# Patient Record
Sex: MALE | Race: ASIAN | NOT HISPANIC OR LATINO | URBAN - METROPOLITAN AREA
[De-identification: names, ages, dates, MRNs, and addresses within clinical notes are randomized per-mention and may not be internally consistent; named-entity substitution may affect disease eponyms.]

---

## 2017-10-04 ENCOUNTER — EMERGENCY (EMERGENCY)
Facility: HOSPITAL | Age: 47
LOS: 1 days | Discharge: HOME | End: 2017-10-04
Attending: EMERGENCY MEDICINE | Admitting: EMERGENCY MEDICINE
Payer: MEDICAID

## 2017-10-04 VITALS
OXYGEN SATURATION: 100 % | TEMPERATURE: 98 F | SYSTOLIC BLOOD PRESSURE: 133 MMHG | DIASTOLIC BLOOD PRESSURE: 98 MMHG | HEART RATE: 89 BPM | RESPIRATION RATE: 16 BRPM

## 2017-10-04 VITALS
RESPIRATION RATE: 16 BRPM | SYSTOLIC BLOOD PRESSURE: 141 MMHG | HEART RATE: 93 BPM | DIASTOLIC BLOOD PRESSURE: 100 MMHG | TEMPERATURE: 98 F | OXYGEN SATURATION: 100 %

## 2017-10-04 PROCEDURE — 99284 EMERGENCY DEPT VISIT MOD MDM: CPT

## 2017-10-04 RX ORDER — LOPERAMIDE HCL 2 MG
2 TABLET ORAL ONCE
Qty: 0 | Refills: 0 | Status: COMPLETED | OUTPATIENT
Start: 2017-10-04 | End: 2017-10-04

## 2017-10-04 RX ORDER — SODIUM CHLORIDE 9 MG/ML
1000 INJECTION INTRAMUSCULAR; INTRAVENOUS; SUBCUTANEOUS ONCE
Qty: 0 | Refills: 0 | Status: COMPLETED | OUTPATIENT
Start: 2017-10-04 | End: 2017-10-04

## 2017-10-04 NOTE — ED ADULT NURSE NOTE - CHIEF COMPLAINT QUOTE
Pt comes in for c/o abd pain that began since his return from [facility] 9/18/17. Pt reports that he was initially only having LLQ pain, now is having diarrhea, multiple episodes today. Pt reports feeling weak, appears in no acute distress, vs as noted.

## 2017-10-04 NOTE — ED ADULT TRIAGE NOTE - CHIEF COMPLAINT QUOTE
Pt comes in for c/o abd pain that began since his return from [facility] 9/18/17. Pt reports that he was initially only having LLQ pain, now is having diarrhea, multiple episodes today. Pt reports feeling weak, appears in no acute distress, vs as noted.

## 2017-10-05 LAB
ALBUMIN SERPL ELPH-MCNC: 4.3 G/DL — SIGNIFICANT CHANGE UP (ref 3.3–5)
ALP SERPL-CCNC: 60 U/L — SIGNIFICANT CHANGE UP (ref 40–120)
ALT FLD-CCNC: 26 U/L — SIGNIFICANT CHANGE UP (ref 4–41)
AST SERPL-CCNC: 24 U/L — SIGNIFICANT CHANGE UP (ref 4–40)
BASOPHILS # BLD AUTO: 0.05 K/UL — SIGNIFICANT CHANGE UP (ref 0–0.2)
BASOPHILS NFR BLD AUTO: 0.7 % — SIGNIFICANT CHANGE UP (ref 0–2)
BILIRUB SERPL-MCNC: 0.8 MG/DL — SIGNIFICANT CHANGE UP (ref 0.2–1.2)
BUN SERPL-MCNC: 12 MG/DL — SIGNIFICANT CHANGE UP (ref 7–23)
CALCIUM SERPL-MCNC: 8.8 MG/DL — SIGNIFICANT CHANGE UP (ref 8.4–10.5)
CHLORIDE SERPL-SCNC: 98 MMOL/L — SIGNIFICANT CHANGE UP (ref 98–107)
CO2 SERPL-SCNC: 26 MMOL/L — SIGNIFICANT CHANGE UP (ref 22–31)
CREAT SERPL-MCNC: 0.89 MG/DL — SIGNIFICANT CHANGE UP (ref 0.5–1.3)
EOSINOPHIL # BLD AUTO: 0.25 K/UL — SIGNIFICANT CHANGE UP (ref 0–0.5)
EOSINOPHIL NFR BLD AUTO: 3.7 % — SIGNIFICANT CHANGE UP (ref 0–6)
GLUCOSE SERPL-MCNC: 178 MG/DL — HIGH (ref 70–99)
HCT VFR BLD CALC: 47.7 % — SIGNIFICANT CHANGE UP (ref 39–50)
HGB BLD-MCNC: 16.3 G/DL — SIGNIFICANT CHANGE UP (ref 13–17)
IMM GRANULOCYTES # BLD AUTO: 0.02 # — SIGNIFICANT CHANGE UP
IMM GRANULOCYTES NFR BLD AUTO: 0.3 % — SIGNIFICANT CHANGE UP (ref 0–1.5)
LYMPHOCYTES # BLD AUTO: 2.31 K/UL — SIGNIFICANT CHANGE UP (ref 1–3.3)
LYMPHOCYTES # BLD AUTO: 34.3 % — SIGNIFICANT CHANGE UP (ref 13–44)
MCHC RBC-ENTMCNC: 29.1 PG — SIGNIFICANT CHANGE UP (ref 27–34)
MCHC RBC-ENTMCNC: 34.2 % — SIGNIFICANT CHANGE UP (ref 32–36)
MCV RBC AUTO: 85.2 FL — SIGNIFICANT CHANGE UP (ref 80–100)
MONOCYTES # BLD AUTO: 0.79 K/UL — SIGNIFICANT CHANGE UP (ref 0–0.9)
MONOCYTES NFR BLD AUTO: 11.7 % — SIGNIFICANT CHANGE UP (ref 2–14)
NEUTROPHILS # BLD AUTO: 3.31 K/UL — SIGNIFICANT CHANGE UP (ref 1.8–7.4)
NEUTROPHILS NFR BLD AUTO: 49.3 % — SIGNIFICANT CHANGE UP (ref 43–77)
NRBC # FLD: 0 — SIGNIFICANT CHANGE UP
PLATELET # BLD AUTO: 193 K/UL — SIGNIFICANT CHANGE UP (ref 150–400)
PMV BLD: 10.9 FL — SIGNIFICANT CHANGE UP (ref 7–13)
POTASSIUM SERPL-MCNC: 4.1 MMOL/L — SIGNIFICANT CHANGE UP (ref 3.5–5.3)
POTASSIUM SERPL-SCNC: 4.1 MMOL/L — SIGNIFICANT CHANGE UP (ref 3.5–5.3)
PROT SERPL-MCNC: 7.5 G/DL — SIGNIFICANT CHANGE UP (ref 6–8.3)
RBC # BLD: 5.6 M/UL — SIGNIFICANT CHANGE UP (ref 4.2–5.8)
RBC # FLD: 11.5 % — SIGNIFICANT CHANGE UP (ref 10.3–14.5)
SODIUM SERPL-SCNC: 136 MMOL/L — SIGNIFICANT CHANGE UP (ref 135–145)
WBC # BLD: 6.73 K/UL — SIGNIFICANT CHANGE UP (ref 3.8–10.5)
WBC # FLD AUTO: 6.73 K/UL — SIGNIFICANT CHANGE UP (ref 3.8–10.5)

## 2017-10-05 RX ADMIN — SODIUM CHLORIDE 2000 MILLILITER(S): 9 INJECTION INTRAMUSCULAR; INTRAVENOUS; SUBCUTANEOUS at 00:06

## 2017-10-05 RX ADMIN — Medication 2 MILLIGRAM(S): at 00:47

## 2017-10-05 NOTE — ED PROVIDER NOTE - OBJECTIVE STATEMENT
This is a 48yo male with DM presenting with 2 weeks of abdominal pain. Patient states that he was recently abroad in [facility] for 17 days and [place] to [place] he began experiencing abdominal pain. Patient describes the pain as sharp/burning and intermittent lasting between 20-30 minutes after eating. The pain is located in LLQ with no radiation. Additionally, patient states that he had subjective fevers and chills at home on Saturday/Sunday and developed a non-productive cough on Sunday. Last night, patient states that he developed diarrhea and had about 6-7 bowel movements. Denies nausea/vomiting, sob, cp, melena/hematochezia, dysuria, constipation.

## 2017-10-05 NOTE — ED PROVIDER NOTE - ATTENDING CONTRIBUTION TO CARE
DR. CARDENAS, ATTENDING MD-  I performed a face to face bedside interview with patient regarding history of present illness, review of symptoms and past medical history. I completed an independent physical exam.  I have discussed patient's plan of care with the resident.   Documentation as above in the note.    46 y/o male recently returned to [place] after visiting [facility] with about 2 wks of llq discomfort, yesterday developed diarrhea without mucus / blood.  Denies f/c, ha, neck stiffness, cp, sob, cough, n/v, dysuria, rash.  Afebrile, vs wnl, dry mm, fatigued, ctabil, s1s2 rrr no m/r/g, abd soft non ttp no r/g, no cva tenderness b/l, no leg swelling b/l, no rash.    Benign abd, likely viral syndrome vs foodborne illness.  Eval for lyte abn.  Obtain cbc, bmp, give ivf bolus, antidiarrheal, reassess.

## 2017-10-05 NOTE — ED PROVIDER NOTE - CHPI ED SYMPTOMS NEG
no dysuria/no chills/no burning urination/no blood in stool/no hematuria/no nausea/no abdominal distension/no vomiting

## 2020-03-18 NOTE — ED ADULT NURSE NOTE - PMH
You have been scheduled for Tuesday, July 14, 2020 09:00 AM with Dr Richard Thomas. No pertinent past medical history

## 2021-03-31 NOTE — ED PROVIDER NOTE - TOBACCO USE
Bexarotene Counseling:  I discussed with the patient the risks of bexarotene including but not limited to hair loss, dry lips/skin/eyes, liver abnormalities, hyperlipidemia, pancreatitis, depression/suicidal ideation, photosensitivity, drug rash/allergic reactions, hypothyroidism, anemia, leukopenia, infection, cataracts, and teratogenicity.  Patient understands that they will need regular blood tests to check lipid profile, liver function tests, white blood cell count, thyroid function tests and pregnancy test if applicable. Current every day smoker

## 2022-05-14 ENCOUNTER — EMERGENCY (EMERGENCY)
Facility: HOSPITAL | Age: 52
LOS: 1 days | Discharge: HOME | End: 2022-05-14
Attending: EMERGENCY MEDICINE | Admitting: EMERGENCY MEDICINE
Payer: MEDICAID

## 2022-05-14 VITALS
RESPIRATION RATE: 18 BRPM | SYSTOLIC BLOOD PRESSURE: 142 MMHG | HEART RATE: 87 BPM | OXYGEN SATURATION: 100 % | DIASTOLIC BLOOD PRESSURE: 84 MMHG

## 2022-05-14 VITALS
OXYGEN SATURATION: 100 % | RESPIRATION RATE: 18 BRPM | SYSTOLIC BLOOD PRESSURE: 159 MMHG | TEMPERATURE: 98 F | DIASTOLIC BLOOD PRESSURE: 90 MMHG | HEART RATE: 84 BPM

## 2022-05-14 LAB
ALBUMIN SERPL ELPH-MCNC: 4.2 G/DL — SIGNIFICANT CHANGE UP (ref 3.3–5)
ALP SERPL-CCNC: 82 U/L — SIGNIFICANT CHANGE UP (ref 40–120)
ALT FLD-CCNC: 23 U/L — SIGNIFICANT CHANGE UP (ref 4–41)
ANION GAP SERPL CALC-SCNC: 14 MMOL/L — SIGNIFICANT CHANGE UP (ref 7–14)
AST SERPL-CCNC: 15 U/L — SIGNIFICANT CHANGE UP (ref 4–40)
BASOPHILS # BLD AUTO: 0.06 K/UL — SIGNIFICANT CHANGE UP (ref 0–0.2)
BASOPHILS NFR BLD AUTO: 0.7 % — SIGNIFICANT CHANGE UP (ref 0–2)
BILIRUB SERPL-MCNC: 0.5 MG/DL — SIGNIFICANT CHANGE UP (ref 0.2–1.2)
BLOOD GAS VENOUS COMPREHENSIVE RESULT: SIGNIFICANT CHANGE UP
BUN SERPL-MCNC: 14 MG/DL — SIGNIFICANT CHANGE UP (ref 7–23)
CALCIUM SERPL-MCNC: 9.3 MG/DL — SIGNIFICANT CHANGE UP (ref 8.4–10.5)
CHLORIDE SERPL-SCNC: 96 MMOL/L — LOW (ref 98–107)
CO2 SERPL-SCNC: 22 MMOL/L — SIGNIFICANT CHANGE UP (ref 22–31)
CREAT SERPL-MCNC: 0.8 MG/DL — SIGNIFICANT CHANGE UP (ref 0.5–1.3)
EGFR: 108 ML/MIN/1.73M2 — SIGNIFICANT CHANGE UP
EOSINOPHIL # BLD AUTO: 0.26 K/UL — SIGNIFICANT CHANGE UP (ref 0–0.5)
EOSINOPHIL NFR BLD AUTO: 2.9 % — SIGNIFICANT CHANGE UP (ref 0–6)
GLUCOSE SERPL-MCNC: 359 MG/DL — HIGH (ref 70–99)
HCT VFR BLD CALC: 46.9 % — SIGNIFICANT CHANGE UP (ref 39–50)
HGB BLD-MCNC: 16.3 G/DL — SIGNIFICANT CHANGE UP (ref 13–17)
IANC: 5.89 K/UL — SIGNIFICANT CHANGE UP (ref 1.8–7.4)
IMM GRANULOCYTES NFR BLD AUTO: 0.2 % — SIGNIFICANT CHANGE UP (ref 0–1.5)
LYMPHOCYTES # BLD AUTO: 2.05 K/UL — SIGNIFICANT CHANGE UP (ref 1–3.3)
LYMPHOCYTES # BLD AUTO: 23.1 % — SIGNIFICANT CHANGE UP (ref 13–44)
MCHC RBC-ENTMCNC: 29.3 PG — SIGNIFICANT CHANGE UP (ref 27–34)
MCHC RBC-ENTMCNC: 34.8 GM/DL — SIGNIFICANT CHANGE UP (ref 32–36)
MCV RBC AUTO: 84.2 FL — SIGNIFICANT CHANGE UP (ref 80–100)
MONOCYTES # BLD AUTO: 0.6 K/UL — SIGNIFICANT CHANGE UP (ref 0–0.9)
MONOCYTES NFR BLD AUTO: 6.8 % — SIGNIFICANT CHANGE UP (ref 2–14)
NEUTROPHILS # BLD AUTO: 5.89 K/UL — SIGNIFICANT CHANGE UP (ref 1.8–7.4)
NEUTROPHILS NFR BLD AUTO: 66.3 % — SIGNIFICANT CHANGE UP (ref 43–77)
NRBC # BLD: 0 /100 WBCS — SIGNIFICANT CHANGE UP
NRBC # FLD: 0 K/UL — SIGNIFICANT CHANGE UP
PLATELET # BLD AUTO: 234 K/UL — SIGNIFICANT CHANGE UP (ref 150–400)
POTASSIUM SERPL-MCNC: 4.1 MMOL/L — SIGNIFICANT CHANGE UP (ref 3.5–5.3)
POTASSIUM SERPL-SCNC: 4.1 MMOL/L — SIGNIFICANT CHANGE UP (ref 3.5–5.3)
PROT SERPL-MCNC: 7.6 G/DL — SIGNIFICANT CHANGE UP (ref 6–8.3)
RBC # BLD: 5.57 M/UL — SIGNIFICANT CHANGE UP (ref 4.2–5.8)
RBC # FLD: 11.9 % — SIGNIFICANT CHANGE UP (ref 10.3–14.5)
SODIUM SERPL-SCNC: 132 MMOL/L — LOW (ref 135–145)
WBC # BLD: 8.88 K/UL — SIGNIFICANT CHANGE UP (ref 3.8–10.5)
WBC # FLD AUTO: 8.88 K/UL — SIGNIFICANT CHANGE UP (ref 3.8–10.5)

## 2022-05-14 PROCEDURE — 99284 EMERGENCY DEPT VISIT MOD MDM: CPT

## 2022-05-14 RX ORDER — FAMOTIDINE 10 MG/ML
20 INJECTION INTRAVENOUS ONCE
Refills: 0 | Status: COMPLETED | OUTPATIENT
Start: 2022-05-14 | End: 2022-05-14

## 2022-05-14 RX ORDER — SODIUM CHLORIDE 9 MG/ML
1000 INJECTION, SOLUTION INTRAVENOUS ONCE
Refills: 0 | Status: COMPLETED | OUTPATIENT
Start: 2022-05-14 | End: 2022-05-14

## 2022-05-14 RX ORDER — ACETAMINOPHEN 500 MG
975 TABLET ORAL ONCE
Refills: 0 | Status: COMPLETED | OUTPATIENT
Start: 2022-05-14 | End: 2022-05-14

## 2022-05-14 RX ADMIN — FAMOTIDINE 20 MILLIGRAM(S): 10 INJECTION INTRAVENOUS at 02:57

## 2022-05-14 RX ADMIN — SODIUM CHLORIDE 1000 MILLILITER(S): 9 INJECTION, SOLUTION INTRAVENOUS at 02:57

## 2022-05-14 RX ADMIN — Medication 975 MILLIGRAM(S): at 02:57

## 2022-05-14 NOTE — ED PROVIDER NOTE - PHYSICAL EXAMINATION
General appearance: NAD, conversant, afebrile    Eyes: anicteric sclerae, ERICK, EOMI   HENT: Atraumatic; oropharynx clear, MMM and no ulcerations, no pharyngeal erythema or exudate   Neck: Trachea midline; Full range of motion, supple   Pulm: CTA bl, normal respiratory effort and no intercostal retractions, normal work of breathing   CV: RRR, No murmurs, rubs, or gallops   Abdomen: Soft, non-tender, non-distended; no guarding or rebound   Back: No cvat   Extremities: No peripheral edema    Skin: Dry, normal temperature, turgor and texture; no rash

## 2022-05-14 NOTE — ED PROVIDER NOTE - CLINICAL SUMMARY MEDICAL DECISION MAKING FREE TEXT BOX
50yo male presenting with generalized abdominal pain, hyperglycemia.  Nontender on exam.  Low suspicion acute surgical process.  Will check labs and blood gas.   Clinically low suspicion dka.  Medicate with gi cocktail and reassess. 52yo male presenting with generalized abdominal pain, hyperglycemia.  Nontender on exam.  Low suspicion acute surgical process.  Will check labs and blood gas.   Clinically low suspicion dka.  Medicate with gi cocktail and reassess.    elizabetholesya: 50 yo man with recent travel from [facility] c/o generailze abd pain.  denies diarrhea/ fever/ vomiting.  has dm/ htn, on janumet.  abd soft no r/g.  pt lucid and awake.  labs being checked, does not appear to need ct based on exam

## 2022-05-14 NOTE — ED PROVIDER NOTE - PROGRESS NOTE DETAILS
[place]enn[place] PGY3: patient notes improvement in pain.  Tolerating po.  Reviewed results with patient and plan for dc.  Has pmd to follow up with, answered questions.

## 2022-05-14 NOTE — ED PROVIDER NOTE - PATIENT PORTAL LINK FT
You can access the FollowMyHealth Patient Portal offered by [facility] by registering at the following website: [email or web address]. By joining [employer]’s FollowMyHealth portal, you will also be able to view your health information using other applications (apps) compatible with our system.

## 2022-05-14 NOTE — ED PROVIDER NOTE - OBJECTIVE STATEMENT
50yo male with pmh dm, htn, hld, presenting with generalized abdominal pain.  Started 4 days ago and persistent, progressively worsening.  Has not taken anything for it.  No vomiting, nausea, diarrhea.  Did have constipation earlier today.  Recently returned from [facility] last sunday.  No chest pain, back pain, sob, urinary symptoms.  Noted to be hyperglycemic, takes janumet but did not take dose today.  No pshx.

## 2022-05-14 NOTE — ED PROVIDER NOTE - NSFOLLOWUPINSTRUCTIONS_ED_ALL_ED_FT
Rest, drink plenty of fluids  Advance activity as tolerated  Continue all previously prescribed medications as directed  Follow up with your PMD - bring copies of your results  Return to the ER for severe abdominal pain, excessive vomiting, or other new or concerning symptoms   For pain you may take famotidine 20mg every 12 hours as needed  You may also take maalox 20ml every 6 hours as needed  You may also take acetaminophen 975mg every 6 hours as needed  For constipation you may take miralax 17g daily as needed

## 2022-05-14 NOTE — ED ADULT NURSE NOTE - OBJECTIVE STATEMENT
A&OX4 ambulatory self care 51 year old female presenting to the ed today from home for abdominal pain, hx diverticulitis, pt states he has been having abdominal pain x1 week with no nausea no vomiting, no chest pain no diarrhea. 20g iv placed in right ac. labs drawn and sent.

## 2022-05-14 NOTE — ED PROVIDER NOTE - ATTENDING CONTRIBUTION TO CARE
eitan: 52 yo man with recent travel from [facility] c/o generailze abd pain.  denies diarrhea/ fever/ vomiting.  has dm/ htn, on janumet.  abd soft no r/g.  pt lucid and awake.  labs being checked, does not appear to need ct based on exam    I performed a history and physical exam of the patient and discussed their management with the resident and /or advanced care provider. I reviewed the resident and /or ACP's note and agree with the documented findings and plan of care. My medical decison making and observations are found above.

## 2022-05-14 NOTE — ED ADULT TRIAGE NOTE - CHIEF COMPLAINT QUOTE
C/o generalized abd pain 1x week. Denies nausea, vomiting, diarrhea, constipation, fever or chills. PMH DM2,  at triage. Pt states did not take medication today

## 2022-12-10 ENCOUNTER — INPATIENT (INPATIENT)
Facility: HOSPITAL | Age: 52
LOS: 15 days | Discharge: HOME HEALTH | DRG: 234 | End: 2022-12-26
Attending: THORACIC SURGERY (CARDIOTHORACIC VASCULAR SURGERY) | Admitting: INTERNAL MEDICINE
Payer: MEDICAID

## 2022-12-10 ENCOUNTER — EMERGENCY (EMERGENCY)
Facility: HOSPITAL | Age: 52
LOS: 0 days | Discharge: ANOTHER HOSPITAL | End: 2022-12-10
Attending: STUDENT IN AN ORGANIZED HEALTH CARE EDUCATION/TRAINING PROGRAM

## 2022-12-10 VITALS
RESPIRATION RATE: 18 BRPM | WEIGHT: 149.91 LBS | OXYGEN SATURATION: 95 % | TEMPERATURE: 98 F | SYSTOLIC BLOOD PRESSURE: 125 MMHG | HEART RATE: 93 BPM | DIASTOLIC BLOOD PRESSURE: 79 MMHG

## 2022-12-10 VITALS
SYSTOLIC BLOOD PRESSURE: 125 MMHG | DIASTOLIC BLOOD PRESSURE: 80 MMHG | TEMPERATURE: 98 F | RESPIRATION RATE: 25 BRPM | OXYGEN SATURATION: 98 % | HEART RATE: 97 BPM

## 2022-12-10 VITALS
HEIGHT: 69 IN | HEART RATE: 92 BPM | DIASTOLIC BLOOD PRESSURE: 90 MMHG | TEMPERATURE: 99 F | RESPIRATION RATE: 16 BRPM | SYSTOLIC BLOOD PRESSURE: 150 MMHG | WEIGHT: 149.91 LBS | OXYGEN SATURATION: 96 %

## 2022-12-10 DIAGNOSIS — R07.89 OTHER CHEST PAIN: ICD-10-CM

## 2022-12-10 DIAGNOSIS — Z20.822 CONTACT WITH AND (SUSPECTED) EXPOSURE TO COVID-19: ICD-10-CM

## 2022-12-10 DIAGNOSIS — I25.10 ATHEROSCLEROTIC HEART DISEASE OF NATIVE CORONARY ARTERY WITHOUT ANGINA PECTORIS: ICD-10-CM

## 2022-12-10 DIAGNOSIS — R61 GENERALIZED HYPERHIDROSIS: ICD-10-CM

## 2022-12-10 DIAGNOSIS — E11.9 TYPE 2 DIABETES MELLITUS WITHOUT COMPLICATIONS: ICD-10-CM

## 2022-12-10 DIAGNOSIS — I21.4 NON-ST ELEVATION (NSTEMI) MYOCARDIAL INFARCTION: ICD-10-CM

## 2022-12-10 DIAGNOSIS — R94.31 ABNORMAL ELECTROCARDIOGRAM [ECG] [EKG]: ICD-10-CM

## 2022-12-10 DIAGNOSIS — I10 ESSENTIAL (PRIMARY) HYPERTENSION: ICD-10-CM

## 2022-12-10 DIAGNOSIS — I77.0 ARTERIOVENOUS FISTULA, ACQUIRED: ICD-10-CM

## 2022-12-10 LAB
A1C WITH ESTIMATED AVERAGE GLUCOSE RESULT: 8.8 % — HIGH (ref 4–5.6)
ALBUMIN SERPL ELPH-MCNC: 3.8 G/DL — SIGNIFICANT CHANGE UP (ref 3.3–5)
ALBUMIN SERPL ELPH-MCNC: 4.1 G/DL — SIGNIFICANT CHANGE UP (ref 3.3–5)
ALBUMIN SERPL ELPH-MCNC: 4.5 G/DL — SIGNIFICANT CHANGE UP (ref 3.3–5)
ALP SERPL-CCNC: 62 U/L — SIGNIFICANT CHANGE UP (ref 40–120)
ALP SERPL-CCNC: 64 U/L — SIGNIFICANT CHANGE UP (ref 40–120)
ALP SERPL-CCNC: 68 U/L — SIGNIFICANT CHANGE UP (ref 40–120)
ALT FLD-CCNC: 25 U/L — SIGNIFICANT CHANGE UP (ref 12–78)
ALT FLD-CCNC: 26 U/L — SIGNIFICANT CHANGE UP (ref 10–45)
ALT FLD-CCNC: 30 U/L — SIGNIFICANT CHANGE UP (ref 10–45)
ANION GAP SERPL CALC-SCNC: 13 MMOL/L — SIGNIFICANT CHANGE UP (ref 5–17)
ANION GAP SERPL CALC-SCNC: 14 MMOL/L — SIGNIFICANT CHANGE UP (ref 5–17)
ANION GAP SERPL CALC-SCNC: 9 MMOL/L — SIGNIFICANT CHANGE UP (ref 5–17)
APTT BLD: 128 SEC — CRITICAL HIGH (ref 27.5–35.5)
APTT BLD: 35.4 SEC — SIGNIFICANT CHANGE UP (ref 27.5–35.5)
APTT BLD: 48.1 SEC — HIGH (ref 27.5–35.5)
AST SERPL-CCNC: 138 U/L — HIGH (ref 10–40)
AST SERPL-CCNC: 18 U/L — SIGNIFICANT CHANGE UP (ref 15–37)
AST SERPL-CCNC: 71 U/L — HIGH (ref 10–40)
BASOPHILS # BLD AUTO: 0.05 K/UL — SIGNIFICANT CHANGE UP (ref 0–0.2)
BASOPHILS # BLD AUTO: 0.09 K/UL — SIGNIFICANT CHANGE UP (ref 0–0.2)
BASOPHILS NFR BLD AUTO: 0.5 % — SIGNIFICANT CHANGE UP (ref 0–2)
BASOPHILS NFR BLD AUTO: 0.6 % — SIGNIFICANT CHANGE UP (ref 0–2)
BILIRUB SERPL-MCNC: 0.4 MG/DL — SIGNIFICANT CHANGE UP (ref 0.2–1.2)
BILIRUB SERPL-MCNC: 0.4 MG/DL — SIGNIFICANT CHANGE UP (ref 0.2–1.2)
BILIRUB SERPL-MCNC: 0.5 MG/DL — SIGNIFICANT CHANGE UP (ref 0.2–1.2)
BLD GP AB SCN SERPL QL: NEGATIVE — SIGNIFICANT CHANGE UP
BUN SERPL-MCNC: 16 MG/DL — SIGNIFICANT CHANGE UP (ref 7–23)
BUN SERPL-MCNC: 19 MG/DL — SIGNIFICANT CHANGE UP (ref 7–23)
BUN SERPL-MCNC: 19 MG/DL — SIGNIFICANT CHANGE UP (ref 7–23)
CALCIUM SERPL-MCNC: 8.8 MG/DL — SIGNIFICANT CHANGE UP (ref 8.4–10.5)
CALCIUM SERPL-MCNC: 9.1 MG/DL — SIGNIFICANT CHANGE UP (ref 8.5–10.1)
CALCIUM SERPL-MCNC: 9.3 MG/DL — SIGNIFICANT CHANGE UP (ref 8.4–10.5)
CHLORIDE SERPL-SCNC: 101 MMOL/L — SIGNIFICANT CHANGE UP (ref 96–108)
CHLORIDE SERPL-SCNC: 101 MMOL/L — SIGNIFICANT CHANGE UP (ref 96–108)
CHLORIDE SERPL-SCNC: 102 MMOL/L — SIGNIFICANT CHANGE UP (ref 96–108)
CHOLEST SERPL-MCNC: 119 MG/DL — SIGNIFICANT CHANGE UP
CK MB BLD-MCNC: 7.5 % — HIGH (ref 0–3.5)
CK MB BLD-MCNC: 8.2 % — HIGH (ref 0–3.5)
CK MB BLD-MCNC: 8.8 % — HIGH (ref 0–3.5)
CK MB CFR SERPL CALC: 101.4 NG/ML — HIGH (ref 0–6.7)
CK MB CFR SERPL CALC: 47.4 NG/ML — HIGH (ref 0–6.7)
CK MB CFR SERPL CALC: 79.9 NG/ML — HIGH (ref 0–6.7)
CK SERPL-CCNC: 1059 U/L — HIGH (ref 30–200)
CK SERPL-CCNC: 1149 U/L — HIGH (ref 30–200)
CK SERPL-CCNC: 576 U/L — HIGH (ref 30–200)
CO2 SERPL-SCNC: 21 MMOL/L — LOW (ref 22–31)
CO2 SERPL-SCNC: 23 MMOL/L — SIGNIFICANT CHANGE UP (ref 22–31)
CO2 SERPL-SCNC: 25 MMOL/L — SIGNIFICANT CHANGE UP (ref 22–31)
CREAT SERPL-MCNC: 0.71 MG/DL — SIGNIFICANT CHANGE UP (ref 0.5–1.3)
CREAT SERPL-MCNC: 0.84 MG/DL — SIGNIFICANT CHANGE UP (ref 0.5–1.3)
CREAT SERPL-MCNC: 1.14 MG/DL — SIGNIFICANT CHANGE UP (ref 0.5–1.3)
EGFR: 106 ML/MIN/1.73M2 — SIGNIFICANT CHANGE UP
EGFR: 111 ML/MIN/1.73M2 — SIGNIFICANT CHANGE UP
EGFR: 78 ML/MIN/1.73M2 — SIGNIFICANT CHANGE UP
EOSINOPHIL # BLD AUTO: 0.09 K/UL — SIGNIFICANT CHANGE UP (ref 0–0.5)
EOSINOPHIL # BLD AUTO: 0.23 K/UL — SIGNIFICANT CHANGE UP (ref 0–0.5)
EOSINOPHIL NFR BLD AUTO: 0.8 % — SIGNIFICANT CHANGE UP (ref 0–6)
EOSINOPHIL NFR BLD AUTO: 1.6 % — SIGNIFICANT CHANGE UP (ref 0–6)
ESTIMATED AVERAGE GLUCOSE: 206 MG/DL — HIGH (ref 68–114)
FLUAV AG NPH QL: SIGNIFICANT CHANGE UP
FLUBV AG NPH QL: SIGNIFICANT CHANGE UP
GLUCOSE BLDC GLUCOMTR-MCNC: 153 MG/DL — HIGH (ref 70–99)
GLUCOSE BLDC GLUCOMTR-MCNC: 193 MG/DL — HIGH (ref 70–99)
GLUCOSE SERPL-MCNC: 121 MG/DL — HIGH (ref 70–99)
GLUCOSE SERPL-MCNC: 188 MG/DL — HIGH (ref 70–99)
GLUCOSE SERPL-MCNC: 231 MG/DL — HIGH (ref 70–99)
HCT VFR BLD CALC: 47.4 % — SIGNIFICANT CHANGE UP (ref 39–50)
HCT VFR BLD CALC: 48.4 % — SIGNIFICANT CHANGE UP (ref 39–50)
HCT VFR BLD CALC: 49.1 % — SIGNIFICANT CHANGE UP (ref 39–50)
HDLC SERPL-MCNC: 37 MG/DL — LOW
HGB BLD-MCNC: 16.1 G/DL — SIGNIFICANT CHANGE UP (ref 13–17)
HGB BLD-MCNC: 16.2 G/DL — SIGNIFICANT CHANGE UP (ref 13–17)
HGB BLD-MCNC: 16.6 G/DL — SIGNIFICANT CHANGE UP (ref 13–17)
IMM GRANULOCYTES NFR BLD AUTO: 0.5 % — SIGNIFICANT CHANGE UP (ref 0–0.9)
IMM GRANULOCYTES NFR BLD AUTO: 0.6 % — SIGNIFICANT CHANGE UP (ref 0–0.9)
INR BLD: 1.08 RATIO — SIGNIFICANT CHANGE UP (ref 0.88–1.16)
INR BLD: 1.15 RATIO — SIGNIFICANT CHANGE UP (ref 0.88–1.16)
INR BLD: 1.18 RATIO — HIGH (ref 0.88–1.16)
LIPID PNL WITH DIRECT LDL SERPL: 67 MG/DL — SIGNIFICANT CHANGE UP
LYMPHOCYTES # BLD AUTO: 1.77 K/UL — SIGNIFICANT CHANGE UP (ref 1–3.3)
LYMPHOCYTES # BLD AUTO: 15.1 % — SIGNIFICANT CHANGE UP (ref 13–44)
LYMPHOCYTES # BLD AUTO: 15.9 % — SIGNIFICANT CHANGE UP (ref 13–44)
LYMPHOCYTES # BLD AUTO: 2.12 K/UL — SIGNIFICANT CHANGE UP (ref 1–3.3)
MAGNESIUM SERPL-MCNC: 2.2 MG/DL — SIGNIFICANT CHANGE UP (ref 1.6–2.6)
MCHC RBC-ENTMCNC: 28.5 PG — SIGNIFICANT CHANGE UP (ref 27–34)
MCHC RBC-ENTMCNC: 28.9 PG — SIGNIFICANT CHANGE UP (ref 27–34)
MCHC RBC-ENTMCNC: 29.2 PG — SIGNIFICANT CHANGE UP (ref 27–34)
MCHC RBC-ENTMCNC: 33.3 GM/DL — SIGNIFICANT CHANGE UP (ref 32–36)
MCHC RBC-ENTMCNC: 33.8 G/DL — SIGNIFICANT CHANGE UP (ref 32–36)
MCHC RBC-ENTMCNC: 34.2 GM/DL — SIGNIFICANT CHANGE UP (ref 32–36)
MCV RBC AUTO: 84.4 FL — SIGNIFICANT CHANGE UP (ref 80–100)
MCV RBC AUTO: 85.6 FL — SIGNIFICANT CHANGE UP (ref 80–100)
MCV RBC AUTO: 86.9 FL — SIGNIFICANT CHANGE UP (ref 80–100)
MONOCYTES # BLD AUTO: 0.29 K/UL — SIGNIFICANT CHANGE UP (ref 0–0.9)
MONOCYTES # BLD AUTO: 0.7 K/UL — SIGNIFICANT CHANGE UP (ref 0–0.9)
MONOCYTES NFR BLD AUTO: 2.6 % — SIGNIFICANT CHANGE UP (ref 2–14)
MONOCYTES NFR BLD AUTO: 5 % — SIGNIFICANT CHANGE UP (ref 2–14)
NEUTROPHILS # BLD AUTO: 10.85 K/UL — HIGH (ref 1.8–7.4)
NEUTROPHILS # BLD AUTO: 8.85 K/UL — HIGH (ref 1.8–7.4)
NEUTROPHILS NFR BLD AUTO: 77.1 % — HIGH (ref 43–77)
NEUTROPHILS NFR BLD AUTO: 79.7 % — HIGH (ref 43–77)
NON HDL CHOLESTEROL: 82 MG/DL — SIGNIFICANT CHANGE UP
NRBC # BLD: 0 /100 WBCS — SIGNIFICANT CHANGE UP (ref 0–0)
NT-PROBNP SERPL-SCNC: 191 PG/ML — HIGH (ref 0–125)
PA ADP PRP-ACNC: 41 PRU — LOW (ref 194–417)
PHOSPHATE SERPL-MCNC: 3.6 MG/DL — SIGNIFICANT CHANGE UP (ref 2.5–4.5)
PLATELET # BLD AUTO: 209 K/UL — SIGNIFICANT CHANGE UP (ref 150–400)
PLATELET # BLD AUTO: 209 K/UL — SIGNIFICANT CHANGE UP (ref 150–400)
PLATELET # BLD AUTO: 214 K/UL — SIGNIFICANT CHANGE UP (ref 150–400)
POTASSIUM SERPL-MCNC: 3.7 MMOL/L — SIGNIFICANT CHANGE UP (ref 3.5–5.3)
POTASSIUM SERPL-MCNC: 3.7 MMOL/L — SIGNIFICANT CHANGE UP (ref 3.5–5.3)
POTASSIUM SERPL-MCNC: 4 MMOL/L — SIGNIFICANT CHANGE UP (ref 3.5–5.3)
POTASSIUM SERPL-SCNC: 3.7 MMOL/L — SIGNIFICANT CHANGE UP (ref 3.5–5.3)
POTASSIUM SERPL-SCNC: 3.7 MMOL/L — SIGNIFICANT CHANGE UP (ref 3.5–5.3)
POTASSIUM SERPL-SCNC: 4 MMOL/L — SIGNIFICANT CHANGE UP (ref 3.5–5.3)
PROT SERPL-MCNC: 6.9 G/DL — SIGNIFICANT CHANGE UP (ref 6–8.3)
PROT SERPL-MCNC: 7.2 GM/DL — SIGNIFICANT CHANGE UP (ref 6–8.3)
PROT SERPL-MCNC: 7.3 G/DL — SIGNIFICANT CHANGE UP (ref 6–8.3)
PROTHROM AB SERPL-ACNC: 13 SEC — SIGNIFICANT CHANGE UP (ref 10.5–13.4)
PROTHROM AB SERPL-ACNC: 13.2 SEC — SIGNIFICANT CHANGE UP (ref 10.5–13.4)
PROTHROM AB SERPL-ACNC: 13.6 SEC — HIGH (ref 10.5–13.4)
RBC # BLD: 5.54 M/UL — SIGNIFICANT CHANGE UP (ref 4.2–5.8)
RBC # BLD: 5.57 M/UL — SIGNIFICANT CHANGE UP (ref 4.2–5.8)
RBC # BLD: 5.82 M/UL — HIGH (ref 4.2–5.8)
RBC # FLD: 12.3 % — SIGNIFICANT CHANGE UP (ref 10.3–14.5)
RBC # FLD: 12.3 % — SIGNIFICANT CHANGE UP (ref 10.3–14.5)
RBC # FLD: 12.5 % — SIGNIFICANT CHANGE UP (ref 10.3–14.5)
RH IG SCN BLD-IMP: POSITIVE — SIGNIFICANT CHANGE UP
SARS-COV-2 RNA SPEC QL NAA+PROBE: SIGNIFICANT CHANGE UP
SARS-COV-2 RNA SPEC QL NAA+PROBE: SIGNIFICANT CHANGE UP
SODIUM SERPL-SCNC: 136 MMOL/L — SIGNIFICANT CHANGE UP (ref 135–145)
SODIUM SERPL-SCNC: 136 MMOL/L — SIGNIFICANT CHANGE UP (ref 135–145)
SODIUM SERPL-SCNC: 137 MMOL/L — SIGNIFICANT CHANGE UP (ref 135–145)
TRIGL SERPL-MCNC: 74 MG/DL — SIGNIFICANT CHANGE UP
TROPONIN I, HIGH SENSITIVITY RESULT: 135.1 NG/L — HIGH
TROPONIN T, HIGH SENSITIVITY RESULT: 1659 NG/L — HIGH (ref 0–51)
TROPONIN T, HIGH SENSITIVITY RESULT: 2197 NG/L — HIGH (ref 0–51)
TROPONIN T, HIGH SENSITIVITY RESULT: 516 NG/L — HIGH (ref 0–51)
TSH SERPL-MCNC: 0.38 UIU/ML — SIGNIFICANT CHANGE UP (ref 0.27–4.2)
WBC # BLD: 11.11 K/UL — HIGH (ref 3.8–10.5)
WBC # BLD: 14.07 K/UL — HIGH (ref 3.8–10.5)
WBC # BLD: 8.05 K/UL — SIGNIFICANT CHANGE UP (ref 3.8–10.5)
WBC # FLD AUTO: 11.11 K/UL — HIGH (ref 3.8–10.5)
WBC # FLD AUTO: 14.07 K/UL — HIGH (ref 3.8–10.5)
WBC # FLD AUTO: 8.05 K/UL — SIGNIFICANT CHANGE UP (ref 3.8–10.5)

## 2022-12-10 PROCEDURE — 99292 CRITICAL CARE ADDL 30 MIN: CPT

## 2022-12-10 PROCEDURE — 99152 MOD SED SAME PHYS/QHP 5/>YRS: CPT

## 2022-12-10 PROCEDURE — 99053 MED SERV 10PM-8AM 24 HR FAC: CPT

## 2022-12-10 PROCEDURE — 99291 CRITICAL CARE FIRST HOUR: CPT | Mod: 25

## 2022-12-10 PROCEDURE — 99221 1ST HOSP IP/OBS SF/LOW 40: CPT

## 2022-12-10 PROCEDURE — 71045 X-RAY EXAM CHEST 1 VIEW: CPT | Mod: 26

## 2022-12-10 PROCEDURE — 93306 TTE W/DOPPLER COMPLETE: CPT | Mod: 26

## 2022-12-10 PROCEDURE — 93010 ELECTROCARDIOGRAM REPORT: CPT

## 2022-12-10 PROCEDURE — 99291 CRITICAL CARE FIRST HOUR: CPT

## 2022-12-10 PROCEDURE — 99285 EMERGENCY DEPT VISIT HI MDM: CPT

## 2022-12-10 PROCEDURE — 71045 X-RAY EXAM CHEST 1 VIEW: CPT | Mod: 26,77

## 2022-12-10 PROCEDURE — 93458 L HRT ARTERY/VENTRICLE ANGIO: CPT | Mod: 26

## 2022-12-10 RX ORDER — FAMOTIDINE 10 MG/ML
20 INJECTION INTRAVENOUS ONCE
Refills: 0 | Status: COMPLETED | OUTPATIENT
Start: 2022-12-10 | End: 2022-12-10

## 2022-12-10 RX ORDER — NITROGLYCERIN 6.5 MG
0.4 CAPSULE, EXTENDED RELEASE ORAL
Refills: 0 | Status: DISCONTINUED | OUTPATIENT
Start: 2022-12-10 | End: 2022-12-10

## 2022-12-10 RX ORDER — HEPARIN SODIUM 5000 [USP'U]/ML
4000 INJECTION INTRAVENOUS; SUBCUTANEOUS ONCE
Refills: 0 | Status: COMPLETED | OUTPATIENT
Start: 2022-12-10 | End: 2022-12-10

## 2022-12-10 RX ORDER — TAMSULOSIN HYDROCHLORIDE 0.4 MG/1
0.4 CAPSULE ORAL AT BEDTIME
Refills: 0 | Status: DISCONTINUED | OUTPATIENT
Start: 2022-12-10 | End: 2022-12-19

## 2022-12-10 RX ORDER — ATORVASTATIN CALCIUM 80 MG/1
80 TABLET, FILM COATED ORAL AT BEDTIME
Refills: 0 | Status: DISCONTINUED | OUTPATIENT
Start: 2022-12-10 | End: 2022-12-19

## 2022-12-10 RX ORDER — DEXTROSE 50 % IN WATER 50 %
25 SYRINGE (ML) INTRAVENOUS ONCE
Refills: 0 | Status: DISCONTINUED | OUTPATIENT
Start: 2022-12-10 | End: 2022-12-12

## 2022-12-10 RX ORDER — HEPARIN SODIUM 5000 [USP'U]/ML
INJECTION INTRAVENOUS; SUBCUTANEOUS
Qty: 25000 | Refills: 0 | Status: DISCONTINUED | OUTPATIENT
Start: 2022-12-10 | End: 2022-12-10

## 2022-12-10 RX ORDER — CHLORHEXIDINE GLUCONATE 213 G/1000ML
1 SOLUTION TOPICAL
Refills: 0 | Status: DISCONTINUED | OUTPATIENT
Start: 2022-12-10 | End: 2022-12-19

## 2022-12-10 RX ORDER — INSULIN LISPRO 100/ML
VIAL (ML) SUBCUTANEOUS AT BEDTIME
Refills: 0 | Status: DISCONTINUED | OUTPATIENT
Start: 2022-12-10 | End: 2022-12-19

## 2022-12-10 RX ORDER — HEPARIN SODIUM 5000 [USP'U]/ML
4000 INJECTION INTRAVENOUS; SUBCUTANEOUS EVERY 6 HOURS
Refills: 0 | Status: DISCONTINUED | OUTPATIENT
Start: 2022-12-10 | End: 2022-12-10

## 2022-12-10 RX ORDER — NICOTINE POLACRILEX 2 MG
2 GUM BUCCAL
Refills: 0 | Status: DISCONTINUED | OUTPATIENT
Start: 2022-12-10 | End: 2022-12-19

## 2022-12-10 RX ORDER — METOPROLOL TARTRATE 50 MG
25 TABLET ORAL
Refills: 0 | Status: DISCONTINUED | OUTPATIENT
Start: 2022-12-10 | End: 2022-12-10

## 2022-12-10 RX ORDER — CARVEDILOL PHOSPHATE 80 MG/1
3.12 CAPSULE, EXTENDED RELEASE ORAL EVERY 12 HOURS
Refills: 0 | Status: DISCONTINUED | OUTPATIENT
Start: 2022-12-10 | End: 2022-12-11

## 2022-12-10 RX ORDER — HEPARIN SODIUM 5000 [USP'U]/ML
4100 INJECTION INTRAVENOUS; SUBCUTANEOUS EVERY 6 HOURS
Refills: 0 | Status: DISCONTINUED | OUTPATIENT
Start: 2022-12-10 | End: 2022-12-10

## 2022-12-10 RX ORDER — INSULIN LISPRO 100/ML
VIAL (ML) SUBCUTANEOUS
Refills: 0 | Status: DISCONTINUED | OUTPATIENT
Start: 2022-12-10 | End: 2022-12-12

## 2022-12-10 RX ORDER — DEXTROSE 50 % IN WATER 50 %
15 SYRINGE (ML) INTRAVENOUS ONCE
Refills: 0 | Status: DISCONTINUED | OUTPATIENT
Start: 2022-12-10 | End: 2022-12-12

## 2022-12-10 RX ORDER — ASPIRIN/CALCIUM CARB/MAGNESIUM 324 MG
81 TABLET ORAL DAILY
Refills: 0 | Status: DISCONTINUED | OUTPATIENT
Start: 2022-12-10 | End: 2022-12-19

## 2022-12-10 RX ORDER — NICOTINE POLACRILEX 2 MG
1 GUM BUCCAL DAILY
Refills: 0 | Status: DISCONTINUED | OUTPATIENT
Start: 2022-12-10 | End: 2022-12-19

## 2022-12-10 RX ORDER — PANTOPRAZOLE SODIUM 20 MG/1
40 TABLET, DELAYED RELEASE ORAL
Refills: 0 | Status: DISCONTINUED | OUTPATIENT
Start: 2022-12-10 | End: 2022-12-19

## 2022-12-10 RX ORDER — DEXTROSE 50 % IN WATER 50 %
12.5 SYRINGE (ML) INTRAVENOUS ONCE
Refills: 0 | Status: DISCONTINUED | OUTPATIENT
Start: 2022-12-10 | End: 2022-12-12

## 2022-12-10 RX ORDER — HEPARIN SODIUM 5000 [USP'U]/ML
800 INJECTION INTRAVENOUS; SUBCUTANEOUS
Qty: 25000 | Refills: 0 | Status: DISCONTINUED | OUTPATIENT
Start: 2022-12-10 | End: 2022-12-14

## 2022-12-10 RX ORDER — TICAGRELOR 90 MG/1
180 TABLET ORAL ONCE
Refills: 0 | Status: COMPLETED | OUTPATIENT
Start: 2022-12-10 | End: 2022-12-10

## 2022-12-10 RX ADMIN — HEPARIN SODIUM 8 UNIT(S)/HR: 5000 INJECTION INTRAVENOUS; SUBCUTANEOUS at 17:08

## 2022-12-10 RX ADMIN — HEPARIN SODIUM 800 UNIT(S)/HR: 5000 INJECTION INTRAVENOUS; SUBCUTANEOUS at 06:58

## 2022-12-10 RX ADMIN — HEPARIN SODIUM 800 UNIT(S)/HR: 5000 INJECTION INTRAVENOUS; SUBCUTANEOUS at 14:29

## 2022-12-10 RX ADMIN — PANTOPRAZOLE SODIUM 40 MILLIGRAM(S): 20 TABLET, DELAYED RELEASE ORAL at 17:07

## 2022-12-10 RX ADMIN — Medication 1 PATCH: at 20:38

## 2022-12-10 RX ADMIN — TICAGRELOR 180 MILLIGRAM(S): 90 TABLET ORAL at 06:53

## 2022-12-10 RX ADMIN — HEPARIN SODIUM 4000 UNIT(S): 5000 INJECTION INTRAVENOUS; SUBCUTANEOUS at 06:53

## 2022-12-10 RX ADMIN — Medication 1: at 17:10

## 2022-12-10 RX ADMIN — Medication 25 MILLIGRAM(S): at 14:29

## 2022-12-10 RX ADMIN — ATORVASTATIN CALCIUM 80 MILLIGRAM(S): 80 TABLET, FILM COATED ORAL at 21:16

## 2022-12-10 RX ADMIN — FAMOTIDINE 20 MILLIGRAM(S): 10 INJECTION INTRAVENOUS at 06:14

## 2022-12-10 RX ADMIN — HEPARIN SODIUM 800 UNIT(S)/HR: 5000 INJECTION INTRAVENOUS; SUBCUTANEOUS at 08:08

## 2022-12-10 RX ADMIN — CARVEDILOL PHOSPHATE 3.12 MILLIGRAM(S): 80 CAPSULE, EXTENDED RELEASE ORAL at 17:08

## 2022-12-10 RX ADMIN — HEPARIN SODIUM 9 UNIT(S)/HR: 5000 INJECTION INTRAVENOUS; SUBCUTANEOUS at 21:16

## 2022-12-10 RX ADMIN — Medication 1 PATCH: at 15:02

## 2022-12-10 RX ADMIN — TAMSULOSIN HYDROCHLORIDE 0.4 MILLIGRAM(S): 0.4 CAPSULE ORAL at 21:16

## 2022-12-10 NOTE — ED ADULT NURSE NOTE - OBJECTIVE STATEMENT
51 y/o M A&Ox3 PMH DM, GERD, HTN denies PSH presents to the ED from OSH via EMS c/o chest pain. Pt reports waking up @ approx 0300 w/ midsternal chest pain 53 y/o M A&Ox3 PMH DM, GERD, HTN denies PSH presents to the ED from OSH via EMS c/o chest pain. Pt reports waking up @ approx 0300 w/ midsternal chest pain. Pt reports feeling left arm numbness (which has since resolved), SOB, diaphoresis & 2 episodes of vomiting w/ chest pain. According to EMS pt received 1 nitro tab, 324mg Aspirin, Brilinta and Heparin (bolus & infusion start) @ OSH prior to ED arrival. Upon ED arrival pt is well appearing. Pt ambulated independently w/ steady gait. Breathing is even and unlabored. Skin is warm, dry & in tact. EKG completed, pt placed on CM- NSR. Denies current CP, SOB, HA, N/V/D, numbness, tingling, fevers, chills, cough. Comfort & safety provided.

## 2022-12-10 NOTE — PROGRESS NOTE ADULT - SUBJECTIVE AND OBJECTIVE BOX
SHAIKH BARBOSA  MRN-[id number]  Patient is a 52y old  Male who presents with a chief complaint of Chest pain (10 Dec 2022 20:43)    HPI:  52M w/ HTN, T2DM, GERD who is transferred from OSH for ACS evaluation/management. Per patient yesterday at 3 am he was woken up from his sleep for crushing chest pain that radiated to his left side w/ associated numbness and tinging. He endorses sweats, and small bouts of emesis x2. He states he has never experienced pain like this in the past, however it has resolved. He has been recently seeing a gastroenterology for worsening reflux over to past 4-6 months for which he was taking omeprazole. he states he misses his medications up to 2-3x weekly. He denies previous MIs, cardiac surgeries or hospitalizations He endorses smoking 5 cig/day for 36 years. 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 70% occlusion of pLAD 80% occlusion of pDaig, 100% occlusion of Lcx, 80% occlusion of pOM, 70% occlusion of pRPL. Patient admitted to CCU for management  (10 Dec 2022 12:55)      REVIEW OF SYSTEMS:    CONSTITUTIONAL: No weakness, fevers or chills  EYES/ENT: No visual changes;  No vertigo or throat pain   NECK: No pain or stiffness  RESPIRATORY: No cough, wheezing, hemoptysis; No shortness of breath  CARDIOVASCULAR: No chest pain or palpitations  GASTROINTESTINAL: No abdominal or epigastric pain. No nausea, vomiting, or hematemesis; No diarrhea or constipation. No melena or hematochezia.  GENITOURINARY: No dysuria, frequency or hematuria  NEUROLOGICAL: No numbness or weakness  SKIN: No itching, rashes      Physical Exam:  Vital Signs Last 24 Hrs  T(C): 36.8 (10 Dec 2022 19:00), Max: 36.8 (10 Dec 2022 19:00)  T(F): 98.2 (10 Dec 2022 19:00), Max: 98.2 (10 Dec 2022 19:00)  HR: 106 (10 Dec 2022 22:00) (78 - 106)  BP: 140/90 (10 Dec 2022 22:00) (121/81 - 159/96)  BP(mean): 109 (10 Dec 2022 22:00) (96 - 120)  RR: 26 (10 Dec 2022 22:00) (16 - 29)  SpO2: 97% (10 Dec 2022 22:00) (95% - 100%)    Parameters below as of 10 Dec 2022 22:00  Patient On (Oxygen Delivery Method): room air    ============================I/O===========================   I&O's Detail    10 Dec 2022 07:01  -  10 Dec 2022 22:18  --------------------------------------------------------  IN:    Heparin: 75 mL    Oral Fluid: 770 mL  Total IN: 845 mL    OUT:    Voided (mL): 750 mL  Total OUT: 750 mL    Total NET: 95 mL        ============================ LABS =========================                        16.2   8.05  )-----------( 209      ( 10 Dec 2022 12:55 )             47.4     12-10    136  |  101  |  16  ----------------------------<  121<H>  3.7   |  21<L>  |  0.71    Ca    8.8      10 Dec 2022 12:55  Phos  3.6     12-10  Mg     2.2     12-10    TPro  6.9  /  Alb  4.1  /  TBili  0.5  /  DBili  x   /  AST  138<H>  /  ALT  30  /  AlkPhos  64  12-10    LIVER FUNCTIONS - ( 10 Dec 2022 12:55 )  Alb: 4.1 g/dL / Pro: 6.9 g/dL / ALK PHOS: 64 U/L / ALT: 30 U/L / AST: 138 U/L / GGT: x           PT/INR - ( 10 Dec 2022 20:41 )   PT: 13.2 sec;   INR: 1.15 ratio         PTT - ( 10 Dec 2022 20:41 )  PTT:48.1 sec      ======================Micro/Rad/Cardio=================  Culture: Reviewed   CXR: Reviewed  Echo:Reviewed  ======================================================  PAST MEDICAL & SURGICAL HISTORY:  Hypertension      Diabetes mellitus      GERD (gastroesophageal reflux disease)      No significant past surgical history        ====================ASSESSMENT ==============  52M w/ HTN, T2DM, GERD who is transferred from [facility] for ACS evaluation/management 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, now pending CABG     Plan:  ====================== NEUROLOGY=====================  A+O x3, no focal deficits  - continue to monitor mental status as per protocol   ==================== RESPIRATORY======================  Comfortable room air  - Monitor SpO2, goal >94%, NC prn       ====================CARDIOVASCULAR==================  Triple Vessel disease  - 70% occlusion of pLAD 80% occlusion of pDaig, 100% occlusion of Lcx, 80% occlusion of pOM, 70% occlusion of pRPL   - F/up CT Surgery for CABG  - F/up Transthoracic echo   - Patient Euvolemic appearing on exam. No need for diuresis at this time, continue to monitor fluid status      - Start high intensity Lipitor. F/U lipid panel      - trend cardiac enzymes q8hrs until peak      - c/w Aspirin  - Monitor Right femoral access site as per protocol   - Hold brillinta pending surgery  - Trend P2Y12 level to >150     HTN  - eventual plan to start ACE after CABG  - lopressor 12.5    carvedilol 3.125 milliGRAM(s) Oral every 12 hours    ===================HEMATOLOGIC/ONC ===================  No active issues    aspirin  chewable 81 milliGRAM(s) Oral daily  heparin  Infusion 800 Unit(s)/Hr (9 mL/Hr) IV Continuous <Continuous>    ===================== RENAL =========================  SCr wnl on presentation  - continue to monitor and trend SCr, BUN, lytes, and I&Os  - replete lytes prn with goal K 4-4.5 and Mg >2      Continue monitoring urine output  tamsulosin 0.4 milliGRAM(s) Oral at bedtime    ==================== GASTROINTESTINAL===================  - DASH carb consistent diet as tolerated   - Monitor Bowel movements  - Trend LFTs     pantoprazole    Tablet 40 milliGRAM(s) Oral before breakfast    =======================    ENDOCRINE  =====================  - f/u A1c  - start mod insulin sliding scale, adjust prn based upon FS  -F/u TSH      atorvastatin 80 milliGRAM(s) Oral at bedtime  dextrose 50% Injectable 25 Gram(s) IV Push once  dextrose 50% Injectable 12.5 Gram(s) IV Push once  dextrose 50% Injectable 25 Gram(s) IV Push once  dextrose Oral Gel 15 Gram(s) Oral once  insulin lispro (ADMELOG) corrective regimen sliding scale   SubCutaneous three times a day before meals  insulin lispro (ADMELOG) corrective regimen sliding scale   SubCutaneous at bedtime    ========================INFECTIOUS DISEASE================    Afebrile, No leukocytosis  - monitor and trend wbc and temp curve          SHAIKH BARBOSA  MRN-[id number]  Patient is a 52y old  Male who presents with a chief complaint of Chest pain (10 Dec 2022 20:43)    HPI:  52M w/ HTN, T2DM, GERD who is transferred from OSH for ACS evaluation/management. Per patient yesterday at 3 am he was woken up from his sleep for crushing chest pain that radiated to his left side w/ associated numbness and tinging. He endorses sweats, and small bouts of emesis x2. He states he has never experienced pain like this in the past, however it has resolved. He has been recently seeing a gastroenterology for worsening reflux over to past 4-6 months for which he was taking omeprazole. he states he misses his medications up to 2-3x weekly. He denies previous MIs, cardiac surgeries or hospitalizations He endorses smoking 5 cig/day for 36 years. 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 70% occlusion of pLAD 80% occlusion of pDaig, 100% occlusion of Lcx, 80% occlusion of pOM, 70% occlusion of pRPL. Patient admitted to CCU for management  (10 Dec 2022 12:55)      REVIEW OF SYSTEMS:    CONSTITUTIONAL: No weakness, fevers or chills  EYES/ENT: No visual changes;  No vertigo or throat pain   NECK: No pain or stiffness  RESPIRATORY: No cough, wheezing, hemoptysis; No shortness of breath  CARDIOVASCULAR: No chest pain or palpitations  GASTROINTESTINAL: No abdominal or epigastric pain. No nausea, vomiting, or hematemesis; No diarrhea or constipation. No melena or hematochezia.  GENITOURINARY: No dysuria, frequency or hematuria  NEUROLOGICAL: No numbness or weakness  SKIN: No itching, rashes      Physical Exam:  Vital Signs Last 24 Hrs  T(C): 36.8 (10 Dec 2022 19:00), Max: 36.8 (10 Dec 2022 19:00)  T(F): 98.2 (10 Dec 2022 19:00), Max: 98.2 (10 Dec 2022 19:00)  HR: 106 (10 Dec 2022 22:00) (78 - 106)  BP: 140/90 (10 Dec 2022 22:00) (121/81 - 159/96)  BP(mean): 109 (10 Dec 2022 22:00) (96 - 120)  RR: 26 (10 Dec 2022 22:00) (16 - 29)  SpO2: 97% (10 Dec 2022 22:00) (95% - 100%)    Parameters below as of 10 Dec 2022 22:00  Patient On (Oxygen Delivery Method): room air    ============================I/O===========================   I&O's Detail    10 Dec 2022 07:01  -  10 Dec 2022 22:18  --------------------------------------------------------  IN:    Heparin: 75 mL    Oral Fluid: 770 mL  Total IN: 845 mL    OUT:    Voided (mL): 750 mL  Total OUT: 750 mL    Total NET: 95 mL        ============================ LABS =========================                        16.2   8.05  )-----------( 209      ( 10 Dec 2022 12:55 )             47.4     12-10    136  |  101  |  16  ----------------------------<  121<H>  3.7   |  21<L>  |  0.71    Ca    8.8      10 Dec 2022 12:55  Phos  3.6     12-10  Mg     2.2     12-10    TPro  6.9  /  Alb  4.1  /  TBili  0.5  /  DBili  x   /  AST  138<H>  /  ALT  30  /  AlkPhos  64  12-10    LIVER FUNCTIONS - ( 10 Dec 2022 12:55 )  Alb: 4.1 g/dL / Pro: 6.9 g/dL / ALK PHOS: 64 U/L / ALT: 30 U/L / AST: 138 U/L / GGT: x           PT/INR - ( 10 Dec 2022 20:41 )   PT: 13.2 sec;   INR: 1.15 ratio         PTT - ( 10 Dec 2022 20:41 )  PTT:48.1 sec      ======================Micro/Rad/Cardio=================  Culture: Reviewed   CXR: Reviewed  Echo:Reviewed  ======================================================  PAST MEDICAL & SURGICAL HISTORY:  Hypertension      Diabetes mellitus      GERD (gastroesophageal reflux disease)      No significant past surgical history        ====================ASSESSMENT ==============  52M w/ HTN, T2DM, GERD who is transferred from [facility] for ACS evaluation/management 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, now pending CABG     Plan:  ====================== NEUROLOGY=====================  A+O x3, no focal deficits  - continue to monitor mental status as per protocol     ==================== RESPIRATORY======================  Comfortable room air  - Monitor SpO2, goal >94%    ====================CARDIOVASCULAR==================  Triple Vessel disease  - 70% occlusion of pLAD 80% occlusion of pDaig, 100% occlusion of Lcx, 80% occlusion of pOM, 70% occlusion of pRPL   - F/up CT Surgery for CABG - Dr Hedrick evaluating   - TTE: EF 45-50%, min MR, min TR, +WMAs,  mild DD, normal RVF  - trend cardiac enzymes q8hrs until peak   - c/w Aspirin  - Hold brillinta pending surgery  - Trend P2Y12 level to >150     HTN  - eventual plan to start ACE after CABG  - Coreg 3.125    carvedilol 3.125 milliGRAM(s) Oral every 12 hours    ===================HEMATOLOGIC/ONC ===================  No active issues    aspirin  chewable 81 milliGRAM(s) Oral daily  heparin  Infusion 800 Unit(s)/Hr (9 mL/Hr) IV Continuous <Continuous>    ===================== RENAL =========================  SCr wnl on presentation  - continue to monitor and trend SCr, BUN, lytes, and I&Os  - replete lytes prn with goal K 4-4.5 and Mg >2    tamsulosin 0.4 milliGRAM(s) Oral at bedtime    ==================== GASTROINTESTINAL===================  - DASH carb consistent diet as tolerated   - Monitor Bowel movements  - Trend LFTs     pantoprazole    Tablet 40 milliGRAM(s) Oral before breakfast    =======================    ENDOCRINE  =====================  - A1C 8.8%  - c/w mod insulin sliding scale  -F/u TSH      atorvastatin 80 milliGRAM(s) Oral at bedtime  dextrose 50% Injectable 25 Gram(s) IV Push once  dextrose 50% Injectable 12.5 Gram(s) IV Push once  dextrose 50% Injectable 25 Gram(s) IV Push once  dextrose Oral Gel 15 Gram(s) Oral once  insulin lispro (ADMELOG) corrective regimen sliding scale   SubCutaneous three times a day before meals  insulin lispro (ADMELOG) corrective regimen sliding scale   SubCutaneous at bedtime    ========================INFECTIOUS DISEASE================    Afebrile, No leukocytosis  - monitor and trend wbc and temp curve         Patient requires continuous monitoring with bedside rhythm monitoring, pulse ox monitoring, and intermittent blood gas analysis. Care plan discussed with ICU care team. Patient remained critical and at risk for life threatening decompensation.  Patient seen, examined and plan discussed with CCU team during rounds.     I have personally provided 35 minutes of critical care time excluding time spent on separate procedures, in addition to initial critical care time provided by the CICU Attending, Dr. Parish

## 2022-12-10 NOTE — ED ADULT NURSE NOTE - NSFALLRSKASSESSDT_ED_ALL_ED
Katelin iWseman  : 1964  Payor: [address] [place] [address] Road / Plan: Manan Jose / Product Type: Workers Comp /  [id number]  at [facility] [place] [employer]  [address] [place] [address], [address].  [phone]   [phone]       OUTPATIENT PHYSICAL THERAPY:Daily Note 2018      ICD-10: Treatment Diagnosis: Sprain of posterior cruciate ligament of right knee, sequela (S83.521S); Pain in right knee (M25.561); Stiffness of right knee, not elsewhere classified (M25.661); Difficulty in walking, not elsewhere classified (R26.2)  Precautions/Allergies:   Review of patient's allergies indicates no known allergies. Fall Risk Score: 1 (? 5 = High Risk)  MD Orders:Evaluate and treat, HEP, ROM, strengthening MEDICAL/REFERRING DIAGNOSIS:  S/p R knee scope, PCL reconstruction   DATE OF ONSET: Surgery 17  REFERRING PHYSICIAN: Olga Akhtar MD  RETURN PHYSICIAN APPOINTMENT: 18     INITIAL ASSESSMENT:  Ms. Rom Walter presents s/p right knee scope with PCL reconstruction. She continues to slowly progress with R knee ROM. She is able to ambulate short distances with no assistive device with increased weight on R LE. She does continue with whole R LE weakness limiting mobility. She does report pain in R knee with increased weight on R LE with walking and exercises and continued swelling at the end of the day in knee in R knee and ankle. She will benefit from continuing skilled physical therapy to continue to progress functional mobility. PROBLEM LIST (Impacting functional limitations):  1. Post-op pain and swelling right knee  2. Decreased ROM right knee   3. Decreased functional strength right knee/LE   4. Decreased gait skills INTERVENTIONS PLANNED:  1. aquatic therapy  2. Thermal and electric modalities, manual therapies for pain. 3. Manual therapies and therapeutic exercises for ROM and strength.    4. Therapeutic exercises for gait and balance   TREATMENT PLAN:  Effective Dates: 4-6-18 to 6-29-18. Frequency/Duration: 3 times a week for 12 more weeks  GOALS: (Goals have been discussed and agreed upon with patient.)   Short-Term Functional Goals: Time Frame: 6 weeks  1. Pt will be able to perform a good quad set and SLR with no extensor lag with for improved strength for gait. Progressing towards  2. Pt will increase R knee ROM to 0-90 for improved mobility. GOAL MET  3. Pt will improved R ankle DF AROM to 0 for improved gait. GOAL MET  4. Pt will report pain 5/10 with modified daily activities. GOAL MET  Discharge Goals: Time Frame: 12 weeks   1. Pt will increase R LE strength to 5/5 with manual muscle testing for improved strength for ADLs and work. Progressing towards  2. Pt will negotiate 1 flight of stairs with step over step with good control. Progressing towards  3. Pt will increase R knee ROM to 0-120 for mobility. GOAL MET  4. Pt will report pain 3/10 with daily activities. Progressing towards  5. Pt will score 45/80 on LEFS. Not tested  Rehabilitation Potential For Stated Goals: Good  Regarding [address] therapy, I certify that the treatment plan above will be carried out by a therapist or under their direction. Thank you for this referral,    Allie Mcgrath, PT                       HISTORY:   History of Present Injury/Illness (Reason for Referral): She works at [employer] and she was entering the cooler and there was oil on the floor. She slipped and the feet slipped out from under her. She tried to get up and slipped again. Injury was August 14-17. She did go to physical therapy but it was making her worse. They decided to have surgery. Surgery 12-21-17. Stayed 2 nights in the hospital. She did have home health physical therapy 3-4x. Pain increases with walking. Past Medical History/Comorbidities: diabetes type 2 controlled with diet, HTN, L LE varicose vein surgery  Social History/Living Environment: Lives with .  2 steps to get inside. Prior Level of Function/Work/Activity: Works at [employer]. 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 Job does require lifting. Would like to get back to walking and walking dogs. Current Medications:       Current Outpatient Prescriptions:     magnesium hydroxide (BEAUCHAMP MILK OF MAGNESIA) 400 mg/5 mL suspension, Take 15 mL by mouth as needed for Constipation. as needed daily, Disp: , Rfl:     temazepam (RESTORIL) 15 mg capsule, Take 15 mg by mouth nightly as needed for Sleep., Disp: , Rfl:     promethazine (PHENERGAN) 25 mg tablet, Take 25 mg by mouth every eight (8) hours as needed for Nausea., Disp: , Rfl:     HYDROmorphone (DILAUDID) 2 mg tablet, Take 2-4 mg by mouth as needed for Pain. as needed every 4-6 hours, Disp: , Rfl:     atorvastatin (LIPITOR) 20 mg tablet, Take 20 mg by mouth nightly., Disp: , Rfl:     lisinopril-hydroCHLOROthiazide (PRINZIDE, ZESTORETIC) 10-12.5 mg per tablet, Take  by mouth daily. Indications: hypertension, Disp: , Rfl:     gemfibrozil (LOPID) 600 mg tablet, Take 600 mg by mouth two (2) times a day., Disp: , Rfl:     Omega-3 Fatty Acids 60- mg cpDR, Take  by mouth daily. , Disp: , Rfl:     acetaminophen (TYLENOL) 325 mg tablet, Take 325 mg by mouth every four (4) hours as needed for Pain., Disp: , Rfl:    Date Last Reviewed:  4-23-18   Number of Personal Factors/Comorbidities that affect the Plan of Care: 1-2: MODERATE COMPLEXITY   EXAMINATION:     Observation/Orthostatic Postural Assessment:  Pt arrived to therapy with no assistive device with brace on the R knee. Palpation: no tenderness around incisions     ROM:                Strength:                   Mobility: n/t  Balance:Unable to balance on R single leg       Body Structures Involved:  1. Joints  2. Muscles  3. Ligaments Body Functions Affected:  1. Neuromusculoskeletal Activities and Participation Affected:  1. Mobility  2.  Community, Social and [place] Index   Number of elements (examined above) that affect the Plan of Care: 4+: HIGH COMPLEXITY   CLINICAL PRESENTATION:   Presentation: Evolving clinical presentation with changing clinical characteristics: MODERATE COMPLEXITY   CLINICAL DECISION MAKING:   Outcome Measure: Tool Used: Lower Extremity Functional Scale (LEFS)  Score:  Initial: 5/80 Most Recent: 20/80 (Date: 4-16-18 )   Interpretation of Score: 20 questions each scored on a 5 point scale with 0 representing \"extreme difficulty or unable to perform\" and 4 representing \"no difficulty\". The lower the score, the greater the functional disability. 80/80 represents no disability. Minimal detectable change is 9 points. Score 80 79-63 62-48 47-32 31-16 15-1 0   Modifier CH CI CJ CK CL CM CN     Medical Necessity:   · Patient is expected to demonstrate progress in strength, range of motion and balance to improve gait. Reason for Services/Other Comments:  · Patient continues to require skilled intervention due to post-op R knee, decreased ROM, strength, gait. Use of outcome tool(s) and clinical judgement create a POC that gives a: Questionable prediction of patient's progress: MODERATE COMPLEXITY            TREATMENT:   (In addition to Assessment/Re-Assessment sessions the following treatments were rendered)  Pre-treatment Symptoms/Complaints: Pt reports she was very sore after her last therapy session. Pain: Initial:  4/10 Post Session:  44/10. Therapeutic Exercise (40 Minutes): For improved muscle activation, gait, and knee ROM. Heel slides performed in supine (3 x 10) with manual overpressure at end of ROM for improved R knee ROM. Short acr quads 4# 3x10, long arc quads 4# 3x10, standing hamstring curls 4# 2x10. Supine bridges 2x10. Standing at wall and bring knee and hip into flexion 2x10 B LE. Verbal cues for maintaining trunk position/not leaning. Shuttle press single leg 25# 2x10, shuttle single leg heel raise25# 2x10.  Step ups 4\" 2x10 with visual and verbal cues for R knee extension during step up. Standing on R LE single leg with UE support 10\"x2. Walking with visual cues from mirror for foot and ankle alignment with no assistive device. Gait Training ( 0 minutes):         HEP: No changes made to existing HEP. Treatment/Session Assessment:    · Response to Treatment:  She seems to be slowly progressing with LE strength. She reports pain when she puts increased weight on the R LE with exercises. · Compliance with Program/Exercises:  Appears complaint   · Recommendations/Intent for next treatment session: \"Next visit will focus on gait, knee ROM\".  LE strengthening as able  Total Treatment Duration: 40 Minutes   PT Patient Time In/Time Out  Time In: 1300  Time Out: [facility] 10-Dec-2022 08:39

## 2022-12-10 NOTE — ED PROVIDER NOTE - CLINICAL SUMMARY MEDICAL DECISION MAKING FREE TEXT BOX
52M PMH HTN, DM BIBEMS d/t central non-radiating waxing / waning CP onset 1hr PTA a/w diaphoresis, N/V. AF, VSS. Well appearing, in NAD. ECG w/ + slight ST change changes. Plan: Consult Cardio, trop, CXR, CBC, CMP, BNP, coags. Give Pepcid. Re-eval.

## 2022-12-10 NOTE — ED PROVIDER NOTE - PROGRESS NOTE DETAILS
cards fellow alerted cards at bedside cards planning for likely cath today. patient admitted to Dr. Meyer.

## 2022-12-10 NOTE — ED PROVIDER NOTE - ATTENDING CONTRIBUTION TO CARE
52M w/ hx of HTN, DM on metformin, GERD w/ NSTEMI transfer from [place] this AM woke up w/ substernal cp, w L arm parethesias, pt w/ no fevers, no chills, + nausea and 1 episode of vomiting, pt reports he thought his sx were 2/2 reflux. denies any prior hx of cardiac cath/stress test. no long travel nor car rides, he states upon arrival to [facility] 5/ 10 cp received asa, 2 nitro, brillinta and started on heparin gtt, pt brought to [facility] w/ no cp, pt states he feels good at this time. Const: no acute distress, Well-developed, Eyes: no conjunctival injection and no scleral icterus ENMT: Moist mucus membranes, CVS: +S1/S2, radial/DP pulse 2+ bilaterally RESP: Unlabored respiratory effort, Clear to auscultation bilaterally GI: Nontender/Nondistended soft abdomen, no CVA tenderness MSK: Extremities w/o deformity or ttp, Psych: Awake, Alert, & Orientedx3;  Appropriate mood and affect, cooperative  Plan for labs imaging and reassessment findings c/f NSTEMI, cards consult and admisison

## 2022-12-10 NOTE — H&P ADULT - HISTORY OF PRESENT ILLNESS
52M w/ HTN, T2DM, GERD who is transferred from OSH for ACS evaluation/management. Per patient yesterday at 3 am he was woken up from his sleep for crushing chest pain that radiated to his left side   52M w/ HTN, T2DM, GERD who is transferred from OSH for ACS evaluation/management. Per patient yesterday at 3 am he was woken up from his sleep for crushing chest pain that radiated to his left side. He endorses sweats, and emesis x2. He states    52M w/ HTN, T2DM, GERD who is transferred from [facility] for ACS evaluation/management. Per patient yesterday at 3 am he was woken up from his sleep for crushing chest pain that radiated to his left side w/ associated numbness and tinging. He endorses sweats, and small bouts of emesis x2. He states he has never experienced pain like this in the past, however it has resolved. He has been recently seeing a gastroenterology for worsening reflux over to past 4-6 months for which he was taking omeprazole. he states he misses his medications up to 2-3x weekly. He denies previous MIs, cardiac surgeries or hospitalizations He endorses smoking 5 cig/day for 36 years.  52M w/ HTN, T2DM, GERD who is transferred from OSH for ACS evaluation/management. Per patient yesterday at 3 am he was woken up from his sleep for crushing chest pain that radiated to his left side w/ associated numbness and tinging. He endorses sweats, and small bouts of emesis x2. He states he has never experienced pain like this in the past, however it has resolved. He has been recently seeing a gastroenterology for worsening reflux over to past 4-6 months for which he was taking omeprazole. he states he misses his medications up to 2-3x weekly. He denies previous MIs, cardiac surgeries or hospitalizations He endorses smoking 5 cig/day for 36 years. 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 70% occlusion of pLAD 80% occlusion of pDaig, 100% occlusion of Lcx, 80% occlusion of pOM, 70% occlusion of pRPL. Patient admitted to CCU for management

## 2022-12-10 NOTE — ED ADULT TRIAGE NOTE - NSWEIGHTCALCTOOLDRUG_GEN_A_CORE
Patient: Ainsley Lima Date: 2022   : 1940    81 year old female      OUTPATIENT WOUND CARE PROGRESS NOTE    Supervising Wound Care / Hyperbaric Medicine Physician: Not Applicable  Consulting Provider:  Debbie Garcia NP  Date of Consultation/Last Comprehensive Exam:  2021  Referring  Provider: Charline Perrin NP    SUBJECTIVE:    Chief Complaint:  Right ankle wound      Wound/Ulcer Present:    Diabetic lower extremity ulcer:  Oquendo grade 1 (superficial diabetic ulcer).    Diabetic foot exam performed?  Yes.     Current Vascular Assessment:  Physical exam and Ankle-brachial indices (TIFF).     Current Antibiotic Regimen:  None.     Current Offloading Modality:  Shoe insert.    Additional Wound Category:  None     Maximum Baseline Ambulatory Status:  Ambulates unassisted    History of Present Illness:  This is a 81 year old diabetic female who is well known to the wound care clinic. She was last seen in 2021 for a nonhealing right diabetic foot ulceration. Despite offloading and local wound care her wound failed to heal. In 2021 she underwent excisional debridement right plantar foot ulcer, medial sesamoidectomy, hallux Gaines procedure, lesser claw toe corrections with extensor tenotomies, Achilles lengthening, and peroneus longus to brevis tendon transfer with Dr. Fagan. She additionally underwent knee replacement in early November.      At the end of November she presented to her pcp with complaints of a nonhealing wound to her right lateral ankle. She believes it may have started after the area was rubbing on her shoes. Cultures were obtained and she was placed on Augmentin and mepilex dressings. Upon follow up on 12/10/2021 it was noted that the wound was larger. Xray, ESR and CRP have all been obtained. She was placed on doxycycline and topical bacitracin ointment.     After her surgery with Dr. Fagan she went to [facility] for diabetic shoes and inserts. Unfortunately she believes  these shoes rubbed on the area. She is since wearing a clogs without inserts in them.     5/25/2022  Returns today for follow up. Tolerating dressing changes. No fevers or chills.   Purulent drainage at 4/06 appt. Culture without growth. Treating topically with gentamicin ointment.  Pt states she has only had a tiny bit of drainage with dressing changes, but it has not completely stopped draining.    No fevers or chills    She did see Dr. Mcgowan who is concerned about underlying abscess, possibly sterile stitch abscess. He recommended exploration, irrigation and probable wound vac closure however surgery had to be postponed d/t awaiting cardiac clearance. Pt now hoping to postpone surgery until late August.     Currently on topical gentamicin BID.    Completed Cipro 250 mg bid x 7 for UTI, 3/18 - 3/25/2022  Topical Mupirocin initially  Doxy end of Dec 2021 and augmentin early Dec 2021    Current Treatment Regimen:  Dressing:  Gentamicin   Frequency:  Twice a day   Changed by:  Patient    Review of Systems:  Pertinent items are noted in HPI (history of present illness).    Past Medical History:   Diagnosis Date   • Acute right MCA stroke (CMS/HCC) 3/11/2016   • Atrial fibrillation (CMS/HCC) 5/19/2011   • Cataract    • Cerebral infarction due to embolism of right middle cerebral artery (CMS/HCC) 3/22/2016   • DDD (degenerative disc disease), lumbar 12/12/2011   • DDD lumbar spine    • Degenerative joint disease    • Diabetic foot ulcer (CMS/HCC)    • Diabetic ulcer of toe (CMS/HCC)    • DM type 2 with diabetic peripheral neuropathy (CMS/HCC) 3/11/2018   • Endometriosis march 2016    coming for hysterectomy   • Essential hypertension, benign 5/19/2011   • Fracture     age 14 right ankle    • Hyperlipidemia 12/12/2011   • Hyperparathyroidism (CMS/HCC)    • Hypothyroid    • Hypothyroidism 12/12/2011   • Malignant neoplasm (CMS/HCC) 2016    squamous cell to face and legs removed   • Mixed hyperlipidemia 11/18/2015   •  BERG (nonalcoholic steatohepatitis)    • Osteoporosis, unspecified 02/02/2009   • Peripheral neuropathy    • Pneumonia     per patient   • PONV (postoperative nausea and vomiting)    • Poor circulation of extremity    • Pseudophakia of both eyes 10/09/2017   • S/P parathyroidectomy (CMS/[facility]) 12/12/2011   • Sciatica 5/19/2011   • Sciatica    • 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     Past Surgical History:   Procedure Laterality Date   • Cardioversion  2015   • Cataract extraction extracapsular w/ intraocular lens implantation Left 09/11/2017    Med Mckeon MD   • Cataract extraction w/  intraocular lens implant Right    • Colonoscopy diagnostic  09/08/2020    Dr. Nayak WNL d/c colon due to age   • Dexa bone density axial skeleton  05/12/2008   • Echocardiogram  03/11/2016   • Gynecologic cryosurgery     • Hysterectomy     • Joint replacement     • Pap smear  02/04/2010   • Parathyroidectomy     • Ptca      holter monitor per patient.   • Stress test  03/03/2016   • Total hip replacement Bilateral    • Total knee replacement Left 11/03/2021    Left Total Knee Arthroplasty~ Dr. Hari Hinojosa/ [facility] [place]     Social History     Socioeconomic History   • Marital status:      Spouse name: Not on file   • Number of children: Not on file   • Years of education: Not on file   • Highest education level: Not on file   Occupational History   • Not on file   Tobacco Use   • Smoking status: Never Smoker   • Smokeless tobacco: Never Used   Vaping Use   • Vaping Use: never used   Substance and Sexual Activity   • Alcohol use: Yes     Alcohol/week: 0.0 - 1.0 standard drinks   •  Drug use: No   • Sexual activity: Never     Partners: Male   Other Topics Concern   • Not on file   Social History Narrative   • Not on file     Social Determinants of Health     Financial Resource Strain: Not on file   Food Insecurity: Not on file   Transportation Needs: Not on file   Physical Activity: Not on file   Stress: Not on file   Social Connections: Not on file   Intimate Partner Violence: Not At Risk   • Social Determinants: Intimate Partner Violence Past Fear: No   • Social Determinants: Intimate Partner Violence Current Fear: No     Family History   Problem Relation Age of Onset   • Cancer Mother         cervical   • Musculoskeletal Mother    • Psychiatry Mother    • Thyroid Mother    • Respiratory Mother          age 92 with emphazema--smoker   • Heart Father          age 84 with CHF--old hx of ASHD   • Hypertension Father    • Cancer Brother    • Other Brother         lungs collapsed twice   • Depression Brother    • COPD Sister    • Cancer Daughter    • Patient is unaware of any medical problems Daughter    • Patient is unaware of any medical problems Daughter    • Patient is unaware of any medical problems Son    • Stroke Paternal Grandmother    • Gastrointestinal Maternal Uncle    • Cancer Maternal Aunt         breast   • Cancer Maternal Uncle         colon       Current Outpatient Medications   Medication Sig   • Insulin Pen Needle (BD ULTRA-FINE PEN NEEDLES) 29G X 12.7MM Misc Use as directed with Lantus   • insulin glargine (Lantus SoloStar) 100 UNIT/ML pen-injector Inject 45 Units into the skin nightly. Prime 2 units before each dose.   • mupirocin (BACTROBAN) 2 % ointment Apply to area three times daily.   • fluorouracil (Efudex) 5 % cream Apply a thin layer twice daily in and around biopsy site left eyebrow for 14 days. Avoid application directly near eye.   • warfarin (COUMADIN) 2 MG tablet Take 6 mg by mouth daily.   • metoPROLOL tartrate (LOPRESSOR) 50 MG tablet Take 1  tablet by mouth 2 times daily.   • rosuvastatin (CRESTOR) 5 MG tablet Take 1 tablet by mouth 3 days a week. Three nights a week Mon Wed Fri   • levothyroxine 137 MCG tablet Take 1 tablet by mouth at bedtime. Except on Tuesdays   • Vitamin K, Phytonadione, 100 MCG Tab Take 1 tablet by mouth daily. Per [facility] clinic for INR stabalization   • losartan (COZAAR) 50 MG tablet Take 0.5 tablets by mouth 2 times daily.   • Flaxseed, Linseed, (FLAX SEEDS PO) Takes two tablets by mouth twice daily   • DISPENSE Nyasia C plus one tab once daily   • DISPENSE Immune C complex, OTC one tablet a day   • gabapentin (NEURONTIN) 100 MG capsule TAKE 1 CAPSULE BY MOUTH THREE TIMES DAILY (Patient taking differently: 10-28-21: takes at bedtime only)   • amoxicillin (AMOXIL) 500 MG capsule Take 4 capsules by mouth before dental procedure.   • DISPENSE Healthy feet & nerves. Take 2 tablets by mouth every morning and 1 tablet every night. OTC.   • B Complex Vitamins (B COMPLEX PO) Take 1 tablet by mouth daily. Unsure of strength.   • aspirin 81 MG chewable tablet Chew 1 tablet by mouth daily.   • Cholecalciferol (VITAMIN D) 2000 UNITS tablet Take 2,000 Units by mouth 2 times daily.      No current facility-administered medications for this encounter.        ALLERGIES:  Cephalexin, Levofloxacin, Aminoglycosides, Amoxicillin-pot clavulanate, Tape [adhesive   (environmental)], Nickel, Polysporin [bacitracin-polymyxin b], Sulfa antibiotics, and Neosporin [neomycin-bacitracin-polymyxin]    OBJECTIVE:  Vital Signs:    Visit Vitals  BP (!) 141/78 (BP Location: RUE - Right upper extremity, Patient Position: Sitting)   Pulse 81   Temp 97.5 °F (36.4 °C) (Temporal)   Resp 20         Physical Exam:  General appearance: Appears stated age and oriented to person, place, time and situation   Right leg edema is well controlled. Right lateral ankle ulceration covered with fibrin which is mechanically lifted. Below is a tiny open area, 2-3 mm in diameter. I  am unable to clearly identify the previous central sinus tract but I am able to probe about 0.3 mm depth.  I do not appreciate any suture material in the base of the wound.   Scant serous drainage on dressing seen. No fluctuance.  Mild tenderness with palpation.    5/9/2022  Wound Bed Quality:  Fibrin and Edgar-epithelialization      Edda-wound Quality:    inflammation - minimal    Additional Descriptors:  none    Wound Measurements Per Flowsheet:       Wound Ankle Right Lateral (Active)   Wound Care Team Consult Date 12/20/21 12/20/21 1400   Wound Length (cm) 0.3 cm 05/24/22 1100   Wound Width (cm) 0.3 cm 05/24/22 1100   Wound Depth (cm) 0 cm 05/24/22 1100   Wound Surface Area (cm^2) 0.09 cm^2 05/24/22 1100   Wound Volume (cm^3) 0 cm^3 05/24/22 1100   Wound Volume Change (Initial) -0.06 cm3 05/24/22 1100   Wound Volume % Change (Initial) -100 % 05/24/22 1100   Number of days: 156       Wound Face Incision (Active)   Number of days: 50       Wound Toe, 3rd Right Distal Traumatic (Active)   Wound Care Team Consult Date 05/24/22 05/24/22 1100   Wound Length (cm) 0.2 cm 05/24/22 1100   Wound Width (cm) 0.2 cm 05/24/22 1100   Wound Depth (cm) 0 cm 05/24/22 1100   Wound Surface Area (cm^2) 0.04 cm^2 05/24/22 1100   Wound Volume (cm^3) 0 cm^3 05/24/22 1100   Number of days: 1     PROCEDURE:  None performed    Procedure was Performed by:  Physician     Laboratory assessments reviewed:  No results found for: PAB   Albumin (g/dL)   Date Value   04/26/2022 4.1   03/01/2022 4.0   01/24/2022 3.9      No results available in last 24 hours    Lab Results   Component Value Date    WBC 6.2 04/26/2022    GLUCOSE 189 (H) 04/26/2022    HGBA1C 7.7 (H) 01/24/2022    CRP 0.9 12/15/2021    RESR 50 (H) 12/15/2021    CREATININE 0.70 04/26/2022    GFRA >90 10/23/2019    GFRNA 87 10/23/2019        Culture results:  4/6/22 R Ankle wound - No growth    Diagnostic Assessments Reviewed:  MRI  and Vascular Studies:  Ankle-brachial indices (TIFF)    2022  IMPRESSION:  Right le. Normal resting TIFF at 1.11 with corresponding normal distal waveforms.  2. Normal great toe pressure at 80 mmHg.     Left le. Normal resting TIFF at 1.19 with corresponding normal distal waveforms.  2. Normal great toe pressure at 83 mmHg.    Xray 2021  FINDINGS and IMPRESSION:     1. Posterior and plantar soft tissue swelling is present at the level of  the calcaneus without evidence for osteomyelitis      2 .Alignment is anatomic without evidence for acute fracture     3.  Minimal osteoarthritic changes.    2022 MRI R ankle  IMPRESSION:  1. No evidence for osteomyelitis  2. There is a pinhole, cylindrical T2 hyperintense signal at the lateral  ankle just below the malleolar tip extending into the peroneal tendons with  enhancement but without associated discrete fluid collection to indicate an  abscess. Given the enhancement, this could reflect scar tissue or be  related to the patient's suspected infection.  3. Achilles tendinopathy, PTT tendinopathy, extensor digitorum tendinopathy  with mild tenosynovitis    Nutritional Assessment:  Prealbumin and/or Albumin reviewed    Wound treatment goals are palliative:  No    DIAGNOSES:  Diabetic lower extremity ulcer, Oquendo grade 1 (superficial diabetic ulcer) Right ankle   Wound deterioration/infection    Medical Decision Makin year old diabetic female with long standing history of diabetic foot ulcerations. She is well known to wound care and she ultimately required surgical reconstruction of her right foot to heal. She was last seen in clinic in 2021. At the end of November she was noted to have an open wound on the right lateral ankle. She was placed initially on Augmentin and then doxycycline. She completed all antibiotics.   Continues to have waxing/waning drainage from sinus tract of the lateral ankle at the distal end of the incisional scar.  MRI is abnormal - no osteomyelitis, no abscess.   Could she have a retained suture?  Debrided wound today but no residual foreign body identified.  Recommend follow-up with [facility] medicine department.  I called them and they will have Dr. Mcgowan review the MRI and determine who the patient should see.  They indicated they would call the patient to schedule that appointment.      5/24/2022: Wound stable but chronic, open wound.  Discussed risk of infection given open wound and probable underlying abscess, although possibly sterile stitch abscess. Pt wants to postpone sg till end of Aug. Discussed no OM now but as long as she has an open wound with possible deep soft tissue abscess, acute infection/OM is a risk. I do not recommend extended postponement. However, decision up to patient. There is a chance she wound continue to be stable but if serious infection occurs, it would be limb threatening. Encouraged pt to discuss with Dr. Mcgowan      Diabetic control:    Tight glycemic control encouraged      Infection:    Purulent drainage, wound deterioration at 4/6 appt. Treating now with gentamicin topically. Culture had no significant growth and wound improving.  Discussed need for urgent evaluation if worsening (redness, fever, chills, odor, etc)  She has completed Augmentin, Doxycycline. No po abx since December except recent Cipro for UTI  MRI abnormal -4/29/2022 but no OM. No current acute infection. See discussion above.       Debridement:    Completed 5/9/2022  Mechanical debridement to lift fibrin plug today   Nutrition:    High protein diet encouraged      Offloading:    She is wearing her diabetic inserts in a quality shoe that does not rub      Vascular:    ABIs reviewed- appears to have adequate perfusion  Recommended continued use of compression stockings.  She does not know what the compression strength is, but will let us know.  If not at least 20-30 mmHg, then I would recommend multilayer compression wrap.    Topical care:    Gentamicin ointment   used to wound twice daily, cover with gauze.       Hyperbaric:    not a current candidate        Follow up wound clinic 2 weeks    Plan of Care:  Advanced Wound Care Recommendations:  NA  Percent Wound Closure from consult:  NA  Care plan to augment wound closure:  Not applicable.       Patient stable. All questions were answered.    Significant note data copied forward from Dr. Deana Ayala and reviewed by me as needed in the formulation of this note and plan.    Feliciano Bustamante, DO  Hyperbaric Medicine and Wound Care  [phone]

## 2022-12-10 NOTE — CONSULT NOTE ADULT - ASSESSMENT
52M w/ HTN, T2DM, GERD who is transferred from OSH for ACS evaluation/management. Per patient yesterday at 3 am he was woken up from his sleep for crushing chest pain that radiated to his left side w/ associated numbness and tinging. He endorses sweats, and small bouts of emesis x2.    12/10 VSS; Patient with multivessel disease. Preop CABG eval.

## 2022-12-10 NOTE — ED PROVIDER NOTE - PROGRESS NOTE DETAILS
Trop + 135, d/w Interventional Cardio, accept as transfer to [facility]. Pt updated to results, transfer. He understands / agrees w/ this plan. Trop + 135, d/w Interventional Cardio, accept as transfer to [facility]. Given loading dose Brilinta, Heparin bolus / drip. Pt updated to results, transfer. He understands / agrees w/ this plan.

## 2022-12-10 NOTE — ED PROVIDER NOTE - CLINICAL SUMMARY MEDICAL DECISION MAKING FREE TEXT BOX
52 m w/ hx of HTN, HLD, here w/ cp, given nitro, heparin asa brillinta, elevated trop currently on heparin gtt, was accepted by interventional 52 m w/ hx of HTN, HLD, here w/ cp, given nitro, heparin asa brillinta, elevated trop currently on heparin gtt, was accepted by interventional cardiology. well appearing, clear lungs, HD stable, normal heart sounds. spoke with cards, will see patient shortly.

## 2022-12-10 NOTE — ED PROVIDER NOTE - OBJECTIVE STATEMENT
52-year-old male with history of hypertension, diabetes on metformin, GERD presenting in transfer from [facility] for NSTEMI.  Patient initially presented with problem chest pain and nausea at 5 AM this morning 1 episode of vomiting.  EKG without ST elevations.  Given 325 ASA and 2 nitro sublingual tablets by EMS.  Patient found to have elevated troponin, started on heparin drip, given Brilinta, and accepted for transfer by interventional cardiology.  Patient reports improvement of symptoms at this time. 52-year-old male with history of hypertension, diabetes on metformin, GERD presenting in transfer from [facility] for NSTEMI.  Patient initially presented with problem chest pain and nausea at 5 AM this morning 1 episode of vomiting.  EKG without ST elevations.  Given 325 ASA and 2 nitro sublingual tablets by EMS.  Patient found to have elevated troponin, started on heparin drip, given Brilinta, and accepted for transfer by interventional cardiology.  Patient reports improvement of symptoms at this time.    [facility] MRN: [id number]

## 2022-12-10 NOTE — H&P ADULT - ASSESSMENT
52M w/ HTN, T2DM, GERD who is transferred from OSH for ACS evaluation/management 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, now pending CABG     PLAN:    NEURO:   A+O x3, no focal deficits  - continue to monitor mental status as per protocol     PULM:  Comfortable room air  - Monitor SpO2, goal >94%, NC prn     CARDIAC:  - Triple Vessel disease , 70% occlusion of pLAD 80% occlusion of pDaig, 100% occlusion of Lcx, 80% occlusion of pOM, 70% occlusion of pRPL   - F/up CT Surgery for CABG  - F/up Transthoracic echo   - Patient Euvolemic appearing on exam. No need for diuresis at this time, continue to monitor fluid status      - Start high intensity Lipitor. F/U lipid panel      - trend cardiac enzymes q8hrs until peak      - c/w Aspirin  - Monitor Right femoral access site as per protocol   - Hold brillinta pending surgery  - Trend P2Y12 level to >150     HTN  - eventual plan to start ACE after CABG  - lopressor 12.5    GI:   - DASH carb consistent diet as tolerated   - Monitor Bowel movements  - Trend LFTs     RENAL:  SCr wnl on presentation  - continue to monitor and trend SCr, BUN, lytes, and I&Os  - replete lytes prn with goal K 4-4.5 and Mg >2    HEME:  CBC and plts wnl  - continue to monitor and trend cbc   - c/w Dual antiplatelet therapy   - DVT PPX: SQH     ENDO:  - f/u A1c  - start mod insulin sliding scale, adjust prn based upon FS  -F/u TSH    ID:  Afebrile, No leukocytosis  - monitor and trend wbc and temp curve     GI  - DASH/CC diet

## 2022-12-10 NOTE — CONSULT NOTE ADULT - SUBJECTIVE AND OBJECTIVE BOX
Patient seen and evaluated at bedside  Consult question:    HPI:    PMH:   Hypertension    Diabetes mellitus    GERD (gastroesophageal reflux disease)      PSH:   No significant past surgical history      Medications:   heparin   Injectable 4100 Unit(s) IV Push every 6 hours PRN  heparin  Infusion.  Unit(s)/Hr IV Continuous <Continuous>    Allergies:  No Known Allergies    FAMILY HISTORY:    Social History:  Smoking:  Alcohol:  Drugs:    Review of Systems:  Constitutional: [ ] Fever [ ] Chills [ ] Fatigue [ ] Weight change   HEENT: [ ] Blurred vision [ ] Eye Pain [ ] Headache [ ] Runny nose [ ] Sore Throat   Respiratory: [ ] Cough [ ] Wheezing [ ] Shortness of breath  Cardiovascular: [ ] Chest Pain [ ] Palpitations [ ] SANCHEZ [ ] PND [ ] Orthopnea  Gastrointestinal: [ ] Abdominal Pain [ ] Diarrhea [ ] Constipation [ ] Hemorrhoids [ ] Nausea [ ] Vomiting  Genitourinary: [ ] Nocturia [ ] Dysuria [ ] Incontinence  Extremities: [ ] Swelling [ ] Joint Pain  Neurologic: [ ] Focal deficit [ ] Paresthesias [ ] Syncope  Lymphatic: [ ] Swelling [ ] Lymphadenopathy   Skin: [ ] Rash [ ] Ecchymoses [ ] Wounds [ ] Lesions  Psychiatry: [ ] Depression [ ] Suicidal/Homicidal Ideation [ ] Anxiety [ ] Sleep Disturbances  [ ] 10 point review of systems is otherwise negative except as mentioned above            [ ]Unable to obtain    Physical Exam:  T(C): 36.4 (12-10-22 @ 08:00), Max: 36.7 (12-10-22 @ 07:38)  HR: 93 (12-10-22 @ 08:00) (93 - 93)  BP: 128/83 (12-10-22 @ 08:00) (125/79 - 128/83)  RR: 22 (12-10-22 @ 08:00) (18 - 22)  SpO2: 97% (12-10-22 @ 08:00) (95% - 97%)  Wt(kg): --    Daily     Daily     Appearance: NAD  Eyes: PERRL, EOMI  HENT: Normal oral mus[place]a, NC/AT  Cardiovascular: normal S1 and S2, RRR, no m/r/g, no edema, normal JVP  Procedural Access Site:  No hematoma, non-tender to palpation, 2+ pulses distally, no bruit, no ecchymosis  Respiratory: Clear to auscultation bilaterally  Gastrointestinal: Soft, non-tender, non-distended, BS+  Musculoskeletal: No clubbing, no joint deformity   Neurologic: Non-focal  Lymphatic: No lymphadenopathy  Psychiatry: AAOx3, mood & affect appropriate  Skin: No rashes, no ecchymoses, no cyanosis    Cardiovascular Diagnostic Testing:  ECG:    Echo:    Stress Testing:    Cath:    Interpretation of Telemetry:    Imaging:    Labs:                       Patient seen and evaluated at bedside  Consult question: NSTEMI transfer    HPI:  52M w/ Hx of HTN, DM, GERD, who is transferred from OSH for chest pain evaluation.    Patient has been having pressure like chest pain for past 6 months. The pain was pressure like and exertional. He thought it was acid reflux and has been following up with PCP and GI. He was treated for "bug in stomach" with ABx for three month (probably H. pylori?) had C-scope which was negative and was planning for EGD. However, his exercise tolerance has been downtrending with worsening exertional chest pressure. Aroudn 3AM this AM, he woke up from sleep with severe chest pressure, diaphoresis, and NBNB emesis. His BP was in 170s, and his wife called EMS, went to OSH. He was loaded with ASA and Brilinta, started on hep gtt. SLNitro was given en route as well. Patient was transferred to [facility] for further evaluation.    Upon evaluation, patient reported resolution of his chest pain. He is current smomker (3-4 cigarette per day for > 20 years), no other toxic habits. Father passed away at age of 57 with stroke. No other cardiac history in family. He reports NKDA. Denies recent palpitation, syncope, SOB, leg swelling, fever/chills.          PMH:   Hypertension    Diabetes mellitus    GERD (gastroesophageal reflux disease)      PSH:   No significant past surgical history      Medications:   heparin   Injectable 4100 Unit(s) IV Push every 6 hours PRN  heparin  Infusion.  Unit(s)/Hr IV Continuous <Continuous>    Allergies:  No Known Allergies    FAMILY HISTORY:    Social History:  Smoking: current smoker  Alcohol: NA  Drugs: NA    Review of Systems:  Constitutional: [ ] Fever [ ] Chills [ ] Fatigue [ ] Weight change   HEENT: [ ] Blurred vision [ ] Eye Pain [ ] Headache [ ] Runny nose [ ] Sore Throat   Respiratory: [ ] Cough [ ] Wheezing [ ] Shortness of breath  Cardiovascular: [x ] Chest Pain [ ] Palpitations [ ] SANCHEZ [ ] PND [ ] Orthopnea  Gastrointestinal: [ ] Abdominal Pain [ ] Diarrhea [ ] Constipation [ ] Hemorrhoids [ ] Nausea [ ] Vomiting  Genitourinary: [ ] Nocturia [ ] Dysuria [ ] Incontinence  Extremities: [ ] Swelling [ ] Joint Pain  Neurologic: [ ] Focal deficit [ ] Paresthesias [ ] Syncope  Lymphatic: [ ] Swelling [ ] Lymphadenopathy   Skin: [ ] Rash [ ] Ecchymoses [ ] Wounds [ ] Lesions  Psychiatry: [ ] Depression [ ] Suicidal/Homicidal Ideation [ ] Anxiety [ ] Sleep Disturbances  [ ] 10 point review of systems is otherwise negative except as mentioned above            [ ]Unable to obtain    Physical Exam:  T(C): 36.4 (12-10-22 @ 08:00), Max: 36.7 (12-10-22 @ 07:38)  HR: 93 (12-10-22 @ 08:00) (93 - 93)  BP: 128/83 (12-10-22 @ 08:00) (125/79 - 128/83)  RR: 22 (12-10-22 @ 08:00) (18 - 22)  SpO2: 97% (12-10-22 @ 08:00) (95% - 97%)  Wt(kg): --    Daily     Daily     Constitutional: NAD.   HEENT: AT/NC, EOMI, Supple neck;  Respiratory: no wheezing or crackles. no increase in WOB  Cardiovascular: RRR, S1, S2, no M/R/G. 2+ distal pulses. no JVD, no LE edema.  Gastrointestinal: soft; NT/ND, +BS  Extremities: no cyanosis; non-tender to palpation, DP and Radial pulses intact.  Neurological: A&Ox 3;  Psychiatric: normal mood/affect.      Labs:

## 2022-12-10 NOTE — H&P ADULT - ATTENDING COMMENTS
PT is 53 yo smoker,  HTN, HLD, DM transferred with NSTEMI with 3VD on cath today. No further CP since admission. CTS contacted for CABG evaluation. Continue heparin. ECHO pending. If recurrent CP then IABP.

## 2022-12-10 NOTE — ED PROVIDER NOTE - NSICDXPASTMEDICALHX_GEN_ALL_CORE_FT
PAST MEDICAL HISTORY:  Diabetes mellitus     GERD (gastroesophageal reflux disease)     Hypertension

## 2022-12-10 NOTE — H&P ADULT - NSHPREVIEWOFSYSTEMS_GEN_ALL_CORE
CONSTITUTIONAL:  No weight loss, fever, chills, weakness or fatigue.  HEENT:  Eyes:  No visual loss, blurred vision, double vision or yellow sclerae. Ears, Nose, Throat:  No hearing loss, sneezing, congestion, runny nose or sore throat.  CARDIOVASCULAR:  No current chest pain, chest pressure or chest discomfort. No palpitations.  RESPIRATORY:  No shortness of breath, cough or sputum.  GASTROINTESTINAL:  No anorexia, nausea, vomiting or diarrhea. No abdominal pain or blood.  GENITOURINARY:  Denies hematuria, dysuria.   NEUROLOGICAL:  No headache, dizziness, syncope, paralysis, ataxia, numbness or tingling in the extremities.   MUSCULOSKELETAL:  No muscle, back pain, joint pain or stiffness.  HEMATOLOGIC:  No anemia, bleeding or bruising.  LYMPHATICS:  No enlarged nodes.   PSYCHIATRIC:  No history of depression or anxiety.  ENDOCRINOLOGIC:  No reports of sweating, cold or heat intolerance. No polyuria or polydipsia.  ALLERGIES:  No history of asthma, hives, eczema or rhinitis.

## 2022-12-10 NOTE — ED ADULT TRIAGE NOTE - ACCOMPANIED BY
EMT/paramedic Closure 4 Information: This tab is for additional flaps and grafts above and beyond our usual structured repairs.  Please note if you enter information here it will not currently bill and you will need to add the billing information manually.

## 2022-12-10 NOTE — CONSULT NOTE ADULT - SUBJECTIVE AND OBJECTIVE BOX
History of Present Illness:  HPI:  52M w/ HTN, T2DM, GERD who is transferred from OSH for ACS evaluation/management. Per patient yesterday at 3 am he was woken up from his sleep for crushing chest pain that radiated to his left side w/ associated numbness and tinging. He endorses sweats, and small bouts of emesis x2. He states he has never experienced pain like this in the past, however it has resolved. He has been recently seeing a gastroenterology for worsening reflux over to past 4-6 months for which he was taking omeprazole. he states he misses his medications up to 2-3x weekly. He denies previous MIs, cardiac surgeries or hospitalizations He endorses smoking 5 cig/day for 36 years. 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 70% occlusion of pLAD 80% occlusion of pDaig, 100% occlusion of Lcx, 80% occlusion of pOM, 70% occlusion of pRPL. Patient admitted to CCU for management  (10 Dec 2022 12:55)       Past Medical History  Hypertension    Diabetes mellitus    GERD (gastroesophageal reflux disease)        Past Surgical History  No significant past surgical history        MEDICATIONS  (STANDING):  aspirin  chewable 81 milliGRAM(s) Oral daily  atorvastatin 80 milliGRAM(s) Oral at bedtime  carvedilol 3.125 milliGRAM(s) Oral every 12 hours  chlorhexidine 4% Liquid 1 Application(s) Topical <User Schedule>  dextrose 50% Injectable 25 Gram(s) IV Push once  dextrose 50% Injectable 12.5 Gram(s) IV Push once  dextrose 50% Injectable 25 Gram(s) IV Push once  dextrose Oral Gel 15 Gram(s) Oral once  heparin  Infusion 800 Unit(s)/Hr (8 mL/Hr) IV Continuous <Continuous>  insulin lispro (ADMELOG) corrective regimen sliding scale   SubCutaneous three times a day before meals  insulin lispro (ADMELOG) corrective regimen sliding scale   SubCutaneous at bedtime  nicotine -  14 mG/24Hr(s) Patch 1 Patch Transdermal daily  pantoprazole    Tablet 40 milliGRAM(s) Oral before breakfast  tamsulosin 0.4 milliGRAM(s) Oral at bedtime    MEDICATIONS  (PRN):  nicotine  Polacrilex Gum 2 milliGRAM(s) Oral every 2 hours PRN nicotine cravings      Vital Signs Last 24 Hrs  T(C): 36.7 (12-10-22 @ 16:00), Max: 36.7 (12-10-22 @ 07:38)  T(F): 98 (12-10-22 @ 16:00), Max: 98 (12-10-22 @ 07:38)  HR: 101 (12-10-22 @ 19:00) (78 - 101)  BP: 136/81 (12-10-22 @ 19:00) (122/86 - 159/96)  RR: 24 (12-10-22 @ 19:00) (16 - 29)  SpO2: 98% (12-10-22 @ 19:00) (95% - 100%)           Daily Height in cm: 175.26 (10 Dec 2022 12:20)    Daily   Admit Wt: Drug Dosing Weight  Height (cm): 175.3 (10 Dec 2022 12:20)  Weight (kg): 66.9 (10 Dec 2022 12:20)  BMI (kg/m2): 21.8 (10 Dec 2022 12:20)  BSA (m2): 1.82 (10 Dec 2022 12:20)    Allergies: No Known Allergies      SOCIAL HISTORY:  Smoker: [X ] Yes  [] No    5 cig/day current smoker   ETOH use: [ ] Yes  [X] No             Pt denies  Ilicit Drug use:  [ ] Yes  [X] No     Pt denies    FAMILY HISTORY:      Review of Systems  GENERAL:  no weakness, fatigue, fevers or chills  NEURO: no dizziness, numbness, tingling or weakness  SKIN: no itching, burning, rashes, or lesions   HEENT: no visual changes;  no headache, no vertigo, no recent colds  RESPIRATORY: no shortness of breath, no cough, sputum, wheezing  CARDIOVASCULAR:  +chest pain,  or palpitations  GI: no abd pain. no N/V/D.  PERIPHERAL VASCULAR: no swelling, no tenderness, no erythema    PHYSICAL EXAM  General: Well nourished, well developed, NAD.                                              Neuro: Normal exam oriented to person/place & time with no focal motor or sensory  deficits.                    Eyes: Normal exam of conjunctiva & lids, pupils equally reactive.   ENT: Normal exam of nasal/oral mucosa with absence of cyanosis.   Neck: Normal exam of jugular veins, trachea & thyroid.   Chest: Normal lung exam with good air movement absence of wheezes, rales, or rhonchi.                                                                         CV:  Auscultation: normal S1S2, RRR   Carotids: No Bruits[X]  Abdominal Aorta: normal [X] nonpalpable[X]                                                                         GI: Normal exam of abdomen with no noted masses or tenderness. +BSx4Q                                                                                            Extremities: Normal no evidence of cyanosis or deformity, Edema: none  Lower Extremity Pulses: Right[+2DP] Left[+2DP] Varicosities[none]  SKIN : Normal exam to inspection & palpation.                                                           LABS:                        16.2   8.05  )-----------( 209      ( 10 Dec 2022 12:55 )             47.4     12-10    136  |  101  |  16  ----------------------------<  121<H>  3.7   |  21<L>  |  0.71    Ca    8.8      10 Dec 2022 12:55  Phos  3.6     12-10  Mg     2.2     12-10    TPro  6.9  /  Alb  4.1  /  TBili  0.5  /  DBili  x   /  AST  138<H>  /  ALT  30  /  AlkPhos  64  12-10    PT/INR - ( 10 Dec 2022 08:50 )   PT: 13.6 sec;   INR: 1.18 ratio         PTT - ( 10 Dec 2022 08:50 )  PTT:128.0 sec      Cardiac Cath:  < from: Cardiac Catheterization (12.10.22 @ 11:50) >    Cath Lab Report    Diagnostic Cardiologist:       Neisha Cramer MD   Fellow:                        Vickie Colon MD - Fellow   Referring Physician:           Amanda Parish MD     Procedures Performed   Procedures:               1.    Arterial Access - Right Femoral     2.    Ultrasound Guided Access   3.    Left Heart Cath   4.    Diagnostic Coronary Angiography   5.    AngioSeal     Indications:                Myocardial infarction without ST elevation  (NSTEMI)    Diagnostic Conclusions:     Multivessel CAD   Recommendations:     CABG eval     Procedure Narrative:   The risks and alternatives of the procedures and conscious sedation  were explained to the patient and informed consent was  obtained. The patient was brought to the cath lab and placed on the  exam table.  Access   Right femoral artery:   The puncture site was infiltrated with 1% Lidocaine. Vascular access  was obtained using modified seldinger technique.    Diagnostic Findings:     Coronary Angiography   The coronary circulation is right dominant.      LM   Left main artery: The segment is visually normal in size and  structure.    LAD   Mid left anterior descending: There is a 90 % stenosis. First  diagonal: There is a 90 % stenosis.    CX   Distal circumflex: There is a 100 % stenosis. First obtuse marginal:  There is a 90 % stenosis.    RCA   Proximal right coronary artery: There is a 30 % stenosis. Right  posterior descending artery: Angiography shows severe    Patient: SHAIKH FAMILIA              MRN: [id number]  Study Date: 12/10/2022   11:50 AM     Page 1 of 3          atherosclerosis. Right Posterolateral Segment: There is a 90 %  stenosis.    History and Risk Factors:   Hypertension:                              No   Dyslipidemia:                              Yes   Tobacco Use:                    Current - Every Day   Cardiac Arrest Out of Hospital:            No   Diabetes:                                  Yes     X-Ray:   Diagnostic Flouro time:      3.6 min.                   Exam record  DAP:          3906.77 cGycm2  Interventional Flouro time:                             Exam fluoro  DAP:          496.86 cGycm2  Total Flouro Time:           3.6 min.                   Exam total  DAP:           4403.63  cGycm2    Exam Start Time:   11:50 AM   Exam End Time:     12:07 PM   Exam Duration:     17 min     < end of copied text >    TTE / BECKY:  e< from: Transthoracic Echocardiogram (12.10.22 @ 14:53) >  PROCEDURE: Transthoracic echocardiogram with 2-D, M-Mode  and complete spectral and color flow Doppler.  INDICATION: Cardiomyopathy, unspecified (I42.9)  ------------------------------------------------------------------------  Dimensions:    Normal Values:  LA:     3.7    2.0 - 4.0 cm  Ao:     3.2    2.0 - 3.8 cm  SEPTUM: 0.8    0.6 - 1.2 cm  PWT:    0.8    0.6 - 1.1 cm  LVIDd:  4.3    3.0 - 5.6 cm  LVIDs:  2.5    1.8 - 4.0 cm  Derived variables:  LVMI: 58 g/m2  RWT: 0.37  Fractional short: 42 %  EF (Visual Estimate): 45-50 %  ------------------------------------------------------------------------  Observations:  Mitral Valve: Normal mitral valve. Minimal mitral  regurgitation.  Aortic Valve/Aorta: Aortic valve leaflet morphology not  well visualized.  Normal aortic root size. (Ao: 3.2 cm at the sinuses of  Valsalva).  Left Atrium: Normal left atrium.  LA volume index = 20  cc/m2.  Left Ventricle: Endocardium not well visualized; grossly  mild segmental left ventricular systolic dysfunction.  Hypokinesis of the distal septum and anteroapex. Normal  left ventricular internal dimensions and wall thicknesses.  Mild diastolic dysfunction (Stage I).  Right Heart: Normal right atrium. Normal right ventricular  size and function. Normal tricuspid valve. Minimal  tricuspid regurgitation. Normal pulmonic valve.  Pericardium/Pleura: Normal pericardium with no pericardial  effusion.  Hemodynamic: Estimated right atrial pressure is 8 mm Hg.  ------------------------------------------------------------------------  Conclusions:  1. Normal mitral valve. Minimal mitral regurgitation.  2. Normal left ventricular internal dimensions and wall  thicknesses.  3. Endocardium not well visualized; grossly mild segmental  left ventricular systolic dysfunction.  Hypokinesis of the  distal septum and anteroapex.  4. Mild diastolic dysfunction (Stage I).  5. Normal right ventricular size and function.  ------------------------------------------------------------------------  Confirmed on  12/10/2022 - 17:01:36 by Nikko Gu M.D.,  [facility], [place]    < end of copied text >

## 2022-12-10 NOTE — ED ADULT NURSE REASSESSMENT NOTE - NS ED NURSE REASSESS COMMENT FT1
Heparin stopped at per Cardiology MD Mcallister @ 1132. Pt to be transported to Cath lab at this time.

## 2022-12-10 NOTE — PATIENT PROFILE ADULT - FALL HARM RISK - HARM RISK INTERVENTIONS

## 2022-12-10 NOTE — ED ADULT NURSE NOTE - OBJECTIVE STATEMENT
52 year old male came in c/o midsternal chest pain, radiating to the left arm x4:00am, PMH HTN, HLD, Gerd, complaint with medication at home, baby aspirin taken at home, x2 nitro spray given by ems, pain not resolving, IV placed upon arrival, Labs collected, placed on the monitors

## 2022-12-10 NOTE — ED ADULT NURSE REASSESSMENT NOTE - NS ED NURSE REASSESS COMMENT FT1
3 attempts made to contact Cath lab to give report on patient. Charge RN aware. Pt being transported with MD Mcallister & BOB at this time.

## 2022-12-10 NOTE — ED ADULT NURSE NOTE - NS ED NURSE TRANSPORT WITH
MD/ EDT/Cardiac Monitor/Defib/ACLS/Rescue Kit/O2/BVM/IV pump/ACLS Rescue Kit Cardiac Monitor/Defib/ACLS/Rescue Kit/O2/BVM/IV pump/ACLS Rescue Kit

## 2022-12-10 NOTE — ED PROVIDER NOTE - OBJECTIVE STATEMENT
52M PMH HTN, DM BIBEMS d/t CP. Pt reports went to bed last night feeling well. Pt awoke 1hr PTA w/ intermittent pressure-like central CP, non-radiating. Pt states pain lasts x minutes, waxing / waning. Pt endorses slight improvement w/ belching. + diaphoresis, N/V. Denies F/C, h/a, dizziness, SOB, cough, abd pain, back pain, diarrhea, constipation, UTI sx, LE pain / swelling. Denies hx CAD.     PMH as above, PSH none, NKDA, meds as listed.

## 2022-12-10 NOTE — H&P ADULT - NSHPPHYSICALEXAM_GEN_ALL_CORE
Vital Signs Last 24 Hrs  T(C): 36.7 (10 Dec 2022 12:20), Max: 36.7 (10 Dec 2022 07:38)  T(F): 98 (10 Dec 2022 12:20), Max: 98 (10 Dec 2022 07:38)  HR: 87 (10 Dec 2022 12:30) (87 - 93)  BP: 128/84 (10 Dec 2022 12:30) (125/79 - 131/87)  BP(mean): 101 (10 Dec 2022 12:30) (101 - 104)  RR: 23 (10 Dec 2022 12:30) (18 - 23)  SpO2: 98% (10 Dec 2022 12:30) (95% - 99%)    Parameters below as of 10 Dec 2022 12:30  Patient On (Oxygen Delivery Method): room air    GENERAL APPEARANCE: Well developed, NAD  HEENT:  PERRL, EOMI. hearing grossly intact.  NECK: Neck supple, non-tender no lymphadenopathy, masses or thyromegaly.  CARDIAC: Normal S1 and S2. no mrg. RRR  LUNGS: Clear to auscultation B/L, no rales, rhonchi, or wheezing  ABDOMEN: Soft , NTND, bowel sounds normal. No guarding or rebound.   MUSCULOSKELETAL: ROM intact.  No joint erythema or tenderness.   EXTREMITIES: No edema. Peripheral pulses intact.   NEUROLOGICAL: Non focal. Strength and sensation symmetric and intact throughout.   SKIN: Warm and dry , Well perfused  PSYCHIATRIC: AOx3 , Normal mood and affect

## 2022-12-10 NOTE — H&P ADULT - NSHPLABSRESULTS_GEN_ALL_CORE
LABS:                        16.1   11.11 )-----------( 214      ( 10 Dec 2022 08:50 )             48.4     10 Dec 2022 08:50    137    |  101    |  19     ----------------------------<  188    4.0     |  23     |  0.84     Ca    9.3        10 Dec 2022 08:50    TPro  7.3    /  Alb  4.5    /  TBili  0.4    /  DBili  x      /  AST  71     /  ALT  26     /  AlkPhos  68     10 Dec 2022 08:50    PT/INR - ( 10 Dec 2022 08:50 )   PT: 13.6 sec;   INR: 1.18 ratio         PTT - ( 10 Dec 2022 08:50 )  PTT:128.0 sec  CAPILLARY BLOOD GLUCOSE        BLOOD CULTURE    RADIOLOGY & ADDITIONAL TESTS:    Imaging Personally Reviewed:  [ ] YES

## 2022-12-10 NOTE — CONSULT NOTE ADULT - ASSESSMENT
Mr. Montez is a 52M w/ HTN, T2DM, GERD who is transferred from OSH for ACS evaluation/management.    # NSTEMI  - Currently patient reports resolution of chest pain. "back to normal"  - history, risk factors, as well as initial symptom this AM concerned for CAD.  - Troponin reportedly elevation at OSH  - EKG showed lateral wall ST depression with slight ST elevation in aVR/V1, which could be multivessel or proximal left system. repeat ECG here shows improvement in ST segments.  - c/w ASA (s/p loading per patient)  - c/w Brilinta (reportedly loaded at OSH)  - c/w hep gtt  - Start high-dose statin   - Check LFT, lipid profile, and TSH.  - Continue to monitor on telemetry  - trend Rodger q6  - trend CMP, Mg, phos, CBC  - maintain K > 4, Mg > 2  - maintain active T&S, Hgb > 8; will monitor for s/s of bleeding   - rapid COVID PCR sent   - Will plan for C today given risk factors and presenting symptom. Consent form signed and placed in chart.   - Discussed with Dr. Cramer         Mr. Montez is a 52M w/ HTN, T2DM, GERD who is transferred from OSH for ACS evaluation/management.    # NSTEMI  - Currently patient reports resolution of chest pain. "back to normal"  - history, risk factors, as well as initial symptom this AM concerned for CAD.  - Troponin reportedly elevation at OSH  - EKG showed lateral wall ST depression with slight ST elevation in aVR/V1, which could be multivessel or proximal left system. repeat ECG here shows improvement in ST segments.  - c/w ASA (s/p loading per patient)  - c/w Brilinta (reportedly loaded at OSH)  - c/w hep gtt  - Start high-dose statin   - Check LFT, lipid profile, and TSH.  - Continue to monitor on telemetry  - trend Rodger q6  - trend CMP, Mg, phos, CBC  - maintain K > 4, Mg > 2  - maintain active T&S, Hgb > 8; will monitor for s/s of bleeding   - rapid COVID PCR sent   - Will plan for C today given risk factors and presenting symptom. Consent form signed and placed in chart.   - acquire TTE  - Discussed with Dr. Cramer         Mr. Montez is a 52M w/ HTN, T2DM, GERD who is transferred from OSH for ACS evaluation/management.    # NSTEMI  - Currently patient reports resolution of chest pain. "back to normal"  - history, risk factors, as well as initial symptom this AM concerned for CAD.  - Troponin reportedly elevation at OSH  - EKG showed lateral wall ST depression with slight ST elevation in aVR/V1, which could be multivessel or proximal left system. repeat ECG here shows improvement in ST segments.  - c/w ASA (s/p loading per patient)  - c/w Brilinta (reportedly loaded at OSH)  - c/w hep gtt  - Start high-dose statin   - Check LFT, lipid profile, and TSH.  - Continue to monitor on telemetry  - trend Rodger q6  - trend CMP, Mg, phos, CBC  - maintain K > 4, Mg > 2  - maintain active T&S, Hgb > 8; will monitor for s/s of bleeding   - rapid COVID PCR sent   - Will plan for C today given risk factors and presenting symptom. Consent form signed and placed in chart. Depending on the result from C, patient may go to CICU instead of telemetry floor.  - acquire TTE  - Discussed with Dr. Cramer

## 2022-12-11 LAB
ALBUMIN SERPL ELPH-MCNC: 3.9 G/DL — SIGNIFICANT CHANGE UP (ref 3.3–5)
ALP SERPL-CCNC: 64 U/L — SIGNIFICANT CHANGE UP (ref 40–120)
ALT FLD-CCNC: 30 U/L — SIGNIFICANT CHANGE UP (ref 10–45)
ANION GAP SERPL CALC-SCNC: 17 MMOL/L — SIGNIFICANT CHANGE UP (ref 5–17)
APTT BLD: 52.8 SEC — HIGH (ref 27.5–35.5)
APTT BLD: 82.5 SEC — HIGH (ref 27.5–35.5)
APTT BLD: 84 SEC — HIGH (ref 27.5–35.5)
AST SERPL-CCNC: 102 U/L — HIGH (ref 10–40)
BILIRUB SERPL-MCNC: 0.8 MG/DL — SIGNIFICANT CHANGE UP (ref 0.2–1.2)
BLD GP AB SCN SERPL QL: NEGATIVE — SIGNIFICANT CHANGE UP
BUN SERPL-MCNC: 19 MG/DL — SIGNIFICANT CHANGE UP (ref 7–23)
CALCIUM SERPL-MCNC: 8.8 MG/DL — SIGNIFICANT CHANGE UP (ref 8.4–10.5)
CHLORIDE SERPL-SCNC: 100 MMOL/L — SIGNIFICANT CHANGE UP (ref 96–108)
CK MB BLD-MCNC: 6.3 % — HIGH (ref 0–3.5)
CK MB CFR SERPL CALC: 38 NG/ML — HIGH (ref 0–6.7)
CK SERPL-CCNC: 607 U/L — HIGH (ref 30–200)
CO2 SERPL-SCNC: 17 MMOL/L — LOW (ref 22–31)
CREAT SERPL-MCNC: 0.9 MG/DL — SIGNIFICANT CHANGE UP (ref 0.5–1.3)
EGFR: 103 ML/MIN/1.73M2 — SIGNIFICANT CHANGE UP
GLUCOSE BLDC GLUCOMTR-MCNC: 134 MG/DL — HIGH (ref 70–99)
GLUCOSE BLDC GLUCOMTR-MCNC: 200 MG/DL — HIGH (ref 70–99)
GLUCOSE BLDC GLUCOMTR-MCNC: 224 MG/DL — HIGH (ref 70–99)
GLUCOSE BLDC GLUCOMTR-MCNC: 245 MG/DL — HIGH (ref 70–99)
GLUCOSE SERPL-MCNC: 178 MG/DL — HIGH (ref 70–99)
HCT VFR BLD CALC: 48.8 % — SIGNIFICANT CHANGE UP (ref 39–50)
HGB BLD-MCNC: 16.2 G/DL — SIGNIFICANT CHANGE UP (ref 13–17)
INR BLD: 1.14 RATIO — SIGNIFICANT CHANGE UP (ref 0.88–1.16)
INR BLD: 1.14 RATIO — SIGNIFICANT CHANGE UP (ref 0.88–1.16)
MAGNESIUM SERPL-MCNC: 2 MG/DL — SIGNIFICANT CHANGE UP (ref 1.6–2.6)
MCHC RBC-ENTMCNC: 28.8 PG — SIGNIFICANT CHANGE UP (ref 27–34)
MCHC RBC-ENTMCNC: 33.2 GM/DL — SIGNIFICANT CHANGE UP (ref 32–36)
MCV RBC AUTO: 86.7 FL — SIGNIFICANT CHANGE UP (ref 80–100)
NRBC # BLD: 0 /100 WBCS — SIGNIFICANT CHANGE UP (ref 0–0)
PA ADP PRP-ACNC: 111 PRU — LOW (ref 194–417)
PHOSPHATE SERPL-MCNC: 3.2 MG/DL — SIGNIFICANT CHANGE UP (ref 2.5–4.5)
PLATELET # BLD AUTO: 204 K/UL — SIGNIFICANT CHANGE UP (ref 150–400)
POTASSIUM SERPL-MCNC: 4 MMOL/L — SIGNIFICANT CHANGE UP (ref 3.5–5.3)
POTASSIUM SERPL-SCNC: 4 MMOL/L — SIGNIFICANT CHANGE UP (ref 3.5–5.3)
PROT SERPL-MCNC: 7 G/DL — SIGNIFICANT CHANGE UP (ref 6–8.3)
PROTHROM AB SERPL-ACNC: 13.2 SEC — SIGNIFICANT CHANGE UP (ref 10.5–13.4)
PROTHROM AB SERPL-ACNC: 13.3 SEC — SIGNIFICANT CHANGE UP (ref 10.5–13.4)
RAPID RVP RESULT: SIGNIFICANT CHANGE UP
RBC # BLD: 5.63 M/UL — SIGNIFICANT CHANGE UP (ref 4.2–5.8)
RBC # FLD: 12.3 % — SIGNIFICANT CHANGE UP (ref 10.3–14.5)
RH IG SCN BLD-IMP: POSITIVE — SIGNIFICANT CHANGE UP
SARS-COV-2 RNA SPEC QL NAA+PROBE: SIGNIFICANT CHANGE UP
SODIUM SERPL-SCNC: 134 MMOL/L — LOW (ref 135–145)
TROPONIN T, HIGH SENSITIVITY RESULT: 1273 NG/L — HIGH (ref 0–51)
WBC # BLD: 10.38 K/UL — SIGNIFICANT CHANGE UP (ref 3.8–10.5)
WBC # FLD AUTO: 10.38 K/UL — SIGNIFICANT CHANGE UP (ref 3.8–10.5)

## 2022-12-11 PROCEDURE — 99292 CRITICAL CARE ADDL 30 MIN: CPT

## 2022-12-11 PROCEDURE — 99221 1ST HOSP IP/OBS SF/LOW 40: CPT

## 2022-12-11 PROCEDURE — 93010 ELECTROCARDIOGRAM REPORT: CPT

## 2022-12-11 PROCEDURE — 99233 SBSQ HOSP IP/OBS HIGH 50: CPT

## 2022-12-11 RX ORDER — METOPROLOL TARTRATE 50 MG
25 TABLET ORAL ONCE
Refills: 0 | Status: COMPLETED | OUTPATIENT
Start: 2022-12-11 | End: 2022-12-11

## 2022-12-11 RX ORDER — SODIUM CHLORIDE 9 MG/ML
500 INJECTION, SOLUTION INTRAVENOUS ONCE
Refills: 0 | Status: COMPLETED | OUTPATIENT
Start: 2022-12-11 | End: 2022-12-11

## 2022-12-11 RX ORDER — ACETAMINOPHEN 500 MG
650 TABLET ORAL EVERY 6 HOURS
Refills: 0 | Status: DISCONTINUED | OUTPATIENT
Start: 2022-12-11 | End: 2022-12-19

## 2022-12-11 RX ORDER — METOPROLOL TARTRATE 50 MG
25 TABLET ORAL
Refills: 0 | Status: DISCONTINUED | OUTPATIENT
Start: 2022-12-11 | End: 2022-12-12

## 2022-12-11 RX ORDER — METOPROLOL TARTRATE 50 MG
12.5 TABLET ORAL EVERY 12 HOURS
Refills: 0 | Status: DISCONTINUED | OUTPATIENT
Start: 2022-12-11 | End: 2022-12-11

## 2022-12-11 RX ADMIN — CARVEDILOL PHOSPHATE 3.12 MILLIGRAM(S): 80 CAPSULE, EXTENDED RELEASE ORAL at 06:34

## 2022-12-11 RX ADMIN — Medication 12.5 MILLIGRAM(S): at 17:15

## 2022-12-11 RX ADMIN — Medication 1 PATCH: at 19:27

## 2022-12-11 RX ADMIN — Medication 1 PATCH: at 17:14

## 2022-12-11 RX ADMIN — Medication 1: at 12:41

## 2022-12-11 RX ADMIN — ATORVASTATIN CALCIUM 80 MILLIGRAM(S): 80 TABLET, FILM COATED ORAL at 21:12

## 2022-12-11 RX ADMIN — Medication 650 MILLIGRAM(S): at 01:59

## 2022-12-11 RX ADMIN — Medication 12.5 MILLIGRAM(S): at 11:39

## 2022-12-11 RX ADMIN — Medication 81 MILLIGRAM(S): at 12:19

## 2022-12-11 RX ADMIN — Medication 2: at 17:08

## 2022-12-11 RX ADMIN — Medication 650 MILLIGRAM(S): at 03:57

## 2022-12-11 RX ADMIN — TAMSULOSIN HYDROCHLORIDE 0.4 MILLIGRAM(S): 0.4 CAPSULE ORAL at 21:12

## 2022-12-11 RX ADMIN — SODIUM CHLORIDE 1000 MILLILITER(S): 9 INJECTION, SOLUTION INTRAVENOUS at 09:38

## 2022-12-11 RX ADMIN — PANTOPRAZOLE SODIUM 40 MILLIGRAM(S): 20 TABLET, DELAYED RELEASE ORAL at 06:33

## 2022-12-11 RX ADMIN — Medication 25 MILLIGRAM(S): at 21:12

## 2022-12-11 RX ADMIN — SODIUM CHLORIDE 500 MILLILITER(S): 9 INJECTION, SOLUTION INTRAVENOUS at 02:25

## 2022-12-11 RX ADMIN — CHLORHEXIDINE GLUCONATE 1 APPLICATION(S): 213 SOLUTION TOPICAL at 06:33

## 2022-12-11 NOTE — PROGRESS NOTE ADULT - NS ATTEND AMEND GEN_ALL_CORE FT
51yo gentleman smoker, DM, GERD, NSTEMI with 3 VD on cath. Enzymes decreasing. OOB. No further CP. one episode low grade temp overnight but now afebrile and HR . FS well controlled. P2Y12 is low and repeat today. ECHO Ef=45-50%. Add metoprolol instead of coreg and fluids. Complete heparin for 48 hours. On flomax for BPH. Glucose well controlled. CABG pending. Transfer to floor.

## 2022-12-11 NOTE — PROGRESS NOTE ADULT - SUBJECTIVE AND OBJECTIVE BOX
PATIENT:  SHAIKH FAMILIA  [id number]    CHIEF COMPLAINT:  Patient is a 52y old  Male who presents with a chief complaint of Chest pain (10 Dec 2022 20:43)      INTERVAL HISTORYOVERNIGHT EVENTS:      REVIEW OF SYSTEMS:    Constitutional:     [ ] negative [ ] fevers [ ] chills [ ] weight loss [ ] weight gain  HEENT:                  [ ] negative [ ] dry eyes [ ] eye irritation [ ] postnasal drip [ ] nasal congestion  CV:                         [ ] negative  [ ] chest pain [ ] orthopnea [ ] palpitations [ ] murmur  Resp:                     [ ] negative [ ] cough [ ] shortness of breath [ ] dyspnea [ ] wheezing [ ] sputum [ ] hemoptysis  GI:                          [ ] negative [ ] nausea [ ] vomiting [ ] diarrhea [ ] constipation [ ] abd pain [ ] dysphagia   :                        [ ] negative [ ] dysuria [ ] nocturia [ ] hematuria [ ] increased urinary frequency  Musculoskeletal: [ ] negative [ ] back pain [ ] myalgias [ ] arthralgias [ ] fracture  Skin:                       [ ] negative [ ] rash [ ] itch  Neurological:        [ ] negative [ ] headache [ ] dizziness [ ] syncope [ ] weakness [ ] numbness  Psychiatric:           [ ] negative [ ] anxiety [ ] depression  Endocrine:            [ ] negative [ ] diabetes [ ] thyroid problem  Heme/Lymph:      [ ] negative [ ] anemia [ ] bleeding problem  Allergic/Immune: [ ] negative [ ] itchy eyes [ ] nasal discharge [ ] hives [ ] angioedema    [ ] All other systems negative  [ ] Unable to assess ROS because ________.    MEDICATIONS:  MEDICATIONS  (STANDING):  aspirin  chewable 81 milliGRAM(s) Oral daily  atorvastatin 80 milliGRAM(s) Oral at bedtime  carvedilol 3.125 milliGRAM(s) Oral every 12 hours  chlorhexidine 4% Liquid 1 Application(s) Topical <User Schedule>  dextrose 50% Injectable 25 Gram(s) IV Push once  dextrose 50% Injectable 12.5 Gram(s) IV Push once  dextrose 50% Injectable 25 Gram(s) IV Push once  dextrose Oral Gel 15 Gram(s) Oral once  heparin  Infusion 800 Unit(s)/Hr (11 mL/Hr) IV Continuous <Continuous>  insulin lispro (ADMELOG) corrective regimen sliding scale   SubCutaneous three times a day before meals  insulin lispro (ADMELOG) corrective regimen sliding scale   SubCutaneous at bedtime  nicotine -  14 mG/24Hr(s) Patch 1 Patch Transdermal daily  pantoprazole    Tablet 40 milliGRAM(s) Oral before breakfast  tamsulosin 0.4 milliGRAM(s) Oral at bedtime    MEDICATIONS  (PRN):  acetaminophen     Tablet .. 650 milliGRAM(s) Oral every 6 hours PRN Temp greater or equal to 38C (100.4F)  nicotine  Polacrilex Gum 2 milliGRAM(s) Oral every 2 hours PRN nicotine cravings      ALLERGIES:  Allergies    No Known Allergies    Intolerances        OBJECTIVE:  ICU Vital Signs Last 24 Hrs  T(C): 36.9 (11 Dec 2022 03:00), Max: 37.4 (10 Dec 2022 23:00)  T(F): 98.4 (11 Dec 2022 03:00), Max: 99.4 (10 Dec 2022 23:00)  HR: 98 (11 Dec 2022 07:01) (78 - 122)  BP: 102/65 (11 Dec 2022 07:01) (85/64 - 159/96)  BP(mean): 79 (11 Dec 2022 07:01) (71 - 120)  ABP: --  ABP(mean): --  RR: 22 (11 Dec 2022 07:01) (16 - 29)  SpO2: 96% (11 Dec 2022 07:01) (96% - 100%)    O2 Parameters below as of 10 Dec 2022 23:00  Patient On (Oxygen Delivery Method): room air            Adult Advanced Hemodynamics Last 24 Hrs  CVP(mm Hg): --  CVP(cm H2O): --  CO: --  CI: --  PA: --  PA(mean): --  PCWP: --  SVR: --  SVRI: --  PVR: --  PVRI: --  CAPILLARY BLOOD GLUCOSE      POCT Blood Glucose.: 193 mg/dL (10 Dec 2022 21:17)  POCT Blood Glucose.: 153 mg/dL (10 Dec 2022 16:58)    CAPILLARY BLOOD GLUCOSE      POCT Blood Glucose.: 193 mg/dL (10 Dec 2022 21:17)    I&O's Summary    10 Dec 2022 07:01  -  11 Dec 2022 07:00  --------------------------------------------------------  IN: 914 mL / OUT: 750 mL / NET: 164 mL      Daily Height in cm: 175.26 (10 Dec 2022 12:20)    Daily     PHYSICAL EXAMINATION:  General: WN/WD NAD  HEENT: PERRLA, EOMI, moist mucous membranes  Neurology: A&Ox3, nonfocal, BLANTON x 4  Respiratory: CTA B/L, normal respiratory effort, no wheezes, crackles, rales  CV: RRR, S1S2, no murmurs, rubs or gallops  Abdominal: Soft, NT, ND +BS, Last BM  Extremities: No edema, + peripheral pulses  Incisions:   Tubes:    LABS:                          16.2   10.38 )-----------( 204      ( 11 Dec 2022 03:57 )             48.8     12-11    134<L>  |  100  |  19  ----------------------------<  178<H>  4.0   |  17<L>  |  0.90    Ca    8.8      11 Dec 2022 03:57  Phos  3.2     12-11  Mg     2.0     12-11    TPro  7.0  /  Alb  3.9  /  TBili  0.8  /  DBili  x   /  AST  102<H>  /  ALT  30  /  AlkPhos  64  12-11    LIVER FUNCTIONS - ( 11 Dec 2022 03:57 )  Alb: 3.9 g/dL / Pro: 7.0 g/dL / ALK PHOS: 64 U/L / ALT: 30 U/L / AST: 102 U/L / GGT: x           PT/INR - ( 11 Dec 2022 03:58 )   PT: 13.3 sec;   INR: 1.14 ratio         PTT - ( 11 Dec 2022 03:58 )  PTT:52.8 sec  CKMB Units: 38.0 ng/mL (12-11 @ 03:57)  Creatine Kinase, Serum: 607 U/L (12-11 @ 03:57)  CKMB Units: 79.9 ng/mL (12-10 @ 20:41)  Creatine Kinase, Serum: 1059 U/L (12-10 @ 20:41)  CKMB Units: 101.4 ng/mL (12-10 @ 12:55)  Creatine Kinase, Serum: 1149 U/L (12-10 @ 12:55)  CKMB Units: 47.4 ng/mL (12-10 @ 08:50)  Creatine Kinase, Serum: 576 U/L (12-10 @ 08:50)    CARDIAC MARKERS ( 11 Dec 2022 03:57 )  x     / x     / 607 U/L / x     / 38.0 ng/mL  CARDIAC MARKERS ( 10 Dec 2022 20:41 )  x     / x     / 1059 U/L / x     / 79.9 ng/mL  CARDIAC MARKERS ( 10 Dec 2022 12:55 )  x     / x     / 1149 U/L / x     / 101.4 ng/mL  CARDIAC MARKERS ( 10 Dec 2022 08:50 )  x     / x     / 576 U/L / x     / 47.4 ng/mL          ===================ASSESSMENT ==============  52M w/ HTN, T2DM, GERD who is transferred from [facility] for ACS evaluation/management 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, now pending CABG     Plan:  ====================== NEUROLOGY=====================  A+O x3, no focal deficits  - continue to monitor mental status as per protocol     ==================== RESPIRATORY======================  Comfortable room air  - Monitor SpO2, goal >94%    ====================CARDIOVASCULAR==================  Triple Vessel disease  - 70% occlusion of pLAD 80% occlusion of pDaig, 100% occlusion of Lcx, 80% occlusion of pOM, 70% occlusion of pRPL   - F/up CT Surgery for CABG - Dr Hedrick evaluating   - TTE: EF 45-50%, min MR, min TR, +WMAs,  mild DD, normal RVF  - trend cardiac enzymes q8hrs until peak   - c/w Aspirin  - Hold brillinta pending surgery  - Trend P2Y12 level to >150     HTN  - eventual plan to start ACE after CABG  - Coreg 3.125- held 12/11 AM     carvedilol 3.125 milliGRAM(s) Oral every 12 hours    ===================HEMATOLOGIC/ONC ===================  No active issues    aspirin  chewable 81 milliGRAM(s) Oral daily  heparin  Infusion 800 Unit(s)/Hr (9 mL/Hr) IV Continuous <Continuous>    ===================== RENAL =========================  SCr wnl on presentation  - continue to monitor and trend SCr, BUN, lytes, and I&Os  - replete lytes prn with goal K 4-4.5 and Mg >2    tamsulosin 0.4 milliGRAM(s) Oral at bedtime    ==================== GASTROINTESTINAL===================  - DASH carb consistent diet as tolerated   - Monitor Bowel movements  - Trend LFTs     pantoprazole    Tablet 40 milliGRAM(s) Oral before breakfast    =======================    ENDOCRINE  =====================  - A1C 8.8%  - c/w mod insulin sliding scale  -F/u TSH      atorvastatin 80 milliGRAM(s) Oral at bedtime  dextrose 50% Injectable 25 Gram(s) IV Push once  dextrose 50% Injectable 12.5 Gram(s) IV Push once  dextrose 50% Injectable 25 Gram(s) IV Push once  dextrose Oral Gel 15 Gram(s) Oral once  insulin lispro (ADMELOG) corrective regimen sliding scale   SubCutaneous three times a day before meals  insulin lispro (ADMELOG) corrective regimen sliding scale   SubCutaneous at bedtime    ========================INFECTIOUS DISEASE================    Afebrile, No leukocytosis  - monitor and trend wbc and temp curve

## 2022-12-11 NOTE — PROGRESS NOTE ADULT - ASSESSMENT
====================ASSESSMENT ==============  52M w/ HTN, T2DM, GERD who is transferred from [facility] for ACS evaluation/management 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, now pending CABG       Plan:  ====================== NEUROLOGY=====================  A+O x3, no focal deficits  - continue to monitor mental status as per protocol     ==================== RESPIRATORY======================  Comfortable room air  - Monitor SpO2, goal >94%    ====================CARDIOVASCULAR==================  Triple Vessel disease  - 70% occlusion of pLAD 80% occlusion of pDaig, 100% occlusion of Lcx, 80% occlusion of pOM, 70% occlusion of pRPL   - F/up CT Surgery for CABG - Dr Hedrick evaluating   - TTE: EF 45-50%, min MR, min TR, +WMAs,  mild DD, normal RVF  - c/w Aspirin and Statin   - Hold brillinta pending surgery  - Trend P2Y12 level to >150     HTN  - eventual plan to start ACE after CABG  - Coreg 3.125    ===================HEMATOLOGIC/ONC ===================  No active issues  - AC with Heparin gtt    heparin  Infusion 800 Unit(s)/Hr (11 mL/Hr) IV Continuous <Continuous>    ===================== RENAL =========================  SCr wnl on presentation  - continue to monitor and trend SCr, BUN, lytes, and I&Os  - replete lytes prn with goal K 4-4.5 and Mg >2  - c/w Flomax     ==================== GASTROINTESTINAL===================  - DASH carb consistent diet as tolerated   - Monitor Bowel movements  - PPI  - Trend LFTs   =======================    ENDOCRINE  =====================   A1C 8.8%  - c/w mod insulin sliding scale  -F/u TSH    ========================INFECTIOUS DISEASE================  Afebrile, No leukocytosis  - monitor and trend wbc and temp curve     Patient requires continuous monitoring with bedside rhythm monitoring, pulse ox monitoring, and intermittent blood gas analysis. Care plan discussed with ICU care team. Patient remained critical and at risk for life threatening decompensation.  Patient seen, examined and plan discussed with CCU team during rounds.     I have personally provided _35___ minutes of critical care time excluding time spent on separate procedures, in addition to initial critical care time provided by the CICU Attending, Dr. Graves   By signing my name below, I, Tony Medrano, attest that this documentation has been prepared under the direction and in the presence of Vianey Loja NP   Electronically signed: Fiorella Garcia, 12-11-22 @ 20:32    I, Vianey Loja NP, personally performed the services described in this documentation. all medical record entries made by the scribe were at my direction and in my presence. I have reviewed the chart and agree that the record reflects my personal performance and is accurate and complete  Electronically signed: Vianey Loja NP

## 2022-12-12 DIAGNOSIS — E11.9 TYPE 2 DIABETES MELLITUS WITHOUT COMPLICATIONS: ICD-10-CM

## 2022-12-12 DIAGNOSIS — I25.10 ATHEROSCLEROTIC HEART DISEASE OF NATIVE CORONARY ARTERY WITHOUT ANGINA PECTORIS: ICD-10-CM

## 2022-12-12 LAB
ALBUMIN SERPL ELPH-MCNC: 4.1 G/DL — SIGNIFICANT CHANGE UP (ref 3.3–5)
ALP SERPL-CCNC: 61 U/L — SIGNIFICANT CHANGE UP (ref 40–120)
ALT FLD-CCNC: 21 U/L — SIGNIFICANT CHANGE UP (ref 10–45)
ANION GAP SERPL CALC-SCNC: 13 MMOL/L — SIGNIFICANT CHANGE UP (ref 5–17)
APTT BLD: 114.9 SEC — HIGH (ref 27.5–35.5)
APTT BLD: 60.5 SEC — HIGH (ref 27.5–35.5)
APTT BLD: 63.4 SEC — HIGH (ref 27.5–35.5)
AST SERPL-CCNC: 38 U/L — SIGNIFICANT CHANGE UP (ref 10–40)
BILIRUB SERPL-MCNC: 1 MG/DL — SIGNIFICANT CHANGE UP (ref 0.2–1.2)
BUN SERPL-MCNC: 19 MG/DL — SIGNIFICANT CHANGE UP (ref 7–23)
CALCIUM SERPL-MCNC: 9 MG/DL — SIGNIFICANT CHANGE UP (ref 8.4–10.5)
CHLORIDE SERPL-SCNC: 99 MMOL/L — SIGNIFICANT CHANGE UP (ref 96–108)
CO2 SERPL-SCNC: 24 MMOL/L — SIGNIFICANT CHANGE UP (ref 22–31)
CREAT SERPL-MCNC: 1.05 MG/DL — SIGNIFICANT CHANGE UP (ref 0.5–1.3)
EGFR: 86 ML/MIN/1.73M2 — SIGNIFICANT CHANGE UP
GLUCOSE BLDC GLUCOMTR-MCNC: 160 MG/DL — HIGH (ref 70–99)
GLUCOSE BLDC GLUCOMTR-MCNC: 179 MG/DL — HIGH (ref 70–99)
GLUCOSE BLDC GLUCOMTR-MCNC: 250 MG/DL — HIGH (ref 70–99)
GLUCOSE BLDC GLUCOMTR-MCNC: 261 MG/DL — HIGH (ref 70–99)
GLUCOSE SERPL-MCNC: 171 MG/DL — HIGH (ref 70–99)
HCT VFR BLD CALC: 45.9 % — SIGNIFICANT CHANGE UP (ref 39–50)
HGB BLD-MCNC: 15.8 G/DL — SIGNIFICANT CHANGE UP (ref 13–17)
INR BLD: 1.16 RATIO — SIGNIFICANT CHANGE UP (ref 0.88–1.16)
MAGNESIUM SERPL-MCNC: 2 MG/DL — SIGNIFICANT CHANGE UP (ref 1.6–2.6)
MCHC RBC-ENTMCNC: 29.1 PG — SIGNIFICANT CHANGE UP (ref 27–34)
MCHC RBC-ENTMCNC: 34.4 GM/DL — SIGNIFICANT CHANGE UP (ref 32–36)
MCV RBC AUTO: 84.5 FL — SIGNIFICANT CHANGE UP (ref 80–100)
NRBC # BLD: 0 /100 WBCS — SIGNIFICANT CHANGE UP (ref 0–0)
PA ADP PRP-ACNC: 152 PRU — LOW (ref 194–417)
PHOSPHATE SERPL-MCNC: 3.6 MG/DL — SIGNIFICANT CHANGE UP (ref 2.5–4.5)
PLATELET # BLD AUTO: 198 K/UL — SIGNIFICANT CHANGE UP (ref 150–400)
POTASSIUM SERPL-MCNC: 3.8 MMOL/L — SIGNIFICANT CHANGE UP (ref 3.5–5.3)
POTASSIUM SERPL-SCNC: 3.8 MMOL/L — SIGNIFICANT CHANGE UP (ref 3.5–5.3)
PROT SERPL-MCNC: 7.1 G/DL — SIGNIFICANT CHANGE UP (ref 6–8.3)
PROTHROM AB SERPL-ACNC: 13.5 SEC — HIGH (ref 10.5–13.4)
RBC # BLD: 5.43 M/UL — SIGNIFICANT CHANGE UP (ref 4.2–5.8)
RBC # FLD: 12.2 % — SIGNIFICANT CHANGE UP (ref 10.3–14.5)
SODIUM SERPL-SCNC: 136 MMOL/L — SIGNIFICANT CHANGE UP (ref 135–145)
WBC # BLD: 7.73 K/UL — SIGNIFICANT CHANGE UP (ref 3.8–10.5)
WBC # FLD AUTO: 7.73 K/UL — SIGNIFICANT CHANGE UP (ref 3.8–10.5)

## 2022-12-12 PROCEDURE — 99291 CRITICAL CARE FIRST HOUR: CPT

## 2022-12-12 PROCEDURE — 93010 ELECTROCARDIOGRAM REPORT: CPT

## 2022-12-12 PROCEDURE — 99232 SBSQ HOSP IP/OBS MODERATE 35: CPT

## 2022-12-12 RX ORDER — METOPROLOL TARTRATE 50 MG
25 TABLET ORAL ONCE
Refills: 0 | Status: COMPLETED | OUTPATIENT
Start: 2022-12-12 | End: 2022-12-12

## 2022-12-12 RX ORDER — METOPROLOL TARTRATE 50 MG
50 TABLET ORAL
Refills: 0 | Status: DISCONTINUED | OUTPATIENT
Start: 2022-12-12 | End: 2022-12-14

## 2022-12-12 RX ORDER — INSULIN LISPRO 100/ML
VIAL (ML) SUBCUTANEOUS
Refills: 0 | Status: DISCONTINUED | OUTPATIENT
Start: 2022-12-12 | End: 2022-12-19

## 2022-12-12 RX ADMIN — Medication 6: at 17:08

## 2022-12-12 RX ADMIN — TAMSULOSIN HYDROCHLORIDE 0.4 MILLIGRAM(S): 0.4 CAPSULE ORAL at 22:24

## 2022-12-12 RX ADMIN — ATORVASTATIN CALCIUM 80 MILLIGRAM(S): 80 TABLET, FILM COATED ORAL at 22:24

## 2022-12-12 RX ADMIN — Medication 81 MILLIGRAM(S): at 12:45

## 2022-12-12 RX ADMIN — Medication 50 MILLIGRAM(S): at 17:08

## 2022-12-12 RX ADMIN — Medication 25 MILLIGRAM(S): at 05:48

## 2022-12-12 RX ADMIN — HEPARIN SODIUM 9 UNIT(S)/HR: 5000 INJECTION INTRAVENOUS; SUBCUTANEOUS at 08:05

## 2022-12-12 RX ADMIN — Medication 1 PATCH: at 13:08

## 2022-12-12 RX ADMIN — Medication 1 PATCH: at 22:14

## 2022-12-12 RX ADMIN — Medication 25 MILLIGRAM(S): at 08:14

## 2022-12-12 RX ADMIN — HEPARIN SODIUM 9 UNIT(S)/HR: 5000 INJECTION INTRAVENOUS; SUBCUTANEOUS at 14:20

## 2022-12-12 RX ADMIN — Medication 2: at 12:41

## 2022-12-12 RX ADMIN — CHLORHEXIDINE GLUCONATE 1 APPLICATION(S): 213 SOLUTION TOPICAL at 05:58

## 2022-12-12 RX ADMIN — Medication 1 PATCH: at 07:09

## 2022-12-12 RX ADMIN — Medication 1 PATCH: at 12:45

## 2022-12-12 RX ADMIN — PANTOPRAZOLE SODIUM 40 MILLIGRAM(S): 20 TABLET, DELAYED RELEASE ORAL at 08:14

## 2022-12-12 RX ADMIN — Medication 2: at 07:59

## 2022-12-12 NOTE — PROGRESS NOTE ADULT - ASSESSMENT
52M w/ HTN, T2DM, GERD who is transferred from [facility] for ACS evaluation/management. Per patient yesterday at 3 am he was woken up from his sleep for crushing chest pain that radiated to his left side w/ associated numbness and tinging. He endorses sweats, and small bouts of emesis x2. He states he has never experienced pain like this in the past, however it has resolved. He has been recently seeing a gastroenterology for worsening reflux over to past 4-6 months for which he was taking omeprazole. he states he misses his medications up to 2-3x weekly.   He denies previous MIs, cardiac surgeries or hospitalizations He endorses smoking 5 cig/day for 36 years. 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 70% occlusion of pLAD 80% occlusion of pDaig, 100% occlusion of Lcx, 80% occlusion of pOM, 70% occlusion of pRPL. Patient admitted to CCU for management    12/12 Tx 2 [facility] for further management  Continue Hep gtt CAD Cont ASA statin betablocker CAD CABG next week w/ Dr Hedrick   Called Endocrine consult hyperglycemia

## 2022-12-12 NOTE — PROGRESS NOTE ADULT - ASSESSMENT
====================ASSESSMENT ==============  52M w/ HTN, T2DM, GERD who is transferred from [facility] for ACS evaluation/management 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, now pending CABG       Plan:  ====================== NEUROLOGY=====================  A+O x3, no focal deficits  - continue to monitor mental status as per protocol     ==================== RESPIRATORY======================  Comfortable room air  - Monitor SpO2, goal >94%    ====================CARDIOVASCULAR==================  Triple Vessel disease  - 70% occlusion of pLAD 80% occlusion of pDaig, 100% occlusion of Lcx, 80% occlusion of pOM, 70% occlusion of pRPL   - F/up CT Surgery for CABG - Dr Hedrick evaluating   - TTE: EF 45-50%, min MR, min TR, +WMAs,  mild DD, normal RVF  - c/w Aspirin and Statin   - Hold brillinta pending surgery  - Trend P2Y12 level to >150     HTN  - eventual plan to start ACE after CABG  - Coreg 3.125    ===================HEMATOLOGIC/ONC ===================  No active issues  - AC with Heparin gtt    heparin  Infusion 800 Unit(s)/Hr (11 mL/Hr) IV Continuous <Continuous>    ===================== RENAL =========================  SCr wnl on presentation  - continue to monitor and trend SCr, BUN, lytes, and I&Os  - replete lytes prn with goal K 4-4.5 and Mg >2  - c/w Flomax     ==================== GASTROINTESTINAL===================  - DASH carb consistent diet as tolerated   - Monitor Bowel movements  - PPI  - Trend LFTs   =======================    ENDOCRINE  =====================   A1C 8.8%  - c/w mod insulin sliding scale  -F/u TSH    ========================INFECTIOUS DISEASE================  Afebrile, No leukocytosis  - monitor and trend wbc and temp curve     Patient requires continuous monitoring with bedside rhythm monitoring, pulse ox monitoring, and intermittent blood gas analysis. Care plan discussed with ICU care team. Patient remained critical and at risk for life threatening decompensation.  Patient seen, examined and plan discussed with CCU team during rounds.     I have personally provided _35___ minutes of critical care time excluding time spent on separate procedures, in addition to initial critical care time provided by the CICU Attending, Dr. Graves   By signing my name below, I, Tony Medrano, attest that this documentation has been prepared under the direction and in the presence of Vianey Loja NP   Electronically signed: Fiorella Garcia, 12-11-22 @ 20:32    I, Vianey Loja NP, personally performed the services described in this documentation. all medical record entries made by the scribe were at my direction and in my presence. I have reviewed the chart and agree that the record reflects my personal performance and is accurate and complete  Electronically signed: Vianey Loja NP  ====================ASSESSMENT ==============  52M w/ HTN, T2DM, GERD who is transferred from [facility] for ACS evaluation/management 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, now pending CABG       Plan:  ====================== NEUROLOGY=====================  A+O x3, no focal deficits  - continue to monitor mental status as per protocol     ==================== RESPIRATORY======================  Comfortable room air  - Monitor SpO2, goal >94%    ====================CARDIOVASCULAR==================  Triple Vessel disease  - 70% occlusion of pLAD 80% occlusion of pDaig, 100% occlusion of Lcx, 80% occlusion of pOM, 70% occlusion of pRPL   - F/up CT Surgery for CABG - Dr Hedrick evaluating   - TTE: EF 45-50%, min MR, min TR, +WMAs,  mild DD, normal RVF  - c/w Aspirin and Statin   - Hold brillinta pending surgery  - Trend P2Y12 level to >150     HTN  - eventual plan to start ACE after CABG  - Coreg 3.125    ===================HEMATOLOGIC/ONC ===================  No active issues  - AC with Heparin gtt    heparin  Infusion 800 Unit(s)/Hr (11 mL/Hr) IV Continuous <Continuous>    ===================== RENAL =========================  SCr wnl on presentation  - continue to monitor and trend SCr, BUN, lytes, and I&Os  - replete lytes prn with goal K 4-4.5 and Mg >2  - c/w Flomax     ==================== GASTROINTESTINAL===================  - DASH carb consistent diet as tolerated   - Monitor Bowel movements  - PPI  - Trend LFTs   =======================    ENDOCRINE  =====================   A1C 8.8%  - c/w mod insulin sliding scale  -F/u TSH    ========================INFECTIOUS DISEASE================  Afebrile, No leukocytosis  - monitor and trend wbc and temp curve    ====================ASSESSMENT ==============  52M w/ HTN, T2DM, GERD who is transferred from [facility] for ACS evaluation/management 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, now pending CABG       Plan:  ====================== NEUROLOGY=====================  A+O x3, no focal deficits  - continue to monitor mental status as per protocol     ==================== RESPIRATORY======================  Comfortable room air  - Monitor SpO2, goal >94%    ====================CARDIOVASCULAR==================  Triple Vessel disease  - 70% occlusion of pLAD 80% occlusion of pDaig, 100% occlusion of Lcx, 80% occlusion of pOM, 70% occlusion of pRPL   - F/up CT Surgery for CABG - Dr Hedrick evaluating   - TTE: EF 45-50%, min MR, min TR, +WMAs,  mild DD, normal RVF  - c/w Aspirin and Statin   - Hold brillinta pending surgery  - Trend P2Y12 level to >150. 152 today 12/12/22    HTN  - eventual plan to start ACE after CABG  - Coreg 3.125    ===================HEMATOLOGIC/ONC ===================  No active issues  - AC with Heparin gtt    heparin  Infusion 900 Unit(s)/Hr (9 mL/Hr) IV Continuous <Continuous>    ===================== RENAL =========================  SCr wnl on presentation  - continue to monitor and trend SCr, BUN, lytes, and I&Os  - replete lytes prn with goal K 4-4.5 and Mg >2  - c/w Flomax     ==================== GASTROINTESTINAL===================  - DASH carb consistent diet as tolerated   - Monitor Bowel movements  - PPI  - Trend LFTs   =======================    ENDOCRINE  =====================   A1C 8.8%  - c/w mod insulin sliding scale  - TSH = 0.38  - consider SGLT2 after surgery for gdmt    ========================INFECTIOUS DISEASE================  Afebrile, No leukocytosis  - monitor and trend wbc and temp curve         Gurjit Geller, PGY1.

## 2022-12-12 NOTE — CHART NOTE - NSCHARTNOTEFT_GEN_A_CORE
CCU Transfer Note    Transfer from: CCU    Transfer to: ( x ) Telemetry --    Accepting Physician:    Team covering on floor: ACP/Resident team   Signout given to: Hospitalist and     CCU COURSE:      PAST MEDICAL & SURGICAL HISTORY:  No pertinent past medical history      Hypertension      Diabetes mellitus      GERD (gastroesophageal reflux disease)      No significant past surgical history      No significant past surgical history          Vital Signs Last 24 Hrs  T(C): 36.6 (12 Dec 2022 08:00), Max: 36.9 (11 Dec 2022 19:00)  T(F): 97.9 (12 Dec 2022 08:00), Max: 98.4 (11 Dec 2022 19:00)  HR: 87 (12 Dec 2022 12:00) (85 - 105)  BP: 126/83 (12 Dec 2022 12:00) (103/61 - 156/94)  BP(mean): 100 (12 Dec 2022 12:00) (77 - 119)  RR: 20 (12 Dec 2022 12:00) (16 - 33)  SpO2: 99% (12 Dec 2022 12:00) (93% - 99%)    Parameters below as of 12 Dec 2022 12:00  Patient On (Oxygen Delivery Method): room air      MEDICATIONS  (STANDING):  aspirin  chewable 81 milliGRAM(s) Oral daily  atorvastatin 80 milliGRAM(s) Oral at bedtime  chlorhexidine 4% Liquid 1 Application(s) Topical <User Schedule>  heparin  Infusion 800 Unit(s)/Hr (9 mL/Hr) IV Continuous <Continuous>  insulin lispro (ADMELOG) corrective regimen sliding scale   SubCutaneous at bedtime  insulin lispro (ADMELOG) corrective regimen sliding scale   SubCutaneous three times a day before meals  metoprolol tartrate 50 milliGRAM(s) Oral two times a day  nicotine -  14 mG/24Hr(s) Patch 1 Patch Transdermal daily  pantoprazole    Tablet 40 milliGRAM(s) Oral before breakfast  tamsulosin 0.4 milliGRAM(s) Oral at bedtime    MEDICATIONS  (PRN):  acetaminophen     Tablet .. 650 milliGRAM(s) Oral every 6 hours PRN Temp greater or equal to 38C (100.4F)  nicotine  Polacrilex Gum 2 milliGRAM(s) Oral every 2 hours PRN nicotine cravings        CARDIAC MARKERS ( 11 Dec 2022 03:57 )  x     / x     / 607 U/L / x     / 38.0 ng/mL  CARDIAC MARKERS ( 10 Dec 2022 20:41 )  x     / x     / 1059 U/L / x     / 79.9 ng/mL                            15.8   7.73  )-----------( 198      ( 12 Dec 2022 05:56 )             45.9     12-12    136  |  99  |  19  ----------------------------<  171<H>  3.8   |  24  |  1.05    Ca    9.0      12 Dec 2022 05:56  Phos  3.6     12-12  Mg     2.0     12-12    TPro  7.1  /  Alb  4.1  /  TBili  1.0  /  DBili  x   /  AST  38  /  ALT  21  /  AlkPhos  61  12-12    PT/INR - ( 12 Dec 2022 05:56 )   PT: 13.5 sec;   INR: 1.16 ratio         PTT - ( 12 Dec 2022 05:56 )  PTT:114.9 sec  PHYSICAL EXAM:      Constitutional:    Eyes:    ENMT:    Neck:    Breasts:    Back:    Respiratory:    Cardiovascular:    Gastrointestinal:    Genitourinary:    Rectal:    Extremities:    Vascular:    Neurological:    Skin:    Lymph Nodes:    Musculoskeletal:    Psychiatric:        Lactate:        ASSESSMENT & PLAN:             FOR FOLLOW UP:        Gurjit Geller, PGY1 CCU Transfer Note    Transfer from: CCU    Transfer to: ( x ) Telemetry --    Accepting Physician:    Team covering on floor: ACP/Resident team   Signout given to: Hospitalist and     CCU COURSE:    52M w/ HTN, T2DM, GERD who is transferred from OSH for ACS evaluation/management. Pt presented after waking up from his sleep d/t crushing chest pain w/ left-sided radiation w/ associated numbness and tinging. Pt taken to cath lab found to have multivessel, disease. 70% occlusion of pLAD 80% occlusion of pDaig, 100% occlusion of Lcx, 80% occlusion of pOM, 70% occlusion of pRPL. Patient admitted to CCU for management. On heparin drip. Trop were elevated but peaked on 12/11. Pt evaluated by CT surgery for CABG. P2Y12 levels initially low s/p bilinta load. Levels trended to >150. Level 152 on 12/12. Patient asymptomatic on unit since arrival w/o chest pain, dyspnea, dizziness, sweats. Pending CABG       PAST MEDICAL & SURGICAL HISTORY:  No pertinent past medical history      Hypertension      Diabetes mellitus      GERD (gastroesophageal reflux disease)      No significant past surgical history      No significant past surgical history          Vital Signs Last 24 Hrs  T(C): 36.6 (12 Dec 2022 08:00), Max: 36.9 (11 Dec 2022 19:00)  T(F): 97.9 (12 Dec 2022 08:00), Max: 98.4 (11 Dec 2022 19:00)  HR: 87 (12 Dec 2022 12:00) (85 - 105)  BP: 126/83 (12 Dec 2022 12:00) (103/61 - 156/94)  BP(mean): 100 (12 Dec 2022 12:00) (77 - 119)  RR: 20 (12 Dec 2022 12:00) (16 - 33)  SpO2: 99% (12 Dec 2022 12:00) (93% - 99%)    Parameters below as of 12 Dec 2022 12:00  Patient On (Oxygen Delivery Method): room air      MEDICATIONS  (STANDING):  aspirin  chewable 81 milliGRAM(s) Oral daily  atorvastatin 80 milliGRAM(s) Oral at bedtime  chlorhexidine 4% Liquid 1 Application(s) Topical <User Schedule>  heparin  Infusion 800 Unit(s)/Hr (9 mL/Hr) IV Continuous <Continuous>  insulin lispro (ADMELOG) corrective regimen sliding scale   SubCutaneous at bedtime  insulin lispro (ADMELOG) corrective regimen sliding scale   SubCutaneous three times a day before meals  metoprolol tartrate 50 milliGRAM(s) Oral two times a day  nicotine -  14 mG/24Hr(s) Patch 1 Patch Transdermal daily  pantoprazole    Tablet 40 milliGRAM(s) Oral before breakfast  tamsulosin 0.4 milliGRAM(s) Oral at bedtime    MEDICATIONS  (PRN):  acetaminophen     Tablet .. 650 milliGRAM(s) Oral every 6 hours PRN Temp greater or equal to 38C (100.4F)  nicotine  Polacrilex Gum 2 milliGRAM(s) Oral every 2 hours PRN nicotine cravings        CARDIAC MARKERS ( 11 Dec 2022 03:57 )  x     / x     / 607 U/L / x     / 38.0 ng/mL  CARDIAC MARKERS ( 10 Dec 2022 20:41 )  x     / x     / 1059 U/L / x     / 79.9 ng/mL                            15.8   7.73  )-----------( 198      ( 12 Dec 2022 05:56 )             45.9     12-12    136  |  99  |  19  ----------------------------<  171<H>  3.8   |  24  |  1.05    Ca    9.0      12 Dec 2022 05:56  Phos  3.6     12-12  Mg     2.0     12-12    TPro  7.1  /  Alb  4.1  /  TBili  1.0  /  DBili  x   /  AST  38  /  ALT  21  /  AlkPhos  61  12-12    PT/INR - ( 12 Dec 2022 05:56 )   PT: 13.5 sec;   INR: 1.16 ratio         PTT - ( 12 Dec 2022 05:56 )  PTT:114.9 sec  PHYSICAL EXAM:      Constitutional:    Eyes:    ENMT:    Neck:    Breasts:    Back:    Respiratory:    Cardiovascular:    Gastrointestinal:    Genitourinary:    Rectal:    Extremities:    Vascular:    Neurological:    Skin:    Lymph Nodes:    Musculoskeletal:    Psychiatric:        Lactate:        ASSESSMENT & PLAN:     52M w/ HTN, T2DM, GERD who is transferred from OSH for ACS evaluation/management 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, now pending CABG       Plan:  ====================== NEUROLOGY=====================  A+O x3, no focal deficits  - continue to monitor mental status as per protocol     ==================== RESPIRATORY======================  Comfortable room air  - Monitor SpO2, goal >94%    ====================CARDIOVASCULAR==================  Triple Vessel disease  - 70% occlusion of pLAD 80% occlusion of pDaig, 100% occlusion of Lcx, 80% occlusion of pOM, 70% occlusion of pRPL   - F/up CT Surgery for CABG - Dr Hedrick evaluating   - TTE: EF 45-50%, min MR, min TR, +WMAs,  mild DD, normal RVF  - c/w Aspirin and Statin   - Hold brillinta pending surgery  - Trend P2Y12 level to >150     HTN  - eventual plan to start ACE after CABG  - Coreg 3.125    ===================HEMATOLOGIC/ONC ===================  No active issues  - AC with Heparin gtt    heparin  Infusion 800 Unit(s)/Hr (11 mL/Hr) IV Continuous <Continuous>    ===================== RENAL =========================  SCr wnl on presentation  - continue to monitor and trend SCr, BUN, lytes, and I&Os  - replete lytes prn with goal K 4-4.5 and Mg >2  - c/w Flomax     ==================== GASTROINTESTINAL===================  - DASH carb consistent diet as tolerated   - Monitor Bowel movements  - PPI  - Trend LFTs   =======================    ENDOCRINE  =====================   A1C 8.8%  - c/w mod insulin sliding scale  -F/u TSH    ========================INFECTIOUS DISEASE================  Afebrile, No leukocytosis  - monitor and trend wbc and temp curve           FOR FOLLOW UP:        Gurjit Geller, PGY1 CCU Transfer Note    Transfer from: CCU    Transfer to: ( x ) Telemetry --    Accepting Physician:    Team covering on floor: ACP/Resident team   Signout given to: Hospitalist and     CCU COURSE:    52M w/ HTN, T2DM, GERD who is transferred from OSH for ACS evaluation/management. Pt presented after waking up from his sleep d/t crushing chest pain w/ left-sided radiation w/ associated numbness and tinging. Pt taken to cath lab found to have multivessel, disease. 70% occlusion of pLAD 80% occlusion of pDaig, 100% occlusion of Lcx, 80% occlusion of pOM, 70% occlusion of pRPL. Patient admitted to CCU for management. On heparin drip. Trop were elevated but peaked on 12/11. Pt evaluated by CT surgery for CABG. P2Y12 levels initially low s/p bilinta load. Levels trended to >150. Level 152 on 12/12. Patient asymptomatic on unit since arrival w/o chest pain, dyspnea, dizziness, sweats. Pending CABG       PAST MEDICAL & SURGICAL HISTORY:  No pertinent past medical history      Hypertension      Diabetes mellitus      GERD (gastroesophageal reflux disease)      No significant past surgical history      No significant past surgical history          Vital Signs Last 24 Hrs  T(C): 36.6 (12 Dec 2022 08:00), Max: 36.9 (11 Dec 2022 19:00)  T(F): 97.9 (12 Dec 2022 08:00), Max: 98.4 (11 Dec 2022 19:00)  HR: 87 (12 Dec 2022 12:00) (85 - 105)  BP: 126/83 (12 Dec 2022 12:00) (103/61 - 156/94)  BP(mean): 100 (12 Dec 2022 12:00) (77 - 119)  RR: 20 (12 Dec 2022 12:00) (16 - 33)  SpO2: 99% (12 Dec 2022 12:00) (93% - 99%)    Parameters below as of 12 Dec 2022 12:00  Patient On (Oxygen Delivery Method): room air      MEDICATIONS  (STANDING):  aspirin  chewable 81 milliGRAM(s) Oral daily  atorvastatin 80 milliGRAM(s) Oral at bedtime  chlorhexidine 4% Liquid 1 Application(s) Topical <User Schedule>  heparin  Infusion 800 Unit(s)/Hr (9 mL/Hr) IV Continuous <Continuous>  insulin lispro (ADMELOG) corrective regimen sliding scale   SubCutaneous at bedtime  insulin lispro (ADMELOG) corrective regimen sliding scale   SubCutaneous three times a day before meals  metoprolol tartrate 50 milliGRAM(s) Oral two times a day  nicotine -  14 mG/24Hr(s) Patch 1 Patch Transdermal daily  pantoprazole    Tablet 40 milliGRAM(s) Oral before breakfast  tamsulosin 0.4 milliGRAM(s) Oral at bedtime    MEDICATIONS  (PRN):  acetaminophen     Tablet .. 650 milliGRAM(s) Oral every 6 hours PRN Temp greater or equal to 38C (100.4F)  nicotine  Polacrilex Gum 2 milliGRAM(s) Oral every 2 hours PRN nicotine cravings        CARDIAC MARKERS ( 11 Dec 2022 03:57 )  x     / x     / 607 U/L / x     / 38.0 ng/mL  CARDIAC MARKERS ( 10 Dec 2022 20:41 )  x     / x     / 1059 U/L / x     / 79.9 ng/mL                            15.8   7.73  )-----------( 198      ( 12 Dec 2022 05:56 )             45.9     12-12    136  |  99  |  19  ----------------------------<  171<H>  3.8   |  24  |  1.05    Ca    9.0      12 Dec 2022 05:56  Phos  3.6     12-12  Mg     2.0     12-12    TPro  7.1  /  Alb  4.1  /  TBili  1.0  /  DBili  x   /  AST  38  /  ALT  21  /  AlkPhos  61  12-12    PT/INR - ( 12 Dec 2022 05:56 )   PT: 13.5 sec;   INR: 1.16 ratio         PTT - ( 12 Dec 2022 05:56 )  PTT:114.9 sec  PHYSICAL EXAM:  GENERAL: No acute distress, well-developed  HEAD:  Atraumatic, Normocephalic  EYES: EOMI, PERRLA, conjunctiva and sclera clear  NECK: Supple, no lymphadenopathy, no JVD  CHEST/LUNG: CTAB; No wheezes, rales, or rhonchi  HEART: Regular rate and rhythm. Normal S1/S2. 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, now pending CABG       Plan:  ====================== NEUROLOGY=====================  A+O x3, no focal deficits  - continue to monitor mental status as per protocol     ==================== RESPIRATORY======================  Comfortable room air  - Monitor SpO2, goal >94%    ====================CARDIOVASCULAR==================  Triple Vessel disease  - 70% occlusion of pLAD 80% occlusion of pDaig, 100% occlusion of Lcx, 80% occlusion of pOM, 70% occlusion of pRPL   - F/up CT Surgery for CABG - Dr Hedrick evaluating   - TTE: EF 45-50%, min MR, min TR, +WMAs,  mild DD, normal RVF  - c/w Aspirin and Statin   - Hold brillinta pending surgery  - Trend P2Y12 level to >150. 152 today 12/12/22    HTN  - eventual plan to start ACE after CABG  - Coreg 3.125    ===================HEMATOLOGIC/ONC ===================  No active issues  - AC with Heparin gtt    heparin  Infusion 900 Unit(s)/Hr (9 mL/Hr) IV Continuous <Continuous>    ===================== RENAL =========================  SCr wnl on presentation  - continue to monitor and trend SCr, BUN, lytes, and I&Os  - replete lytes prn with goal K 4-4.5 and Mg >2  - c/w Flomax     ==================== GASTROINTESTINAL===================  - DASH carb consistent diet as tolerated   - Monitor Bowel movements  - PPI  - Trend LFTs   =======================    ENDOCRINE  =====================   A1C 8.8%  - c/w mod insulin sliding scale  - TSH = 0.38  - consider SGLT2 after surgery for gdmt    ========================INFECTIOUS DISEASE================  Afebrile, No leukocytosis  - monitor and trend wbc and temp curve           FOR FOLLOW UP:    [ ] CT surgery recs for CABG      Gurjit Geller, PGY1

## 2022-12-12 NOTE — PROGRESS NOTE ADULT - PROBLEM SELECTOR PLAN 1
ASA statin betablocker  Hep gtt anatomy  Preop workup in progress  CABG next week  Nicotine patch current tobacco use

## 2022-12-12 NOTE — PROGRESS NOTE ADULT - SUBJECTIVE AND OBJECTIVE BOX
SHAIKH BARBOSA  MRN-[id number]  Patient is a 52y old  Male who presents with a chief complaint of Chest pain (10 Dec 2022 20:43)    HPI:  52M w/ HTN, T2DM, GERD who is transferred from OSH for ACS evaluation/management. Per patient yesterday at 3 am he was woken up from his sleep for crushing chest pain that radiated to his left side w/ associated numbness and tinging. He endorses sweats, and small bouts of emesis x2. He states he has never experienced pain like this in the past, however it has resolved. He has been recently seeing a gastroenterology for worsening reflux over to past 4-6 months for which he was taking omeprazole. he states he misses his medications up to 2-3x weekly. He denies previous MIs, cardiac surgeries or hospitalizations He endorses smoking 5 cig/day for 36 years. 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 70% occlusion of pLAD 80% occlusion of pDaig, 100% occlusion of Lcx, 80% occlusion of pOM, 70% occlusion of pRPL. Patient admitted to CCU for management  (10 Dec 2022 12:55)      24 HOUR EVENTS:    REVIEW OF SYSTEMS:    CONSTITUTIONAL: No weakness, fevers or chills  EYES/ENT: No visual changes;  No vertigo or throat pain   NECK: No pain or stiffness  RESPIRATORY: No cough, wheezing, hemoptysis; No shortness of breath  CARDIOVASCULAR: No chest pain or palpitations  GASTROINTESTINAL: No abdominal or epigastric pain. No nausea, vomiting, or hematemesis; No diarrhea or constipation. No melena or hematochezia.  GENITOURINARY: No dysuria, frequency or hematuria  NEUROLOGICAL: No numbness or weakness  SKIN: No itching, rashes      ICU Vital Signs Last 24 Hrs  T(C): 36.6 (12 Dec 2022 08:00), Max: 36.9 (11 Dec 2022 19:00)  T(F): 97.9 (12 Dec 2022 08:00), Max: 98.4 (11 Dec 2022 19:00)  HR: 87 (12 Dec 2022 12:00) (85 - 105)  BP: 126/83 (12 Dec 2022 12:00) (103/61 - 156/94)  BP(mean): 100 (12 Dec 2022 12:00) (77 - 119)  ABP: --  ABP(mean): --  RR: 20 (12 Dec 2022 12:00) (16 - 33)  SpO2: 99% (12 Dec 2022 12:00) (93% - 99%)    O2 Parameters below as of 12 Dec 2022 12:00  Patient On (Oxygen Delivery Method): room air            CVP(mm Hg): --  CO: --  CI: --  PA: --  PA(mean): --  PA(direct): --  PCWP: --  LA: --  RA: --  SVR: --  SVRI: --  PVR: --  PVRI: --  I&O's Summary    11 Dec 2022 07:01  -  12 Dec 2022 07:00  --------------------------------------------------------  IN: 1634 mL / OUT: 1450 mL / NET: 184 mL    12 Dec 2022 07:01  -  12 Dec 2022 13:03  --------------------------------------------------------  IN: 294 mL / OUT: 150 mL / NET: 144 mL        CAPILLARY BLOOD GLUCOSE    CAPILLARY BLOOD GLUCOSE      POCT Blood Glucose.: 160 mg/dL (12 Dec 2022 12:38)      PHYSICAL EXAM:  GENERAL: No acute distress, well-developed  HEAD:  Atraumatic, Normocephalic  EYES: EOMI, PERRLA, conjunctiva and sclera clear  NECK: Supple, no lymphadenopathy, no JVD  CHEST/LUNG: CTAB; No wheezes, rales, or rhonchi  HEART: Regular rate and rhythm. Normal S1/S2. No murmurs, rubs, or gallops  ABDOMEN: Soft, non-tender, non-distended; normal bowel sounds, no organomegaly  EXTREMITIES:  2+ peripheral pulses b/l, No clubbing, cyanosis, or edema  NEUROLOGY: A&O x 3, no focal deficits  SKIN: No rashes or lesions    ============================I/O===========================   I&O's Detail    11 Dec 2022 07:01  -  12 Dec 2022 07:00  --------------------------------------------------------  IN:    Heparin: 264 mL    IV PiggyBack: 500 mL    Oral Fluid: 870 mL  Total IN: 1634 mL    OUT:    Voided (mL): 1450 mL  Total OUT: 1450 mL    Total NET: 184 mL      12 Dec 2022 07:01  -  12 Dec 2022 13:03  --------------------------------------------------------  IN:    Heparin: 54 mL    Oral Fluid: 240 mL  Total IN: 294 mL    OUT:    Voided (mL): 150 mL  Total OUT: 150 mL    Total NET: 144 mL        ============================ LABS =========================                        15.8   7.73  )-----------( 198      ( 12 Dec 2022 05:56 )             45.9     12-12    136  |  99  |  19  ----------------------------<  171<H>  3.8   |  24  |  1.05    Ca    9.0      12 Dec 2022 05:56  Phos  3.6     12-12  Mg     2.0     12-12    TPro  7.1  /  Alb  4.1  /  TBili  1.0  /  DBili  x   /  AST  38  /  ALT  21  /  AlkPhos  61  12-12    Troponin T, High Sensitivity Result: 1273 ng/L (12-11-22 @ 03:57)  Troponin T, High Sensitivity Result: 2197 ng/L (12-10-22 @ 20:41)  Troponin T, High Sensitivity Result: 1659 ng/L (12-10-22 @ 12:55)  Troponin T, High Sensitivity Result: 516 ng/L (12-10-22 @ 08:50)    CKMB Units: 38.0 ng/mL (12-11-22 @ 03:57)  CKMB Units: 79.9 ng/mL (12-10-22 @ 20:41)  CKMB Units: 101.4 ng/mL (12-10-22 @ 12:55)  CKMB Units: 47.4 ng/mL (12-10-22 @ 08:50)    Creatine Kinase, Serum: 607 U/L (12-11-22 @ 03:57)  Creatine Kinase, Serum: 1059 U/L (12-10-22 @ 20:41)  Creatine Kinase, Serum: 1149 U/L (12-10-22 @ 12:55)  Creatine Kinase, Serum: 576 U/L (12-10-22 @ 08:50)    CPK Mass Assay %: 6.3 % (12-11-22 @ 03:57)  CPK Mass Assay %: 7.5 % (12-10-22 @ 20:41)  CPK Mass Assay %: 8.8 % (12-10-22 @ 12:55)  CPK Mass Assay %: 8.2 % (12-10-22 @ 08:50)      P2Y12 Plt Reactivity: 152 PRU (12-12-22 @ 05:56)  P2Y12 Plt Reactivity: 111 PRU (12-11-22 @ 11:22)  P2Y12 Plt Reactivity: 41 PRU (12-10-22 @ 16:06)    LIVER FUNCTIONS - ( 12 Dec 2022 05:56 )  Alb: 4.1 g/dL / Pro: 7.1 g/dL / ALK PHOS: 61 U/L / ALT: 21 U/L / AST: 38 U/L / GGT: x           PT/INR - ( 12 Dec 2022 05:56 )   PT: 13.5 sec;   INR: 1.16 ratio         PTT - ( 12 Dec 2022 05:56 )  PTT:114.9 sec        ======================Micro/Rad/Cardio=================  Telemetry: Reviewed   EKG: Reviewed  CXR: Reviewed  Culture: Reviewed   Echo: Transthoracic Echocardiogram:   Patient name: SHAIKH BARBOSA  YOB: 1970   Age: 52 (M)   MR#: [id number]  Study Date: 12/10/2022  [facility]  Study quality: Technically fair  Referring Physician: David Meyer MD  Blood Pressure: 147/89 mmHg  Height: 175 cm  Weight: 68 kg  BSA: 1.8 m2  ------------------------------------------------------------------------  PROCEDURE: Transthoracic echocardiogram with 2-D, M-Mode  and complete spectral and color flow Doppler.  INDICATION: Cardiomyopathy, unspecified (I42.9)  ------------------------------------------------------------------------  Dimensions:    Normal Values:  LA:     3.7    2.0 - 4.0 cm  Ao:     3.2    2.0 - 3.8 cm  SEPTUM: 0.8    0.6 - 1.2 cm  PWT:    0.8    0.6 - 1.1 cm  LVIDd:  4.3    3.0 - 5.6 cm  LVIDs:  2.5    1.8 - 4.0 cm  Derived variables:  LVMI: 58 g/m2  RWT: 0.37  Fractional short: 42 %  EF (Visual Estimate): 45-50 %  ------------------------------------------------------------------------  Observations:  Mitral Valve: Normal mitral valve. Minimal mitral  regurgitation.  Aortic Valve/Aorta: Aortic valve leaflet morphology not  well visualized.  Normal aortic root size. (Ao: 3.2 cm at the sinuses of  Valsalva).  Left Atrium: Normal left atrium.  LA volume index = 20  cc/m2.  Left Ventricle: Endocardium not well visualized; grossly  mild segmental left ventricular systolic dysfunction.  Hypokinesis of the distal septum and anteroapex. Normal  left ventricular internal dimensions and wall thicknesses.  Mild diastolic dysfunction (Stage I).  Right Heart: Normal right atrium. Normal right ventricular  size and function. Normal tricuspid valve. Minimal  tricuspid regurgitation. Normal pulmonic valve.  Pericardium/Pleura: Normal pericardium with no pericardial  effusion.  Hemodynamic: Estimated right atrial pressure is 8 mm Hg.  ------------------------------------------------------------------------  Conclusions:  1. Normal mitral valve. Minimal mitral regurgitation.  2. Normal left ventricular internal dimensions and wall  thicknesses.  3. Endocardium not well visualized; grossly mild segmental  left ventricular systolic dysfunction.  Hypokinesis of the  distal septum and anteroapex.  4. Mild diastolic dysfunction (Stage I).  5. Normal right ventricular size and function.  ------------------------------------------------------------------------  Confirmed on  12/10/2022 - 17:01:36 by Nikko Gu M.D.,  [facility], REGINALD  ------------------------------------------------------------------------ (12-10-22 @ 14:53)    Cath: see sunrise for details  ======================================================  PAST MEDICAL & SURGICAL HISTORY:  No pertinent past medical history      Hypertension      Diabetes mellitus      GERD (gastroesophageal reflux disease)      No significant past surgical history      No significant past surgical history             Jay Méndez MD  Internal Medicine, PGY-2  Please Contact via TEAMS SHAIKH BARBOSA  MRN-[id number]  Patient is a 52y old  Male who presents with a chief complaint of Chest pain (10 Dec 2022 20:43)    HPI:  52M w/ HTN, T2DM, GERD who is transferred from OSH for ACS evaluation/management. Per patient yesterday at 3 am he was woken up from his sleep for crushing chest pain that radiated to his left side w/ associated numbness and tinging. He endorses sweats, and small bouts of emesis x2. He states he has never experienced pain like this in the past, however it has resolved. He has been recently seeing a gastroenterology for worsening reflux over to past 4-6 months for which he was taking omeprazole. he states he misses his medications up to 2-3x weekly. He denies previous MIs, cardiac surgeries or hospitalizations He endorses smoking 5 cig/day for 36 years. 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 70% occlusion of pLAD 80% occlusion of pDaig, 100% occlusion of Lcx, 80% occlusion of pOM, 70% occlusion of pRPL. Patient admitted to CCU for management      24 HOUR EVENTS: No acute o/n events. Pt reports feeling well w/o chest pain, dyspnea. Pending CT surg recs for CABG.     REVIEW OF SYSTEMS:    CONSTITUTIONAL: No weakness, fevers or chills  EYES/ENT: No visual changes;  No vertigo or throat pain   NECK: No pain or stiffness  RESPIRATORY: No cough, wheezing, hemoptysis; No shortness of breath  CARDIOVASCULAR: No chest pain or palpitations  GASTROINTESTINAL: No abdominal or epigastric pain. No nausea, vomiting, or hematemesis; No diarrhea or constipation. No melena or hematochezia.  GENITOURINARY: No dysuria, frequency or hematuria  NEUROLOGICAL: No numbness or weakness  SKIN: No itching, rashes      ICU Vital Signs Last 24 Hrs  T(C): 36.6 (12 Dec 2022 08:00), Max: 36.9 (11 Dec 2022 19:00)  T(F): 97.9 (12 Dec 2022 08:00), Max: 98.4 (11 Dec 2022 19:00)  HR: 87 (12 Dec 2022 12:00) (85 - 105)  BP: 126/83 (12 Dec 2022 12:00) (103/61 - 156/94)  BP(mean): 100 (12 Dec 2022 12:00) (77 - 119)  ABP: --  ABP(mean): --  RR: 20 (12 Dec 2022 12:00) (16 - 33)  SpO2: 99% (12 Dec 2022 12:00) (93% - 99%)    O2 Parameters below as of 12 Dec 2022 12:00  Patient On (Oxygen Delivery Method): room air            CVP(mm Hg): --  CO: --  CI: --  PA: --  PA(mean): --  PA(direct): --  PCWP: --  LA: --  RA: --  SVR: --  SVRI: --  PVR: --  PVRI: --  I&O's Summary    11 Dec 2022 07:01  -  12 Dec 2022 07:00  --------------------------------------------------------  IN: 1634 mL / OUT: 1450 mL / NET: 184 mL    12 Dec 2022 07:01  -  12 Dec 2022 13:03  --------------------------------------------------------  IN: 294 mL / OUT: 150 mL / NET: 144 mL        CAPILLARY BLOOD GLUCOSE    CAPILLARY BLOOD GLUCOSE      POCT Blood Glucose.: 160 mg/dL (12 Dec 2022 12:38)      PHYSICAL EXAM:  GENERAL: No acute distress, well-developed  HEAD:  Atraumatic, Normocephalic  EYES: EOMI, PERRLA, conjunctiva and sclera clear  NECK: Supple, no lymphadenopathy, no JVD  CHEST/LUNG: CTAB; No wheezes, rales, or rhonchi  HEART: Regular rate and rhythm. Normal S1/S2. No murmurs, rubs, or gallops  ABDOMEN: Soft, non-tender, non-distended; normal bowel sounds, no organomegaly  EXTREMITIES:  2+ peripheral pulses b/l, No clubbing, cyanosis, or edema  NEUROLOGY: A&O x 3, no focal deficits  SKIN: No rashes or lesions    ============================I/O===========================   I&O's Detail    11 Dec 2022 07:01  -  12 Dec 2022 07:00  --------------------------------------------------------  IN:    Heparin: 264 mL    IV PiggyBack: 500 mL    Oral Fluid: 870 mL  Total IN: 1634 mL    OUT:    Voided (mL): 1450 mL  Total OUT: 1450 mL    Total NET: 184 mL      12 Dec 2022 07:01  -  12 Dec 2022 13:03  --------------------------------------------------------  IN:    Heparin: 54 mL    Oral Fluid: 240 mL  Total IN: 294 mL    OUT:    Voided (mL): 150 mL  Total OUT: 150 mL    Total NET: 144 mL        ============================ LABS =========================                        15.8   7.73  )-----------( 198      ( 12 Dec 2022 05:56 )             45.9     12-12    136  |  99  |  19  ----------------------------<  171<H>  3.8   |  24  |  1.05    Ca    9.0      12 Dec 2022 05:56  Phos  3.6     12-12  Mg     2.0     12-12    TPro  7.1  /  Alb  4.1  /  TBili  1.0  /  DBili  x   /  AST  38  /  ALT  21  /  AlkPhos  61  12-12    Troponin T, High Sensitivity Result: 1273 ng/L (12-11-22 @ 03:57)  Troponin T, High Sensitivity Result: 2197 ng/L (12-10-22 @ 20:41)  Troponin T, High Sensitivity Result: 1659 ng/L (12-10-22 @ 12:55)  Troponin T, High Sensitivity Result: 516 ng/L (12-10-22 @ 08:50)    CKMB Units: 38.0 ng/mL (12-11-22 @ 03:57)  CKMB Units: 79.9 ng/mL (12-10-22 @ 20:41)  CKMB Units: 101.4 ng/mL (12-10-22 @ 12:55)  CKMB Units: 47.4 ng/mL (12-10-22 @ 08:50)    Creatine Kinase, Serum: 607 U/L (12-11-22 @ 03:57)  Creatine Kinase, Serum: 1059 U/L (12-10-22 @ 20:41)  Creatine Kinase, Serum: 1149 U/L (12-10-22 @ 12:55)  Creatine Kinase, Serum: 576 U/L (12-10-22 @ 08:50)    CPK Mass Assay %: 6.3 % (12-11-22 @ 03:57)  CPK Mass Assay %: 7.5 % (12-10-22 @ 20:41)  CPK Mass Assay %: 8.8 % (12-10-22 @ 12:55)  CPK Mass Assay %: 8.2 % (12-10-22 @ 08:50)      P2Y12 Plt Reactivity: 152 PRU (12-12-22 @ 05:56)  P2Y12 Plt Reactivity: 111 PRU (12-11-22 @ 11:22)  P2Y12 Plt Reactivity: 41 PRU (12-10-22 @ 16:06)    LIVER FUNCTIONS - ( 12 Dec 2022 05:56 )  Alb: 4.1 g/dL / Pro: 7.1 g/dL / ALK PHOS: 61 U/L / ALT: 21 U/L / AST: 38 U/L / GGT: x           PT/INR - ( 12 Dec 2022 05:56 )   PT: 13.5 sec;   INR: 1.16 ratio         PTT - ( 12 Dec 2022 05:56 )  PTT:114.9 sec        ======================Micro/Rad/Cardio=================  Telemetry: Reviewed   EKG: Reviewed  CXR: Reviewed  Culture: Reviewed   Echo: Transthoracic Echocardiogram:   Patient name: SHAIKH BARBOSA  YOB: 1970   Age: 52 (M)   MR#: [id number]  Study Date: 12/10/2022  [facility]  Study quality: Technically fair  Referring Physician: David Meyer MD  Blood Pressure: 147/89 mmHg  Height: 175 cm  Weight: 68 kg  BSA: 1.8 m2  ------------------------------------------------------------------------  PROCEDURE: Transthoracic echocardiogram with 2-D, M-Mode  and complete spectral and color flow Doppler.  INDICATION: Cardiomyopathy, unspecified (I42.9)  ------------------------------------------------------------------------  Dimensions:    Normal Values:  LA:     3.7    2.0 - 4.0 cm  Ao:     3.2    2.0 - 3.8 cm  SEPTUM: 0.8    0.6 - 1.2 cm  PWT:    0.8    0.6 - 1.1 cm  LVIDd:  4.3    3.0 - 5.6 cm  LVIDs:  2.5    1.8 - 4.0 cm  Derived variables:  LVMI: 58 g/m2  RWT: 0.37  Fractional short: 42 %  EF (Visual Estimate): 45-50 %  ------------------------------------------------------------------------  Observations:  Mitral Valve: Normal mitral valve. Minimal mitral  regurgitation.  Aortic Valve/Aorta: Aortic valve leaflet morphology not  well visualized.  Normal aortic root size. (Ao: 3.2 cm at the sinuses of  Valsalva).  Left Atrium: Normal left atrium.  LA volume index = 20  cc/m2.  Left Ventricle: Endocardium not well visualized; grossly  mild segmental left ventricular systolic dysfunction.  Hypokinesis of the distal septum and anteroapex. Normal  left ventricular internal dimensions and wall thicknesses.  Mild diastolic dysfunction (Stage I).  Right Heart: Normal right atrium. Normal right ventricular  size and function. Normal tricuspid valve. Minimal  tricuspid regurgitation. Normal pulmonic valve.  Pericardium/Pleura: Normal pericardium with no pericardial  effusion.  Hemodynamic: Estimated right atrial pressure is 8 mm Hg.  ------------------------------------------------------------------------  Conclusions:  1. Normal mitral valve. Minimal mitral regurgitation.  2. Normal left ventricular internal dimensions and wall  thicknesses.  3. Endocardium not well visualized; grossly mild segmental  left ventricular systolic dysfunction.  Hypokinesis of the  distal septum and anteroapex.  4. Mild diastolic dysfunction (Stage I).  5. Normal right ventricular size and function.  ------------------------------------------------------------------------  Confirmed on  12/10/2022 - 17:01:36 by Nikko Gu M.D.,  [facility], REGINALD  ------------------------------------------------------------------------ (12-10-22 @ 14:53)    Cath: see sunrise for details  ======================================================  PAST MEDICAL & SURGICAL HISTORY:  No pertinent past medical history      Hypertension      Diabetes mellitus      GERD (gastroesophageal reflux disease)      No significant past surgical history      No significant past surgical history             Jay Méndez MD  Internal Medicine, PGY-2  Please Contact via TEAMS

## 2022-12-12 NOTE — PROGRESS NOTE ADULT - ATTENDING COMMENTS
PT is 51 yo smoker,  HTN, HLD, DM transferred with NSTEMI     s/p cath - with 3VD on cath .   No further CP since admission.   CTS contacted for CABG evaluation.   Continue heparin. ECHO pending. If recurrent CP then IABP.  awaiting p2y12    Patient is critically ill, requiring critical care services. Despite the current condition, the patient is at a high risk of clinical and hemodynamic demise

## 2022-12-12 NOTE — PROGRESS NOTE ADULT - SUBJECTIVE AND OBJECTIVE BOX
Subjective:  "Hello"  oswaldo, son at bedside    Tele:   SR  80s                             T(C): 37 (12-12-22 @ 13:45), Max: 37 (12-12-22 @ 13:45)  HR: 97 (12-12-22 @ 13:45) (85 - 105)  BP: 127/86 (12-12-22 @ 13:45) (103/61 - 156/94)  RR: 18 (12-12-22 @ 13:45) (16 - 33)  SpO2: 98% (12-12-22 @ 13:45) (93% - 99%)        12-12    136  |  99  |  19  ----------------------------<  171<H>  3.8   |  24  |  1.05    Ca    9.0      12 Dec 2022 05:56  Phos  3.6     12-12  Mg     2.0     12-12    TPro  7.1  /  Alb  4.1  /  TBili  1.0  /  DBili  x   /  AST  38  /  ALT  21  /  AlkPhos  61  12-12                               15.8   7.73  )-----------( 198      ( 12 Dec 2022 05:56 )             45.9        PT/INR - ( 12 Dec 2022 05:56 )   PT: 13.5 sec;   INR: 1.16 ratio         PTT - ( 12 Dec 2022 14:17 )  PTT:63.4 sec    CAPILLARY BLOOD GLUCOSE      POCT Blood Glucose.: 160 mg/dL (12 Dec 2022 12:38)  POCT Blood Glucose.: 179 mg/dL (12 Dec 2022 07:36)  POCT Blood Glucose.: 245 mg/dL (11 Dec 2022 21:11)  POCT Blood Glucose.: 224 mg/dL (11 Dec 2022 17:03)           CXR:      Assessment    Neuro: alert, oriented     Pulm: clear bilaterally    CV: S1 S2  RRR    Abd: softly distended   nontender BM this am    Extremities: no edema      MEDICATIONS  (STANDING):  aspirin  chewable 81 milliGRAM(s) Oral daily  atorvastatin 80 milliGRAM(s) Oral at bedtime  chlorhexidine 4% Liquid 1 Application(s) Topical <User Schedule>  heparin  Infusion 800 Unit(s)/Hr (9 mL/Hr) IV Continuous <Continuous>  insulin lispro (ADMELOG) corrective regimen sliding scale   SubCutaneous at bedtime  insulin lispro (ADMELOG) corrective regimen sliding scale   SubCutaneous three times a day before meals  metoprolol tartrate 50 milliGRAM(s) Oral two times a day  nicotine -  14 mG/24Hr(s) Patch 1 Patch Transdermal daily  pantoprazole    Tablet 40 milliGRAM(s) Oral before breakfast  tamsulosin 0.4 milliGRAM(s) Oral at bedtime       PAST MEDICAL & SURGICAL HISTORY:  No pertinent past medical history      Hypertension      Diabetes mellitus      GERD (gastroesophageal reflux disease)      No significant past surgical history      No significant past surgical history

## 2022-12-13 DIAGNOSIS — I10 ESSENTIAL (PRIMARY) HYPERTENSION: ICD-10-CM

## 2022-12-13 LAB
ALBUMIN SERPL ELPH-MCNC: 3.8 G/DL — SIGNIFICANT CHANGE UP (ref 3.3–5)
ALP SERPL-CCNC: 56 U/L — SIGNIFICANT CHANGE UP (ref 40–120)
ALT FLD-CCNC: 17 U/L — SIGNIFICANT CHANGE UP (ref 10–45)
ANION GAP SERPL CALC-SCNC: 15 MMOL/L — SIGNIFICANT CHANGE UP (ref 5–17)
APTT BLD: 52.7 SEC — HIGH (ref 27.5–35.5)
AST SERPL-CCNC: 23 U/L — SIGNIFICANT CHANGE UP (ref 10–40)
BASOPHILS # BLD AUTO: 0.05 K/UL — SIGNIFICANT CHANGE UP (ref 0–0.2)
BASOPHILS NFR BLD AUTO: 0.7 % — SIGNIFICANT CHANGE UP (ref 0–2)
BILIRUB SERPL-MCNC: 0.8 MG/DL — SIGNIFICANT CHANGE UP (ref 0.2–1.2)
BUN SERPL-MCNC: 14 MG/DL — SIGNIFICANT CHANGE UP (ref 7–23)
CALCIUM SERPL-MCNC: 9 MG/DL — SIGNIFICANT CHANGE UP (ref 8.4–10.5)
CHLORIDE SERPL-SCNC: 100 MMOL/L — SIGNIFICANT CHANGE UP (ref 96–108)
CO2 SERPL-SCNC: 23 MMOL/L — SIGNIFICANT CHANGE UP (ref 22–31)
CREAT SERPL-MCNC: 0.8 MG/DL — SIGNIFICANT CHANGE UP (ref 0.5–1.3)
EGFR: 107 ML/MIN/1.73M2 — SIGNIFICANT CHANGE UP
EOSINOPHIL # BLD AUTO: 0.31 K/UL — SIGNIFICANT CHANGE UP (ref 0–0.5)
EOSINOPHIL NFR BLD AUTO: 4.2 % — SIGNIFICANT CHANGE UP (ref 0–6)
GLUCOSE BLDC GLUCOMTR-MCNC: 195 MG/DL — HIGH (ref 70–99)
GLUCOSE BLDC GLUCOMTR-MCNC: 221 MG/DL — HIGH (ref 70–99)
GLUCOSE BLDC GLUCOMTR-MCNC: 224 MG/DL — HIGH (ref 70–99)
GLUCOSE BLDC GLUCOMTR-MCNC: 273 MG/DL — HIGH (ref 70–99)
GLUCOSE SERPL-MCNC: 187 MG/DL — HIGH (ref 70–99)
HCT VFR BLD CALC: 44.2 % — SIGNIFICANT CHANGE UP (ref 39–50)
HGB BLD-MCNC: 15 G/DL — SIGNIFICANT CHANGE UP (ref 13–17)
IMM GRANULOCYTES NFR BLD AUTO: 0.4 % — SIGNIFICANT CHANGE UP (ref 0–0.9)
LYMPHOCYTES # BLD AUTO: 2.51 K/UL — SIGNIFICANT CHANGE UP (ref 1–3.3)
LYMPHOCYTES # BLD AUTO: 33.6 % — SIGNIFICANT CHANGE UP (ref 13–44)
MCHC RBC-ENTMCNC: 28.8 PG — SIGNIFICANT CHANGE UP (ref 27–34)
MCHC RBC-ENTMCNC: 33.9 GM/DL — SIGNIFICANT CHANGE UP (ref 32–36)
MCV RBC AUTO: 84.8 FL — SIGNIFICANT CHANGE UP (ref 80–100)
MONOCYTES # BLD AUTO: 0.6 K/UL — SIGNIFICANT CHANGE UP (ref 0–0.9)
MONOCYTES NFR BLD AUTO: 8 % — SIGNIFICANT CHANGE UP (ref 2–14)
NEUTROPHILS # BLD AUTO: 3.96 K/UL — SIGNIFICANT CHANGE UP (ref 1.8–7.4)
NEUTROPHILS NFR BLD AUTO: 53.1 % — SIGNIFICANT CHANGE UP (ref 43–77)
NRBC # BLD: 0 /100 WBCS — SIGNIFICANT CHANGE UP (ref 0–0)
PLATELET # BLD AUTO: 185 K/UL — SIGNIFICANT CHANGE UP (ref 150–400)
POTASSIUM SERPL-MCNC: 3.5 MMOL/L — SIGNIFICANT CHANGE UP (ref 3.5–5.3)
POTASSIUM SERPL-SCNC: 3.5 MMOL/L — SIGNIFICANT CHANGE UP (ref 3.5–5.3)
PROT SERPL-MCNC: 6.9 G/DL — SIGNIFICANT CHANGE UP (ref 6–8.3)
RBC # BLD: 5.21 M/UL — SIGNIFICANT CHANGE UP (ref 4.2–5.8)
RBC # FLD: 12 % — SIGNIFICANT CHANGE UP (ref 10.3–14.5)
SODIUM SERPL-SCNC: 138 MMOL/L — SIGNIFICANT CHANGE UP (ref 135–145)
WBC # BLD: 7.46 K/UL — SIGNIFICANT CHANGE UP (ref 3.8–10.5)
WBC # FLD AUTO: 7.46 K/UL — SIGNIFICANT CHANGE UP (ref 3.8–10.5)

## 2022-12-13 PROCEDURE — 99232 SBSQ HOSP IP/OBS MODERATE 35: CPT

## 2022-12-13 PROCEDURE — 93880 EXTRACRANIAL BILAT STUDY: CPT | Mod: 26

## 2022-12-13 RX ORDER — DEXTROSE 50 % IN WATER 50 %
25 SYRINGE (ML) INTRAVENOUS ONCE
Refills: 0 | Status: DISCONTINUED | OUTPATIENT
Start: 2022-12-13 | End: 2022-12-19

## 2022-12-13 RX ORDER — DEXTROSE 50 % IN WATER 50 %
12.5 SYRINGE (ML) INTRAVENOUS ONCE
Refills: 0 | Status: DISCONTINUED | OUTPATIENT
Start: 2022-12-13 | End: 2022-12-19

## 2022-12-13 RX ORDER — INSULIN GLARGINE 100 [IU]/ML
8 INJECTION, SOLUTION SUBCUTANEOUS AT BEDTIME
Refills: 0 | Status: DISCONTINUED | OUTPATIENT
Start: 2022-12-13 | End: 2022-12-13

## 2022-12-13 RX ORDER — INSULIN GLARGINE 100 [IU]/ML
13 INJECTION, SOLUTION SUBCUTANEOUS AT BEDTIME
Refills: 0 | Status: DISCONTINUED | OUTPATIENT
Start: 2022-12-13 | End: 2022-12-14

## 2022-12-13 RX ORDER — GLUCAGON INJECTION, SOLUTION 0.5 MG/.1ML
1 INJECTION, SOLUTION SUBCUTANEOUS ONCE
Refills: 0 | Status: DISCONTINUED | OUTPATIENT
Start: 2022-12-13 | End: 2022-12-19

## 2022-12-13 RX ORDER — DEXTROSE 50 % IN WATER 50 %
15 SYRINGE (ML) INTRAVENOUS ONCE
Refills: 0 | Status: DISCONTINUED | OUTPATIENT
Start: 2022-12-13 | End: 2022-12-19

## 2022-12-13 RX ORDER — INSULIN LISPRO 100/ML
7 VIAL (ML) SUBCUTANEOUS
Refills: 0 | Status: DISCONTINUED | OUTPATIENT
Start: 2022-12-13 | End: 2022-12-14

## 2022-12-13 RX ORDER — INSULIN LISPRO 100/ML
5 VIAL (ML) SUBCUTANEOUS
Refills: 0 | Status: DISCONTINUED | OUTPATIENT
Start: 2022-12-13 | End: 2022-12-13

## 2022-12-13 RX ADMIN — Medication 50 MILLIGRAM(S): at 17:03

## 2022-12-13 RX ADMIN — Medication 81 MILLIGRAM(S): at 11:30

## 2022-12-13 RX ADMIN — Medication 5 UNIT(S): at 11:30

## 2022-12-13 RX ADMIN — INSULIN GLARGINE 13 UNIT(S): 100 INJECTION, SOLUTION SUBCUTANEOUS at 22:31

## 2022-12-13 RX ADMIN — Medication 4: at 17:03

## 2022-12-13 RX ADMIN — ATORVASTATIN CALCIUM 80 MILLIGRAM(S): 80 TABLET, FILM COATED ORAL at 22:31

## 2022-12-13 RX ADMIN — PANTOPRAZOLE SODIUM 40 MILLIGRAM(S): 20 TABLET, DELAYED RELEASE ORAL at 05:06

## 2022-12-13 RX ADMIN — Medication 6: at 11:31

## 2022-12-13 RX ADMIN — Medication 7 UNIT(S): at 17:03

## 2022-12-13 RX ADMIN — Medication 1 PATCH: at 07:00

## 2022-12-13 RX ADMIN — CHLORHEXIDINE GLUCONATE 1 APPLICATION(S): 213 SOLUTION TOPICAL at 10:31

## 2022-12-13 RX ADMIN — Medication 2: at 07:43

## 2022-12-13 RX ADMIN — Medication 50 MILLIGRAM(S): at 05:06

## 2022-12-13 RX ADMIN — Medication 1 PATCH: at 11:30

## 2022-12-13 RX ADMIN — HEPARIN SODIUM 9 UNIT(S)/HR: 5000 INJECTION INTRAVENOUS; SUBCUTANEOUS at 07:00

## 2022-12-13 RX ADMIN — TAMSULOSIN HYDROCHLORIDE 0.4 MILLIGRAM(S): 0.4 CAPSULE ORAL at 22:31

## 2022-12-13 NOTE — CONSULT NOTE ADULT - PROBLEM SELECTOR RECOMMENDATION 9
Continue with ASA 81 Ator 80 mg daily   Continue with BB   Brillinta LD - Stopped   P2Y12 41 - plan to RPT   TTE - EF 45-50%   Preop CABG eval Dr. Hedrick   Continue with hep gtt   Monitor cardiac enzymes
Will start Lantus 13 units at bed time.  Will start Admelog 7 units before each meal in addition to Admelog correction scale coverage.  Patient counseled for compliance with consistent low carb diet.  .

## 2022-12-13 NOTE — PROGRESS NOTE ADULT - SUBJECTIVE AND OBJECTIVE BOX
VITAL SIGNS    Telemetry: SR   Overnight Events: no acute events    Vital Signs Last 24 Hrs  T(C): 36.5 (22 @ 03:59), Max: 37.5 (22 @ 19:17)  T(F): 97.7 (22 @ 03:59), Max: 99.5 (22 @ 19:17)  HR: 101 (22 @ 03:59) (85 - 101)  BP: 117/78 (22 @ 03:59) (117/78 - 149/94)  RR: 18 (22 @ 03:59) (18 - 24)  SpO2: 95% (22 @ 03:59) (95% - 99%)             @ 07:01  -   @ 07:00  --------------------------------------------------------  IN: 1100 mL / OUT: 1150 mL / NET: -50 mL     @ 07:01  -   @ 08:36  --------------------------------------------------------  IN: 300 mL / OUT: 300 mL / NET: 0 mL       Daily     Daily Weight in k.2 (13 Dec 2022 07:29)  Admit Wt: Drug Dosing Weight  Height (cm): 175.3 (10 Dec 2022 12:20)  Weight (kg): 66.9 (10 Dec 2022 12:20)  BMI (kg/m2): 21.8 (10 Dec 2022 12:20)  BSA (m2): 1.82 (10 Dec 2022 12:20)    Bilirubin Total, Serum: 0.8 mg/dL ( @ 05:23)    CAPILLARY BLOOD GLUCOSE      POCT Blood Glucose.: 195 mg/dL (13 Dec 2022 07:25)  POCT Blood Glucose.: 250 mg/dL (12 Dec 2022 21:35)  POCT Blood Glucose.: 261 mg/dL (12 Dec 2022 16:24)  POCT Blood Glucose.: 160 mg/dL (12 Dec 2022 12:38)          acetaminophen     Tablet .. 650 milliGRAM(s) Oral every 6 hours PRN  aspirin  chewable 81 milliGRAM(s) Oral daily  atorvastatin 80 milliGRAM(s) Oral at bedtime  chlorhexidine 4% Liquid 1 Application(s) Topical <User Schedule>  heparin  Infusion 800 Unit(s)/Hr IV Continuous <Continuous>  insulin lispro (ADMELOG) corrective regimen sliding scale   SubCutaneous at bedtime  insulin lispro (ADMELOG) corrective regimen sliding scale   SubCutaneous three times a day before meals  metoprolol tartrate 50 milliGRAM(s) Oral two times a day  nicotine  Polacrilex Gum 2 milliGRAM(s) Oral every 2 hours PRN  nicotine -  14 mG/24Hr(s) Patch 1 Patch Transdermal daily  pantoprazole    Tablet 40 milliGRAM(s) Oral before breakfast  tamsulosin 0.4 milliGRAM(s) Oral at bedtime                            15.0   7.46  )-----------( 185      ( 13 Dec 2022 05:23 )             44.2     12-    138  |  100  |  14  ----------------------------<  187<H>  3.5   |  23  |  0.80    Ca    9.0      13 Dec 2022 05:23  Phos  3.6     12-12  Mg     2.0     12-12    TPro  6.9  /  Alb  3.8  /  TBili  0.8  /  DBili  x   /  AST  23  /  ALT  17  /  AlkPhos  56  12-13    PT/INR - ( 12 Dec 2022 05:56 )   PT: 13.5 sec;   INR: 1.16 ratio         PTT - ( 13 Dec 2022 05:23 )  PTT:52.7 sec    PHYSICAL EXAM    Subjective: "I feel good"  General: well appearing male, in no acute distress, sitting in chair, eating breakfast  Neurology: alert and oriented x 3, nonfocal, no gross deficits  CV : S1/S2, no murmurs/rubs/gallops  Lungs: clear. RR easy, unlabored   Abdomen: soft, nontender, nondistended, positive bowel sounds, +bowel movement, Neg N/V/D   :  pt voiding without difficulty   Extremities: BLANTON; no edema, neg calf tenderness. PPP bilaterally, groins stable          Coumadin    [ ] YES          [x]      NO         Eliquis    [ ] YES          [x]      NO       Heparin gtt   [ x ] YES              [ ]   NO  Dose: 9cc/hr    Disposition:  Home [   ]     Rehab [   ]       OR Date: Next Week  Plan of care discussed with Cardiothoracic Team at AM rounds.

## 2022-12-13 NOTE — PROGRESS NOTE ADULT - ASSESSMENT
52M w/ HTN, T2DM, GERD who is transferred from OSH for ACS evaluation/management. Per patient yesterday at 3 am he was woken up from his sleep for crushing chest pain that radiated to his left side w/ associated numbness and tinging. He endorses sweats, and small bouts of emesis x2. He states he has never experienced pain like this in the past, however it has resolved. He has been recently seeing a gastroenterology for worsening reflux over to past 4-6 months for which he was taking omeprazole. he states he misses his medications up to 2-3x weekly.   He denies previous MIs, cardiac surgeries or hospitalizations He endorses smoking 5 cig/day for 36 years. 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 70% occlusion of pLAD 80% occlusion of pDaig, 100% occlusion of Lcx, 80% occlusion of pOM, 70% occlusion of pRPL. Patient admitted to CCU for management    12/12 Tx 2 [place] for further management  Continue Hep gtt CAD Cont ASA statin betablocker CAD CABG next week w/ Dr Hedrick   Called Endocrine consult hyperglycemia  12/13 c/w Hep gtt, preop w/u in progress, f/u carotids, trend P2Y12

## 2022-12-13 NOTE — CONSULT NOTE ADULT - ASSESSMENT
Assessment  DMT2: 52y Male with DM T2 with hyperglycemia admitted with chest pain, A1C is 8.2%  patient was on oral hypoglycemic agents at home, now on insulin coverage, blood sugars running high, no hypoglycemic episode,  eating meals.  Patient is high risk with high level decision making due to uncontrolled diabetes, has increased risk for complications. Tight sugar control is not recommended for this patient.  CAD: Planing CABG, on medications, monitored, asymptomatic.  HTN: On antihypertensive medications, monitored, asymptomatic.  HLD: On statin, compliant.            Judith Sanders MD  Cell:  [phone]  Office: [phone]

## 2022-12-13 NOTE — PROGRESS NOTE ADULT - NSPROGADDITIONALINFOA_GEN_ALL_CORE
Luz Maria Toure, AGACNP-BC  Cardiovascular & Thoracic Surgery  [address] [place] [address]  Office: [phone]    Available on [employer] Teams  Spectra: [id number]  New Consults: [id number]

## 2022-12-13 NOTE — CONSULT NOTE ADULT - SUBJECTIVE AND OBJECTIVE BOX
HPI:  52M w/ HTN, T2DM, GERD who is transferred from OSH for ACS evaluation/management. Per patient yesterday at 3 am he was woken up from his sleep for crushing chest pain that radiated to his left side w/ associated numbness and tinging. He endorses sweats, and small bouts of emesis x2. He states he has never experienced pain like this in the past, however it has resolved. He has been recently seeing a gastroenterology for worsening reflux over to past 4-6 months for which he was taking omeprazole. he states he misses his medications up to 2-3x weekly. He denies previous MIs, cardiac surgeries or hospitalizations He endorses smoking 5 cig/day for 36 years. 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 70% occlusion of pLAD 80% occlusion of pDaig, 100% occlusion of Lcx, 80% occlusion of pOM, 70% occlusion of pRPL. Patient admitted to CCU for management  (10 Dec 2022 12:55)  Patient has history of diabetes, on oral meds at home, no recent hypoglycemic episodes, no polyuria polydipsia. Patient follows up with PCP for diabetes management.    PAST MEDICAL & SURGICAL HISTORY:  No pertinent past medical history      Hypertension      Diabetes mellitus      GERD (gastroesophageal reflux disease)      No significant past surgical history      No significant past surgical history          FAMILY HISTORY:      Social History:    Outpatient Medications:    MEDICATIONS  (STANDING):  aspirin  chewable 81 milliGRAM(s) Oral daily  atorvastatin 80 milliGRAM(s) Oral at bedtime  chlorhexidine 4% Liquid 1 Application(s) Topical <User Schedule>  dextrose 50% Injectable 25 Gram(s) IV Push once  dextrose 50% Injectable 12.5 Gram(s) IV Push once  dextrose 50% Injectable 25 Gram(s) IV Push once  dextrose Oral Gel 15 Gram(s) Oral once  glucagon  Injectable 1 milliGRAM(s) IntraMuscular once  heparin  Infusion 800 Unit(s)/Hr (9 mL/Hr) IV Continuous <Continuous>  insulin glargine Injectable (LANTUS) 8 Unit(s) SubCutaneous at bedtime  insulin lispro (ADMELOG) corrective regimen sliding scale   SubCutaneous three times a day before meals  insulin lispro (ADMELOG) corrective regimen sliding scale   SubCutaneous at bedtime  insulin lispro Injectable (ADMELOG) 5 Unit(s) SubCutaneous three times a day before meals  metoprolol tartrate 50 milliGRAM(s) Oral two times a day  nicotine -  14 mG/24Hr(s) Patch 1 Patch Transdermal daily  pantoprazole    Tablet 40 milliGRAM(s) Oral before breakfast  tamsulosin 0.4 milliGRAM(s) Oral at bedtime    MEDICATIONS  (PRN):  acetaminophen     Tablet .. 650 milliGRAM(s) Oral every 6 hours PRN Temp greater or equal to 38C (100.4F)  nicotine  Polacrilex Gum 2 milliGRAM(s) Oral every 2 hours PRN nicotine cravings      Allergies    No Known Allergies    Intolerances      Review of Systems:  Constitutional: No fever, no chills  Eyes: No blurry vision  Neuro: No tremors  HEENT: No pain, no neck swelling  Cardiovascular: No chest pain, no palpitations  Respiratory: No SOB, no cough  GI: No nausea, vomiting, abdominal pain  : No dysuria  Skin: no rash  MSK: Has leg swelling.  Psych: no depression  Endocrine: no polyuria, polydipsia    UNABLE TO OBTAIN    ALL OTHER SYSTEMS REVIEWED AND NEGATIVE        PHYSICAL EXAM:  VITALS: T(C): 36.5 (12-13-22 @ 03:59)  T(F): 97.7 (12-13-22 @ 03:59), Max: 99.5 (12-12-22 @ 19:17)  HR: 101 (12-13-22 @ 03:59) (96 - 101)  BP: 117/78 (12-13-22 @ 03:59) (117/78 - 149/94)  RR:  (18 - 20)  SpO2:  (95% - 98%)  Wt(kg): --  GENERAL: NAD, well-groomed, well-developed  EYES: No proptosis, no lid lag  HEENT:  Atraumatic, Normocephalic  THYROID: Normal size, no palpable nodules  RESPIRATORY: Clear to auscultation bilaterally; No rales, rhonchi, wheezing  CARDIOVASCULAR: Si S2, No murmurs;  GI: Soft, non distended, normal bowel sounds  SKIN: Dry, intact, No rashes or lesions  MUSCULOSKELETAL: Has BL lower extremity edema.  NEURO:  no tremor, sensation decreased in feet BL,    POCT Blood Glucose.: 273 mg/dL (12-13-22 @ 11:18)  POCT Blood Glucose.: 195 mg/dL (12-13-22 @ 07:25)  POCT Blood Glucose.: 250 mg/dL (12-12-22 @ 21:35)  POCT Blood Glucose.: 261 mg/dL (12-12-22 @ 16:24)  POCT Blood Glucose.: 160 mg/dL (12-12-22 @ 12:38)  POCT Blood Glucose.: 179 mg/dL (12-12-22 @ 07:36)  POCT Blood Glucose.: 245 mg/dL (12-11-22 @ 21:11)  POCT Blood Glucose.: 224 mg/dL (12-11-22 @ 17:03)  POCT Blood Glucose.: 200 mg/dL (12-11-22 @ 12:13)  POCT Blood Glucose.: 134 mg/dL (12-11-22 @ 08:18)  POCT Blood Glucose.: 193 mg/dL (12-10-22 @ 21:17)  POCT Blood Glucose.: 153 mg/dL (12-10-22 @ 16:58)                            15.0   7.46  )-----------( 185      ( 13 Dec 2022 05:23 )             44.2       12-13    138  |  100  |  14  ----------------------------<  187<H>  3.5   |  23  |  0.80    eGFR: 107    Ca    9.0      12-13  Mg     2.0     12-12  Phos  3.6     12-12    TPro  6.9  /  Alb  3.8  /  TBili  0.8  /  DBili  x   /  AST  23  /  ALT  17  /  AlkPhos  56  12-13      Thyroid Function Tests:  12-10 @ 12:55 TSH 0.38 FreeT4 -- T3 -- Anti TPO -- Anti Thyroglobulin Ab -- TSI --          12-10 Chol 119 Direct LDL -- LDL calculated 67 HDL 37<L> Trig 74    Radiology:

## 2022-12-13 NOTE — PROGRESS NOTE ADULT - PROBLEM SELECTOR PLAN 1
ASA 81, Atorvastatin 80, Lopressor 50 BID  Hep gtt anatomy  Trend P2Y12, 12/12- 152  Preop workup in progress- f/u Carotids, MRSA, Fibrinogen  CABG next week  Nicotine patch current tobacco use

## 2022-12-14 LAB
ANION GAP SERPL CALC-SCNC: 12 MMOL/L — SIGNIFICANT CHANGE UP (ref 5–17)
APTT BLD: 40.2 SEC — HIGH (ref 27.5–35.5)
APTT BLD: 64.2 SEC — HIGH (ref 27.5–35.5)
BUN SERPL-MCNC: 14 MG/DL — SIGNIFICANT CHANGE UP (ref 7–23)
CALCIUM SERPL-MCNC: 9.1 MG/DL — SIGNIFICANT CHANGE UP (ref 8.4–10.5)
CHLORIDE SERPL-SCNC: 100 MMOL/L — SIGNIFICANT CHANGE UP (ref 96–108)
CO2 SERPL-SCNC: 24 MMOL/L — SIGNIFICANT CHANGE UP (ref 22–31)
CREAT SERPL-MCNC: 0.81 MG/DL — SIGNIFICANT CHANGE UP (ref 0.5–1.3)
EGFR: 107 ML/MIN/1.73M2 — SIGNIFICANT CHANGE UP
GLUCOSE BLDC GLUCOMTR-MCNC: 180 MG/DL — HIGH (ref 70–99)
GLUCOSE BLDC GLUCOMTR-MCNC: 193 MG/DL — HIGH (ref 70–99)
GLUCOSE BLDC GLUCOMTR-MCNC: 200 MG/DL — HIGH (ref 70–99)
GLUCOSE BLDC GLUCOMTR-MCNC: 317 MG/DL — HIGH (ref 70–99)
GLUCOSE SERPL-MCNC: 181 MG/DL — HIGH (ref 70–99)
HCT VFR BLD CALC: 42.5 % — SIGNIFICANT CHANGE UP (ref 39–50)
HGB BLD-MCNC: 14.4 G/DL — SIGNIFICANT CHANGE UP (ref 13–17)
MCHC RBC-ENTMCNC: 28.7 PG — SIGNIFICANT CHANGE UP (ref 27–34)
MCHC RBC-ENTMCNC: 33.9 GM/DL — SIGNIFICANT CHANGE UP (ref 32–36)
MCV RBC AUTO: 84.8 FL — SIGNIFICANT CHANGE UP (ref 80–100)
MRSA PCR RESULT.: SIGNIFICANT CHANGE UP
NRBC # BLD: 0 /100 WBCS — SIGNIFICANT CHANGE UP (ref 0–0)
PLATELET # BLD AUTO: 191 K/UL — SIGNIFICANT CHANGE UP (ref 150–400)
POTASSIUM SERPL-MCNC: 3.5 MMOL/L — SIGNIFICANT CHANGE UP (ref 3.5–5.3)
POTASSIUM SERPL-SCNC: 3.5 MMOL/L — SIGNIFICANT CHANGE UP (ref 3.5–5.3)
RBC # BLD: 5.01 M/UL — SIGNIFICANT CHANGE UP (ref 4.2–5.8)
RBC # FLD: 12 % — SIGNIFICANT CHANGE UP (ref 10.3–14.5)
S AUREUS DNA NOSE QL NAA+PROBE: SIGNIFICANT CHANGE UP
SODIUM SERPL-SCNC: 136 MMOL/L — SIGNIFICANT CHANGE UP (ref 135–145)
WBC # BLD: 6.43 K/UL — SIGNIFICANT CHANGE UP (ref 3.8–10.5)
WBC # FLD AUTO: 6.43 K/UL — SIGNIFICANT CHANGE UP (ref 3.8–10.5)

## 2022-12-14 PROCEDURE — 99232 SBSQ HOSP IP/OBS MODERATE 35: CPT

## 2022-12-14 RX ORDER — INSULIN LISPRO 100/ML
8 VIAL (ML) SUBCUTANEOUS
Refills: 0 | Status: DISCONTINUED | OUTPATIENT
Start: 2022-12-14 | End: 2022-12-14

## 2022-12-14 RX ORDER — METOPROLOL TARTRATE 50 MG
50 TABLET ORAL EVERY 8 HOURS
Refills: 0 | Status: DISCONTINUED | OUTPATIENT
Start: 2022-12-14 | End: 2022-12-19

## 2022-12-14 RX ORDER — HEPARIN SODIUM 5000 [USP'U]/ML
1100 INJECTION INTRAVENOUS; SUBCUTANEOUS
Qty: 25000 | Refills: 0 | Status: DISCONTINUED | OUTPATIENT
Start: 2022-12-14 | End: 2022-12-19

## 2022-12-14 RX ORDER — INSULIN GLARGINE 100 [IU]/ML
18 INJECTION, SOLUTION SUBCUTANEOUS AT BEDTIME
Refills: 0 | Status: DISCONTINUED | OUTPATIENT
Start: 2022-12-14 | End: 2022-12-15

## 2022-12-14 RX ORDER — INSULIN GLARGINE 100 [IU]/ML
16 INJECTION, SOLUTION SUBCUTANEOUS AT BEDTIME
Refills: 0 | Status: DISCONTINUED | OUTPATIENT
Start: 2022-12-14 | End: 2022-12-14

## 2022-12-14 RX ORDER — INSULIN LISPRO 100/ML
9 VIAL (ML) SUBCUTANEOUS
Refills: 0 | Status: DISCONTINUED | OUTPATIENT
Start: 2022-12-14 | End: 2022-12-15

## 2022-12-14 RX ADMIN — TAMSULOSIN HYDROCHLORIDE 0.4 MILLIGRAM(S): 0.4 CAPSULE ORAL at 22:00

## 2022-12-14 RX ADMIN — PANTOPRAZOLE SODIUM 40 MILLIGRAM(S): 20 TABLET, DELAYED RELEASE ORAL at 05:53

## 2022-12-14 RX ADMIN — Medication 8 UNIT(S): at 11:17

## 2022-12-14 RX ADMIN — Medication 2: at 09:54

## 2022-12-14 RX ADMIN — Medication 9 UNIT(S): at 16:57

## 2022-12-14 RX ADMIN — Medication 1 PATCH: at 19:21

## 2022-12-14 RX ADMIN — Medication 50 MILLIGRAM(S): at 22:00

## 2022-12-14 RX ADMIN — INSULIN GLARGINE 18 UNIT(S): 100 INJECTION, SOLUTION SUBCUTANEOUS at 22:00

## 2022-12-14 RX ADMIN — Medication 2: at 16:56

## 2022-12-14 RX ADMIN — Medication 81 MILLIGRAM(S): at 15:03

## 2022-12-14 RX ADMIN — CHLORHEXIDINE GLUCONATE 1 APPLICATION(S): 213 SOLUTION TOPICAL at 09:55

## 2022-12-14 RX ADMIN — Medication 8: at 11:16

## 2022-12-14 RX ADMIN — Medication 1 PATCH: at 15:02

## 2022-12-14 RX ADMIN — Medication 50 MILLIGRAM(S): at 17:00

## 2022-12-14 RX ADMIN — Medication 50 MILLIGRAM(S): at 05:53

## 2022-12-14 RX ADMIN — ATORVASTATIN CALCIUM 80 MILLIGRAM(S): 80 TABLET, FILM COATED ORAL at 22:00

## 2022-12-14 RX ADMIN — Medication 1 PATCH: at 06:52

## 2022-12-14 NOTE — PROGRESS NOTE ADULT - SUBJECTIVE AND OBJECTIVE BOX
Patient discussed on morning rounds with       Operation / Date:     SUBJECTIVE ASSESSMENT:  52y Male         Vital Signs Last 24 Hrs  T(C): 37 (14 Dec 2022 04:24), Max: 37 (14 Dec 2022 04:24)  T(F): 98.6 (14 Dec 2022 04:24), Max: 98.6 (14 Dec 2022 04:24)  HR: 95 (14 Dec 2022 04:24) (88 - 104)  BP: 112/75 (14 Dec 2022 04:24) (112/75 - 138/93)  BP(mean): --  RR: 18 (14 Dec 2022 04:24) (18 - 18)  SpO2: 96% (14 Dec 2022 04:24) (96% - 98%)    Parameters below as of 14 Dec 2022 04:24  Patient On (Oxygen Delivery Method): room air      I&O's Detail    12 Dec 2022 07:01  -  13 Dec 2022 07:00  --------------------------------------------------------  IN:    Heparin: 180 mL    Oral Fluid: 920 mL  Total IN: 1100 mL    OUT:    Voided (mL): 1150 mL  Total OUT: 1150 mL    Total NET: -50 mL      13 Dec 2022 07:01  -  14 Dec 2022 06:39  --------------------------------------------------------  IN:    Heparin: 216 mL    Oral Fluid: 1140 mL  Total IN: 1356 mL    OUT:    Voided (mL): 940 mL  Total OUT: 940 mL    Total NET: 416 mL          CHEST TUBE:  Yes/No. AIR LEAKS: Yes/No. Suction / H2O SEAL.   CELIO DRAIN:  Yes/No.  EPICARDIAL WIRES: Yes/No.  TIE DOWNS: Yes/No.  DAWKINS: Yes/No.    PHYSICAL EXAM:    General:     Neurological:    Cardiovascular:    Respiratory:    Gastrointestinal:    Extremities:    Vascular:    Incision Sites:    LABS:                        14.4   6.43  )-----------( 191      ( 14 Dec 2022 06:19 )             42.5       COUMADIN:  Yes/No. REASON: .    PTT - ( 13 Dec 2022 05:23 )  PTT:52.7 sec    12-13    138  |  100  |  14  ----------------------------<  187<H>  3.5   |  23  |  0.80    Ca    9.0      13 Dec 2022 05:23    TPro  6.9  /  Alb  3.8  /  TBili  0.8  /  DBili  x   /  AST  23  /  ALT  17  /  AlkPhos  56  12-13          MEDICATIONS  (STANDING):  aspirin  chewable 81 milliGRAM(s) Oral daily  atorvastatin 80 milliGRAM(s) Oral at bedtime  chlorhexidine 4% Liquid 1 Application(s) Topical <User Schedule>  dextrose 50% Injectable 25 Gram(s) IV Push once  dextrose 50% Injectable 12.5 Gram(s) IV Push once  dextrose 50% Injectable 25 Gram(s) IV Push once  dextrose Oral Gel 15 Gram(s) Oral once  glucagon  Injectable 1 milliGRAM(s) IntraMuscular once  heparin  Infusion 800 Unit(s)/Hr (9 mL/Hr) IV Continuous <Continuous>  insulin glargine Injectable (LANTUS) 13 Unit(s) SubCutaneous at bedtime  insulin lispro (ADMELOG) corrective regimen sliding scale   SubCutaneous three times a day before meals  insulin lispro (ADMELOG) corrective regimen sliding scale   SubCutaneous at bedtime  insulin lispro Injectable (ADMELOG) 7 Unit(s) SubCutaneous three times a day before meals  metoprolol tartrate 50 milliGRAM(s) Oral two times a day  nicotine -  14 mG/24Hr(s) Patch 1 Patch Transdermal daily  pantoprazole    Tablet 40 milliGRAM(s) Oral before breakfast  tamsulosin 0.4 milliGRAM(s) Oral at bedtime    MEDICATIONS  (PRN):  acetaminophen     Tablet .. 650 milliGRAM(s) Oral every 6 hours PRN Temp greater or equal to 38C (100.4F)  nicotine  Polacrilex Gum 2 milliGRAM(s) Oral every 2 hours PRN nicotine cravings        RADIOLOGY & ADDITIONAL TESTS:     Patient discussed on morning rounds with Dr. Moreno    Operation / Date: Pre op for CABG next week    SUBJECTIVE ASSESSMENT:  Patient feels well; Good appetite, up and ambulating.  Has been CP-free.  Denies CP/SOB/N/V/D/dizziness/cough/fever/chills.      Vital Signs Last 24 Hrs  T(C): 37 (14 Dec 2022 04:24), Max: 37 (14 Dec 2022 04:24)  T(F): 98.6 (14 Dec 2022 04:24), Max: 98.6 (14 Dec 2022 04:24)  HR: 95 (14 Dec 2022 04:24) (88 - 104)  BP: 112/75 (14 Dec 2022 04:24) (112/75 - 138/93)  BP(mean): --  RR: 18 (14 Dec 2022 04:24) (18 - 18)  SpO2: 96% (14 Dec 2022 04:24) (96% - 98%)    Parameters below as of 14 Dec 2022 04:24  Patient On (Oxygen Delivery Method): room air      I&O's Detail    12 Dec 2022 07:01  -  13 Dec 2022 07:00  --------------------------------------------------------  IN:    Heparin: 180 mL    Oral Fluid: 920 mL  Total IN: 1100 mL    OUT:    Voided (mL): 1150 mL  Total OUT: 1150 mL    Total NET: -50 mL      13 Dec 2022 07:01  -  14 Dec 2022 06:39  --------------------------------------------------------  IN:    Heparin: 216 mL    Oral Fluid: 1140 mL  Total IN: 1356 mL    OUT:    Voided (mL): 940 mL  Total OUT: 940 mL    Total NET: 416 mL      PHYSICAL EXAM:    GEN: NAD, looks comfortable  Psych: Mood appropriate  Neuro: A&Ox3.  No focal deficits.  Moving all extremities.   HEENT: No obvious abnormalities  CV: S1S2, regular, no murmurs appreciated.  No carotid bruits.  No JVD  Lungs: Clear B/L.  No wheezing, rales or rhonchi  ABD: Soft, non-tender, non-distended.  +Bowel sounds  EXT: Warm and well perfused.  No peripheral edema noted  Musculoskeletal: Moving all extremities with normal ROM, no joint swelling  PV: Pedal pulses palpable    LABS:                        14.4   6.43  )-----------( 191      ( 14 Dec 2022 06:19 )             42.5       PTT - ( 13 Dec 2022 05:23 )  PTT:52.7 sec    12-13    138  |  100  |  14  ----------------------------<  187<H>  3.5   |  23  |  0.80    Ca    9.0      13 Dec 2022 05:23    TPro  6.9  /  Alb  3.8  /  TBili  0.8  /  DBili  x   /  AST  23  /  ALT  17  /  AlkPhos  56  12-13          MEDICATIONS  (STANDING):  aspirin  chewable 81 milliGRAM(s) Oral daily  atorvastatin 80 milliGRAM(s) Oral at bedtime  chlorhexidine 4% Liquid 1 Application(s) Topical <User Schedule>  dextrose 50% Injectable 25 Gram(s) IV Push once  dextrose 50% Injectable 12.5 Gram(s) IV Push once  dextrose 50% Injectable 25 Gram(s) IV Push once  dextrose Oral Gel 15 Gram(s) Oral once  glucagon  Injectable 1 milliGRAM(s) IntraMuscular once  heparin  Infusion 800 Unit(s)/Hr (9 mL/Hr) IV Continuous <Continuous>  insulin glargine Injectable (LANTUS) 13 Unit(s) SubCutaneous at bedtime  insulin lispro (ADMELOG) corrective regimen sliding scale   SubCutaneous three times a day before meals  insulin lispro (ADMELOG) corrective regimen sliding scale   SubCutaneous at bedtime  insulin lispro Injectable (ADMELOG) 7 Unit(s) SubCutaneous three times a day before meals  metoprolol tartrate 50 milliGRAM(s) Oral two times a day  nicotine -  14 mG/24Hr(s) Patch 1 Patch Transdermal daily  pantoprazole    Tablet 40 milliGRAM(s) Oral before breakfast  tamsulosin 0.4 milliGRAM(s) Oral at bedtime    MEDICATIONS  (PRN):  acetaminophen     Tablet .. 650 milliGRAM(s) Oral every 6 hours PRN Temp greater or equal to 38C (100.4F)  nicotine  Polacrilex Gum 2 milliGRAM(s) Oral every 2 hours PRN nicotine cravings        RADIOLOGY & ADDITIONAL TESTS:

## 2022-12-14 NOTE — PROGRESS NOTE ADULT - ASSESSMENT
Assessment  DMT2: 52y Male with DM T2 with hyperglycemia admitted with chest pain, A1C is 8.2%  patient was on oral hypoglycemic agents at home, now basal bolus insulin coverage, blood sugars still running high, no hypoglycemic episode,  eating meals.  Patient is high risk with high level decision making due to uncontrolled diabetes, has increased risk for complications. Tight sugar control is not recommended for this patient.  CAD: Planing CABG, on medications, monitored, asymptomatic.  HTN: On antihypertensive medications, monitored, asymptomatic.  HLD: On statin, compliant.            Judith Sanders MD  Cell:  [phone]  Office: [phone]

## 2022-12-14 NOTE — PROGRESS NOTE ADULT - PROBLEM SELECTOR PLAN 1
ASA 81, Atorvastatin 80, Lopressor 50 BID  Hep gtt anatomy  Trend P2Y12, 12/12- 152  Preop workup in progress- f/u Carotids, MRSA, Fibrinogen  CABG next week  Nicotine patch current tobacco use ASA 81, Atorvastatin 80, Lopressor 50 BID  Hep gtt - ptt 40.  Gtt inc'd, repeat ptt later.   Trend P2Y12, 12/12- 152  Preop workup in progress-  MRSA, Fibrinogen  Carotids neg.   CABG next week- date TBD.   Nicotine patch current tobacco use

## 2022-12-14 NOTE — PROGRESS NOTE ADULT - PROBLEM SELECTOR PLAN 1
Will increase Lantus to 18 units at bed time.  Will increase Admelog to 9  units before each meal in addition to Admelog correction scale coverage.  Patient counseled for compliance with consistent low carb diet.  .

## 2022-12-14 NOTE — PROGRESS NOTE ADULT - ASSESSMENT
52M w/ HTN, T2DM, GERD who is transferred from OSH for ACS evaluation/management. Per patient yesterday at 3 am he was woken up from his sleep for crushing chest pain that radiated to his left side w/ associated numbness and tinging. He endorses sweats, and small bouts of emesis x2. He states he has never experienced pain like this in the past, however it has resolved. He has been recently seeing a gastroenterology for worsening reflux over to past 4-6 months for which he was taking omeprazole. he states he misses his medications up to 2-3x weekly.   He denies previous MIs, cardiac surgeries or hospitalizations He endorses smoking 5 cig/day for 36 years. 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 70% occlusion of pLAD 80% occlusion of pDaig, 100% occlusion of Lcx, 80% occlusion of pOM, 70% occlusion of pRPL. Patient admitted to CCU for management    12/12 Tx 2 [place] for further management  Continue Hep gtt CAD Cont ASA statin betablocker CAD CABG next week w/ Dr Hedrick   Called Endocrine consult hyperglycemia  12/13 c/w Hep gtt, preop w/u in progress, f/u carotids, trend P2Y12   This is a 51 y/o male, w/ PMHx HTN, T2DM, GERD who is transferred from OSH for ACS evaluation/management. Per patient he got woken up from his sleep with crushing chest pain that radiated to his left side w/ associated numbness and tinging and 2 bouts of emesis.  He thought it was "acid reflux" and was recently seeing a gastroenterologist, prescribed omeprazole.  Somewhat compliant with taking his meds.  Denies previous MIs, cardiac surgeries or hospitalizations He endorses smoking 5 cig/day for 36 years. 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 70% occlusion of pLAD 80% occlusion of pDaig, 100% occlusion of Lcx, 80% occlusion of pOM, 70% occlusion of pRPL. Patient admitted to CCU for management.    12/12 Tx 2 [place] for further management  Continue Hep gtt CAD Cont ASA statin betablocker CAD CABG next week w/ Dr Hedrick   Called Endocrine consult hyperglycemia  12/13 c/w Hep gtt, preop w/u in progress, f/u carotids, trend P2Y12  12/14 Carotids neg.  P2Y12 152.  Repeat tomorrow or Friday.  Pre op for next week.  Hep gtt inc'd from 9 units to 11 units for ptt 40.

## 2022-12-14 NOTE — PROGRESS NOTE ADULT - SUBJECTIVE AND OBJECTIVE BOX
Chief complaint    Patient is a 52y old  Male who presents with a chief complaint of Preop CABG (12 Dec 2022 15:00)   Review of systems  Patient in bed, appears comfortable.    Labs and Fingersticks  CAPILLARY BLOOD GLUCOSE      POCT Blood Glucose.: 317 mg/dL (14 Dec 2022 11:06)  POCT Blood Glucose.: 180 mg/dL (14 Dec 2022 07:14)  POCT Blood Glucose.: 224 mg/dL (13 Dec 2022 21:39)  POCT Blood Glucose.: 221 mg/dL (13 Dec 2022 16:29)      Anion Gap, Serum: 12 (12-14 @ 06:19)  Anion Gap, Serum: 15 (12-13 @ 05:23)      Calcium, Total Serum: 9.1 (12-14 @ 06:19)  Calcium, Total Serum: 9.0 (12-13 @ 05:23)  Albumin, Serum: 3.8 (12-13 @ 05:23)    Alanine Aminotransferase (ALT/SGPT): 17 (12-13 @ 05:23)  Alkaline Phosphatase, Serum: 56 (12-13 @ 05:23)  Aspartate Aminotransferase (AST/SGOT): 23 (12-13 @ 05:23)        12-14    136  |  100  |  14  ----------------------------<  181<H>  3.5   |  24  |  0.81    Ca    9.1      14 Dec 2022 06:19    TPro  6.9  /  Alb  3.8  /  TBili  0.8  /  DBili  x   /  AST  23  /  ALT  17  /  AlkPhos  56  12-13                        14.4   6.43  )-----------( 191      ( 14 Dec 2022 06:19 )             42.5     Medications  MEDICATIONS  (STANDING):  aspirin  chewable 81 milliGRAM(s) Oral daily  atorvastatin 80 milliGRAM(s) Oral at bedtime  chlorhexidine 4% Liquid 1 Application(s) Topical <User Schedule>  dextrose 50% Injectable 25 Gram(s) IV Push once  dextrose 50% Injectable 12.5 Gram(s) IV Push once  dextrose 50% Injectable 25 Gram(s) IV Push once  dextrose Oral Gel 15 Gram(s) Oral once  glucagon  Injectable 1 milliGRAM(s) IntraMuscular once  heparin  Infusion 1100 Unit(s)/Hr (11 mL/Hr) IV Continuous <Continuous>  insulin glargine Injectable (LANTUS) 18 Unit(s) SubCutaneous at bedtime  insulin lispro (ADMELOG) corrective regimen sliding scale   SubCutaneous three times a day before meals  insulin lispro (ADMELOG) corrective regimen sliding scale   SubCutaneous at bedtime  insulin lispro Injectable (ADMELOG) 9 Unit(s) SubCutaneous three times a day before meals  metoprolol tartrate 50 milliGRAM(s) Oral two times a day  nicotine -  14 mG/24Hr(s) Patch 1 Patch Transdermal daily  pantoprazole    Tablet 40 milliGRAM(s) Oral before breakfast  tamsulosin 0.4 milliGRAM(s) Oral at bedtime      Physical Exam  General: Patient appears comfortable.  Vital Signs Last 12 Hrs  T(F): 97.7 (12-14-22 @ 11:17), Max: 98.6 (12-14-22 @ 04:24)  HR: 87 (12-14-22 @ 11:17) (84 - 95)  BP: 128/80 (12-14-22 @ 11:17) (110/75 - 128/80)  BP(mean): 87 (12-14-22 @ 09:00) (87 - 87)  RR: 18 (12-14-22 @ 11:17) (18 - 18)  SpO2: 95% (12-14-22 @ 11:17) (95% - 99%)  Neck: No palpable thyroid nodules.  CVS: S1S2, No murmurs  Respiratory: No wheezing, no crepitations  GI: Abdomen soft, non tender.  Musculoskeletal:  edema lower extremities.     Diagnostics

## 2022-12-15 LAB
ALBUMIN SERPL ELPH-MCNC: 3.9 G/DL — SIGNIFICANT CHANGE UP (ref 3.3–5)
ALP SERPL-CCNC: 53 U/L — SIGNIFICANT CHANGE UP (ref 40–120)
ALT FLD-CCNC: 49 U/L — HIGH (ref 10–45)
ANION GAP SERPL CALC-SCNC: 13 MMOL/L — SIGNIFICANT CHANGE UP (ref 5–17)
APTT BLD: 60.3 SEC — HIGH (ref 27.5–35.5)
APTT BLD: 60.9 SEC — HIGH (ref 27.5–35.5)
AST SERPL-CCNC: 36 U/L — SIGNIFICANT CHANGE UP (ref 10–40)
BASOPHILS # BLD AUTO: 0.07 K/UL — SIGNIFICANT CHANGE UP (ref 0–0.2)
BASOPHILS NFR BLD AUTO: 1 % — SIGNIFICANT CHANGE UP (ref 0–2)
BILIRUB SERPL-MCNC: 0.7 MG/DL — SIGNIFICANT CHANGE UP (ref 0.2–1.2)
BUN SERPL-MCNC: 10 MG/DL — SIGNIFICANT CHANGE UP (ref 7–23)
CALCIUM SERPL-MCNC: 9.2 MG/DL — SIGNIFICANT CHANGE UP (ref 8.4–10.5)
CHLORIDE SERPL-SCNC: 100 MMOL/L — SIGNIFICANT CHANGE UP (ref 96–108)
CO2 SERPL-SCNC: 23 MMOL/L — SIGNIFICANT CHANGE UP (ref 22–31)
CREAT SERPL-MCNC: 0.75 MG/DL — SIGNIFICANT CHANGE UP (ref 0.5–1.3)
EGFR: 109 ML/MIN/1.73M2 — SIGNIFICANT CHANGE UP
EOSINOPHIL # BLD AUTO: 0.41 K/UL — SIGNIFICANT CHANGE UP (ref 0–0.5)
EOSINOPHIL NFR BLD AUTO: 5.8 % — SIGNIFICANT CHANGE UP (ref 0–6)
GLUCOSE BLDC GLUCOMTR-MCNC: 111 MG/DL — HIGH (ref 70–99)
GLUCOSE BLDC GLUCOMTR-MCNC: 140 MG/DL — HIGH (ref 70–99)
GLUCOSE BLDC GLUCOMTR-MCNC: 158 MG/DL — HIGH (ref 70–99)
GLUCOSE BLDC GLUCOMTR-MCNC: 190 MG/DL — HIGH (ref 70–99)
GLUCOSE SERPL-MCNC: 133 MG/DL — HIGH (ref 70–99)
HCT VFR BLD CALC: 42.4 % — SIGNIFICANT CHANGE UP (ref 39–50)
HGB BLD-MCNC: 14.3 G/DL — SIGNIFICANT CHANGE UP (ref 13–17)
IMM GRANULOCYTES NFR BLD AUTO: 0.4 % — SIGNIFICANT CHANGE UP (ref 0–0.9)
INR BLD: 1.22 RATIO — HIGH (ref 0.88–1.16)
LYMPHOCYTES # BLD AUTO: 2.81 K/UL — SIGNIFICANT CHANGE UP (ref 1–3.3)
LYMPHOCYTES # BLD AUTO: 39.6 % — SIGNIFICANT CHANGE UP (ref 13–44)
MAGNESIUM SERPL-MCNC: 1.8 MG/DL — SIGNIFICANT CHANGE UP (ref 1.6–2.6)
MCHC RBC-ENTMCNC: 28.9 PG — SIGNIFICANT CHANGE UP (ref 27–34)
MCHC RBC-ENTMCNC: 33.7 GM/DL — SIGNIFICANT CHANGE UP (ref 32–36)
MCV RBC AUTO: 85.7 FL — SIGNIFICANT CHANGE UP (ref 80–100)
MONOCYTES # BLD AUTO: 0.55 K/UL — SIGNIFICANT CHANGE UP (ref 0–0.9)
MONOCYTES NFR BLD AUTO: 7.7 % — SIGNIFICANT CHANGE UP (ref 2–14)
NEUTROPHILS # BLD AUTO: 3.23 K/UL — SIGNIFICANT CHANGE UP (ref 1.8–7.4)
NEUTROPHILS NFR BLD AUTO: 45.5 % — SIGNIFICANT CHANGE UP (ref 43–77)
NRBC # BLD: 0 /100 WBCS — SIGNIFICANT CHANGE UP (ref 0–0)
PA ADP PRP-ACNC: 158 PRU — LOW (ref 194–417)
PLATELET # BLD AUTO: 201 K/UL — SIGNIFICANT CHANGE UP (ref 150–400)
POTASSIUM SERPL-MCNC: 3.5 MMOL/L — SIGNIFICANT CHANGE UP (ref 3.5–5.3)
POTASSIUM SERPL-SCNC: 3.5 MMOL/L — SIGNIFICANT CHANGE UP (ref 3.5–5.3)
PROT SERPL-MCNC: 6.8 G/DL — SIGNIFICANT CHANGE UP (ref 6–8.3)
PROTHROM AB SERPL-ACNC: 14.2 SEC — HIGH (ref 10.5–13.4)
RBC # BLD: 4.95 M/UL — SIGNIFICANT CHANGE UP (ref 4.2–5.8)
RBC # FLD: 12.2 % — SIGNIFICANT CHANGE UP (ref 10.3–14.5)
SODIUM SERPL-SCNC: 136 MMOL/L — SIGNIFICANT CHANGE UP (ref 135–145)
WBC # BLD: 7.1 K/UL — SIGNIFICANT CHANGE UP (ref 3.8–10.5)
WBC # FLD AUTO: 7.1 K/UL — SIGNIFICANT CHANGE UP (ref 3.8–10.5)

## 2022-12-15 PROCEDURE — 99231 SBSQ HOSP IP/OBS SF/LOW 25: CPT

## 2022-12-15 RX ORDER — INSULIN LISPRO 100/ML
10 VIAL (ML) SUBCUTANEOUS
Refills: 0 | Status: DISCONTINUED | OUTPATIENT
Start: 2022-12-15 | End: 2022-12-19

## 2022-12-15 RX ORDER — INSULIN GLARGINE 100 [IU]/ML
20 INJECTION, SOLUTION SUBCUTANEOUS AT BEDTIME
Refills: 0 | Status: DISCONTINUED | OUTPATIENT
Start: 2022-12-15 | End: 2022-12-19

## 2022-12-15 RX ADMIN — Medication 10 UNIT(S): at 11:42

## 2022-12-15 RX ADMIN — TAMSULOSIN HYDROCHLORIDE 0.4 MILLIGRAM(S): 0.4 CAPSULE ORAL at 22:02

## 2022-12-15 RX ADMIN — Medication 50 MILLIGRAM(S): at 06:07

## 2022-12-15 RX ADMIN — Medication 81 MILLIGRAM(S): at 11:43

## 2022-12-15 RX ADMIN — ATORVASTATIN CALCIUM 80 MILLIGRAM(S): 80 TABLET, FILM COATED ORAL at 22:02

## 2022-12-15 RX ADMIN — Medication 10 UNIT(S): at 08:27

## 2022-12-15 RX ADMIN — Medication 2: at 07:50

## 2022-12-15 RX ADMIN — Medication 50 MILLIGRAM(S): at 22:02

## 2022-12-15 RX ADMIN — CHLORHEXIDINE GLUCONATE 1 APPLICATION(S): 213 SOLUTION TOPICAL at 11:47

## 2022-12-15 RX ADMIN — Medication 1 PATCH: at 07:00

## 2022-12-15 RX ADMIN — PANTOPRAZOLE SODIUM 40 MILLIGRAM(S): 20 TABLET, DELAYED RELEASE ORAL at 06:07

## 2022-12-15 RX ADMIN — HEPARIN SODIUM 11 UNIT(S)/HR: 5000 INJECTION INTRAVENOUS; SUBCUTANEOUS at 21:55

## 2022-12-15 RX ADMIN — Medication 1 PATCH: at 21:22

## 2022-12-15 RX ADMIN — Medication 1 PATCH: at 11:42

## 2022-12-15 RX ADMIN — INSULIN GLARGINE 20 UNIT(S): 100 INJECTION, SOLUTION SUBCUTANEOUS at 22:03

## 2022-12-15 RX ADMIN — Medication 50 MILLIGRAM(S): at 13:40

## 2022-12-15 RX ADMIN — Medication 10 UNIT(S): at 17:20

## 2022-12-15 NOTE — PROGRESS NOTE ADULT - SUBJECTIVE AND OBJECTIVE BOX
Patient has only 2 batteries (being charged) at bedside and 2 clips.   Notified Dr. Riojas(Neurocritical Care) that we are waiting on back up batteries/controller for LVAD prior to sending patient to CT. Charge Nurse Giovany spoke with LVAD coordinator, coordinator will bring back up batteries and controller. CT Deptmade leanne.     Dr. ROCHELLE Campbell spoke with wife, wife is coming from [place].        Chief complaint    Patient is a 52y old  Male who presents with a chief complaint of sob (15 Dec 2022 07:52)   Review of systems  Patient in bed, appears comfortable.    Labs and Fingersticks  CAPILLARY BLOOD GLUCOSE      POCT Blood Glucose.: 158 mg/dL (15 Dec 2022 07:29)  POCT Blood Glucose.: 200 mg/dL (14 Dec 2022 21:59)  POCT Blood Glucose.: 193 mg/dL (14 Dec 2022 16:39)  POCT Blood Glucose.: 317 mg/dL (14 Dec 2022 11:06)      Anion Gap, Serum: 13 (12-15 @ 06:55)  Anion Gap, Serum: 12 (12-14 @ 06:19)      Calcium, Total Serum: 9.2 (12-15 @ 06:55)  Calcium, Total Serum: 9.1 (12-14 @ 06:19)  Albumin, Serum: 3.9 (12-15 @ 06:55)    Alanine Aminotransferase (ALT/SGPT): 49 *H* (12-15 @ 06:55)  Alkaline Phosphatase, Serum: 53 (12-15 @ 06:55)  Aspartate Aminotransferase (AST/SGOT): 36 (12-15 @ 06:55)        12-15    136  |  100  |  10  ----------------------------<  133<H>  3.5   |  23  |  0.75    Ca    9.2      15 Dec 2022 06:55  Mg     1.8     12-15    TPro  6.8  /  Alb  3.9  /  TBili  0.7  /  DBili  x   /  AST  36  /  ALT  49<H>  /  AlkPhos  53  12-15                        14.3   7.10  )-----------( 201      ( 15 Dec 2022 06:55 )             42.4     Medications  MEDICATIONS  (STANDING):  aspirin  chewable 81 milliGRAM(s) Oral daily  atorvastatin 80 milliGRAM(s) Oral at bedtime  chlorhexidine 4% Liquid 1 Application(s) Topical <User Schedule>  dextrose 50% Injectable 25 Gram(s) IV Push once  dextrose 50% Injectable 12.5 Gram(s) IV Push once  dextrose 50% Injectable 25 Gram(s) IV Push once  dextrose Oral Gel 15 Gram(s) Oral once  glucagon  Injectable 1 milliGRAM(s) IntraMuscular once  heparin  Infusion 1100 Unit(s)/Hr (11 mL/Hr) IV Continuous <Continuous>  insulin glargine Injectable (LANTUS) 20 Unit(s) SubCutaneous at bedtime  insulin lispro (ADMELOG) corrective regimen sliding scale   SubCutaneous three times a day before meals  insulin lispro (ADMELOG) corrective regimen sliding scale   SubCutaneous at bedtime  insulin lispro Injectable (ADMELOG) 10 Unit(s) SubCutaneous three times a day before meals  metoprolol tartrate 50 milliGRAM(s) Oral every 8 hours  nicotine -  14 mG/24Hr(s) Patch 1 Patch Transdermal daily  pantoprazole    Tablet 40 milliGRAM(s) Oral before breakfast  tamsulosin 0.4 milliGRAM(s) Oral at bedtime      Physical Exam  General: Patient appears comfortable.  Vital Signs Last 12 Hrs  T(F): 97.9 (12-15-22 @ 05:00), Max: 97.9 (12-15-22 @ 05:00)  HR: 83 (12-15-22 @ 05:00) (83 - 83)  BP: 124/81 (12-15-22 @ 05:00) (124/81 - 124/81)  BP(mean): --  RR: 18 (12-15-22 @ 05:00) (18 - 18)  SpO2: 98% (12-15-22 @ 05:00) (98% - 98%)  Neck: No palpable thyroid nodules.  CVS: S1S2, No murmurs  Respiratory: No wheezing, no crepitations  GI: Abdomen soft, non tender.  Musculoskeletal:  edema lower extremities.     Diagnostics

## 2022-12-15 NOTE — PROGRESS NOTE ADULT - SUBJECTIVE AND OBJECTIVE BOX
VITAL SIGNS-Telemetry:  SR   Vital Signs Last 24 Hrs  T(C): 36.6 (12-15-22 @ 05:00), Max: 36.8 (12-14-22 @ 19:38)  T(F): 97.9 (12-15-22 @ 05:00), Max: 98.2 (12-14-22 @ 19:38)  HR: 83 (12-15-22 @ 05:00) (83 - 87)  BP: 124/81 (12-15-22 @ 05:00) (110/75 - 144/94)  RR: 18 (12-15-22 @ 05:00) (18 - 18)  SpO2: 98% (12-15-22 @ 05:00) (95% - 99%)         12-14 @ 07:01  -  12-15 @ 07:00  --------------------------------------------------------  IN: 972 mL / OUT: 1200 mL / NET: -228 mL    Daily     Daily     CAPILLARY BLOOD GLUCOSE  POCT Blood Glucose.: 158 mg/dL (15 Dec 2022 07:29)  POCT Blood Glucose.: 200 mg/dL (14 Dec 2022 21:59)  POCT Blood Glucose.: 193 mg/dL (14 Dec 2022 16:39)  POCT Blood Glucose.: 317 mg/dL (14 Dec 2022 11:06)            PHYSICAL EXAM:  Neurology: alert and oriented x 3, nonfocal, no gross deficits  CV : S1S2  Lungs: cta  Abdomen: soft, nontender, nondistended, positive bowel sounds, last bowel movement       +bm  Extremities:   no c/c/e      acetaminophen     Tablet .. 650 milliGRAM(s) Oral every 6 hours PRN  aspirin  chewable 81 milliGRAM(s) Oral daily  atorvastatin 80 milliGRAM(s) Oral at bedtime  chlorhexidine 4% Liquid 1 Application(s) Topical <User Schedule>  dextrose 50% Injectable 25 Gram(s) IV Push once  dextrose 50% Injectable 12.5 Gram(s) IV Push once  dextrose 50% Injectable 25 Gram(s) IV Push once  dextrose Oral Gel 15 Gram(s) Oral once  glucagon  Injectable 1 milliGRAM(s) IntraMuscular once  heparin  Infusion 1100 Unit(s)/Hr IV Continuous <Continuous>  insulin glargine Injectable (LANTUS) 20 Unit(s) SubCutaneous at bedtime  insulin lispro (ADMELOG) corrective regimen sliding scale   SubCutaneous three times a day before meals  insulin lispro (ADMELOG) corrective regimen sliding scale   SubCutaneous at bedtime  insulin lispro Injectable (ADMELOG) 10 Unit(s) SubCutaneous three times a day before meals  metoprolol tartrate 50 milliGRAM(s) Oral every 8 hours  nicotine  Polacrilex Gum 2 milliGRAM(s) Oral every 2 hours PRN  nicotine -  14 mG/24Hr(s) Patch 1 Patch Transdermal daily  pantoprazole    Tablet 40 milliGRAM(s) Oral before breakfast  tamsulosin 0.4 milliGRAM(s) Oral at bedtime    Physical Therapy Rec:   Home  [ x ]   Home w/ PT  [  ]  Rehab  [  ]  Discussed with Cardiothoracic Team at AM rounds.

## 2022-12-15 NOTE — PROGRESS NOTE ADULT - PROBLEM SELECTOR PLAN 1
ASA 81, Atorvastatin 80, Lopressor 50 BID  Hep gtt - ptt 40.  Gtt inc'd, repeat ptt later.   Trend P2Y12, 12/12- 152  Preop workup in progress-  MRSA, Fibrinogen  Carotids neg.   CABG Monday 12/19  Nicotine patch current tobacco use

## 2022-12-15 NOTE — PROGRESS NOTE ADULT - ASSESSMENT
Assessment  DMT2: 52y Male with DM T2 with hyperglycemia admitted with chest pain, A1C is 8.2%  patient was on oral hypoglycemic agents at home, now basal bolus insulin coverage, blood sugars still running high, insulin dose increased today, no hypoglycemic episode,  eating meals.  Patient is high risk with high level decision making due to uncontrolled diabetes, has increased risk for complications. Tight sugar control is not recommended for this patient.  CAD: Planing CABG, on medications, monitored, asymptomatic.  HTN: On antihypertensive medications, monitored, asymptomatic.  HLD: On statin, compliant.            Judith Sanders MD  Cell:  [phone]  Office: [phone]

## 2022-12-15 NOTE — PROGRESS NOTE ADULT - ASSESSMENT
This is a 53 y/o male, w/ PMHx HTN, T2DM, GERD who is transferred from OSH for ACS evaluation/management. Per patient he got woken up from his sleep with crushing chest pain that radiated to his left side w/ associated numbness and tinging and 2 bouts of emesis.  He thought it was "acid reflux" and was recently seeing a gastroenterologist, prescribed omeprazole.  Somewhat compliant with taking his meds.  Denies previous MIs, cardiac surgeries or hospitalizations He endorses smoking 5 cig/day for 36 years. 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 70% occlusion of pLAD 80% occlusion of pDaig, 100% occlusion of Lcx, 80% occlusion of pOM, 70% occlusion of pRPL. Patient admitted to CCU for management.    12/12 Tx 2 [place] for further management  Continue Hep gtt CAD Cont ASA statin betablocker CAD CABG next week w/ Dr Hedrick   Called Endocrine consult hyperglycemia  12/13 c/w Hep gtt, preop w/u in progress, f/u carotids, trend P2Y12  12/14 Carotids neg.  P2Y12 152.  Repeat tomorrow or Friday.  Pre op for next week.  Hep gtt inc'd from 9 units to 11 units for ptt 40.     12/15 VSS SR OR Mon 12/19

## 2022-12-16 LAB
ANION GAP SERPL CALC-SCNC: 13 MMOL/L — SIGNIFICANT CHANGE UP (ref 5–17)
APTT BLD: 71.9 SEC — HIGH (ref 27.5–35.5)
BUN SERPL-MCNC: 10 MG/DL — SIGNIFICANT CHANGE UP (ref 7–23)
CALCIUM SERPL-MCNC: 8.7 MG/DL — SIGNIFICANT CHANGE UP (ref 8.4–10.5)
CHLORIDE SERPL-SCNC: 102 MMOL/L — SIGNIFICANT CHANGE UP (ref 96–108)
CO2 SERPL-SCNC: 24 MMOL/L — SIGNIFICANT CHANGE UP (ref 22–31)
CREAT SERPL-MCNC: 0.76 MG/DL — SIGNIFICANT CHANGE UP (ref 0.5–1.3)
EGFR: 109 ML/MIN/1.73M2 — SIGNIFICANT CHANGE UP
GLUCOSE BLDC GLUCOMTR-MCNC: 100 MG/DL — HIGH (ref 70–99)
GLUCOSE BLDC GLUCOMTR-MCNC: 103 MG/DL — HIGH (ref 70–99)
GLUCOSE BLDC GLUCOMTR-MCNC: 178 MG/DL — HIGH (ref 70–99)
GLUCOSE BLDC GLUCOMTR-MCNC: 201 MG/DL — HIGH (ref 70–99)
GLUCOSE SERPL-MCNC: 110 MG/DL — HIGH (ref 70–99)
HCT VFR BLD CALC: 42 % — SIGNIFICANT CHANGE UP (ref 39–50)
HGB BLD-MCNC: 14.3 G/DL — SIGNIFICANT CHANGE UP (ref 13–17)
MAGNESIUM SERPL-MCNC: 1.7 MG/DL — SIGNIFICANT CHANGE UP (ref 1.6–2.6)
MCHC RBC-ENTMCNC: 28.9 PG — SIGNIFICANT CHANGE UP (ref 27–34)
MCHC RBC-ENTMCNC: 34 GM/DL — SIGNIFICANT CHANGE UP (ref 32–36)
MCV RBC AUTO: 84.8 FL — SIGNIFICANT CHANGE UP (ref 80–100)
NRBC # BLD: 0 /100 WBCS — SIGNIFICANT CHANGE UP (ref 0–0)
PHOSPHATE SERPL-MCNC: 4.2 MG/DL — SIGNIFICANT CHANGE UP (ref 2.5–4.5)
PLATELET # BLD AUTO: 212 K/UL — SIGNIFICANT CHANGE UP (ref 150–400)
POTASSIUM SERPL-MCNC: 3.6 MMOL/L — SIGNIFICANT CHANGE UP (ref 3.5–5.3)
POTASSIUM SERPL-SCNC: 3.6 MMOL/L — SIGNIFICANT CHANGE UP (ref 3.5–5.3)
RBC # BLD: 4.95 M/UL — SIGNIFICANT CHANGE UP (ref 4.2–5.8)
RBC # FLD: 12.3 % — SIGNIFICANT CHANGE UP (ref 10.3–14.5)
SODIUM SERPL-SCNC: 139 MMOL/L — SIGNIFICANT CHANGE UP (ref 135–145)
WBC # BLD: 6.75 K/UL — SIGNIFICANT CHANGE UP (ref 3.8–10.5)
WBC # FLD AUTO: 6.75 K/UL — SIGNIFICANT CHANGE UP (ref 3.8–10.5)

## 2022-12-16 PROCEDURE — 99232 SBSQ HOSP IP/OBS MODERATE 35: CPT

## 2022-12-16 RX ORDER — POTASSIUM CHLORIDE 20 MEQ
20 PACKET (EA) ORAL ONCE
Refills: 0 | Status: COMPLETED | OUTPATIENT
Start: 2022-12-16 | End: 2022-12-16

## 2022-12-16 RX ADMIN — Medication 1 PATCH: at 11:15

## 2022-12-16 RX ADMIN — Medication 1 PATCH: at 19:00

## 2022-12-16 RX ADMIN — Medication 1 PATCH: at 06:10

## 2022-12-16 RX ADMIN — Medication 50 MILLIGRAM(S): at 13:49

## 2022-12-16 RX ADMIN — Medication 50 MILLIGRAM(S): at 05:39

## 2022-12-16 RX ADMIN — Medication 20 MILLIEQUIVALENT(S): at 08:52

## 2022-12-16 RX ADMIN — Medication 10 UNIT(S): at 11:00

## 2022-12-16 RX ADMIN — ATORVASTATIN CALCIUM 80 MILLIGRAM(S): 80 TABLET, FILM COATED ORAL at 22:11

## 2022-12-16 RX ADMIN — Medication 10 UNIT(S): at 08:04

## 2022-12-16 RX ADMIN — Medication 10 UNIT(S): at 16:28

## 2022-12-16 RX ADMIN — INSULIN GLARGINE 20 UNIT(S): 100 INJECTION, SOLUTION SUBCUTANEOUS at 22:11

## 2022-12-16 RX ADMIN — TAMSULOSIN HYDROCHLORIDE 0.4 MILLIGRAM(S): 0.4 CAPSULE ORAL at 22:12

## 2022-12-16 RX ADMIN — Medication 1 PATCH: at 12:00

## 2022-12-16 RX ADMIN — Medication 81 MILLIGRAM(S): at 12:00

## 2022-12-16 RX ADMIN — PANTOPRAZOLE SODIUM 40 MILLIGRAM(S): 20 TABLET, DELAYED RELEASE ORAL at 05:35

## 2022-12-16 RX ADMIN — Medication 50 MILLIGRAM(S): at 22:12

## 2022-12-16 RX ADMIN — Medication 4: at 16:28

## 2022-12-16 RX ADMIN — CHLORHEXIDINE GLUCONATE 1 APPLICATION(S): 213 SOLUTION TOPICAL at 06:38

## 2022-12-16 NOTE — PROGRESS NOTE ADULT - ASSESSMENT
Assessment  DMT2: 52y Male with DM T2 with hyperglycemia admitted with chest pain, A1C is 8.2%  patient was on oral hypoglycemic agents at home, now basal bolus insulin coverage, blood sugars are trending down, no hypoglycemic episode,  eating meals.  Patient is high risk with high level decision making due to uncontrolled diabetes, has increased risk for complications. Tight sugar control is not recommended for this patient.  CAD: Planing CABG, on medications, monitored, asymptomatic.  HTN: On antihypertensive medications, monitored, asymptomatic.  HLD: On statin, compliant.            Judith Sanders MD  Cell:  [phone]  Office: [phone]

## 2022-12-16 NOTE — PROGRESS NOTE ADULT - SUBJECTIVE AND OBJECTIVE BOX
VITAL SIGNS    Telemetry:  SR 70-90  Vital Signs Last 24 Hrs  T(C): 36.2 (22 @ 04:36), Max: 36.8 (12-15-22 @ 19:57)  T(F): 97.2 (22 @ 04:36), Max: 98.2 (12-15-22 @ 19:57)  HR: 77 (22 @ 04:36) (77 - 96)  BP: 151/72 (22 @ 04:36) (110/72 - 151/72)  RR: 18 (22 @ 04:36) (18 - 18)  SpO2: 100% (22 @ 04:36) (96% - 100%)            12-15 @ 07:01  -   @ 07:00  --------------------------------------------------------  IN: 492 mL / OUT: 600 mL / NET: -108 mL       Daily     Daily Weight in k (16 Dec 2022 08:49)  Admit Wt: Drug Dosing Weight  Height (cm): 175.3 (10 Dec 2022 12:20)  Weight (kg): 66.9 (10 Dec 2022 12:20)  BMI (kg/m2): 21.8 (10 Dec 2022 12:20)  BSA (m2): 1.82 (10 Dec 2022 12:20)      CAPILLARY BLOOD GLUCOSE      POCT Blood Glucose.: 103 mg/dL (16 Dec 2022 07:31)  POCT Blood Glucose.: 190 mg/dL (15 Dec 2022 22:00)  POCT Blood Glucose.: 111 mg/dL (15 Dec 2022 16:27)  POCT Blood Glucose.: 140 mg/dL (15 Dec 2022 11:37)          MEDICATIONS  acetaminophen     Tablet .. 650 milliGRAM(s) Oral every 6 hours PRN  aspirin  chewable 81 milliGRAM(s) Oral daily  atorvastatin 80 milliGRAM(s) Oral at bedtime  chlorhexidine 4% Liquid 1 Application(s) Topical <User Schedule>  dextrose 50% Injectable 25 Gram(s) IV Push once  dextrose 50% Injectable 25 Gram(s) IV Push once  dextrose 50% Injectable 12.5 Gram(s) IV Push once  dextrose Oral Gel 15 Gram(s) Oral once  glucagon  Injectable 1 milliGRAM(s) IntraMuscular once  heparin  Infusion 1100 Unit(s)/Hr IV Continuous <Continuous>  insulin glargine Injectable (LANTUS) 20 Unit(s) SubCutaneous at bedtime  insulin lispro (ADMELOG) corrective regimen sliding scale   SubCutaneous three times a day before meals  insulin lispro (ADMELOG) corrective regimen sliding scale   SubCutaneous at bedtime  insulin lispro Injectable (ADMELOG) 10 Unit(s) SubCutaneous three times a day before meals  metoprolol tartrate 50 milliGRAM(s) Oral every 8 hours  nicotine  Polacrilex Gum 2 milliGRAM(s) Oral every 2 hours PRN  nicotine -  14 mG/24Hr(s) Patch 1 Patch Transdermal daily  pantoprazole    Tablet 40 milliGRAM(s) Oral before breakfast  tamsulosin 0.4 milliGRAM(s) Oral at bedtime      >>> <<<  PHYSICAL EXAM  Subjective: NAD  Neurology: alert and oriented x 3, nonfocal, no gross deficits  CV :s1s2  Lungs: CTA b/l  Abdomen: soft, NT,ND, (+ )BM  :  voiding  Extremities: -c/c/e       LABS  12-    139  |  102  |  10  ----------------------------<  110<H>  3.6   |  24  |  0.76    Ca    8.7      16 Dec 2022 06:07  Phos  4.2     12-16  Mg     1.7     12-16    TPro  6.8  /  Alb  3.9  /  TBili  0.7  /  DBili  x   /  AST  36  /  ALT  49<H>  /  AlkPhos  53  12-15                                 14.3   6.75  )-----------( 212      ( 16 Dec 2022 06:07 )             42.0          PT/INR - ( 15 Dec 2022 06:55 )   PT: 14.2 sec;   INR: 1.22 ratio         PTT - ( 16 Dec 2022 06:07 )  PTT:71.9 sec       PAST MEDICAL & SURGICAL HISTORY:  No pertinent past medical history      Hypertension      Diabetes mellitus      GERD (gastroesophageal reflux disease)      No significant past surgical history      No significant past surgical history

## 2022-12-16 NOTE — PROGRESS NOTE ADULT - SUBJECTIVE AND OBJECTIVE BOX
Chief complaint    Patient is a 52y old  Male who presents with a chief complaint of CAD NSTEMI (16 Dec 2022 10:37)   Review of systems  Patient in bed, appears comfortable.    Labs and Fingersticks  CAPILLARY BLOOD GLUCOSE      POCT Blood Glucose.: 100 mg/dL (16 Dec 2022 11:21)  POCT Blood Glucose.: 103 mg/dL (16 Dec 2022 07:31)  POCT Blood Glucose.: 190 mg/dL (15 Dec 2022 22:00)  POCT Blood Glucose.: 111 mg/dL (15 Dec 2022 16:27)      Anion Gap, Serum: 13 (12-16 @ 06:07)  Anion Gap, Serum: 13 (12-15 @ 06:55)      Calcium, Total Serum: 8.7 (12-16 @ 06:07)  Calcium, Total Serum: 9.2 (12-15 @ 06:55)  Albumin, Serum: 3.9 (12-15 @ 06:55)    Alanine Aminotransferase (ALT/SGPT): 49 *H* (12-15 @ 06:55)  Alkaline Phosphatase, Serum: 53 (12-15 @ 06:55)  Aspartate Aminotransferase (AST/SGOT): 36 (12-15 @ 06:55)        12-16    139  |  102  |  10  ----------------------------<  110<H>  3.6   |  24  |  0.76    Ca    8.7      16 Dec 2022 06:07  Phos  4.2     12-16  Mg     1.7     12-16    TPro  6.8  /  Alb  3.9  /  TBili  0.7  /  DBili  x   /  AST  36  /  ALT  49<H>  /  AlkPhos  53  12-15                        14.3   6.75  )-----------( 212      ( 16 Dec 2022 06:07 )             42.0     Medications  MEDICATIONS  (STANDING):  aspirin  chewable 81 milliGRAM(s) Oral daily  atorvastatin 80 milliGRAM(s) Oral at bedtime  chlorhexidine 4% Liquid 1 Application(s) Topical <User Schedule>  dextrose 50% Injectable 25 Gram(s) IV Push once  dextrose 50% Injectable 12.5 Gram(s) IV Push once  dextrose 50% Injectable 25 Gram(s) IV Push once  dextrose Oral Gel 15 Gram(s) Oral once  glucagon  Injectable 1 milliGRAM(s) IntraMuscular once  heparin  Infusion 1100 Unit(s)/Hr (11 mL/Hr) IV Continuous <Continuous>  insulin glargine Injectable (LANTUS) 20 Unit(s) SubCutaneous at bedtime  insulin lispro (ADMELOG) corrective regimen sliding scale   SubCutaneous at bedtime  insulin lispro (ADMELOG) corrective regimen sliding scale   SubCutaneous three times a day before meals  insulin lispro Injectable (ADMELOG) 10 Unit(s) SubCutaneous three times a day before meals  metoprolol tartrate 50 milliGRAM(s) Oral every 8 hours  nicotine -  14 mG/24Hr(s) Patch 1 Patch Transdermal daily  pantoprazole    Tablet 40 milliGRAM(s) Oral before breakfast  tamsulosin 0.4 milliGRAM(s) Oral at bedtime      Physical Exam  General: Patient appears comfortable.  Vital Signs Last 12 Hrs  T(F): 99 (12-16-22 @ 12:08), Max: 99 (12-16-22 @ 12:08)  HR: 85 (12-16-22 @ 12:08) (77 - 85)  BP: 104/72 (12-16-22 @ 12:08) (104/72 - 151/72)  BP(mean): 98 (12-16-22 @ 04:36) (98 - 98)  RR: 18 (12-16-22 @ 12:08) (18 - 18)  SpO2: 97% (12-16-22 @ 12:08) (97% - 100%)  Neck: No palpable thyroid nodules.  CVS: S1S2, No murmurs  Respiratory: No wheezing, no crepitations  GI: Abdomen soft, non tender.  Musculoskeletal:  edema lower extremities.     Diagnostics

## 2022-12-16 NOTE — PROGRESS NOTE ADULT - ASSESSMENT
This is a 53 y/o male, w/ PMHx HTN, T2DM, GERD who is transferred from OSH for ACS evaluation/management. Per patient he got woken up from his sleep with crushing chest pain that radiated to his left side w/ associated numbness and tinging and 2 bouts of emesis.  He thought it was "acid reflux" and was recently seeing a gastroenterologist, prescribed omeprazole.  Somewhat compliant with taking his meds.  Denies previous MIs, cardiac surgeries or hospitalizations He endorses smoking 5 cig/day for 36 years. 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 70% occlusion of pLAD 80% occlusion of pDaig, 100% occlusion of Lcx, 80% occlusion of pOM, 70% occlusion of pRPL. Patient admitted to CCU for management.    12/12 Tx 2 [facility] for further management  Continue Hep gtt CAD Cont ASA statin betablocker CAD CABG next week w/ Dr Hedrick   Called Endocrine consult hyperglycemia  12/13 c/w Hep gtt, preop w/u in progress, f/u carotids, trend P2Y12  12/14 Carotids neg.  P2Y12 152.  Repeat tomorrow or Friday.  Pre op for next week.  Hep gtt inc'd from 9 units to 11 units for ptt 40.     12/15 VSS SR OR Mon 12/19 12/16 Continue with heparin gtt, Betablocker and statin.  Glucose well controlled. This is a 53 y/o male, w/ PMHx HTN, T2DM, GERD who is transferred from OSH for ACS evaluation/management. Per patient he got woken up from his sleep with crushing chest pain that radiated to his left side w/ associated numbness and tinging and 2 bouts of emesis.  He thought it was "acid reflux" and was recently seeing a gastroenterologist, prescribed omeprazole.  Somewhat compliant with taking his meds.  Denies previous MIs, cardiac surgeries or hospitalizations He endorses smoking 5 cig/day for 36 years. 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 70% occlusion of pLAD 80% occlusion of pDaig, 100% occlusion of Lcx, 80% occlusion of pOM, 70% occlusion of pRPL. Patient admitted to CCU for management.    12/12 Tx 2 [facility] for further management  Continue Hep gtt CAD Cont ASA statin betablocker CAD CABG next week w/ Dr Hedrick   Called Endocrine consult hyperglycemia  12/13 c/w Hep gtt, preop w/u in progress, f/u carotids, trend P2Y12  12/14 Carotids neg.  P2Y12 152.  Repeat tomorrow or Friday.  Pre op for next week.  Hep gtt inc'd from 9 units to 11 units for ptt 40.     12/15 VSS SR OR Mon 12/19 12/16 Continue with heparin gtt, Betablocker and statin.  Glucose well controlled. Replete potassium

## 2022-12-17 LAB
ANION GAP SERPL CALC-SCNC: 12 MMOL/L — SIGNIFICANT CHANGE UP (ref 5–17)
APTT BLD: 70.5 SEC — HIGH (ref 27.5–35.5)
BUN SERPL-MCNC: 11 MG/DL — SIGNIFICANT CHANGE UP (ref 7–23)
CALCIUM SERPL-MCNC: 9.1 MG/DL — SIGNIFICANT CHANGE UP (ref 8.4–10.5)
CHLORIDE SERPL-SCNC: 103 MMOL/L — SIGNIFICANT CHANGE UP (ref 96–108)
CO2 SERPL-SCNC: 23 MMOL/L — SIGNIFICANT CHANGE UP (ref 22–31)
CREAT SERPL-MCNC: 0.79 MG/DL — SIGNIFICANT CHANGE UP (ref 0.5–1.3)
EGFR: 108 ML/MIN/1.73M2 — SIGNIFICANT CHANGE UP
GLUCOSE BLDC GLUCOMTR-MCNC: 114 MG/DL — HIGH (ref 70–99)
GLUCOSE BLDC GLUCOMTR-MCNC: 161 MG/DL — HIGH (ref 70–99)
GLUCOSE BLDC GLUCOMTR-MCNC: 169 MG/DL — HIGH (ref 70–99)
GLUCOSE BLDC GLUCOMTR-MCNC: 195 MG/DL — HIGH (ref 70–99)
GLUCOSE SERPL-MCNC: 107 MG/DL — HIGH (ref 70–99)
HCT VFR BLD CALC: 41.6 % — SIGNIFICANT CHANGE UP (ref 39–50)
HGB BLD-MCNC: 13.9 G/DL — SIGNIFICANT CHANGE UP (ref 13–17)
MCHC RBC-ENTMCNC: 29.2 PG — SIGNIFICANT CHANGE UP (ref 27–34)
MCHC RBC-ENTMCNC: 33.4 GM/DL — SIGNIFICANT CHANGE UP (ref 32–36)
MCV RBC AUTO: 87.4 FL — SIGNIFICANT CHANGE UP (ref 80–100)
NRBC # BLD: 0 /100 WBCS — SIGNIFICANT CHANGE UP (ref 0–0)
PLATELET # BLD AUTO: 228 K/UL — SIGNIFICANT CHANGE UP (ref 150–400)
POTASSIUM SERPL-MCNC: 3.6 MMOL/L — SIGNIFICANT CHANGE UP (ref 3.5–5.3)
POTASSIUM SERPL-SCNC: 3.6 MMOL/L — SIGNIFICANT CHANGE UP (ref 3.5–5.3)
RBC # BLD: 4.76 M/UL — SIGNIFICANT CHANGE UP (ref 4.2–5.8)
RBC # FLD: 12.2 % — SIGNIFICANT CHANGE UP (ref 10.3–14.5)
SODIUM SERPL-SCNC: 138 MMOL/L — SIGNIFICANT CHANGE UP (ref 135–145)
WBC # BLD: 7.73 K/UL — SIGNIFICANT CHANGE UP (ref 3.8–10.5)
WBC # FLD AUTO: 7.73 K/UL — SIGNIFICANT CHANGE UP (ref 3.8–10.5)

## 2022-12-17 PROCEDURE — 99232 SBSQ HOSP IP/OBS MODERATE 35: CPT

## 2022-12-17 RX ADMIN — Medication 50 MILLIGRAM(S): at 05:13

## 2022-12-17 RX ADMIN — INSULIN GLARGINE 20 UNIT(S): 100 INJECTION, SOLUTION SUBCUTANEOUS at 21:55

## 2022-12-17 RX ADMIN — Medication 50 MILLIGRAM(S): at 21:56

## 2022-12-17 RX ADMIN — HEPARIN SODIUM 11 UNIT(S)/HR: 5000 INJECTION INTRAVENOUS; SUBCUTANEOUS at 07:00

## 2022-12-17 RX ADMIN — Medication 10 UNIT(S): at 16:27

## 2022-12-17 RX ADMIN — Medication 81 MILLIGRAM(S): at 11:36

## 2022-12-17 RX ADMIN — Medication 10 UNIT(S): at 08:05

## 2022-12-17 RX ADMIN — Medication 50 MILLIGRAM(S): at 13:35

## 2022-12-17 RX ADMIN — Medication 1 PATCH: at 07:00

## 2022-12-17 RX ADMIN — PANTOPRAZOLE SODIUM 40 MILLIGRAM(S): 20 TABLET, DELAYED RELEASE ORAL at 05:13

## 2022-12-17 RX ADMIN — Medication 2: at 11:35

## 2022-12-17 RX ADMIN — ATORVASTATIN CALCIUM 80 MILLIGRAM(S): 80 TABLET, FILM COATED ORAL at 21:56

## 2022-12-17 RX ADMIN — Medication 10 UNIT(S): at 11:35

## 2022-12-17 RX ADMIN — Medication 1 PATCH: at 07:30

## 2022-12-17 RX ADMIN — TAMSULOSIN HYDROCHLORIDE 0.4 MILLIGRAM(S): 0.4 CAPSULE ORAL at 21:56

## 2022-12-17 RX ADMIN — Medication 2: at 16:28

## 2022-12-17 NOTE — PROGRESS NOTE ADULT - SUBJECTIVE AND OBJECTIVE BOX
Chief complaint    Patient is a 52y old  Male who presents with a chief complaint of CAD NSTEMI (16 Dec 2022 10:37)   Review of systems  Patient in bed, appears comfortable.    Labs and Fingersticks  CAPILLARY BLOOD GLUCOSE      POCT Blood Glucose.: 114 mg/dL (17 Dec 2022 07:33)  POCT Blood Glucose.: 178 mg/dL (16 Dec 2022 21:50)  POCT Blood Glucose.: 201 mg/dL (16 Dec 2022 16:26)  POCT Blood Glucose.: 100 mg/dL (16 Dec 2022 11:21)      Anion Gap, Serum: 12 (12-17 @ 06:43)  Anion Gap, Serum: 13 (12-16 @ 06:07)      Calcium, Total Serum: 9.1 (12-17 @ 06:43)  Calcium, Total Serum: 8.7 (12-16 @ 06:07)          12-17    138  |  103  |  11  ----------------------------<  107<H>  3.6   |  23  |  0.79    Ca    9.1      17 Dec 2022 06:43  Phos  4.2     12-16  Mg     1.7     12-16                          13.9   7.73  )-----------( 228      ( 17 Dec 2022 06:43 )             41.6     Medications  MEDICATIONS  (STANDING):  aspirin  chewable 81 milliGRAM(s) Oral daily  atorvastatin 80 milliGRAM(s) Oral at bedtime  chlorhexidine 4% Liquid 1 Application(s) Topical <User Schedule>  dextrose 50% Injectable 25 Gram(s) IV Push once  dextrose 50% Injectable 12.5 Gram(s) IV Push once  dextrose 50% Injectable 25 Gram(s) IV Push once  dextrose Oral Gel 15 Gram(s) Oral once  glucagon  Injectable 1 milliGRAM(s) IntraMuscular once  heparin  Infusion 1100 Unit(s)/Hr (11 mL/Hr) IV Continuous <Continuous>  insulin glargine Injectable (LANTUS) 20 Unit(s) SubCutaneous at bedtime  insulin lispro (ADMELOG) corrective regimen sliding scale   SubCutaneous at bedtime  insulin lispro (ADMELOG) corrective regimen sliding scale   SubCutaneous three times a day before meals  insulin lispro Injectable (ADMELOG) 10 Unit(s) SubCutaneous three times a day before meals  metoprolol tartrate 50 milliGRAM(s) Oral every 8 hours  nicotine -  14 mG/24Hr(s) Patch 1 Patch Transdermal daily  pantoprazole    Tablet 40 milliGRAM(s) Oral before breakfast  tamsulosin 0.4 milliGRAM(s) Oral at bedtime      Physical Exam  General: Patient appears comfortable.  Vital Signs Last 12 Hrs  T(F): 98.4 (12-17-22 @ 04:23), Max: 98.4 (12-17-22 @ 04:23)  HR: 87 (12-17-22 @ 04:23) (87 - 91)  BP: 110/74 (12-17-22 @ 04:23) (110/74 - 117/80)  BP(mean): --  RR: 18 (12-17-22 @ 04:23) (18 - 18)  SpO2: 98% (12-17-22 @ 04:23) (98% - 98%)  Neck: No palpable thyroid nodules.  CVS: S1S2, No murmurs  Respiratory: No wheezing, no crepitations  GI: Abdomen soft, non tender.  Musculoskeletal:  edema lower extremities.     Diagnostics

## 2022-12-17 NOTE — PROGRESS NOTE ADULT - ASSESSMENT
This is a 51 y/o male, w/ PMHx HTN, T2DM, GERD who is transferred from OSH for ACS evaluation/management. Per patient he got woken up from his sleep with crushing chest pain that radiated to his left side w/ associated numbness and tinging and 2 bouts of emesis.  He thought it was "acid reflux" and was recently seeing a gastroenterologist, prescribed omeprazole.  Somewhat compliant with taking his meds.  Denies previous MIs, cardiac surgeries or hospitalizations He endorses smoking 5 cig/day for 36 years. 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 70% occlusion of pLAD 80% occlusion of pDaig, 100% occlusion of Lcx, 80% occlusion of pOM, 70% occlusion of pRPL. Patient admitted to CCU for management.    12/12 Tx 2 [place] for further management  Continue Hep gtt CAD Cont ASA statin betablocker CAD CABG next week w/ Dr Hedrick   Called Endocrine consult hyperglycemia  12/13 c/w Hep gtt, preop w/u in progress, f/u carotids, trend P2Y12  12/14 Carotids neg.  P2Y12 152.  Repeat tomorrow or Friday.  Pre op for next week.  Hep gtt inc'd from 9 units to 11 units for ptt 40.     12/15 VSS SR OR Mon 12/19 12/16 Continue with heparin gtt, Betablocker and statin.  Glucose well controlled. Replete potassium   12/17 VSS Carotid doppler negative for stenosis . Nutrition consulted for diabetic education.

## 2022-12-17 NOTE — PROGRESS NOTE ADULT - SUBJECTIVE AND OBJECTIVE BOX
VITAL SIGNS    Telemetry:  SR 80-90  Vital Signs Last 24 Hrs  T(C): 36.9 (22 @ 04:23), Max: 36.9 (22 @ 19:54)  T(F): 98.4 (22 @ 04:23), Max: 98.4 (22 @ 19:54)  HR: 87 (22 @ 04:23) (82 - 91)  BP: 110/74 (22 @ 04:23) (110/74 - 117/80)  RR: 18 (22 @ 04:23) (18 - 18)  SpO2: 98% (22 @ 04:23) (98% - 99%)             @ 07:01  -   @ 07:00  --------------------------------------------------------  IN: 577 mL / OUT: 400 mL / NET: 177 mL     @ 07:01  -   @ 13:04  --------------------------------------------------------  IN: 0 mL / OUT: 450 mL / NET: -450 mL       Daily     Daily Weight in k.3 (17 Dec 2022 08:38)  Admit Wt: Drug Dosing Weight  Height (cm): 175.3 (10 Dec 2022 12:20)  Weight (kg): 66.9 (10 Dec 2022 12:20)  BMI (kg/m2): 21.8 (10 Dec 2022 12:20)  BSA (m2): 1.82 (10 Dec 2022 12:20)      CAPILLARY BLOOD GLUCOSE      POCT Blood Glucose.: 195 mg/dL (17 Dec 2022 11:20)  POCT Blood Glucose.: 114 mg/dL (17 Dec 2022 07:33)  POCT Blood Glucose.: 178 mg/dL (16 Dec 2022 21:50)  POCT Blood Glucose.: 201 mg/dL (16 Dec 2022 16:26)          MEDICATIONS  acetaminophen     Tablet .. 650 milliGRAM(s) Oral every 6 hours PRN  aspirin  chewable 81 milliGRAM(s) Oral daily  atorvastatin 80 milliGRAM(s) Oral at bedtime  chlorhexidine 4% Liquid 1 Application(s) Topical <User Schedule>  dextrose 50% Injectable 25 Gram(s) IV Push once  dextrose 50% Injectable 12.5 Gram(s) IV Push once  dextrose 50% Injectable 25 Gram(s) IV Push once  dextrose Oral Gel 15 Gram(s) Oral once  glucagon  Injectable 1 milliGRAM(s) IntraMuscular once  heparin  Infusion 1100 Unit(s)/Hr IV Continuous <Continuous>  insulin glargine Injectable (LANTUS) 20 Unit(s) SubCutaneous at bedtime  insulin lispro (ADMELOG) corrective regimen sliding scale   SubCutaneous three times a day before meals  insulin lispro (ADMELOG) corrective regimen sliding scale   SubCutaneous at bedtime  insulin lispro Injectable (ADMELOG) 10 Unit(s) SubCutaneous three times a day before meals  metoprolol tartrate 50 milliGRAM(s) Oral every 8 hours  nicotine  Polacrilex Gum 2 milliGRAM(s) Oral every 2 hours PRN  nicotine -  14 mG/24Hr(s) Patch 1 Patch Transdermal daily  pantoprazole    Tablet 40 milliGRAM(s) Oral before breakfast  tamsulosin 0.4 milliGRAM(s) Oral at bedtime      >>> <<<  PHYSICAL EXAM  Subjective: NAD  Neurology: alert and oriented x 3, nonfocal, no gross deficits  CV : s1s2  Lungs:cta  Abdomen: soft, NT,ND, ( +)BM  :  voiding  Extremities:   -c/c/e    LABS      138  |  103  |  11  ----------------------------<  107<H>  3.6   |  23  |  0.79    Ca    9.1      17 Dec 2022 06:43  Phos  4.2     12-16  Mg     1.7     12-16                                   13.9   7.73  )-----------( 228      ( 17 Dec 2022 06:43 )             41.6          PTT - ( 17 Dec 2022 06:43 )  PTT:70.5 sec       PAST MEDICAL & SURGICAL HISTORY:  No pertinent past medical history      Hypertension      Diabetes mellitus      GERD (gastroesophageal reflux disease)      No significant past surgical history      No significant past surgical history

## 2022-12-17 NOTE — PROGRESS NOTE ADULT - ASSESSMENT
Assessment  DMT2: 52y Male with DM T2 with hyperglycemia admitted with chest pain, A1C is 8.2%  patient was on oral hypoglycemic agents at home, on basal bolus insulin coverage, blood sugarsimproving, no hypoglycemic episode,  eating meals.  Patient is high risk with high level decision making due to uncontrolled diabetes, has increased risk for complications. Tight sugar control is not recommended for this patient.  CAD: Planing CABG, on medications, monitored, asymptomatic.  HTN: On antihypertensive medications, monitored, asymptomatic.  HLD: On statin, compliant.            Judith Sanders MD  Cell:  [phone]  Office: [phone]

## 2022-12-18 ENCOUNTER — TRANSCRIPTION ENCOUNTER (OUTPATIENT)
Age: 52
End: 2022-12-18

## 2022-12-18 LAB
ANION GAP SERPL CALC-SCNC: 13 MMOL/L — SIGNIFICANT CHANGE UP (ref 5–17)
APTT BLD: 72.8 SEC — HIGH (ref 27.5–35.5)
BLD GP AB SCN SERPL QL: NEGATIVE — SIGNIFICANT CHANGE UP
BUN SERPL-MCNC: 13 MG/DL — SIGNIFICANT CHANGE UP (ref 7–23)
CALCIUM SERPL-MCNC: 9.4 MG/DL — SIGNIFICANT CHANGE UP (ref 8.4–10.5)
CHLORIDE SERPL-SCNC: 100 MMOL/L — SIGNIFICANT CHANGE UP (ref 96–108)
CO2 SERPL-SCNC: 25 MMOL/L — SIGNIFICANT CHANGE UP (ref 22–31)
CREAT SERPL-MCNC: 0.91 MG/DL — SIGNIFICANT CHANGE UP (ref 0.5–1.3)
EGFR: 102 ML/MIN/1.73M2 — SIGNIFICANT CHANGE UP
GLUCOSE BLDC GLUCOMTR-MCNC: 122 MG/DL — HIGH (ref 70–99)
GLUCOSE BLDC GLUCOMTR-MCNC: 189 MG/DL — HIGH (ref 70–99)
GLUCOSE BLDC GLUCOMTR-MCNC: 191 MG/DL — HIGH (ref 70–99)
GLUCOSE BLDC GLUCOMTR-MCNC: 196 MG/DL — HIGH (ref 70–99)
GLUCOSE SERPL-MCNC: 121 MG/DL — HIGH (ref 70–99)
HCT VFR BLD CALC: 43.3 % — SIGNIFICANT CHANGE UP (ref 39–50)
HGB BLD-MCNC: 14.4 G/DL — SIGNIFICANT CHANGE UP (ref 13–17)
MCHC RBC-ENTMCNC: 29.1 PG — SIGNIFICANT CHANGE UP (ref 27–34)
MCHC RBC-ENTMCNC: 33.3 GM/DL — SIGNIFICANT CHANGE UP (ref 32–36)
MCV RBC AUTO: 87.5 FL — SIGNIFICANT CHANGE UP (ref 80–100)
NRBC # BLD: 0 /100 WBCS — SIGNIFICANT CHANGE UP (ref 0–0)
PLATELET # BLD AUTO: 233 K/UL — SIGNIFICANT CHANGE UP (ref 150–400)
POTASSIUM SERPL-MCNC: 4 MMOL/L — SIGNIFICANT CHANGE UP (ref 3.5–5.3)
POTASSIUM SERPL-SCNC: 4 MMOL/L — SIGNIFICANT CHANGE UP (ref 3.5–5.3)
RBC # BLD: 4.95 M/UL — SIGNIFICANT CHANGE UP (ref 4.2–5.8)
RBC # FLD: 12.5 % — SIGNIFICANT CHANGE UP (ref 10.3–14.5)
RH IG SCN BLD-IMP: POSITIVE — SIGNIFICANT CHANGE UP
SARS-COV-2 RNA SPEC QL NAA+PROBE: SIGNIFICANT CHANGE UP
SODIUM SERPL-SCNC: 138 MMOL/L — SIGNIFICANT CHANGE UP (ref 135–145)
WBC # BLD: 7.4 K/UL — SIGNIFICANT CHANGE UP (ref 3.8–10.5)
WBC # FLD AUTO: 7.4 K/UL — SIGNIFICANT CHANGE UP (ref 3.8–10.5)

## 2022-12-18 PROCEDURE — 99232 SBSQ HOSP IP/OBS MODERATE 35: CPT

## 2022-12-18 RX ORDER — ASCORBIC ACID 60 MG
2000 TABLET,CHEWABLE ORAL ONCE
Refills: 0 | Status: COMPLETED | OUTPATIENT
Start: 2022-12-18 | End: 2022-12-18

## 2022-12-18 RX ORDER — CHLORHEXIDINE GLUCONATE 213 G/1000ML
1 SOLUTION TOPICAL ONCE
Refills: 0 | Status: COMPLETED | OUTPATIENT
Start: 2022-12-18 | End: 2022-12-18

## 2022-12-18 RX ORDER — CEFUROXIME AXETIL 250 MG
1500 TABLET ORAL ONCE
Refills: 0 | Status: DISCONTINUED | OUTPATIENT
Start: 2022-12-18 | End: 2022-12-19

## 2022-12-18 RX ORDER — MUPIROCIN 20 MG/G
1 OINTMENT TOPICAL
Refills: 0 | Status: DISCONTINUED | OUTPATIENT
Start: 2022-12-18 | End: 2022-12-19

## 2022-12-18 RX ORDER — CHLORHEXIDINE GLUCONATE 213 G/1000ML
30 SOLUTION TOPICAL ONCE
Refills: 0 | Status: COMPLETED | OUTPATIENT
Start: 2022-12-18 | End: 2022-12-19

## 2022-12-18 RX ORDER — GABAPENTIN 400 MG/1
300 CAPSULE ORAL ONCE
Refills: 0 | Status: COMPLETED | OUTPATIENT
Start: 2022-12-18 | End: 2022-12-19

## 2022-12-18 RX ORDER — ACETAMINOPHEN 500 MG
1000 TABLET ORAL ONCE
Refills: 0 | Status: COMPLETED | OUTPATIENT
Start: 2022-12-18 | End: 2022-12-19

## 2022-12-18 RX ADMIN — Medication 50 MILLIGRAM(S): at 05:00

## 2022-12-18 RX ADMIN — INSULIN GLARGINE 20 UNIT(S): 100 INJECTION, SOLUTION SUBCUTANEOUS at 21:58

## 2022-12-18 RX ADMIN — MUPIROCIN 1 APPLICATION(S): 20 OINTMENT TOPICAL at 16:59

## 2022-12-18 RX ADMIN — ATORVASTATIN CALCIUM 80 MILLIGRAM(S): 80 TABLET, FILM COATED ORAL at 21:59

## 2022-12-18 RX ADMIN — PANTOPRAZOLE SODIUM 40 MILLIGRAM(S): 20 TABLET, DELAYED RELEASE ORAL at 05:00

## 2022-12-18 RX ADMIN — CHLORHEXIDINE GLUCONATE 1 APPLICATION(S): 213 SOLUTION TOPICAL at 20:01

## 2022-12-18 RX ADMIN — Medication 50 MILLIGRAM(S): at 13:08

## 2022-12-18 RX ADMIN — Medication 81 MILLIGRAM(S): at 11:32

## 2022-12-18 RX ADMIN — Medication 50 MILLIGRAM(S): at 21:59

## 2022-12-18 RX ADMIN — Medication 2000 MILLIGRAM(S): at 21:58

## 2022-12-18 RX ADMIN — Medication 10 UNIT(S): at 16:59

## 2022-12-18 RX ADMIN — Medication 2: at 11:33

## 2022-12-18 RX ADMIN — HEPARIN SODIUM 11 UNIT(S)/HR: 5000 INJECTION INTRAVENOUS; SUBCUTANEOUS at 07:00

## 2022-12-18 RX ADMIN — Medication 2: at 16:59

## 2022-12-18 RX ADMIN — Medication 10 UNIT(S): at 08:05

## 2022-12-18 RX ADMIN — Medication 10 UNIT(S): at 11:32

## 2022-12-18 RX ADMIN — TAMSULOSIN HYDROCHLORIDE 0.4 MILLIGRAM(S): 0.4 CAPSULE ORAL at 21:59

## 2022-12-18 NOTE — PROGRESS NOTE ADULT - SUBJECTIVE AND OBJECTIVE BOX
Cardiac Surgery Pre-op Note:    CC: Patient is a 52y old  Male who presents with a chief complaint of CAD (17 Dec 2022 13:04)      Referring Physician:                                                                                                           Surgeon: Dr Hedrick    Procedure: (Date) (Procedure)    Allergies    No Known Allergies    Intolerances        HPI:  52M w/ HTN, T2DM, GERD who is transferred from OS for ACS evaluation/management. Per patient yesterday at 3 am he was woken up from his sleep for crushing chest pain that radiated to his left side w/ associated numbness and tinging. He endorses sweats, and small bouts of emesis x2. He states he has never experienced pain like this in the past, however it has resolved. He has been recently seeing a gastroenterology for worsening reflux over to past 4-6 months for which he was taking omeprazole. he states he misses his medications up to 2-3x weekly. He denies previous MIs, cardiac surgeries or hospitalizations He endorses smoking 5 cig/day for 36 years. 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 70% occlusion of pLAD 80% occlusion of pDaig, 100% occlusion of Lcx, 80% occlusion of pOM, 70% occlusion of pRPL. Patient admitted to CCU for management  (10 Dec 2022 12:55)      PAST MEDICAL & SURGICAL HISTORY:  No pertinent past medical history      Hypertension      Diabetes mellitus      GERD (gastroesophageal reflux disease)      No significant past surgical history      No significant past surgical history          MEDICATIONS  (STANDING):  aspirin  chewable 81 milliGRAM(s) Oral daily  atorvastatin 80 milliGRAM(s) Oral at bedtime  chlorhexidine 4% Liquid 1 Application(s) Topical <User Schedule>  dextrose 50% Injectable 25 Gram(s) IV Push once  dextrose 50% Injectable 12.5 Gram(s) IV Push once  dextrose 50% Injectable 25 Gram(s) IV Push once  dextrose Oral Gel 15 Gram(s) Oral once  glucagon  Injectable 1 milliGRAM(s) IntraMuscular once  heparin  Infusion 1100 Unit(s)/Hr (11 mL/Hr) IV Continuous <Continuous>  insulin glargine Injectable (LANTUS) 20 Unit(s) SubCutaneous at bedtime  insulin lispro (ADMELOG) corrective regimen sliding scale   SubCutaneous three times a day before meals  insulin lispro (ADMELOG) corrective regimen sliding scale   SubCutaneous at bedtime  insulin lispro Injectable (ADMELOG) 10 Unit(s) SubCutaneous three times a day before meals  metoprolol tartrate 50 milliGRAM(s) Oral every 8 hours  nicotine -  14 mG/24Hr(s) Patch 1 Patch Transdermal daily  pantoprazole    Tablet 40 milliGRAM(s) Oral before breakfast  tamsulosin 0.4 milliGRAM(s) Oral at bedtime    MEDICATIONS  (PRN):  acetaminophen     Tablet .. 650 milliGRAM(s) Oral every 6 hours PRN Temp greater or equal to 38C (100.4F)  nicotine  Polacrilex Gum 2 milliGRAM(s) Oral every 2 hours PRN nicotine cravings        Labs:                        14.4   7.40  )-----------( 233      ( 18 Dec 2022 06:01 )             43.3     12-18    138  |  100  |  13  ----------------------------<  121<H>  4.0   |  25  |  0.91    Ca    9.4      18 Dec 2022 06:01      PTT - ( 18 Dec 2022 06:01 )  PTT:72.8 sec    Blood Type: ABO Interpretation: A (12-18 @ 05:48)    HGB A1C: 8.8  Prealbumin:   Pro-BNP:   Thyroid Panel: Thyroid Stimulating Hormone, Serum: 0.38 uIU/mL   MRSA: MRSA PCR Result.: NotDetec (12-13 @ 12:22)   / MSSA:       CXR: < from: Xray Chest 1 View- PORTABLE-Urgent (12.10.22 @ 08:57) >  FINDINGS:  LUNGS: The lungs are clear.  PLEURA: No pleural effusion or pneumothorax.  HEART AND MEDIASTINUM: Heart size is within normal limits.  SKELETON: There is no acute osseus abnormality,.    IMPRESSION:  Clear lungs.    < end of copied text >      EKG: < from: 12 Lead ECG (12.12.22 @ 05:33) >  Systolic  mmHg    Diastolic BP 81 mmHg    Ventricular Rate 104 BPM    Atrial Rate 104 BPM    P-R Interval 172 ms    QRS Duration 84 ms    Q-T Interval 340 ms    QTC Calculation(Bazett) 447 ms    P Axis 63 degrees    R Axis -34 degrees    T Axis 64 degrees    Diagnosis Line SINUS TACHYCARDIA  LEFT AXIS DEVIATION  INFERIOR INFARCT , AGE UNDETERMINED  ANTERIOR INFARCT , AGE UNDETERMINED  ABNORMAL ECG    < end of copied text >      Carotid Duplex:  < from: VA Duplex Carotid, Bilat (12.13.22 @ 14:21) >  RIGHT:  PROX CCA = 75  DIST CCA = 56  PROX ICA = 67  DIST ICA = 49  ECA = 135    LEFT:  PROX CCA = 79  DIST CCA = 73  PROX ICA = 62  DIST ICA = 50  ECA = 148    Antegrade flow is noted within both vertebral arteries.    IMPRESSION: No significant hemodynamic stenosis of either carotid artery.    Measurement of carotid stenosis is based on velocity parameters that   correlate the residual internal carotid diameter with that of the more   distal vessel in accordance with a method such as the [place] American   Symptomatic Carotid Endarterectomy Trial (NASCET).    < end of copied text >      PFT's:    Echocardiogram: < from: Transthoracic Echocardiogram (12.10.22 @ 14:53) >  ------------------------------------------------------------------------  Dimensions:    Normal Values:  LA:     3.7    2.0 - 4.0 cm  Ao:     3.2    2.0 - 3.8 cm  SEPTUM: 0.8    0.6 - 1.2 cm  PWT:    0.8    0.6 - 1.1 cm  LVIDd:  4.3    3.0 - 5.6 cm  LVIDs:  2.5    1.8 - 4.0 cm  Derived variables:  LVMI: 58 g/m2  RWT: 0.37  Fractional short: 42 %  EF (Visual Estimate): 45-50 %  ------------------------------------------------------------------------  Observations:  Mitral Valve: Normal mitral valve. Minimal mitral  regurgitation.  Aortic Valve/Aorta: Aortic valve leaflet morphology not  well visualized.  Normal aortic root size. (Ao: 3.2 cm at the sinuses of  Valsalva).  Left Atrium: Normal left atrium.  LA volume index = 20  cc/m2.  Left Ventricle: Endocardium not well visualized; grossly  mild segmental left ventricular systolic dysfunction.  Hypokinesis of the distal septum and anteroapex. Normal  left ventricular internal dimensions and wall thicknesses.  Mild diastolic dysfunction (Stage I).  Right Heart: Normal right atrium. Normal right ventricular  size and function. Normal tricuspid valve. Minimal  tricuspid regurgitation. Normal pulmonic valve.  Pericardium/Pleura: Normal pericardium with no pericardial  effusion.  Hemodynamic: Estimated right atrial pressure is 8 mm Hg.  ------------------------------------------------------------------------  Conclusions:  1. Normal mitral valve. Minimal mitral regurgitation.  2. Normal left ventricular internal dimensions and wall  thicknesses.  3. Endocardium not well visualized; grossly mild segmental  left ventricular systolic dysfunction.  Hypokinesis of the  distal septum and anteroapex.  4. Mild diastolic dysfunction (Stage I).  5. Normal right ventricular size and function.    < end of copied text >      Cardiac catheterization: Triple Vessel disease  - 70% occlusion of pLAD 80% occlusion of pDaig, 100% occlusion of Lcx, 80% occlusion of pOM, 70% occlusion of pRPL     Vein Mapping:    Gen: WN/WD NAD  Neuro: AAOx3, nonfocal  Pulm: CTA B/L  CV: RRR, S1S2  Abd: Soft, NT, ND +BS  Ext: No edema, + peripheral pulses      Pt has AICD/PPM [ ] Yes  [ ] No             Brand Name:  Pre-op Beta Blocker ordered within 24 hrs of surgery (CABG ONLY)?  [ ] Yes  [ ] No  If not, Why?  Type & Cross  [ ] Yes  [ ] No  NPO after Midnight [ ] Yes  [ ] No  Pre-op ABX ordered, to be taped on chart:  [ ] Yes  [ ] No     Hibiclens/Peridex ordered [ ] Yes  [ ] No  Intraop on Hold: PRBCs, CXR, BECKY [ ]   Consent obtained  [ ] Yes  [ ] No

## 2022-12-18 NOTE — PROGRESS NOTE ADULT - SUBJECTIVE AND OBJECTIVE BOX
Chief complaint    Patient is a 52y old  Male who presents with a chief complaint of CAD (17 Dec 2022 13:04)   Review of systems  Patient in bed, appears comfortable.    Labs and Fingersticks  CAPILLARY BLOOD GLUCOSE      POCT Blood Glucose.: 122 mg/dL (18 Dec 2022 07:21)  POCT Blood Glucose.: 169 mg/dL (17 Dec 2022 21:34)  POCT Blood Glucose.: 161 mg/dL (17 Dec 2022 16:22)  POCT Blood Glucose.: 195 mg/dL (17 Dec 2022 11:20)      Anion Gap, Serum: 13 (12-18 @ 06:01)  Anion Gap, Serum: 12 (12-17 @ 06:43)      Calcium, Total Serum: 9.4 (12-18 @ 06:01)  Calcium, Total Serum: 9.1 (12-17 @ 06:43)          12-18    138  |  100  |  13  ----------------------------<  121<H>  4.0   |  25  |  0.91    Ca    9.4      18 Dec 2022 06:01                          14.4   7.40  )-----------( 233      ( 18 Dec 2022 06:01 )             43.3     Medications  MEDICATIONS  (STANDING):  aspirin  chewable 81 milliGRAM(s) Oral daily  atorvastatin 80 milliGRAM(s) Oral at bedtime  chlorhexidine 4% Liquid 1 Application(s) Topical <User Schedule>  dextrose 50% Injectable 25 Gram(s) IV Push once  dextrose 50% Injectable 12.5 Gram(s) IV Push once  dextrose 50% Injectable 25 Gram(s) IV Push once  dextrose Oral Gel 15 Gram(s) Oral once  glucagon  Injectable 1 milliGRAM(s) IntraMuscular once  heparin  Infusion 1100 Unit(s)/Hr (11 mL/Hr) IV Continuous <Continuous>  insulin glargine Injectable (LANTUS) 20 Unit(s) SubCutaneous at bedtime  insulin lispro (ADMELOG) corrective regimen sliding scale   SubCutaneous three times a day before meals  insulin lispro (ADMELOG) corrective regimen sliding scale   SubCutaneous at bedtime  insulin lispro Injectable (ADMELOG) 10 Unit(s) SubCutaneous three times a day before meals  metoprolol tartrate 50 milliGRAM(s) Oral every 8 hours  nicotine -  14 mG/24Hr(s) Patch 1 Patch Transdermal daily  pantoprazole    Tablet 40 milliGRAM(s) Oral before breakfast  tamsulosin 0.4 milliGRAM(s) Oral at bedtime      Physical Exam  General: Patient appears comfortable.  Vital Signs Last 12 Hrs  T(F): 97.9 (12-18-22 @ 04:42), Max: 97.9 (12-18-22 @ 04:42)  HR: 84 (12-18-22 @ 04:42) (79 - 84)  BP: 113/77 (12-18-22 @ 04:42) (113/77 - 116/79)  BP(mean): --  RR: 18 (12-18-22 @ 04:42) (18 - 18)  SpO2: 98% (12-18-22 @ 04:42) (98% - 98%)  Neck: No palpable thyroid nodules.  CVS: S1S2, No murmurs  Respiratory: No wheezing, no crepitations  GI: Abdomen soft, non tender.  Musculoskeletal:  edema lower extremities.     Diagnostics

## 2022-12-18 NOTE — PROGRESS NOTE ADULT - ASSESSMENT
Assessment  DMT2: 52y Male with DM T2 with hyperglycemia admitted with chest pain, A1C is 8.2%  patient was on oral hypoglycemic agents at home, on basal bolus insulin coverage, blood sugarsimproving, no hypoglycemic episode,  eating meals.  Patient is high risk with high level decision making due to uncontrolled diabetes, has increased risk for complications. Tight sugar control is not recommended for this patient.  CAD: Planing CABG, on medications, monitored, asymptomatic.  HTN: On antihypertensive medications, monitored, asymptomatic.  HLD: On statin, compliant.            Judith Sanders MD  Cell:  [phone]  Office: [phone]

## 2022-12-18 NOTE — PROGRESS NOTE ADULT - ASSESSMENT
52M w/ HTN, T2DM, GERD who is transferred from OSH for ACS evaluation/management. Cardiac cath demonstrates Triple Vessel disease- 70% occlusion of pLAD 80% occlusion of pDaig, 100% occlusion of Lcx, 80% occlusion of pOM, 70% occlusion of pRPL

## 2022-12-19 ENCOUNTER — APPOINTMENT (OUTPATIENT)
Dept: CARDIOTHORACIC SURGERY | Facility: HOSPITAL | Age: 52
End: 2022-12-19

## 2022-12-19 ENCOUNTER — TRANSCRIPTION ENCOUNTER (OUTPATIENT)
Age: 52
End: 2022-12-19

## 2022-12-19 LAB
ALBUMIN SERPL ELPH-MCNC: 3.2 G/DL — LOW (ref 3.3–5)
ALP SERPL-CCNC: 42 U/L — SIGNIFICANT CHANGE UP (ref 40–120)
ALT FLD-CCNC: 33 U/L — SIGNIFICANT CHANGE UP (ref 10–45)
ANION GAP SERPL CALC-SCNC: 12 MMOL/L — SIGNIFICANT CHANGE UP (ref 5–17)
ANION GAP SERPL CALC-SCNC: 9 MMOL/L — SIGNIFICANT CHANGE UP (ref 5–17)
APTT BLD: 32.8 SEC — SIGNIFICANT CHANGE UP (ref 27.5–35.5)
AST SERPL-CCNC: 40 U/L — SIGNIFICANT CHANGE UP (ref 10–40)
BASE EXCESS BLDV CALC-SCNC: -1 MMOL/L — SIGNIFICANT CHANGE UP (ref -2–3)
BASE EXCESS BLDV CALC-SCNC: -2.1 MMOL/L — LOW (ref -2–3)
BASE EXCESS BLDV CALC-SCNC: -4.3 MMOL/L — LOW (ref -2–3)
BASE EXCESS BLDV CALC-SCNC: 0.2 MMOL/L — SIGNIFICANT CHANGE UP (ref -2–3)
BASE EXCESS BLDV CALC-SCNC: 1.7 MMOL/L — SIGNIFICANT CHANGE UP (ref -2–3)
BASE EXCESS BLDV CALC-SCNC: 2.2 MMOL/L — SIGNIFICANT CHANGE UP (ref -2–3)
BASE EXCESS BLDV CALC-SCNC: 2.5 MMOL/L — SIGNIFICANT CHANGE UP (ref -2–3)
BASE EXCESS BLDV CALC-SCNC: 3.6 MMOL/L — HIGH (ref -2–3)
BASOPHILS # BLD AUTO: 0 K/UL — SIGNIFICANT CHANGE UP (ref 0–0.2)
BASOPHILS NFR BLD AUTO: 0 % — SIGNIFICANT CHANGE UP (ref 0–2)
BILIRUB SERPL-MCNC: 1 MG/DL — SIGNIFICANT CHANGE UP (ref 0.2–1.2)
BLOOD GAS VENOUS - CREATININE: SIGNIFICANT CHANGE UP MG/DL (ref 0.5–1.3)
BUN SERPL-MCNC: 14 MG/DL — SIGNIFICANT CHANGE UP (ref 7–23)
BUN SERPL-MCNC: 14 MG/DL — SIGNIFICANT CHANGE UP (ref 7–23)
CA-I SERPL-SCNC: 1.05 MMOL/L — LOW (ref 1.15–1.33)
CA-I SERPL-SCNC: 1.06 MMOL/L — LOW (ref 1.15–1.33)
CA-I SERPL-SCNC: 1.07 MMOL/L — LOW (ref 1.15–1.33)
CA-I SERPL-SCNC: 1.08 MMOL/L — LOW (ref 1.15–1.33)
CA-I SERPL-SCNC: 1.26 MMOL/L — SIGNIFICANT CHANGE UP (ref 1.15–1.33)
CA-I SERPL-SCNC: 1.36 MMOL/L — HIGH (ref 1.15–1.33)
CA-I SERPL-SCNC: 1.54 MMOL/L — HIGH (ref 1.15–1.33)
CA-I SERPL-SCNC: 1.77 MMOL/L — CRITICAL HIGH (ref 1.15–1.33)
CALCIUM SERPL-MCNC: 8.3 MG/DL — LOW (ref 8.4–10.5)
CALCIUM SERPL-MCNC: 8.5 MG/DL — SIGNIFICANT CHANGE UP (ref 8.4–10.5)
CHLORIDE BLDV-SCNC: 100 MMOL/L — SIGNIFICANT CHANGE UP (ref 96–108)
CHLORIDE BLDV-SCNC: 101 MMOL/L — SIGNIFICANT CHANGE UP (ref 96–108)
CHLORIDE BLDV-SCNC: 102 MMOL/L — SIGNIFICANT CHANGE UP (ref 96–108)
CHLORIDE BLDV-SCNC: 103 MMOL/L — SIGNIFICANT CHANGE UP (ref 96–108)
CHLORIDE BLDV-SCNC: 104 MMOL/L — SIGNIFICANT CHANGE UP (ref 96–108)
CHLORIDE BLDV-SCNC: 105 MMOL/L — SIGNIFICANT CHANGE UP (ref 96–108)
CHLORIDE BLDV-SCNC: 106 MMOL/L — SIGNIFICANT CHANGE UP (ref 96–108)
CHLORIDE BLDV-SCNC: 99 MMOL/L — SIGNIFICANT CHANGE UP (ref 96–108)
CHLORIDE SERPL-SCNC: 107 MMOL/L — SIGNIFICANT CHANGE UP (ref 96–108)
CHLORIDE SERPL-SCNC: 108 MMOL/L — SIGNIFICANT CHANGE UP (ref 96–108)
CK MB BLD-MCNC: 8.9 % — HIGH (ref 0–3.5)
CK MB CFR SERPL CALC: 22.8 NG/ML — HIGH (ref 0–6.7)
CK SERPL-CCNC: 255 U/L — HIGH (ref 30–200)
CO2 BLDV-SCNC: 23 MMOL/L — SIGNIFICANT CHANGE UP (ref 22–26)
CO2 BLDV-SCNC: 26 MMOL/L — SIGNIFICANT CHANGE UP (ref 22–26)
CO2 BLDV-SCNC: 27 MMOL/L — HIGH (ref 22–26)
CO2 BLDV-SCNC: 27 MMOL/L — HIGH (ref 22–26)
CO2 BLDV-SCNC: 28 MMOL/L — HIGH (ref 22–26)
CO2 BLDV-SCNC: 28 MMOL/L — HIGH (ref 22–26)
CO2 BLDV-SCNC: 30 MMOL/L — HIGH (ref 22–26)
CO2 BLDV-SCNC: 30 MMOL/L — HIGH (ref 22–26)
CO2 SERPL-SCNC: 22 MMOL/L — SIGNIFICANT CHANGE UP (ref 22–31)
CO2 SERPL-SCNC: 24 MMOL/L — SIGNIFICANT CHANGE UP (ref 22–31)
CREAT SERPL-MCNC: 0.76 MG/DL — SIGNIFICANT CHANGE UP (ref 0.5–1.3)
CREAT SERPL-MCNC: 0.76 MG/DL — SIGNIFICANT CHANGE UP (ref 0.5–1.3)
EGFR: 109 ML/MIN/1.73M2 — SIGNIFICANT CHANGE UP
EGFR: 109 ML/MIN/1.73M2 — SIGNIFICANT CHANGE UP
EOSINOPHIL # BLD AUTO: 0.12 K/UL — SIGNIFICANT CHANGE UP (ref 0–0.5)
EOSINOPHIL NFR BLD AUTO: 1 % — SIGNIFICANT CHANGE UP (ref 0–6)
FIBRINOGEN PPP-MCNC: 275 MG/DL — SIGNIFICANT CHANGE UP (ref 200–445)
GAS PNL BLDA: SIGNIFICANT CHANGE UP
GAS PNL BLDV: 132 MMOL/L — LOW (ref 136–145)
GAS PNL BLDV: 133 MMOL/L — LOW (ref 136–145)
GAS PNL BLDV: 135 MMOL/L — LOW (ref 136–145)
GAS PNL BLDV: 137 MMOL/L — SIGNIFICANT CHANGE UP (ref 136–145)
GAS PNL BLDV: 138 MMOL/L — SIGNIFICANT CHANGE UP (ref 136–145)
GAS PNL BLDV: SIGNIFICANT CHANGE UP
GLUCOSE BLDC GLUCOMTR-MCNC: 131 MG/DL — HIGH (ref 70–99)
GLUCOSE BLDC GLUCOMTR-MCNC: 143 MG/DL — HIGH (ref 70–99)
GLUCOSE BLDC GLUCOMTR-MCNC: 180 MG/DL — HIGH (ref 70–99)
GLUCOSE BLDC GLUCOMTR-MCNC: 199 MG/DL — HIGH (ref 70–99)
GLUCOSE BLDC GLUCOMTR-MCNC: 227 MG/DL — HIGH (ref 70–99)
GLUCOSE BLDV-MCNC: 101 MG/DL — HIGH (ref 70–99)
GLUCOSE BLDV-MCNC: 117 MG/DL — HIGH (ref 70–99)
GLUCOSE BLDV-MCNC: 140 MG/DL — HIGH (ref 70–99)
GLUCOSE BLDV-MCNC: 157 MG/DL — HIGH (ref 70–99)
GLUCOSE BLDV-MCNC: 162 MG/DL — HIGH (ref 70–99)
GLUCOSE BLDV-MCNC: 94 MG/DL — SIGNIFICANT CHANGE UP (ref 70–99)
GLUCOSE BLDV-MCNC: 95 MG/DL — SIGNIFICANT CHANGE UP (ref 70–99)
GLUCOSE BLDV-MCNC: 97 MG/DL — SIGNIFICANT CHANGE UP (ref 70–99)
GLUCOSE SERPL-MCNC: 119 MG/DL — HIGH (ref 70–99)
GLUCOSE SERPL-MCNC: 137 MG/DL — HIGH (ref 70–99)
HCO3 BLDV-SCNC: 22 MMOL/L — SIGNIFICANT CHANGE UP (ref 22–29)
HCO3 BLDV-SCNC: 25 MMOL/L — SIGNIFICANT CHANGE UP (ref 22–29)
HCO3 BLDV-SCNC: 25 MMOL/L — SIGNIFICANT CHANGE UP (ref 22–29)
HCO3 BLDV-SCNC: 26 MMOL/L — SIGNIFICANT CHANGE UP (ref 22–29)
HCO3 BLDV-SCNC: 28 MMOL/L — SIGNIFICANT CHANGE UP (ref 22–29)
HCO3 BLDV-SCNC: 29 MMOL/L — SIGNIFICANT CHANGE UP (ref 22–29)
HCT VFR BLD CALC: 34.6 % — LOW (ref 39–50)
HCT VFR BLDA CALC: 27 % — LOW (ref 39–51)
HCT VFR BLDA CALC: 28 % — LOW (ref 39–51)
HCT VFR BLDA CALC: 30 % — LOW (ref 39–51)
HCT VFR BLDA CALC: 30 % — LOW (ref 39–51)
HCT VFR BLDA CALC: 42 % — SIGNIFICANT CHANGE UP (ref 39–51)
HGB BLD CALC-MCNC: 10 G/DL — LOW (ref 12.6–17.4)
HGB BLD CALC-MCNC: 14.1 G/DL — SIGNIFICANT CHANGE UP (ref 12.6–17.4)
HGB BLD CALC-MCNC: 9 G/DL — LOW (ref 12.6–17.4)
HGB BLD CALC-MCNC: 9.2 G/DL — LOW (ref 12.6–17.4)
HGB BLD CALC-MCNC: 9.9 G/DL — LOW (ref 12.6–17.4)
HGB BLD-MCNC: 11.8 G/DL — LOW (ref 13–17)
INR BLD: 1.57 RATIO — HIGH (ref 0.88–1.16)
LACTATE BLDV-MCNC: 1 MMOL/L — SIGNIFICANT CHANGE UP (ref 0.5–2)
LACTATE BLDV-MCNC: 1 MMOL/L — SIGNIFICANT CHANGE UP (ref 0.5–2)
LACTATE BLDV-MCNC: 1.1 MMOL/L — SIGNIFICANT CHANGE UP (ref 0.5–2)
LACTATE BLDV-MCNC: 1.3 MMOL/L — SIGNIFICANT CHANGE UP (ref 0.5–2)
LACTATE BLDV-MCNC: 1.4 MMOL/L — SIGNIFICANT CHANGE UP (ref 0.5–2)
LACTATE BLDV-MCNC: 1.4 MMOL/L — SIGNIFICANT CHANGE UP (ref 0.5–2)
LACTATE BLDV-MCNC: 1.5 MMOL/L — SIGNIFICANT CHANGE UP (ref 0.5–2)
LACTATE BLDV-MCNC: 1.5 MMOL/L — SIGNIFICANT CHANGE UP (ref 0.5–2)
LYMPHOCYTES # BLD AUTO: 1.84 K/UL — SIGNIFICANT CHANGE UP (ref 1–3.3)
LYMPHOCYTES # BLD AUTO: 16 % — SIGNIFICANT CHANGE UP (ref 13–44)
MAGNESIUM SERPL-MCNC: 2.4 MG/DL — SIGNIFICANT CHANGE UP (ref 1.6–2.6)
MANUAL SMEAR VERIFICATION: SIGNIFICANT CHANGE UP
MCHC RBC-ENTMCNC: 29.6 PG — SIGNIFICANT CHANGE UP (ref 27–34)
MCHC RBC-ENTMCNC: 34.1 GM/DL — SIGNIFICANT CHANGE UP (ref 32–36)
MCV RBC AUTO: 86.7 FL — SIGNIFICANT CHANGE UP (ref 80–100)
MONOCYTES # BLD AUTO: 1.04 K/UL — HIGH (ref 0–0.9)
MONOCYTES NFR BLD AUTO: 9 % — SIGNIFICANT CHANGE UP (ref 2–14)
MYELOCYTES NFR BLD: 2 % — HIGH (ref 0–0)
NEUTROPHILS # BLD AUTO: 8.29 K/UL — HIGH (ref 1.8–7.4)
NEUTROPHILS NFR BLD AUTO: 65 % — SIGNIFICANT CHANGE UP (ref 43–77)
NEUTS BAND # BLD: 7 % — SIGNIFICANT CHANGE UP (ref 0–8)
NRBC # BLD: 0 /100 — SIGNIFICANT CHANGE UP (ref 0–0)
PCO2 BLDV: 36 MMHG — LOW (ref 42–55)
PCO2 BLDV: 39 MMHG — LOW (ref 42–55)
PCO2 BLDV: 42 MMHG — SIGNIFICANT CHANGE UP (ref 42–55)
PCO2 BLDV: 43 MMHG — SIGNIFICANT CHANGE UP (ref 42–55)
PCO2 BLDV: 44 MMHG — SIGNIFICANT CHANGE UP (ref 42–55)
PCO2 BLDV: 53 MMHG — SIGNIFICANT CHANGE UP (ref 42–55)
PCO2 BLDV: 53 MMHG — SIGNIFICANT CHANGE UP (ref 42–55)
PCO2 BLDV: 54 MMHG — SIGNIFICANT CHANGE UP (ref 42–55)
PH BLDV: 7.28 — LOW (ref 7.32–7.43)
PH BLDV: 7.29 — LOW (ref 7.32–7.43)
PH BLDV: 7.32 — SIGNIFICANT CHANGE UP (ref 7.32–7.43)
PH BLDV: 7.34 — SIGNIFICANT CHANGE UP (ref 7.32–7.43)
PH BLDV: 7.38 — SIGNIFICANT CHANGE UP (ref 7.32–7.43)
PH BLDV: 7.42 — SIGNIFICANT CHANGE UP (ref 7.32–7.43)
PH BLDV: 7.44 — HIGH (ref 7.32–7.43)
PH BLDV: 7.47 — HIGH (ref 7.32–7.43)
PHOSPHATE SERPL-MCNC: 3.9 MG/DL — SIGNIFICANT CHANGE UP (ref 2.5–4.5)
PLAT MORPH BLD: NORMAL — SIGNIFICANT CHANGE UP
PLATELET # BLD AUTO: 143 K/UL — LOW (ref 150–400)
PO2 BLDV: 117 MMHG — HIGH (ref 25–45)
PO2 BLDV: 159 MMHG — HIGH (ref 25–45)
PO2 BLDV: 192 MMHG — HIGH (ref 25–45)
PO2 BLDV: 54 MMHG — HIGH (ref 25–45)
PO2 BLDV: 63 MMHG — HIGH (ref 25–45)
PO2 BLDV: 68 MMHG — HIGH (ref 25–45)
PO2 BLDV: 72 MMHG — HIGH (ref 25–45)
PO2 BLDV: 81 MMHG — HIGH (ref 25–45)
POTASSIUM BLDV-SCNC: 3.9 MMOL/L — SIGNIFICANT CHANGE UP (ref 3.5–5.1)
POTASSIUM BLDV-SCNC: 4.1 MMOL/L — SIGNIFICANT CHANGE UP (ref 3.5–5.1)
POTASSIUM BLDV-SCNC: 4.1 MMOL/L — SIGNIFICANT CHANGE UP (ref 3.5–5.1)
POTASSIUM BLDV-SCNC: 4.4 MMOL/L — SIGNIFICANT CHANGE UP (ref 3.5–5.1)
POTASSIUM BLDV-SCNC: 4.5 MMOL/L — SIGNIFICANT CHANGE UP (ref 3.5–5.1)
POTASSIUM BLDV-SCNC: 5 MMOL/L — SIGNIFICANT CHANGE UP (ref 3.5–5.1)
POTASSIUM BLDV-SCNC: 5.3 MMOL/L — HIGH (ref 3.5–5.1)
POTASSIUM BLDV-SCNC: 5.4 MMOL/L — HIGH (ref 3.5–5.1)
POTASSIUM SERPL-MCNC: 3.9 MMOL/L — SIGNIFICANT CHANGE UP (ref 3.5–5.3)
POTASSIUM SERPL-MCNC: 5.2 MMOL/L — SIGNIFICANT CHANGE UP (ref 3.5–5.3)
POTASSIUM SERPL-SCNC: 3.9 MMOL/L — SIGNIFICANT CHANGE UP (ref 3.5–5.3)
POTASSIUM SERPL-SCNC: 5.2 MMOL/L — SIGNIFICANT CHANGE UP (ref 3.5–5.3)
PROT SERPL-MCNC: 5.3 G/DL — LOW (ref 6–8.3)
PROTHROM AB SERPL-ACNC: 18.3 SEC — HIGH (ref 10.5–13.4)
RBC # BLD: 3.99 M/UL — LOW (ref 4.2–5.8)
RBC # FLD: 12.2 % — SIGNIFICANT CHANGE UP (ref 10.3–14.5)
RBC BLD AUTO: SIGNIFICANT CHANGE UP
SAO2 % BLDV: 83.2 % — SIGNIFICANT CHANGE UP (ref 67–88)
SAO2 % BLDV: 88.1 % — HIGH (ref 67–88)
SAO2 % BLDV: 93.5 % — HIGH (ref 67–88)
SAO2 % BLDV: 95.3 % — HIGH (ref 67–88)
SAO2 % BLDV: 97.6 % — HIGH (ref 67–88)
SAO2 % BLDV: 98.5 % — HIGH (ref 67–88)
SAO2 % BLDV: 99 % — HIGH (ref 67–88)
SAO2 % BLDV: 99.4 % — HIGH (ref 67–88)
SODIUM SERPL-SCNC: 139 MMOL/L — SIGNIFICANT CHANGE UP (ref 135–145)
SODIUM SERPL-SCNC: 143 MMOL/L — SIGNIFICANT CHANGE UP (ref 135–145)
TROPONIN T, HIGH SENSITIVITY RESULT: 320 NG/L — HIGH (ref 0–51)
WBC # BLD: 11.52 K/UL — HIGH (ref 3.8–10.5)
WBC # FLD AUTO: 11.52 K/UL — HIGH (ref 3.8–10.5)

## 2022-12-19 PROCEDURE — 33519 CABG ARTERY-VEIN THREE: CPT

## 2022-12-19 PROCEDURE — 71045 X-RAY EXAM CHEST 1 VIEW: CPT | Mod: 26

## 2022-12-19 PROCEDURE — 33533 CABG ARTERIAL SINGLE: CPT

## 2022-12-19 PROCEDURE — 93010 ELECTROCARDIOGRAM REPORT: CPT | Mod: 76

## 2022-12-19 PROCEDURE — 99291 CRITICAL CARE FIRST HOUR: CPT

## 2022-12-19 PROCEDURE — 33508 ENDOSCOPIC VEIN HARVEST: CPT | Mod: 59

## 2022-12-19 DEVICE — LIGATING CLIPS WECK HORIZON MEDIUM (BLUE) 24: Type: IMPLANTABLE DEVICE | Status: FUNCTIONAL

## 2022-12-19 DEVICE — CANNULA VESSEL 3MM BLUNT TIP CLEAR 1-WAY VALVE: Type: IMPLANTABLE DEVICE | Status: FUNCTIONAL

## 2022-12-19 DEVICE — PACING WIRE WHITE M-24 BAREWIRE 37MM X 89MM: Type: IMPLANTABLE DEVICE | Status: FUNCTIONAL

## 2022-12-19 DEVICE — SHUNT FLO-THRU INTRALUMINAL 1MM X 18MM: Type: IMPLANTABLE DEVICE | Status: FUNCTIONAL

## 2022-12-19 DEVICE — CANNULA VENOUS 2 STAGE LIGHTHOUSE TIP 28-38FR X 1/2" NON-VENTED: Type: IMPLANTABLE DEVICE | Status: FUNCTIONAL

## 2022-12-19 DEVICE — CANNULA RCSP 15FR X 12".5" MANUAL-INFLATE CUFF WITH GUIDEWIRE STYLET: Type: IMPLANTABLE DEVICE | Status: FUNCTIONAL

## 2022-12-19 DEVICE — LIGATING CLIPS WECK HORIZON SMALL-WIDE (RED) 24: Type: IMPLANTABLE DEVICE | Status: FUNCTIONAL

## 2022-12-19 DEVICE — SURGICEL FIBRILLAR 2 X 4": Type: IMPLANTABLE DEVICE | Status: FUNCTIONAL

## 2022-12-19 DEVICE — CANNULA AORTIC ROOT 14G X 14CM FLANGED: Type: IMPLANTABLE DEVICE | Status: FUNCTIONAL

## 2022-12-19 DEVICE — KIT CVC 1LUM 16GX20CM BLU FLX TIP: Type: IMPLANTABLE DEVICE | Status: FUNCTIONAL

## 2022-12-19 DEVICE — SHUNT FLO-THRU INTRALUMINAL1.5MM X 18MM: Type: IMPLANTABLE DEVICE | Status: FUNCTIONAL

## 2022-12-19 DEVICE — KIT A-LINE 1LUM 20G X 12CM SAFE KIT: Type: IMPLANTABLE DEVICE | Status: FUNCTIONAL

## 2022-12-19 DEVICE — MEDIASTINAL CATH DRAIN 9MM: Type: IMPLANTABLE DEVICE | Status: FUNCTIONAL

## 2022-12-19 DEVICE — KIT CVC 2LUM MAC 9FR CHG: Type: IMPLANTABLE DEVICE | Status: FUNCTIONAL

## 2022-12-19 DEVICE — CANNULA AORTIC PERFUSION EZ GLIDE STRAIGHT 21FR X 35CM NON-VENTED: Type: IMPLANTABLE DEVICE | Status: FUNCTIONAL

## 2022-12-19 RX ORDER — CHLORHEXIDINE GLUCONATE 213 G/1000ML
15 SOLUTION TOPICAL EVERY 12 HOURS
Refills: 0 | Status: DISCONTINUED | OUTPATIENT
Start: 2022-12-19 | End: 2022-12-19

## 2022-12-19 RX ORDER — GABAPENTIN 400 MG/1
100 CAPSULE ORAL EVERY 8 HOURS
Refills: 0 | Status: COMPLETED | OUTPATIENT
Start: 2022-12-19 | End: 2022-12-24

## 2022-12-19 RX ORDER — PANTOPRAZOLE SODIUM 20 MG/1
40 TABLET, DELAYED RELEASE ORAL DAILY
Refills: 0 | Status: DISCONTINUED | OUTPATIENT
Start: 2022-12-19 | End: 2022-12-20

## 2022-12-19 RX ORDER — AMIODARONE HYDROCHLORIDE 400 MG/1
400 TABLET ORAL
Refills: 0 | Status: COMPLETED | OUTPATIENT
Start: 2022-12-19 | End: 2022-12-22

## 2022-12-19 RX ORDER — INSULIN HUMAN 100 [IU]/ML
3 INJECTION, SOLUTION SUBCUTANEOUS
Qty: 100 | Refills: 0 | Status: DISCONTINUED | OUTPATIENT
Start: 2022-12-19 | End: 2022-12-20

## 2022-12-19 RX ORDER — OXYCODONE HYDROCHLORIDE 5 MG/1
5 TABLET ORAL EVERY 4 HOURS
Refills: 0 | Status: DISCONTINUED | OUTPATIENT
Start: 2022-12-19 | End: 2022-12-26

## 2022-12-19 RX ORDER — SODIUM CHLORIDE 9 MG/ML
1000 INJECTION INTRAMUSCULAR; INTRAVENOUS; SUBCUTANEOUS
Refills: 0 | Status: DISCONTINUED | OUTPATIENT
Start: 2022-12-19 | End: 2022-12-26

## 2022-12-19 RX ORDER — DEXTROSE 50 % IN WATER 50 %
50 SYRINGE (ML) INTRAVENOUS
Refills: 0 | Status: DISCONTINUED | OUTPATIENT
Start: 2022-12-19 | End: 2022-12-26

## 2022-12-19 RX ORDER — SENNA PLUS 8.6 MG/1
2 TABLET ORAL AT BEDTIME
Refills: 0 | Status: DISCONTINUED | OUTPATIENT
Start: 2022-12-20 | End: 2022-12-26

## 2022-12-19 RX ORDER — ACETAMINOPHEN 500 MG
650 TABLET ORAL EVERY 6 HOURS
Refills: 0 | Status: COMPLETED | OUTPATIENT
Start: 2022-12-19 | End: 2022-12-22

## 2022-12-19 RX ORDER — HYDROMORPHONE HYDROCHLORIDE 2 MG/ML
0.5 INJECTION INTRAMUSCULAR; INTRAVENOUS; SUBCUTANEOUS EVERY 6 HOURS
Refills: 0 | Status: DISCONTINUED | OUTPATIENT
Start: 2022-12-19 | End: 2022-12-21

## 2022-12-19 RX ORDER — INSULIN HUMAN 100 [IU]/ML
10 INJECTION, SOLUTION SUBCUTANEOUS ONCE
Refills: 0 | Status: COMPLETED | OUTPATIENT
Start: 2022-12-19 | End: 2022-12-19

## 2022-12-19 RX ORDER — POTASSIUM CHLORIDE 20 MEQ
10 PACKET (EA) ORAL
Refills: 0 | Status: DISCONTINUED | OUTPATIENT
Start: 2022-12-19 | End: 2022-12-20

## 2022-12-19 RX ORDER — DEXTROSE 50 % IN WATER 50 %
25 SYRINGE (ML) INTRAVENOUS
Refills: 0 | Status: DISCONTINUED | OUTPATIENT
Start: 2022-12-19 | End: 2022-12-20

## 2022-12-19 RX ORDER — ASPIRIN/CALCIUM CARB/MAGNESIUM 324 MG
300 TABLET ORAL ONCE
Refills: 0 | Status: DISCONTINUED | OUTPATIENT
Start: 2022-12-19 | End: 2022-12-20

## 2022-12-19 RX ORDER — ASPIRIN/CALCIUM CARB/MAGNESIUM 324 MG
325 TABLET ORAL DAILY
Refills: 0 | Status: DISCONTINUED | OUTPATIENT
Start: 2022-12-19 | End: 2022-12-26

## 2022-12-19 RX ORDER — POLYETHYLENE GLYCOL 3350 17 G/17G
17 POWDER, FOR SOLUTION ORAL DAILY
Refills: 0 | Status: DISCONTINUED | OUTPATIENT
Start: 2022-12-20 | End: 2022-12-26

## 2022-12-19 RX ORDER — ASCORBIC ACID 60 MG
500 TABLET,CHEWABLE ORAL
Refills: 0 | Status: COMPLETED | OUTPATIENT
Start: 2022-12-19 | End: 2022-12-24

## 2022-12-19 RX ORDER — DEXMEDETOMIDINE HYDROCHLORIDE IN 0.9% SODIUM CHLORIDE 4 UG/ML
1.5 INJECTION INTRAVENOUS
Qty: 200 | Refills: 0 | Status: DISCONTINUED | OUTPATIENT
Start: 2022-12-19 | End: 2022-12-20

## 2022-12-19 RX ORDER — MEPERIDINE HYDROCHLORIDE 50 MG/ML
25 INJECTION INTRAMUSCULAR; INTRAVENOUS; SUBCUTANEOUS ONCE
Refills: 0 | Status: DISCONTINUED | OUTPATIENT
Start: 2022-12-19 | End: 2022-12-20

## 2022-12-19 RX ORDER — ALBUTEROL 90 UG/1
2.5 AEROSOL, METERED ORAL ONCE
Refills: 0 | Status: COMPLETED | OUTPATIENT
Start: 2022-12-19 | End: 2022-12-19

## 2022-12-19 RX ORDER — DEXTROSE 50 % IN WATER 50 %
10 SYRINGE (ML) INTRAVENOUS ONCE
Refills: 0 | Status: COMPLETED | OUTPATIENT
Start: 2022-12-19 | End: 2022-12-19

## 2022-12-19 RX ORDER — CHLORHEXIDINE GLUCONATE 213 G/1000ML
1 SOLUTION TOPICAL DAILY
Refills: 0 | Status: DISCONTINUED | OUTPATIENT
Start: 2022-12-19 | End: 2022-12-23

## 2022-12-19 RX ORDER — OXYCODONE HYDROCHLORIDE 5 MG/1
10 TABLET ORAL EVERY 4 HOURS
Refills: 0 | Status: DISCONTINUED | OUTPATIENT
Start: 2022-12-19 | End: 2022-12-26

## 2022-12-19 RX ORDER — SODIUM BICARBONATE 1 MEQ/ML
50 SYRINGE (ML) INTRAVENOUS ONCE
Refills: 0 | Status: COMPLETED | OUTPATIENT
Start: 2022-12-19 | End: 2022-12-19

## 2022-12-19 RX ORDER — SODIUM CHLORIDE 9 MG/ML
1500 INJECTION, SOLUTION INTRAVENOUS ONCE
Refills: 0 | Status: COMPLETED | OUTPATIENT
Start: 2022-12-19 | End: 2022-12-19

## 2022-12-19 RX ORDER — POTASSIUM CHLORIDE 20 MEQ
10 PACKET (EA) ORAL
Refills: 0 | Status: COMPLETED | OUTPATIENT
Start: 2022-12-19 | End: 2022-12-19

## 2022-12-19 RX ORDER — CEFUROXIME AXETIL 250 MG
1500 TABLET ORAL EVERY 8 HOURS
Refills: 0 | Status: COMPLETED | OUTPATIENT
Start: 2022-12-19 | End: 2022-12-21

## 2022-12-19 RX ORDER — ACETAMINOPHEN 500 MG
650 TABLET ORAL EVERY 6 HOURS
Refills: 0 | Status: DISCONTINUED | OUTPATIENT
Start: 2022-12-22 | End: 2022-12-26

## 2022-12-19 RX ADMIN — SODIUM CHLORIDE 10 MILLILITER(S): 9 INJECTION INTRAMUSCULAR; INTRAVENOUS; SUBCUTANEOUS at 14:52

## 2022-12-19 RX ADMIN — MUPIROCIN 1 APPLICATION(S): 20 OINTMENT TOPICAL at 05:22

## 2022-12-19 RX ADMIN — Medication 100 MILLIGRAM(S): at 19:58

## 2022-12-19 RX ADMIN — HYDROMORPHONE HYDROCHLORIDE 0.5 MILLIGRAM(S): 2 INJECTION INTRAMUSCULAR; INTRAVENOUS; SUBCUTANEOUS at 19:15

## 2022-12-19 RX ADMIN — ALBUTEROL 2.5 MILLIGRAM(S): 90 AEROSOL, METERED ORAL at 21:00

## 2022-12-19 RX ADMIN — ALBUTEROL 2.5 MILLIGRAM(S): 90 AEROSOL, METERED ORAL at 21:01

## 2022-12-19 RX ADMIN — HYDROMORPHONE HYDROCHLORIDE 0.5 MILLIGRAM(S): 2 INJECTION INTRAMUSCULAR; INTRAVENOUS; SUBCUTANEOUS at 19:00

## 2022-12-19 RX ADMIN — Medication 50 MILLIGRAM(S): at 05:22

## 2022-12-19 RX ADMIN — CHLORHEXIDINE GLUCONATE 30 MILLILITER(S): 213 SOLUTION TOPICAL at 05:23

## 2022-12-19 RX ADMIN — INSULIN HUMAN 3 UNIT(S)/HR: 100 INJECTION, SOLUTION SUBCUTANEOUS at 14:53

## 2022-12-19 RX ADMIN — Medication 50 MILLIEQUIVALENT(S): at 20:45

## 2022-12-19 RX ADMIN — CHLORHEXIDINE GLUCONATE 15 MILLILITER(S): 213 SOLUTION TOPICAL at 17:35

## 2022-12-19 RX ADMIN — Medication 10 MILLILITER(S): at 20:45

## 2022-12-19 RX ADMIN — SODIUM CHLORIDE 3000 MILLILITER(S): 9 INJECTION, SOLUTION INTRAVENOUS at 17:28

## 2022-12-19 RX ADMIN — ALBUTEROL 2.5 MILLIGRAM(S): 90 AEROSOL, METERED ORAL at 20:52

## 2022-12-19 RX ADMIN — DEXMEDETOMIDINE HYDROCHLORIDE IN 0.9% SODIUM CHLORIDE 25.1 MICROGRAM(S)/KG/HR: 4 INJECTION INTRAVENOUS at 14:57

## 2022-12-19 RX ADMIN — INSULIN HUMAN 10 UNIT(S): 100 INJECTION, SOLUTION SUBCUTANEOUS at 20:45

## 2022-12-19 RX ADMIN — GABAPENTIN 300 MILLIGRAM(S): 400 CAPSULE ORAL at 05:22

## 2022-12-19 RX ADMIN — PANTOPRAZOLE SODIUM 40 MILLIGRAM(S): 20 TABLET, DELAYED RELEASE ORAL at 05:22

## 2022-12-19 RX ADMIN — Medication 1000 MILLIGRAM(S): at 05:22

## 2022-12-19 NOTE — DIETITIAN INITIAL EVALUATION ADULT - PERTINENT LABORATORY DATA
12-18    138  |  100  |  13  ----------------------------<  121<H>  4.0   |  25  |  0.91    Ca    9.4      18 Dec 2022 06:01    POCT Blood Glucose.: 189 mg/dL (12-18-22 @ 21:21)  A1C with Estimated Average Glucose Result: 8.8 % (12-10-22 @ 12:55)

## 2022-12-19 NOTE — DIETITIAN INITIAL EVALUATION ADULT - REASON FOR ADMISSION
Pt is a 52M w/ HTN, T2DM, GERD who is transferred from OSH for ACS evaluation/management 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, now s/p CABG.

## 2022-12-19 NOTE — PROGRESS NOTE ADULT - PROBLEM SELECTOR PLAN 1
Will continue current insulin regimen for now. Will continue monitoring  blood sugars, will Follow up.  Will switch to basal bolus insulin once starts eating meals.

## 2022-12-19 NOTE — DIETITIAN INITIAL EVALUATION ADULT - ADD RECOMMEND
When/if feasible - diet advancement per team, add consistent carbohydrate restriction. RD remains available to provide nutrition education when appropriate. Continue to monitor nutritional intake, labs, weights, BM, skin, clinical course.

## 2022-12-19 NOTE — DIETITIAN INITIAL EVALUATION ADULT - OTHER INFO
-- Pt intubated s/p CABG, sedated on Precedex.  -- Insulin gtt for BG management. HbA1c 8.8% - endocrinology following prior to surgery. On oral medications, no insulin, PTA.    Weight Hx: Dosing weight 147.4lbs - most recent weight 149.2lbs. Weights fluctuating in house, will continue to monitor/obtain further weight history on follow up.

## 2022-12-19 NOTE — PROGRESS NOTE ADULT - ASSESSMENT
Assessment  DMT2: 52y Male with DM T2 with hyperglycemia admitted with chest pain, A1C is 8.2%  patient was on oral hypoglycemic agents at home, was on basal bolus insulin coverage, s/p CABG on IV insulin, blood sugars stable.  Patient is high risk with high level decision making due to uncontrolled diabetes, has increased risk for complications. Tight sugar control is not recommended for this patient.  CAD: S/P CABG, on medications, monitored, asymptomatic.  HTN: On antihypertensive medications, monitored, asymptomatic.  HLD: On statin, compliant.            Judith Sanders MD  Cell:  [phone]  Office: [phone]

## 2022-12-19 NOTE — PROGRESS NOTE ADULT - SUBJECTIVE AND OBJECTIVE BOX
Chief complaint    Patient is a 52y old  Male who presents with a chief complaint of Pt is a 52M w/ HTN, T2DM, GERD who is transferred from OSH for ACS evaluation/management 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, now s/p CABG.     (19 Dec 2022 15:09)   Review of systems  Patient in bed, appears comfortable.    Labs and Fingersticks  CAPILLARY BLOOD GLUCOSE      POCT Blood Glucose.: 189 mg/dL (18 Dec 2022 21:21)      Anion Gap, Serum: 12 (12-19 @ 15:09)  Anion Gap, Serum: 13 (12-18 @ 06:01)      Calcium, Total Serum: 8.3 *L* (12-19 @ 15:09)  Calcium, Total Serum: 9.4 (12-18 @ 06:01)  Albumin, Serum: 3.2 *L* (12-19 @ 15:09)    Alanine Aminotransferase (ALT/SGPT): 33 (12-19 @ 15:09)  Alkaline Phosphatase, Serum: 42 (12-19 @ 15:09)  Aspartate Aminotransferase (AST/SGOT): 40 (12-19 @ 15:09)        12-19    143  |  107  |  14  ----------------------------<  119<H>  3.9   |  24  |  0.76    Ca    8.3<L>      19 Dec 2022 15:09  Phos  3.9     12-19  Mg     2.4     12-19    TPro  5.3<L>  /  Alb  3.2<L>  /  TBili  1.0  /  DBili  x   /  AST  40  /  ALT  33  /  AlkPhos  42  12-19                        11.8   11.52 )-----------( 143      ( 19 Dec 2022 15:09 )             34.6     Medications  MEDICATIONS  (STANDING):  acetaminophen     Tablet .. 650 milliGRAM(s) Oral every 6 hours  aMIOdarone    Tablet 400 milliGRAM(s) Oral two times a day  ascorbic acid 500 milliGRAM(s) Oral two times a day  aspirin enteric coated 325 milliGRAM(s) Oral daily  aspirin Suppository 300 milliGRAM(s) Rectal once  chlorhexidine 0.12% Liquid 15 milliLiter(s) Oral Mucosa every 12 hours  chlorhexidine 2% Cloths 1 Application(s) Topical daily  dexMEDEtomidine Infusion 1.5 MICROgram(s)/kG/Hr (25.1 mL/Hr) IV Continuous <Continuous>  dextrose 50% Injectable 50 milliLiter(s) IV Push every 15 minutes  dextrose 50% Injectable 25 milliLiter(s) IV Push every 15 minutes  gabapentin 100 milliGRAM(s) Oral every 8 hours  insulin regular Infusion 3 Unit(s)/Hr (3 mL/Hr) IV Continuous <Continuous>  meperidine     Injectable 25 milliGRAM(s) IV Push once  pantoprazole  Injectable 40 milliGRAM(s) IV Push daily  potassium chloride  10 mEq/50 mL IVPB 10 milliEquivalent(s) IV Intermittent every 1 hour  potassium chloride  10 mEq/50 mL IVPB 10 milliEquivalent(s) IV Intermittent every 1 hour  potassium chloride  10 mEq/50 mL IVPB 10 milliEquivalent(s) IV Intermittent every 1 hour  potassium chloride  10 mEq/50 mL IVPB 10 milliEquivalent(s) IV Intermittent every 1 hour  sodium chloride 0.9%. 1000 milliLiter(s) (10 mL/Hr) IV Continuous <Continuous>      Physical Exam  General: Patient appears comfortable.  Vital Signs Last 12 Hrs  T(F): 96.1 (12-19-22 @ 14:53), Max: 96.1 (12-19-22 @ 14:53)  HR: 97 (12-19-22 @ 16:30) (75 - 97)  BP: 109/72 (12-19-22 @ 07:22) (109/72 - 109/72)  BP(mean): 98 (12-19-22 @ 07:22) (98 - 98)  RR: 10 (12-19-22 @ 16:30) (10 - 18)  SpO2: 99% (12-19-22 @ 16:30) (96% - 100%)  Neck: No palpable thyroid nodules.  CVS: S1S2, No murmurs  Respiratory: No wheezing, no crepitations  GI: Abdomen soft, non tender.  Musculoskeletal:  edema lower extremities.     Diagnostics

## 2022-12-19 NOTE — DIETITIAN INITIAL EVALUATION ADULT - PERTINENT MEDS FT
MEDICATIONS  (STANDING):  acetaminophen     Tablet .. 650 milliGRAM(s) Oral every 6 hours  aMIOdarone    Tablet 400 milliGRAM(s) Oral two times a day  ascorbic acid 500 milliGRAM(s) Oral two times a day  aspirin enteric coated 325 milliGRAM(s) Oral daily  aspirin Suppository 300 milliGRAM(s) Rectal once  chlorhexidine 0.12% Liquid 15 milliLiter(s) Oral Mucosa every 12 hours  chlorhexidine 2% Cloths 1 Application(s) Topical daily  dexMEDEtomidine Infusion 1.5 MICROgram(s)/kG/Hr (25.1 mL/Hr) IV Continuous <Continuous>  dextrose 50% Injectable 50 milliLiter(s) IV Push every 15 minutes  dextrose 50% Injectable 25 milliLiter(s) IV Push every 15 minutes  gabapentin 100 milliGRAM(s) Oral every 8 hours  insulin regular Infusion 3 Unit(s)/Hr (3 mL/Hr) IV Continuous <Continuous>  meperidine     Injectable 25 milliGRAM(s) IV Push once  pantoprazole  Injectable 40 milliGRAM(s) IV Push daily  potassium chloride  10 mEq/50 mL IVPB 10 milliEquivalent(s) IV Intermittent every 1 hour  potassium chloride  10 mEq/50 mL IVPB 10 milliEquivalent(s) IV Intermittent every 1 hour  potassium chloride  10 mEq/50 mL IVPB 10 milliEquivalent(s) IV Intermittent every 1 hour  sodium chloride 0.9%. 1000 milliLiter(s) (10 mL/Hr) IV Continuous <Continuous>    MEDICATIONS  (PRN):  HYDROmorphone  Injectable 0.5 milliGRAM(s) IV Push every 6 hours PRN Breakthrough Pain  oxyCODONE    IR 5 milliGRAM(s) Oral every 4 hours PRN Moderate Pain (4 - 6)  oxyCODONE    IR 10 milliGRAM(s) Oral every 4 hours PRN Severe Pain (7 - 10)

## 2022-12-19 NOTE — PRE-ANESTHESIA EVALUATION ADULT - NSANTHPMHFT_GEN_ALL_CORE
52M PMH HTN, T2DM, GERD, current smoker p/w multivessel disease, EF: 45-50%.  CXR, carotid dopplers wnl, EKG: NSR.

## 2022-12-19 NOTE — PROGRESS NOTE ADULT - SUBJECTIVE AND OBJECTIVE BOX
HPI:  52M w/ HTN, T2DM, GERD who is transferred from OSH for ACS evaluation/management. Per patient yesterday at 3 am he was woken up from his sleep for crushing chest pain that radiated to his left side w/ associated numbness and tinging. He endorses sweats, and small bouts of emesis x2. He states he has never experienced pain like this in the past, however it has resolved. He has been recently seeing a gastroenterology for worsening reflux over to past 4-6 months for which he was taking omeprazole. he states he misses his medications up to 2-3x weekly. He denies previous MIs, cardiac surgeries or hospitalizations He endorses smoking 5 cig/day for 36 years. 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 70% occlusion of pLAD 80% occlusion of pDaig, 100% occlusion of Lcx, 80% occlusion of pOM, 70% occlusion of pRPL. Patient admitted to CCU for management  (10 Dec 2022 12:55)      Patient seen and examined at the bedside.    Remained critically ill on continuous ICU monitoring.    OBJECTIVE:  Vital Signs Last 24 Hrs  T(C): 37.2 (19 Dec 2022 17:00), Max: 37.2 (19 Dec 2022 17:00)  T(F): 99 (19 Dec 2022 17:00), Max: 99 (19 Dec 2022 17:00)  HR: 96 (19 Dec 2022 17:15) (73 - 97)  BP: 109/72 (19 Dec 2022 07:22) (109/72 - 143/86)  BP(mean): 98 (19 Dec 2022 07:22) (98 - 98)  RR: 12 (19 Dec 2022 17:15) (10 - 19)  SpO2: 100% (19 Dec 2022 17:15) (96% - 100%)    Parameters below as of 19 Dec 2022 16:00      O2 Concentration (%): 40    Physical Exam:   General: intubated, multiple lines gtt  Neurology: sedated  Eyes: bilateral pupils equal and reactive   ENT/Neck: +ETT midline, Neck supple, trachea midline, No JVD   Respiratory: Clear bilaterally   CV: S1S2, no murmurs        [x] Sternal dressing, [x] Mediastinal CT x2, [x] Pleural CT,        [x] Sinus rhythm  Abdominal: Soft, NT, ND +BS  Extremities: 1-2+ pedal edema noted, + peripheral pulses   Skin: No Rashes, Hematoma, Ecchymosis                           Assessment:  52M w/ HTN, T2DM, GERD who is transferred from OSH for ACS evaluation/management. Per patient yesterday at 3 am he was woken up from his sleep for crushing chest pain that radiated to his left side w/ associated numbness and tinging. He endorses sweats, and small bouts of emesis x2. He states he has never experienced pain like this in the past, however it has resolved. He has been recently seeing a gastroenterology for worsening reflux over to past 4-6 months for which he was taking omeprazole. he states he misses his medications up to 2-3x weekly. He denies previous MIs, cardiac surgeries or hospitalizations He endorses smoking 5 cig/day for 36 years.    CAD s/p CABG 12/19/2022  Hemodynamic instability  Hypovolemia  Post op respiratory insufficiency  Acute blood loss anemia  Thrombocytopenia       Plan:   ***Neuro***  Addressed analgesic regimen to optimize function and sedated with IV Precedex infusion for ventilatory synchrony.       ***Cardiovascular***  Patient was admitted with CAD s/p CABG 12/19/2022. Invasive hemodynamic monitoring with a central venous catheter & an A-line were required for the continuous central venous and MAP/BP monitoring to ensure adequate cardiovascular support Continue with Amiodarone for atrial fibrillation prophylaxis. ASA continued for graft occlusion-thromboembolism prophylaxis. Echo from today showed mildly reduced left ventricular systolic function.    ***Pulmonary***  Respiratory status required full ventilatory support, close monitoring of respiratory rate and breathing pattern, the following of ABG’s with A-line monitoring, continuous pulse oximetry monitoring      Mode: AC/ CMV (Assist Control/ Continuous Mandatory Ventilation)  RR (machine): 10  TV (machine): 600  FiO2: 50  PEEP: 5  ITime: 1  MAP: 8  PIP: 21               ***Heme***  Thrombocytopenia and Anemia due to acute blood loss, no current plan for transfusion. Continue to monitor hemoglobin and hematocrit levels     ***GI***  NPO after recent procedure, advance diet as tolerated. Protonix for stress ulcer prophylaxis.       ***Renal***  Optimize renal perfusion with adequate volume resuscitation and continued monitoring of urine output, fluid balance, electrolytes, and BUN/Creatinine.          ***ID***  Afebrile, white blood cells within normal limits  Continue trending white blood count and monitoring fever curve.       ***Endocrine***  Metabolic stability, history of diabetes mellitus required an IV regular Insulin drip while following serial glucose levels to help achieve and maintain euglycemia.            Patient requires continuous monitoring with bedside rhythm monitoring, pulse oximetry monitoring, and continuous central venous and arterial pressure monitoring; and intermittent blood gas analysis. Care plan discussed with the ICU care team.   Patient remained critical, at risk for life threatening decompensation.    I have spent 30 minutes providing critical care management to this patient.    By signing my name below, I, Tony Medrano, attest that this documentation has been prepared under the direction and in the presence of Landy James MD   Electronically signed: Fiorella Garcia, 12-19-22 @ 17:43    I, Landy James, personally performed the services described in this documentation. all medical record entries made by the scribe were at my direction and in my presence. I have reviewed the chart and agree that the record reflects my personal performance and is accurate and complete  Electronically signed: Landy James MD  HPI:  52M w/ HTN, T2DM, GERD who is transferred from OSH for ACS evaluation/management. Per patient yesterday at 3 am he was woken up from his sleep for crushing chest pain that radiated to his left side w/ associated numbness and tinging. He endorses sweats, and small bouts of emesis x2. He states he has never experienced pain like this in the past, however it has resolved. He has been recently seeing a gastroenterology for worsening reflux over to past 4-6 months for which he was taking omeprazole. he states he misses his medications up to 2-3x weekly. He denies previous MIs, cardiac surgeries or hospitalizations He endorses smoking 5 cig/day for 36 years. 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 70% occlusion of pLAD 80% occlusion of pDaig, 100% occlusion of Lcx, 80% occlusion of pOM, 70% occlusion of pRPL. Patient admitted to CCU for management  (10 Dec 2022 12:55)      Patient seen and examined at the bedside.    Remained critically ill on continuous ICU monitoring.    OBJECTIVE:  Vital Signs Last 24 Hrs  T(C): 37.2 (19 Dec 2022 17:00), Max: 37.2 (19 Dec 2022 17:00)  T(F): 99 (19 Dec 2022 17:00), Max: 99 (19 Dec 2022 17:00)  HR: 96 (19 Dec 2022 17:15) (73 - 97)  BP: 109/72 (19 Dec 2022 07:22) (109/72 - 143/86)  BP(mean): 98 (19 Dec 2022 07:22) (98 - 98)  RR: 12 (19 Dec 2022 17:15) (10 - 19)  SpO2: 100% (19 Dec 2022 17:15) (96% - 100%)    Parameters below as of 19 Dec 2022 16:00      O2 Concentration (%): 40    Physical Exam:   General: intubated, breathing in sync with the ventilator, normal body habitus  Neurology: clearing anesthesia and able to follow commands and move all extremities  Eyes: bilateral pupils equal and reactive   ENT/Neck: +ETT midline, Neck supple, trachea midline, No JVD   Respiratory: Clear bilaterally   CV: S1S2, no murmurs        [x] Sternal dressing, [x] Mediastinal CT x2, [x] Pleural CT,        [x] Sinus rhythm  Abdominal: Soft, NT, ND +BS  Extremities: No pedal edema noted, + peripheral pulses   Skin: No Rashes, Hematoma, Ecchymosis                           Assessment:  52M w/ HTN, T2DM, GERD who is transferred from OSH for ACS evaluation/management. Per patient yesterday at 3 am he was woken up from his sleep for crushing chest pain that radiated to his left side w/ associated numbness and tinging. He endorses sweats, and small bouts of emesis x2. He states he has never experienced pain like this in the past, however it has resolved. He has been recently seeing a gastroenterology for worsening reflux over to past 4-6 months for which he was taking omeprazole. he states he misses his medications up to 2-3x weekly. He denies previous MIs, cardiac surgeries or hospitalizations He endorses smoking 5 cig/day for 36 years.    CAD s/p CABG 12/19/2022  Hemodynamic instability  Hypovolemia  Post op respiratory insufficiency  Acute blood loss anemia  Thrombocytopenia       Plan:   ***Neuro***  Addressed analgesic regimen to optimize function and sedated initially with IV Precedex infusion for ventilatory synchrony, now weaned to off.       ***Cardiovascular***  Patient was admitted with CAD s/p CABG 12/19/2022. Volume resuscitated for hypovolemia due to high urine output. Invasive hemodynamic monitoring with a central venous catheter & an A-line were required for the continuous central venous and MAP/BP monitoring to ensure adequate cardiovascular support. Continue with Amiodarone for atrial fibrillation prophylaxis. ASA continued for graft occlusion-thromboembolism prophylaxis. Echo from today showed mildly reduced left ventricular systolic function.    ***Pulmonary***  Respiratory status required full ventilatory support with subsequent weaning to pressure support, close monitoring of respiratory rate and breathing pattern, the following of ABG’s with A-line monitoring, and continuous pulse oximetry monitoring with plan to proceed to extubation if patient successfully completes weaning trial.    Mode: CPAP with PS  FiO2: 40  PEEP: 5  PS: 5  MAP: 7  PIP: 12       ***Heme***  No acute issues. Continue to monitor hemoglobin and hematocrit levels     ***GI***  NPO after recent procedure, advance diet as tolerated. Protonix for stress ulcer prophylaxis.       ***Renal***  Optimize renal perfusion with adequate volume resuscitation and continued monitoring of urine output, fluid balance, electrolytes, and BUN/Creatinine.          ***ID***  Afebrile, white blood cells within normal limits  Continue trending white blood count and monitoring fever curve.       ***Endocrine***  Metabolic stability, history of diabetes mellitus required an IV regular Insulin drip while following serial glucose levels to help achieve and maintain euglycemia.            Patient requires continuous monitoring with bedside rhythm monitoring, pulse oximetry monitoring, and continuous central venous and arterial pressure monitoring; and intermittent blood gas analysis. Care plan discussed with the ICU care team.   Patient remained critical, at risk for life threatening decompensation.    I have spent 30 minutes providing critical care management to this patient.    By signing my name below, I, Tony Medrano, attest that this documentation has been prepared under the direction and in the presence of Landy James MD   Electronically signed: Fiorella Garcia, 12-19-22 @ 17:43    I, Landy James, personally performed the services described in this documentation. all medical record entries made by the scribe were at my direction and in my presence. I have reviewed the chart and agree that the record reflects my personal performance and is accurate and complete  Electronically signed: Landy James MD

## 2022-12-20 PROBLEM — Z00.00 ENCOUNTER FOR PREVENTIVE HEALTH EXAMINATION: Status: ACTIVE | Noted: 2022-12-20

## 2022-12-20 LAB
ALBUMIN SERPL ELPH-MCNC: 3.6 G/DL — SIGNIFICANT CHANGE UP (ref 3.3–5)
ALP SERPL-CCNC: 40 U/L — SIGNIFICANT CHANGE UP (ref 40–120)
ALT FLD-CCNC: 33 U/L — SIGNIFICANT CHANGE UP (ref 10–45)
ANION GAP SERPL CALC-SCNC: 9 MMOL/L — SIGNIFICANT CHANGE UP (ref 5–17)
APTT BLD: 35 SEC — SIGNIFICANT CHANGE UP (ref 27.5–35.5)
AST SERPL-CCNC: 39 U/L — SIGNIFICANT CHANGE UP (ref 10–40)
BASOPHILS # BLD AUTO: 0.02 K/UL — SIGNIFICANT CHANGE UP (ref 0–0.2)
BASOPHILS NFR BLD AUTO: 0.2 % — SIGNIFICANT CHANGE UP (ref 0–2)
BILIRUB SERPL-MCNC: 0.7 MG/DL — SIGNIFICANT CHANGE UP (ref 0.2–1.2)
BUN SERPL-MCNC: 15 MG/DL — SIGNIFICANT CHANGE UP (ref 7–23)
CALCIUM SERPL-MCNC: 8.2 MG/DL — LOW (ref 8.4–10.5)
CHLORIDE SERPL-SCNC: 108 MMOL/L — SIGNIFICANT CHANGE UP (ref 96–108)
CO2 SERPL-SCNC: 24 MMOL/L — SIGNIFICANT CHANGE UP (ref 22–31)
CREAT SERPL-MCNC: 0.85 MG/DL — SIGNIFICANT CHANGE UP (ref 0.5–1.3)
EGFR: 105 ML/MIN/1.73M2 — SIGNIFICANT CHANGE UP
EOSINOPHIL # BLD AUTO: 0 K/UL — SIGNIFICANT CHANGE UP (ref 0–0.5)
EOSINOPHIL NFR BLD AUTO: 0 % — SIGNIFICANT CHANGE UP (ref 0–6)
GAS PNL BLDA: SIGNIFICANT CHANGE UP
GLUCOSE BLDC GLUCOMTR-MCNC: 124 MG/DL — HIGH (ref 70–99)
GLUCOSE BLDC GLUCOMTR-MCNC: 155 MG/DL — HIGH (ref 70–99)
GLUCOSE BLDC GLUCOMTR-MCNC: 225 MG/DL — HIGH (ref 70–99)
GLUCOSE BLDC GLUCOMTR-MCNC: 242 MG/DL — HIGH (ref 70–99)
GLUCOSE BLDC GLUCOMTR-MCNC: 260 MG/DL — HIGH (ref 70–99)
GLUCOSE BLDC GLUCOMTR-MCNC: 264 MG/DL — HIGH (ref 70–99)
GLUCOSE BLDC GLUCOMTR-MCNC: 270 MG/DL — HIGH (ref 70–99)
GLUCOSE BLDC GLUCOMTR-MCNC: 270 MG/DL — HIGH (ref 70–99)
GLUCOSE BLDC GLUCOMTR-MCNC: 296 MG/DL — HIGH (ref 70–99)
GLUCOSE BLDC GLUCOMTR-MCNC: 300 MG/DL — HIGH (ref 70–99)
GLUCOSE BLDC GLUCOMTR-MCNC: 303 MG/DL — HIGH (ref 70–99)
GLUCOSE BLDC GLUCOMTR-MCNC: 76 MG/DL — SIGNIFICANT CHANGE UP (ref 70–99)
GLUCOSE BLDC GLUCOMTR-MCNC: 79 MG/DL — SIGNIFICANT CHANGE UP (ref 70–99)
GLUCOSE BLDC GLUCOMTR-MCNC: 80 MG/DL — SIGNIFICANT CHANGE UP (ref 70–99)
GLUCOSE BLDC GLUCOMTR-MCNC: 88 MG/DL — SIGNIFICANT CHANGE UP (ref 70–99)
GLUCOSE BLDC GLUCOMTR-MCNC: 91 MG/DL — SIGNIFICANT CHANGE UP (ref 70–99)
GLUCOSE BLDC GLUCOMTR-MCNC: 97 MG/DL — SIGNIFICANT CHANGE UP (ref 70–99)
GLUCOSE SERPL-MCNC: 77 MG/DL — SIGNIFICANT CHANGE UP (ref 70–99)
HCT VFR BLD CALC: 31.6 % — LOW (ref 39–50)
HGB BLD-MCNC: 10.6 G/DL — LOW (ref 13–17)
IMM GRANULOCYTES NFR BLD AUTO: 0.6 % — SIGNIFICANT CHANGE UP (ref 0–0.9)
INR BLD: 1.54 RATIO — HIGH (ref 0.88–1.16)
LYMPHOCYTES # BLD AUTO: 1.17 K/UL — SIGNIFICANT CHANGE UP (ref 1–3.3)
LYMPHOCYTES # BLD AUTO: 13.6 % — SIGNIFICANT CHANGE UP (ref 13–44)
MAGNESIUM SERPL-MCNC: 1.9 MG/DL — SIGNIFICANT CHANGE UP (ref 1.6–2.6)
MCHC RBC-ENTMCNC: 29.3 PG — SIGNIFICANT CHANGE UP (ref 27–34)
MCHC RBC-ENTMCNC: 33.5 GM/DL — SIGNIFICANT CHANGE UP (ref 32–36)
MCV RBC AUTO: 87.3 FL — SIGNIFICANT CHANGE UP (ref 80–100)
MONOCYTES # BLD AUTO: 1.11 K/UL — HIGH (ref 0–0.9)
MONOCYTES NFR BLD AUTO: 12.9 % — SIGNIFICANT CHANGE UP (ref 2–14)
NEUTROPHILS # BLD AUTO: 6.26 K/UL — SIGNIFICANT CHANGE UP (ref 1.8–7.4)
NEUTROPHILS NFR BLD AUTO: 72.7 % — SIGNIFICANT CHANGE UP (ref 43–77)
NRBC # BLD: 0 /100 WBCS — SIGNIFICANT CHANGE UP (ref 0–0)
PHOSPHATE SERPL-MCNC: 4 MG/DL — SIGNIFICANT CHANGE UP (ref 2.5–4.5)
PLATELET # BLD AUTO: 147 K/UL — LOW (ref 150–400)
POTASSIUM SERPL-MCNC: 4 MMOL/L — SIGNIFICANT CHANGE UP (ref 3.5–5.3)
POTASSIUM SERPL-SCNC: 4 MMOL/L — SIGNIFICANT CHANGE UP (ref 3.5–5.3)
PROT SERPL-MCNC: 5.6 G/DL — LOW (ref 6–8.3)
PROTHROM AB SERPL-ACNC: 17.9 SEC — HIGH (ref 10.5–13.4)
RBC # BLD: 3.62 M/UL — LOW (ref 4.2–5.8)
RBC # FLD: 12.5 % — SIGNIFICANT CHANGE UP (ref 10.3–14.5)
SODIUM SERPL-SCNC: 141 MMOL/L — SIGNIFICANT CHANGE UP (ref 135–145)
WBC # BLD: 8.61 K/UL — SIGNIFICANT CHANGE UP (ref 3.8–10.5)
WBC # FLD AUTO: 8.61 K/UL — SIGNIFICANT CHANGE UP (ref 3.8–10.5)

## 2022-12-20 PROCEDURE — 71045 X-RAY EXAM CHEST 1 VIEW: CPT | Mod: 26

## 2022-12-20 PROCEDURE — 93010 ELECTROCARDIOGRAM REPORT: CPT

## 2022-12-20 PROCEDURE — 99291 CRITICAL CARE FIRST HOUR: CPT

## 2022-12-20 RX ORDER — DEXTROSE 50 % IN WATER 50 %
25 SYRINGE (ML) INTRAVENOUS
Refills: 0 | Status: DISCONTINUED | OUTPATIENT
Start: 2022-12-20 | End: 2022-12-26

## 2022-12-20 RX ORDER — MAGNESIUM SULFATE 500 MG/ML
1 VIAL (ML) INJECTION ONCE
Refills: 0 | Status: COMPLETED | OUTPATIENT
Start: 2022-12-20 | End: 2022-12-20

## 2022-12-20 RX ORDER — HYDROMORPHONE HYDROCHLORIDE 2 MG/ML
0.5 INJECTION INTRAMUSCULAR; INTRAVENOUS; SUBCUTANEOUS ONCE
Refills: 0 | Status: DISCONTINUED | OUTPATIENT
Start: 2022-12-20 | End: 2022-12-20

## 2022-12-20 RX ORDER — PANTOPRAZOLE SODIUM 20 MG/1
40 TABLET, DELAYED RELEASE ORAL
Refills: 0 | Status: DISCONTINUED | OUTPATIENT
Start: 2022-12-20 | End: 2022-12-26

## 2022-12-20 RX ORDER — INSULIN GLARGINE 100 [IU]/ML
40 INJECTION, SOLUTION SUBCUTANEOUS AT BEDTIME
Refills: 0 | Status: DISCONTINUED | OUTPATIENT
Start: 2022-12-20 | End: 2022-12-22

## 2022-12-20 RX ORDER — METOPROLOL TARTRATE 50 MG
25 TABLET ORAL EVERY 12 HOURS
Refills: 0 | Status: DISCONTINUED | OUTPATIENT
Start: 2022-12-20 | End: 2022-12-20

## 2022-12-20 RX ORDER — INSULIN LISPRO 100/ML
12 VIAL (ML) SUBCUTANEOUS
Refills: 0 | Status: DISCONTINUED | OUTPATIENT
Start: 2022-12-20 | End: 2022-12-21

## 2022-12-20 RX ORDER — GLUCAGON INJECTION, SOLUTION 0.5 MG/.1ML
1 INJECTION, SOLUTION SUBCUTANEOUS ONCE
Refills: 0 | Status: DISCONTINUED | OUTPATIENT
Start: 2022-12-20 | End: 2022-12-26

## 2022-12-20 RX ORDER — INSULIN LISPRO 100/ML
VIAL (ML) SUBCUTANEOUS
Refills: 0 | Status: DISCONTINUED | OUTPATIENT
Start: 2022-12-20 | End: 2022-12-26

## 2022-12-20 RX ORDER — ATORVASTATIN CALCIUM 80 MG/1
40 TABLET, FILM COATED ORAL AT BEDTIME
Refills: 0 | Status: DISCONTINUED | OUTPATIENT
Start: 2022-12-20 | End: 2022-12-26

## 2022-12-20 RX ORDER — ALBUMIN HUMAN 25 %
250 VIAL (ML) INTRAVENOUS ONCE
Refills: 0 | Status: COMPLETED | OUTPATIENT
Start: 2022-12-20 | End: 2022-12-20

## 2022-12-20 RX ORDER — INSULIN LISPRO 100/ML
10 VIAL (ML) SUBCUTANEOUS
Refills: 0 | Status: DISCONTINUED | OUTPATIENT
Start: 2022-12-20 | End: 2022-12-20

## 2022-12-20 RX ORDER — INSULIN HUMAN 100 [IU]/ML
3 INJECTION, SOLUTION SUBCUTANEOUS
Qty: 100 | Refills: 0 | Status: DISCONTINUED | OUTPATIENT
Start: 2022-12-20 | End: 2022-12-21

## 2022-12-20 RX ORDER — METOPROLOL TARTRATE 50 MG
25 TABLET ORAL ONCE
Refills: 0 | Status: COMPLETED | OUTPATIENT
Start: 2022-12-20 | End: 2022-12-20

## 2022-12-20 RX ORDER — METOPROLOL TARTRATE 50 MG
25 TABLET ORAL EVERY 8 HOURS
Refills: 0 | Status: DISCONTINUED | OUTPATIENT
Start: 2022-12-21 | End: 2022-12-21

## 2022-12-20 RX ORDER — DEXTROSE 50 % IN WATER 50 %
15 SYRINGE (ML) INTRAVENOUS ONCE
Refills: 0 | Status: DISCONTINUED | OUTPATIENT
Start: 2022-12-20 | End: 2022-12-26

## 2022-12-20 RX ORDER — METOPROLOL TARTRATE 50 MG
25 TABLET ORAL EVERY 8 HOURS
Refills: 0 | Status: DISCONTINUED | OUTPATIENT
Start: 2022-12-20 | End: 2022-12-20

## 2022-12-20 RX ORDER — DEXTROSE 50 % IN WATER 50 %
50 SYRINGE (ML) INTRAVENOUS
Refills: 0 | Status: DISCONTINUED | OUTPATIENT
Start: 2022-12-20 | End: 2022-12-26

## 2022-12-20 RX ORDER — ENOXAPARIN SODIUM 100 MG/ML
40 INJECTION SUBCUTANEOUS EVERY 24 HOURS
Refills: 0 | Status: DISCONTINUED | OUTPATIENT
Start: 2022-12-20 | End: 2022-12-26

## 2022-12-20 RX ORDER — METOPROLOL TARTRATE 50 MG
25 TABLET ORAL
Refills: 0 | Status: DISCONTINUED | OUTPATIENT
Start: 2022-12-20 | End: 2022-12-20

## 2022-12-20 RX ORDER — INSULIN GLARGINE 100 [IU]/ML
36 INJECTION, SOLUTION SUBCUTANEOUS AT BEDTIME
Refills: 0 | Status: DISCONTINUED | OUTPATIENT
Start: 2022-12-20 | End: 2022-12-20

## 2022-12-20 RX ORDER — TAMSULOSIN HYDROCHLORIDE 0.4 MG/1
0.4 CAPSULE ORAL AT BEDTIME
Refills: 0 | Status: DISCONTINUED | OUTPATIENT
Start: 2022-12-20 | End: 2022-12-21

## 2022-12-20 RX ADMIN — PANTOPRAZOLE SODIUM 40 MILLIGRAM(S): 20 TABLET, DELAYED RELEASE ORAL at 09:00

## 2022-12-20 RX ADMIN — Medication 500 MILLIGRAM(S): at 06:36

## 2022-12-20 RX ADMIN — Medication 650 MILLIGRAM(S): at 06:36

## 2022-12-20 RX ADMIN — Medication 100 GRAM(S): at 09:04

## 2022-12-20 RX ADMIN — HYDROMORPHONE HYDROCHLORIDE 0.5 MILLIGRAM(S): 2 INJECTION INTRAMUSCULAR; INTRAVENOUS; SUBCUTANEOUS at 05:45

## 2022-12-20 RX ADMIN — HYDROMORPHONE HYDROCHLORIDE 0.5 MILLIGRAM(S): 2 INJECTION INTRAMUSCULAR; INTRAVENOUS; SUBCUTANEOUS at 11:31

## 2022-12-20 RX ADMIN — GABAPENTIN 100 MILLIGRAM(S): 400 CAPSULE ORAL at 22:28

## 2022-12-20 RX ADMIN — Medication 100 MILLIGRAM(S): at 20:31

## 2022-12-20 RX ADMIN — HYDROMORPHONE HYDROCHLORIDE 0.5 MILLIGRAM(S): 2 INJECTION INTRAMUSCULAR; INTRAVENOUS; SUBCUTANEOUS at 09:47

## 2022-12-20 RX ADMIN — HYDROMORPHONE HYDROCHLORIDE 0.5 MILLIGRAM(S): 2 INJECTION INTRAMUSCULAR; INTRAVENOUS; SUBCUTANEOUS at 16:14

## 2022-12-20 RX ADMIN — GABAPENTIN 100 MILLIGRAM(S): 400 CAPSULE ORAL at 06:40

## 2022-12-20 RX ADMIN — ENOXAPARIN SODIUM 40 MILLIGRAM(S): 100 INJECTION SUBCUTANEOUS at 09:14

## 2022-12-20 RX ADMIN — Medication 125 MILLILITER(S): at 13:05

## 2022-12-20 RX ADMIN — HYDROMORPHONE HYDROCHLORIDE 0.5 MILLIGRAM(S): 2 INJECTION INTRAMUSCULAR; INTRAVENOUS; SUBCUTANEOUS at 05:30

## 2022-12-20 RX ADMIN — Medication 650 MILLIGRAM(S): at 17:14

## 2022-12-20 RX ADMIN — Medication 650 MILLIGRAM(S): at 12:37

## 2022-12-20 RX ADMIN — INSULIN HUMAN 3 UNIT(S)/HR: 100 INJECTION, SOLUTION SUBCUTANEOUS at 23:05

## 2022-12-20 RX ADMIN — Medication 650 MILLIGRAM(S): at 17:34

## 2022-12-20 RX ADMIN — OXYCODONE HYDROCHLORIDE 5 MILLIGRAM(S): 5 TABLET ORAL at 10:46

## 2022-12-20 RX ADMIN — HYDROMORPHONE HYDROCHLORIDE 0.5 MILLIGRAM(S): 2 INJECTION INTRAMUSCULAR; INTRAVENOUS; SUBCUTANEOUS at 17:36

## 2022-12-20 RX ADMIN — HYDROMORPHONE HYDROCHLORIDE 0.5 MILLIGRAM(S): 2 INJECTION INTRAMUSCULAR; INTRAVENOUS; SUBCUTANEOUS at 21:52

## 2022-12-20 RX ADMIN — GABAPENTIN 100 MILLIGRAM(S): 400 CAPSULE ORAL at 13:18

## 2022-12-20 RX ADMIN — AMIODARONE HYDROCHLORIDE 400 MILLIGRAM(S): 400 TABLET ORAL at 17:15

## 2022-12-20 RX ADMIN — HYDROMORPHONE HYDROCHLORIDE 0.5 MILLIGRAM(S): 2 INJECTION INTRAMUSCULAR; INTRAVENOUS; SUBCUTANEOUS at 09:43

## 2022-12-20 RX ADMIN — OXYCODONE HYDROCHLORIDE 10 MILLIGRAM(S): 5 TABLET ORAL at 20:26

## 2022-12-20 RX ADMIN — HYDROMORPHONE HYDROCHLORIDE 0.5 MILLIGRAM(S): 2 INJECTION INTRAMUSCULAR; INTRAVENOUS; SUBCUTANEOUS at 17:37

## 2022-12-20 RX ADMIN — Medication 325 MILLIGRAM(S): at 12:37

## 2022-12-20 RX ADMIN — OXYCODONE HYDROCHLORIDE 10 MILLIGRAM(S): 5 TABLET ORAL at 13:18

## 2022-12-20 RX ADMIN — ATORVASTATIN CALCIUM 40 MILLIGRAM(S): 80 TABLET, FILM COATED ORAL at 22:29

## 2022-12-20 RX ADMIN — Medication 500 MILLIGRAM(S): at 17:15

## 2022-12-20 RX ADMIN — AMIODARONE HYDROCHLORIDE 400 MILLIGRAM(S): 400 TABLET ORAL at 06:36

## 2022-12-20 RX ADMIN — Medication 125 MILLILITER(S): at 18:23

## 2022-12-20 RX ADMIN — Medication 25 MILLIGRAM(S): at 15:27

## 2022-12-20 RX ADMIN — Medication 650 MILLIGRAM(S): at 12:38

## 2022-12-20 RX ADMIN — INSULIN HUMAN 3 UNIT(S)/HR: 100 INJECTION, SOLUTION SUBCUTANEOUS at 13:05

## 2022-12-20 RX ADMIN — POLYETHYLENE GLYCOL 3350 17 GRAM(S): 17 POWDER, FOR SOLUTION ORAL at 12:36

## 2022-12-20 RX ADMIN — Medication 100 MILLIGRAM(S): at 12:36

## 2022-12-20 RX ADMIN — Medication 100 MILLIGRAM(S): at 04:10

## 2022-12-20 RX ADMIN — Medication 25 MILLIGRAM(S): at 09:20

## 2022-12-20 RX ADMIN — OXYCODONE HYDROCHLORIDE 5 MILLIGRAM(S): 5 TABLET ORAL at 10:48

## 2022-12-20 RX ADMIN — SENNA PLUS 2 TABLET(S): 8.6 TABLET ORAL at 22:28

## 2022-12-20 RX ADMIN — OXYCODONE HYDROCHLORIDE 10 MILLIGRAM(S): 5 TABLET ORAL at 13:25

## 2022-12-20 RX ADMIN — Medication 650 MILLIGRAM(S): at 00:16

## 2022-12-20 RX ADMIN — TAMSULOSIN HYDROCHLORIDE 0.4 MILLIGRAM(S): 0.4 CAPSULE ORAL at 22:29

## 2022-12-20 RX ADMIN — HYDROMORPHONE HYDROCHLORIDE 0.5 MILLIGRAM(S): 2 INJECTION INTRAMUSCULAR; INTRAVENOUS; SUBCUTANEOUS at 11:23

## 2022-12-20 NOTE — PHYSICAL THERAPY INITIAL EVALUATION ADULT - WEIGHT-BEARING RESTRICTIONS: GAIT, REHAB EVAL
Group Topic:  Group Psychotherapy    Date: 6/8/2022  Start Time: 10:15 AM  End Time: 11:15 AM  Facilitators: Marcellus Cervantes LCSW    Focus: The focus of todays group was on peoples challenges managing their anger and ways to improve it as evidenced by working on grounding and relaxation skills.   Number in attendance: 7    Pt. was an active participant in group and shared about their challenges with managing their anger in healhty ways.  They were attentive to a group discussion about ways to best cope with anger. Therapist offered empathic validation and encouragement that pt. focus on healthy self care. Pt. encouraged her to work on healthy self soothing strategies as well as healthy ways to mobilize their anger in positive directions.  Open and receptive to feedback. Offered support to peers. Pleasant and cooperative. Services provided via remote technology.  60 minutes spent with the pt using this technology.       Method: Group  Attendance: Present  Participation: Active  Patient Response: Appropriate feedback and Attentive  Mood: Anxious and Depressed  Affect: Type: Anxious and Depressed   Range: Blunted/flat   Congruency: Congruent   Stability: Stable  Behavior/Socialization: Cooperative  Thought Process: Focused  Task Performance: Follows directions  Additional Information:  Psychosocial Stressors: Health  Symptom Notations: anxious, depressed, engaged.   Patient Evaluation: Encouragement - needs prompts       full weight-bearing

## 2022-12-20 NOTE — OCCUPATIONAL THERAPY INITIAL EVALUATION ADULT - LIVES WITH, PROFILE
as per chart and confirmed with pt, Pt lives in private house w wife with 1 flight of stairs to enter, no stairs inside, independently performs ADLs and ambulates without AD. Reports 0 falls within the last year./children/spouse

## 2022-12-20 NOTE — PHYSICAL THERAPY INITIAL EVALUATION ADULT - PLANNED THERAPY INTERVENTIONS, PT EVAL
GOAL: Pt will negotiate 1 flight of stairs using handrail independently to ensure safe home entry, within 2 weeks./balance training/bed mobility training/gait training/strengthening/transfer training

## 2022-12-20 NOTE — PROGRESS NOTE ADULT - SUBJECTIVE AND OBJECTIVE BOX
Patient seen and examined at the bedside.    Remained critically ill on continuous ICU monitoring.    Brief Summary:  52y M CAD s/p C4L 12/19/22.     24 Hour events:      OBJECTIVE:  Vital Signs Last 24 Hrs  T(C): 37.7 (20 Dec 2022 04:00), Max: 38.1 (19 Dec 2022 23:00)  T(F): 99.9 (20 Dec 2022 04:00), Max: 100.6 (19 Dec 2022 23:00)  HR: 101 (20 Dec 2022 07:00) (84 - 108)  BP: --  BP(mean): --  RR: 11 (20 Dec 2022 07:00) (4 - 30)  SpO2: 98% (20 Dec 2022 07:00) (98% - 100%)    Parameters below as of 20 Dec 2022 04:00  Patient On (Oxygen Delivery Method): mask, aerosol  O2 Concentration (%): 40    Mode: CPAP with PS  FiO2: 40  PEEP: 5  PS: 5  MAP: 7  PIP: 12            Physical Exam:   General: NAD   Neurology: sedated   Eyes: bilateral pupils equal and reactive   ENT/Neck: +ETT midline, Neck supple, trachea midline, No JVD   Respiratory: Clear bilaterally   CV: S1S2, no murmurs        [x] Sternal dressing, [x] Mediastinal CT x2, [x] Pleural CT        [x] Sinus rhythm  Abdominal: Soft, NT, ND +BS  Extremities: No pedal edema noted, + peripheral pulses   Skin: No Rashes, Hematoma, Ecchymosis                       -------------------------------------------------------------------------------------------------------------------------------                        10.6   8.61  )-----------( 147      ( 20 Dec 2022 02:11 )             31.6     12-20    141  |  108  |  15  ----------------------------<  77  4.0   |  24  |  0.85    Ca    8.2<L>      20 Dec 2022 02:11  Phos  4.0     12-20  Mg     1.9     12-20    TPro  5.6<L>  /  Alb  3.6  /  TBili  0.7  /  DBili  x   /  AST  39  /  ALT  33  /  AlkPhos  40  12-20    LIVER FUNCTIONS - ( 20 Dec 2022 02:11 )  Alb: 3.6 g/dL / Pro: 5.6 g/dL / ALK PHOS: 40 U/L / ALT: 33 U/L / AST: 39 U/L / GGT: x           PT/INR - ( 20 Dec 2022 02:11 )   PT: 17.9 sec;   INR: 1.54 ratio         PTT - ( 20 Dec 2022 02:11 )  PTT:35.0 sec  ABG - ( 20 Dec 2022 05:57 )  pH, Arterial: 7.38  pH, Blood: x     /  pCO2: 41    /  pO2: 107   / HCO3: 24    / Base Excess: -0.8  /  SaO2: 98.7    ------------------------------------------------------------------------------------------------------------------------------  Assessment:  52M w/ HTN, T2DM, GERD who is transferred from OSH for ACS evaluation/management. Per patient yesterday at 3 am he was woken up from his sleep for crushing chest pain that radiated to his left side w/ associated numbness and tinging. He endorses sweats, and small bouts of emesis x2. He states he has never experienced pain like this in the past, however it has resolved. He has been recently seeing a gastroenterology for worsening reflux over to past 4-6 months for which he was taking omeprazole. he states he misses his medications up to 2-3x weekly. He denies previous MIs, cardiac surgeries or hospitalizations He endorses smoking 5 cig/day for 36 years.    CAD s/p CABG 12/19/2022  Hemodynamic instability  Hypovolemia  Post op respiratory insufficiency  Acute blood loss anemia  Thrombocytopenia    Plan:   ***Neuro***  Patient sedated with precedex   Postoperative acute pain control with oxycodone, tylenol, and dilaudid.     ***Cardiovascular***  Invasive hemodynamic monitoring, assess perfusion indices   At high risk for hemodynamic instability and cardiac arrhythmias.  IVF for perioperative hypovolemia.  Amiodarone for afib prophylaxis   lopressor for rate control   ASA daily   Monitor chest tube output.    ***Pulmonary***  Postoperative acute respiratory insufficiency  Deep breathing and coughing exercises.  Wean oxygen as able.    ***GI***  NPO for now.  Protonix for stress ulcer prophylaxis   Bowel regimen with Miralax and Senna     ***Renal***  Radford catheter for strict I/O measurements.   Replete electrolytes as indicated.    ***ID***  Perioperative coverage with Cefuroxime.    ***Endocrine***  Hx of DM2, insulin as needed to maintain euglycemia.      Patient requires continuous monitoring with bedside rhythm monitoring, pulse oximetry monitoring, and continuous central venous and arterial pressure monitoring; and intermittent blood gas analysis. Care plan discussed with the ICU care team.   Patient remained critical, at risk for life threatening decompensation.    I have spent 30 minutes providing critical care management to this patient.    By signing my name below, I, Linda Lieberman, attest that this documentation has been prepared under the direction and in the presence of Yahaira Clifton MD  Electronically signed: Fiorella Moraes, 12-20-22 @ 07:23    I, Yahaira Clifton MD, personally performed the services described in this documentation. all medical record entries made by the scribe were at my direction and in my presence. I have reviewed the chart and agree that the record reflects my personal performance and is accurate and complete  Electronically signed: Yahaira Clifton MD Patient seen and examined at the bedside.    Remained critically ill on continuous ICU monitoring.    Brief Summary:  52y M CAD s/p CABG x 4 on 12/19/22.     24 Hour events:  No acute events overnight.  Given additional volume and started on beta blocker this morning.    OBJECTIVE:  Vital Signs Last 24 Hrs  T(C): 37.7 (20 Dec 2022 04:00), Max: 38.1 (19 Dec 2022 23:00)  T(F): 99.9 (20 Dec 2022 04:00), Max: 100.6 (19 Dec 2022 23:00)  HR: 101 (20 Dec 2022 07:00) (84 - 108)  BP: --  BP(mean): --  RR: 11 (20 Dec 2022 07:00) (4 - 30)  SpO2: 98% (20 Dec 2022 07:00) (98% - 100%)    Parameters below as of 20 Dec 2022 04:00  Patient On (Oxygen Delivery Method): mask, aerosol  O2 Concentration (%): 40    Mode: CPAP with PS  FiO2: 40  PEEP: 5  PS: 5  MAP: 7  PIP: 12            Physical Exam:   General: NAD   Neurology: Awake and interactive, no new focal deficits.  Eyes: Bilateral pupils equal and reactive   ENT/Neck: +ETT midline, Neck supple, trachea midline, No JVD   Respiratory: Clear bilaterally   CV: S1S2, no murmurs        [x] Sternal dressing, [x] Mediastinal CT x2, [x] Pleural CT        [x] Sinus rhythm  Abdominal: Soft, NT, ND +BS  Extremities: No pedal edema noted, + peripheral pulses                        -------------------------------------------------------------------------------------------------------------------------------                        10.6   8.61  )-----------( 147      ( 20 Dec 2022 02:11 )             31.6     12-20    141  |  108  |  15  ----------------------------<  77  4.0   |  24  |  0.85    Ca    8.2<L>      20 Dec 2022 02:11  Phos  4.0     12-20  Mg     1.9     12-20    TPro  5.6<L>  /  Alb  3.6  /  TBili  0.7  /  DBili  x   /  AST  39  /  ALT  33  /  AlkPhos  40  12-20    LIVER FUNCTIONS - ( 20 Dec 2022 02:11 )  Alb: 3.6 g/dL / Pro: 5.6 g/dL / ALK PHOS: 40 U/L / ALT: 33 U/L / AST: 39 U/L / GGT: x           PT/INR - ( 20 Dec 2022 02:11 )   PT: 17.9 sec;   INR: 1.54 ratio         PTT - ( 20 Dec 2022 02:11 )  PTT:35.0 sec  ABG - ( 20 Dec 2022 05:57 )  pH, Arterial: 7.38  pH, Blood: x     /  pCO2: 41    /  pO2: 107   / HCO3: 24    / Base Excess: -0.8  /  SaO2: 98.7    ------------------------------------------------------------------------------------------------------------------------------  Assessment:  52M w/ CAD s/p CABG 12/19/2022    At highr risk for hemodynamic instability  At high risk for cardiac arrhythmias  Hypovolemia  Post op respiratory insufficiency  Acute blood loss anemia      Plan:   ***Neuro***  Postoperative acute pain control with oxycodone, tylenol, and dilaudid.     ***Cardiovascular***  Invasive hemodynamic monitoring, assess perfusion indices   At high risk for hemodynamic instability and cardiac arrhythmias.  IVF for perioperative hypovolemia.  Amiodarone for afib prophylaxis   Beta blocker for rate control   ASA/statin daily   Monitor chest tube output.    ***Pulmonary***  Postoperative acute respiratory insufficiency  Deep breathing and coughing exercises.  Wean oxygen as able.    ***GI***  Tolerating diet  Protonix for stress ulcer prophylaxis   Bowel regimen with Miralax and Senna     ***Renal***  Trend creatinine   Replete electrolytes as indicated.    ***ID***  Perioperative antibiotics.    ***Endocrine***  Hx of DM2, insulin as needed to maintain euglycemia.      Patient requires continuous monitoring with bedside rhythm monitoring, pulse oximetry monitoring, and continuous central venous and arterial pressure monitoring; and intermittent blood gas analysis. Care plan discussed with the ICU care team.   Patient remained critical, at risk for life threatening decompensation.    I have spent 35 minutes providing critical care management to this patient.    By signing my name below, I, Linda Lieberman, attest that this documentation has been prepared under the direction and in the presence of Yahaira Clifton MD  Electronically signed: Fiorella Moraes, 12-20-22 @ 07:23    I, Yahaira Clifton MD, personally performed the services described in this documentation. all medical record entries made by the scribe were at my direction and in my presence. I have reviewed the chart and agree that the record reflects my personal performance and is accurate and complete  Electronically signed: Yahaira Clifton MD

## 2022-12-20 NOTE — PHYSICAL THERAPY INITIAL EVALUATION ADULT - PERTINENT HX OF CURRENT PROBLEM, REHAB EVAL
Pt is a 52y M w/ HTN, T2DM, GERD who is transferred from OSH for ACS evaluation/management. Pt adm after waking up from sleep from crushing chest pain that radiated to his left side w/ associated numbness and tinging, endorsed sweats, small bouts of emesis x2. He states he has never experienced pain like this in the past, however it has resolved. He has been recently seeing a gastroenterology for worsening reflux over to past 4-6 months for which he was taking omeprazole. he states he misses his medications up to 2-3x weekly. He denies previous MIs, cardiac surgeries or hospitalizations He endorses smoking 5 cig/day for 36 years. 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 70% occlusion of pLAD 80% occlusion of pDaig, 100% occlusion of Lcx, 80% occlusion of pOM, 70% occlusion of pRPL. Patient admitted to CCU for management. Pt now s/p CABG 12/19.

## 2022-12-20 NOTE — PROGRESS NOTE ADULT - SUBJECTIVE AND OBJECTIVE BOX
Chief complaint    Patient is a 52y old  Male who presents with a chief complaint of C4L (20 Dec 2022 07:22)   Review of systems  Patient in bed, appears comfortable.    Labs and Fingersticks  CAPILLARY BLOOD GLUCOSE      POCT Blood Glucose.: 300 mg/dL (20 Dec 2022 15:58)  POCT Blood Glucose.: 270 mg/dL (20 Dec 2022 15:01)  POCT Blood Glucose.: 264 mg/dL (20 Dec 2022 13:47)  POCT Blood Glucose.: 303 mg/dL (20 Dec 2022 12:47)  POCT Blood Glucose.: 296 mg/dL (20 Dec 2022 11:46)  POCT Blood Glucose.: 76 mg/dL (20 Dec 2022 06:50)  POCT Blood Glucose.: 88 mg/dL (20 Dec 2022 05:22)  POCT Blood Glucose.: 91 mg/dL (20 Dec 2022 04:00)  POCT Blood Glucose.: 80 mg/dL (20 Dec 2022 03:05)  POCT Blood Glucose.: 79 mg/dL (20 Dec 2022 01:35)  POCT Blood Glucose.: 180 mg/dL (19 Dec 2022 22:50)  POCT Blood Glucose.: 199 mg/dL (19 Dec 2022 21:58)  POCT Blood Glucose.: 227 mg/dL (19 Dec 2022 21:16)  POCT Blood Glucose.: 131 mg/dL (19 Dec 2022 20:03)  POCT Blood Glucose.: 143 mg/dL (19 Dec 2022 19:09)      Anion Gap, Serum: 9 (12-20 @ 02:11)  Anion Gap, Serum: 9 (12-19 @ 20:38)  Anion Gap, Serum: 12 (12-19 @ 15:09)      Calcium, Total Serum: 8.2 *L* (12-20 @ 02:11)  Calcium, Total Serum: 8.5 (12-19 @ 20:38)  Calcium, Total Serum: 8.3 *L* (12-19 @ 15:09)  Albumin, Serum: 3.6 (12-20 @ 02:11)  Albumin, Serum: 3.2 *L* (12-19 @ 15:09)    Alanine Aminotransferase (ALT/SGPT): 33 (12-20 @ 02:11)  Alanine Aminotransferase (ALT/SGPT): 33 (12-19 @ 15:09)  Alkaline Phosphatase, Serum: 40 (12-20 @ 02:11)  Alkaline Phosphatase, Serum: 42 (12-19 @ 15:09)  Aspartate Aminotransferase (AST/SGOT): 39 (12-20 @ 02:11)  Aspartate Aminotransferase (AST/SGOT): 40 (12-19 @ 15:09)        12-20    141  |  108  |  15  ----------------------------<  77  4.0   |  24  |  0.85    Ca    8.2<L>      20 Dec 2022 02:11  Phos  4.0     12-20  Mg     1.9     12-20    TPro  5.6<L>  /  Alb  3.6  /  TBili  0.7  /  DBili  x   /  AST  39  /  ALT  33  /  AlkPhos  40  12-20                        10.6   8.61  )-----------( 147      ( 20 Dec 2022 02:11 )             31.6     Medications  MEDICATIONS  (STANDING):  acetaminophen     Tablet .. 650 milliGRAM(s) Oral every 6 hours  aMIOdarone    Tablet 400 milliGRAM(s) Oral two times a day  ascorbic acid 500 milliGRAM(s) Oral two times a day  aspirin enteric coated 325 milliGRAM(s) Oral daily  atorvastatin 40 milliGRAM(s) Oral at bedtime  cefuroxime  IVPB 1500 milliGRAM(s) IV Intermittent every 8 hours  chlorhexidine 2% Cloths 1 Application(s) Topical daily  dextrose 50% Injectable 50 milliLiter(s) IV Push every 15 minutes  dextrose 50% Injectable 25 milliLiter(s) IV Push every 15 minutes  dextrose 50% Injectable 50 milliLiter(s) IV Push every 15 minutes  dextrose Oral Gel 15 Gram(s) Oral once  enoxaparin Injectable 40 milliGRAM(s) SubCutaneous every 24 hours  gabapentin 100 milliGRAM(s) Oral every 8 hours  glucagon  Injectable 1 milliGRAM(s) IntraMuscular once  insulin glargine Injectable (LANTUS) 36 Unit(s) SubCutaneous at bedtime  insulin lispro (ADMELOG) corrective regimen sliding scale   SubCutaneous three times a day before meals  insulin lispro Injectable (ADMELOG) 10 Unit(s) SubCutaneous three times a day before meals  insulin regular Infusion 3 Unit(s)/Hr (3 mL/Hr) IV Continuous <Continuous>  metoprolol tartrate 25 milliGRAM(s) Oral every 8 hours  pantoprazole    Tablet 40 milliGRAM(s) Oral before breakfast  polyethylene glycol 3350 17 Gram(s) Oral daily  potassium chloride  10 mEq/50 mL IVPB 10 milliEquivalent(s) IV Intermittent every 1 hour  potassium chloride  10 mEq/50 mL IVPB 10 milliEquivalent(s) IV Intermittent every 1 hour  potassium chloride  10 mEq/50 mL IVPB 10 milliEquivalent(s) IV Intermittent every 1 hour  senna 2 Tablet(s) Oral at bedtime  sodium chloride 0.9%. 1000 milliLiter(s) (10 mL/Hr) IV Continuous <Continuous>  tamsulosin 0.4 milliGRAM(s) Oral at bedtime      Physical Exam  General: Patient appears comfortable.  Vital Signs Last 12 Hrs  T(F): 98.2 (12-20-22 @ 16:00), Max: 99.3 (12-20-22 @ 08:00)  HR: 114 (12-20-22 @ 16:00) (94 - 114)  BP: --  BP(mean): --  RR: 31 (12-20-22 @ 16:00) (11 - 37)  SpO2: 97% (12-20-22 @ 16:00) (97% - 100%)  Neck: No palpable thyroid nodules.  CVS: S1S2, No murmurs  Respiratory: No wheezing, no crepitations  GI: Abdomen soft, non tender.  Musculoskeletal:  edema lower extremities.     Diagnostics

## 2022-12-20 NOTE — OCCUPATIONAL THERAPY INITIAL EVALUATION ADULT - PERTINENT HX OF CURRENT PROBLEM, REHAB EVAL
52M w/ HTN, T2DM, GERD who is transferred from [facility] for ACS evaluation/management. Per patient yesterday at 3 am he was woken up from his sleep for crushing chest pain that radiated to his left side w/ associated numbness and tinging. He endorses sweats, and small bouts of emesis x2. He states he has never experienced pain like this in the past, however it has resolved. He has been recently seeing a gastroenterology for worsening reflux over to past 4-6 months for which he was taking omeprazole. he states he misses his medications up to 2-3x weekly. He denies previous MIs, cardiac surgeries or hospitalizations. He endorses smoking 5 cig/day for 36 years. Found to have CAD s/p CABG 12/19/2022.

## 2022-12-20 NOTE — PROGRESS NOTE ADULT - PROBLEM SELECTOR PLAN 1
Will start Lantus 38 units at bed time.  Will start Admelog 12 units before each meal in addition to Admelog correction scale coverage.  Patient counseled for compliance with consistent low carb diet.  . Will start Lantus 40 units at bed time.  Will start Admelog 12 units before each meal in addition to Admelog correction scale coverage.  Patient counseled for compliance with consistent low carb diet.  .

## 2022-12-20 NOTE — PHYSICAL THERAPY INITIAL EVALUATION ADULT - ADDITIONAL COMMENTS
Pt lives in private house w wife with 1 flight of stairs to enter, no stairs inside, independently performs ADLs and ambulates without AD. Reports 0 falls within the last year.

## 2022-12-20 NOTE — PROGRESS NOTE ADULT - ASSESSMENT
Assessment  DMT2: 52y Male with DM T2 with hyperglycemia admitted with chest pain, A1C is 8.2%  patient was on oral hypoglycemic agents at home, on basal bolus insulin coverage, s/p CABG on IV insulin, off IV insulin now, started eating meals, blood sugars are running high  Patient is high risk with high level decision making due to uncontrolled diabetes, has increased risk for complications. Tight sugar control is not recommended for this patient.  CAD: S/P CABG, on medications, monitored, asymptomatic.  HTN: On antihypertensive medications, monitored, asymptomatic.  HLD: On statin, compliant.            Judith Sanders MD  Cell:  [phone]  Office: [phone]

## 2022-12-20 NOTE — OCCUPATIONAL THERAPY INITIAL EVALUATION ADULT - LEVEL OF INDEPENDENCE: DRESS UPPER BODY, OT EVAL
attempted to review UB dressing techniques with pt, however pt requested to do another time, stating he wants to go back to sleep attempted to review UB dressing techniques with pt, however pt requested to do another time, stating pt wants to go back to sleep

## 2022-12-20 NOTE — PHYSICAL THERAPY INITIAL EVALUATION ADULT - GAIT TRAINING, PT EVAL
GOAL: Pt will independently ambulate x400 ft using least restrictive device without loss of balance, within 2 weeks.

## 2022-12-21 LAB
ALBUMIN SERPL ELPH-MCNC: 3.9 G/DL — SIGNIFICANT CHANGE UP (ref 3.3–5)
ALP SERPL-CCNC: 42 U/L — SIGNIFICANT CHANGE UP (ref 40–120)
ALT FLD-CCNC: 90 U/L — HIGH (ref 10–45)
ANION GAP SERPL CALC-SCNC: 12 MMOL/L — SIGNIFICANT CHANGE UP (ref 5–17)
APPEARANCE UR: CLEAR — SIGNIFICANT CHANGE UP
AST SERPL-CCNC: 91 U/L — HIGH (ref 10–40)
BACTERIA # UR AUTO: NEGATIVE — SIGNIFICANT CHANGE UP
BASE EXCESS BLDV CALC-SCNC: -4.1 MMOL/L — LOW (ref -2–3)
BASOPHILS # BLD AUTO: 0.04 K/UL — SIGNIFICANT CHANGE UP (ref 0–0.2)
BASOPHILS NFR BLD AUTO: 0.4 % — SIGNIFICANT CHANGE UP (ref 0–2)
BILIRUB SERPL-MCNC: 1 MG/DL — SIGNIFICANT CHANGE UP (ref 0.2–1.2)
BILIRUB UR-MCNC: NEGATIVE — SIGNIFICANT CHANGE UP
BLD GP AB SCN SERPL QL: NEGATIVE — SIGNIFICANT CHANGE UP
BUN SERPL-MCNC: 20 MG/DL — SIGNIFICANT CHANGE UP (ref 7–23)
CALCIUM SERPL-MCNC: 8.4 MG/DL — SIGNIFICANT CHANGE UP (ref 8.4–10.5)
CHLORIDE SERPL-SCNC: 101 MMOL/L — SIGNIFICANT CHANGE UP (ref 96–108)
CO2 BLDV-SCNC: 24 MMOL/L — SIGNIFICANT CHANGE UP (ref 22–26)
CO2 SERPL-SCNC: 22 MMOL/L — SIGNIFICANT CHANGE UP (ref 22–31)
COLOR SPEC: YELLOW — SIGNIFICANT CHANGE UP
COMMENT - URINE: SIGNIFICANT CHANGE UP
CREAT SERPL-MCNC: 0.95 MG/DL — SIGNIFICANT CHANGE UP (ref 0.5–1.3)
DIFF PNL FLD: NEGATIVE — SIGNIFICANT CHANGE UP
EGFR: 97 ML/MIN/1.73M2 — SIGNIFICANT CHANGE UP
EOSINOPHIL # BLD AUTO: 0.02 K/UL — SIGNIFICANT CHANGE UP (ref 0–0.5)
EOSINOPHIL NFR BLD AUTO: 0.2 % — SIGNIFICANT CHANGE UP (ref 0–6)
EPI CELLS # UR: 0 /HPF — SIGNIFICANT CHANGE UP
GAS PNL BLDA: SIGNIFICANT CHANGE UP
GAS PNL BLDV: SIGNIFICANT CHANGE UP
GLUCOSE BLDC GLUCOMTR-MCNC: 101 MG/DL — HIGH (ref 70–99)
GLUCOSE BLDC GLUCOMTR-MCNC: 101 MG/DL — HIGH (ref 70–99)
GLUCOSE BLDC GLUCOMTR-MCNC: 115 MG/DL — HIGH (ref 70–99)
GLUCOSE BLDC GLUCOMTR-MCNC: 116 MG/DL — HIGH (ref 70–99)
GLUCOSE BLDC GLUCOMTR-MCNC: 123 MG/DL — HIGH (ref 70–99)
GLUCOSE BLDC GLUCOMTR-MCNC: 129 MG/DL — HIGH (ref 70–99)
GLUCOSE BLDC GLUCOMTR-MCNC: 129 MG/DL — HIGH (ref 70–99)
GLUCOSE BLDC GLUCOMTR-MCNC: 137 MG/DL — HIGH (ref 70–99)
GLUCOSE BLDC GLUCOMTR-MCNC: 153 MG/DL — HIGH (ref 70–99)
GLUCOSE BLDC GLUCOMTR-MCNC: 159 MG/DL — HIGH (ref 70–99)
GLUCOSE BLDC GLUCOMTR-MCNC: 201 MG/DL — HIGH (ref 70–99)
GLUCOSE BLDC GLUCOMTR-MCNC: 237 MG/DL — HIGH (ref 70–99)
GLUCOSE BLDC GLUCOMTR-MCNC: 254 MG/DL — HIGH (ref 70–99)
GLUCOSE BLDC GLUCOMTR-MCNC: 72 MG/DL — SIGNIFICANT CHANGE UP (ref 70–99)
GLUCOSE BLDC GLUCOMTR-MCNC: 78 MG/DL — SIGNIFICANT CHANGE UP (ref 70–99)
GLUCOSE SERPL-MCNC: 136 MG/DL — HIGH (ref 70–99)
GLUCOSE UR QL: ABNORMAL
HCO3 BLDV-SCNC: 22 MMOL/L — SIGNIFICANT CHANGE UP (ref 22–29)
HCT VFR BLD CALC: 32.8 % — LOW (ref 39–50)
HGB BLD-MCNC: 10.6 G/DL — LOW (ref 13–17)
HOROWITZ INDEX BLDV+IHG-RTO: 40 — SIGNIFICANT CHANGE UP
HYALINE CASTS # UR AUTO: 13 /LPF — HIGH (ref 0–2)
IMM GRANULOCYTES NFR BLD AUTO: 0.6 % — SIGNIFICANT CHANGE UP (ref 0–0.9)
KETONES UR-MCNC: ABNORMAL
LEUKOCYTE ESTERASE UR-ACNC: NEGATIVE — SIGNIFICANT CHANGE UP
LYMPHOCYTES # BLD AUTO: 2.24 K/UL — SIGNIFICANT CHANGE UP (ref 1–3.3)
LYMPHOCYTES # BLD AUTO: 20.4 % — SIGNIFICANT CHANGE UP (ref 13–44)
MAGNESIUM SERPL-MCNC: 2 MG/DL — SIGNIFICANT CHANGE UP (ref 1.6–2.6)
MCHC RBC-ENTMCNC: 29.2 PG — SIGNIFICANT CHANGE UP (ref 27–34)
MCHC RBC-ENTMCNC: 32.3 GM/DL — SIGNIFICANT CHANGE UP (ref 32–36)
MCV RBC AUTO: 90.4 FL — SIGNIFICANT CHANGE UP (ref 80–100)
MONOCYTES # BLD AUTO: 1.45 K/UL — HIGH (ref 0–0.9)
MONOCYTES NFR BLD AUTO: 13.2 % — SIGNIFICANT CHANGE UP (ref 2–14)
NEUTROPHILS # BLD AUTO: 7.15 K/UL — SIGNIFICANT CHANGE UP (ref 1.8–7.4)
NEUTROPHILS NFR BLD AUTO: 65.2 % — SIGNIFICANT CHANGE UP (ref 43–77)
NITRITE UR-MCNC: NEGATIVE — SIGNIFICANT CHANGE UP
NRBC # BLD: 0 /100 WBCS — SIGNIFICANT CHANGE UP (ref 0–0)
PCO2 BLDV: 44 MMHG — SIGNIFICANT CHANGE UP (ref 42–55)
PH BLDV: 7.31 — LOW (ref 7.32–7.43)
PH UR: 5.5 — SIGNIFICANT CHANGE UP (ref 5–8)
PHOSPHATE SERPL-MCNC: 2.9 MG/DL — SIGNIFICANT CHANGE UP (ref 2.5–4.5)
PLATELET # BLD AUTO: 181 K/UL — SIGNIFICANT CHANGE UP (ref 150–400)
PO2 BLDV: 41 MMHG — SIGNIFICANT CHANGE UP (ref 25–45)
POTASSIUM SERPL-MCNC: 4.4 MMOL/L — SIGNIFICANT CHANGE UP (ref 3.5–5.3)
POTASSIUM SERPL-SCNC: 4.4 MMOL/L — SIGNIFICANT CHANGE UP (ref 3.5–5.3)
PROT SERPL-MCNC: 6.3 G/DL — SIGNIFICANT CHANGE UP (ref 6–8.3)
PROT UR-MCNC: ABNORMAL
RBC # BLD: 3.63 M/UL — LOW (ref 4.2–5.8)
RBC # FLD: 13 % — SIGNIFICANT CHANGE UP (ref 10.3–14.5)
RBC CASTS # UR COMP ASSIST: 4 /HPF — SIGNIFICANT CHANGE UP (ref 0–4)
RH IG SCN BLD-IMP: POSITIVE — SIGNIFICANT CHANGE UP
SAO2 % BLDV: 68 % — SIGNIFICANT CHANGE UP (ref 67–88)
SODIUM SERPL-SCNC: 135 MMOL/L — SIGNIFICANT CHANGE UP (ref 135–145)
SP GR SPEC: 1.03 — HIGH (ref 1.01–1.02)
UROBILINOGEN FLD QL: NEGATIVE — SIGNIFICANT CHANGE UP
WBC # BLD: 10.97 K/UL — HIGH (ref 3.8–10.5)
WBC # FLD AUTO: 10.97 K/UL — HIGH (ref 3.8–10.5)
WBC UR QL: 107 /HPF — HIGH (ref 0–5)

## 2022-12-21 PROCEDURE — 71045 X-RAY EXAM CHEST 1 VIEW: CPT | Mod: 26

## 2022-12-21 PROCEDURE — 99291 CRITICAL CARE FIRST HOUR: CPT

## 2022-12-21 RX ORDER — METOPROLOL TARTRATE 50 MG
2.5 TABLET ORAL ONCE
Refills: 0 | Status: COMPLETED | OUTPATIENT
Start: 2022-12-21 | End: 2022-12-21

## 2022-12-21 RX ORDER — ALBUMIN HUMAN 25 %
250 VIAL (ML) INTRAVENOUS
Refills: 0 | Status: COMPLETED | OUTPATIENT
Start: 2022-12-21 | End: 2022-12-21

## 2022-12-21 RX ORDER — METOCLOPRAMIDE HCL 10 MG
10 TABLET ORAL EVERY 8 HOURS
Refills: 0 | Status: COMPLETED | OUTPATIENT
Start: 2022-12-21 | End: 2022-12-22

## 2022-12-21 RX ORDER — MAGNESIUM SULFATE 500 MG/ML
2 VIAL (ML) INJECTION ONCE
Refills: 0 | Status: COMPLETED | OUTPATIENT
Start: 2022-12-21 | End: 2022-12-21

## 2022-12-21 RX ORDER — TAMSULOSIN HYDROCHLORIDE 0.4 MG/1
0.4 CAPSULE ORAL ONCE
Refills: 0 | Status: DISCONTINUED | OUTPATIENT
Start: 2022-12-21 | End: 2022-12-21

## 2022-12-21 RX ORDER — COLCHICINE 0.6 MG
0.6 TABLET ORAL
Refills: 0 | Status: DISCONTINUED | OUTPATIENT
Start: 2022-12-21 | End: 2022-12-26

## 2022-12-21 RX ORDER — ALBUMIN HUMAN 25 %
250 VIAL (ML) INTRAVENOUS ONCE
Refills: 0 | Status: COMPLETED | OUTPATIENT
Start: 2022-12-21 | End: 2022-12-21

## 2022-12-21 RX ORDER — METOPROLOL TARTRATE 50 MG
50 TABLET ORAL EVERY 8 HOURS
Refills: 0 | Status: DISCONTINUED | OUTPATIENT
Start: 2022-12-21 | End: 2022-12-22

## 2022-12-21 RX ORDER — INSULIN LISPRO 100/ML
14 VIAL (ML) SUBCUTANEOUS
Refills: 0 | Status: DISCONTINUED | OUTPATIENT
Start: 2022-12-21 | End: 2022-12-22

## 2022-12-21 RX ORDER — TAMSULOSIN HYDROCHLORIDE 0.4 MG/1
0.8 CAPSULE ORAL AT BEDTIME
Refills: 0 | Status: DISCONTINUED | OUTPATIENT
Start: 2022-12-21 | End: 2022-12-26

## 2022-12-21 RX ORDER — METOPROLOL TARTRATE 50 MG
25 TABLET ORAL ONCE
Refills: 0 | Status: COMPLETED | OUTPATIENT
Start: 2022-12-21 | End: 2022-12-21

## 2022-12-21 RX ORDER — INSULIN HUMAN 100 [IU]/ML
3 INJECTION, SOLUTION SUBCUTANEOUS
Qty: 100 | Refills: 0 | Status: DISCONTINUED | OUTPATIENT
Start: 2022-12-21 | End: 2022-12-23

## 2022-12-21 RX ADMIN — Medication 50 MILLIGRAM(S): at 15:15

## 2022-12-21 RX ADMIN — ENOXAPARIN SODIUM 40 MILLIGRAM(S): 100 INJECTION SUBCUTANEOUS at 11:23

## 2022-12-21 RX ADMIN — OXYCODONE HYDROCHLORIDE 10 MILLIGRAM(S): 5 TABLET ORAL at 04:10

## 2022-12-21 RX ADMIN — Medication 650 MILLIGRAM(S): at 23:11

## 2022-12-21 RX ADMIN — Medication 2.5 MILLIGRAM(S): at 06:05

## 2022-12-21 RX ADMIN — Medication 50 MILLIGRAM(S): at 22:09

## 2022-12-21 RX ADMIN — Medication 650 MILLIGRAM(S): at 17:22

## 2022-12-21 RX ADMIN — Medication 10 MILLIGRAM(S): at 02:30

## 2022-12-21 RX ADMIN — Medication 125 MILLILITER(S): at 07:10

## 2022-12-21 RX ADMIN — Medication 500 MILLIGRAM(S): at 16:37

## 2022-12-21 RX ADMIN — Medication 4: at 11:28

## 2022-12-21 RX ADMIN — Medication 650 MILLIGRAM(S): at 07:30

## 2022-12-21 RX ADMIN — SENNA PLUS 2 TABLET(S): 8.6 TABLET ORAL at 22:10

## 2022-12-21 RX ADMIN — GABAPENTIN 100 MILLIGRAM(S): 400 CAPSULE ORAL at 15:13

## 2022-12-21 RX ADMIN — Medication 650 MILLIGRAM(S): at 12:12

## 2022-12-21 RX ADMIN — Medication 325 MILLIGRAM(S): at 11:22

## 2022-12-21 RX ADMIN — TAMSULOSIN HYDROCHLORIDE 0.8 MILLIGRAM(S): 0.4 CAPSULE ORAL at 22:10

## 2022-12-21 RX ADMIN — GABAPENTIN 100 MILLIGRAM(S): 400 CAPSULE ORAL at 06:04

## 2022-12-21 RX ADMIN — Medication 650 MILLIGRAM(S): at 23:07

## 2022-12-21 RX ADMIN — Medication 650 MILLIGRAM(S): at 06:03

## 2022-12-21 RX ADMIN — PANTOPRAZOLE SODIUM 40 MILLIGRAM(S): 20 TABLET, DELAYED RELEASE ORAL at 06:05

## 2022-12-21 RX ADMIN — Medication 650 MILLIGRAM(S): at 11:22

## 2022-12-21 RX ADMIN — HYDROMORPHONE HYDROCHLORIDE 0.5 MILLIGRAM(S): 2 INJECTION INTRAMUSCULAR; INTRAVENOUS; SUBCUTANEOUS at 01:44

## 2022-12-21 RX ADMIN — GABAPENTIN 100 MILLIGRAM(S): 400 CAPSULE ORAL at 22:10

## 2022-12-21 RX ADMIN — CHLORHEXIDINE GLUCONATE 1 APPLICATION(S): 213 SOLUTION TOPICAL at 11:39

## 2022-12-21 RX ADMIN — Medication 500 MILLIGRAM(S): at 06:04

## 2022-12-21 RX ADMIN — Medication 10 MILLIGRAM(S): at 22:11

## 2022-12-21 RX ADMIN — Medication 10 MILLIGRAM(S): at 15:13

## 2022-12-21 RX ADMIN — Medication 650 MILLIGRAM(S): at 00:00

## 2022-12-21 RX ADMIN — Medication 12 UNIT(S): at 11:28

## 2022-12-21 RX ADMIN — Medication 125 MILLILITER(S): at 11:24

## 2022-12-21 RX ADMIN — Medication 650 MILLIGRAM(S): at 16:37

## 2022-12-21 RX ADMIN — ATORVASTATIN CALCIUM 40 MILLIGRAM(S): 80 TABLET, FILM COATED ORAL at 22:10

## 2022-12-21 RX ADMIN — Medication 0.6 MILLIGRAM(S): at 16:37

## 2022-12-21 RX ADMIN — Medication 25 GRAM(S): at 12:29

## 2022-12-21 RX ADMIN — Medication 6: at 16:22

## 2022-12-21 RX ADMIN — AMIODARONE HYDROCHLORIDE 400 MILLIGRAM(S): 400 TABLET ORAL at 06:04

## 2022-12-21 RX ADMIN — AMIODARONE HYDROCHLORIDE 400 MILLIGRAM(S): 400 TABLET ORAL at 17:26

## 2022-12-21 RX ADMIN — Medication 125 MILLILITER(S): at 08:53

## 2022-12-21 RX ADMIN — Medication 12 UNIT(S): at 16:23

## 2022-12-21 RX ADMIN — INSULIN GLARGINE 40 UNIT(S): 100 INJECTION, SOLUTION SUBCUTANEOUS at 22:12

## 2022-12-21 RX ADMIN — Medication 25 MILLIGRAM(S): at 12:29

## 2022-12-21 RX ADMIN — Medication 100 MILLIGRAM(S): at 06:46

## 2022-12-21 RX ADMIN — Medication 0.6 MILLIGRAM(S): at 08:55

## 2022-12-21 NOTE — PROGRESS NOTE ADULT - SUBJECTIVE AND OBJECTIVE BOX
Chief complaint    Patient is a 52y old  Male who presents with a chief complaint of C4L (20 Dec 2022 07:22)   Review of systems  Patient in bed, appears comfortable.    Labs and Fingersticks  CAPILLARY BLOOD GLUCOSE      POCT Blood Glucose.: 237 mg/dL (21 Dec 2022 10:50)  POCT Blood Glucose.: 78 mg/dL (21 Dec 2022 07:06)  POCT Blood Glucose.: 137 mg/dL (21 Dec 2022 06:24)  POCT Blood Glucose.: 101 mg/dL (21 Dec 2022 05:35)  POCT Blood Glucose.: 115 mg/dL (21 Dec 2022 04:14)  POCT Blood Glucose.: 116 mg/dL (21 Dec 2022 03:07)  POCT Blood Glucose.: 129 mg/dL (21 Dec 2022 01:55)  POCT Blood Glucose.: 153 mg/dL (21 Dec 2022 00:57)  POCT Blood Glucose.: 97 mg/dL (20 Dec 2022 23:03)  POCT Blood Glucose.: 124 mg/dL (20 Dec 2022 22:14)  POCT Blood Glucose.: 155 mg/dL (20 Dec 2022 21:25)  POCT Blood Glucose.: 242 mg/dL (20 Dec 2022 20:17)  POCT Blood Glucose.: 225 mg/dL (20 Dec 2022 18:45)  POCT Blood Glucose.: 260 mg/dL (20 Dec 2022 17:55)  POCT Blood Glucose.: 270 mg/dL (20 Dec 2022 16:50)  POCT Blood Glucose.: 300 mg/dL (20 Dec 2022 15:58)  POCT Blood Glucose.: 270 mg/dL (20 Dec 2022 15:01)      Anion Gap, Serum: 12 (12-21 @ 01:02)  Anion Gap, Serum: 9 (12-20 @ 02:11)  Anion Gap, Serum: 9 (12-19 @ 20:38)  Anion Gap, Serum: 12 (12-19 @ 15:09)      Calcium, Total Serum: 8.4 (12-21 @ 01:02)  Calcium, Total Serum: 8.2 *L* (12-20 @ 02:11)  Calcium, Total Serum: 8.5 (12-19 @ 20:38)  Calcium, Total Serum: 8.3 *L* (12-19 @ 15:09)  Albumin, Serum: 3.9 (12-21 @ 01:02)  Albumin, Serum: 3.6 (12-20 @ 02:11)  Albumin, Serum: 3.2 *L* (12-19 @ 15:09)    Alanine Aminotransferase (ALT/SGPT): 90 *H* (12-21 @ 01:02)  Alanine Aminotransferase (ALT/SGPT): 33 (12-20 @ 02:11)  Alanine Aminotransferase (ALT/SGPT): 33 (12-19 @ 15:09)  Alkaline Phosphatase, Serum: 42 (12-21 @ 01:02)  Alkaline Phosphatase, Serum: 40 (12-20 @ 02:11)  Alkaline Phosphatase, Serum: 42 (12-19 @ 15:09)  Aspartate Aminotransferase (AST/SGOT): 91 *H* (12-21 @ 01:02)  Aspartate Aminotransferase (AST/SGOT): 39 (12-20 @ 02:11)  Aspartate Aminotransferase (AST/SGOT): 40 (12-19 @ 15:09)        12-21    135  |  101  |  20  ----------------------------<  136<H>  4.4   |  22  |  0.95    Ca    8.4      21 Dec 2022 01:02  Phos  2.9     12-21  Mg     2.0     12-21    TPro  6.3  /  Alb  3.9  /  TBili  1.0  /  DBili  x   /  AST  91<H>  /  ALT  90<H>  /  AlkPhos  42  12-21                        10.6   10.97 )-----------( 181      ( 21 Dec 2022 00:52 )             32.8     Medications  MEDICATIONS  (STANDING):  acetaminophen     Tablet .. 650 milliGRAM(s) Oral every 6 hours  aMIOdarone    Tablet 400 milliGRAM(s) Oral two times a day  ascorbic acid 500 milliGRAM(s) Oral two times a day  aspirin enteric coated 325 milliGRAM(s) Oral daily  atorvastatin 40 milliGRAM(s) Oral at bedtime  bisacodyl Suppository 10 milliGRAM(s) Rectal once  chlorhexidine 2% Cloths 1 Application(s) Topical daily  colchicine 0.6 milliGRAM(s) Oral two times a day  dextrose 50% Injectable 50 milliLiter(s) IV Push every 15 minutes  dextrose 50% Injectable 25 milliLiter(s) IV Push every 15 minutes  dextrose 50% Injectable 50 milliLiter(s) IV Push every 15 minutes  dextrose Oral Gel 15 Gram(s) Oral once  enoxaparin Injectable 40 milliGRAM(s) SubCutaneous every 24 hours  gabapentin 100 milliGRAM(s) Oral every 8 hours  glucagon  Injectable 1 milliGRAM(s) IntraMuscular once  insulin glargine Injectable (LANTUS) 40 Unit(s) SubCutaneous at bedtime  insulin lispro (ADMELOG) corrective regimen sliding scale   SubCutaneous three times a day before meals  insulin lispro Injectable (ADMELOG) 12 Unit(s) SubCutaneous three times a day before meals  metoclopramide Injectable 10 milliGRAM(s) IV Push every 8 hours  metoprolol tartrate 50 milliGRAM(s) Oral every 8 hours  pantoprazole    Tablet 40 milliGRAM(s) Oral before breakfast  polyethylene glycol 3350 17 Gram(s) Oral daily  senna 2 Tablet(s) Oral at bedtime  sodium chloride 0.9%. 1000 milliLiter(s) (10 mL/Hr) IV Continuous <Continuous>  tamsulosin 0.8 milliGRAM(s) Oral at bedtime      Physical Exam  General: Patient appears comfortable.  Vital Signs Last 12 Hrs  T(F): 101.5 (12-21-22 @ 04:00), Max: 101.5 (12-21-22 @ 04:00)  HR: 109 (12-21-22 @ 13:19) (109 - 124)  BP: 154/76 (12-21-22 @ 13:10) (90/59 - 154/76)  BP(mean): 108 (12-21-22 @ 13:10) (69 - 108)  RR: 30 (12-21-22 @ 13:19) (14 - 42)  SpO2: 93% (12-21-22 @ 13:19) (91% - 96%)  Neck: No palpable thyroid nodules.  CVS: S1S2, No murmurs  Respiratory: No wheezing, no crepitations  GI: Abdomen soft, non tender.  Musculoskeletal:  edema lower extremities.     Diagnostics

## 2022-12-21 NOTE — PROGRESS NOTE ADULT - SUBJECTIVE AND OBJECTIVE BOX
Patient seen and examined at the bedside.    Remained critically ill on continuous ICU monitoring.      Brief Summary:  52y M CAD s/p CABG x 4 on 12/19/22.       24 Hour events:  Started on beta blocker yesterday    OBJECTIVE:  Vital Signs Last 24 Hrs  T(C): 38.6 (21 Dec 2022 04:00), Max: 38.9 (21 Dec 2022 01:00)  T(F): 101.5 (21 Dec 2022 04:00), Max: 102 (21 Dec 2022 01:00)  HR: 114 (21 Dec 2022 05:00) (94 - 124)  BP: --  BP(mean): --  RR: 18 (21 Dec 2022 05:00) (11 - 37)  SpO2: 96% (21 Dec 2022 05:00) (89% - 100%)    Parameters below as of 21 Dec 2022 04:00  Patient On (Oxygen Delivery Method): nasal cannula  O2 Flow (L/min): 5                  Physical Exam:   General: NAD   Neurology: Awake and interactive, no new focal deficits.  Eyes: Bilateral pupils equal and reactive   ENT/Neck:, Neck supple, trachea midline, No JVD   Respiratory: Clear bilaterally   CV: S1S2, no murmurs        [x] Sternal dressing, [x] Mediastinal CT x2, [x] Pleural CT        [x] Sinus rhythm  Abdominal: Soft, NT, ND +BS  Extremities: No pedal edema noted, + peripheral pulses       -------------------------------------------------------------------------------------------------------------------------------                        10.6   10.97 )-----------( 181      ( 21 Dec 2022 00:52 )             32.8     12-21    135  |  101  |  20  ----------------------------<  136<H>  4.4   |  22  |  0.95    Ca    8.4      21 Dec 2022 01:02  Phos  2.9     12-21  Mg     2.0     12-21    TPro  6.3  /  Alb  3.9  /  TBili  1.0  /  DBili  x   /  AST  91<H>  /  ALT  90<H>  /  AlkPhos  42  12-21    LIVER FUNCTIONS - ( 21 Dec 2022 01:02 )  Alb: 3.9 g/dL / Pro: 6.3 g/dL / ALK PHOS: 42 U/L / ALT: 90 U/L / AST: 91 U/L / GGT: x           PT/INR - ( 20 Dec 2022 02:11 )   PT: 17.9 sec;   INR: 1.54 ratio         PTT - ( 20 Dec 2022 02:11 )  PTT:35.0 sec  ABG - ( 21 Dec 2022 00:10 )  pH, Arterial: 7.35  pH, Blood: x     /  pCO2: 39    /  pO2: 67    / HCO3: 22    / Base Excess: -3.8  /  SaO2: 93.3              ------------------------------------------------------------------------------------------------------------------------------  Assessment:  52M w/ CAD s/p CABG 12/19/2022    At highr risk for hemodynamic instability  At high risk for cardiac arrhythmias  Hypovolemia  Post op respiratory insufficiency  Acute blood loss anemia      Plan:   ***Neuro***  Postoperative acute pain control with oxycodone, tylenol, and dilaudid.     ***Cardiovascular***  Invasive hemodynamic monitoring, assess perfusion indices   At high risk for hemodynamic instability and cardiac arrhythmias.  IVF for perioperative hypovolemia.  Amiodarone for afib prophylaxis   Beta blocker for rate control   ASA/statin daily   VTE prophylaxis with Lovenox  Monitor chest tube output.    ***Pulmonary***  Postoperative acute respiratory insufficiency  Deep breathing and coughing exercises.  Wean oxygen as able.    ***GI***  Tolerating diet  Protonix for stress ulcer prophylaxis   Bowel regimen with Miralax and Senna   Reglan for gut motility    ***Renal***  Trend creatinine   Replete electrolytes as indicated.  BPH- Flomax    ***ID***  No current antibiotic coverage    ***Endocrine***  Hx of DM2, insulin as needed to maintain euglycemia.        Patient requires continuous monitoring with bedside rhythm monitoring, pulse oximetry monitoring, and continuous central venous and arterial pressure monitoring; and intermittent blood gas analysis. Care plan discussed with the ICU care team.   Patient remained critical, at risk for life threatening decompensation.    I have spent 30 minutes providing critical care management to this patient.    By signing my name below, I, Maria D'Amico, attest that this documentation has been prepared under the direction and in the presence of Yahaira Clifton MD  Electronically signed: Maria D'Amico, Scribe, 12-21-22 @ 06:49    I, Yahaira Clifton MD, personally performed the services described in this documentation. all medical record entries made by the scribe were at my direction and in my presence. I have reviewed the chart and agree that the record reflects my personal performance and is accurate and complete  Electronically signed: Yahaira Clifton MD Patient seen and examined at the bedside.    Remained critically ill on continuous ICU monitoring.      Brief Summary:  52y M CAD s/p CABG x 4 on 12/19/22.       24 Hour events:  Febrile overnight  Started on Reglan overnight  Transfused with a unit of Albumin this morning  Radford replaced for urinary retention    OBJECTIVE:  Vital Signs Last 24 Hrs  T(C): 38.6 (21 Dec 2022 04:00), Max: 38.9 (21 Dec 2022 01:00)  T(F): 101.5 (21 Dec 2022 04:00), Max: 102 (21 Dec 2022 01:00)  HR: 114 (21 Dec 2022 05:00) (94 - 124)  BP: --  BP(mean): --  RR: 18 (21 Dec 2022 05:00) (11 - 37)  SpO2: 94% (21 Dec 2022 05:00) (89% - 100%)    Parameters below as of 21 Dec 2022 04:00  Patient On (Oxygen Delivery Method): nasal cannula  O2 Flow (L/min): 5                  Physical Exam:   General: NAD   Neurology: Awake and interactive, no new focal deficits.  Eyes: Bilateral pupils equal and reactive   ENT/Neck:, Neck supple, trachea midline, No JVD   Respiratory: Clear bilaterally   CV: S1S2, no murmurs        [x] Sternal dressing, [x] Mediastinal CT x2, [x] Pleural CT        [x] Sinus rhythm  Abdominal: Soft, NT, ND +BS  Extremities: No pedal edema noted, + peripheral pulses       -------------------------------------------------------------------------------------------------------------------------------                        10.6   10.97 )-----------( 181      ( 21 Dec 2022 00:52 )             32.8     12-21    135  |  101  |  20  ----------------------------<  136<H>  4.4   |  22  |  0.95    Ca    8.4      21 Dec 2022 01:02  Phos  2.9     12-21  Mg     2.0     12-21    TPro  6.3  /  Alb  3.9  /  TBili  1.0  /  DBili  x   /  AST  91<H>  /  ALT  90<H>  /  AlkPhos  42  12-21    LIVER FUNCTIONS - ( 21 Dec 2022 01:02 )  Alb: 3.9 g/dL / Pro: 6.3 g/dL / ALK PHOS: 42 U/L / ALT: 90 U/L / AST: 91 U/L / GGT: x           PT/INR - ( 20 Dec 2022 02:11 )   PT: 17.9 sec;   INR: 1.54 ratio         PTT - ( 20 Dec 2022 02:11 )  PTT:35.0 sec  ABG - ( 21 Dec 2022 00:10 )  pH, Arterial: 7.35  pH, Blood: x     /  pCO2: 39    /  pO2: 67    / HCO3: 22    / Base Excess: -3.8  /  SaO2: 93.3              ------------------------------------------------------------------------------------------------------------------------------  Assessment:  52M w/ CAD s/p CABG 12/19/2022    At highr risk for hemodynamic instability  At high risk for cardiac arrhythmias  Hypovolemia  Post op respiratory insufficiency  Acute blood loss anemia      Plan:   ***Neuro***  Postoperative acute pain control with oxycodone, tylenol, and dilaudid.     ***Cardiovascular***  Invasive hemodynamic monitoring, assess perfusion indices   At high risk for hemodynamic instability and cardiac arrhythmias.  IVF for perioperative hypovolemia.  Amiodarone for afib prophylaxis   Possibly start Beta blockers for rate control   ASA/statin daily   VTE prophylaxis with Lovenox  Monitor chest tube output.    ***Pulmonary***  Postoperative acute respiratory insufficiency  Deep breathing and coughing exercises.  Wean oxygen as able.    ***GI***  Tolerating diet  Protonix for stress ulcer prophylaxis   Bowel regimen with Miralax and Senna   Reglan for gut motility    ***Renal***  Trend creatinine   Replete electrolytes as indicated.  Radford replaced for urinary retention  BPH- Flomax    ***ID***  Febrile overnight  No current antibiotic coverage    ***Endocrine***  Hx of DM2, insulin as needed to maintain euglycemia.        Patient requires continuous monitoring with bedside rhythm monitoring, pulse oximetry monitoring, and continuous central venous and arterial pressure monitoring; and intermittent blood gas analysis. Care plan discussed with the ICU care team.   Patient remained critical, at risk for life threatening decompensation.    I have spent 30 minutes providing critical care management to this patient.    By signing my name below, I, Maria D'Amico, attest that this documentation has been prepared under the direction and in the presence of Yahaira Clifton MD  Electronically signed: Maria D'Amico, Scribe, 12-21-22 @ 06:49    I, Yahaira Clifton MD, personally performed the services described in this documentation. all medical record entries made by the scribe were at my direction and in my presence. I have reviewed the chart and agree that the record reflects my personal performance and is accurate and complete  Electronically signed: Yahaira Clifton MD Patient seen and examined at the bedside.    Remained critically ill on continuous ICU monitoring.      Brief Summary:  52y M CAD s/p CABG x 4 on 12/19/22.       24 Hour events:  Febrile overnight  Started on Reglan overnight  Transfused with a unit of Albumin this morning  Radford replaced for urinary retention    OBJECTIVE:  Vital Signs Last 24 Hrs  T(C): 38.6 (21 Dec 2022 04:00), Max: 38.9 (21 Dec 2022 01:00)  T(F): 101.5 (21 Dec 2022 04:00), Max: 102 (21 Dec 2022 01:00)  HR: 114 (21 Dec 2022 05:00) (94 - 124)  BP: --  BP(mean): --  RR: 18 (21 Dec 2022 05:00) (11 - 37)  SpO2: 94% (21 Dec 2022 05:00) (89% - 100%)    Parameters below as of 21 Dec 2022 04:00  Patient On (Oxygen Delivery Method): nasal cannula  O2 Flow (L/min): 5                  Physical Exam:   General: NAD   Neurology: Awake and interactive, no new focal deficits.  Eyes: Bilateral pupils equal and reactive   ENT/Neck:, Neck supple, trachea midline, No JVD   Respiratory: Clear bilaterally   CV: S1S2, no murmurs        [x] Sternal dressing, [x] Mediastinal CT x2, [x] Pleural CT        [x] Sinus tachycardia  Abdominal: Soft, NT, ND +BS  Extremities: No pedal edema noted, + peripheral pulses       -------------------------------------------------------------------------------------------------------------------------------                        10.6   10.97 )-----------( 181      ( 21 Dec 2022 00:52 )             32.8     12-21    135  |  101  |  20  ----------------------------<  136<H>  4.4   |  22  |  0.95    Ca    8.4      21 Dec 2022 01:02  Phos  2.9     12-21  Mg     2.0     12-21    TPro  6.3  /  Alb  3.9  /  TBili  1.0  /  DBili  x   /  AST  91<H>  /  ALT  90<H>  /  AlkPhos  42  12-21    LIVER FUNCTIONS - ( 21 Dec 2022 01:02 )  Alb: 3.9 g/dL / Pro: 6.3 g/dL / ALK PHOS: 42 U/L / ALT: 90 U/L / AST: 91 U/L / GGT: x           PT/INR - ( 20 Dec 2022 02:11 )   PT: 17.9 sec;   INR: 1.54 ratio         PTT - ( 20 Dec 2022 02:11 )  PTT:35.0 sec  ABG - ( 21 Dec 2022 00:10 )  pH, Arterial: 7.35  pH, Blood: x     /  pCO2: 39    /  pO2: 67    / HCO3: 22    / Base Excess: -3.8  /  SaO2: 93.3              ------------------------------------------------------------------------------------------------------------------------------  Assessment:  52M w/ CAD s/p CABG 12/19/2022    At highr risk for hemodynamic instability  At high risk for cardiac arrhythmias  Hypovolemia  Post op respiratory insufficiency  Acute blood loss anemia      Plan:   ***Neuro***  Postoperative acute pain control with oxycodone, tylenol, and dilaudid.     ***Cardiovascular***  Invasive hemodynamic monitoring, assess perfusion indices   At high risk for hemodynamic instability and cardiac arrhythmias.  IVF for perioperative hypovolemia.  Amiodarone for afib prophylaxis   Possibly start Beta blockers for rate control   ASA/statin daily   VTE prophylaxis with Lovenox  Monitor chest tube output.    ***Pulmonary***  Postoperative acute respiratory insufficiency- 5L NC  Deep breathing and coughing exercises.  Wean oxygen as able.    ***GI***  Tolerating diet  Protonix for stress ulcer prophylaxis   Bowel regimen with Miralax and Senna   Reglan for gut motility    ***Renal***  Trend creatinine   Replete electrolytes as indicated.  Radford replaced for urinary retention  BPH- Flomax    ***ID***  Febrile overnight  No current antibiotic coverage    ***Endocrine***  Hx of DM2, insulin as needed to maintain euglycemia.        Patient requires continuous monitoring with bedside rhythm monitoring, pulse oximetry monitoring, and continuous central venous and arterial pressure monitoring; and intermittent blood gas analysis. Care plan discussed with the ICU care team.   Patient remained critical, at risk for life threatening decompensation.    I have spent 30 minutes providing critical care management to this patient.    By signing my name below, I, Maria D'Amico, attest that this documentation has been prepared under the direction and in the presence of Yahaira Clifton MD  Electronically signed: Maria D'Amico, Scribe, 12-21-22 @ 06:49    I, Yahaira Clifton MD, personally performed the services described in this documentation. all medical record entries made by the scribe were at my direction and in my presence. I have reviewed the chart and agree that the record reflects my personal performance and is accurate and complete  Electronically signed: Yahaira Clifton MD Patient seen and examined at the bedside.    Remained critically ill on continuous ICU monitoring.      Brief Summary:  52y M CAD s/p CABG x 4 on 12/19/22.     24 Hour events:  Radford replaced for urinary retention, Flomax ordered.  Ambulated with PT.  Weaned off pressor this morning - started on low dose beta blocker.    OBJECTIVE:  Vital Signs Last 24 Hrs  T(C): 38.6 (21 Dec 2022 04:00), Max: 38.9 (21 Dec 2022 01:00)  T(F): 101.5 (21 Dec 2022 04:00), Max: 102 (21 Dec 2022 01:00)  HR: 114 (21 Dec 2022 05:00) (94 - 124)  BP: --  BP(mean): --  RR: 18 (21 Dec 2022 05:00) (11 - 37)  SpO2: 94% (21 Dec 2022 05:00) (89% - 100%)    Parameters below as of 21 Dec 2022 04:00  Patient On (Oxygen Delivery Method): nasal cannula  O2 Flow (L/min): 5               Physical Exam:   General: NAD   Neurology: Awake and interactive, no new focal deficits.  Eyes: Bilateral pupils equal and reactive   ENT/Neck:, Neck supple, trachea midline, No JVD   Respiratory: Clear bilaterally   CV: S1S2, no murmurs        [x] Sternal dressing, [x] Mediastinal CT x2, [x] Pleural CT        [x] Sinus tachycardia  Abdominal: Soft, NT, ND +BS  Extremities: No pedal edema noted, + peripheral pulses       -------------------------------------------------------------------------------------------------------------------------------                        10.6   10.97 )-----------( 181      ( 21 Dec 2022 00:52 )             32.8     12-21    135  |  101  |  20  ----------------------------<  136<H>  4.4   |  22  |  0.95    Ca    8.4      21 Dec 2022 01:02  Phos  2.9     12-21  Mg     2.0     12-21    TPro  6.3  /  Alb  3.9  /  TBili  1.0  /  DBili  x   /  AST  91<H>  /  ALT  90<H>  /  AlkPhos  42  12-21    LIVER FUNCTIONS - ( 21 Dec 2022 01:02 )  Alb: 3.9 g/dL / Pro: 6.3 g/dL / ALK PHOS: 42 U/L / ALT: 90 U/L / AST: 91 U/L / GGT: x           PT/INR - ( 20 Dec 2022 02:11 )   PT: 17.9 sec;   INR: 1.54 ratio         PTT - ( 20 Dec 2022 02:11 )  PTT:35.0 sec  ABG - ( 21 Dec 2022 00:10 )  pH, Arterial: 7.35  pH, Blood: x     /  pCO2: 39    /  pO2: 67    / HCO3: 22    / Base Excess: -3.8  /  SaO2: 93.3            ------------------------------------------------------------------------------------------------------------------------------  Assessment:  52M w/ CAD s/p CABG 12/19/2022    At high risk for hemodynamic instability  At high risk for cardiac arrhythmias  Post op respiratory insufficiency      Plan:   ***Neuro***  Postoperative acute pain control with Oxycodone, Tylenol, and Dilaudid.     ***Cardiovascular***  Invasive hemodynamic monitoring, assess perfusion indices   At high risk for hemodynamic instability and cardiac arrhythmias.  Amiodarone for afib prophylaxis   Beta blocker for rate control   ASA/statin daily   VTE prophylaxis with Lovenox  Monitor chest tube output.    ***Pulmonary***  Postoperative acute respiratory insufficiency  Deep breathing and coughing exercises.  Wean oxygen as able.    ***GI***  Tolerating diet  Protonix for stress ulcer prophylaxis   Bowel regimen with Miralax and Senna   Reglan for gut motility    ***Renal***  Trend creatinine   Replete electrolytes as indicated.  Radford replaced for urinary retention.  Flomax    ***ID***  No current antibiotic coverage    ***Endocrine***  Hx of DM2, insulin as needed for hyperglycemia.        Patient requires continuous monitoring with bedside rhythm monitoring, pulse oximetry monitoring, and continuous central venous and arterial pressure monitoring; and intermittent blood gas analysis. Care plan discussed with the ICU care team.   Patient remained critical, at risk for life threatening decompensation.    I have spent 35 minutes providing critical care management to this patient.    By signing my name below, I, Maria D'Amico, attest that this documentation has been prepared under the direction and in the presence of Yahaira Clifton MD  Electronically signed: Maria D'Amico, Scribe, 12-21-22 @ 06:49    I, Yahaira Clifton MD, personally performed the services described in this documentation. all medical record entries made by the scribe were at my direction and in my presence. I have reviewed the chart and agree that the record reflects my personal performance and is accurate and complete  Electronically signed: Yahaira Clifton MD

## 2022-12-21 NOTE — PROGRESS NOTE ADULT - ASSESSMENT
Assessment  DMT2: 52y Male with DM T2 with hyperglycemia admitted with chest pain, A1C is 8.2%  patient was on oral hypoglycemic agents at home, on basal bolus insulin coverage, s/p CABG on IV insulin, eating meals, switched to basal bolus insulin, blood sugars are improving  Patient is high risk with high level decision making due to uncontrolled diabetes, has increased risk for complications. Tight sugar control is not recommended for this patient.  CAD: S/P CABG, on medications, monitored, asymptomatic.  HTN: On antihypertensive medications, monitored, asymptomatic.  HLD: On statin, compliant.            Judith Sanders MD  Cell:  [phone]  Office: [phone]

## 2022-12-22 LAB
ALBUMIN SERPL ELPH-MCNC: 3 G/DL — LOW (ref 3.3–5)
ALP SERPL-CCNC: 41 U/L — SIGNIFICANT CHANGE UP (ref 40–120)
ALT FLD-CCNC: 51 U/L — HIGH (ref 10–45)
ANION GAP SERPL CALC-SCNC: 10 MMOL/L — SIGNIFICANT CHANGE UP (ref 5–17)
APTT BLD: 38 SEC — HIGH (ref 27.5–35.5)
AST SERPL-CCNC: 32 U/L — SIGNIFICANT CHANGE UP (ref 10–40)
BASOPHILS # BLD AUTO: 0.03 K/UL — SIGNIFICANT CHANGE UP (ref 0–0.2)
BASOPHILS NFR BLD AUTO: 0.4 % — SIGNIFICANT CHANGE UP (ref 0–2)
BILIRUB SERPL-MCNC: 0.7 MG/DL — SIGNIFICANT CHANGE UP (ref 0.2–1.2)
BUN SERPL-MCNC: 17 MG/DL — SIGNIFICANT CHANGE UP (ref 7–23)
CALCIUM SERPL-MCNC: 7.3 MG/DL — LOW (ref 8.4–10.5)
CHLORIDE SERPL-SCNC: 105 MMOL/L — SIGNIFICANT CHANGE UP (ref 96–108)
CO2 SERPL-SCNC: 21 MMOL/L — LOW (ref 22–31)
CREAT SERPL-MCNC: 0.83 MG/DL — SIGNIFICANT CHANGE UP (ref 0.5–1.3)
CULTURE RESULTS: NO GROWTH — SIGNIFICANT CHANGE UP
EGFR: 106 ML/MIN/1.73M2 — SIGNIFICANT CHANGE UP
EOSINOPHIL # BLD AUTO: 0.08 K/UL — SIGNIFICANT CHANGE UP (ref 0–0.5)
EOSINOPHIL NFR BLD AUTO: 1.1 % — SIGNIFICANT CHANGE UP (ref 0–6)
GLUCOSE BLDC GLUCOMTR-MCNC: 144 MG/DL — HIGH (ref 70–99)
GLUCOSE BLDC GLUCOMTR-MCNC: 147 MG/DL — HIGH (ref 70–99)
GLUCOSE BLDC GLUCOMTR-MCNC: 174 MG/DL — HIGH (ref 70–99)
GLUCOSE BLDC GLUCOMTR-MCNC: 197 MG/DL — HIGH (ref 70–99)
GLUCOSE BLDC GLUCOMTR-MCNC: 204 MG/DL — HIGH (ref 70–99)
GLUCOSE BLDC GLUCOMTR-MCNC: 92 MG/DL — SIGNIFICANT CHANGE UP (ref 70–99)
GLUCOSE SERPL-MCNC: 168 MG/DL — HIGH (ref 70–99)
HCT VFR BLD CALC: 26.5 % — LOW (ref 39–50)
HGB BLD-MCNC: 8.5 G/DL — LOW (ref 13–17)
IMM GRANULOCYTES NFR BLD AUTO: 0.4 % — SIGNIFICANT CHANGE UP (ref 0–0.9)
INR BLD: 1.46 RATIO — HIGH (ref 0.88–1.16)
LYMPHOCYTES # BLD AUTO: 1.55 K/UL — SIGNIFICANT CHANGE UP (ref 1–3.3)
LYMPHOCYTES # BLD AUTO: 21.9 % — SIGNIFICANT CHANGE UP (ref 13–44)
MAGNESIUM SERPL-MCNC: 1.9 MG/DL — SIGNIFICANT CHANGE UP (ref 1.6–2.6)
MCHC RBC-ENTMCNC: 28.8 PG — SIGNIFICANT CHANGE UP (ref 27–34)
MCHC RBC-ENTMCNC: 32.1 GM/DL — SIGNIFICANT CHANGE UP (ref 32–36)
MCV RBC AUTO: 89.8 FL — SIGNIFICANT CHANGE UP (ref 80–100)
MONOCYTES # BLD AUTO: 0.81 K/UL — SIGNIFICANT CHANGE UP (ref 0–0.9)
MONOCYTES NFR BLD AUTO: 11.4 % — SIGNIFICANT CHANGE UP (ref 2–14)
NEUTROPHILS # BLD AUTO: 4.58 K/UL — SIGNIFICANT CHANGE UP (ref 1.8–7.4)
NEUTROPHILS NFR BLD AUTO: 64.8 % — SIGNIFICANT CHANGE UP (ref 43–77)
NRBC # BLD: 0 /100 WBCS — SIGNIFICANT CHANGE UP (ref 0–0)
PHOSPHATE SERPL-MCNC: 1.9 MG/DL — LOW (ref 2.5–4.5)
PLATELET # BLD AUTO: 143 K/UL — LOW (ref 150–400)
POTASSIUM SERPL-MCNC: 3.3 MMOL/L — LOW (ref 3.5–5.3)
POTASSIUM SERPL-SCNC: 3.3 MMOL/L — LOW (ref 3.5–5.3)
PROT SERPL-MCNC: 5.1 G/DL — LOW (ref 6–8.3)
PROTHROM AB SERPL-ACNC: 17 SEC — HIGH (ref 10.5–13.4)
RAPID RVP RESULT: DETECTED
RBC # BLD: 2.95 M/UL — LOW (ref 4.2–5.8)
RBC # FLD: 12.8 % — SIGNIFICANT CHANGE UP (ref 10.3–14.5)
RV+EV RNA SPEC QL NAA+PROBE: DETECTED
SARS-COV-2 RNA SPEC QL NAA+PROBE: SIGNIFICANT CHANGE UP
SODIUM SERPL-SCNC: 136 MMOL/L — SIGNIFICANT CHANGE UP (ref 135–145)
SPECIMEN SOURCE: SIGNIFICANT CHANGE UP
WBC # BLD: 7.08 K/UL — SIGNIFICANT CHANGE UP (ref 3.8–10.5)
WBC # FLD AUTO: 7.08 K/UL — SIGNIFICANT CHANGE UP (ref 3.8–10.5)

## 2022-12-22 PROCEDURE — 71045 X-RAY EXAM CHEST 1 VIEW: CPT | Mod: 26

## 2022-12-22 PROCEDURE — 99291 CRITICAL CARE FIRST HOUR: CPT

## 2022-12-22 RX ORDER — INSULIN GLARGINE 100 [IU]/ML
45 INJECTION, SOLUTION SUBCUTANEOUS AT BEDTIME
Refills: 0 | Status: DISCONTINUED | OUTPATIENT
Start: 2022-12-22 | End: 2022-12-23

## 2022-12-22 RX ORDER — HYDROMORPHONE HYDROCHLORIDE 2 MG/ML
0.5 INJECTION INTRAMUSCULAR; INTRAVENOUS; SUBCUTANEOUS ONCE
Refills: 0 | Status: DISCONTINUED | OUTPATIENT
Start: 2022-12-22 | End: 2022-12-22

## 2022-12-22 RX ORDER — MAGNESIUM SULFATE 500 MG/ML
1 VIAL (ML) INJECTION ONCE
Refills: 0 | Status: COMPLETED | OUTPATIENT
Start: 2022-12-22 | End: 2022-12-22

## 2022-12-22 RX ORDER — POTASSIUM CHLORIDE 20 MEQ
40 PACKET (EA) ORAL ONCE
Refills: 0 | Status: COMPLETED | OUTPATIENT
Start: 2022-12-22 | End: 2022-12-22

## 2022-12-22 RX ORDER — CALCIUM GLUCONATE 100 MG/ML
2 VIAL (ML) INTRAVENOUS ONCE
Refills: 0 | Status: COMPLETED | OUTPATIENT
Start: 2022-12-22 | End: 2022-12-22

## 2022-12-22 RX ORDER — INSULIN LISPRO 100/ML
15 VIAL (ML) SUBCUTANEOUS
Refills: 0 | Status: DISCONTINUED | OUTPATIENT
Start: 2022-12-22 | End: 2022-12-23

## 2022-12-22 RX ORDER — METOPROLOL TARTRATE 50 MG
50 TABLET ORAL EVERY 6 HOURS
Refills: 0 | Status: DISCONTINUED | OUTPATIENT
Start: 2022-12-22 | End: 2022-12-26

## 2022-12-22 RX ADMIN — Medication 0.6 MILLIGRAM(S): at 17:25

## 2022-12-22 RX ADMIN — ATORVASTATIN CALCIUM 40 MILLIGRAM(S): 80 TABLET, FILM COATED ORAL at 21:49

## 2022-12-22 RX ADMIN — INSULIN GLARGINE 45 UNIT(S): 100 INJECTION, SOLUTION SUBCUTANEOUS at 21:52

## 2022-12-22 RX ADMIN — Medication 10 MILLIGRAM(S): at 14:33

## 2022-12-22 RX ADMIN — OXYCODONE HYDROCHLORIDE 10 MILLIGRAM(S): 5 TABLET ORAL at 09:32

## 2022-12-22 RX ADMIN — OXYCODONE HYDROCHLORIDE 10 MILLIGRAM(S): 5 TABLET ORAL at 10:25

## 2022-12-22 RX ADMIN — Medication 650 MILLIGRAM(S): at 06:01

## 2022-12-22 RX ADMIN — Medication 325 MILLIGRAM(S): at 11:52

## 2022-12-22 RX ADMIN — Medication 14 UNIT(S): at 07:55

## 2022-12-22 RX ADMIN — Medication 63.75 MILLIMOLE(S): at 03:04

## 2022-12-22 RX ADMIN — Medication 500 MILLIGRAM(S): at 06:00

## 2022-12-22 RX ADMIN — Medication 40 MILLIEQUIVALENT(S): at 01:48

## 2022-12-22 RX ADMIN — GABAPENTIN 100 MILLIGRAM(S): 400 CAPSULE ORAL at 06:00

## 2022-12-22 RX ADMIN — Medication 650 MILLIGRAM(S): at 12:41

## 2022-12-22 RX ADMIN — TAMSULOSIN HYDROCHLORIDE 0.8 MILLIGRAM(S): 0.4 CAPSULE ORAL at 21:49

## 2022-12-22 RX ADMIN — POLYETHYLENE GLYCOL 3350 17 GRAM(S): 17 POWDER, FOR SOLUTION ORAL at 11:51

## 2022-12-22 RX ADMIN — Medication 4: at 07:55

## 2022-12-22 RX ADMIN — PANTOPRAZOLE SODIUM 40 MILLIGRAM(S): 20 TABLET, DELAYED RELEASE ORAL at 06:01

## 2022-12-22 RX ADMIN — Medication 650 MILLIGRAM(S): at 06:03

## 2022-12-22 RX ADMIN — Medication 200 GRAM(S): at 07:53

## 2022-12-22 RX ADMIN — Medication 50 MILLIGRAM(S): at 06:01

## 2022-12-22 RX ADMIN — ENOXAPARIN SODIUM 40 MILLIGRAM(S): 100 INJECTION SUBCUTANEOUS at 09:32

## 2022-12-22 RX ADMIN — HYDROMORPHONE HYDROCHLORIDE 0.5 MILLIGRAM(S): 2 INJECTION INTRAMUSCULAR; INTRAVENOUS; SUBCUTANEOUS at 06:03

## 2022-12-22 RX ADMIN — AMIODARONE HYDROCHLORIDE 400 MILLIGRAM(S): 400 TABLET ORAL at 06:01

## 2022-12-22 RX ADMIN — Medication 650 MILLIGRAM(S): at 11:52

## 2022-12-22 RX ADMIN — Medication 50 MILLIGRAM(S): at 11:51

## 2022-12-22 RX ADMIN — Medication 0.6 MILLIGRAM(S): at 06:01

## 2022-12-22 RX ADMIN — GABAPENTIN 100 MILLIGRAM(S): 400 CAPSULE ORAL at 14:32

## 2022-12-22 RX ADMIN — Medication 100 GRAM(S): at 01:49

## 2022-12-22 RX ADMIN — Medication 500 MILLIGRAM(S): at 17:25

## 2022-12-22 RX ADMIN — Medication 15 UNIT(S): at 11:55

## 2022-12-22 RX ADMIN — GABAPENTIN 100 MILLIGRAM(S): 400 CAPSULE ORAL at 21:49

## 2022-12-22 RX ADMIN — SENNA PLUS 2 TABLET(S): 8.6 TABLET ORAL at 21:49

## 2022-12-22 RX ADMIN — Medication 10 MILLIGRAM(S): at 06:00

## 2022-12-22 RX ADMIN — Medication 50 MILLIGRAM(S): at 17:25

## 2022-12-22 RX ADMIN — HYDROMORPHONE HYDROCHLORIDE 0.5 MILLIGRAM(S): 2 INJECTION INTRAMUSCULAR; INTRAVENOUS; SUBCUTANEOUS at 05:32

## 2022-12-22 RX ADMIN — Medication 10 MILLIGRAM(S): at 21:50

## 2022-12-22 NOTE — PROGRESS NOTE ADULT - SUBJECTIVE AND OBJECTIVE BOX
Patient seen and examined at the bedside.    Remained critically ill on continuous ICU monitoring.      Brief Summary:  52y M CAD s/p CABG x 4 on 12/19/22.       24 Hour events:      OBJECTIVE:  Vital Signs Last 24 Hrs  T(C): 38 (22 Dec 2022 04:00), Max: 38 (21 Dec 2022 11:00)  T(F): 100.4 (22 Dec 2022 04:00), Max: 100.4 (21 Dec 2022 11:00)  HR: 96 (22 Dec 2022 06:30) (82 - 124)  BP: 140/81 (22 Dec 2022 06:00) (90/59 - 154/76)  BP(mean): 105 (22 Dec 2022 06:00) (69 - 108)  RR: 25 (22 Dec 2022 06:00) (18 - 42)  SpO2: 96% (22 Dec 2022 06:30) (91% - 97%)    Parameters below as of 22 Dec 2022 04:00  Patient On (Oxygen Delivery Method): nasal cannula  O2 Flow (L/min): 6      Physical Exam:   General: NAD   Neurology: Awake and interactive, no new focal deficits.  Eyes: Bilateral pupils equal and reactive   ENT/Neck:, Neck supple, trachea midline, No JVD   Respiratory: Clear bilaterally   CV: S1S2, no murmurs        [x] Sternal dressing,  [x] Pleural CT        [x] Sinus rhythm  Abdominal: Soft, NT, ND +BS  Extremities: No pedal edema noted, + peripheral pulses        -------------------------------------------------------------------------------------------------------------------------------                        8.5    7.08  )-----------( 143      ( 22 Dec 2022 01:02 )             26.5     12-22    136  |  105  |  17  ----------------------------<  168<H>  3.3<L>   |  21<L>  |  0.83    Ca    7.3<L>      22 Dec 2022 01:02  Phos  1.9     12-22  Mg     1.9     12-22    TPro  5.1<L>  /  Alb  3.0<L>  /  TBili  0.7  /  DBili  x   /  AST  32  /  ALT  51<H>  /  AlkPhos  41  12-22    LIVER FUNCTIONS - ( 22 Dec 2022 01:02 )  Alb: 3.0 g/dL / Pro: 5.1 g/dL / ALK PHOS: 41 U/L / ALT: 51 U/L / AST: 32 U/L / GGT: x           PT/INR - ( 22 Dec 2022 01:02 )   PT: 17.0 sec;   INR: 1.46 ratio         PTT - ( 22 Dec 2022 01:02 )  PTT:38.0 sec  ABG - ( 21 Dec 2022 00:10 )  pH, Arterial: 7.35  pH, Blood: x     /  pCO2: 39    /  pO2: 67    / HCO3: 22    / Base Excess: -3.8  /  SaO2: 93.3          ------------------------------------------------------------------------------------------------------------------------------  Assessment:  52M w/ CAD s/p CABG 12/19/2022    At high risk for hemodynamic instability  At high risk for cardiac arrhythmias  Post op respiratory insufficiency      Plan:   ***Neuro***  Postoperative acute pain control with Oxycodone, Tylenol, and Gabapentin.     ***Cardiovascular***  Invasive hemodynamic monitoring, assess perfusion indices   At high risk for hemodynamic instability and cardiac arrhythmias.  Beta blocker for rate control   ASA/statin daily   VTE prophylaxis with Lovenox  Monitor chest tube output.    ***Pulmonary***  Postoperative acute respiratory insufficiency- 6L NC  Deep breathing and coughing exercises.  Wean oxygen as able.    ***GI***  Tolerating DASH diet  Protonix for stress ulcer prophylaxis   Bowel regimen with Miralax and Senna   Reglan for gut motility    ***Renal***  Trend creatinine   Replete electrolytes as indicated.  Radford replaced for urinary retention.  Flomax    ***ID***  No current antibiotic coverage    ***Endocrine***  Hx of DM2, insulin infusion, ISS, and Lantus as needed for hyperglycemia.      Patient requires continuous monitoring with bedside rhythm monitoring, pulse oximetry monitoring, and continuous central venous and arterial pressure monitoring; and intermittent blood gas analysis. Care plan discussed with the ICU care team.   Patient remained critical, at risk for life threatening decompensation.    I have spent 30 minutes providing critical care management to this patient.    By signing my name below, I, Maria D'Amico, attest that this documentation has been prepared under the direction and in the presence of Yahaira Clifton MD  Electronically signed: Maria D'Amico, Scribe, 12-22-22 @ 06:54    I, Yahaira Clifton MD, personally performed the services described in this documentation. all medical record entries made by the scribe were at my direction and in my presence. I have reviewed the chart and agree that the record reflects my personal performance and is accurate and complete  Electronically signed: Yahaira Clifton MD Patient seen and examined at the bedside.    Remained critically ill on continuous ICU monitoring.      Brief Summary:  52y M CAD s/p CABG x 4 on 12/19/22.       24 Hour events:  Lopressor increased to 50 q6  Radford replaced yesterday, plan for trial void after ambulating  Transfused Albumin x3 yesterday    OBJECTIVE:  Vital Signs Last 24 Hrs  T(C): 38 (22 Dec 2022 04:00), Max: 38 (21 Dec 2022 11:00)  T(F): 100.4 (22 Dec 2022 04:00), Max: 100.4 (21 Dec 2022 11:00)  HR: 96 (22 Dec 2022 06:30) (82 - 124)  BP: 140/81 (22 Dec 2022 06:00) (90/59 - 154/76)  BP(mean): 105 (22 Dec 2022 06:00) (69 - 108)  RR: 25 (22 Dec 2022 06:00) (18 - 42)  SpO2: 96% (22 Dec 2022 06:30) (91% - 97%)    Parameters below as of 22 Dec 2022 04:00  Patient On (Oxygen Delivery Method): nasal cannula  O2 Flow (L/min): 6      Physical Exam:   General: NAD   Neurology: Awake and interactive, no new focal deficits.  Eyes: Bilateral pupils equal and reactive   ENT/Neck:, Neck supple, trachea midline, No JVD   Respiratory: Clear bilaterally   CV: S1S2, no murmurs        [x] Sternal dressing,  [x] Pleural CT        [x] Sinus rhythm  Abdominal: Soft, NT, ND +BS  Extremities: No pedal edema noted, + peripheral pulses        -------------------------------------------------------------------------------------------------------------------------------                        8.5    7.08  )-----------( 143      ( 22 Dec 2022 01:02 )             26.5     12-22    136  |  105  |  17  ----------------------------<  168<H>  3.3<L>   |  21<L>  |  0.83    Ca    7.3<L>      22 Dec 2022 01:02  Phos  1.9     12-22  Mg     1.9     12-22    TPro  5.1<L>  /  Alb  3.0<L>  /  TBili  0.7  /  DBili  x   /  AST  32  /  ALT  51<H>  /  AlkPhos  41  12-22    LIVER FUNCTIONS - ( 22 Dec 2022 01:02 )  Alb: 3.0 g/dL / Pro: 5.1 g/dL / ALK PHOS: 41 U/L / ALT: 51 U/L / AST: 32 U/L / GGT: x           PT/INR - ( 22 Dec 2022 01:02 )   PT: 17.0 sec;   INR: 1.46 ratio         PTT - ( 22 Dec 2022 01:02 )  PTT:38.0 sec  ABG - ( 21 Dec 2022 00:10 )  pH, Arterial: 7.35  pH, Blood: x     /  pCO2: 39    /  pO2: 67    / HCO3: 22    / Base Excess: -3.8  /  SaO2: 93.3          ------------------------------------------------------------------------------------------------------------------------------  Assessment:  52M w/ CAD s/p CABG 12/19/2022    At high risk for hemodynamic instability  At high risk for cardiac arrhythmias  Post op respiratory insufficiency      Plan:   ***Neuro***  Postoperative acute pain control with Oxycodone, Tylenol, and Gabapentin.     ***Cardiovascular***  Invasive hemodynamic monitoring, assess perfusion indices   At high risk for hemodynamic instability and cardiac arrhythmias.  Beta blocker for rate control   ASA/statin daily   VTE prophylaxis with Lovenox  Monitor chest tube output.    ***Pulmonary***  Postoperative acute respiratory insufficiency- 6L NC, BiPAP if needed  Deep breathing and coughing exercises.  Wean oxygen as able.    ***GI***  Tolerating DASH diet  Protonix for stress ulcer prophylaxis   Bowel regimen with Miralax and Senna   Reglan for gut motility    ***Renal***  Trend creatinine   Replete electrolytes as indicated.  Radford replaced for urinary retention.  Flomax    ***ID***  No current antibiotic coverage    ***Endocrine***  Hx of DM2, ISS and Lantus as needed for hyperglycemia.      Patient requires continuous monitoring with bedside rhythm monitoring, pulse oximetry monitoring, and continuous central venous and arterial pressure monitoring; and intermittent blood gas analysis. Care plan discussed with the ICU care team.   Patient remained critical, at risk for life threatening decompensation.    I have spent 30 minutes providing critical care management to this patient.    By signing my name below, I, Maria D'Amico, attest that this documentation has been prepared under the direction and in the presence of Yahaira Clifton MD  Electronically signed: Maria D'Amico, Scribe, 12-22-22 @ 06:54    I, Yahaira Clifton MD, personally performed the services described in this documentation. all medical record entries made by the scribe were at my direction and in my presence. I have reviewed the chart and agree that the record reflects my personal performance and is accurate and complete  Electronically signed: Yahaira Clifton MD Patient seen and examined at the bedside.    Remained critically ill on continuous ICU monitoring.      Brief Summary:  52y M CAD s/p CABG x 4 on 12/19/22.       24 Hour events:  Lopressor increased to 50 q6  Radford replaced yesterday, plan for trial void after ambulating  Transfused Albumin x3 yesterday    OBJECTIVE:  Vital Signs Last 24 Hrs  T(C): 38 (22 Dec 2022 04:00), Max: 38 (21 Dec 2022 11:00)  T(F): 100.4 (22 Dec 2022 04:00), Max: 100.4 (21 Dec 2022 11:00)  HR: 96 (22 Dec 2022 06:30) (82 - 124)  BP: 140/81 (22 Dec 2022 06:00) (90/59 - 154/76)  BP(mean): 105 (22 Dec 2022 06:00) (69 - 108)  RR: 25 (22 Dec 2022 06:00) (18 - 42)  SpO2: 96% (22 Dec 2022 06:30) (91% - 97%)    Parameters below as of 22 Dec 2022 04:00  Patient On (Oxygen Delivery Method): nasal cannula  O2 Flow (L/min): 6      Physical Exam:   General: NAD   Neurology: Awake and interactive, no new focal deficits.  Eyes: Bilateral pupils equal and reactive   ENT/Neck:, Neck supple, trachea midline, No JVD   Respiratory: Clear bilaterally   CV: S1S2, no murmurs        [x] Sternal dressing,  [x] Pleural CT        [x] Sinus rhythm  Abdominal: Soft, NT, ND +BS  Extremities: No pedal edema noted, + peripheral pulses        -------------------------------------------------------------------------------------------------------------------------------                        8.5    7.08  )-----------( 143      ( 22 Dec 2022 01:02 )             26.5     12-22    136  |  105  |  17  ----------------------------<  168<H>  3.3<L>   |  21<L>  |  0.83    Ca    7.3<L>      22 Dec 2022 01:02  Phos  1.9     12-22  Mg     1.9     12-22    TPro  5.1<L>  /  Alb  3.0<L>  /  TBili  0.7  /  DBili  x   /  AST  32  /  ALT  51<H>  /  AlkPhos  41  12-22    LIVER FUNCTIONS - ( 22 Dec 2022 01:02 )  Alb: 3.0 g/dL / Pro: 5.1 g/dL / ALK PHOS: 41 U/L / ALT: 51 U/L / AST: 32 U/L / GGT: x           PT/INR - ( 22 Dec 2022 01:02 )   PT: 17.0 sec;   INR: 1.46 ratio         PTT - ( 22 Dec 2022 01:02 )  PTT:38.0 sec  ABG - ( 21 Dec 2022 00:10 )  pH, Arterial: 7.35  pH, Blood: x     /  pCO2: 39    /  pO2: 67    / HCO3: 22    / Base Excess: -3.8  /  SaO2: 93.3          ------------------------------------------------------------------------------------------------------------------------------  Assessment:  52M w/ CAD s/p CABG 12/19/2022    At high risk for hemodynamic instability  At high risk for cardiac arrhythmias  Post op respiratory insufficiency      Plan:   ***Neuro***  Postoperative acute pain control with Oxycodone, Tylenol, and Gabapentin.     ***Cardiovascular***  Invasive hemodynamic monitoring, assess perfusion indices   At high risk for hemodynamic instability and cardiac arrhythmias.  Beta blocker for rate control   ASA/statin daily   Monitor chest tube output.    ***Pulmonary***  Postoperative acute respiratory insufficiency- 6L NC, BiPAP if needed  Deep breathing and coughing exercises.  Wean oxygen as able.    ***GI***  Tolerating DASH diet  Protonix for stress ulcer prophylaxis   Bowel regimen with Miralax and Senna   Reglan for gut motility    ***Renal***  Trend creatinine   Replete electrolytes as indicated.  Radford replaced for urinary retention.  Flomax    ***ID***  No current antibiotic coverage    ***Endocrine***  Hx of DM2, ISS and Lantus as needed for hyperglycemia.    ***Hematology***  Acute blood loss anemia and thrombocytopenia - no current transfusion indication   VTE prophylaxis with Lovenox      Patient requires continuous monitoring with bedside rhythm monitoring, pulse oximetry monitoring, and continuous central venous and arterial pressure monitoring; and intermittent blood gas analysis. Care plan discussed with the ICU care team.   Patient remained critical, at risk for life threatening decompensation.    I have spent 30 minutes providing critical care management to this patient.    By signing my name below, I, Maria D'Amico, attest that this documentation has been prepared under the direction and in the presence of Yahaira Clifton MD  Electronically signed: Maria D'Amico, Scribe, 12-22-22 @ 06:54    I, Yahaira Clifton MD, personally performed the services described in this documentation. all medical record entries made by the brettibe were at my direction and in my presence. I have reviewed the chart and agree that the record reflects my personal performance and is accurate and complete  Electronically signed: Yahaira Clifton MD Patient seen and examined at the bedside.        Brief Summary:  52y M CAD s/p CABG x 4 on 12/19/22.       24 Hour events:  Placed on BIPAP this morning for lung recruitment - now on nasal cannula.  Beta blocker dose increased.      OBJECTIVE:  Vital Signs Last 24 Hrs  T(C): 38 (22 Dec 2022 04:00), Max: 38 (21 Dec 2022 11:00)  T(F): 100.4 (22 Dec 2022 04:00), Max: 100.4 (21 Dec 2022 11:00)  HR: 96 (22 Dec 2022 06:30) (82 - 124)  BP: 140/81 (22 Dec 2022 06:00) (90/59 - 154/76)  BP(mean): 105 (22 Dec 2022 06:00) (69 - 108)  RR: 25 (22 Dec 2022 06:00) (18 - 42)  SpO2: 96% (22 Dec 2022 06:30) (91% - 97%)    Parameters below as of 22 Dec 2022 04:00  Patient On (Oxygen Delivery Method): nasal cannula  O2 Flow (L/min): 6      Physical Exam:   General: NAD   Neurology: Awake and interactive, no new focal deficits.  Eyes: Bilateral pupils equal and reactive   ENT/Neck:, Neck supple, trachea midline  Respiratory: Clear bilaterally   CV: Sinus rhythm  Abdominal: Soft, NT, ND +BS  Extremities: Warm       -------------------------------------------------------------------------------------------------------------------------------                        8.5    7.08  )-----------( 143      ( 22 Dec 2022 01:02 )             26.5     12-22    136  |  105  |  17  ----------------------------<  168<H>  3.3<L>   |  21<L>  |  0.83    Ca    7.3<L>      22 Dec 2022 01:02  Phos  1.9     12-22  Mg     1.9     12-22    TPro  5.1<L>  /  Alb  3.0<L>  /  TBili  0.7  /  DBili  x   /  AST  32  /  ALT  51<H>  /  AlkPhos  41  12-22    LIVER FUNCTIONS - ( 22 Dec 2022 01:02 )  Alb: 3.0 g/dL / Pro: 5.1 g/dL / ALK PHOS: 41 U/L / ALT: 51 U/L / AST: 32 U/L / GGT: x           PT/INR - ( 22 Dec 2022 01:02 )   PT: 17.0 sec;   INR: 1.46 ratio         PTT - ( 22 Dec 2022 01:02 )  PTT:38.0 sec  ABG - ( 21 Dec 2022 00:10 )  pH, Arterial: 7.35  pH, Blood: x     /  pCO2: 39    /  pO2: 67    / HCO3: 22    / Base Excess: -3.8  /  SaO2: 93.3          ------------------------------------------------------------------------------------------------------------------------------  Assessment:  52M w/ CAD s/p CABG 12/19/2022    At high risk for hemodynamic instability  At high risk for cardiac arrhythmias  Post op respiratory insufficiency  Acute blood loss anemia      Plan:   ***Neuro***  Postoperative acute pain control with Oxycodone, Tylenol, and Gabapentin.     ***Cardiovascular***  At high risk for hemodynamic instability and cardiac arrhythmias.  Beta blocker for rate control - dose increased  Amiodarone for A fib prophylaxis   ASA/statin daily   Monitor chest tube output.    ***Pulmonary***  Postoperative acute respiratory insufficiency  Deep breathing and coughing exercises.  Wean oxygen as able.    ***GI***  Tolerating DASH diet  Protonix for stress ulcer prophylaxis   Bowel regimen with Miralax and Senna     ***Renal***  Trend creatinine   Replete electrolytes as indicated.  Flomax for urinary retention  Trial of void later today.    ***ID***  No current antibiotic coverage    ***Endocrine***  Hx of DM2, ISS and Lantus as needed for hyperglycemia.    ***Hematology***  Acute blood loss anemia - no current transfusion indication   VTE prophylaxis with Lovenox      Patient requires continuous monitoring with bedside rhythm monitoring, pulse oximetry monitoring, and continuous central venous and arterial pressure monitoring; and intermittent blood gas analysis. Care plan discussed with the ICU care team.   Patient remained critical, at risk for life threatening decompensation.    I have spent 32 minutes providing critical care management to this patient.    By signing my name below, I, Maria D'Amico, attest that this documentation has been prepared under the direction and in the presence of Yahaira Clifton MD  Electronically signed: Maria D'Amico, Scribe, 12-22-22 @ 06:54    I, Yahaira Clifton MD, personally performed the services described in this documentation. all medical record entries made by the scribe were at my direction and in my presence. I have reviewed the chart and agree that the record reflects my personal performance and is accurate and complete  Electronically signed: Yahaira Clifton MD

## 2022-12-22 NOTE — PROGRESS NOTE ADULT - SUBJECTIVE AND OBJECTIVE BOX
VITAL SIGNS    Telemetry: SR 80's     Vital Signs Last 24 Hrs  T(C): 36.9 (12-22-22 @ 12:52), Max: 38 (12-22-22 @ 04:00)  T(F): 98.4 (12-22-22 @ 12:52), Max: 100.4 (12-22-22 @ 04:00)  HR: 85 (12-22-22 @ 12:52) (82 - 107)  BP: 108/73 (12-22-22 @ 12:52) (91/59 - 146/88)  RR: 20 (12-22-22 @ 12:52) (12 - 32)  SpO2: 95% (12-22-22 @ 12:52) (91% - 97%)            12-21 @ 07:01  -  12-22 @ 07:00  --------------------------------------------------------  IN: 1079 mL / OUT: 2385 mL / NET: -1306 mL    12-22 @ 07:01  -  12-22 @ 13:27  --------------------------------------------------------  IN: 960 mL / OUT: 200 mL / NET: 760 mL       Daily     Daily   Admit Wt: Drug Dosing Weight  Height (cm): 175.3 (19 Dec 2022 07:22)  Weight (kg): 66.9 (19 Dec 2022 07:22)  BMI (kg/m2): 21.8 (19 Dec 2022 07:22)  BSA (m2): 1.82 (19 Dec 2022 07:22)    Bilirubin Total, Serum: 0.7 mg/dL (12-22 @ 01:02)    CAPILLARY BLOOD GLUCOSE      POCT Blood Glucose.: 147 mg/dL (22 Dec 2022 11:49)  POCT Blood Glucose.: 204 mg/dL (22 Dec 2022 07:34)  POCT Blood Glucose.: 197 mg/dL (22 Dec 2022 05:30)  POCT Blood Glucose.: 174 mg/dL (21 Dec 2022 23:59)  POCT Blood Glucose.: 129 mg/dL (21 Dec 2022 23:24)  POCT Blood Glucose.: 72 mg/dL (21 Dec 2022 23:02)  POCT Blood Glucose.: 101 mg/dL (21 Dec 2022 22:08)  POCT Blood Glucose.: 123 mg/dL (21 Dec 2022 21:13)  POCT Blood Glucose.: 159 mg/dL (21 Dec 2022 20:09)  POCT Blood Glucose.: 201 mg/dL (21 Dec 2022 19:01)  POCT Blood Glucose.: 254 mg/dL (21 Dec 2022 15:28)          MEDICATIONS  acetaminophen     Tablet .. 650 milliGRAM(s) Oral every 6 hours PRN  ascorbic acid 500 milliGRAM(s) Oral two times a day  aspirin enteric coated 325 milliGRAM(s) Oral daily  atorvastatin 40 milliGRAM(s) Oral at bedtime  bisacodyl Suppository 10 milliGRAM(s) Rectal once  chlorhexidine 2% Cloths 1 Application(s) Topical daily  colchicine 0.6 milliGRAM(s) Oral two times a day  dextrose 50% Injectable 50 milliLiter(s) IV Push every 15 minutes  dextrose 50% Injectable 50 milliLiter(s) IV Push every 15 minutes  dextrose 50% Injectable 25 milliLiter(s) IV Push every 15 minutes  dextrose Oral Gel 15 Gram(s) Oral once  enoxaparin Injectable 40 milliGRAM(s) SubCutaneous every 24 hours  gabapentin 100 milliGRAM(s) Oral every 8 hours  glucagon  Injectable 1 milliGRAM(s) IntraMuscular once  insulin glargine Injectable (LANTUS) 45 Unit(s) SubCutaneous at bedtime  insulin lispro (ADMELOG) corrective regimen sliding scale   SubCutaneous three times a day before meals  insulin lispro Injectable (ADMELOG) 15 Unit(s) SubCutaneous three times a day before meals  insulin regular Infusion 3 Unit(s)/Hr IV Continuous <Continuous>  metoclopramide Injectable 10 milliGRAM(s) IV Push every 8 hours  metoprolol tartrate 50 milliGRAM(s) Oral every 6 hours  oxyCODONE    IR 5 milliGRAM(s) Oral every 4 hours PRN  oxyCODONE    IR 10 milliGRAM(s) Oral every 4 hours PRN  pantoprazole    Tablet 40 milliGRAM(s) Oral before breakfast  polyethylene glycol 3350 17 Gram(s) Oral daily  senna 2 Tablet(s) Oral at bedtime  sodium chloride 0.9%. 1000 milliLiter(s) IV Continuous <Continuous>  tamsulosin 0.8 milliGRAM(s) Oral at bedtime      >>> <<<  PHYSICAL EXAM  Subjective: " Hi, I feel fine"  Neurology: alert and oriented x 3, nonfocal, no gross deficits  CV : RRR S1S2, no murmurs, rubs or gallops   Sternal Wound :  CDI , Stable  Lungs: CTA B/L, No wheezing, rales, rhonchi. Non labored respirations   Abdomen: soft, NT, ND, (- )BM  :  voiding  Extremities:  No LE edema bilaterally, +DP, No calf tenderness     LABS  12-22    136  |  105  |  17  ----------------------------<  168<H>  3.3<L>   |  21<L>  |  0.83    Ca    7.3<L>      22 Dec 2022 01:02  Phos  1.9     12-22  Mg     1.9     12-22    TPro  5.1<L>  /  Alb  3.0<L>  /  TBili  0.7  /  DBili  x   /  AST  32  /  ALT  51<H>  /  AlkPhos  41  12-22                                 8.5    7.08  )-----------( 143      ( 22 Dec 2022 01:02 )             26.5          PT/INR - ( 22 Dec 2022 01:02 )   PT: 17.0 sec;   INR: 1.46 ratio         PTT - ( 22 Dec 2022 01:02 )  PTT:38.0 sec       PAST MEDICAL & SURGICAL HISTORY:  No pertinent past medical history      Hypertension      Diabetes mellitus      GERD (gastroesophageal reflux disease)      No significant past surgical history      No significant past surgical history

## 2022-12-22 NOTE — PROGRESS NOTE ADULT - PROBLEM SELECTOR PLAN 1
s/p C4L   Continue with ASA 325mg daily   Continue with Ator 40mg daily   Titrate up Lop 50Q6 to Lop 100TID if rate not controlled   L Pleural -LWS   C/W [place]chine .6BID for pericarditis  Radford retention s/p reinsertion - Now removed for TOV- DTV by 5pm  Cough and deep breathe, Incentive Spirometry Q1h, Chest PT.  Ambulate 4x daily as tolerated and with PT.    C/W GI prophylaxis on protonix and DVT prophylaxis on SQ LVX

## 2022-12-22 NOTE — PROGRESS NOTE ADULT - ASSESSMENT
52M w/ HTN, T2DM, GERD who is transferred from OSH for ACS evaluation/management. Per patient yesterday at 3 am he was woken up from his sleep for crushing chest pain that radiated to his left side w/ associated numbness and tinging. He endorses sweats, and small bouts of emesis x2. He states he has never experienced pain like this in the past, however it has resolved. He has been recently seeing a gastroenterology for worsening reflux over to past 4-6 months for which he was taking omeprazole. he states he misses his medications up to 2-3x weekly. He denies previous MIs, cardiac surgeries or hospitalizations He endorses smoking 5 cig/day for 36 years.     s/p C4L     weaned off - started on BB    Hypotension s/p fluid resuscitation. Low H&H likely dilutional. BiPAP overnight for atelectasis    Low grade temp overnight 100.4 F likely 2/2 to atelectasis. F/U RVP. Floor with L Pleural CT in place. Radford Removed DTV by 5pm.

## 2022-12-23 DIAGNOSIS — Z95.1 PRESENCE OF AORTOCORONARY BYPASS GRAFT: ICD-10-CM

## 2022-12-23 LAB
ALBUMIN SERPL ELPH-MCNC: 3.1 G/DL — LOW (ref 3.3–5)
ALP SERPL-CCNC: 55 U/L — SIGNIFICANT CHANGE UP (ref 40–120)
ALT FLD-CCNC: 49 U/L — HIGH (ref 10–45)
ANION GAP SERPL CALC-SCNC: 12 MMOL/L — SIGNIFICANT CHANGE UP (ref 5–17)
AST SERPL-CCNC: 28 U/L — SIGNIFICANT CHANGE UP (ref 10–40)
BASOPHILS # BLD AUTO: 0.03 K/UL — SIGNIFICANT CHANGE UP (ref 0–0.2)
BASOPHILS NFR BLD AUTO: 0.5 % — SIGNIFICANT CHANGE UP (ref 0–2)
BILIRUB SERPL-MCNC: 0.7 MG/DL — SIGNIFICANT CHANGE UP (ref 0.2–1.2)
BUN SERPL-MCNC: 16 MG/DL — SIGNIFICANT CHANGE UP (ref 7–23)
CALCIUM SERPL-MCNC: 8.2 MG/DL — LOW (ref 8.4–10.5)
CHLORIDE SERPL-SCNC: 101 MMOL/L — SIGNIFICANT CHANGE UP (ref 96–108)
CO2 SERPL-SCNC: 25 MMOL/L — SIGNIFICANT CHANGE UP (ref 22–31)
CREAT SERPL-MCNC: 1.02 MG/DL — SIGNIFICANT CHANGE UP (ref 0.5–1.3)
EGFR: 89 ML/MIN/1.73M2 — SIGNIFICANT CHANGE UP
EOSINOPHIL # BLD AUTO: 0.2 K/UL — SIGNIFICANT CHANGE UP (ref 0–0.5)
EOSINOPHIL NFR BLD AUTO: 3.2 % — SIGNIFICANT CHANGE UP (ref 0–6)
GLUCOSE BLDC GLUCOMTR-MCNC: 101 MG/DL — HIGH (ref 70–99)
GLUCOSE BLDC GLUCOMTR-MCNC: 134 MG/DL — HIGH (ref 70–99)
GLUCOSE BLDC GLUCOMTR-MCNC: 57 MG/DL — LOW (ref 70–99)
GLUCOSE BLDC GLUCOMTR-MCNC: 65 MG/DL — LOW (ref 70–99)
GLUCOSE BLDC GLUCOMTR-MCNC: 73 MG/DL — SIGNIFICANT CHANGE UP (ref 70–99)
GLUCOSE SERPL-MCNC: 94 MG/DL — SIGNIFICANT CHANGE UP (ref 70–99)
HCT VFR BLD CALC: 27.5 % — LOW (ref 39–50)
HGB BLD-MCNC: 9 G/DL — LOW (ref 13–17)
IMM GRANULOCYTES NFR BLD AUTO: 0.5 % — SIGNIFICANT CHANGE UP (ref 0–0.9)
LMWH PPP CHRO-ACNC: 0.05 IU/ML — LOW (ref 0.5–1.1)
LYMPHOCYTES # BLD AUTO: 1.38 K/UL — SIGNIFICANT CHANGE UP (ref 1–3.3)
LYMPHOCYTES # BLD AUTO: 22.3 % — SIGNIFICANT CHANGE UP (ref 13–44)
MAGNESIUM SERPL-MCNC: 1.8 MG/DL — SIGNIFICANT CHANGE UP (ref 1.6–2.6)
MCHC RBC-ENTMCNC: 28.9 PG — SIGNIFICANT CHANGE UP (ref 27–34)
MCHC RBC-ENTMCNC: 32.7 GM/DL — SIGNIFICANT CHANGE UP (ref 32–36)
MCV RBC AUTO: 88.4 FL — SIGNIFICANT CHANGE UP (ref 80–100)
MONOCYTES # BLD AUTO: 0.8 K/UL — SIGNIFICANT CHANGE UP (ref 0–0.9)
MONOCYTES NFR BLD AUTO: 12.9 % — SIGNIFICANT CHANGE UP (ref 2–14)
NEUTROPHILS # BLD AUTO: 3.76 K/UL — SIGNIFICANT CHANGE UP (ref 1.8–7.4)
NEUTROPHILS NFR BLD AUTO: 60.6 % — SIGNIFICANT CHANGE UP (ref 43–77)
NRBC # BLD: 0 /100 WBCS — SIGNIFICANT CHANGE UP (ref 0–0)
PLATELET # BLD AUTO: 201 K/UL — SIGNIFICANT CHANGE UP (ref 150–400)
POTASSIUM SERPL-MCNC: 3.6 MMOL/L — SIGNIFICANT CHANGE UP (ref 3.5–5.3)
POTASSIUM SERPL-SCNC: 3.6 MMOL/L — SIGNIFICANT CHANGE UP (ref 3.5–5.3)
PROT SERPL-MCNC: 5.8 G/DL — LOW (ref 6–8.3)
RBC # BLD: 3.11 M/UL — LOW (ref 4.2–5.8)
RBC # FLD: 12.6 % — SIGNIFICANT CHANGE UP (ref 10.3–14.5)
SODIUM SERPL-SCNC: 138 MMOL/L — SIGNIFICANT CHANGE UP (ref 135–145)
WBC # BLD: 6.2 K/UL — SIGNIFICANT CHANGE UP (ref 3.8–10.5)
WBC # FLD AUTO: 6.2 K/UL — SIGNIFICANT CHANGE UP (ref 3.8–10.5)

## 2022-12-23 PROCEDURE — 71045 X-RAY EXAM CHEST 1 VIEW: CPT | Mod: 26

## 2022-12-23 RX ORDER — INSULIN GLARGINE 100 [IU]/ML
40 INJECTION, SOLUTION SUBCUTANEOUS AT BEDTIME
Refills: 0 | Status: DISCONTINUED | OUTPATIENT
Start: 2022-12-23 | End: 2022-12-24

## 2022-12-23 RX ORDER — POTASSIUM BICARBONATE 978 MG/1
25 TABLET, EFFERVESCENT ORAL ONCE
Refills: 0 | Status: COMPLETED | OUTPATIENT
Start: 2022-12-23 | End: 2022-12-23

## 2022-12-23 RX ORDER — INSULIN LISPRO 100/ML
12 VIAL (ML) SUBCUTANEOUS
Refills: 0 | Status: DISCONTINUED | OUTPATIENT
Start: 2022-12-23 | End: 2022-12-24

## 2022-12-23 RX ADMIN — GABAPENTIN 100 MILLIGRAM(S): 400 CAPSULE ORAL at 11:29

## 2022-12-23 RX ADMIN — OXYCODONE HYDROCHLORIDE 10 MILLIGRAM(S): 5 TABLET ORAL at 18:10

## 2022-12-23 RX ADMIN — OXYCODONE HYDROCHLORIDE 10 MILLIGRAM(S): 5 TABLET ORAL at 11:27

## 2022-12-23 RX ADMIN — CHLORHEXIDINE GLUCONATE 1 APPLICATION(S): 213 SOLUTION TOPICAL at 11:36

## 2022-12-23 RX ADMIN — OXYCODONE HYDROCHLORIDE 10 MILLIGRAM(S): 5 TABLET ORAL at 11:57

## 2022-12-23 RX ADMIN — OXYCODONE HYDROCHLORIDE 10 MILLIGRAM(S): 5 TABLET ORAL at 08:01

## 2022-12-23 RX ADMIN — POTASSIUM BICARBONATE 25 MILLIEQUIVALENT(S): 978 TABLET, EFFERVESCENT ORAL at 09:00

## 2022-12-23 RX ADMIN — OXYCODONE HYDROCHLORIDE 10 MILLIGRAM(S): 5 TABLET ORAL at 17:40

## 2022-12-23 RX ADMIN — Medication 15 UNIT(S): at 07:37

## 2022-12-23 RX ADMIN — Medication 12 UNIT(S): at 11:29

## 2022-12-23 RX ADMIN — Medication 0.6 MILLIGRAM(S): at 17:41

## 2022-12-23 RX ADMIN — Medication 50 MILLIGRAM(S): at 00:34

## 2022-12-23 RX ADMIN — GABAPENTIN 100 MILLIGRAM(S): 400 CAPSULE ORAL at 05:59

## 2022-12-23 RX ADMIN — OXYCODONE HYDROCHLORIDE 10 MILLIGRAM(S): 5 TABLET ORAL at 07:31

## 2022-12-23 RX ADMIN — ATORVASTATIN CALCIUM 40 MILLIGRAM(S): 80 TABLET, FILM COATED ORAL at 22:06

## 2022-12-23 RX ADMIN — OXYCODONE HYDROCHLORIDE 10 MILLIGRAM(S): 5 TABLET ORAL at 22:30

## 2022-12-23 RX ADMIN — GABAPENTIN 100 MILLIGRAM(S): 400 CAPSULE ORAL at 22:06

## 2022-12-23 RX ADMIN — Medication 50 MILLIGRAM(S): at 17:41

## 2022-12-23 RX ADMIN — ENOXAPARIN SODIUM 40 MILLIGRAM(S): 100 INJECTION SUBCUTANEOUS at 08:59

## 2022-12-23 RX ADMIN — Medication 0.6 MILLIGRAM(S): at 06:00

## 2022-12-23 RX ADMIN — Medication 325 MILLIGRAM(S): at 11:28

## 2022-12-23 RX ADMIN — Medication 50 MILLIGRAM(S): at 06:00

## 2022-12-23 RX ADMIN — OXYCODONE HYDROCHLORIDE 10 MILLIGRAM(S): 5 TABLET ORAL at 22:07

## 2022-12-23 RX ADMIN — PANTOPRAZOLE SODIUM 40 MILLIGRAM(S): 20 TABLET, DELAYED RELEASE ORAL at 07:37

## 2022-12-23 RX ADMIN — Medication 500 MILLIGRAM(S): at 06:00

## 2022-12-23 RX ADMIN — TAMSULOSIN HYDROCHLORIDE 0.8 MILLIGRAM(S): 0.4 CAPSULE ORAL at 22:06

## 2022-12-23 RX ADMIN — Medication 500 MILLIGRAM(S): at 17:41

## 2022-12-23 RX ADMIN — Medication 50 MILLIGRAM(S): at 11:28

## 2022-12-23 RX ADMIN — Medication 12 UNIT(S): at 17:41

## 2022-12-23 NOTE — PROGRESS NOTE ADULT - PROBLEM SELECTOR PLAN 1
Will continue current insulin regimen for now. Will continue monitoring  blood sugars, will Follow up.    Will start insulin pen injection teaching, patient may get DC home on insulin.  Patient counseled for compliance with consistent low carb diet.

## 2022-12-23 NOTE — PROGRESS NOTE ADULT - SUBJECTIVE AND OBJECTIVE BOX
VITAL SIGNS    Telemetry:    Vital Signs Last 24 Hrs  T(C): 36.3 (22 @ 04:40), Max: 37.4 (22 @ 00:00)  T(F): 97.3 (22 @ 04:40), Max: 99.3 (22 @ 00:00)  HR: 96 (22 @ 04:40) (84 - 104)  BP: 121/72 (22 @ 04:40) (108/73 - 146/88)  RR: 18 (22 @ 04:40) (16 - 25)  SpO2: 93% (22 @ 04:40) (93% - 98%)             @ 07:01  -   @ 07:00  --------------------------------------------------------  IN: 1200 mL / OUT: 1701 mL / NET: -501 mL     @ 07:01  -   @ 10:56  --------------------------------------------------------  IN: 120 mL / OUT: 60 mL / NET: 60 mL       Daily     Daily Weight in k.9 (23 Dec 2022 08:35)  Admit Wt: Drug Dosing Weight  Height (cm): 175.3 (19 Dec 2022 07:22)  Weight (kg): 66.9 (19 Dec 2022 07:22)  BMI (kg/m2): 21.8 (19 Dec 2022 07:22)  BSA (m2): 1.82 (19 Dec 2022 07:22)    Bilirubin Total, Serum: 0.7 mg/dL ( @ 06:36)    CAPILLARY BLOOD GLUCOSE      POCT Blood Glucose.: 101 mg/dL (23 Dec 2022 07:25)  POCT Blood Glucose.: 144 mg/dL (22 Dec 2022 21:30)  POCT Blood Glucose.: 92 mg/dL (22 Dec 2022 16:41)  POCT Blood Glucose.: 147 mg/dL (22 Dec 2022 11:49)          acetaminophen     Tablet .. 650 milliGRAM(s) Oral every 6 hours PRN  ascorbic acid 500 milliGRAM(s) Oral two times a day  aspirin enteric coated 325 milliGRAM(s) Oral daily  atorvastatin 40 milliGRAM(s) Oral at bedtime  bisacodyl Suppository 10 milliGRAM(s) Rectal once  chlorhexidine 2% Cloths 1 Application(s) Topical daily  colchicine 0.6 milliGRAM(s) Oral two times a day  dextrose 50% Injectable 50 milliLiter(s) IV Push every 15 minutes  dextrose 50% Injectable 25 milliLiter(s) IV Push every 15 minutes  dextrose 50% Injectable 50 milliLiter(s) IV Push every 15 minutes  dextrose Oral Gel 15 Gram(s) Oral once  enoxaparin Injectable 40 milliGRAM(s) SubCutaneous every 24 hours  gabapentin 100 milliGRAM(s) Oral every 8 hours  glucagon  Injectable 1 milliGRAM(s) IntraMuscular once  insulin glargine Injectable (LANTUS) 40 Unit(s) SubCutaneous at bedtime  insulin lispro (ADMELOG) corrective regimen sliding scale   SubCutaneous three times a day before meals  insulin lispro Injectable (ADMELOG) 12 Unit(s) SubCutaneous three times a day before meals  metoprolol tartrate 50 milliGRAM(s) Oral every 6 hours  oxyCODONE    IR 5 milliGRAM(s) Oral every 4 hours PRN  oxyCODONE    IR 10 milliGRAM(s) Oral every 4 hours PRN  pantoprazole    Tablet 40 milliGRAM(s) Oral before breakfast  polyethylene glycol 3350 17 Gram(s) Oral daily  senna 2 Tablet(s) Oral at bedtime  sodium chloride 0.9%. 1000 milliLiter(s) IV Continuous <Continuous>  tamsulosin 0.8 milliGRAM(s) Oral at bedtime      PHYSICAL EXAM    Subjective: "Hi.   Neurology: alert and oriented x 3, nonfocal, no gross deficits  CV : tele:  RSR  Sternal Wound :  CDI with dressing , Stable  Lungs: clear. RR easy, unlabored   Abdomen: soft, nontender, nondistended, positive bowel sounds, bowel movement   Neg N/V/D   :  pt voiding without difficulty   Extremities:   BLANTON; edema, neg calf tenderness.   PPP bilaterally      PW:  Chest tubes:                 VITAL SIGNS    Telemetry:  rsr   Vital Signs Last 24 Hrs  T(C): 36.3 (22 @ 04:40), Max: 37.4 (22 @ 00:00)  T(F): 97.3 (22 @ 04:40), Max: 99.3 (22 @ 00:00)  HR: 96 (22 @ 04:40) (84 - 104)  BP: 121/72 (22 @ 04:40) (108/73 - 146/88)  RR: 18 (22 @ 04:40) (16 - 25)  SpO2: 93% (22 @ 04:40) (93% - 98%)             @ 07:01  -   @ 07:00  --------------------------------------------------------  IN: 1200 mL / OUT: 1701 mL / NET: -501 mL     @ 07:01  -   @ 10:56  --------------------------------------------------------  IN: 120 mL / OUT: 60 mL / NET: 60 mL       Daily     Daily Weight in k.9 (23 Dec 2022 08:35)  Admit Wt: Drug Dosing Weight  Height (cm): 175.3 (19 Dec 2022 07:22)  Weight (kg): 66.9 (19 Dec 2022 07:22)  BMI (kg/m2): 21.8 (19 Dec 2022 07:22)  BSA (m2): 1.82 (19 Dec 2022 07:22)    Bilirubin Total, Serum: 0.7 mg/dL ( @ 06:36)    CAPILLARY BLOOD GLUCOSE      POCT Blood Glucose.: 101 mg/dL (23 Dec 2022 07:25)  POCT Blood Glucose.: 144 mg/dL (22 Dec 2022 21:30)  POCT Blood Glucose.: 92 mg/dL (22 Dec 2022 16:41)  POCT Blood Glucose.: 147 mg/dL (22 Dec 2022 11:49)          acetaminophen     Tablet .. 650 milliGRAM(s) Oral every 6 hours PRN  ascorbic acid 500 milliGRAM(s) Oral two times a day  aspirin enteric coated 325 milliGRAM(s) Oral daily  atorvastatin 40 milliGRAM(s) Oral at bedtime  bisacodyl Suppository 10 milliGRAM(s) Rectal once  chlorhexidine 2% Cloths 1 Application(s) Topical daily  colchicine 0.6 milliGRAM(s) Oral two times a day  dextrose 50% Injectable 50 milliLiter(s) IV Push every 15 minutes  dextrose 50% Injectable 25 milliLiter(s) IV Push every 15 minutes  dextrose 50% Injectable 50 milliLiter(s) IV Push every 15 minutes  dextrose Oral Gel 15 Gram(s) Oral once  enoxaparin Injectable 40 milliGRAM(s) SubCutaneous every 24 hours  gabapentin 100 milliGRAM(s) Oral every 8 hours  glucagon  Injectable 1 milliGRAM(s) IntraMuscular once  insulin glargine Injectable (LANTUS) 40 Unit(s) SubCutaneous at bedtime  insulin lispro (ADMELOG) corrective regimen sliding scale   SubCutaneous three times a day before meals  insulin lispro Injectable (ADMELOG) 12 Unit(s) SubCutaneous three times a day before meals  metoprolol tartrate 50 milliGRAM(s) Oral every 6 hours  oxyCODONE    IR 5 milliGRAM(s) Oral every 4 hours PRN  oxyCODONE    IR 10 milliGRAM(s) Oral every 4 hours PRN  pantoprazole    Tablet 40 milliGRAM(s) Oral before breakfast  polyethylene glycol 3350 17 Gram(s) Oral daily  senna 2 Tablet(s) Oral at bedtime  sodium chloride 0.9%. 1000 milliLiter(s) IV Continuous <Continuous>  tamsulosin 0.8 milliGRAM(s) Oral at bedtime      PHYSICAL EXAM    Subjective: "I have pain."   Neurology: alert and oriented x 3, nonfocal, no gross deficits  CV : tele:  RSR    Sternal Wound :  CDI HUGO- sternum stable   Lungs: clear. RR easy, unlabored   Abdomen: soft, nontender, nondistended, positive bowel sounds, + bowel movement   Neg N/V/D   :  pt voiding without difficulty   Extremities:   BLANTON; neg LE edema, neg calf tenderness.   PPP bilaterally; left svg site cdi hugo       PW: no  Chest tubes: left ct d/c this am

## 2022-12-23 NOTE — PROGRESS NOTE ADULT - ASSESSMENT
Assessment  DMT2: 52y Male with DM T2 with hyperglycemia admitted with chest pain, A1C is 8.2%  patient was on oral hypoglycemic agents at home, on basal bolus insulin coverage, s/p CABG on IV insulin, now on basal bolus insulin, blood sugars are improving, insulin dose adjusted.  Patient is high risk with high level decision making due to uncontrolled diabetes, has increased risk for complications. Tight sugar control is not recommended for this patient.  CAD: S/P CABG, on medications, monitored, asymptomatic.  HTN: On antihypertensive medications, monitored, asymptomatic.  HLD: On statin, compliant.            Judith Sanders MD  Cell:  [phone]  Office: [phone]

## 2022-12-23 NOTE — PROGRESS NOTE ADULT - ASSESSMENT
52M w/ HTN, T2DM, GERD who is transferred from OSH for ACS evaluation/management. Per patient yesterday at 3 am he was woken up from his sleep for crushing chest pain that radiated to his left side w/ associated numbness and tinging. He endorses sweats, and small bouts of emesis x2. He states he has never experienced pain like this in the past, however it has resolved. He has been recently seeing a gastroenterology for worsening reflux over to past 4-6 months for which he was taking omeprazole. he states he misses his medications up to 2-3x weekly. He denies previous MIs, cardiac surgeries or hospitalizations He endorses smoking 5 cig/day for 36 years.     s/p C4L     weaned off - started on BB    Hypotension s/p fluid resuscitation. Low H&H likely dilutional. BiPAP overnight for atelectasis    Low grade temp overnight 100.4 F likely 2/2 to atelectasis. F/U RVP. Floor with L Pleural CT in place. [place] Removed DTV by 5pm.  52M w/ HTN, T2DM, GERD who is transferred from OSH for ACS evaluation/management. Per patient yesterday at 3 am he was woken up from his sleep for crushing chest pain that radiated to his left side w/ associated numbness and tinging. He endorses sweats, and small bouts of emesis x2. He states he has never experienced pain like this in the past, however it has resolved. He has been recently seeing a gastroenterology for worsening reflux over to past 4-6 months for which he was taking omeprazole. he states he misses his medications up to 2-3x weekly. He denies previous MIs, cardiac surgeries or hospitalizations He endorses smoking 5 cig/day for 36 years.     s/p C4L     weaned off - started on BB    Hypotension s/p fluid resuscitation. Low H&H likely dilutional. BiPAP overnight for atelectasis    Low grade temp overnight 100.4 F likely 2/2 to atelectasis. F/U RVP + enterorhinovirus;  Floor with L Pleural CT in place. [place] Removed DTV by 5pm- > voided   VSS; rsr ; d/c lt ct d/c this am; ck f/u chest xray; insulin teaching for home use as per endo  discharge planning- home sat/ sun if stable

## 2022-12-23 NOTE — PROGRESS NOTE ADULT - PROBLEM SELECTOR PLAN 2
A1C 8.8%  Endo following   ACHS FS  Carb consistent diet   Continue with Lantus 45 U and Adm 9UTID continue postop care  continue asa and statin  continue lop 50 mg po q6  colchicine for pps  d/c lt ct this am; ck f/u chest xray  pulm toilet  pain management  increase activity as tolerated  bowel regimen  discharge planning- home sat/ sun if stable overnight

## 2022-12-23 NOTE — PROGRESS NOTE ADULT - SUBJECTIVE AND OBJECTIVE BOX
Chief complaint    Patient is a 52y old  Male who presents with a chief complaint of s/p cabg (22 Dec 2022 13:27)   Review of systems  Patient in bed, appears comfortable.    Labs and Fingersticks  CAPILLARY BLOOD GLUCOSE      POCT Blood Glucose.: 134 mg/dL (23 Dec 2022 11:13)  POCT Blood Glucose.: 101 mg/dL (23 Dec 2022 07:25)  POCT Blood Glucose.: 144 mg/dL (22 Dec 2022 21:30)  POCT Blood Glucose.: 92 mg/dL (22 Dec 2022 16:41)  POCT Blood Glucose.: 147 mg/dL (22 Dec 2022 11:49)      Anion Gap, Serum: 12 (12-23 @ 06:36)  Anion Gap, Serum: 10 (12-22 @ 01:02)      Calcium, Total Serum: 8.2 *L* (12-23 @ 06:36)  Calcium, Total Serum: 7.3 *L* (12-22 @ 01:02)  Albumin, Serum: 3.1 *L* (12-23 @ 06:36)  Albumin, Serum: 3.0 *L* (12-22 @ 01:02)    Alanine Aminotransferase (ALT/SGPT): 49 *H* (12-23 @ 06:36)  Alanine Aminotransferase (ALT/SGPT): 51 *H* (12-22 @ 01:02)  Alkaline Phosphatase, Serum: 55 (12-23 @ 06:36)  Alkaline Phosphatase, Serum: 41 (12-22 @ 01:02)  Aspartate Aminotransferase (AST/SGOT): 28 (12-23 @ 06:36)  Aspartate Aminotransferase (AST/SGOT): 32 (12-22 @ 01:02)        12-23    138  |  101  |  16  ----------------------------<  94  3.6   |  25  |  1.02    Ca    8.2<L>      23 Dec 2022 06:36  Phos  1.9     12-22  Mg     1.8     12-23    TPro  5.8<L>  /  Alb  3.1<L>  /  TBili  0.7  /  DBili  x   /  AST  28  /  ALT  49<H>  /  AlkPhos  55  12-23                        9.0    6.20  )-----------( 201      ( 23 Dec 2022 06:39 )             27.5     Medications  MEDICATIONS  (STANDING):  ascorbic acid 500 milliGRAM(s) Oral two times a day  aspirin enteric coated 325 milliGRAM(s) Oral daily  atorvastatin 40 milliGRAM(s) Oral at bedtime  bisacodyl Suppository 10 milliGRAM(s) Rectal once  chlorhexidine 2% Cloths 1 Application(s) Topical daily  colchicine 0.6 milliGRAM(s) Oral two times a day  dextrose 50% Injectable 50 milliLiter(s) IV Push every 15 minutes  dextrose 50% Injectable 25 milliLiter(s) IV Push every 15 minutes  dextrose 50% Injectable 50 milliLiter(s) IV Push every 15 minutes  dextrose Oral Gel 15 Gram(s) Oral once  enoxaparin Injectable 40 milliGRAM(s) SubCutaneous every 24 hours  gabapentin 100 milliGRAM(s) Oral every 8 hours  glucagon  Injectable 1 milliGRAM(s) IntraMuscular once  insulin glargine Injectable (LANTUS) 40 Unit(s) SubCutaneous at bedtime  insulin lispro (ADMELOG) corrective regimen sliding scale   SubCutaneous three times a day before meals  insulin lispro Injectable (ADMELOG) 12 Unit(s) SubCutaneous three times a day before meals  metoprolol tartrate 50 milliGRAM(s) Oral every 6 hours  pantoprazole    Tablet 40 milliGRAM(s) Oral before breakfast  polyethylene glycol 3350 17 Gram(s) Oral daily  senna 2 Tablet(s) Oral at bedtime  sodium chloride 0.9%. 1000 milliLiter(s) (10 mL/Hr) IV Continuous <Continuous>  tamsulosin 0.8 milliGRAM(s) Oral at bedtime      Physical Exam  General: Patient appears comfortable.  Vital Signs Last 12 Hrs  T(F): 97.3 (12-23-22 @ 04:40), Max: 99.3 (12-23-22 @ 00:00)  HR: 96 (12-23-22 @ 04:40) (96 - 100)  BP: 121/72 (12-23-22 @ 04:40) (121/72 - 124/78)  BP(mean): --  RR: 18 (12-23-22 @ 04:40) (16 - 18)  SpO2: 93% (12-23-22 @ 04:40) (93% - 94%)  Neck: No palpable thyroid nodules.  CVS: S1S2, No murmurs  Respiratory: No wheezing, no crepitations  GI: Abdomen soft, non tender.  Musculoskeletal:  edema lower extremities.     Diagnostics

## 2022-12-24 ENCOUNTER — TRANSCRIPTION ENCOUNTER (OUTPATIENT)
Age: 52
End: 2022-12-24

## 2022-12-24 LAB
ANION GAP SERPL CALC-SCNC: 12 MMOL/L — SIGNIFICANT CHANGE UP (ref 5–17)
BUN SERPL-MCNC: 11 MG/DL — SIGNIFICANT CHANGE UP (ref 7–23)
CALCIUM SERPL-MCNC: 8.8 MG/DL — SIGNIFICANT CHANGE UP (ref 8.4–10.5)
CHLORIDE SERPL-SCNC: 101 MMOL/L — SIGNIFICANT CHANGE UP (ref 96–108)
CO2 SERPL-SCNC: 26 MMOL/L — SIGNIFICANT CHANGE UP (ref 22–31)
CREAT SERPL-MCNC: 0.91 MG/DL — SIGNIFICANT CHANGE UP (ref 0.5–1.3)
EGFR: 102 ML/MIN/1.73M2 — SIGNIFICANT CHANGE UP
GLUCOSE BLDC GLUCOMTR-MCNC: 112 MG/DL — HIGH (ref 70–99)
GLUCOSE BLDC GLUCOMTR-MCNC: 115 MG/DL — HIGH (ref 70–99)
GLUCOSE BLDC GLUCOMTR-MCNC: 117 MG/DL — HIGH (ref 70–99)
GLUCOSE BLDC GLUCOMTR-MCNC: 144 MG/DL — HIGH (ref 70–99)
GLUCOSE BLDC GLUCOMTR-MCNC: 79 MG/DL — SIGNIFICANT CHANGE UP (ref 70–99)
GLUCOSE BLDC GLUCOMTR-MCNC: 97 MG/DL — SIGNIFICANT CHANGE UP (ref 70–99)
GLUCOSE BLDC GLUCOMTR-MCNC: 99 MG/DL — SIGNIFICANT CHANGE UP (ref 70–99)
GLUCOSE SERPL-MCNC: 70 MG/DL — SIGNIFICANT CHANGE UP (ref 70–99)
HCT VFR BLD CALC: 28.7 % — LOW (ref 39–50)
HGB BLD-MCNC: 9.5 G/DL — LOW (ref 13–17)
MCHC RBC-ENTMCNC: 29 PG — SIGNIFICANT CHANGE UP (ref 27–34)
MCHC RBC-ENTMCNC: 33.1 GM/DL — SIGNIFICANT CHANGE UP (ref 32–36)
MCV RBC AUTO: 87.5 FL — SIGNIFICANT CHANGE UP (ref 80–100)
NRBC # BLD: 0 /100 WBCS — SIGNIFICANT CHANGE UP (ref 0–0)
PLATELET # BLD AUTO: 298 K/UL — SIGNIFICANT CHANGE UP (ref 150–400)
POTASSIUM SERPL-MCNC: 3.6 MMOL/L — SIGNIFICANT CHANGE UP (ref 3.5–5.3)
POTASSIUM SERPL-SCNC: 3.6 MMOL/L — SIGNIFICANT CHANGE UP (ref 3.5–5.3)
RBC # BLD: 3.28 M/UL — LOW (ref 4.2–5.8)
RBC # FLD: 12.5 % — SIGNIFICANT CHANGE UP (ref 10.3–14.5)
SODIUM SERPL-SCNC: 139 MMOL/L — SIGNIFICANT CHANGE UP (ref 135–145)
WBC # BLD: 5.91 K/UL — SIGNIFICANT CHANGE UP (ref 3.8–10.5)
WBC # FLD AUTO: 5.91 K/UL — SIGNIFICANT CHANGE UP (ref 3.8–10.5)

## 2022-12-24 RX ORDER — INSULIN GLARGINE 100 [IU]/ML
35 INJECTION, SOLUTION SUBCUTANEOUS AT BEDTIME
Refills: 0 | Status: DISCONTINUED | OUTPATIENT
Start: 2022-12-24 | End: 2022-12-25

## 2022-12-24 RX ORDER — AMLODIPINE BESYLATE 2.5 MG/1
5 TABLET ORAL DAILY
Refills: 0 | Status: DISCONTINUED | OUTPATIENT
Start: 2022-12-24 | End: 2022-12-26

## 2022-12-24 RX ORDER — INSULIN LISPRO 100/ML
8 VIAL (ML) SUBCUTANEOUS
Refills: 0 | Status: DISCONTINUED | OUTPATIENT
Start: 2022-12-24 | End: 2022-12-25

## 2022-12-24 RX ADMIN — GABAPENTIN 100 MILLIGRAM(S): 400 CAPSULE ORAL at 05:31

## 2022-12-24 RX ADMIN — Medication 50 MILLIGRAM(S): at 05:30

## 2022-12-24 RX ADMIN — OXYCODONE HYDROCHLORIDE 10 MILLIGRAM(S): 5 TABLET ORAL at 17:21

## 2022-12-24 RX ADMIN — GABAPENTIN 100 MILLIGRAM(S): 400 CAPSULE ORAL at 12:04

## 2022-12-24 RX ADMIN — Medication 325 MILLIGRAM(S): at 12:04

## 2022-12-24 RX ADMIN — ENOXAPARIN SODIUM 40 MILLIGRAM(S): 100 INJECTION SUBCUTANEOUS at 08:17

## 2022-12-24 RX ADMIN — OXYCODONE HYDROCHLORIDE 5 MILLIGRAM(S): 5 TABLET ORAL at 22:30

## 2022-12-24 RX ADMIN — SENNA PLUS 2 TABLET(S): 8.6 TABLET ORAL at 21:51

## 2022-12-24 RX ADMIN — Medication 50 MILLIGRAM(S): at 12:04

## 2022-12-24 RX ADMIN — OXYCODONE HYDROCHLORIDE 10 MILLIGRAM(S): 5 TABLET ORAL at 06:00

## 2022-12-24 RX ADMIN — Medication 8 UNIT(S): at 12:05

## 2022-12-24 RX ADMIN — OXYCODONE HYDROCHLORIDE 10 MILLIGRAM(S): 5 TABLET ORAL at 12:32

## 2022-12-24 RX ADMIN — OXYCODONE HYDROCHLORIDE 5 MILLIGRAM(S): 5 TABLET ORAL at 21:51

## 2022-12-24 RX ADMIN — Medication 50 MILLIGRAM(S): at 00:02

## 2022-12-24 RX ADMIN — OXYCODONE HYDROCHLORIDE 10 MILLIGRAM(S): 5 TABLET ORAL at 05:30

## 2022-12-24 RX ADMIN — Medication 0.6 MILLIGRAM(S): at 05:30

## 2022-12-24 RX ADMIN — Medication 8 UNIT(S): at 08:13

## 2022-12-24 RX ADMIN — OXYCODONE HYDROCHLORIDE 10 MILLIGRAM(S): 5 TABLET ORAL at 12:02

## 2022-12-24 RX ADMIN — Medication 50 MILLIGRAM(S): at 16:51

## 2022-12-24 RX ADMIN — Medication 0.6 MILLIGRAM(S): at 16:51

## 2022-12-24 RX ADMIN — TAMSULOSIN HYDROCHLORIDE 0.8 MILLIGRAM(S): 0.4 CAPSULE ORAL at 21:51

## 2022-12-24 RX ADMIN — ATORVASTATIN CALCIUM 40 MILLIGRAM(S): 80 TABLET, FILM COATED ORAL at 21:51

## 2022-12-24 RX ADMIN — Medication 8 UNIT(S): at 16:50

## 2022-12-24 RX ADMIN — PANTOPRAZOLE SODIUM 40 MILLIGRAM(S): 20 TABLET, DELAYED RELEASE ORAL at 05:30

## 2022-12-24 RX ADMIN — OXYCODONE HYDROCHLORIDE 10 MILLIGRAM(S): 5 TABLET ORAL at 16:51

## 2022-12-24 RX ADMIN — AMLODIPINE BESYLATE 5 MILLIGRAM(S): 2.5 TABLET ORAL at 08:20

## 2022-12-24 RX ADMIN — Medication 500 MILLIGRAM(S): at 05:30

## 2022-12-24 NOTE — DISCHARGE NOTE NURSING/CASE MANAGEMENT/SOCIAL WORK - NSDCPEWEB_GEN_ALL_CORE
[facility] for Tobacco Control website --- [email or web address] website --- [email or web address]

## 2022-12-24 NOTE — DISCHARGE NOTE NURSING/CASE MANAGEMENT/SOCIAL WORK - NSDCPEEMAIL_GEN_ALL_CORE
[facility] for Tobacco Control email [email or web address]

## 2022-12-24 NOTE — PROGRESS NOTE ADULT - SUBJECTIVE AND OBJECTIVE BOX
Chief complaint    Patient is a 52y old  Male who presents with a chief complaint of s/p cabg (22 Dec 2022 13:27)   Review of systems  Patient in bed, appears comfortable.    Labs and Fingersticks  CAPILLARY BLOOD GLUCOSE      POCT Blood Glucose.: 115 mg/dL (24 Dec 2022 11:26)  POCT Blood Glucose.: 112 mg/dL (24 Dec 2022 07:46)  POCT Blood Glucose.: 117 mg/dL (24 Dec 2022 01:09)  POCT Blood Glucose.: 97 mg/dL (24 Dec 2022 00:50)  POCT Blood Glucose.: 79 mg/dL (24 Dec 2022 00:25)  POCT Blood Glucose.: 57 mg/dL (23 Dec 2022 23:56)  POCT Blood Glucose.: 101 mg/dL (23 Dec 2022 21:40)  POCT Blood Glucose.: 101 mg/dL (23 Dec 2022 16:56)  POCT Blood Glucose.: 73 mg/dL (23 Dec 2022 16:30)  POCT Blood Glucose.: 65 mg/dL (23 Dec 2022 16:30)      Anion Gap, Serum: 12 (12-24 @ 06:01)  Anion Gap, Serum: 12 (12-23 @ 06:36)      Calcium, Total Serum: 8.8 (12-24 @ 06:01)  Calcium, Total Serum: 8.2 *L* (12-23 @ 06:36)  Albumin, Serum: 3.1 *L* (12-23 @ 06:36)    Alanine Aminotransferase (ALT/SGPT): 49 *H* (12-23 @ 06:36)  Alkaline Phosphatase, Serum: 55 (12-23 @ 06:36)  Aspartate Aminotransferase (AST/SGOT): 28 (12-23 @ 06:36)        12-24    139  |  101  |  11  ----------------------------<  70  3.6   |  26  |  0.91    Ca    8.8      24 Dec 2022 06:01  Mg     1.8     12-23    TPro  5.8<L>  /  Alb  3.1<L>  /  TBili  0.7  /  DBili  x   /  AST  28  /  ALT  49<H>  /  AlkPhos  55  12-23                        9.5    5.91  )-----------( 298      ( 24 Dec 2022 06:02 )             28.7     Medications  MEDICATIONS  (STANDING):  amLODIPine   Tablet 5 milliGRAM(s) Oral daily  aspirin enteric coated 325 milliGRAM(s) Oral daily  atorvastatin 40 milliGRAM(s) Oral at bedtime  bisacodyl Suppository 10 milliGRAM(s) Rectal once  colchicine 0.6 milliGRAM(s) Oral two times a day  dextrose 50% Injectable 50 milliLiter(s) IV Push every 15 minutes  dextrose 50% Injectable 25 milliLiter(s) IV Push every 15 minutes  dextrose 50% Injectable 50 milliLiter(s) IV Push every 15 minutes  dextrose Oral Gel 15 Gram(s) Oral once  enoxaparin Injectable 40 milliGRAM(s) SubCutaneous every 24 hours  glucagon  Injectable 1 milliGRAM(s) IntraMuscular once  insulin glargine Injectable (LANTUS) 35 Unit(s) SubCutaneous at bedtime  insulin lispro (ADMELOG) corrective regimen sliding scale   SubCutaneous three times a day before meals  insulin lispro Injectable (ADMELOG) 8 Unit(s) SubCutaneous three times a day before meals  metoprolol tartrate 50 milliGRAM(s) Oral every 6 hours  pantoprazole    Tablet 40 milliGRAM(s) Oral before breakfast  polyethylene glycol 3350 17 Gram(s) Oral daily  senna 2 Tablet(s) Oral at bedtime  sodium chloride 0.9%. 1000 milliLiter(s) (10 mL/Hr) IV Continuous <Continuous>  tamsulosin 0.8 milliGRAM(s) Oral at bedtime      Physical Exam  General: Patient appears comfortable.  Vital Signs Last 12 Hrs  T(F): 98.2 (12-24-22 @ 12:38), Max: 98.2 (12-24-22 @ 12:38)  HR: 91 (12-24-22 @ 12:38) (91 - 105)  BP: 137/91 (12-24-22 @ 12:38) (137/91 - 143/83)  BP(mean): --  RR: 18 (12-24-22 @ 12:38) (18 - 19)  SpO2: 95% (12-24-22 @ 12:38) (90% - 95%)  Neck: No palpable thyroid nodules.  CVS: S1S2, No murmurs  Respiratory: No wheezing, no crepitations  GI: Abdomen soft, non tender.  Musculoskeletal:  edema lower extremities.     Diagnostics

## 2022-12-24 NOTE — PROVIDER CONTACT NOTE (HYPOGLYCEMIA EVENT) - NS PROVIDER CONTACT BACKGROUND-HYPO
Age: 52y    Gender: Male    POCT Blood Glucose:  117 mg/dL (12-24-22 @ 01:09)  97 mg/dL (12-24-22 @ 00:50)  79 mg/dL (12-24-22 @ 00:25)  57 mg/dL (12-23-22 @ 23:56)  101 mg/dL (12-23-22 @ 21:40)  101 mg/dL (12-23-22 @ 16:56)  73 mg/dL (12-23-22 @ 16:30)  65 mg/dL (12-23-22 @ 16:30)      eMAR:atorvastatin   40 milliGRAM(s) Oral (12-23-22 @ 22:06)    colchicine   0.6 milliGRAM(s) Oral (12-23-22 @ 17:41)   0.6 milliGRAM(s) Oral (12-23-22 @ 06:00)    insulin lispro Injectable (ADMELOG)   15 Unit(s) SubCutaneous (12-23-22 @ 07:37)    insulin lispro Injectable (ADMELOG)   12 Unit(s) SubCutaneous (12-23-22 @ 17:41)   12 Unit(s) SubCutaneous (12-23-22 @ 11:29)

## 2022-12-24 NOTE — DISCHARGE NOTE NURSING/CASE MANAGEMENT/SOCIAL WORK - NSDCPEFALRISK_GEN_ALL_CORE
For information on Fall & Injury Prevention, visit: [email or web address] OR  [email or web address] OR  https://www.cdc.gov/steadi/patient.html

## 2022-12-24 NOTE — PROGRESS NOTE ADULT - SUBJECTIVE AND OBJECTIVE BOX
VITAL SIGNS    Telemetry:    Vital Signs Last 24 Hrs  T(C): 36.7 (22 @ 05:18), Max: 36.7 (22 @ 05:18)  T(F): 98.1 (22 @ 05:18), Max: 98.1 (22 @ 05:18)  HR: 105 (22 @ 05:18) (91 - 105)  BP: 143/83 (22 @ 05:18) (105/68 - 143/83)  RR: 19 (22 @ 06:00) (18 - 19)  SpO2: 94% (22 @ 06:00) (90% - 95%)             @ 07:01  -   @ 07:00  --------------------------------------------------------  IN: 596 mL / OUT: 961 mL / NET: -365 mL       Daily     Daily Weight in k.1 (24 Dec 2022 08:06)  Admit Wt: Drug Dosing Weight  Height (cm): 175.3 (19 Dec 2022 07:22)  Weight (kg): 66.9 (19 Dec 2022 07:22)  BMI (kg/m2): 21.8 (19 Dec 2022 07:22)  BSA (m2): 1.82 (19 Dec 2022 07:22)      CAPILLARY BLOOD GLUCOSE      POCT Blood Glucose.: 112 mg/dL (24 Dec 2022 07:46)  POCT Blood Glucose.: 117 mg/dL (24 Dec 2022 01:09)  POCT Blood Glucose.: 97 mg/dL (24 Dec 2022 00:50)  POCT Blood Glucose.: 79 mg/dL (24 Dec 2022 00:25)  POCT Blood Glucose.: 57 mg/dL (23 Dec 2022 23:56)  POCT Blood Glucose.: 101 mg/dL (23 Dec 2022 21:40)  POCT Blood Glucose.: 101 mg/dL (23 Dec 2022 16:56)  POCT Blood Glucose.: 73 mg/dL (23 Dec 2022 16:30)  POCT Blood Glucose.: 65 mg/dL (23 Dec 2022 16:30)  POCT Blood Glucose.: 134 mg/dL (23 Dec 2022 11:13)          acetaminophen     Tablet .. 650 milliGRAM(s) Oral every 6 hours PRN  amLODIPine   Tablet 5 milliGRAM(s) Oral daily  aspirin enteric coated 325 milliGRAM(s) Oral daily  atorvastatin 40 milliGRAM(s) Oral at bedtime  bisacodyl Suppository 10 milliGRAM(s) Rectal once  colchicine 0.6 milliGRAM(s) Oral two times a day  dextrose 50% Injectable 50 milliLiter(s) IV Push every 15 minutes  dextrose 50% Injectable 50 milliLiter(s) IV Push every 15 minutes  dextrose 50% Injectable 25 milliLiter(s) IV Push every 15 minutes  dextrose Oral Gel 15 Gram(s) Oral once  enoxaparin Injectable 40 milliGRAM(s) SubCutaneous every 24 hours  gabapentin 100 milliGRAM(s) Oral every 8 hours  glucagon  Injectable 1 milliGRAM(s) IntraMuscular once  insulin glargine Injectable (LANTUS) 35 Unit(s) SubCutaneous at bedtime  insulin lispro (ADMELOG) corrective regimen sliding scale   SubCutaneous three times a day before meals  insulin lispro Injectable (ADMELOG) 8 Unit(s) SubCutaneous three times a day before meals  metoprolol tartrate 50 milliGRAM(s) Oral every 6 hours  oxyCODONE    IR 5 milliGRAM(s) Oral every 4 hours PRN  oxyCODONE    IR 10 milliGRAM(s) Oral every 4 hours PRN  pantoprazole    Tablet 40 milliGRAM(s) Oral before breakfast  polyethylene glycol 3350 17 Gram(s) Oral daily  senna 2 Tablet(s) Oral at bedtime  sodium chloride 0.9%. 1000 milliLiter(s) IV Continuous <Continuous>  tamsulosin 0.8 milliGRAM(s) Oral at bedtime      PHYSICAL EXAM    Subjective: "Hi.   Neurology: alert and oriented x 3, nonfocal, no gross deficits  CV : tele:  RSR  Sternal Wound :  CDI with dressing , Stable  Lungs: clear. RR easy, unlabored   Abdomen: soft, nontender, nondistended, positive bowel sounds, bowel movement   Neg N/V/D   :  pt voiding without difficulty   Extremities:   BLANTON; edema, neg calf tenderness.   PPP bilaterally      PW:  Chest tubes:                 VITAL SIGNS    Telemetry:  rsr    Vital Signs Last 24 Hrs  T(C): 36.7 (22 @ 05:18), Max: 36.7 (22 @ 05:18)  T(F): 98.1 (22 @ 05:18), Max: 98.1 (22 @ 05:18)  HR: 105 (22 @ 05:18) (91 - 105)  BP: 143/83 (22 @ 05:18) (105/68 - 143/83)  RR: 19 (22 @ 06:00) (18 - 19)  SpO2: 94% (22 @ 06:00) (90% - 95%)             @ 07:01  -   @ 07:00  --------------------------------------------------------  IN: 596 mL / OUT: 961 mL / NET: -365 mL       Daily     Daily Weight in k.1 (24 Dec 2022 08:06)  Admit Wt: Drug Dosing Weight  Height (cm): 175.3 (19 Dec 2022 07:22)  Weight (kg): 66.9 (19 Dec 2022 07:22)  BMI (kg/m2): 21.8 (19 Dec 2022 07:22)  BSA (m2): 1.82 (19 Dec 2022 07:22)      CAPILLARY BLOOD GLUCOSE      POCT Blood Glucose.: 112 mg/dL (24 Dec 2022 07:46)  POCT Blood Glucose.: 117 mg/dL (24 Dec 2022 01:09)  POCT Blood Glucose.: 97 mg/dL (24 Dec 2022 00:50)  POCT Blood Glucose.: 79 mg/dL (24 Dec 2022 00:25)  POCT Blood Glucose.: 57 mg/dL (23 Dec 2022 23:56)  POCT Blood Glucose.: 101 mg/dL (23 Dec 2022 21:40)  POCT Blood Glucose.: 101 mg/dL (23 Dec 2022 16:56)  POCT Blood Glucose.: 73 mg/dL (23 Dec 2022 16:30)  POCT Blood Glucose.: 65 mg/dL (23 Dec 2022 16:30)  POCT Blood Glucose.: 134 mg/dL (23 Dec 2022 11:13)          acetaminophen     Tablet .. 650 milliGRAM(s) Oral every 6 hours PRN  amLODIPine   Tablet 5 milliGRAM(s) Oral daily  aspirin enteric coated 325 milliGRAM(s) Oral daily  atorvastatin 40 milliGRAM(s) Oral at bedtime  bisacodyl Suppository 10 milliGRAM(s) Rectal once  colchicine 0.6 milliGRAM(s) Oral two times a day  dextrose 50% Injectable 50 milliLiter(s) IV Push every 15 minutes  dextrose 50% Injectable 50 milliLiter(s) IV Push every 15 minutes  dextrose 50% Injectable 25 milliLiter(s) IV Push every 15 minutes  dextrose Oral Gel 15 Gram(s) Oral once  enoxaparin Injectable 40 milliGRAM(s) SubCutaneous every 24 hours  gabapentin 100 milliGRAM(s) Oral every 8 hours  glucagon  Injectable 1 milliGRAM(s) IntraMuscular once  insulin glargine Injectable (LANTUS) 35 Unit(s) SubCutaneous at bedtime  insulin lispro (ADMELOG) corrective regimen sliding scale   SubCutaneous three times a day before meals  insulin lispro Injectable (ADMELOG) 8 Unit(s) SubCutaneous three times a day before meals  metoprolol tartrate 50 milliGRAM(s) Oral every 6 hours  oxyCODONE    IR 5 milliGRAM(s) Oral every 4 hours PRN  oxyCODONE    IR 10 milliGRAM(s) Oral every 4 hours PRN  pantoprazole    Tablet 40 milliGRAM(s) Oral before breakfast  polyethylene glycol 3350 17 Gram(s) Oral daily  senna 2 Tablet(s) Oral at bedtime  sodium chloride 0.9%. 1000 milliLiter(s) IV Continuous <Continuous>  tamsulosin 0.8 milliGRAM(s) Oral at bedtime        PHYSICAL EXAM    Subjective: "I'm ok."   Neurology: alert and oriented x 3, nonfocal, no gross deficits  CV : tele:  RSR    Sternal Wound :  CDI HUGO- sternum stable   Lungs: clear. RR easy, unlabored   Abdomen: soft, nontender, nondistended, positive bowel sounds, + bowel movement   Neg N/V/D   :  pt voiding without difficulty   Extremities:   BLANTON; neg LE edema, neg calf tenderness.   PPP bilaterally;  left svg site cdi hugo         PW: no  Chest tubes: none

## 2022-12-24 NOTE — DISCHARGE NOTE NURSING/CASE MANAGEMENT/SOCIAL WORK - PATIENT PORTAL LINK FT
You can access the FollowMyHealth Patient Portal offered by [facility] by registering at the following website: [email or web address]. By joining [email or web address]’s FollowMyHealth portal, you will also be able to view your health information using other applications (apps) compatible with our system.

## 2022-12-24 NOTE — PROVIDER CONTACT NOTE (HYPOGLYCEMIA EVENT) - NS PROVIDER CONTACT RECOMMEND-HYPO
Patient to receive 40 units of lantus with 101 mg/dL. BG was rechecked hour later and resulted 57. Patient asymptomatic and received 4oz of apple juice x2. BG was checked q15 minutes until >100mg/dL

## 2022-12-24 NOTE — PROGRESS NOTE ADULT - ASSESSMENT
52M w/ HTN, T2DM, GERD who is transferred from OSH for ACS evaluation/management. Per patient yesterday at 3 am he was woken up from his sleep for crushing chest pain that radiated to his left side w/ associated numbness and tinging. He endorses sweats, and small bouts of emesis x2. He states he has never experienced pain like this in the past, however it has resolved. He has been recently seeing a gastroenterology for worsening reflux over to past 4-6 months for which he was taking omeprazole. he states he misses his medications up to 2-3x weekly. He denies previous MIs, cardiac surgeries or hospitalizations He endorses smoking 5 cig/day for 36 years.     s/p C4L     weaned off - started on BB    Hypotension s/p fluid resuscitation. Low H&H likely dilutional. BiPAP overnight for atelectasis    Low grade temp overnight 100.4 F likely 2/2 to atelectasis. F/U RVP + enterorhinovirus;  Floor with L Pleural CT in place. [place] Removed DTV by 5pm- > voided   VSS; rsr ; d/c lt ct d/c this am; ck f/u chest xray; insulin teaching for home use as per endo  discharge planning- home sat/ sun if stable  52M w/ HTN, T2DM, GERD who is transferred from OSH for ACS evaluation/management. Per patient yesterday at 3 am he was woken up from his sleep for crushing chest pain that radiated to his left side w/ associated numbness and tinging. He endorses sweats, and small bouts of emesis x2. He states he has never experienced pain like this in the past, however it has resolved. He has been recently seeing a gastroenterology for worsening reflux over to past 4-6 months for which he was taking omeprazole. he states he misses his medications up to 2-3x weekly. He denies previous MIs, cardiac surgeries or hospitalizations He endorses smoking 5 cig/day for 36 years.     s/p C4L     weaned off - started on BB    Hypotension s/p fluid resuscitation. Low H&H likely dilutional. BiPAP overnight for atelectasis    Low grade temp overnight 100.4 F likely 2/2 to atelectasis. F/U RVP + enterorhinovirus;  Floor with L Pleural CT in place. [place] Removed DTV by 5pm- > voided   VSS; rsr ; d/c lt ct d/c this am; ck f/u chest xray; insulin teaching for home use as per endo   VSS; RSR ; + hypoglycemia noted overnight; d/w endo- insulin adjusted; norvasc added for HTN   discharge planning- home when bs stable sun/ mon

## 2022-12-24 NOTE — PROGRESS NOTE ADULT - PROBLEM SELECTOR PLAN 2
continue postop care  continue asa and statin  continue lop 50 mg po q6  colchicine for pps  d/c lt ct this am; ck f/u chest xray  pulm toilet  pain management  increase activity as tolerated  bowel regimen  discharge planning- home sat/ sun if stable overnight continue postop care  continue asa and statin  continue lop 50 mg po q6  colchicine for pps  pulm toilet  pain management  increase activity as tolerated  bowel regimen  discharge planning- home sun/mon if stable overnight

## 2022-12-24 NOTE — PROGRESS NOTE ADULT - ASSESSMENT
Assessment  DMT2: 52y Male with DM T2 with hyperglycemia admitted with chest pain, A1C is 8.2%  patient was on oral hypoglycemic agents at home, on basal bolus insulin coverage, s/p CABG was on IV insulin, now on basal bolus insulin, blood sugars are improving, insulin dose adjusted.  Patient is high risk with high level decision making due to uncontrolled diabetes, has increased risk for complications. Tight sugar control is not recommended for this patient.  CAD: S/P CABG, on medications, monitored, asymptomatic.  HTN: On antihypertensive medications, monitored, asymptomatic.  HLD: On statin, compliant.            Judith Sanders MD  Cell:  [phone]  Office: [phone]

## 2022-12-24 NOTE — PROGRESS NOTE ADULT - PROBLEM SELECTOR PLAN 1
A1C 8.8%  Endo following   ACHS FS  Carb consistent diet   Continue and admelog as per endo  insulin teaching for home insulin use

## 2022-12-25 LAB
ALBUMIN SERPL ELPH-MCNC: 3.3 G/DL — SIGNIFICANT CHANGE UP (ref 3.3–5)
ALP SERPL-CCNC: 73 U/L — SIGNIFICANT CHANGE UP (ref 40–120)
ALT FLD-CCNC: 47 U/L — HIGH (ref 10–45)
ANION GAP SERPL CALC-SCNC: 14 MMOL/L — SIGNIFICANT CHANGE UP (ref 5–17)
AST SERPL-CCNC: 32 U/L — SIGNIFICANT CHANGE UP (ref 10–40)
BILIRUB SERPL-MCNC: 1 MG/DL — SIGNIFICANT CHANGE UP (ref 0.2–1.2)
BUN SERPL-MCNC: 9 MG/DL — SIGNIFICANT CHANGE UP (ref 7–23)
CALCIUM SERPL-MCNC: 8.7 MG/DL — SIGNIFICANT CHANGE UP (ref 8.4–10.5)
CHLORIDE SERPL-SCNC: 99 MMOL/L — SIGNIFICANT CHANGE UP (ref 96–108)
CO2 SERPL-SCNC: 23 MMOL/L — SIGNIFICANT CHANGE UP (ref 22–31)
CREAT SERPL-MCNC: 0.92 MG/DL — SIGNIFICANT CHANGE UP (ref 0.5–1.3)
EGFR: 101 ML/MIN/1.73M2 — SIGNIFICANT CHANGE UP
GLUCOSE BLDC GLUCOMTR-MCNC: 143 MG/DL — HIGH (ref 70–99)
GLUCOSE BLDC GLUCOMTR-MCNC: 149 MG/DL — HIGH (ref 70–99)
GLUCOSE BLDC GLUCOMTR-MCNC: 170 MG/DL — HIGH (ref 70–99)
GLUCOSE BLDC GLUCOMTR-MCNC: 179 MG/DL — HIGH (ref 70–99)
GLUCOSE BLDC GLUCOMTR-MCNC: 94 MG/DL — SIGNIFICANT CHANGE UP (ref 70–99)
GLUCOSE SERPL-MCNC: 111 MG/DL — HIGH (ref 70–99)
HCT VFR BLD CALC: 31.8 % — LOW (ref 39–50)
HGB BLD-MCNC: 10.6 G/DL — LOW (ref 13–17)
INR BLD: 1.46 RATIO — HIGH (ref 0.88–1.16)
MAGNESIUM SERPL-MCNC: 1.8 MG/DL — SIGNIFICANT CHANGE UP (ref 1.6–2.6)
MCHC RBC-ENTMCNC: 29.4 PG — SIGNIFICANT CHANGE UP (ref 27–34)
MCHC RBC-ENTMCNC: 33.3 GM/DL — SIGNIFICANT CHANGE UP (ref 32–36)
MCV RBC AUTO: 88.1 FL — SIGNIFICANT CHANGE UP (ref 80–100)
NRBC # BLD: 0 /100 WBCS — SIGNIFICANT CHANGE UP (ref 0–0)
PHOSPHATE SERPL-MCNC: 3.4 MG/DL — SIGNIFICANT CHANGE UP (ref 2.5–4.5)
PLATELET # BLD AUTO: 357 K/UL — SIGNIFICANT CHANGE UP (ref 150–400)
POTASSIUM SERPL-MCNC: 4.2 MMOL/L — SIGNIFICANT CHANGE UP (ref 3.5–5.3)
POTASSIUM SERPL-SCNC: 4.2 MMOL/L — SIGNIFICANT CHANGE UP (ref 3.5–5.3)
PROT SERPL-MCNC: 6.6 G/DL — SIGNIFICANT CHANGE UP (ref 6–8.3)
PROTHROM AB SERPL-ACNC: 16.8 SEC — HIGH (ref 10.5–13.4)
RBC # BLD: 3.61 M/UL — LOW (ref 4.2–5.8)
RBC # FLD: 12.6 % — SIGNIFICANT CHANGE UP (ref 10.3–14.5)
SODIUM SERPL-SCNC: 136 MMOL/L — SIGNIFICANT CHANGE UP (ref 135–145)
WBC # BLD: 5.69 K/UL — SIGNIFICANT CHANGE UP (ref 3.8–10.5)
WBC # FLD AUTO: 5.69 K/UL — SIGNIFICANT CHANGE UP (ref 3.8–10.5)

## 2022-12-25 RX ORDER — INSULIN LISPRO 100/ML
7 VIAL (ML) SUBCUTANEOUS
Refills: 0 | Status: DISCONTINUED | OUTPATIENT
Start: 2022-12-25 | End: 2022-12-26

## 2022-12-25 RX ORDER — INSULIN GLARGINE 100 [IU]/ML
18 INJECTION, SOLUTION SUBCUTANEOUS AT BEDTIME
Refills: 0 | Status: DISCONTINUED | OUTPATIENT
Start: 2022-12-25 | End: 2022-12-26

## 2022-12-25 RX ADMIN — OXYCODONE HYDROCHLORIDE 10 MILLIGRAM(S): 5 TABLET ORAL at 06:00

## 2022-12-25 RX ADMIN — Medication 2: at 07:47

## 2022-12-25 RX ADMIN — Medication 50 MILLIGRAM(S): at 11:12

## 2022-12-25 RX ADMIN — OXYCODONE HYDROCHLORIDE 10 MILLIGRAM(S): 5 TABLET ORAL at 05:26

## 2022-12-25 RX ADMIN — OXYCODONE HYDROCHLORIDE 5 MILLIGRAM(S): 5 TABLET ORAL at 11:46

## 2022-12-25 RX ADMIN — Medication 50 MILLIGRAM(S): at 01:22

## 2022-12-25 RX ADMIN — Medication 7 UNIT(S): at 12:25

## 2022-12-25 RX ADMIN — OXYCODONE HYDROCHLORIDE 5 MILLIGRAM(S): 5 TABLET ORAL at 11:16

## 2022-12-25 RX ADMIN — AMLODIPINE BESYLATE 5 MILLIGRAM(S): 2.5 TABLET ORAL at 05:27

## 2022-12-25 RX ADMIN — PANTOPRAZOLE SODIUM 40 MILLIGRAM(S): 20 TABLET, DELAYED RELEASE ORAL at 05:28

## 2022-12-25 RX ADMIN — OXYCODONE HYDROCHLORIDE 10 MILLIGRAM(S): 5 TABLET ORAL at 17:18

## 2022-12-25 RX ADMIN — ENOXAPARIN SODIUM 40 MILLIGRAM(S): 100 INJECTION SUBCUTANEOUS at 09:49

## 2022-12-25 RX ADMIN — ATORVASTATIN CALCIUM 40 MILLIGRAM(S): 80 TABLET, FILM COATED ORAL at 22:13

## 2022-12-25 RX ADMIN — Medication 7 UNIT(S): at 16:47

## 2022-12-25 RX ADMIN — TAMSULOSIN HYDROCHLORIDE 0.8 MILLIGRAM(S): 0.4 CAPSULE ORAL at 22:13

## 2022-12-25 RX ADMIN — SENNA PLUS 2 TABLET(S): 8.6 TABLET ORAL at 22:13

## 2022-12-25 RX ADMIN — POLYETHYLENE GLYCOL 3350 17 GRAM(S): 17 POWDER, FOR SOLUTION ORAL at 09:47

## 2022-12-25 RX ADMIN — Medication 0.6 MILLIGRAM(S): at 16:48

## 2022-12-25 RX ADMIN — INSULIN GLARGINE 18 UNIT(S): 100 INJECTION, SOLUTION SUBCUTANEOUS at 22:15

## 2022-12-25 RX ADMIN — Medication 50 MILLIGRAM(S): at 22:15

## 2022-12-25 RX ADMIN — Medication 8 UNIT(S): at 07:47

## 2022-12-25 RX ADMIN — Medication 325 MILLIGRAM(S): at 11:12

## 2022-12-25 RX ADMIN — OXYCODONE HYDROCHLORIDE 10 MILLIGRAM(S): 5 TABLET ORAL at 16:48

## 2022-12-25 RX ADMIN — Medication 0.6 MILLIGRAM(S): at 05:27

## 2022-12-25 RX ADMIN — Medication 50 MILLIGRAM(S): at 16:49

## 2022-12-25 RX ADMIN — Medication 50 MILLIGRAM(S): at 05:27

## 2022-12-25 NOTE — PROGRESS NOTE ADULT - ASSESSMENT
52M w/ HTN, T2DM, GERD who is transferred from OSH for ACS evaluation/management. Per patient yesterday at 3 am he was woken up from his sleep for crushing chest pain that radiated to his left side w/ associated numbness and tinging. He endorses sweats, and small bouts of emesis x2. He states he has never experienced pain like this in the past, however it has resolved. He has been recently seeing a gastroenterology for worsening reflux over to past 4-6 months for which he was taking omeprazole. he states he misses his medications up to 2-3x weekly. He denies previous MIs, cardiac surgeries or hospitalizations He endorses smoking 5 cig/day for 36 years.     s/p C4L     weaned off - started on BB    Hypotension s/p fluid resuscitation. Low H&H likely dilutional. BiPAP overnight for atelectasis    Low grade temp overnight 100.4 F likely 2/2 to atelectasis. F/U RVP + enterorhinovirus;  Floor with L Pleural CT in place. [place] Removed DTV by 5pm- > voided   VSS; rsr ; d/c lt ct d/c this am; ck f/u chest xray; insulin teaching for home use as per endo   VSS; RSR ; + hypoglycemia noted overnight; d/w endo- insulin adjusted; norvasc added for HTN    ASA statin betablocker Replete magnesium  DC Monday  discharge planning- home when bs stable sun/ mon

## 2022-12-25 NOTE — PROGRESS NOTE ADULT - PROBLEM SELECTOR PLAN 2
continue postop care  continue asa and statin  continue lop 50 mg po q6  colchicine for pps  pulm toilet  pain management  increase activity as tolerated  bowel regimen  Replete magnesium  discharge planning- home sun/mon if stable overnight

## 2022-12-25 NOTE — PROGRESS NOTE ADULT - ASSESSMENT
Assessment  DMT2: 52y Male with DM T2 with hyperglycemia admitted with chest pain, A1C is 8.2%  patient was on oral hypoglycemic agents at home, on basal bolus insulin coverage, s/p CABG was on IV insulin, now on basal bolus insulin, blood sugars are improving.  Patient is high risk with high level decision making due to uncontrolled diabetes, has increased risk for complications. Tight sugar control is not recommended for this patient.  CAD: S/P CABG, on medications, monitored, asymptomatic.  HTN: On antihypertensive medications, monitored, asymptomatic.  HLD: On statin, compliant.            Judith Sanders MD  Cell:  [phone]  Office: [phone]

## 2022-12-25 NOTE — PROGRESS NOTE ADULT - SUBJECTIVE AND OBJECTIVE BOX
Chief complaint    Patient is a 52y old  Male who presents with a chief complaint of s/p cabg (22 Dec 2022 13:27)   Review of systems  Patient in bed, appears comfortable.    Labs and Fingersticks  CAPILLARY BLOOD GLUCOSE      POCT Blood Glucose.: 170 mg/dL (25 Dec 2022 07:27)  POCT Blood Glucose.: 94 mg/dL (25 Dec 2022 01:17)  POCT Blood Glucose.: 99 mg/dL (24 Dec 2022 21:33)  POCT Blood Glucose.: 144 mg/dL (24 Dec 2022 16:30)  POCT Blood Glucose.: 115 mg/dL (24 Dec 2022 11:26)      Anion Gap, Serum: 14 (12-25 @ 05:59)  Anion Gap, Serum: 12 (12-24 @ 06:01)      Calcium, Total Serum: 8.7 (12-25 @ 05:59)  Calcium, Total Serum: 8.8 (12-24 @ 06:01)  Albumin, Serum: 3.3 (12-25 @ 05:59)    Alanine Aminotransferase (ALT/SGPT): 47 *H* (12-25 @ 05:59)  Alkaline Phosphatase, Serum: 73 (12-25 @ 05:59)  Aspartate Aminotransferase (AST/SGOT): 32 (12-25 @ 05:59)        12-25    136  |  99  |  9   ----------------------------<  111<H>  4.2   |  23  |  0.92    Ca    8.7      25 Dec 2022 05:59  Phos  3.4     12-25  Mg     1.8     12-25    TPro  6.6  /  Alb  3.3  /  TBili  1.0  /  DBili  x   /  AST  32  /  ALT  47<H>  /  AlkPhos  73  12-25                        10.6   5.69  )-----------( 357      ( 25 Dec 2022 05:58 )             31.8     Medications  MEDICATIONS  (STANDING):  amLODIPine   Tablet 5 milliGRAM(s) Oral daily  aspirin enteric coated 325 milliGRAM(s) Oral daily  atorvastatin 40 milliGRAM(s) Oral at bedtime  bisacodyl Suppository 10 milliGRAM(s) Rectal once  colchicine 0.6 milliGRAM(s) Oral two times a day  dextrose 50% Injectable 50 milliLiter(s) IV Push every 15 minutes  dextrose 50% Injectable 25 milliLiter(s) IV Push every 15 minutes  dextrose 50% Injectable 50 milliLiter(s) IV Push every 15 minutes  dextrose Oral Gel 15 Gram(s) Oral once  enoxaparin Injectable 40 milliGRAM(s) SubCutaneous every 24 hours  glucagon  Injectable 1 milliGRAM(s) IntraMuscular once  insulin glargine Injectable (LANTUS) 18 Unit(s) SubCutaneous at bedtime  insulin lispro (ADMELOG) corrective regimen sliding scale   SubCutaneous three times a day before meals  insulin lispro Injectable (ADMELOG) 7 Unit(s) SubCutaneous three times a day before meals  metoprolol tartrate 50 milliGRAM(s) Oral every 6 hours  pantoprazole    Tablet 40 milliGRAM(s) Oral before breakfast  polyethylene glycol 3350 17 Gram(s) Oral daily  senna 2 Tablet(s) Oral at bedtime  sodium chloride 0.9%. 1000 milliLiter(s) (10 mL/Hr) IV Continuous <Continuous>  tamsulosin 0.8 milliGRAM(s) Oral at bedtime      Physical Exam  General: Patient appears comfortable.  Vital Signs Last 12 Hrs  T(F): 98.8 (12-25-22 @ 04:23), Max: 98.8 (12-25-22 @ 04:23)  HR: 111 (12-25-22 @ 04:23) (111 - 111)  BP: 135/83 (12-25-22 @ 04:23) (135/83 - 135/83)  BP(mean): --  RR: 18 (12-25-22 @ 04:23) (18 - 18)  SpO2: 94% (12-25-22 @ 04:23) (94% - 94%)  Neck: No palpable thyroid nodules.  CVS: S1S2, No murmurs  Respiratory: No wheezing, no crepitations  GI: Abdomen soft, non tender.  Musculoskeletal:  edema lower extremities.     Diagnostics

## 2022-12-25 NOTE — PROGRESS NOTE ADULT - ASSESSMENT
Assessment  DMT2: 52y Male with DM T2 with hyperglycemia admitted with chest pain, A1C is 8.2%  patient was on oral hypoglycemic agents at home, on basal bolus insulin coverage, s/p CABG was on IV insulin, now on basal bolus insulin, blood sugars are improving, insulin dose adjusted.  Patient is high risk with high level decision making due to uncontrolled diabetes, has increased risk for complications. Tight sugar control is not recommended for this patient.  CAD: S/P CABG, on medications, monitored, asymptomatic.  HTN: On antihypertensive medications, monitored, asymptomatic.  HLD: On statin, compliant.            Judith Sanders MD  Cell:  [phone]  Office: [phone]

## 2022-12-25 NOTE — PROGRESS NOTE ADULT - SUBJECTIVE AND OBJECTIVE BOX
Subjective: " hello"  Sitting up in bed      Tele: SR  90S           V/S:                    T(F): 98.1 (22 @ 11:13), Max: 98.8 (22 @ 04:23)  HR: 108 (22 @ 11:13) (92 - 111)  BP: 135/84 (22 @ 11:13) (134/84 - 135/84)  RR: 17 (22 @ 11:13) (17 - 18)  SpO2: 98% (22 @ 11:13) (94% - 98%)  Wt(kg): --      LV EF:      Labs:      136  |  99  |  9   ----------------------------<  111<H>  4.2   |  23  |  0.92    Ca    8.7      25 Dec 2022 05:59  Phos  3.4       Mg     1.8         TPro  6.6  /  Alb  3.3  /  TBili  1.0  /  DBili  x   /  AST  32  /  ALT  47<H>  /  AlkPhos  73                                 10.6   5.69  )-----------( 357      ( 25 Dec 2022 05:58 )             31.8        PT/INR - ( 25 Dec 2022 05:58 )   PT: 16.8 sec;   INR: 1.46 ratio              CAPILLARY BLOOD GLUCOSE      POCT Blood Glucose.: 149 mg/dL (25 Dec 2022 11:29)  POCT Blood Glucose.: 170 mg/dL (25 Dec 2022 07:27)  POCT Blood Glucose.: 94 mg/dL (25 Dec 2022 01:17)  POCT Blood Glucose.: 99 mg/dL (24 Dec 2022 21:33)  POCT Blood Glucose.: 144 mg/dL (24 Dec 2022 16:30)           CXR:    I&O's Detail    24 Dec 2022 07:01  -  25 Dec 2022 07:00  --------------------------------------------------------  IN:    Oral Fluid: 720 mL  Total IN: 720 mL    OUT:    Voided (mL): 200 mL  Total OUT: 200 mL    Total NET: 520 mL      25 Dec 2022 07:01  -  25 Dec 2022 14:18  --------------------------------------------------------  IN:    Oral Fluid: 120 mL  Total IN: 120 mL    OUT:  Total OUT: 0 mL    Total NET: 120 mL      BOWEL MOVEMENT:  [X ] YES [ ] NO      Daily     Daily Weight in k.6 (25 Dec 2022 08:00)  Medications:  acetaminophen     Tablet .. 650 milliGRAM(s) Oral every 6 hours PRN  amLODIPine   Tablet 5 milliGRAM(s) Oral daily  aspirin enteric coated 325 milliGRAM(s) Oral daily  atorvastatin 40 milliGRAM(s) Oral at bedtime  bisacodyl Suppository 10 milliGRAM(s) Rectal once  colchicine 0.6 milliGRAM(s) Oral two times a day  dextrose 50% Injectable 50 milliLiter(s) IV Push every 15 minutes  dextrose 50% Injectable 50 milliLiter(s) IV Push every 15 minutes  dextrose 50% Injectable 25 milliLiter(s) IV Push every 15 minutes  dextrose Oral Gel 15 Gram(s) Oral once  enoxaparin Injectable 40 milliGRAM(s) SubCutaneous every 24 hours  glucagon  Injectable 1 milliGRAM(s) IntraMuscular once  insulin glargine Injectable (LANTUS) 18 Unit(s) SubCutaneous at bedtime  insulin lispro (ADMELOG) corrective regimen sliding scale   SubCutaneous three times a day before meals  insulin lispro Injectable (ADMELOG) 7 Unit(s) SubCutaneous three times a day before meals  metoprolol tartrate 50 milliGRAM(s) Oral every 6 hours  oxyCODONE    IR 10 milliGRAM(s) Oral every 4 hours PRN  oxyCODONE    IR 5 milliGRAM(s) Oral every 4 hours PRN  pantoprazole    Tablet 40 milliGRAM(s) Oral before breakfast  polyethylene glycol 3350 17 Gram(s) Oral daily  senna 2 Tablet(s) Oral at bedtime  sodium chloride 0.9%. 1000 milliLiter(s) IV Continuous <Continuous>  tamsulosin 0.8 milliGRAM(s) Oral at bedtime        Physical Exam:  Neurology: alert and oriented x 3    CV : S1S2  RRR    Sternal Wound :  CDI HUGO- sternum stable     Lungs: clear. RR easy, unlabored     Abdomen: soft, nontender, nondistended, positive bowel sounds, + bowel movement   Neg N/V/D     :  pt voiding without difficulty     Extremities:   BLANTON; neg LE edema, neg calf tenderness.   PPP bilaterally;  left svg site cdi hugo                   PAST MEDICAL & SURGICAL HISTORY:  No pertinent past medical history      Hypertension      GERD (gastroesophageal reflux disease)      Diabetes mellitus      No significant past surgical history      No significant past surgical history

## 2022-12-25 NOTE — PROGRESS NOTE ADULT - PROBLEM SELECTOR PLAN 1
Will continue current insulin regimen for now. Will continue monitoring  blood sugars, will Follow up.  DC home on Lantus 18u qhs, Janumet 50/1000mg PO BID, Farxiga 10mg daily. FU 2 weeks.

## 2022-12-25 NOTE — PROGRESS NOTE ADULT - SUBJECTIVE AND OBJECTIVE BOX
Chief complaint    Patient is a 52y old  Male who presents with a chief complaint of s/p cabg (22 Dec 2022 13:27)   Review of systems  Patient in bed, appears comfortable.    Labs and Fingersticks  CAPILLARY BLOOD GLUCOSE      POCT Blood Glucose.: 149 mg/dL (25 Dec 2022 11:29)  POCT Blood Glucose.: 170 mg/dL (25 Dec 2022 07:27)  POCT Blood Glucose.: 94 mg/dL (25 Dec 2022 01:17)  POCT Blood Glucose.: 99 mg/dL (24 Dec 2022 21:33)  POCT Blood Glucose.: 144 mg/dL (24 Dec 2022 16:30)      Anion Gap, Serum: 14 (12-25 @ 05:59)  Anion Gap, Serum: 12 (12-24 @ 06:01)      Calcium, Total Serum: 8.7 (12-25 @ 05:59)  Calcium, Total Serum: 8.8 (12-24 @ 06:01)  Albumin, Serum: 3.3 (12-25 @ 05:59)    Alanine Aminotransferase (ALT/SGPT): 47 *H* (12-25 @ 05:59)  Alkaline Phosphatase, Serum: 73 (12-25 @ 05:59)  Aspartate Aminotransferase (AST/SGOT): 32 (12-25 @ 05:59)        12-25    136  |  99  |  9   ----------------------------<  111<H>  4.2   |  23  |  0.92    Ca    8.7      25 Dec 2022 05:59  Phos  3.4     12-25  Mg     1.8     12-25    TPro  6.6  /  Alb  3.3  /  TBili  1.0  /  DBili  x   /  AST  32  /  ALT  47<H>  /  AlkPhos  73  12-25                        10.6   5.69  )-----------( 357      ( 25 Dec 2022 05:58 )             31.8     Medications  MEDICATIONS  (STANDING):  amLODIPine   Tablet 5 milliGRAM(s) Oral daily  aspirin enteric coated 325 milliGRAM(s) Oral daily  atorvastatin 40 milliGRAM(s) Oral at bedtime  bisacodyl Suppository 10 milliGRAM(s) Rectal once  colchicine 0.6 milliGRAM(s) Oral two times a day  dextrose 50% Injectable 50 milliLiter(s) IV Push every 15 minutes  dextrose 50% Injectable 50 milliLiter(s) IV Push every 15 minutes  dextrose 50% Injectable 25 milliLiter(s) IV Push every 15 minutes  dextrose Oral Gel 15 Gram(s) Oral once  enoxaparin Injectable 40 milliGRAM(s) SubCutaneous every 24 hours  glucagon  Injectable 1 milliGRAM(s) IntraMuscular once  insulin glargine Injectable (LANTUS) 18 Unit(s) SubCutaneous at bedtime  insulin lispro (ADMELOG) corrective regimen sliding scale   SubCutaneous three times a day before meals  insulin lispro Injectable (ADMELOG) 7 Unit(s) SubCutaneous three times a day before meals  metoprolol tartrate 50 milliGRAM(s) Oral every 6 hours  pantoprazole    Tablet 40 milliGRAM(s) Oral before breakfast  polyethylene glycol 3350 17 Gram(s) Oral daily  senna 2 Tablet(s) Oral at bedtime  sodium chloride 0.9%. 1000 milliLiter(s) (10 mL/Hr) IV Continuous <Continuous>  tamsulosin 0.8 milliGRAM(s) Oral at bedtime      Physical Exam  General: Patient appears comfortable.  Vital Signs Last 12 Hrs  T(F): 98.1 (12-25-22 @ 11:13), Max: 98.8 (12-25-22 @ 04:23)  HR: 108 (12-25-22 @ 11:13) (108 - 111)  BP: 135/84 (12-25-22 @ 11:13) (135/83 - 135/84)  BP(mean): 101 (12-25-22 @ 11:13) (101 - 101)  RR: 17 (12-25-22 @ 11:13) (17 - 18)  SpO2: 98% (12-25-22 @ 11:13) (94% - 98%)  Neck: No palpable thyroid nodules.  CVS: S1S2, No murmurs  Respiratory: No wheezing, no crepitations  GI: Abdomen soft, non tender.  Musculoskeletal:  edema lower extremities.     Diagnostics

## 2022-12-26 ENCOUNTER — TRANSCRIPTION ENCOUNTER (OUTPATIENT)
Age: 52
End: 2022-12-26

## 2022-12-26 VITALS
HEART RATE: 98 BPM | OXYGEN SATURATION: 98 % | TEMPERATURE: 98 F | DIASTOLIC BLOOD PRESSURE: 79 MMHG | SYSTOLIC BLOOD PRESSURE: 113 MMHG | RESPIRATION RATE: 17 BRPM

## 2022-12-26 LAB
ALBUMIN SERPL ELPH-MCNC: 3.6 G/DL — SIGNIFICANT CHANGE UP (ref 3.3–5)
ALP SERPL-CCNC: 82 U/L — SIGNIFICANT CHANGE UP (ref 40–120)
ALT FLD-CCNC: 55 U/L — HIGH (ref 10–45)
ANION GAP SERPL CALC-SCNC: 15 MMOL/L — SIGNIFICANT CHANGE UP (ref 5–17)
AST SERPL-CCNC: 47 U/L — HIGH (ref 10–40)
BASOPHILS # BLD AUTO: 0.06 K/UL — SIGNIFICANT CHANGE UP (ref 0–0.2)
BASOPHILS NFR BLD AUTO: 0.8 % — SIGNIFICANT CHANGE UP (ref 0–2)
BILIRUB SERPL-MCNC: 1.1 MG/DL — SIGNIFICANT CHANGE UP (ref 0.2–1.2)
BUN SERPL-MCNC: 10 MG/DL — SIGNIFICANT CHANGE UP (ref 7–23)
CALCIUM SERPL-MCNC: 8.9 MG/DL — SIGNIFICANT CHANGE UP (ref 8.4–10.5)
CHLORIDE SERPL-SCNC: 98 MMOL/L — SIGNIFICANT CHANGE UP (ref 96–108)
CO2 SERPL-SCNC: 21 MMOL/L — LOW (ref 22–31)
CREAT SERPL-MCNC: 0.97 MG/DL — SIGNIFICANT CHANGE UP (ref 0.5–1.3)
EGFR: 95 ML/MIN/1.73M2 — SIGNIFICANT CHANGE UP
EOSINOPHIL # BLD AUTO: 0.4 K/UL — SIGNIFICANT CHANGE UP (ref 0–0.5)
EOSINOPHIL NFR BLD AUTO: 5.4 % — SIGNIFICANT CHANGE UP (ref 0–6)
GLUCOSE BLDC GLUCOMTR-MCNC: 131 MG/DL — HIGH (ref 70–99)
GLUCOSE BLDC GLUCOMTR-MCNC: 150 MG/DL — HIGH (ref 70–99)
GLUCOSE SERPL-MCNC: 140 MG/DL — HIGH (ref 70–99)
HCT VFR BLD CALC: 33.5 % — LOW (ref 39–50)
HGB BLD-MCNC: 11.2 G/DL — LOW (ref 13–17)
IMM GRANULOCYTES NFR BLD AUTO: 1.4 % — HIGH (ref 0–0.9)
INR BLD: 1.37 RATIO — HIGH (ref 0.88–1.16)
LYMPHOCYTES # BLD AUTO: 1.95 K/UL — SIGNIFICANT CHANGE UP (ref 1–3.3)
LYMPHOCYTES # BLD AUTO: 26.4 % — SIGNIFICANT CHANGE UP (ref 13–44)
MAGNESIUM SERPL-MCNC: 1.9 MG/DL — SIGNIFICANT CHANGE UP (ref 1.6–2.6)
MCHC RBC-ENTMCNC: 28.9 PG — SIGNIFICANT CHANGE UP (ref 27–34)
MCHC RBC-ENTMCNC: 33.4 GM/DL — SIGNIFICANT CHANGE UP (ref 32–36)
MCV RBC AUTO: 86.3 FL — SIGNIFICANT CHANGE UP (ref 80–100)
MONOCYTES # BLD AUTO: 0.77 K/UL — SIGNIFICANT CHANGE UP (ref 0–0.9)
MONOCYTES NFR BLD AUTO: 10.4 % — SIGNIFICANT CHANGE UP (ref 2–14)
NEUTROPHILS # BLD AUTO: 4.1 K/UL — SIGNIFICANT CHANGE UP (ref 1.8–7.4)
NEUTROPHILS NFR BLD AUTO: 55.6 % — SIGNIFICANT CHANGE UP (ref 43–77)
NRBC # BLD: 0 /100 WBCS — SIGNIFICANT CHANGE UP (ref 0–0)
PHOSPHATE SERPL-MCNC: 3.4 MG/DL — SIGNIFICANT CHANGE UP (ref 2.5–4.5)
PLATELET # BLD AUTO: 527 K/UL — HIGH (ref 150–400)
POTASSIUM SERPL-MCNC: 4.6 MMOL/L — SIGNIFICANT CHANGE UP (ref 3.5–5.3)
POTASSIUM SERPL-SCNC: 4.6 MMOL/L — SIGNIFICANT CHANGE UP (ref 3.5–5.3)
PROT SERPL-MCNC: 7.1 G/DL — SIGNIFICANT CHANGE UP (ref 6–8.3)
PROTHROM AB SERPL-ACNC: 16 SEC — HIGH (ref 10.5–13.4)
RBC # BLD: 3.88 M/UL — LOW (ref 4.2–5.8)
RBC # FLD: 12.7 % — SIGNIFICANT CHANGE UP (ref 10.3–14.5)
SODIUM SERPL-SCNC: 134 MMOL/L — LOW (ref 135–145)
WBC # BLD: 7.38 K/UL — SIGNIFICANT CHANGE UP (ref 3.8–10.5)
WBC # FLD AUTO: 7.38 K/UL — SIGNIFICANT CHANGE UP (ref 3.8–10.5)

## 2022-12-26 PROCEDURE — 82553 CREATINE MB FRACTION: CPT

## 2022-12-26 PROCEDURE — 85018 HEMOGLOBIN: CPT

## 2022-12-26 PROCEDURE — 36415 COLL VENOUS BLD VENIPUNCTURE: CPT

## 2022-12-26 PROCEDURE — U0003: CPT

## 2022-12-26 PROCEDURE — C1769: CPT

## 2022-12-26 PROCEDURE — 82803 BLOOD GASES ANY COMBINATION: CPT

## 2022-12-26 PROCEDURE — 83605 ASSAY OF LACTIC ACID: CPT

## 2022-12-26 PROCEDURE — 97530 THERAPEUTIC ACTIVITIES: CPT

## 2022-12-26 PROCEDURE — P9012: CPT

## 2022-12-26 PROCEDURE — 83735 ASSAY OF MAGNESIUM: CPT

## 2022-12-26 PROCEDURE — 86923 COMPATIBILITY TEST ELECTRIC: CPT

## 2022-12-26 PROCEDURE — 80048 BASIC METABOLIC PNL TOTAL CA: CPT

## 2022-12-26 PROCEDURE — 86850 RBC ANTIBODY SCREEN: CPT

## 2022-12-26 PROCEDURE — 93005 ELECTROCARDIOGRAM TRACING: CPT

## 2022-12-26 PROCEDURE — 82550 ASSAY OF CK (CPK): CPT

## 2022-12-26 PROCEDURE — 87641 MR-STAPH DNA AMP PROBE: CPT

## 2022-12-26 PROCEDURE — 93306 TTE W/DOPPLER COMPLETE: CPT

## 2022-12-26 PROCEDURE — 93880 EXTRACRANIAL BILAT STUDY: CPT

## 2022-12-26 PROCEDURE — C1887: CPT

## 2022-12-26 PROCEDURE — P9045: CPT

## 2022-12-26 PROCEDURE — 85576 BLOOD PLATELET AGGREGATION: CPT

## 2022-12-26 PROCEDURE — 80053 COMPREHEN METABOLIC PANEL: CPT

## 2022-12-26 PROCEDURE — 97116 GAIT TRAINING THERAPY: CPT

## 2022-12-26 PROCEDURE — 81001 URINALYSIS AUTO W/SCOPE: CPT

## 2022-12-26 PROCEDURE — 82330 ASSAY OF CALCIUM: CPT

## 2022-12-26 PROCEDURE — 85520 HEPARIN ASSAY: CPT

## 2022-12-26 PROCEDURE — 85025 COMPLETE CBC W/AUTO DIFF WBC: CPT

## 2022-12-26 PROCEDURE — 82435 ASSAY OF BLOOD CHLORIDE: CPT

## 2022-12-26 PROCEDURE — 86891 AUTOLOGOUS BLOOD OP SALVAGE: CPT

## 2022-12-26 PROCEDURE — C1751: CPT

## 2022-12-26 PROCEDURE — 86901 BLOOD TYPING SEROLOGIC RH(D): CPT

## 2022-12-26 PROCEDURE — 84443 ASSAY THYROID STIM HORMONE: CPT

## 2022-12-26 PROCEDURE — 82565 ASSAY OF CREATININE: CPT

## 2022-12-26 PROCEDURE — 82962 GLUCOSE BLOOD TEST: CPT

## 2022-12-26 PROCEDURE — 0225U NFCT DS DNA&RNA 21 SARSCOV2: CPT

## 2022-12-26 PROCEDURE — 99291 CRITICAL CARE FIRST HOUR: CPT

## 2022-12-26 PROCEDURE — 93458 L HRT ARTERY/VENTRICLE ANGIO: CPT

## 2022-12-26 PROCEDURE — 86900 BLOOD TYPING SEROLOGIC ABO: CPT

## 2022-12-26 PROCEDURE — C1889: CPT

## 2022-12-26 PROCEDURE — 94640 AIRWAY INHALATION TREATMENT: CPT

## 2022-12-26 PROCEDURE — 71045 X-RAY EXAM CHEST 1 VIEW: CPT

## 2022-12-26 PROCEDURE — 84100 ASSAY OF PHOSPHORUS: CPT

## 2022-12-26 PROCEDURE — 85027 COMPLETE CBC AUTOMATED: CPT

## 2022-12-26 PROCEDURE — 82947 ASSAY GLUCOSE BLOOD QUANT: CPT

## 2022-12-26 PROCEDURE — 86965 POOLING BLOOD PLATELETS: CPT

## 2022-12-26 PROCEDURE — 85730 THROMBOPLASTIN TIME PARTIAL: CPT

## 2022-12-26 PROCEDURE — 84484 ASSAY OF TROPONIN QUANT: CPT

## 2022-12-26 PROCEDURE — 85610 PROTHROMBIN TIME: CPT

## 2022-12-26 PROCEDURE — 97535 SELF CARE MNGMENT TRAINING: CPT

## 2022-12-26 PROCEDURE — 83036 HEMOGLOBIN GLYCOSYLATED A1C: CPT

## 2022-12-26 PROCEDURE — 94660 CPAP INITIATION&MGMT: CPT

## 2022-12-26 PROCEDURE — C1760: CPT

## 2022-12-26 PROCEDURE — 94002 VENT MGMT INPAT INIT DAY: CPT

## 2022-12-26 PROCEDURE — 87086 URINE CULTURE/COLONY COUNT: CPT

## 2022-12-26 PROCEDURE — 85014 HEMATOCRIT: CPT

## 2022-12-26 PROCEDURE — 80061 LIPID PANEL: CPT

## 2022-12-26 PROCEDURE — 87640 STAPH A DNA AMP PROBE: CPT

## 2022-12-26 PROCEDURE — U0005: CPT

## 2022-12-26 PROCEDURE — 84295 ASSAY OF SERUM SODIUM: CPT

## 2022-12-26 PROCEDURE — 84132 ASSAY OF SERUM POTASSIUM: CPT

## 2022-12-26 PROCEDURE — 97162 PT EVAL MOD COMPLEX 30 MIN: CPT

## 2022-12-26 PROCEDURE — 85384 FIBRINOGEN ACTIVITY: CPT

## 2022-12-26 PROCEDURE — C1894: CPT

## 2022-12-26 PROCEDURE — 97165 OT EVAL LOW COMPLEX 30 MIN: CPT

## 2022-12-26 RX ORDER — ALOGLIPTIN AND METFORMIN HYDROCHLORIDE 12.5; 5 MG/1; MG/1
1 TABLET, FILM COATED ORAL
Qty: 60 | Refills: 0
Start: 2022-12-26 | End: 2023-01-24

## 2022-12-26 RX ORDER — DAPAGLIFLOZIN 10 MG/1
1 TABLET, FILM COATED ORAL
Qty: 30 | Refills: 0
Start: 2022-12-26 | End: 2023-01-24

## 2022-12-26 RX ORDER — ATORVASTATIN CALCIUM 80 MG/1
1 TABLET, FILM COATED ORAL
Qty: 30 | Refills: 0
Start: 2022-12-26 | End: 2023-01-24

## 2022-12-26 RX ORDER — TAMSULOSIN HYDROCHLORIDE 0.4 MG/1
1 CAPSULE ORAL
Qty: 0 | Refills: 0 | DISCHARGE

## 2022-12-26 RX ORDER — INSULIN GLARGINE 100 [IU]/ML
18 INJECTION, SOLUTION SUBCUTANEOUS
Qty: 1 | Refills: 0
Start: 2022-12-26

## 2022-12-26 RX ORDER — SITAGLIPTIN AND METFORMIN HYDROCHLORIDE 500; 50 MG/1; MG/1
1 TABLET, FILM COATED ORAL
Qty: 60 | Refills: 0
Start: 2022-12-26 | End: 2023-01-24

## 2022-12-26 RX ORDER — ERTUGLIFLOZIN 5 MG/1
1 TABLET, FILM COATED ORAL
Qty: 0 | Refills: 0 | DISCHARGE

## 2022-12-26 RX ORDER — ALOGLIPTIN AND METFORMIN HYDROCHLORIDE 12.5; 5 MG/1; MG/1
1 TABLET, FILM COATED ORAL
Qty: 0 | Refills: 0 | DISCHARGE

## 2022-12-26 RX ORDER — ATORVASTATIN CALCIUM 80 MG/1
1 TABLET, FILM COATED ORAL
Qty: 0 | Refills: 0 | DISCHARGE

## 2022-12-26 RX ORDER — ACETAMINOPHEN 500 MG
1 TABLET ORAL
Qty: 0 | Refills: 0 | DISCHARGE
Start: 2022-12-26

## 2022-12-26 RX ORDER — SENNA PLUS 8.6 MG/1
2 TABLET ORAL
Qty: 0 | Refills: 0 | DISCHARGE
Start: 2022-12-26

## 2022-12-26 RX ORDER — EMPAGLIFLOZIN 10 MG/1
1 TABLET, FILM COATED ORAL
Qty: 30 | Refills: 0
Start: 2022-12-26 | End: 2023-01-24

## 2022-12-26 RX ORDER — ASPIRIN/CALCIUM CARB/MAGNESIUM 324 MG
1 TABLET ORAL
Qty: 0 | Refills: 0 | DISCHARGE

## 2022-12-26 RX ORDER — POLYETHYLENE GLYCOL 3350 17 G/17G
17 POWDER, FOR SOLUTION ORAL
Qty: 0 | Refills: 0 | DISCHARGE
Start: 2022-12-26

## 2022-12-26 RX ORDER — TAMSULOSIN HYDROCHLORIDE 0.4 MG/1
2 CAPSULE ORAL
Qty: 0 | Refills: 0 | DISCHARGE
Start: 2022-12-26

## 2022-12-26 RX ORDER — METOPROLOL TARTRATE 50 MG
100 TABLET ORAL
Refills: 0 | Status: DISCONTINUED | OUTPATIENT
Start: 2022-12-26 | End: 2022-12-26

## 2022-12-26 RX ORDER — PIOGLITAZONE HYDROCHLORIDE 15 MG/1
1 TABLET ORAL
Qty: 0 | Refills: 0 | DISCHARGE

## 2022-12-26 RX ORDER — ERTUGLIFLOZIN 5 MG/1
1 TABLET, FILM COATED ORAL
Qty: 30 | Refills: 0
Start: 2022-12-26 | End: 2023-01-24

## 2022-12-26 RX ORDER — OXYCODONE HYDROCHLORIDE 5 MG/1
1 TABLET ORAL
Qty: 28 | Refills: 0
Start: 2022-12-26 | End: 2023-01-01

## 2022-12-26 RX ORDER — ISOPROPYL ALCOHOL, BENZOCAINE .7; .06 ML/ML; ML/ML
1 SWAB TOPICAL
Qty: 100 | Refills: 1
Start: 2022-12-26 | End: 2023-02-13

## 2022-12-26 RX ORDER — METOPROLOL TARTRATE 50 MG
1 TABLET ORAL
Qty: 60 | Refills: 0
Start: 2022-12-26 | End: 2023-01-24

## 2022-12-26 RX ORDER — AMLODIPINE BESYLATE 2.5 MG/1
1 TABLET ORAL
Qty: 30 | Refills: 0
Start: 2022-12-26 | End: 2023-01-24

## 2022-12-26 RX ORDER — ASPIRIN/CALCIUM CARB/MAGNESIUM 324 MG
1 TABLET ORAL
Qty: 30 | Refills: 0
Start: 2022-12-26 | End: 2023-01-24

## 2022-12-26 RX ADMIN — Medication 7 UNIT(S): at 08:29

## 2022-12-26 RX ADMIN — OXYCODONE HYDROCHLORIDE 10 MILLIGRAM(S): 5 TABLET ORAL at 12:04

## 2022-12-26 RX ADMIN — Medication 325 MILLIGRAM(S): at 12:08

## 2022-12-26 RX ADMIN — AMLODIPINE BESYLATE 5 MILLIGRAM(S): 2.5 TABLET ORAL at 05:11

## 2022-12-26 RX ADMIN — PANTOPRAZOLE SODIUM 40 MILLIGRAM(S): 20 TABLET, DELAYED RELEASE ORAL at 05:10

## 2022-12-26 RX ADMIN — Medication 0.6 MILLIGRAM(S): at 05:10

## 2022-12-26 RX ADMIN — Medication 50 MILLIGRAM(S): at 05:10

## 2022-12-26 RX ADMIN — OXYCODONE HYDROCHLORIDE 10 MILLIGRAM(S): 5 TABLET ORAL at 05:10

## 2022-12-26 RX ADMIN — OXYCODONE HYDROCHLORIDE 10 MILLIGRAM(S): 5 TABLET ORAL at 05:40

## 2022-12-26 RX ADMIN — Medication 50 MILLIGRAM(S): at 12:05

## 2022-12-26 RX ADMIN — Medication 7 UNIT(S): at 12:03

## 2022-12-26 RX ADMIN — OXYCODONE HYDROCHLORIDE 10 MILLIGRAM(S): 5 TABLET ORAL at 12:36

## 2022-12-26 RX ADMIN — ENOXAPARIN SODIUM 40 MILLIGRAM(S): 100 INJECTION SUBCUTANEOUS at 08:29

## 2022-12-26 NOTE — PROGRESS NOTE ADULT - SUBJECTIVE AND OBJECTIVE BOX
Chief complaint    Patient is a 52y old  Male who presents with a chief complaint of C4LIMA (25 Dec 2022 14:17)   Review of systems  Patient in bed, appears comfortable.    Labs and Fingersticks  CAPILLARY BLOOD GLUCOSE      POCT Blood Glucose.: 150 mg/dL (26 Dec 2022 07:41)  POCT Blood Glucose.: 179 mg/dL (25 Dec 2022 21:40)  POCT Blood Glucose.: 143 mg/dL (25 Dec 2022 16:40)  POCT Blood Glucose.: 149 mg/dL (25 Dec 2022 11:29)      Anion Gap, Serum: 15 (12-26 @ 06:06)  Anion Gap, Serum: 14 (12-25 @ 05:59)      Calcium, Total Serum: 8.9 (12-26 @ 06:06)  Calcium, Total Serum: 8.7 (12-25 @ 05:59)  Albumin, Serum: 3.6 (12-26 @ 06:06)  Albumin, Serum: 3.3 (12-25 @ 05:59)    Alanine Aminotransferase (ALT/SGPT): 55 *H* (12-26 @ 06:06)  Alanine Aminotransferase (ALT/SGPT): 47 *H* (12-25 @ 05:59)  Alkaline Phosphatase, Serum: 82 (12-26 @ 06:06)  Alkaline Phosphatase, Serum: 73 (12-25 @ 05:59)  Aspartate Aminotransferase (AST/SGOT): 47 *H* (12-26 @ 06:06)  Aspartate Aminotransferase (AST/SGOT): 32 (12-25 @ 05:59)        12-26    134<L>  |  98  |  10  ----------------------------<  140<H>  4.6   |  21<L>  |  0.97    Ca    8.9      26 Dec 2022 06:06  Phos  3.4     12-26  Mg     1.9     12-26    TPro  7.1  /  Alb  3.6  /  TBili  1.1  /  DBili  x   /  AST  47<H>  /  ALT  55<H>  /  AlkPhos  82  12-26                        11.2   7.38  )-----------( 527      ( 26 Dec 2022 06:07 )             33.5     Medications  MEDICATIONS  (STANDING):  amLODIPine   Tablet 5 milliGRAM(s) Oral daily  aspirin enteric coated 325 milliGRAM(s) Oral daily  atorvastatin 40 milliGRAM(s) Oral at bedtime  bisacodyl Suppository 10 milliGRAM(s) Rectal once  colchicine 0.6 milliGRAM(s) Oral two times a day  dextrose 50% Injectable 50 milliLiter(s) IV Push every 15 minutes  dextrose 50% Injectable 50 milliLiter(s) IV Push every 15 minutes  dextrose 50% Injectable 25 milliLiter(s) IV Push every 15 minutes  dextrose Oral Gel 15 Gram(s) Oral once  enoxaparin Injectable 40 milliGRAM(s) SubCutaneous every 24 hours  glucagon  Injectable 1 milliGRAM(s) IntraMuscular once  insulin glargine Injectable (LANTUS) 18 Unit(s) SubCutaneous at bedtime  insulin lispro (ADMELOG) corrective regimen sliding scale   SubCutaneous three times a day before meals  insulin lispro Injectable (ADMELOG) 7 Unit(s) SubCutaneous three times a day before meals  metoprolol tartrate 50 milliGRAM(s) Oral every 6 hours  pantoprazole    Tablet 40 milliGRAM(s) Oral before breakfast  polyethylene glycol 3350 17 Gram(s) Oral daily  senna 2 Tablet(s) Oral at bedtime  sodium chloride 0.9%. 1000 milliLiter(s) (10 mL/Hr) IV Continuous <Continuous>  tamsulosin 0.8 milliGRAM(s) Oral at bedtime      Physical Exam  General: Patient appears comfortable.  Vital Signs Last 12 Hrs  T(F): 98.4 (12-26-22 @ 05:00), Max: 98.6 (12-25-22 @ 21:17)  HR: 98 (12-26-22 @ 05:00) (98 - 101)  BP: 124/82 (12-26-22 @ 05:00) (122/81 - 124/82)  BP(mean): --  RR: 18 (12-26-22 @ 05:00) (18 - 18)  SpO2: 94% (12-26-22 @ 05:00) (94% - 97%)  Neck: No palpable thyroid nodules.  CVS: S1S2, No murmurs  Respiratory: No wheezing, no crepitations  GI: Abdomen soft, non tender.  Musculoskeletal:  edema lower extremities.     Diagnostics             Chief complaint    Patient is a 52y old  Male who presents with a chief complaint of C4LIMA (25 Dec 2022 14:17)   Review of systems  Patient in bed, appears comfortable.    Labs and Fingersticks  CAPILLARY BLOOD GLUCOSE      POCT Blood Glucose.: 150 mg/dL (26 Dec 2022 07:41)  POCT Blood Glucose.: 179 mg/dL (25 Dec 2022 21:40)  POCT Blood Glucose.: 143 mg/dL (25 Dec 2022 16:40)  POCT Blood Glucose.: 149 mg/dL (25 Dec 2022 11:29)      Anion Gap, Serum: 15 (12-26 @ 06:06)  Anion Gap, Serum: 14 (12-25 @ 05:59)      Calcium, Total Serum: 8.9 (12-26 @ 06:06)  Calcium, Total Serum: 8.7 (12-25 @ 05:59)  Albumin, Serum: 3.6 (12-26 @ 06:06)  Albumin, Serum: 3.3 (12-25 @ 05:59)    Alanine Aminotransferase (ALT/SGPT): 55 *H* (12-26 @ 06:06)  Alanine Aminotransferase (ALT/SGPT): 47 *H* (12-25 @ 05:59)  Alkaline Phosphatase, Serum: 82 (12-26 @ 06:06)  Alkaline Phosphatase, Serum: 73 (12-25 @ 05:59)  Aspartate Aminotransferase (AST/SGOT): 47 *H* (12-26 @ 06:06)  Aspartate Aminotransferase (AST/SGOT): 32 (12-25 @ 05:59)        12-26    134<L>  |  98  |  10  ----------------------------<  140<H>  4.6   |  21<L>  |  0.97    Ca    8.9      26 Dec 2022 06:06  Phos  3.4     12-26  Mg     1.9     12-26    TPro  7.1  /  Alb  3.6  /  TBili  1.1  /  DBili  x   /  AST  47<H>  /  ALT  55<H>  /  AlkPhos  82  12-26                        11.2   7.38  )-----------( 527      ( 26 Dec 2022 06:07 )             33.5     Medications  MEDICATIONS  (STANDING):  amLODIPine   Tablet 5 milliGRAM(s) Oral daily  aspirin enteric coated 325 milliGRAM(s) Oral daily  atorvastatin 40 milliGRAM(s) Oral at bedtime  bisacodyl Suppository 10 milliGRAM(s) Rectal once  colchicine 0.6 milliGRAM(s) Oral two times a day  dextrose 50% Injectable 50 milliLiter(s) IV Push every 15 minutes  dextrose 50% Injectable 50 milliLiter(s) IV Push every 15 minutes  dextrose 50% Injectable 25 milliLiter(s) IV Push every 15 minutes  dextrose Oral Gel 15 Gram(s) Oral once  enoxaparin Injectable 40 milliGRAM(s) SubCutaneous every 24 hours  glucagon  Injectable 1 milliGRAM(s) IntraMuscular once  insulin glargine Injectable (LANTUS) 18 Unit(s) SubCutaneous at bedtime  insulin lispro (ADMELOG) corrective regimen sliding scale   SubCutaneous three times a day before meals  insulin lispro Injectable (ADMELOG) 7 Unit(s) SubCutaneous three times a day before meals  metoprolol tartrate 50 milliGRAM(s) Oral every 6 hours  pantoprazole    Tablet 40 milliGRAM(s) Oral before breakfast  polyethylene glycol 3350 17 Gram(s) Oral daily  senna 2 Tablet(s) Oral at bedtime  sodium chloride 0.9%. 1000 milliLiter(s) (10 mL/Hr) IV Continuous <Continuous>  tamsulosin 0.8 milliGRAM(s) Oral at bedtime      Physical Exam  General: Patient appears comfortable.  Vital Signs Last 12 Hrs  T(F): 98.4 (12-26-22 @ 05:00), Max: 98.6 (12-25-22 @ 21:17)  HR: 98 (12-26-22 @ 05:00) (98 - 101)  BP: 124/82 (12-26-22 @ 05:00) (122/81 - 124/82)  BP(mean): --  RR: 18 (12-26-22 @ 05:00) (18 - 18)  SpO2: 94% (12-26-22 @ 05:00) (94% - 97%)  Neck: No palpable thyroid nodules.  CVS: S1S2, No murmurs  Respiratory: No wheezing, no crepitations  GI: Abdomen soft, non tender.  Musculoskeletal:  edema lower extremities.     Diagnostics

## 2022-12-26 NOTE — DISCHARGE NOTE PROVIDER - HOSPITAL COURSE
52M w/ HTN, T2DM, GERD who is transferred from OSH for ACS evaluation/management. Per patient yesterday at 3 am he was woken up from his sleep for crushing chest pain that radiated to his left side w/ associated numbness and tinging. He endorses sweats, and small bouts of emesis x2. He states he has never experienced pain like this in the past, however it has resolved. He has been recently seeing a gastroenterology for worsening reflux over to past 4-6 months for which he was taking omeprazole. he states he misses his medications up to 2-3x weekly. He denies previous MIs, cardiac surgeries or hospitalizations He endorses smoking 5 cig/day for 36 years.     s/p C4L     weaned off - started on BB    Hypotension s/p fluid resuscitation. Low H&H likely dilutional. BiPAP overnight for atelectasis    Low grade temp overnight 100.4 F likely 2/2 to atelectasis. F/U RVP + enterorhinovirus;  Floor with L Pleural CT in place. [place] Removed DTV by 5pm- > voided   VSS; rsr ; d/c lt ct d/c this am; ck f/u chest xray; insulin teaching for home use as per endo   VSS; RSR ; + hypoglycemia noted overnight; d/w endo- insulin adjusted; norvasc added for HTN    ASA statin betablocker Replete magnesium  DC  VSS; RSR 80's; discharge pt home today as per Dr. east; d/c on lantus/ janumet/ farxiga as per endo; f/u endo in 2 weeks

## 2022-12-26 NOTE — DISCHARGE NOTE PROVIDER - NSDCPNSUBOBJ_GEN_ALL_CORE
PHYSICAL EXAM      Subjective: "I want to go home today."   Neurology: alert and oriented x 3, nonfocal, no gross deficits  CV : tele:  RSR    Sternal Wound :  CDI HUGO- sternum stable   Lungs: clear. RR easy, unlabored   Abdomen: soft, nontender, nondistended, positive bowel sounds, + bowel movement   Neg N/V/D   :  pt voiding without difficulty   Extremities:   BLANTON; neg LE edema, neg calf tenderness.   PPP bilaterally;  left svg site cdi hugo         PW: no  Chest tubes: none       discharge pt home today 12/26 with Dr. Hedrick

## 2022-12-26 NOTE — DISCHARGE NOTE PROVIDER - NSDCCPCAREPLAN_GEN_ALL_CORE_FT
PRINCIPAL DISCHARGE DIAGNOSIS  Diagnosis: S/P CABG x 4  Assessment and Plan of Treatment: shower daily  weigh yourself daily  continue current prescriptions as ordered  increase activity as tolerated   no added salt; low fat; low cholesterol, low salt diabetic diet  check blood sugar levels before meals and at bedtime   follow up with Cardiologist in 1-2 weeks. Call to schedule appointment.  follow up with cardiac surgeon, Dr. Hedrick in 1 week; call office to schedule appointment. [phone]  follow up with endocrinologist, Dr. Sanders in 2 weeks; call office to schedule appointment.

## 2022-12-26 NOTE — PROGRESS NOTE ADULT - PROBLEM SELECTOR PLAN 3
Suggest to continue medications, monitoring, FU primary team recommendations. .
DASH diet  c/w Lopressor 50 BID
Suggest to continue medications, monitoring, FU primary team recommendations. .
DASH diet  c/w Lopressor 50 BID
Suggest to continue medications, monitoring, FU primary team recommendations. .
DASH diet  c/w Lopressor 50 BID

## 2022-12-26 NOTE — DISCHARGE NOTE PROVIDER - NSDCMRMEDTOKEN_GEN_ALL_CORE_FT
acetaminophen: 1 tab(s) orally every 6 hours as needed for mild pain   alcohol swabs : Apply topically to affected area 4 times a day   amLODIPine 5 mg oral tablet: 1 tab(s) orally once a day  aspirin 325 mg oral delayed release tablet: 1 tab(s) orally once a day  atorvastatin 40 mg oral tablet: 1 tab(s) orally once a day (at bedtime)  Farxiga 10 mg oral tablet: 1 tab(s) orally once a day   glucometer (per patient&#x27;s insurance): Test blood sugars four times a day. Dispense #1 glucometer.  Insulin Pen Needles, 4mm: 1 application subcutaneously 4 times a day. ** Use with insulin pen **   Janumet 50 mg-1000 mg oral tablet: 1 tab(s) orally 2 times a day   lancets: 1 application subcutaneously 4 times a day   Lantus Solostar Pen 100 units/mL subcutaneous solution: 18 unit(s) subcutaneous once a day (at bedtime)  metoprolol tartrate 100 mg oral tablet: 1 tab(s) orally 2 times a day  omeprazole 40 mg oral delayed release capsule: 1 cap(s) orally once a day  oxyCODONE 5 mg oral tablet: 1 tab(s) orally every 6 hours, As Needed -Moderate Pain (4 - 6) MDD:4  polyethylene glycol 3350 oral powder for reconstitution: 17 gram(s) orally once a day  senna leaf extract oral tablet: 2 tab(s) orally once a day (at bedtime)  tamsulosin 0.4 mg oral capsule: 2 cap(s) orally once a day (at bedtime)  test strips (per patient&#x27;s insurance): 1 application subcutaneously 4 times a day. ** Compatible with patient&#x27;s glucometer **   acetaminophen: 1 tab(s) orally every 6 hours as needed for mild pain   alcohol swabs : Apply topically to affected area 4 times a day   alogliptin-metformin 12.5 mg-1000 mg oral tablet: 1 tab(s) orally 2 times a day   amLODIPine 5 mg oral tablet: 1 tab(s) orally once a day  aspirin 325 mg oral delayed release tablet: 1 tab(s) orally once a day  atorvastatin 40 mg oral tablet: 1 tab(s) orally once a day (at bedtime)  Basaglar KwikPen 100 units/mL subcutaneous solution: 18 unit(s) subcutaneous once a day (at bedtime)  glucometer (per patient&#x27;s insurance): Test blood sugars four times a day. Dispense #1 glucometer.  Insulin Pen Needles, 4mm: 1 application subcutaneously 4 times a day. ** Use with insulin pen **   Jardiance 10 mg oral tablet: 1 tab(s) orally once a day (in the morning)   lancets: 1 application subcutaneously 4 times a day   metoprolol tartrate 100 mg oral tablet: 1 tab(s) orally 2 times a day  omeprazole 40 mg oral delayed release capsule: 1 cap(s) orally once a day  oxyCODONE 5 mg oral tablet: 1 tab(s) orally every 6 hours, As Needed -Moderate Pain (4 - 6) MDD:4  polyethylene glycol 3350 oral powder for reconstitution: 17 gram(s) orally once a day  senna leaf extract oral tablet: 2 tab(s) orally once a day (at bedtime)  tamsulosin 0.4 mg oral capsule: 2 cap(s) orally once a day (at bedtime)  test strips (per patient&#x27;s insurance): 1 application subcutaneously 4 times a day. ** Compatible with patient&#x27;s glucometer **   acetaminophen: 1 tab(s) orally every 6 hours as needed for mild pain   alcohol swabs : Apply topically to affected area 4 times a day   alogliptin-metformin 12.5 mg-1000 mg oral tablet: 1 tab(s) orally 2 times a day   amLODIPine 5 mg oral tablet: 1 tab(s) orally once a day  aspirin 325 mg oral delayed release tablet: 1 tab(s) orally once a day  atorvastatin 40 mg oral tablet: 1 tab(s) orally once a day (at bedtime)  Basaglar KwikPen 100 units/mL subcutaneous solution: 18 unit(s) subcutaneous once a day (at bedtime)  glucometer (per patient&#x27;s insurance): Test blood sugars four times a day. Dispense #1 glucometer.  Insulin Pen Needles, 4mm: 1 application subcutaneously 4 times a day. ** Use with insulin pen **   lancets: 1 application subcutaneously 4 times a day   metoprolol tartrate 100 mg oral tablet: 1 tab(s) orally 2 times a day  omeprazole 40 mg oral delayed release capsule: 1 cap(s) orally once a day  oxyCODONE 5 mg oral tablet: 1 tab(s) orally every 6 hours, As Needed -Moderate Pain (4 - 6) MDD:4  polyethylene glycol 3350 oral powder for reconstitution: 17 gram(s) orally once a day  senna leaf extract oral tablet: 2 tab(s) orally once a day (at bedtime)  Steglatro 5 mg oral tablet: 1 tab(s) orally once a day (in the morning)   tamsulosin 0.4 mg oral capsule: 2 cap(s) orally once a day (at bedtime)  test strips (per patient&#x27;s insurance): 1 application subcutaneously 4 times a day. ** Compatible with patient&#x27;s glucometer **

## 2022-12-26 NOTE — PROGRESS NOTE ADULT - ASSESSMENT
Assessment  DMT2: 52y Male with DM T2 with hyperglycemia admitted with chest pain, A1C is 8.2%  patient was on oral hypoglycemic agents at home, on basal bolus insulin coverage, s/p CABG was on IV insulin, now on basal bolus insulin, blood sugars improved.  Patient is high risk with high level decision making due to uncontrolled diabetes, has increased risk for complications. Tight sugar control is not recommended for this patient.  CAD: S/P CABG, on medications, monitored, asymptomatic.  HTN: On antihypertensive medications, monitored, asymptomatic.  HLD: On statin, compliant.            Judith Sanders MD  Cell:  [phone]  Office: [phone]               Assessment  DMT2: 52y Male with DM T2 with hyperglycemia admitted with chest pain, A1C is 8.2%  patient was on oral hypoglycemic  agents at home, on basal bolus insulin coverage, s/p CABG was on IV insulin, now on basal bolus insulin, blood sugars improved.  Patient is high risk with high level decision making due to uncontrolled diabetes, has increased risk for complications. Tight sugar control is not recommended for this patient.  CAD: S/P CABG, on medications, monitored, asymptomatic.  HTN: On antihypertensive medications, monitored, asymptomatic.  HLD: On statin, compliant.            Judith Sanders MD  Cell:  [phone]  Office: [phone]

## 2022-12-26 NOTE — DISCHARGE NOTE PROVIDER - CARE PROVIDERS DIRECT ADDRESSES
[email or web address]

## 2022-12-26 NOTE — DISCHARGE NOTE PROVIDER - CARE PROVIDER_API CALL
Antonio Hedrick)  Surgery; Surgical Critical Care; Thoracic and Cardiac Surgery  [address] [place] [address]  Phone: [phone]  Fax: [phone]  Follow Up Time:     Judith Sanders)  EndocrinologyMetabDiabetes; Internal Medicine  [address] [place] [address]  Phone: [phone]  Fax: [phone]  Follow Up Time:

## 2022-12-26 NOTE — DISCHARGE NOTE PROVIDER - NSDCFUADDINST_GEN_ALL_CORE_FT
call Dr. Hedrick for fever > 101  no driving until cleared by Dr. Hedrick  refer to cardiac surgery do and don't checklist  check blood sugar levels before meals and at bedtime- record levels

## 2022-12-26 NOTE — PROGRESS NOTE ADULT - PROVIDER SPECIALTY LIST ADULT
Critical Care
Critical Care
LIO
CT Surgery
CT Surgery
Critical Care
Endocrinology
Endocrinology
LIO
CCU
Endocrinology
Endocrinology
CT Surgery
Endocrinology
Endocrinology
CT Surgery
Endocrinology
CT Surgery
CT Surgery
Endocrinology
CT Surgery

## 2022-12-27 ENCOUNTER — TRANSCRIPTION ENCOUNTER (OUTPATIENT)
Age: 52
End: 2022-12-27

## 2022-12-27 RX ORDER — ERTUGLIFLOZIN 5 MG/1
5 TABLET, FILM COATED ORAL EVERY MORNING
Refills: 0 | Status: ACTIVE | COMMUNITY
Start: 2022-12-27

## 2022-12-27 RX ORDER — INSULIN GLARGINE 100 [IU]/ML
100 INJECTION, SOLUTION SUBCUTANEOUS AT BEDTIME
Refills: 0 | Status: ACTIVE | COMMUNITY
Start: 2022-12-27

## 2022-12-27 RX ORDER — TAMSULOSIN HYDROCHLORIDE 0.4 MG/1
0.4 CAPSULE ORAL AT BEDTIME
Refills: 0 | Status: ACTIVE | COMMUNITY
Start: 2022-12-27

## 2022-12-27 RX ORDER — ALOGLIPTIN AND METFORMIN HYDROCHLORIDE 12.5; 1 MG/1; MG/1
12.5-1 TABLET, FILM COATED ORAL TWICE DAILY
Refills: 0 | Status: ACTIVE | COMMUNITY
Start: 2022-12-27

## 2022-12-28 ENCOUNTER — APPOINTMENT (OUTPATIENT)
Dept: CARE COORDINATION | Facility: HOME HEALTH | Age: 52
End: 2022-12-28
Payer: MEDICAID

## 2022-12-28 VITALS — RESPIRATION RATE: 12 BRPM

## 2022-12-28 PROCEDURE — 99024 POSTOP FOLLOW-UP VISIT: CPT

## 2022-12-28 RX ORDER — DEXTROSE 3.75 G
4 TABLET,CHEWABLE ORAL
Qty: 90 | Refills: 0 | Status: ACTIVE | COMMUNITY
Start: 2022-12-28 | End: 1900-01-01

## 2022-12-28 RX ORDER — OMEPRAZOLE 20 MG/1
20 TABLET, DELAYED RELEASE ORAL DAILY
Qty: 60 | Refills: 0 | Status: ACTIVE | COMMUNITY
Start: 2022-12-27 | End: 1900-01-01

## 2022-12-28 NOTE — HISTORY OF PRESENT ILLNESS
[Home] : at home, [unfilled] , at the time of the visit. [Other Location: e.g. Home (Enter Location, City,State)___] : at [unfilled] [Verbal consent obtained from patient] : the patient, [unfilled] [FreeTextEntry1] : 52M w/ HTN, T2DM, GERD who is transferred from OSH for ACS\par evaluation/management. Per patient yesterday at 3 am he was woken up from his\par sleep for crushing chest pain that radiated to his left side w/ associated\par numbness and tinging. He endorses sweats, and small bouts of emesis x2. He\par states he has never experienced pain like this in the past, however it has\par resolved. He has been recently seeing a gastroenterology for worsening reflux\par over to past 4-6 months for which he was taking omeprazole. he states he misses\par his medications up to 2-3x weekly. He denies previous MIs, cardiac surgeries or\par hospitalizations He endorses smoking 5 cig/day for 36 years.\par  \par  s/p C4L\par   weaned off - started on BB\par  Hypotension s/p fluid resuscitation. Low H&H likely dilutional. BiPAP overnight for atelectasis\par  Low grade temp overnight 100.4 F likely 2/2 to atelectasis. F/U RVP + enterorhinovirus;  L Pleural CT in place. [place] Removed\par  VSS; rsr ; d/c lt ct d/c this am; insulin teaching for home use as per endo\par  VSS; RSR ; + hypoglycemia noted overnight; d/w endo- insulin adjusted; norvasc added for HTN\par  ASA statin betablocker \par  VSS; RSR 80's, DC pt home today as per Dr. east; \par d/c on lantus/janumet/ farxiga as per endo; f/u endo in 2 wks\par - Seen by [facility] NP for a f/u visit. Emotional support and education provided. All questions answered. Pt overall is recovering well w/o complaints.\par \par

## 2022-12-28 NOTE — REASON FOR VISIT
[Post Hospitalization] : a post hospitalization visit [FreeTextEntry1] : FOLLOW YOUR HEART - Transitional Care Management Program - [facility]

## 2022-12-28 NOTE — REVIEW OF SYSTEMS
[Lower Ext Edema] : lower extremity edema [Constipation] : constipation [Negative] : Heme/Lymph [FreeTextEntry7] : last BM on 12/26 [de-identified] : FS: 134 today in the AM

## 2022-12-28 NOTE — ASSESSMENT
[FreeTextEntry1] : Pt recovering well at home s/p OHS w/ support from his family. Reviewed all medications and dosages with pt understanding. Pt c/o constipation. Advised pt to take Miralax packet daily and Senna 2 tab at bedtime as prescribed. B/L LE pitting edema noted. Advised pt to keep LE elevated w/ 1-2 pillows under legs majority of the time & to walk around as tolerated. Pt aware to notify [facility] team of wt gain, which pt currently denies having. Pt is aware of scheduled & recommended follow up appointments as listed below.\par

## 2022-12-28 NOTE — PHYSICAL EXAM
[Neck Appearance] : the appearance of the neck was normal [] : no respiratory distress [Respiration, Rhythm And Depth] : normal respiratory rhythm and effort [Exaggerated Use Of Accessory Muscles For Inspiration] : no accessory muscle use [FreeTextEntry1] : MSI, CT sites and L leg SVG sites without erythema or drainage, with edges well approximated.  BL LE 2+ edema. [Examination Of The Chest] : the chest was normal in appearance [Chest Visual Inspection Thoracic Asymmetry] : no chest asymmetry [Diminished Respiratory Excursion] : normal chest expansion [Skin Color & Pigmentation] : normal skin color and pigmentation [Sensation] : the sensory exam was normal to light touch and pinprick [Motor Exam] : the motor exam was normal [No Focal Deficits] : no focal deficits [Oriented To Time, Place, And Person] : oriented to person, place, and time [Impaired Insight] : insight and judgment were intact [Affect] : the affect was normal [Mood] : the mood was normal

## 2023-01-02 PROBLEM — I10 ESSENTIAL (PRIMARY) HYPERTENSION: Chronic | Status: ACTIVE | Noted: 2022-12-10

## 2023-01-02 PROBLEM — E11.9 TYPE 2 DIABETES MELLITUS WITHOUT COMPLICATIONS: Chronic | Status: ACTIVE | Noted: 2022-12-10

## 2023-01-02 PROBLEM — K21.9 GASTRO-ESOPHAGEAL REFLUX DISEASE WITHOUT ESOPHAGITIS: Chronic | Status: ACTIVE | Noted: 2022-12-10

## 2023-01-05 RX ORDER — DAPAGLIFLOZIN 10 MG/1
10 TABLET, FILM COATED ORAL DAILY
Qty: 1 | Refills: 3 | Status: ACTIVE | COMMUNITY

## 2023-01-06 ENCOUNTER — NON-APPOINTMENT (OUTPATIENT)
Age: 53
End: 2023-01-06

## 2023-01-06 ENCOUNTER — APPOINTMENT (OUTPATIENT)
Dept: CARDIOTHORACIC SURGERY | Facility: CLINIC | Age: 53
End: 2023-01-06
Payer: MEDICAID

## 2023-01-06 VITALS
HEIGHT: 69 IN | RESPIRATION RATE: 16 BRPM | OXYGEN SATURATION: 99 % | SYSTOLIC BLOOD PRESSURE: 95 MMHG | BODY MASS INDEX: 20.88 KG/M2 | WEIGHT: 141 LBS | TEMPERATURE: 97.7 F | DIASTOLIC BLOOD PRESSURE: 67 MMHG | HEART RATE: 90 BPM

## 2023-01-06 VITALS — SYSTOLIC BLOOD PRESSURE: 100 MMHG | DIASTOLIC BLOOD PRESSURE: 70 MMHG

## 2023-01-06 DIAGNOSIS — Z09 ENCOUNTER FOR FOLLOW-UP EXAMINATION AFTER COMPLETED TREATMENT FOR CONDITIONS OTHER THAN MALIGNANT NEOPLASM: ICD-10-CM

## 2023-01-06 PROCEDURE — 93000 ELECTROCARDIOGRAM COMPLETE: CPT

## 2023-01-06 PROCEDURE — 99024 POSTOP FOLLOW-UP VISIT: CPT

## 2023-01-06 RX ORDER — DEXTROMETHORPHAN HBR, GUAIFENESIN 10; 100 MG/10ML; MG/10ML
20-200 SOLUTION ORAL 3 TIMES DAILY
Qty: 4 | Refills: 0 | Status: ACTIVE | COMMUNITY
Start: 2023-01-06 | End: 1900-01-01

## 2023-01-06 RX ORDER — POLYETHYLENE GLYCOL 3350 17 G/17G
17 POWDER, FOR SOLUTION ORAL DAILY
Qty: 20 | Refills: 0 | Status: COMPLETED | COMMUNITY
Start: 2022-12-28 | End: 2023-01-06

## 2023-01-06 RX ORDER — SENNA 8.6 MG/1
8.6 TABLET, FILM COATED ORAL
Qty: 40 | Refills: 0 | Status: COMPLETED | COMMUNITY
Start: 2022-12-28 | End: 2023-01-06

## 2023-01-06 RX ORDER — SITAGLIPTIN AND METFORMIN HYDROCHLORIDE 50; 1000 MG/1; MG/1
50-1000 TABLET, FILM COATED ORAL TWICE DAILY
Qty: 60 | Refills: 0 | Status: COMPLETED | COMMUNITY
End: 2023-01-06

## 2023-01-06 NOTE — ASSESSMENT
[FreeTextEntry1] : Mr. BARBOSA is a 52 year old male with  past medical history of HTN, T2DM, GERD who is transferred from OSH for ACS evaluation/management. Per patient yesterday at 3 am he was woken up from his sleep for crushing chest pain that radiated to his left side w/ associated numbness and tinging. He endorses sweats, and small bouts of emesis x2. He states he has never experienced pain like this in the past, however it has resolved. He has been recently seeing a gastroenterology for worsening reflux over to past 4-6 months for which he was taking omeprazole. he states he misses his medications up to 2-3x weekly. He endorses smoking 5 cig/day for 36 years.\par \par \par He is S/P Coronary artery bypass grafting x4 with left internal mammary artery to the left anterior descending, saphenous vein  graft for the diagonal, saphenous vein graft for the circumflex marginal, saphenous vein graft for the free posterolateral branch off the right on 12/19/22. Post op course significant  Hypotension s/p fluid resuscitation. Low H&H likely dilutional. BiPAP overnight for atelectasis.  F/U RVP + enterorhinovirus,  norvasc added for HTN , d/c on lantus/ janumet/ farxiga as per endo; f/u endo in 2 weeks . Discharged to Home. He is here for post op visit. \par \par He is doing well except pain to LT SVG site. He also reports he has swelling to LT side of the scrotum but denies any dysuria, urinary urgency or frequency. Blood sugar between 106 - 120 mg/dl. He reports that he is not taking stool softeners as he his bowel movements are normal. Denies any chest pain, shortness of breath, palpitations or pedal edema. \par \par Today on exam patient's lungs clear bilaterally, normal sinus rhythm, sternum stable, incision clean, dry and intact. Lt groin mild erythema noted . LT SVG site is clean, dry and intact.  No peripheral edema noted. Sutures  removed today. Instructed patient on importance of optimal glycemic control, daily showering, daily weights, any signs of fever (temperature greater than 101F, chills,  incentive spirometer use, and increase ambulation as tolerated. Instructed to call office with any signs or symptoms of infection or weight gain of 2 or more pounds in 1 day or 3 or more pounds in 1 week.  \par \par \par Plan:\par 1) Continue current medication regimen\par 2) Follow up with cardiologist ( Dr.Joe Laughlin on 1/11/23) and PCP \par 3) May return on as needed basis \par 4) Start Robitussin 5 ml TID PRN for cough \par 5) Follow up with Endocrinologist \par 6) Follow up with Urologist for LT sided scrotal pain ( Dr.Gary Goldberg) \par 7) Apply warm compress to LT SVG site TID and PRN \par 8) Continue to increase activity and walk daily as tolerated. Continue to use incentive spirometer. \par 9) Keep legs elevated above heart when resting/sitting/sleeping. \par 10) Call MD if you experience fever, fatigue, dizziness, confusion, syncope, shortness of breath, chest pain not relieved with analgesics, increased redness/drainage from the surgical  incision site\par \par \par \par

## 2023-01-06 NOTE — CONSULT LETTER
[FreeTextEntry2] : Dr.Donna Parish [FreeTextEntry3] : Antonio Hedrick MD\par  & \par \par Cardiovascular & Thoracic Surgery\par [facility] \par [address]\par [facility] [address]\par \par

## 2023-01-06 NOTE — DISCUSSION/SUMMARY
[Coumadin] : the patient is not on Coumadin [de-identified] : LT [facility] [place] site

## 2023-01-06 NOTE — REASON FOR VISIT
[de-identified] : Coronary artery bypass grafting x4 with left internal mammary artery to the left anterior descending, saphenous vein  graft for the diagonal, saphenous vein graft for the circumflex marginal, saphenous vein graft for the free posterolateral branch off the right [de-identified] : 12/19/22

## 2023-01-11 ENCOUNTER — APPOINTMENT (OUTPATIENT)
Dept: CARDIOLOGY | Facility: CLINIC | Age: 53
End: 2023-01-11
Payer: MEDICAID

## 2023-01-11 VITALS
DIASTOLIC BLOOD PRESSURE: 70 MMHG | OXYGEN SATURATION: 99 % | HEART RATE: 89 BPM | HEIGHT: 69 IN | WEIGHT: 142.31 LBS | SYSTOLIC BLOOD PRESSURE: 102 MMHG | BODY MASS INDEX: 21.08 KG/M2

## 2023-01-11 PROCEDURE — 99214 OFFICE O/P EST MOD 30 MIN: CPT | Mod: 25

## 2023-01-11 PROCEDURE — 93000 ELECTROCARDIOGRAM COMPLETE: CPT

## 2023-01-11 RX ORDER — ASPIRIN 81 MG/1
81 TABLET ORAL DAILY
Qty: 90 | Refills: 3 | Status: ACTIVE | COMMUNITY
Start: 2023-01-11 | End: 1900-01-01

## 2023-01-11 RX ORDER — AMLODIPINE BESYLATE 5 MG/1
5 TABLET ORAL DAILY
Refills: 0 | Status: DISCONTINUED | COMMUNITY
Start: 2022-12-27 | End: 2023-01-11

## 2023-01-25 ENCOUNTER — TRANSCRIPTION ENCOUNTER (OUTPATIENT)
Age: 53
End: 2023-01-25

## 2023-04-02 ENCOUNTER — INPATIENT (INPATIENT)
Facility: HOSPITAL | Age: 53
LOS: 6 days | Discharge: HOME | DRG: 247 | End: 2023-04-09
Attending: INTERNAL MEDICINE | Admitting: INTERNAL MEDICINE
Payer: MEDICAID

## 2023-04-02 VITALS
HEIGHT: 70 IN | DIASTOLIC BLOOD PRESSURE: 84 MMHG | OXYGEN SATURATION: 99 % | HEART RATE: 90 BPM | TEMPERATURE: 98 F | RESPIRATION RATE: 16 BRPM | SYSTOLIC BLOOD PRESSURE: 119 MMHG | WEIGHT: 184.97 LBS

## 2023-04-02 DIAGNOSIS — I25.10 ATHEROSCLEROTIC HEART DISEASE OF NATIVE CORONARY ARTERY WITHOUT ANGINA PECTORIS: ICD-10-CM

## 2023-04-02 DIAGNOSIS — I10 ESSENTIAL (PRIMARY) HYPERTENSION: ICD-10-CM

## 2023-04-02 DIAGNOSIS — I21.3 ST ELEVATION (STEMI) MYOCARDIAL INFARCTION OF UNSPECIFIED SITE: ICD-10-CM

## 2023-04-02 DIAGNOSIS — Z29.9 ENCOUNTER FOR PROPHYLACTIC MEASURES, UNSPECIFIED: ICD-10-CM

## 2023-04-02 DIAGNOSIS — E11.9 TYPE 2 DIABETES MELLITUS WITHOUT COMPLICATIONS: ICD-10-CM

## 2023-04-02 DIAGNOSIS — I21.4 NON-ST ELEVATION (NSTEMI) MYOCARDIAL INFARCTION: ICD-10-CM

## 2023-04-02 LAB
ALBUMIN SERPL ELPH-MCNC: 4.5 G/DL — SIGNIFICANT CHANGE UP (ref 3.3–5)
ALP SERPL-CCNC: 64 U/L — SIGNIFICANT CHANGE UP (ref 40–120)
ALT FLD-CCNC: 17 U/L — SIGNIFICANT CHANGE UP (ref 10–45)
ANION GAP SERPL CALC-SCNC: 14 MMOL/L — SIGNIFICANT CHANGE UP (ref 5–17)
APTT BLD: 36.7 SEC — HIGH (ref 27.5–35.5)
AST SERPL-CCNC: 33 U/L — SIGNIFICANT CHANGE UP (ref 10–40)
BASE EXCESS BLDV CALC-SCNC: 3.1 MMOL/L — HIGH (ref -2–3)
BASOPHILS # BLD AUTO: 0.06 K/UL — SIGNIFICANT CHANGE UP (ref 0–0.2)
BASOPHILS NFR BLD AUTO: 0.7 % — SIGNIFICANT CHANGE UP (ref 0–2)
BILIRUB SERPL-MCNC: 0.5 MG/DL — SIGNIFICANT CHANGE UP (ref 0.2–1.2)
BLOOD GAS VENOUS - CREATININE: SIGNIFICANT CHANGE UP MG/DL (ref 0.5–1.3)
BUN SERPL-MCNC: 17 MG/DL — SIGNIFICANT CHANGE UP (ref 7–23)
CA-I SERPL-SCNC: 1.2 MMOL/L — SIGNIFICANT CHANGE UP (ref 1.15–1.33)
CALCIUM SERPL-MCNC: 9.6 MG/DL — SIGNIFICANT CHANGE UP (ref 8.4–10.5)
CHLORIDE BLDV-SCNC: 102 MMOL/L — SIGNIFICANT CHANGE UP (ref 96–108)
CHLORIDE SERPL-SCNC: 100 MMOL/L — SIGNIFICANT CHANGE UP (ref 96–108)
CK MB BLD-MCNC: 10.7 % — HIGH (ref 0–3.5)
CK MB BLD-MCNC: 11.2 % — HIGH (ref 0–3.5)
CK MB CFR SERPL CALC: 20.2 NG/ML — HIGH (ref 0–6.7)
CK MB CFR SERPL CALC: 20.3 NG/ML — HIGH (ref 0–6.7)
CK SERPL-CCNC: 181 U/L — SIGNIFICANT CHANGE UP (ref 30–200)
CK SERPL-CCNC: 190 U/L — SIGNIFICANT CHANGE UP (ref 30–200)
CO2 BLDV-SCNC: 32 MMOL/L — HIGH (ref 22–26)
CO2 SERPL-SCNC: 24 MMOL/L — SIGNIFICANT CHANGE UP (ref 22–31)
CREAT SERPL-MCNC: 0.97 MG/DL — SIGNIFICANT CHANGE UP (ref 0.5–1.3)
EGFR: 95 ML/MIN/1.73M2 — SIGNIFICANT CHANGE UP
EOSINOPHIL # BLD AUTO: 0.21 K/UL — SIGNIFICANT CHANGE UP (ref 0–0.5)
EOSINOPHIL NFR BLD AUTO: 2.6 % — SIGNIFICANT CHANGE UP (ref 0–6)
FLUAV AG NPH QL: SIGNIFICANT CHANGE UP
FLUBV AG NPH QL: SIGNIFICANT CHANGE UP
GAS PNL BLDV: 133 MMOL/L — LOW (ref 136–145)
GAS PNL BLDV: SIGNIFICANT CHANGE UP
GAS PNL BLDV: SIGNIFICANT CHANGE UP
GLUCOSE BLDC GLUCOMTR-MCNC: 168 MG/DL — HIGH (ref 70–99)
GLUCOSE BLDV-MCNC: 130 MG/DL — HIGH (ref 70–99)
GLUCOSE SERPL-MCNC: 127 MG/DL — HIGH (ref 70–99)
HCO3 BLDV-SCNC: 31 MMOL/L — HIGH (ref 22–29)
HCT VFR BLD CALC: 51.2 % — HIGH (ref 39–50)
HCT VFR BLDA CALC: 51 % — SIGNIFICANT CHANGE UP (ref 39–51)
HGB BLD CALC-MCNC: 16.9 G/DL — SIGNIFICANT CHANGE UP (ref 12.6–17.4)
HGB BLD-MCNC: 16.1 G/DL — SIGNIFICANT CHANGE UP (ref 13–17)
IMM GRANULOCYTES NFR BLD AUTO: 0.2 % — SIGNIFICANT CHANGE UP (ref 0–0.9)
INR BLD: 1.16 RATIO — SIGNIFICANT CHANGE UP (ref 0.88–1.16)
INR BLD: 1.27 RATIO — HIGH (ref 0.88–1.16)
LACTATE BLDV-MCNC: 1.9 MMOL/L — SIGNIFICANT CHANGE UP (ref 0.5–2)
LIDOCAIN IGE QN: 66 U/L — HIGH (ref 7–60)
LYMPHOCYTES # BLD AUTO: 2.34 K/UL — SIGNIFICANT CHANGE UP (ref 1–3.3)
LYMPHOCYTES # BLD AUTO: 29 % — SIGNIFICANT CHANGE UP (ref 13–44)
MAGNESIUM SERPL-MCNC: 2.2 MG/DL — SIGNIFICANT CHANGE UP (ref 1.6–2.6)
MCHC RBC-ENTMCNC: 26.2 PG — LOW (ref 27–34)
MCHC RBC-ENTMCNC: 31.4 GM/DL — LOW (ref 32–36)
MCV RBC AUTO: 83.4 FL — SIGNIFICANT CHANGE UP (ref 80–100)
MONOCYTES # BLD AUTO: 0.58 K/UL — SIGNIFICANT CHANGE UP (ref 0–0.9)
MONOCYTES NFR BLD AUTO: 7.2 % — SIGNIFICANT CHANGE UP (ref 2–14)
NEUTROPHILS # BLD AUTO: 4.87 K/UL — SIGNIFICANT CHANGE UP (ref 1.8–7.4)
NEUTROPHILS NFR BLD AUTO: 60.3 % — SIGNIFICANT CHANGE UP (ref 43–77)
NRBC # BLD: 0 /100 WBCS — SIGNIFICANT CHANGE UP (ref 0–0)
NT-PROBNP SERPL-SCNC: 1667 PG/ML — HIGH (ref 0–300)
PCO2 BLDV: 57 MMHG — HIGH (ref 42–55)
PH BLDV: 7.34 — SIGNIFICANT CHANGE UP (ref 7.32–7.43)
PLATELET # BLD AUTO: 288 K/UL — SIGNIFICANT CHANGE UP (ref 150–400)
PO2 BLDV: 18 MMHG — LOW (ref 25–45)
POTASSIUM BLDV-SCNC: 4.6 MMOL/L — SIGNIFICANT CHANGE UP (ref 3.5–5.1)
POTASSIUM SERPL-MCNC: 4.6 MMOL/L — SIGNIFICANT CHANGE UP (ref 3.5–5.3)
POTASSIUM SERPL-SCNC: 4.6 MMOL/L — SIGNIFICANT CHANGE UP (ref 3.5–5.3)
PROT SERPL-MCNC: 8.2 G/DL — SIGNIFICANT CHANGE UP (ref 6–8.3)
PROTHROM AB SERPL-ACNC: 13.4 SEC — SIGNIFICANT CHANGE UP (ref 10.5–13.4)
PROTHROM AB SERPL-ACNC: 14.6 SEC — HIGH (ref 10.5–13.4)
RBC # BLD: 6.14 M/UL — HIGH (ref 4.2–5.8)
RBC # FLD: 13.2 % — SIGNIFICANT CHANGE UP (ref 10.3–14.5)
RSV RNA NPH QL NAA+NON-PROBE: SIGNIFICANT CHANGE UP
SAO2 % BLDV: 19.6 % — LOW (ref 67–88)
SARS-COV-2 RNA SPEC QL NAA+PROBE: SIGNIFICANT CHANGE UP
SODIUM SERPL-SCNC: 138 MMOL/L — SIGNIFICANT CHANGE UP (ref 135–145)
TROPONIN T, HIGH SENSITIVITY RESULT: 412 NG/L — HIGH (ref 0–51)
TROPONIN T, HIGH SENSITIVITY RESULT: 413 NG/L — HIGH (ref 0–51)
TROPONIN T, HIGH SENSITIVITY RESULT: 453 NG/L — HIGH (ref 0–51)
UFH PPP CHRO-ACNC: 0.3 IU/ML — SIGNIFICANT CHANGE UP (ref 0.3–0.7)
WBC # BLD: 8.08 K/UL — SIGNIFICANT CHANGE UP (ref 3.8–10.5)
WBC # FLD AUTO: 8.08 K/UL — SIGNIFICANT CHANGE UP (ref 3.8–10.5)

## 2023-04-02 PROCEDURE — 99291 CRITICAL CARE FIRST HOUR: CPT

## 2023-04-02 PROCEDURE — 71045 X-RAY EXAM CHEST 1 VIEW: CPT | Mod: 26

## 2023-04-02 PROCEDURE — 93308 TTE F-UP OR LMTD: CPT | Mod: 26

## 2023-04-02 PROCEDURE — 99223 1ST HOSP IP/OBS HIGH 75: CPT

## 2023-04-02 RX ORDER — SODIUM CHLORIDE 9 MG/ML
1000 INJECTION, SOLUTION INTRAVENOUS
Refills: 0 | Status: DISCONTINUED | OUTPATIENT
Start: 2023-04-02 | End: 2023-04-09

## 2023-04-02 RX ORDER — TAMSULOSIN HYDROCHLORIDE 0.4 MG/1
0.4 CAPSULE ORAL AT BEDTIME
Refills: 0 | Status: DISCONTINUED | OUTPATIENT
Start: 2023-04-02 | End: 2023-04-09

## 2023-04-02 RX ORDER — LISINOPRIL 2.5 MG/1
2.5 TABLET ORAL DAILY
Refills: 0 | Status: DISCONTINUED | OUTPATIENT
Start: 2023-04-03 | End: 2023-04-06

## 2023-04-02 RX ORDER — DEXTROSE 50 % IN WATER 50 %
25 SYRINGE (ML) INTRAVENOUS ONCE
Refills: 0 | Status: DISCONTINUED | OUTPATIENT
Start: 2023-04-02 | End: 2023-04-09

## 2023-04-02 RX ORDER — LANOLIN ALCOHOL/MO/W.PET/CERES
5 CREAM (GRAM) TOPICAL AT BEDTIME
Refills: 0 | Status: DISCONTINUED | OUTPATIENT
Start: 2023-04-02 | End: 2023-04-09

## 2023-04-02 RX ORDER — LISINOPRIL 2.5 MG/1
2.5 TABLET ORAL DAILY
Refills: 0 | Status: DISCONTINUED | OUTPATIENT
Start: 2023-04-02 | End: 2023-04-02

## 2023-04-02 RX ORDER — GLUCAGON INJECTION, SOLUTION 0.5 MG/.1ML
1 INJECTION, SOLUTION SUBCUTANEOUS ONCE
Refills: 0 | Status: DISCONTINUED | OUTPATIENT
Start: 2023-04-02 | End: 2023-04-09

## 2023-04-02 RX ORDER — INSULIN LISPRO 100/ML
VIAL (ML) SUBCUTANEOUS AT BEDTIME
Refills: 0 | Status: DISCONTINUED | OUTPATIENT
Start: 2023-04-02 | End: 2023-04-09

## 2023-04-02 RX ORDER — INSULIN LISPRO 100/ML
VIAL (ML) SUBCUTANEOUS
Refills: 0 | Status: DISCONTINUED | OUTPATIENT
Start: 2023-04-02 | End: 2023-04-09

## 2023-04-02 RX ORDER — ACETAMINOPHEN 500 MG
1000 TABLET ORAL ONCE
Refills: 0 | Status: COMPLETED | OUTPATIENT
Start: 2023-04-02 | End: 2023-04-02

## 2023-04-02 RX ORDER — METOPROLOL TARTRATE 50 MG
200 TABLET ORAL DAILY
Refills: 0 | Status: DISCONTINUED | OUTPATIENT
Start: 2023-04-03 | End: 2023-04-09

## 2023-04-02 RX ORDER — INSULIN LISPRO 100/ML
3 VIAL (ML) SUBCUTANEOUS
Refills: 0 | Status: DISCONTINUED | OUTPATIENT
Start: 2023-04-02 | End: 2023-04-09

## 2023-04-02 RX ORDER — SENNA PLUS 8.6 MG/1
2 TABLET ORAL AT BEDTIME
Refills: 0 | Status: DISCONTINUED | OUTPATIENT
Start: 2023-04-02 | End: 2023-04-09

## 2023-04-02 RX ORDER — NITROGLYCERIN 6.5 MG
0.4 CAPSULE, EXTENDED RELEASE ORAL ONCE
Refills: 0 | Status: COMPLETED | OUTPATIENT
Start: 2023-04-02 | End: 2023-04-02

## 2023-04-02 RX ORDER — TICAGRELOR 90 MG/1
180 TABLET ORAL ONCE
Refills: 0 | Status: COMPLETED | OUTPATIENT
Start: 2023-04-02 | End: 2023-04-02

## 2023-04-02 RX ORDER — ASPIRIN/CALCIUM CARB/MAGNESIUM 324 MG
0 TABLET ORAL
Refills: 0 | DISCHARGE

## 2023-04-02 RX ORDER — SODIUM CHLORIDE 9 MG/ML
250 INJECTION INTRAMUSCULAR; INTRAVENOUS; SUBCUTANEOUS ONCE
Refills: 0 | Status: COMPLETED | OUTPATIENT
Start: 2023-04-02 | End: 2023-04-02

## 2023-04-02 RX ORDER — PANTOPRAZOLE SODIUM 20 MG/1
40 TABLET, DELAYED RELEASE ORAL
Refills: 0 | Status: DISCONTINUED | OUTPATIENT
Start: 2023-04-02 | End: 2023-04-09

## 2023-04-02 RX ORDER — TICAGRELOR 90 MG/1
90 TABLET ORAL EVERY 12 HOURS
Refills: 0 | Status: DISCONTINUED | OUTPATIENT
Start: 2023-04-03 | End: 2023-04-09

## 2023-04-02 RX ORDER — ASPIRIN/CALCIUM CARB/MAGNESIUM 324 MG
324 TABLET ORAL ONCE
Refills: 0 | Status: COMPLETED | OUTPATIENT
Start: 2023-04-02 | End: 2023-04-02

## 2023-04-02 RX ORDER — INSULIN GLARGINE 100 [IU]/ML
12 INJECTION, SOLUTION SUBCUTANEOUS AT BEDTIME
Refills: 0 | Status: DISCONTINUED | OUTPATIENT
Start: 2023-04-02 | End: 2023-04-09

## 2023-04-02 RX ORDER — ASPIRIN/CALCIUM CARB/MAGNESIUM 324 MG
81 TABLET ORAL DAILY
Refills: 0 | Status: DISCONTINUED | OUTPATIENT
Start: 2023-04-03 | End: 2023-04-07

## 2023-04-02 RX ORDER — METOPROLOL TARTRATE 50 MG
200 TABLET ORAL DAILY
Refills: 0 | Status: DISCONTINUED | OUTPATIENT
Start: 2023-04-02 | End: 2023-04-02

## 2023-04-02 RX ORDER — DEXTROSE 50 % IN WATER 50 %
15 SYRINGE (ML) INTRAVENOUS ONCE
Refills: 0 | Status: DISCONTINUED | OUTPATIENT
Start: 2023-04-02 | End: 2023-04-09

## 2023-04-02 RX ORDER — ACETAMINOPHEN 500 MG
650 TABLET ORAL EVERY 6 HOURS
Refills: 0 | Status: DISCONTINUED | OUTPATIENT
Start: 2023-04-02 | End: 2023-04-09

## 2023-04-02 RX ORDER — DEXTROSE 50 % IN WATER 50 %
12.5 SYRINGE (ML) INTRAVENOUS ONCE
Refills: 0 | Status: DISCONTINUED | OUTPATIENT
Start: 2023-04-02 | End: 2023-04-09

## 2023-04-02 RX ORDER — HEPARIN SODIUM 5000 [USP'U]/ML
1100 INJECTION INTRAVENOUS; SUBCUTANEOUS
Qty: 25000 | Refills: 0 | Status: DISCONTINUED | OUTPATIENT
Start: 2023-04-02 | End: 2023-04-03

## 2023-04-02 RX ORDER — OMEPRAZOLE 10 MG/1
1 CAPSULE, DELAYED RELEASE ORAL
Qty: 0 | Refills: 0 | DISCHARGE

## 2023-04-02 RX ORDER — HEPARIN SODIUM 5000 [USP'U]/ML
800 INJECTION INTRAVENOUS; SUBCUTANEOUS
Qty: 25000 | Refills: 0 | Status: DISCONTINUED | OUTPATIENT
Start: 2023-04-02 | End: 2023-04-02

## 2023-04-02 RX ORDER — ATORVASTATIN CALCIUM 80 MG/1
40 TABLET, FILM COATED ORAL AT BEDTIME
Refills: 0 | Status: DISCONTINUED | OUTPATIENT
Start: 2023-04-02 | End: 2023-04-09

## 2023-04-02 RX ORDER — HEPARIN SODIUM 5000 [USP'U]/ML
4100 INJECTION INTRAVENOUS; SUBCUTANEOUS ONCE
Refills: 0 | Status: COMPLETED | OUTPATIENT
Start: 2023-04-02 | End: 2023-04-02

## 2023-04-02 RX ADMIN — Medication 400 MILLIGRAM(S): at 22:43

## 2023-04-02 RX ADMIN — Medication 324 MILLIGRAM(S): at 14:34

## 2023-04-02 RX ADMIN — Medication 400 MILLIGRAM(S): at 14:33

## 2023-04-02 RX ADMIN — INSULIN GLARGINE 12 UNIT(S): 100 INJECTION, SOLUTION SUBCUTANEOUS at 22:42

## 2023-04-02 RX ADMIN — Medication 1000 MILLIGRAM(S): at 23:13

## 2023-04-02 RX ADMIN — TAMSULOSIN HYDROCHLORIDE 0.4 MILLIGRAM(S): 0.4 CAPSULE ORAL at 22:25

## 2023-04-02 RX ADMIN — Medication 1000 MILLIGRAM(S): at 16:02

## 2023-04-02 RX ADMIN — HEPARIN SODIUM 4100 UNIT(S): 5000 INJECTION INTRAVENOUS; SUBCUTANEOUS at 16:30

## 2023-04-02 RX ADMIN — SENNA PLUS 2 TABLET(S): 8.6 TABLET ORAL at 22:25

## 2023-04-02 RX ADMIN — HEPARIN SODIUM 8 UNIT(S)/HR: 5000 INJECTION INTRAVENOUS; SUBCUTANEOUS at 16:30

## 2023-04-02 RX ADMIN — TICAGRELOR 180 MILLIGRAM(S): 90 TABLET ORAL at 16:30

## 2023-04-02 RX ADMIN — ATORVASTATIN CALCIUM 40 MILLIGRAM(S): 80 TABLET, FILM COATED ORAL at 22:25

## 2023-04-02 NOTE — H&P ADULT - NSHPPHYSICALEXAM_GEN_ALL_CORE
Vital Signs Last 24 Hrs  T(C): 36.7 (02 Apr 2023 19:55), Max: 36.7 (02 Apr 2023 17:36)  T(F): 98 (02 Apr 2023 19:55), Max: 98 (02 Apr 2023 17:36)  HR: 91 (02 Apr 2023 19:55) (79 - 91)  BP: 121/82 (02 Apr 2023 19:55) (108/74 - 121/82)  BP(mean): 96 (02 Apr 2023 15:07) (96 - 96)  RR: 18 (02 Apr 2023 19:55) (16 - 18)  SpO2: 98% (02 Apr 2023 19:55) (98% - 99%)    Parameters below as of 02 Apr 2023 19:55  Patient On (Oxygen Delivery Method): room air    CONSTITUTIONAL: Well-groomed, in no apparent distress  EYES: No conjunctival or scleral injection, non-icteric;   ENMT: No external nasal lesions; MMM  NECK: Trachea midline without palpable neck mass; thyroid not enlarged and non-tender  RESPIRATORY: Breathing comfortably; no dullness to percussion; lungs CTA without wheeze/rhonchi/rales  CARDIOVASCULAR: +S1S2, RRR, no M/G/R; pedal pulses full and symmetric; no lower extremity edema  CHEST: ttp over the entire anterior chest wall  GASTROINTESTINAL: No palpable masses or tenderness, +BS throughout, no rebound/guarding; no hepatosplenomegaly; no hernia palpated  LYMPHATIC: No cervical LAD or tenderness  SKIN: No rashes or ulcers noted  NEUROLOGIC: CN II-XII intact; sensation intact in LEs b/l to light touch  PSYCHIATRIC: A&Ox3; mood and affect appropriate; appropriate insight and judgment

## 2023-04-02 NOTE — H&P ADULT - NSHPREVIEWOFSYSTEMS_GEN_ALL_CORE
CONSTITUTIONAL: No fever, weight loss  EYES: No eye pain, visual disturbances, or discharge  ENMT:  No difficulty hearing, tinnitus, vertigo; No sinus or throat pain  RESPIRATORY: [place]. No cough, wheezing, chills or hemoptysis  CARDIOVASCULAR: Chest pain. No palpitations, dizziness, or leg swelling  GASTROINTESTINAL: No abdominal or epigastric pain. No nausea, vomiting, or hematemesis; No diarrhea or constipation. No melena or hematochezia.  GENITOURINARY: No dysuria, frequency, hematuria, or incontinence  NEUROLOGICAL: No headaches, memory loss, loss of strength, numbness, or tremors  SKIN: No itching, burning, rashes, or lesions   LYMPH NODES: No enlarged glands  ENDOCRINE: No heat or cold intolerance; No hair loss  MUSCULOSKELETAL: No joint pain or swelling; No muscle, back pain  PSYCHIATRIC: No depression, anxiety, mood swings, or difficulty sleeping  HEME/LYMPH: No easy bruising, or bleeding gums

## 2023-04-02 NOTE — H&P ADULT - NSHPLABSRESULTS_GEN_ALL_CORE
LABS:                      16.1   8.08  )-----------( 288      ( 02 Apr 2023 14:31 )             51.2     04-02    138  |  100  |  17  ----------------------------<  127<H>  4.6   |  24  |  0.97    Ca    9.6      02 Apr 2023 14:31  Mg     2.2     04-02    TPro  8.2  /  Alb  4.5  /  TBili  0.5  /  DBili  x   /  AST  33  /  ALT  17  /  AlkPhos  64  04-02    CARDIAC MARKERS ( 02 Apr 2023 14:31 )  x     / x     / 190 U/L / x     / 20.3 ng/mL    LIVER FUNCTIONS - ( 02 Apr 2023 14:31 )  Alb: 4.5 g/dL / Pro: 8.2 g/dL / ALK PHOS: 64 U/L / ALT: 17 U/L / AST: 33 U/L / GGT: x           PT/INR - ( 02 Apr 2023 15:11 )   PT: 13.4 sec;   INR: 1.16 ratio     PTT - ( 02 Apr 2023 15:11 )  PTT:36.7 sec    IMAGING:  Xray Chest 1 View- PORTABLE-Urgent (04.02.23 @ 15:29)  Impression:  - No acute pulmonary disease.    POCUS ED TTE 2D F/U, Limited w/o Cont. (04.02.23 @ 20:06)  IMPRESSION:  - Trace pericardial effusion  - Anterior and apical hypokinesis    [X] Imaging personally reviewed by me-   [X] ECG personally reviewed by me- ROYAL III, aVR, V1. STD I, II, aVL

## 2023-04-02 NOTE — ED ADULT NURSE REASSESSMENT NOTE - NS ED NURSE REASSESS COMMENT FT1
Pt received from JESSIKA Troy in green, A&Ox3, breathing spontaneously, unlabored & w/o distress on room air. Pt Heparin ve Pt received from JESSIKA Troy in green, A&Ox3, breathing spontaneously, unlabored & w/o distress on room air. Pt Heparin verified going at 8ml/hr. Pt requesting food, no diet order in [facility] notified via TEAMS, awaiting diet order. Pt ambulated independently to bathroom. No acute complaints at this time, VSS. Pt is admitted, awaits bed.

## 2023-04-02 NOTE — ED PROVIDER NOTE - CLINICAL SUMMARY MEDICAL DECISION MAKING FREE TEXT BOX
52-year-old male with a past medical history of CAD status post CABG 3 months ago, HTN, DM 2, GERD presents to the ED with 1 week history of chest pain. Initial vitals nonactionable.  Satting 100% on room air at bedside.  Blood pressure in the 120 systolic.  Initial EKG notable for new T wave inversions in the inferior lateral leads.  Possible ST segment depressions in the lateral leads.  Physical exam as noted above.  Well-healed sternotomy scar.  Abdomen is soft nontender without guarding or rebound tenderness.  Uncomfortable appearing at bedside.  Differential diagnosis includes but not limited to ACS versus stable angina versus metabolic derangements versus viral syndrome.  Will order labs, EKG, meds, imaging, and reassess.  Will consult cardiology given concerning EKG findings and active chest pain. 52-year-old male with a past medical history of CAD status post CABG 3 months ago, HTN, DM 2, GERD presents to the ED with 1 week history of chest pain. Initial vitals nonactionable.  Satting 100% on room air at bedside.  Blood pressure in the 120 systolic.  Initial EKG notable for new T wave inversions in the inferior lateral leads.  Possible ST segment depressions in the lateral leads.  Physical exam as noted above.  Well-healed sternotomy scar.  Abdomen is soft nontender without guarding or rebound tenderness.  Uncomfortable appearing at bedside.  Differential diagnosis includes but not limited to ACS versus stable angina versus metabolic derangements versus viral syndrome.  Will order labs, EKG, meds, imaging, and reassess.  Will consult cardiology given concerning EKG findings and active chest pain.  Attending Coreen Lyons: 52-year-old gentleman with multiple medical issues including history of coronary artery disease, high blood pressure and prior CABG a few months ago presenting with chest pain.  Pain is located in his left side of his chest and radiates to his left arm.  Pain is similar to when he needed his CABG in the past.  Pain has been constant for the last week and intermittent in intensity.  No associated shortness of breath with exertion.  No black or bloody stools.  Patient denies any back pain.  Upon arrival EKG performed showing evidence of ST elevation as well as depressions.  Patient is complaining of chest pain.  With concerning EKG and acute chest pain cath consult called for concern for acute MI.  Serial EKGs with persistent ST elevation.  Point-of-care ultrasound performed showing reduced ejection fraction as well as hypokinesis of the anterior wall.  Patient seen by cards fellow at the bedside for cath consult.  At this time does not feel the patient needs any acute catheterization and recommends medical management and monitoring of troponin.  Troponin came back elevated and cardiology recommended at that time starting a heparin drip.  Patient denies any black stools or any recent head traumas.  Heparin drip started.  On exam patient extremities are warm and initial lactate within normal limits making acute cardiogenic shock less likely.  With reduced EF will need to closely monitor patient's hemodynamics.  Patient may require CCU.  Very low threshold to reconsult cardiology if any acute changes for emergent catheterization.

## 2023-04-02 NOTE — ED PROVIDER NOTE - OBJECTIVE STATEMENT
Attending Coreen Lyons: 53 yo 52-year-old male with a past medical history of CAD status post CABG 3 months ago, HTN, DM 2, GERD presents to the ED with 1 week history of chest pain.  Patient states her chest pain has progressively worsened since onset.  Pain is localized over the anterior chest and radiates to the left arm and the jaw.  Patient endorses worsening dyspnea on exertion and diaphoresis.  He states his chest pain is approximately 7 out of 10 at bedside.  No abdominal pain, nausea, vomiting, focal neurologic deficits, numbness, or tingling.  He denies any other symptoms at bedside.    Attending Coreen Lyons: 53 yo 52-year-old male with a past medical history of CAD status post CABG 3 months ago, HTN, DM 2, GERD presents to the ED with 1 week history of chest pain.  Patient states her chest pain has progressively worsened since onset.  Pain is localized over the anterior chest and radiates to the left arm and the jaw.  Patient endorses worsening dyspnea on exertion and diaphoresis.  He states his chest pain is approximately 7 out of 10 at bedside.  No abdominal pain, nausea, vomiting, focal neurologic deficits, numbness, or tingling.  He denies any other symptoms at bedside.    Attending Coreen Lyons: 51 yo male with h/o CAD s/p CABG in december 2022 presenting with chest pain. pt reports has been having chest pain for last week. pain has been constant and intermittent in intensity. no pain with inspiration. no black or bloody stools. pain is associated with sob. no recent uri. no worsening sob with walking or moving. no back pain. does report some discomfort with eating as well.

## 2023-04-02 NOTE — CONSULT NOTE ADULT - ATTENDING COMMENTS
52 year old man 3 months since coronary revascularization surgery presents with increasingly frequent and severe chest pain for several days. Troponin elevated, EKG abnormal, though non-specific. Plan cardiac echo and coronary angiography.    To contact call Cardiology Fellow or Attending as listed on [email or web address] password: [email or web address].

## 2023-04-02 NOTE — ED ADULT NURSE NOTE - OBJECTIVE STATEMENT
52 yr old male with extensive cardiac history came in with chest pain and an abnormal ekg.  had open heart last december and previous MI. on assessment a and o cx 3 lungs clear abd soft non tender no swelling in extremities no n/v/d no fevers no other complaints. states he is no sob, and pain is mild but can become diaphoretic when the pain happens, ekg showing stemi.

## 2023-04-02 NOTE — H&P ADULT - NSHPSOCIALHISTORY_GEN_ALL_CORE
- Former tobacco use, quit at time of CABG. Prior to that 4-5cig/day for 30-40yrs  - Denies EtOH consumption  - Denies illicit drug use

## 2023-04-02 NOTE — H&P ADULT - PROBLEM SELECTOR PLAN 2
- Was on home Lantus 18U qhs and oral regimen  - Last A1c from 12/22: 8.8%  - f/u AM A1c  - Start weight based Lantus 12U qHS and Admelog 3U w/ meals for now and titrate based on FS needs  - NAKIA  - CC diet  - Diabetes education

## 2023-04-02 NOTE — ED PROVIDER NOTE - WR ORDER ID 1
09/30/22 1130: Writer left message for Jolie,  with Darlene Johnston confirming patient was most recently seen by Dr. Acevedo on 9/19/22. Writer reviewed diagnoses, restrictions, and treatment plan which includes formal PT. Writer confirmed patient will have 6 total PT visits by next f/u with Dr. Acevedo on 10/10/22 at 0900am.     Jolie's ph. [phone]  
[phone]

## 2023-04-02 NOTE — CONSULT NOTE ADULT - SUBJECTIVE AND OBJECTIVE BOX
Patient seen and evaluated at bedside    Chief Complaint: Chest pain    HPI:  52-year-old male with a past medical history of CAD status post CABG 3 months ago, HTN, DM 2, GERD presents to the ED with 1 week history of chest pain.  Patient states her chest pain has progressively worsened since onset.  Pain is localized over the anterior chest and radiates to the left arm. It occurs at rest and is worsened with eating. The pain also worsens with movement of his left arm. Patient endorses worsening dyspnea on exertion and diaphoresis since his CABG.  He states his chest pain is approximately 7 out of 10 at bedside but improved with tylenol.  No abdominal pain, nausea, vomiting, focal neurologic deficits, numbness, or tingling.  He denies any other symptoms at bedside.  Patient has not had any changes in exercise to[place]. Denies orthopnea, PND or LE edema        PMHx:   No pertinent past medical history    Hypertension    Diabetes mellitus    GERD (gastroesophageal reflux disease)        PSHx:   No significant past surgical history    No significant past surgical history        Allergies:  No Known Allergies      Home Meds:  Metoprolol tartrate 100mg BID  ASpirin 81mg daily  Atrovastatin 40mg daily  Lisinopril 2.5mg daily  Basaglar 18u  Alogliptin and metformin      Current Medications:       FAMILY HISTORY:      Social History:  Smoking History: Smoked for 30-40 years prior to his heart attack    REVIEW OF SYSTEMS:  Constitutional:     [ ] negative [ ] fevers [ ] chills [ ] weight loss [ ] weight gain  HEENT:                  [ ] negative [ ] dry eyes [ ] eye irritation [ ] postnasal drip [ ] nasal congestion  CV:                         [ ] negative  [ ] chest pain [ ] orthopnea [ ] palpitations [ ] murmur  Resp:                     [ ] negative [ ] cough [ ] shortness of breath [ ] dyspnea [ ] wheezing [ ] sputum [ ]hemoptysis  GI:                          [ ] negative [ ] nausea [ ] vomiting [ ] diarrhea [ ] constipation [ ] abd pain [ ] dysphagia   :                        [ ] negative [ ] dysuria [ ] nocturia [ ] hematuria [ ] increased urinary frequency  Musculoskeletal: [ ] negative [ ] back pain [ ] myalgias [ ] arthralgias [ ] fracture  Skin:                       [ ] negative [ ] rash [ ] itch  Neurological:        [ ] negative [ ] headache [ ] dizziness [ ] syncope [ ] weakness [ ] numbness  Psychiatric:           [ ] negative [ ] anxiety [ ] depression  Endocrine:            [ ] negative [ ] diabetes [ ] thyroid problem  Heme/Lymph:      [ ] negative [ ] anemia [ ] bleeding problem  Allergic/Immune: [ ] negative [ ] itchy eyes [ ] nasal discharge [ ] hives [ ] angioedema    [ ] All other systems negative  [ ] Unable to assess ROS due to      Physical Exam:  T(F): 97.5 (-), Max: 97.5 (-)  HR: 86 () (86 - 90)  BP: 115/85 (-) (115/85 - 119/84)  RR: 16 (-)  SpO2: 99% ()  GENERAL: No acute distress, well-developed  HEAD:  Atraumatic, Normocephalic  ENT: EOMI, PERRLA, conjunctiva and sclera clear, Neck supple, No JVD, moist mucosa  CHEST/LUNG: Clear to auscultation bilaterally; No wheeze, equal breath sounds bilaterally. Pain is reproducible on chest wall palpation  BACK: No spinal tenderness  HEART: Regular rate and rhythm; No murmurs, rubs, or gallops  ABDOMEN: Soft, Nontender, Nondistended; Bowel sounds present  EXTREMITIES:  No clubbing, cyanosis, or edema      Cardiovascular Diagnostic Testing:    ECG: Personally reviewed: Normal sinus rhythm with St segment elvation in V1 as well as 1mm elevation in AVR and lead III. ST depression in leads I and AVL. T wave inversions in the lateral leads.     Echo: Personally reviewed:    atient name: SHAIKH BARBOSA  YOB: 1970   Age: 52 (M)   MR#: [id number]  Study Date: 2022  Location: [address] [place], [place] [id number]: Trevon Mcallister M.D.  Study quality: Technically good  Referring Physician: [employer] ERICKSON M.D  Blood Pressure: 110/60 mmHg  Height: 175 cm  Weight: 67 kg  BSA: 1.8 m2  Heart Rate: 60 mmHg  ------------------------------------------------------------------------  PROCEDURE: Intra operative transeophageal echocardiogram  with 2D, M mode and complete  Doppler examination. The  patient was approached in the operative suite after  induction of full anesthesia and transesophageal probe had  been positioned in the esophagus posterior to the heart.  INDICATION: Atherosclerotic heart disease of native  coronary artery without angina pectoris (I25.10)  ------------------------------------------------------------------------  Dimensions:    Normal Values:  LA:            2.0 - 4.0 cm  Ao:            2.0 - 3.8 cm  SEPTUM:        0.6 - 1.2 cm  PWT:           0.6 - 1.1 cm  LVIDd:         3.0 - 5.6 cm  LVIDs:         1.8 - 4.0 cm  EF (Visual Estimate): 45-50 %  ------------------------------------------------------------------------  Pre-Bypass Observations:  Mitral Valve: Normal mitral valve. Minimal mitral  regurgitation. No mitral stenosis.  Aortic Valve/Aorta: Normal trileaflet aortic valve. No  aortic stenosis No aortic valve regurgitation seen. No LVOT  obstruction or KUSHAL visualized.  Normal aortic root.  Grossly normal dimensions.  No  dissection or significant atheromatous disease in  visualized portions of aorta.  Left Atrium: Normal left atrium.  No thrombus visualized in  left atrial appendage.  Left Ventricle: Mildly reduced left ventricular systolic  function. Hypokinesis of mid papillary inferoseptal  segment. Normal left ventricular internal dimensions and  wall thicknesses. Normal diastolic function.  Right Heart: Normal right atrium. Normal right ventricular  size and function. Normal tricuspid valve. Minimal  tricuspid regurgitation. Normal pulmonic valve. Mild  pulmonic regurgitation.  Pericardium/Pleura: Normal pericardium with no pericardial  effusion.  No pleural effusion visualized.  Hemodynamic: Color Doppler demonstrates no evidence of a  patent foramen ovale.  ------------------------------------------------------------------------  Post-Bypass Observations:    S/P CABG.  Normal left ventricular function, EF  approximately 60-65% by visual estimate, no RWMA.  Normal  right ventricular function.No dissection or effusion.  All other findings unchanged.  All pre/post CPB findings discussed with surgeon.  ------------------------------------------------------------------------  Conclusions:  1. Normal mitral valve. Minimal mitral regurgitation. No  mitral stenosis.  2. Normal trileaflet aortic valve. No aortic stenosis No  aortic valve regurgitation seen.  3. Normal aortic root.  Grossly normal dimensions.  No  dissection or significant atheromatous disease in  visualized portions of aorta.  4. Normal left atrium.  No thrombus visualized in left  atrial appendage.  5. Normal left ventricular internal dimensions and wall  thicknesses.  6. Mildly reduced left ventricular systolic function.  Hypokinesis of mid papillary inferoseptal segment. No LVOT  obstruction or KUSHAL visualized.  7. Normal diastolic function.  8. Normal right atrium.  9. Normal right ventricular size and function.  10. Normal tricuspid valve. Minimal tricuspid  regurgitation.  11. Normal pulmonic valve. Mild pulmonic regurgitation.  Estimated mean pulmonary artery pressure: 18 mmHg.  Estimated diastolic pulmonary artery pressure: 14 mmHg.  12. Color Doppler demonstrates no evidence of a patent  foramen ovale.  13. Normal pericardium with no pericardial effusion.  14. No pleural effusion visualized.  Confirmed on  2022 - 14:17:07 by Trevon Mcallister M.D.  ------------------------------------------------------------------------      Stress Testing:    Cath:    Study Date:     12/10/2022   Name:           SHAIKH BARBOSA   :            1970   (52 years)   Gender:         male   MR#:            [id number]   [facility]#:           [id number]   Patient Class:  Inpatient     Cath Lab Report    Diagnostic Cardiologist:       Neisha Cramer MD   Fellow:                        Vickie Colon MD - Fellow   Referring Physician:           Amanda Parish MD     Procedures Performed   Procedures:               1.    Arterial Access - Right Femoral     2.    Ultrasound Guided Access   3.    Left Heart Cath   4.    Diagnostic Coronary Angiography   5.    AngioSeal     Indications:                Myocardial infarction without ST elevation  (NSTEMI)    Diagnostic Conclusions:     Multivessel CAD   Recommendations:     CABG eval     Procedure Narrative:   The risks and alternatives of the procedures and conscious sedation  were explained to the patient and informed consent was  obtained. The patient was brought to the cath lab and placed on the  exam table.  Access   Right femoral artery:   The puncture site was infiltrated with 1% Lidocaine. Vascular access  was obtained using modified seldinger technique.    Diagnostic Findings:     Coronary Angiography   The coronary circulation is right dominant.      LM   Left main artery: The segment is visually normal in size and  structure.    LAD   Mid left anterior descending: There is a 90 % stenosis. First  diagonal: There is a 90 % stenosis.    CX   Distal circumflex: There is a 100 % stenosis. First obtuse marginal:  There is a 90 % stenosis.    RCA   Proximal right coronary artery: There is a 30 % stenosis. Right  posterior descending artery: Angiography shows severe    Patient: SHAIKH FAMILIA              MRN: [id number]  Study Date: 12/10/2022   11:50 AM     Page 1 of 3        Imaging:    CXR: Personally reviewed    Labs: Personally reviewed                        16.1   8.08  )-----------( 288      ( 2023 14:31 )             51.2                           No Known Allergies      T(F): 97.5 (-), Max: 97.5 (-)  HR: 86 (-) (86 - 90)  BP: 115/85 (-02) (115/85 - 119/84)  RR: 16 (-)  SpO2: 99% (-) Patient seen and evaluated at bedside    Chief Complaint: Chest pain    HPI:  52-year-old male with a past medical history of CAD status post CABG 3 months ago, HTN, DM 2, GERD presents to the ED with 1 week history of chest pain.  Patient states her chest pain has progressively worsened since onset.  Pain is localized over the anterior chest and radiates to the left arm. It occurs at rest and is worsened with eating. The pain also worsens with movement of his left arm but it does not improve with change in position. Patient endorses worsening dyspnea on exertion and diaphoresis since his CABG.  He states his chest pain is approximately 7 out of 10 at bedside but improved with tylenol.  No abdominal pain, nausea, vomiting, focal neurologic deficits, numbness, or tingling.  He denies any other symptoms at bedside.  Patient has not had any changes in exercise tolerance Denies orthopnea, PND or LE edema        PMHx:   No pertinent past medical history    Hypertension    Diabetes mellitus    GERD (gastroesophageal reflux disease)        PSHx:   No significant past surgical history    No significant past surgical history        Allergies:  No Known Allergies      Home Meds:  Metoprolol tartrate 100mg BID  ASpirin 81mg daily  Atrovastatin 40mg daily  Lisinopril 2.5mg daily  Basaglar 18u  Alogliptin and metformin      Current Medications:       FAMILY HISTORY:      Social History:  Smoking History: Smoked for 30-40 years prior to his heart attack    REVIEW OF SYSTEMS:  Constitutional:     [ ] negative [ ] fevers [ ] chills [ ] weight loss [ ] weight gain  HEENT:                  [ ] negative [ ] dry eyes [ ] eye irritation [ ] postnasal drip [ ] nasal congestion  CV:                         [ ] negative  [ ] chest pain [ ] orthopnea [ ] palpitations [ ] murmur  Resp:                     [ ] negative [ ] cough [ ] shortness of breath [ ] dyspnea [ ] wheezing [ ] sputum [ ]hemoptysis  GI:                          [ ] negative [ ] nausea [ ] vomiting [ ] diarrhea [ ] constipation [ ] abd pain [ ] dysphagia   :                        [ ] negative [ ] dysuria [ ] nocturia [ ] hematuria [ ] increased urinary frequency  Musculoskeletal: [ ] negative [ ] back pain [ ] myalgias [ ] arthralgias [ ] fracture  Skin:                       [ ] negative [ ] rash [ ] itch  Neurological:        [ ] negative [ ] headache [ ] dizziness [ ] syncope [ ] weakness [ ] numbness  Psychiatric:           [ ] negative [ ] anxiety [ ] depression  Endocrine:            [ ] negative [ ] diabetes [ ] thyroid problem  Heme/Lymph:      [ ] negative [ ] anemia [ ] bleeding problem  Allergic/Immune: [ ] negative [ ] itchy eyes [ ] nasal discharge [ ] hives [ ] angioedema    [ ] All other systems negative  [ ] Unable to assess ROS due to      Physical Exam:  T(F): 97.5 (-), Max: 97.5 ()  HR: 86 () (86 - 90)  BP: 115/85 (-) (115/85 - 119/84)  RR: 16 (-)  SpO2: 99% ()  GENERAL: No acute distress, well-developed  HEAD:  Atraumatic, Normocephalic  ENT: EOMI, PERRLA, conjunctiva and sclera clear, Neck supple, No JVD, moist mucosa  CHEST/LUNG: Clear to auscultation bilaterally; No wheeze, equal breath sounds bilaterally. Pain is reproducible on chest wall palpation  BACK: No spinal tenderness  HEART: Regular rate and rhythm; No murmurs, rubs, or gallops  ABDOMEN: Soft, Nontender, Nondistended; Bowel sounds present  EXTREMITIES:  No clubbing, cyanosis, or edema      Cardiovascular Diagnostic Testing:    ECG: Personally reviewed: Normal sinus rhythm with St segment elvation in V1 as well as 1mm elevation in AVR and lead III. ST depression in leads I and AVL. T wave inversions in the lateral leads.     Echo: Personally reviewed:    atient name: SHAIKH BARBOSA  YOB: 1970   Age: 52 (M)   MR#: [id number]  Study Date: 2022  Location: [address] [place], [place] [id number]: Trevon Mcallister M.D.  Study quality: Technically good  Referring Physician: [employer] ERICKSON M.D  Blood Pressure: 110/60 mmHg  Height: 175 cm  Weight: 67 kg  BSA: 1.8 m2  Heart Rate: 60 mmHg  ------------------------------------------------------------------------  PROCEDURE: Intra operative transeophageal echocardiogram  with 2D, M mode and complete  Doppler examination. The  patient was approached in the operative suite after  induction of full anesthesia and transesophageal probe had  been positioned in the esophagus posterior to the heart.  INDICATION: Atherosclerotic heart disease of native  coronary artery without angina pectoris (I25.10)  ------------------------------------------------------------------------  Dimensions:    Normal Values:  LA:            2.0 - 4.0 cm  Ao:            2.0 - 3.8 cm  SEPTUM:        0.6 - 1.2 cm  PWT:           0.6 - 1.1 cm  LVIDd:         3.0 - 5.6 cm  LVIDs:         1.8 - 4.0 cm  EF (Visual Estimate): 45-50 %  ------------------------------------------------------------------------  Pre-Bypass Observations:  Mitral Valve: Normal mitral valve. Minimal mitral  regurgitation. No mitral stenosis.  Aortic Valve/Aorta: Normal trileaflet aortic valve. No  aortic stenosis No aortic valve regurgitation seen. No LVOT  obstruction or KUSHAL visualized.  Normal aortic root.  Grossly normal dimensions.  No  dissection or significant atheromatous disease in  visualized portions of aorta.  Left Atrium: Normal left atrium.  No thrombus visualized in  left atrial appendage.  Left Ventricle: Mildly reduced left ventricular systolic  function. Hypokinesis of mid papillary inferoseptal  segment. Normal left ventricular internal dimensions and  wall thicknesses. Normal diastolic function.  Right Heart: Normal right atrium. Normal right ventricular  size and function. Normal tricuspid valve. Minimal  tricuspid regurgitation. Normal pulmonic valve. Mild  pulmonic regurgitation.  Pericardium/Pleura: Normal pericardium with no pericardial  effusion.  No pleural effusion visualized.  Hemodynamic: Color Doppler demonstrates no evidence of a  patent foramen ovale.  ------------------------------------------------------------------------  Post-Bypass Observations:    S/P CABG.  Normal left ventricular function, EF  approximately 60-65% by visual estimate, no RWMA.  Normal  right ventricular function.No dissection or effusion.  All other findings unchanged.  All pre/post CPB findings discussed with surgeon.  ------------------------------------------------------------------------  Conclusions:  1. Normal mitral valve. Minimal mitral regurgitation. No  mitral stenosis.  2. Normal trileaflet aortic valve. No aortic stenosis No  aortic valve regurgitation seen.  3. Normal aortic root.  Grossly normal dimensions.  No  dissection or significant atheromatous disease in  visualized portions of aorta.  4. Normal left atrium.  No thrombus visualized in left  atrial appendage.  5. Normal left ventricular internal dimensions and wall  thicknesses.  6. Mildly reduced left ventricular systolic function.  Hypokinesis of mid papillary inferoseptal segment. No LVOT  obstruction or KUSHAL visualized.  7. Normal diastolic function.  8. Normal right atrium.  9. Normal right ventricular size and function.  10. Normal tricuspid valve. Minimal tricuspid  regurgitation.  11. Normal pulmonic valve. Mild pulmonic regurgitation.  Estimated mean pulmonary artery pressure: 18 mmHg.  Estimated diastolic pulmonary artery pressure: 14 mmHg.  12. Color Doppler demonstrates no evidence of a patent  foramen ovale.  13. Normal pericardium with no pericardial effusion.  14. No pleural effusion visualized.  Confirmed on  2022 - 14:17:07 by Trevon Mcallister M.D.  ------------------------------------------------------------------------      Stress Testing:    Cath:    Study Date:     12/10/2022   Name:           FAMILIASHAIKH   :            1970   (52 years)   Gender:         male   MR#:            [id number]   [facility]#:           [id number]   Patient Class:  Inpatient     Cath Lab Report    Diagnostic Cardiologist:       Neisha Cramer MD   Fellow:                        Vickie Colon MD - Fellow   Referring Physician:           Amanda Parish MD     Procedures Performed   Procedures:               1.    Arterial Access - Right Femoral     2.    Ultrasound Guided Access   3.    Left Heart Cath   4.    Diagnostic Coronary Angiography   5.    AngioSeal     Indications:                Myocardial infarction without ST elevation  (NSTEMI)    Diagnostic Conclusions:     Multivessel CAD   Recommendations:     CABG eval     Procedure Narrative:   The risks and alternatives of the procedures and conscious sedation  were explained to the patient and informed consent was  obtained. The patient was brought to the cath lab and placed on the  exam table.  Access   Right femoral artery:   The puncture site was infiltrated with 1% Lidocaine. Vascular access  was obtained using modified seldinger technique.    Diagnostic Findings:     Coronary Angiography   The coronary circulation is right dominant.      LM   Left main artery: The segment is visually normal in size and  structure.    LAD   Mid left anterior descending: There is a 90 % stenosis. First  diagonal: There is a 90 % stenosis.    CX   Distal circumflex: There is a 100 % stenosis. First obtuse marginal:  There is a 90 % stenosis.    RCA   Proximal right coronary artery: There is a 30 % stenosis. Right  posterior descending artery: Angiography shows severe    Patient: SHAIKH FAMILIA              MRN: [id number]  Study Date: 12/10/2022   11:50 AM     Page 1 of 3        Imaging:    CXR: Personally reviewed    Labs: Personally reviewed                        16.1   8.08  )-----------( 288      ( 2023 14:31 )             51.2     Troponin 412  CKMB 20  CPK mass assay 10.7  CRP 12  Lipase 66     Cr 0.97                        No Known Allergies      T(F): 97.5 (-), Max: 97.5 (-)  HR: 86 (-) (86 - 90)  BP: 115/85 (-02) (115/85 - 119/84)  RR: 16 (-)  SpO2: 99% (-) Patient seen and evaluated at bedside    Chief Complaint: Chest pain    HPI:  52-year-old male with a past medical history of CAD status post CABG 3 months ago, HTN, DM 2, GERD presents to the ED with 1 week history of chest pain.  Patient states her chest pain has progressively worsened since onset.  Pain is localized over the anterior chest and radiates to the left arm. It occurs at rest and is worsened with eating. The pain also worsens with movement of his left arm but it does not improve with change in position. Patient endorses worsening dyspnea on exertion and diaphoresis since his CABG.  He states his chest pain is approximately 7 out of 10 at bedside but improved with tylenol.  No abdominal pain, nausea, vomiting, focal neurologic deficits, numbness, or tingling.  He denies any other symptoms at bedside.  Patient has not had any changes in exercise tolerance Denies orthopnea, PND or LE edema        PMHx:   No pertinent past medical history    Hypertension    Diabetes mellitus    GERD (gastroesophageal reflux disease)        PSHx:   No significant past surgical history    No significant past surgical history        Allergies:  No Known Allergies      Home Meds:  Metoprolol tartrate 100mg BID  ASpirin 81mg daily  Atrovastatin 40mg daily  Lisinopril 2.5mg daily  Basaglar 18u  Alogliptin and metformin      Current Medications:       FAMILY HISTORY:      Social History:  Smoking History: Smoked for 30-40 years prior to his heart attack    REVIEW OF SYSTEMS:  Constitutional:     [ ] negative [ ] fevers [ ] chills [ ] weight loss [ ] weight gain  HEENT:                  [ ] negative [ ] dry eyes [ ] eye irritation [ ] postnasal drip [ ] nasal congestion  CV:                         [ ] negative  [ ] chest pain [ ] orthopnea [ ] palpitations [ ] murmur  Resp:                     [ ] negative [ ] cough [ ] shortness of breath [ ] dyspnea [ ] wheezing [ ] sputum [ ]hemoptysis  GI:                          [ ] negative [ ] nausea [ ] vomiting [ ] diarrhea [ ] constipation [ ] abd pain [ ] dysphagia   :                        [ ] negative [ ] dysuria [ ] nocturia [ ] hematuria [ ] increased urinary frequency  Musculoskeletal: [ ] negative [ ] back pain [ ] myalgias [ ] arthralgias [ ] fracture  Skin:                       [ ] negative [ ] rash [ ] itch  Neurological:        [ ] negative [ ] headache [ ] dizziness [ ] syncope [ ] weakness [ ] numbness  Psychiatric:           [ ] negative [ ] anxiety [ ] depression  Endocrine:            [ ] negative [ ] diabetes [ ] thyroid problem  Heme/Lymph:      [ ] negative [ ] anemia [ ] bleeding problem  Allergic/Immune: [ ] negative [ ] itchy eyes [ ] nasal discharge [ ] hives [ ] angioedema    [ ] All other systems negative  [ ] Unable to assess ROS due to      Physical Exam:  T(F): 97.5 (-), Max: 97.5 ()  HR: 86 () (86 - 90)  BP: 115/85 (-) (115/85 - 119/84)  RR: 16 (-)  SpO2: 99% ()  GENERAL: No acute distress, well-developed  HEAD:  Atraumatic, Normocephalic  ENT: EOMI, PERRLA, conjunctiva and sclera clear, Neck supple, No JVD, moist mucosa  CHEST/LUNG: Clear to auscultation bilaterally; No wheeze, equal breath sounds bilaterally. Pain is reproducible on chest wall palpation  BACK: No spinal tenderness  HEART: Regular rate and rhythm; No murmurs, rubs, or gallops  ABDOMEN: Soft, Nontender, Nondistended; Bowel sounds present  EXTREMITIES:  No clubbing, cyanosis, or edema      Cardiovascular Diagnostic Testing:    ECG: Personally reviewed: Normal sinus rhythm with St segment elvation in V1 as well as 1mm elevation in AVR and lead III. ST depression in leads I and AVL. T wave inversions in the lateral leads.     Echo: Personally reviewed:    atient name: SHAIKH BARBOSA  YOB: 1970   Age: 52 (M)   MR#: [id number]  Study Date: 2022  Location: [address] [place], [place] [id number]: Trevon Mcallister M.D.  Study quality: Technically good  Referring Physician: [employer] ERICKSON M.D  Blood Pressure: 110/60 mmHg  Height: 175 cm  Weight: 67 kg  BSA: 1.8 m2  Heart Rate: 60 mmHg  ------------------------------------------------------------------------  PROCEDURE: Intra operative transeophageal echocardiogram  with 2D, M mode and complete  Doppler examination. The  patient was approached in the operative suite after  induction of full anesthesia and transesophageal probe had  been positioned in the esophagus posterior to the heart.  INDICATION: Atherosclerotic heart disease of native  coronary artery without angina pectoris (I25.10)  ------------------------------------------------------------------------  Dimensions:    Normal Values:  LA:            2.0 - 4.0 cm  Ao:            2.0 - 3.8 cm  SEPTUM:        0.6 - 1.2 cm  PWT:           0.6 - 1.1 cm  LVIDd:         3.0 - 5.6 cm  LVIDs:         1.8 - 4.0 cm  EF (Visual Estimate): 45-50 %  ------------------------------------------------------------------------  Pre-Bypass Observations:  Mitral Valve: Normal mitral valve. Minimal mitral  regurgitation. No mitral stenosis.  Aortic Valve/Aorta: Normal trileaflet aortic valve. No  aortic stenosis No aortic valve regurgitation seen. No LVOT  obstruction or KUSHAL visualized.  Normal aortic root.  Grossly normal dimensions.  No  dissection or significant atheromatous disease in  visualized portions of aorta.  Left Atrium: Normal left atrium.  No thrombus visualized in  left atrial appendage.  Left Ventricle: Mildly reduced left ventricular systolic  function. Hypokinesis of mid papillary inferoseptal  segment. Normal left ventricular internal dimensions and  wall thicknesses. Normal diastolic function.  Right Heart: Normal right atrium. Normal right ventricular  size and function. Normal tricuspid valve. Minimal  tricuspid regurgitation. Normal pulmonic valve. Mild  pulmonic regurgitation.  Pericardium/Pleura: Normal pericardium with no pericardial  effusion.  No pleural effusion visualized.  Hemodynamic: Color Doppler demonstrates no evidence of a  patent foramen ovale.  ------------------------------------------------------------------------  Post-Bypass Observations:    S/P CABG.  Normal left ventricular function, EF  approximately 60-65% by visual estimate, no RWMA.  Normal  right ventricular function.No dissection or effusion.  All other findings unchanged.  All pre/post CPB findings discussed with surgeon.  ------------------------------------------------------------------------  Conclusions:  1. Normal mitral valve. Minimal mitral regurgitation. No  mitral stenosis.  2. Normal trileaflet aortic valve. No aortic stenosis No  aortic valve regurgitation seen.  3. Normal aortic root.  Grossly normal dimensions.  No  dissection or significant atheromatous disease in  visualized portions of aorta.  4. Normal left atrium.  No thrombus visualized in left  atrial appendage.  5. Normal left ventricular internal dimensions and wall  thicknesses.  6. Mildly reduced left ventricular systolic function.  Hypokinesis of mid papillary inferoseptal segment. No LVOT  obstruction or KUSHAL visualized.  7. Normal diastolic function.  8. Normal right atrium.  9. Normal right ventricular size and function.  10. Normal tricuspid valve. Minimal tricuspid  regurgitation.  11. Normal pulmonic valve. Mild pulmonic regurgitation.  Estimated mean pulmonary artery pressure: 18 mmHg.  Estimated diastolic pulmonary artery pressure: 14 mmHg.  12. Color Doppler demonstrates no evidence of a patent  foramen ovale.  13. Normal pericardium with no pericardial effusion.  14. No pleural effusion visualized.  Confirmed on  2022 - 14:17:07 by Trevon Mcallister M.D.  ------------------------------------------------------------------------      Stress Testing:    Cath:    Study Date:     12/10/2022   Name:           FAMILIASHAIKH   :            1970   (52 years)   Gender:         male   MR#:            [id number]   [facility]#:           [id number]   Patient Class:  Inpatient     Cath Lab Report    Diagnostic Cardiologist:       Neisha Cramer MD   Fellow:                        Vickie Colon MD - Fellow   Referring Physician:           Amanda Parish MD     Procedures Performed   Procedures:               1.    Arterial Access - Right Femoral     2.    Ultrasound Guided Access   3.    Left Heart Cath   4.    Diagnostic Coronary Angiography   5.    AngioSeal     Indications:                Myocardial infarction without ST elevation  (NSTEMI)    Diagnostic Conclusions:     Multivessel CAD   Recommendations:     CABG eval     Procedure Narrative:   The risks and alternatives of the procedures and conscious sedation  were explained to the patient and informed consent was  obtained. The patient was brought to the cath lab and placed on the  exam table.  Access   Right femoral artery:   The puncture site was infiltrated with 1% Lidocaine. Vascular access  was obtained using modified seldinger technique.    Diagnostic Findings:     Coronary Angiography   The coronary circulation is right dominant.      LM   Left main artery: The segment is visually normal in size and  structure.    LAD   Mid left anterior descending: There is a 90 % stenosis. First  diagonal: There is a 90 % stenosis.    CX   Distal circumflex: There is a 100 % stenosis. First obtuse marginal:  There is a 90 % stenosis.    RCA   Proximal right coronary artery: There is a 30 % stenosis. Right  posterior descending artery: Angiography shows severe    Patient: SHAIKH FAMILIA              MRN: [id number]  Study Date: 12/10/2022   11:50 AM     Page 1 of 3        Imaging:    CXR: Personally reviewed    Labs: Personally reviewed                        16.1   8.08  )-----------( 288      ( 2023 14:31 )             51.2     Troponin 412  CKMB 20  CPK mass assay 10.7  CRP 12  Lipase 66     Cr 0.97    No Known Allergies      T(F): 97.5 (-), Max: 97.5 (-)  HR: 86 (-) (86 - 90)  BP: 115/85 (-02) (115/85 - 119/84)  RR: 16 (-)  SpO2: 99% (-)

## 2023-04-02 NOTE — ED PROVIDER NOTE - ATTENDING CONTRIBUTION TO CARE
Attending MD Coreen Lyons:  I personally have seen and examined this patient.  Resident note reviewed and agree on plan of care and except where noted.  See HPI, PE, and MDM for details.

## 2023-04-02 NOTE — ED PROVIDER NOTE - PHYSICAL EXAMINATION
GENERAL: no acute distress, non-toxic appearing  HEAD: normocephalic, atraumatic  HEENT: normal conjunctiva, oral mucosa moist, neck supple  CARDIAC: regular rate and rhythm, normal S1 and S2,  no appreciable murmurs, Well-healed sternotomy scar  PULM: clear to ascultation bilaterally, no crackles, rales, rhonchi, or wheezing  GI: abdomen nondistended, soft, nontender, no guarding or rebound tenderness  : no CVA tenderness, no suprapubic tenderness  NEURO: alert and oriented x 3, normal speech, PERRLA, EOMI, no focal motor or sensory deficits  MSK: no visible deformities, no peripheral edema, calf tenderness/redness/swelling  SKIN: no visible rashes, dry, well-perfused  PSYCH: appropriate mood and affect GENERAL: no acute distress, non-toxic appearing  HEAD: normocephalic, atraumatic  HEENT: normal conjunctiva, oral mucosa moist, neck supple  CARDIAC: regular rate and rhythm, normal S1 and S2,  no appreciable murmurs, Well-healed sternotomy scar  PULM: clear to ascultation bilaterally, no crackles, rales, rhonchi, or wheezing  GI: abdomen nondistended, soft, nontender, no guarding or rebound tenderness  : no CVA tenderness, no suprapubic tenderness  NEURO: alert and oriented x 3, normal speech, PERRLA, EOMI, no focal motor or sensory deficits  MSK: no visible deformities, no peripheral edema, calf tenderness/redness/swelling  SKIN: no visible rashes, dry, well-perfused  PSYCH: appropriate mood and affect  Attending Coreen Lyons: Gen: NAD, heent: atrauamtic, eomi, perrla, mmm, op pink, uvula midline, neck; nttp, no nuchal rigidity, chest: mild chest wall ttp bilaterally no crepitus, cv: rrr, no murmurs, lungs: ctab, abd: soft, nontender, nondistended, no peritoneal signs,  no guarding, ext: wwp, neg homans, skin: no rash, neuro: awake and alert, following commands, speech clear, sensation and strength intact, no focal deficits

## 2023-04-02 NOTE — H&P ADULT - ASSESSMENT
53yo M w/ PMH of HTN, HLD, DM2, CAD s/p CABG on 12/19/22 pw CP w/ ECG c/f diffuse ST changes and rising trops though ttp on exam

## 2023-04-02 NOTE — H&P ADULT - HISTORY OF PRESENT ILLNESS
Pt is a 53yo M w/ PMH of HTN, HLD, DM2, CAD s/p CABG on 12/19/22 pw CP.     States he has been having progressively worsening CP for the past 10 days. Pain is located in the anterior chest b/l w/ radiation towards the L arm. Pain is intermittent occurring for brief periods throughout the day, rated as a 10/10 at its worst and worsened w/ movement and eating. He initially presented to his PMD one week ago but d/t persistent pain, he presented to the ED today. In the ED, pain is improved initially a 7/10 now "tolerable" stating the pain is worse w/ palpation of the chest and w/ eating, thus he's had decreased PO intake and Blancas after he eats.     He otherwise denies any F/C, HA, palpitations, abd pain, N/V, constipation or diarrhea. No orthopnea, PND or LE edema.     In the ED ECG c/f ischemia. He was administered ASA and Brilinta load and started on hep gtt for ACS protocol.

## 2023-04-02 NOTE — H&P ADULT - PROBLEM SELECTOR PLAN 1
- Although CP atypical w/ ttp on exam, c/f ACS given ECG changes and elevated troponin w/ CAD s/p recent CABG  - ROYAL not in concordant leads. ST depressions seen.   - s/p ASA, Brilinta load in the ED  - c/w ASA 81mg qd and Brilinta 90mg BID  - Start Heparin gtt for ACS protocol  - c/w Atorvastatin 40mg qhs   - c/w Lisinopril 2.5mg qd  - Start Torpol 200mg qd (was on metoprolol tartrate 100mg BID at home)  - f/u official TTE (POCUS w. trace effusion and anterior wall hypokinesis) to assess for WMA  - f/u lipid profile, and TSH in AM  - Monitor on tele for arrythmia  - Trend Rodger q6hrs  - Maintain K > 4, Mg > 2  - Maintain active T&S, Hgb > 8; will monitor for s/s of bleeding  - Cards following, f/u final recs

## 2023-04-02 NOTE — ED PROVIDER NOTE - PROGRESS NOTE DETAILS
Attending Coreen Lyons: cards at bedside Attending Coreen Lyons: ekg concerning for stemi, cath consult placed Attending Coreen Lyons: pt's pain improved, d/w cards fellow recommends heparin. , no brilinta or plavix at this time Attending Coreen Lyons: pt's pain improved, d/w cards fellow recommends heparin. , and brilinta Attending Coreen Lyons: pt with decrease in SBP, MAP currently 85 pt awake and alert following commands. denies any current chest pain. d/w ptr results of troponin. d;w cards fellow who is aware will continue to monitor. repeat ekg with st elevations

## 2023-04-02 NOTE — CONSULT NOTE ADULT - ASSESSMENT
52-year-old male with a past medical history of CAD status post CABG 3 months ago, HTN, DM 2, GERD presents to the ED with 1 week history of chest pain.  Patient states her chest pain has progressively worsened since onset.  Pain is localized over the anterior chest and radiates to the left arm. It occurs at rest and is worsened with eating and it is reproducible on chest palpation.  EKG with diffuse ST changes. Troponin elevated to 412 but lipase also elevated.     #NSTEMI  Patient with ST elevations but not in concordant leads. ST depressions seen. Chest pain is atypical and occurs with food consumption and is reproducible by palpations. It is possible that the patient is having ischemia due to failed graft.  - Patient loaded with ASA 325mg, continue with ASA 81mg daily  - give heparin bolus and gtt (use ACS order set)  - Obtain official TTE  - Continue atorvastatin 40mg   - Switch to metoprolol succinate 200mg daily (patient takes metoprolol tartrate 100mg BID at home)  - Continue lisinopril 2.5mg daily  - Check LFT, lipid profile, and TSH.  - Continue to monitor on telemetry  - trend Rodger q6  - trend CMP, Mg, phos, CBC  - maintain K > 4, Mg > 2  - maintain active T&S, Hgb > 8; will monitor for s/s of bleeding     Amira Sharma MD  Cardiology fellow   52-year-old male with a past medical history of CAD status post CABG 3 months ago, HTN, DM 2, GERD presents to the ED with 1 week history of chest pain.  Patient states her chest pain has progressively worsened since onset.  Pain is localized over the anterior chest and radiates to the left arm. It occurs at rest and is worsened with eating and it is reproducible on chest palpation.  EKG with diffuse ST changes. Troponin elevated to 412 but lipase also elevated.     #NSTEMI  Patient with ST elevations but not in concordant leads. ST depressions seen. Chest pain is atypical and occurs with food consumption and is reproducible by palpations. It is possible that the patient is having ischemia due to failed graft.  - Patient loaded with ASA 325mg, continue with ASA 81mg daily  - Load with brilinta 180mg and then continue with brilinta 90mg BID  - give heparin bolus and gtt (use ACS order set)  - Obtain official TTE  - Continue atorvastatin 40mg   - Switch to metoprolol succinate 200mg daily (patient takes metoprolol tartrate 100mg BID at home)  - Continue lisinopril 2.5mg daily  - Check LFT, lipid profile, and TSH.  - Continue to monitor on telemetry  - trend Rodger q6  - trend CMP, Mg, phos, CBC  - maintain K > 4, Mg > 2  - maintain active T&S, Hgb > 8; will monitor for s/s of bleeding     Amira Sharma MD  Cardiology fellow

## 2023-04-03 LAB
A1C WITH ESTIMATED AVERAGE GLUCOSE RESULT: 7 % — HIGH (ref 4–5.6)
ALBUMIN SERPL ELPH-MCNC: 4.2 G/DL — SIGNIFICANT CHANGE UP (ref 3.3–5)
ALP SERPL-CCNC: 64 U/L — SIGNIFICANT CHANGE UP (ref 40–120)
ALT FLD-CCNC: 14 U/L — SIGNIFICANT CHANGE UP (ref 10–45)
ANION GAP SERPL CALC-SCNC: 17 MMOL/L — SIGNIFICANT CHANGE UP (ref 5–17)
APTT BLD: 56.7 SEC — HIGH (ref 27.5–35.5)
APTT BLD: 66.8 SEC — HIGH (ref 27.5–35.5)
AST SERPL-CCNC: 30 U/L — SIGNIFICANT CHANGE UP (ref 10–40)
BILIRUB SERPL-MCNC: 0.6 MG/DL — SIGNIFICANT CHANGE UP (ref 0.2–1.2)
BUN SERPL-MCNC: 16 MG/DL — SIGNIFICANT CHANGE UP (ref 7–23)
CALCIUM SERPL-MCNC: 9.1 MG/DL — SIGNIFICANT CHANGE UP (ref 8.4–10.5)
CHLORIDE SERPL-SCNC: 98 MMOL/L — SIGNIFICANT CHANGE UP (ref 96–108)
CHOLEST SERPL-MCNC: 90 MG/DL — SIGNIFICANT CHANGE UP
CK MB BLD-MCNC: 10.4 % — HIGH (ref 0–3.5)
CK MB CFR SERPL CALC: 17 NG/ML — HIGH (ref 0–6.7)
CK SERPL-CCNC: 163 U/L — SIGNIFICANT CHANGE UP (ref 30–200)
CO2 SERPL-SCNC: 21 MMOL/L — LOW (ref 22–31)
CREAT SERPL-MCNC: 0.82 MG/DL — SIGNIFICANT CHANGE UP (ref 0.5–1.3)
EGFR: 106 ML/MIN/1.73M2 — SIGNIFICANT CHANGE UP
ESTIMATED AVERAGE GLUCOSE: 154 MG/DL — HIGH (ref 68–114)
GLUCOSE BLDC GLUCOMTR-MCNC: 118 MG/DL — HIGH (ref 70–99)
GLUCOSE BLDC GLUCOMTR-MCNC: 184 MG/DL — HIGH (ref 70–99)
GLUCOSE BLDC GLUCOMTR-MCNC: 192 MG/DL — HIGH (ref 70–99)
GLUCOSE BLDC GLUCOMTR-MCNC: 98 MG/DL — SIGNIFICANT CHANGE UP (ref 70–99)
GLUCOSE SERPL-MCNC: 122 MG/DL — HIGH (ref 70–99)
HCT VFR BLD CALC: 47.7 % — SIGNIFICANT CHANGE UP (ref 39–50)
HDLC SERPL-MCNC: 34 MG/DL — LOW
HGB BLD-MCNC: 15.4 G/DL — SIGNIFICANT CHANGE UP (ref 13–17)
LIPID PNL WITH DIRECT LDL SERPL: 31 MG/DL — SIGNIFICANT CHANGE UP
MAGNESIUM SERPL-MCNC: 1.9 MG/DL — SIGNIFICANT CHANGE UP (ref 1.6–2.6)
MCHC RBC-ENTMCNC: 26 PG — LOW (ref 27–34)
MCHC RBC-ENTMCNC: 32.3 GM/DL — SIGNIFICANT CHANGE UP (ref 32–36)
MCV RBC AUTO: 80.4 FL — SIGNIFICANT CHANGE UP (ref 80–100)
NON HDL CHOLESTEROL: 55 MG/DL — SIGNIFICANT CHANGE UP
NRBC # BLD: 0 /100 WBCS — SIGNIFICANT CHANGE UP (ref 0–0)
PHOSPHATE SERPL-MCNC: 4.2 MG/DL — SIGNIFICANT CHANGE UP (ref 2.5–4.5)
PLATELET # BLD AUTO: 247 K/UL — SIGNIFICANT CHANGE UP (ref 150–400)
POTASSIUM SERPL-MCNC: 3.7 MMOL/L — SIGNIFICANT CHANGE UP (ref 3.5–5.3)
POTASSIUM SERPL-SCNC: 3.7 MMOL/L — SIGNIFICANT CHANGE UP (ref 3.5–5.3)
PROT SERPL-MCNC: 7.6 G/DL — SIGNIFICANT CHANGE UP (ref 6–8.3)
RBC # BLD: 5.93 M/UL — HIGH (ref 4.2–5.8)
RBC # FLD: 13.2 % — SIGNIFICANT CHANGE UP (ref 10.3–14.5)
SODIUM SERPL-SCNC: 136 MMOL/L — SIGNIFICANT CHANGE UP (ref 135–145)
TRIGL SERPL-MCNC: 120 MG/DL — SIGNIFICANT CHANGE UP
TROPONIN T, HIGH SENSITIVITY RESULT: 465 NG/L — HIGH (ref 0–51)
TSH SERPL-MCNC: 1.03 UIU/ML — SIGNIFICANT CHANGE UP (ref 0.27–4.2)
WBC # BLD: 7.61 K/UL — SIGNIFICANT CHANGE UP (ref 3.8–10.5)
WBC # FLD AUTO: 7.61 K/UL — SIGNIFICANT CHANGE UP (ref 3.8–10.5)

## 2023-04-03 PROCEDURE — 93455 CORONARY ART/GRFT ANGIO S&I: CPT | Mod: 26

## 2023-04-03 PROCEDURE — 93306 TTE W/DOPPLER COMPLETE: CPT | Mod: 26

## 2023-04-03 PROCEDURE — 93567 NJX CAR CTH SPRVLV AORTGRPHY: CPT

## 2023-04-03 PROCEDURE — 99152 MOD SED SAME PHYS/QHP 5/>YRS: CPT

## 2023-04-03 PROCEDURE — 99223 1ST HOSP IP/OBS HIGH 75: CPT

## 2023-04-03 RX ORDER — HEPARIN SODIUM 5000 [USP'U]/ML
900 INJECTION INTRAVENOUS; SUBCUTANEOUS
Qty: 25000 | Refills: 0 | Status: DISCONTINUED | OUTPATIENT
Start: 2023-04-03 | End: 2023-04-03

## 2023-04-03 RX ADMIN — ATORVASTATIN CALCIUM 40 MILLIGRAM(S): 80 TABLET, FILM COATED ORAL at 21:36

## 2023-04-03 RX ADMIN — SENNA PLUS 2 TABLET(S): 8.6 TABLET ORAL at 21:36

## 2023-04-03 RX ADMIN — Medication 5 MILLIGRAM(S): at 01:01

## 2023-04-03 RX ADMIN — LISINOPRIL 2.5 MILLIGRAM(S): 2.5 TABLET ORAL at 05:46

## 2023-04-03 RX ADMIN — Medication 3 UNIT(S): at 17:24

## 2023-04-03 RX ADMIN — Medication 200 MILLIGRAM(S): at 05:45

## 2023-04-03 RX ADMIN — INSULIN GLARGINE 12 UNIT(S): 100 INJECTION, SOLUTION SUBCUTANEOUS at 22:26

## 2023-04-03 RX ADMIN — Medication 81 MILLIGRAM(S): at 09:42

## 2023-04-03 RX ADMIN — HEPARIN SODIUM 9 UNIT(S)/HR: 5000 INJECTION INTRAVENOUS; SUBCUTANEOUS at 09:44

## 2023-04-03 RX ADMIN — TAMSULOSIN HYDROCHLORIDE 0.4 MILLIGRAM(S): 0.4 CAPSULE ORAL at 21:35

## 2023-04-03 RX ADMIN — Medication 1: at 17:24

## 2023-04-03 RX ADMIN — HEPARIN SODIUM 9 UNIT(S)/HR: 5000 INJECTION INTRAVENOUS; SUBCUTANEOUS at 00:59

## 2023-04-03 RX ADMIN — TICAGRELOR 90 MILLIGRAM(S): 90 TABLET ORAL at 05:45

## 2023-04-03 RX ADMIN — PANTOPRAZOLE SODIUM 40 MILLIGRAM(S): 20 TABLET, DELAYED RELEASE ORAL at 05:46

## 2023-04-03 RX ADMIN — Medication 3 UNIT(S): at 08:17

## 2023-04-03 RX ADMIN — Medication 3 UNIT(S): at 13:16

## 2023-04-03 RX ADMIN — TICAGRELOR 90 MILLIGRAM(S): 90 TABLET ORAL at 17:10

## 2023-04-03 NOTE — PROGRESS NOTE ADULT - SUBJECTIVE AND OBJECTIVE BOX
Patient is a 52y old  Male who presents with a chief complaint of CP (02 Apr 2023 21:00)      INTERVAL HPI/OVERNIGHT EVENTS: s/p diagnostic cardiac cath   T(C): 36.9 (04-04-23 @ 04:04), Max: 36.9 (04-04-23 @ 04:04)  HR: 96 (04-04-23 @ 04:04) (96 - 111)  BP: 109/65 (04-04-23 @ 04:04) (105/81 - 126/86)  RR: 16 (04-04-23 @ 04:04) (14 - 18)  SpO2: 96% (04-04-23 @ 04:04) (95% - 99%)  Wt(kg): --  I&O's Summary      PAST MEDICAL & SURGICAL HISTORY:  Hypertension      Diabetes mellitus      GERD (gastroesophageal reflux disease)      No significant past surgical history      No significant past surgical history          SOCIAL HISTORY  Alcohol:  Tobacco:  Illicit substance use:    FAMILY HISTORY:    REVIEW OF SYSTEMS:  CONSTITUTIONAL: No fever, weight loss, or fatigue  EYES: No eye pain, visual disturbances, or discharge  ENMT:  No difficulty hearing, tinnitus, vertigo; No sinus or throat pain  NECK: No pain or stiffness  RESPIRATORY: No cough, wheezing, chills or hemoptysis; No shortness of breath  CARDIOVASCULAR: No chest pain, palpitations, dizziness, or leg swelling  GASTROINTESTINAL: No abdominal or epigastric pain. No nausea, vomiting, or hematemesis; No diarrhea or constipation. No melena or hematochezia.  GENITOURINARY: No dysuria, frequency, hematuria, or incontinence  NEUROLOGICAL: No headaches, memory loss, loss of strength, numbness, or tremors  SKIN: No itching, burning, rashes, or lesions   LYMPH NODES: No enlarged glands  ENDOCRINE: No heat or cold intolerance; No hair loss  MUSCULOSKELETAL: No joint pain or swelling; No muscle, back, or extremity pain  PSYCHIATRIC: No depression, anxiety, mood swings, or difficulty sleeping  HEME/LYMPH: No easy bruising, or bleeding gums  ALLERY AND IMMUNOLOGIC: No hives or eczema    RADIOLOGY & ADDITIONAL TESTS:    Imaging Personally Reviewed:  [ ] YES  [ ] NO    Consultant(s) Notes Reviewed:  [ ] YES  [ ] NO    PHYSICAL EXAM:  GENERAL: NAD, well-groomed, well-developed  HEAD:  Atraumatic, Normocephalic  EYES: EOMI, PERRLA, conjunctiva and sclera clear  ENMT: No tonsillar erythema, exudates, or enlargement; Moist mucous membranes, Good dentition, No lesions  NECK: Supple, No JVD, Normal thyroid  NERVOUS SYSTEM:  Alert & Oriented X3, Good concentration; Motor Strength 5/5 B/L upper and lower extremities; DTRs 2+ intact and symmetric  CHEST/LUNG: Clear to percussion bilaterally; No rales, rhonchi, wheezing, or rubs  HEART: Regular rate and rhythm; No murmurs, rubs, or gallops  ABDOMEN: Soft, Nontender, Nondistended; Bowel sounds present  EXTREMITIES:  2+ Peripheral Pulses, No clubbing, cyanosis, or edema  LYMPH: No lymphadenopathy noted  SKIN: No rashes or lesions    LABS:                        15.4   7.61  )-----------( 247      ( 03 Apr 2023 06:55 )             47.7     04-03    136  |  98  |  16  ----------------------------<  122<H>  3.7   |  21<L>  |  0.82    Ca    9.1      03 Apr 2023 06:55  Phos  4.2     04-03  Mg     1.9     04-03    TPro  7.6  /  Alb  4.2  /  TBili  0.6  /  DBili  x   /  AST  30  /  ALT  14  /  AlkPhos  64  04-03    PT/INR - ( 02 Apr 2023 22:48 )   PT: 14.6 sec;   INR: 1.27 ratio         PTT - ( 03 Apr 2023 06:55 )  PTT:66.8 sec    CAPILLARY BLOOD GLUCOSE      POCT Blood Glucose.: 192 mg/dL (03 Apr 2023 22:18)  POCT Blood Glucose.: 184 mg/dL (03 Apr 2023 16:58)  POCT Blood Glucose.: 118 mg/dL (03 Apr 2023 12:40)  POCT Blood Glucose.: 98 mg/dL (03 Apr 2023 08:02)            MEDICATIONS  (STANDING):  aspirin  chewable 81 milliGRAM(s) Oral daily  atorvastatin 40 milliGRAM(s) Oral at bedtime  dextrose 5%. 1000 milliLiter(s) (100 mL/Hr) IV Continuous <Continuous>  dextrose 5%. 1000 milliLiter(s) (50 mL/Hr) IV Continuous <Continuous>  dextrose 50% Injectable 25 Gram(s) IV Push once  dextrose 50% Injectable 12.5 Gram(s) IV Push once  dextrose 50% Injectable 25 Gram(s) IV Push once  glucagon  Injectable 1 milliGRAM(s) IntraMuscular once  insulin glargine Injectable (LANTUS) 12 Unit(s) SubCutaneous at bedtime  insulin lispro (ADMELOG) corrective regimen sliding scale   SubCutaneous three times a day before meals  insulin lispro (ADMELOG) corrective regimen sliding scale   SubCutaneous at bedtime  insulin lispro Injectable (ADMELOG) 3 Unit(s) SubCutaneous three times a day before meals  lisinopril 2.5 milliGRAM(s) Oral daily  metoprolol succinate  milliGRAM(s) Oral daily  pantoprazole    Tablet 40 milliGRAM(s) Oral before breakfast  senna 2 Tablet(s) Oral at bedtime  tamsulosin 0.4 milliGRAM(s) Oral at bedtime  ticagrelor 90 milliGRAM(s) Oral every 12 hours    MEDICATIONS  (PRN):  acetaminophen     Tablet .. 650 milliGRAM(s) Oral every 6 hours PRN Temp greater or equal to 38C (100.4F), Mild Pain (1 - 3)  artificial tears (preservative free) Ophthalmic Solution 1 Drop(s) Both EYES three times a day PRN Dry Eyes  dextrose Oral Gel 15 Gram(s) Oral once PRN Blood Glucose LESS THAN 70 milliGRAM(s)/deciliter  melatonin 5 milliGRAM(s) Oral at bedtime PRN Insomnia      Care Discussed with Consultants/Other Providers [ ] YES  [ ] NO

## 2023-04-03 NOTE — PROGRESS NOTE ADULT - ASSESSMENT
51yo M w/ PMH of HTN, HLD, DM2, CAD s/p CABG on 12/19/22 pw CP w/ ECG c/f diffuse ST changes and rising trops though ttp on exam      # Acute non-ST elevation myocardial infarction (NSTEMI).   - Although CP atypical w/ ttp on exam, c/f ACS given ECG changes and elevated troponin w/ CAD s/p recent CABG  - ROYAL not in concordant leads. ST depressions seen.   - s/p ASA, Brilinta load in the ED  - c/w ASA 81mg qd and Brilinta 90mg BID  - Start Heparin gtt for ACS protocol  - c/w Atorvastatin 40mg qhs   - c/w Lisinopril 2.5mg qd  -  Torpol 200mg qd   - f/u official TTE (POCUS w. trace effusion and anterior wall hypokinesis) to assess for WMA    s/p cardiac cath ( diagnostic) done today : shows significant progression of coronaries dis obstruction and occlusion of graft   f/u with cardiology for further medical Rx   off heparin GTT   c/w asa/brilinta     # Type 2 diabetes mellitus, with long-term current use of insulin.   -c/w lantus / FSBS with RICss  - Diabetes education.    # Hypertension.   - c/w home Lisinopril 2.5mg qd w/ hold parameters  - Toprol 200mg qd as above w/ hold parameters.    # CAD (coronary artery disease).    - ASA 81mg qd.  started on brilinta     dispo: significant coronary dis progression on cardiac cath , cardiology f/u and may need to optimize his drug regimen

## 2023-04-04 LAB
ANION GAP SERPL CALC-SCNC: 14 MMOL/L — SIGNIFICANT CHANGE UP (ref 5–17)
BUN SERPL-MCNC: 13 MG/DL — SIGNIFICANT CHANGE UP (ref 7–23)
CALCIUM SERPL-MCNC: 8.9 MG/DL — SIGNIFICANT CHANGE UP (ref 8.4–10.5)
CHLORIDE SERPL-SCNC: 102 MMOL/L — SIGNIFICANT CHANGE UP (ref 96–108)
CO2 SERPL-SCNC: 23 MMOL/L — SIGNIFICANT CHANGE UP (ref 22–31)
CREAT SERPL-MCNC: 0.86 MG/DL — SIGNIFICANT CHANGE UP (ref 0.5–1.3)
EGFR: 105 ML/MIN/1.73M2 — SIGNIFICANT CHANGE UP
GLUCOSE BLDC GLUCOMTR-MCNC: 130 MG/DL — HIGH (ref 70–99)
GLUCOSE BLDC GLUCOMTR-MCNC: 166 MG/DL — HIGH (ref 70–99)
GLUCOSE BLDC GLUCOMTR-MCNC: 167 MG/DL — HIGH (ref 70–99)
GLUCOSE BLDC GLUCOMTR-MCNC: 202 MG/DL — HIGH (ref 70–99)
GLUCOSE SERPL-MCNC: 111 MG/DL — HIGH (ref 70–99)
POTASSIUM SERPL-MCNC: 3.6 MMOL/L — SIGNIFICANT CHANGE UP (ref 3.5–5.3)
POTASSIUM SERPL-SCNC: 3.6 MMOL/L — SIGNIFICANT CHANGE UP (ref 3.5–5.3)
SODIUM SERPL-SCNC: 139 MMOL/L — SIGNIFICANT CHANGE UP (ref 135–145)

## 2023-04-04 PROCEDURE — 99233 SBSQ HOSP IP/OBS HIGH 50: CPT | Mod: GC

## 2023-04-04 RX ADMIN — ATORVASTATIN CALCIUM 40 MILLIGRAM(S): 80 TABLET, FILM COATED ORAL at 22:47

## 2023-04-04 RX ADMIN — TAMSULOSIN HYDROCHLORIDE 0.4 MILLIGRAM(S): 0.4 CAPSULE ORAL at 22:46

## 2023-04-04 RX ADMIN — Medication 81 MILLIGRAM(S): at 12:29

## 2023-04-04 RX ADMIN — LISINOPRIL 2.5 MILLIGRAM(S): 2.5 TABLET ORAL at 06:05

## 2023-04-04 RX ADMIN — SENNA PLUS 2 TABLET(S): 8.6 TABLET ORAL at 22:46

## 2023-04-04 RX ADMIN — Medication 200 MILLIGRAM(S): at 06:05

## 2023-04-04 RX ADMIN — INSULIN GLARGINE 12 UNIT(S): 100 INJECTION, SOLUTION SUBCUTANEOUS at 22:46

## 2023-04-04 RX ADMIN — Medication 3 UNIT(S): at 18:00

## 2023-04-04 RX ADMIN — Medication 3 UNIT(S): at 08:31

## 2023-04-04 RX ADMIN — Medication 1: at 12:29

## 2023-04-04 RX ADMIN — TICAGRELOR 90 MILLIGRAM(S): 90 TABLET ORAL at 06:05

## 2023-04-04 RX ADMIN — TICAGRELOR 90 MILLIGRAM(S): 90 TABLET ORAL at 17:59

## 2023-04-04 RX ADMIN — Medication 2: at 18:00

## 2023-04-04 RX ADMIN — PANTOPRAZOLE SODIUM 40 MILLIGRAM(S): 20 TABLET, DELAYED RELEASE ORAL at 06:05

## 2023-04-04 RX ADMIN — Medication 3 UNIT(S): at 12:30

## 2023-04-04 NOTE — PROGRESS NOTE ADULT - ASSESSMENT
52-year-old male with a past medical history of CAD status post CABG 3 months ago, HTN, DM 2, GERD presents to the ED with 1 week history of chest pain. s/p LHC finding early graft closure with progression of coronary disease in native valves.    #Multivessel CAD.  - Patient w/ hx of CAD s/p CABG in 12/2022 presented with recurrence of chest pain, found to have all grafts no longer functional and progression of his native coronary disease.  - c/w ASA/Brilinta.   - c/w high intensity statin.  - c/w metoprolol succinate 200mg daily  - c/w lisinopril 2.5mg daily  - Continue to monitor on telemetry  - Echo reviewed with echo attending and interventional cardiology attending: recommend acquiring cMR viability study to determine revascularization strategy.

## 2023-04-04 NOTE — PROGRESS NOTE ADULT - SUBJECTIVE AND OBJECTIVE BOX
Patient is a 52y old  Male who presents with a chief complaint of CP (04 Apr 2023 05:48)      INTERVAL HPI/OVERNIGHT EVENTS: seen and examined   T(C): 36.9 (04-04-23 @ 21:56), Max: 36.9 (04-04-23 @ 04:04)  HR: 93 (04-04-23 @ 21:56) (93 - 110)  BP: 110/80 (04-04-23 @ 21:56) (109/65 - 116/84)  RR: 18 (04-04-23 @ 21:56) (16 - 19)  SpO2: 99% (04-04-23 @ 21:56) (96% - 100%)  Wt(kg): --  I&O's Summary    04 Apr 2023 07:01  -  04 Apr 2023 23:34  --------------------------------------------------------  IN: 1110 mL / OUT: 0 mL / NET: 1110 mL        PAST MEDICAL & SURGICAL HISTORY:  Hypertension      Diabetes mellitus      GERD (gastroesophageal reflux disease)      No significant past surgical history      No significant past surgical history          SOCIAL HISTORY  Alcohol:  Tobacco:  Illicit substance use:    FAMILY HISTORY:    REVIEW OF SYSTEMS:  CONSTITUTIONAL: No fever, weight loss, or fatigue  EYES: No eye pain, visual disturbances, or discharge  ENMT:  No difficulty hearing, tinnitus, vertigo; No sinus or throat pain  NECK: No pain or stiffness  RESPIRATORY: No cough, wheezing, chills or hemoptysis; No shortness of breath  CARDIOVASCULAR: No chest pain, palpitations, dizziness, or leg swelling  GASTROINTESTINAL: No abdominal or epigastric pain. No nausea, vomiting, or hematemesis; No diarrhea or constipation. No melena or hematochezia.  GENITOURINARY: No dysuria, frequency, hematuria, or incontinence  NEUROLOGICAL: No headaches, memory loss, loss of strength, numbness, or tremors  SKIN: No itching, burning, rashes, or lesions   LYMPH NODES: No enlarged glands  ENDOCRINE: No heat or cold intolerance; No hair loss  MUSCULOSKELETAL: No joint pain or swelling; No muscle, back, or extremity pain  PSYCHIATRIC: No depression, anxiety, mood swings, or difficulty sleeping  HEME/LYMPH: No easy bruising, or bleeding gums  ALLERY AND IMMUNOLOGIC: No hives or eczema    RADIOLOGY & ADDITIONAL TESTS:    Imaging Personally Reviewed:  [ ] YES  [ ] NO    Consultant(s) Notes Reviewed:  [ ] YES  [ ] NO    PHYSICAL EXAM:  GENERAL: NAD, well-groomed, well-developed  HEAD:  Atraumatic, Normocephalic  EYES: EOMI, PERRLA, conjunctiva and sclera clear  ENMT: No tonsillar erythema, exudates, or enlargement; Moist mucous membranes, Good dentition, No lesions  NECK: Supple, No JVD, Normal thyroid  NERVOUS SYSTEM:  Alert & Oriented X3, Good concentration; Motor Strength 5/5 B/L upper and lower extremities; DTRs 2+ intact and symmetric  CHEST/LUNG: Clear to percussion bilaterally; No rales, rhonchi, wheezing, or rubs  HEART: Regular rate and rhythm; No murmurs, rubs, or gallops  ABDOMEN: Soft, Nontender, Nondistended; Bowel sounds present  EXTREMITIES:  2+ Peripheral Pulses, No clubbing, cyanosis, or edema  LYMPH: No lymphadenopathy noted  SKIN: No rashes or lesions    LABS:                        15.4   7.61  )-----------( 247      ( 03 Apr 2023 06:55 )             47.7     04-04    139  |  102  |  13  ----------------------------<  111<H>  3.6   |  23  |  0.86    Ca    8.9      04 Apr 2023 05:54  Phos  4.2     04-03  Mg     1.9     04-03    TPro  7.6  /  Alb  4.2  /  TBili  0.6  /  DBili  x   /  AST  30  /  ALT  14  /  AlkPhos  64  04-03    PTT - ( 03 Apr 2023 06:55 )  PTT:66.8 sec    CAPILLARY BLOOD GLUCOSE      POCT Blood Glucose.: 167 mg/dL (04 Apr 2023 22:14)  POCT Blood Glucose.: 202 mg/dL (04 Apr 2023 17:02)  POCT Blood Glucose.: 166 mg/dL (04 Apr 2023 12:28)  POCT Blood Glucose.: 130 mg/dL (04 Apr 2023 08:12)            MEDICATIONS  (STANDING):  aspirin  chewable 81 milliGRAM(s) Oral daily  atorvastatin 40 milliGRAM(s) Oral at bedtime  dextrose 5%. 1000 milliLiter(s) (100 mL/Hr) IV Continuous <Continuous>  dextrose 5%. 1000 milliLiter(s) (50 mL/Hr) IV Continuous <Continuous>  dextrose 50% Injectable 25 Gram(s) IV Push once  dextrose 50% Injectable 12.5 Gram(s) IV Push once  dextrose 50% Injectable 25 Gram(s) IV Push once  glucagon  Injectable 1 milliGRAM(s) IntraMuscular once  insulin glargine Injectable (LANTUS) 12 Unit(s) SubCutaneous at bedtime  insulin lispro (ADMELOG) corrective regimen sliding scale   SubCutaneous three times a day before meals  insulin lispro (ADMELOG) corrective regimen sliding scale   SubCutaneous at bedtime  insulin lispro Injectable (ADMELOG) 3 Unit(s) SubCutaneous three times a day before meals  lisinopril 2.5 milliGRAM(s) Oral daily  metoprolol succinate  milliGRAM(s) Oral daily  pantoprazole    Tablet 40 milliGRAM(s) Oral before breakfast  senna 2 Tablet(s) Oral at bedtime  tamsulosin 0.4 milliGRAM(s) Oral at bedtime  ticagrelor 90 milliGRAM(s) Oral every 12 hours    MEDICATIONS  (PRN):  acetaminophen     Tablet .. 650 milliGRAM(s) Oral every 6 hours PRN Temp greater or equal to 38C (100.4F), Mild Pain (1 - 3)  artificial tears (preservative free) Ophthalmic Solution 1 Drop(s) Both EYES three times a day PRN Dry Eyes  dextrose Oral Gel 15 Gram(s) Oral once PRN Blood Glucose LESS THAN 70 milliGRAM(s)/deciliter  melatonin 5 milliGRAM(s) Oral at bedtime PRN Insomnia      Care Discussed with Consultants/Other Providers [ ] YES  [ ] NO

## 2023-04-04 NOTE — PROGRESS NOTE ADULT - SUBJECTIVE AND OBJECTIVE BOX
Patient is a 52y old  Male who presents with a chief complaint of CP (03 Apr 2023 20:33)      SUBJECTIVE / OVERNIGHT EVENTS:    MEDICATIONS  (STANDING):  aspirin  chewable 81 milliGRAM(s) Oral daily  atorvastatin 40 milliGRAM(s) Oral at bedtime  dextrose 5%. 1000 milliLiter(s) (100 mL/Hr) IV Continuous <Continuous>  dextrose 5%. 1000 milliLiter(s) (50 mL/Hr) IV Continuous <Continuous>  dextrose 50% Injectable 25 Gram(s) IV Push once  dextrose 50% Injectable 12.5 Gram(s) IV Push once  dextrose 50% Injectable 25 Gram(s) IV Push once  glucagon  Injectable 1 milliGRAM(s) IntraMuscular once  insulin glargine Injectable (LANTUS) 12 Unit(s) SubCutaneous at bedtime  insulin lispro (ADMELOG) corrective regimen sliding scale   SubCutaneous three times a day before meals  insulin lispro (ADMELOG) corrective regimen sliding scale   SubCutaneous at bedtime  insulin lispro Injectable (ADMELOG) 3 Unit(s) SubCutaneous three times a day before meals  lisinopril 2.5 milliGRAM(s) Oral daily  metoprolol succinate  milliGRAM(s) Oral daily  pantoprazole    Tablet 40 milliGRAM(s) Oral before breakfast  senna 2 Tablet(s) Oral at bedtime  tamsulosin 0.4 milliGRAM(s) Oral at bedtime  ticagrelor 90 milliGRAM(s) Oral every 12 hours    MEDICATIONS  (PRN):  acetaminophen     Tablet .. 650 milliGRAM(s) Oral every 6 hours PRN Temp greater or equal to 38C (100.4F), Mild Pain (1 - 3)  artificial tears (preservative free) Ophthalmic Solution 1 Drop(s) Both EYES three times a day PRN Dry Eyes  dextrose Oral Gel 15 Gram(s) Oral once PRN Blood Glucose LESS THAN 70 milliGRAM(s)/deciliter  melatonin 5 milliGRAM(s) Oral at bedtime PRN Insomnia      T(C): 36.9 (04-04-23 @ 04:04), Max: 36.9 (04-04-23 @ 04:04)  HR: 96 (04-04-23 @ 04:04) (96 - 106)  BP: 109/65 (04-04-23 @ 04:04) (105/81 - 126/86)  RR: 16 (04-04-23 @ 04:04) (14 - 18)  SpO2: 96% (04-04-23 @ 04:04) (95% - 99%)  CAPILLARY BLOOD GLUCOSE      POCT Blood Glucose.: 192 mg/dL (03 Apr 2023 22:18)  POCT Blood Glucose.: 184 mg/dL (03 Apr 2023 16:58)  POCT Blood Glucose.: 118 mg/dL (03 Apr 2023 12:40)  POCT Blood Glucose.: 98 mg/dL (03 Apr 2023 08:02)    I&O's Summary      PHYSICAL EXAM:  PHYSICAL EXAM:    Constitutional: NAD. well-developed; well-groomed; well-nourished;  HEENT: AT/NC, PERRLA; EOMI, MMM, no oropharyngeal lesions, no erythema, no exudates, Supple neck; normal thyroid gland, no cervical lymphadenopathy  Respiratory: CTAB. equal aeration bilaterally. no wheezing, no crackes, no rhonchi. no increase in WOB  Cardiovascular: RRR, no M/R/G. 2+ distal pulses. Cap refill < 2 seconds. no JVD  Gastrointestinal: soft; NT/ND, +BS, no rebounding tenderness / guarding / HSM / mass / ascites.  Genitourinary: not examined.  Extremities: no clubbing; no cyanosis; no pedal edema, non-tender to palpation, DP and Radial pulses intact.  Skin: warm and dry; color normal: no rash: no ulcers  Neurological: A&Ox 3; responds to pain; responds to verbal commands; epicritic and protopathic sensation intact: CN nerves grossly intact.   MSK/Back: no deformities. Active and passive ROM intact; strength intact, no CVA tenderness, No joint tenderness, swelling, erythema  Psychiatric: normal mood/affect. Denies SI/HI    LABS:                        15.4   7.61  )-----------( 247      ( 03 Apr 2023 06:55 )             47.7     04-03    136  |  98  |  16  ----------------------------<  122<H>  3.7   |  21<L>  |  0.82    Ca    9.1      03 Apr 2023 06:55  Phos  4.2     04-03  Mg     1.9     04-03    TPro  7.6  /  Alb  4.2  /  TBili  0.6  /  DBili  x   /  AST  30  /  ALT  14  /  AlkPhos  64  04-03    PT/INR - ( 02 Apr 2023 22:48 )   PT: 14.6 sec;   INR: 1.27 ratio         PTT - ( 03 Apr 2023 06:55 )  PTT:66.8 sec  CARDIAC MARKERS ( 03 Apr 2023 06:55 )  x     / x     / 163 U/L / x     / 17.0 ng/mL  CARDIAC MARKERS ( 02 Apr 2023 22:48 )  x     / x     / 181 U/L / x     / 20.2 ng/mL  CARDIAC MARKERS ( 02 Apr 2023 14:31 )  x     / x     / 190 U/L / x     / 20.3 ng/mL          RADIOLOGY & ADDITIONAL TESTS:    Imaging Personally Reviewed: EL    Consultant(s) Notes Reviewed:  EL    Care Discussed with Consultants/Other Providers: EL   Patient is a 52y old  Male who presents with a chief complaint of CP (03 Apr 2023 20:33)      SUBJECTIVE / OVERNIGHT EVENTS:  No acute events overnight  Patient denies CP, palpitation, SOB, visual disturbance, dizziness, lightheadedness.  R groin site benign.    Tele: SR     CONSTITUTIONAL: No fever, weight loss, or fatigue  EYES: No eye pain, visual disturbances, or discharge  ENMT:  No difficulty hearing, tinnitus, vertigo; No sinus or throat pain  RESPIRATORY: No cough, wheezing, chills or hemoptysis; No shortness of breath  CARDIOVASCULAR: No chest pain, palpitations, dizziness, or leg swelling  GASTROINTESTINAL: No abdominal or epigastric pain. No nausea, vomiting, or hematemesis; No diarrhea or constipation. No melena or hematochezia.  GENITOURINARY: No dysuria, frequency, hematuria, or incontinence  NEUROLOGICAL: No headaches, loss of strength, numbness, or tremors  SKIN: No itching, burning, rashes, or lesions   ENDOCRINE: No heat or cold intolerance; No polydipsia or polyuria  MUSCULOSKELETAL: No joint pain or swelling;   HEME/LYMPH: No easy bruising, or bleeding gums  ALLERGY AND IMMUNOLOGIC: No hives or eczema    MEDICATIONS  (STANDING):  aspirin  chewable 81 milliGRAM(s) Oral daily  atorvastatin 40 milliGRAM(s) Oral at bedtime  dextrose 5%. 1000 milliLiter(s) (100 mL/Hr) IV Continuous <Continuous>  dextrose 5%. 1000 milliLiter(s) (50 mL/Hr) IV Continuous <Continuous>  dextrose 50% Injectable 25 Gram(s) IV Push once  dextrose 50% Injectable 12.5 Gram(s) IV Push once  dextrose 50% Injectable 25 Gram(s) IV Push once  glucagon  Injectable 1 milliGRAM(s) IntraMuscular once  insulin glargine Injectable (LANTUS) 12 Unit(s) SubCutaneous at bedtime  insulin lispro (ADMELOG) corrective regimen sliding scale   SubCutaneous three times a day before meals  insulin lispro (ADMELOG) corrective regimen sliding scale   SubCutaneous at bedtime  insulin lispro Injectable (ADMELOG) 3 Unit(s) SubCutaneous three times a day before meals  lisinopril 2.5 milliGRAM(s) Oral daily  metoprolol succinate  milliGRAM(s) Oral daily  pantoprazole    Tablet 40 milliGRAM(s) Oral before breakfast  senna 2 Tablet(s) Oral at bedtime  tamsulosin 0.4 milliGRAM(s) Oral at bedtime  ticagrelor 90 milliGRAM(s) Oral every 12 hours    MEDICATIONS  (PRN):  acetaminophen     Tablet .. 650 milliGRAM(s) Oral every 6 hours PRN Temp greater or equal to 38C (100.4F), Mild Pain (1 - 3)  artificial tears (preservative free) Ophthalmic Solution 1 Drop(s) Both EYES three times a day PRN Dry Eyes  dextrose Oral Gel 15 Gram(s) Oral once PRN Blood Glucose LESS THAN 70 milliGRAM(s)/deciliter  melatonin 5 milliGRAM(s) Oral at bedtime PRN Insomnia      T(C): 36.9 (04-04-23 @ 04:04), Max: 36.9 (04-04-23 @ 04:04)  HR: 96 (04-04-23 @ 04:04) (96 - 106)  BP: 109/65 (04-04-23 @ 04:04) (105/81 - 126/86)  RR: 16 (04-04-23 @ 04:04) (14 - 18)  SpO2: 96% (04-04-23 @ 04:04) (95% - 99%)  CAPILLARY BLOOD GLUCOSE      POCT Blood Glucose.: 192 mg/dL (03 Apr 2023 22:18)  POCT Blood Glucose.: 184 mg/dL (03 Apr 2023 16:58)  POCT Blood Glucose.: 118 mg/dL (03 Apr 2023 12:40)  POCT Blood Glucose.: 98 mg/dL (03 Apr 2023 08:02)    I&O's Summary      PHYSICAL EXAM:  Constitutional: NAD.   HEENT: AT/NC, EOMI, Supple neck;  Respiratory: no wheezing or crackles. no increase in WOB  Cardiovascular: tachycardic, S1, S2, no M/R/G. 2+ distal pulses. no JVD, no LE edema.  Procedure site: R groin c/d/i, no hematoma, no hemorrhage. no skin changes. distal pulse intact. distal motor and sensory intact.  Gastrointestinal: soft; NT/ND, +BS  Extremities: no cyanosis; non-tender to palpation, DP and Radial pulses intact.  Neurological: A&Ox 3;  Psychiatric: normal mood/affect.      LABS:                        15.4   7.61  )-----------( 247      ( 03 Apr 2023 06:55 )             47.7     04-03    136  |  98  |  16  ----------------------------<  122<H>  3.7   |  21<L>  |  0.82    Ca    9.1      03 Apr 2023 06:55  Phos  4.2     04-03  Mg     1.9     04-03    TPro  7.6  /  Alb  4.2  /  TBili  0.6  /  DBili  x   /  AST  30  /  ALT  14  /  AlkPhos  64  04-03    PT/INR - ( 02 Apr 2023 22:48 )   PT: 14.6 sec;   INR: 1.27 ratio         PTT - ( 03 Apr 2023 06:55 )  PTT:66.8 sec  CARDIAC MARKERS ( 03 Apr 2023 06:55 )  x     / x     / 163 U/L / x     / 17.0 ng/mL  CARDIAC MARKERS ( 02 Apr 2023 22:48 )  x     / x     / 181 U/L / x     / 20.2 ng/mL  CARDIAC MARKERS ( 02 Apr 2023 14:31 )  x     / x     / 190 U/L / x     / 20.3 ng/mL          RADIOLOGY & ADDITIONAL TESTS:    Imaging Personally Reviewed: EL    Consultant(s) Notes Reviewed:  EL    Care Discussed with Consultants/Other Providers: EL

## 2023-04-04 NOTE — PROGRESS NOTE ADULT - ASSESSMENT
53yo M w/ PMH of HTN, HLD, DM2, CAD s/p CABG on 12/19/22 pw CP w/ ECG c/f diffuse ST changes and rising trops though ttp on exam      # Acute non-ST elevation myocardial infarction (NSTEMI).   - Although CP atypical w/ ttp on exam, c/f ACS given ECG changes and elevated troponin w/ CAD s/p recent CABG  - ROYAL not in concordant leads. ST depressions seen.   - s/p ASA, Brilinta load in the ED  - c/w ASA 81mg qd and Brilinta 90mg BID  - Start Heparin gtt for ACS protocol  - c/w Atorvastatin 40mg qhs   - c/w Lisinopril 2.5mg qd  -  Torpol 200mg qd   - f/u official TTE (POCUS w. trace effusion and anterior wall hypokinesis) to assess for WMA    s/p cardiac cath ( diagnostic) done today : shows significant progression of coronaries dis obstruction and occlusion of graft   f/u with cardiology for further medical Rx   off heparin GTT   c/w asa/brilinta     # Type 2 diabetes mellitus, with long-term current use of insulin.   -c/w lantus / FSBS with RICss  - Diabetes education.    # Hypertension.   - c/w home Lisinopril 2.5mg qd w/ hold parameters  - Toprol 200mg qd as above w/ hold parameters.    # CAD (coronary artery disease).    - ASA 81mg qd.  started on brilinta     dispo: cardiology following , c/w present rx ,

## 2023-04-05 LAB
GLUCOSE BLDC GLUCOMTR-MCNC: 108 MG/DL — HIGH (ref 70–99)
GLUCOSE BLDC GLUCOMTR-MCNC: 175 MG/DL — HIGH (ref 70–99)
GLUCOSE BLDC GLUCOMTR-MCNC: 179 MG/DL — HIGH (ref 70–99)
GLUCOSE BLDC GLUCOMTR-MCNC: 260 MG/DL — HIGH (ref 70–99)

## 2023-04-05 PROCEDURE — 99233 SBSQ HOSP IP/OBS HIGH 50: CPT | Mod: GC

## 2023-04-05 PROCEDURE — 75561 CARDIAC MRI FOR MORPH W/DYE: CPT | Mod: 26

## 2023-04-05 RX ADMIN — LISINOPRIL 2.5 MILLIGRAM(S): 2.5 TABLET ORAL at 05:23

## 2023-04-05 RX ADMIN — Medication 3 UNIT(S): at 12:33

## 2023-04-05 RX ADMIN — TICAGRELOR 90 MILLIGRAM(S): 90 TABLET ORAL at 05:22

## 2023-04-05 RX ADMIN — Medication 3: at 12:33

## 2023-04-05 RX ADMIN — TICAGRELOR 90 MILLIGRAM(S): 90 TABLET ORAL at 17:16

## 2023-04-05 RX ADMIN — INSULIN GLARGINE 12 UNIT(S): 100 INJECTION, SOLUTION SUBCUTANEOUS at 21:42

## 2023-04-05 RX ADMIN — PANTOPRAZOLE SODIUM 40 MILLIGRAM(S): 20 TABLET, DELAYED RELEASE ORAL at 05:22

## 2023-04-05 RX ADMIN — TAMSULOSIN HYDROCHLORIDE 0.4 MILLIGRAM(S): 0.4 CAPSULE ORAL at 21:42

## 2023-04-05 RX ADMIN — Medication 200 MILLIGRAM(S): at 05:22

## 2023-04-05 RX ADMIN — ATORVASTATIN CALCIUM 40 MILLIGRAM(S): 80 TABLET, FILM COATED ORAL at 21:42

## 2023-04-05 RX ADMIN — Medication 81 MILLIGRAM(S): at 11:46

## 2023-04-05 RX ADMIN — Medication 1: at 17:15

## 2023-04-05 RX ADMIN — Medication 3 UNIT(S): at 17:14

## 2023-04-05 NOTE — PROGRESS NOTE ADULT - SUBJECTIVE AND OBJECTIVE BOX
Patient is a 52y old  Male who presents with a chief complaint of CP (04 Apr 2023 18:33)      SUBJECTIVE / OVERNIGHT EVENTS:    MEDICATIONS  (STANDING):  aspirin  chewable 81 milliGRAM(s) Oral daily  atorvastatin 40 milliGRAM(s) Oral at bedtime  dextrose 5%. 1000 milliLiter(s) (100 mL/Hr) IV Continuous <Continuous>  dextrose 5%. 1000 milliLiter(s) (50 mL/Hr) IV Continuous <Continuous>  dextrose 50% Injectable 25 Gram(s) IV Push once  dextrose 50% Injectable 12.5 Gram(s) IV Push once  dextrose 50% Injectable 25 Gram(s) IV Push once  glucagon  Injectable 1 milliGRAM(s) IntraMuscular once  insulin glargine Injectable (LANTUS) 12 Unit(s) SubCutaneous at bedtime  insulin lispro (ADMELOG) corrective regimen sliding scale   SubCutaneous three times a day before meals  insulin lispro (ADMELOG) corrective regimen sliding scale   SubCutaneous at bedtime  insulin lispro Injectable (ADMELOG) 3 Unit(s) SubCutaneous three times a day before meals  lisinopril 2.5 milliGRAM(s) Oral daily  metoprolol succinate  milliGRAM(s) Oral daily  pantoprazole    Tablet 40 milliGRAM(s) Oral before breakfast  senna 2 Tablet(s) Oral at bedtime  tamsulosin 0.4 milliGRAM(s) Oral at bedtime  ticagrelor 90 milliGRAM(s) Oral every 12 hours    MEDICATIONS  (PRN):  acetaminophen     Tablet .. 650 milliGRAM(s) Oral every 6 hours PRN Temp greater or equal to 38C (100.4F), Mild Pain (1 - 3)  artificial tears (preservative free) Ophthalmic Solution 1 Drop(s) Both EYES three times a day PRN Dry Eyes  dextrose Oral Gel 15 Gram(s) Oral once PRN Blood Glucose LESS THAN 70 milliGRAM(s)/deciliter  melatonin 5 milliGRAM(s) Oral at bedtime PRN Insomnia      T(C): 36.9 (04-05-23 @ 05:01), Max: 36.9 (04-04-23 @ 21:56)  HR: 100 (04-05-23 @ 05:01) (93 - 100)  BP: 110/77 (04-05-23 @ 05:01) (110/77 - 116/84)  RR: 18 (04-05-23 @ 05:01) (18 - 19)  SpO2: 97% (04-05-23 @ 05:01) (97% - 100%)  CAPILLARY BLOOD GLUCOSE      POCT Blood Glucose.: 108 mg/dL (05 Apr 2023 08:21)  POCT Blood Glucose.: 167 mg/dL (04 Apr 2023 22:14)  POCT Blood Glucose.: 202 mg/dL (04 Apr 2023 17:02)  POCT Blood Glucose.: 166 mg/dL (04 Apr 2023 12:28)    I&O's Summary    04 Apr 2023 07:01  -  05 Apr 2023 07:00  --------------------------------------------------------  IN: 1110 mL / OUT: 0 mL / NET: 1110 mL        PHYSICAL EXAM:  PHYSICAL EXAM:    Constitutional: NAD. well-developed; well-groomed; well-nourished;  HEENT: AT/NC, PERRLA; EOMI, MMM, no oropharyngeal lesions, no erythema, no exudates, Supple neck; normal thyroid gland, no cervical lymphadenopathy  Respiratory: CTAB. equal aeration bilaterally. no wheezing, no crackes, no rhonchi. no increase in WOB  Cardiovascular: RRR, no M/R/G. 2+ distal pulses. Cap refill < 2 seconds. no JVD  Gastrointestinal: soft; NT/ND, +BS, no rebounding tenderness / guarding / HSM / mass / ascites.  Genitourinary: not examined.  Extremities: no clubbing; no cyanosis; no pedal edema, non-tender to palpation, DP and Radial pulses intact.  Skin: warm and dry; color normal: no rash: no ulcers  Neurological: A&Ox 3; responds to pain; responds to verbal commands; epicritic and protopathic sensation intact: CN nerves grossly intact.   MSK/Back: no deformities. Active and passive ROM intact; strength intact, no CVA tenderness, No joint tenderness, swelling, erythema  Psychiatric: normal mood/affect. Denies SI/HI    LABS:    04-04    139  |  102  |  13  ----------------------------<  111<H>  3.6   |  23  |  0.86    Ca    8.9      04 Apr 2023 05:54                RADIOLOGY & ADDITIONAL TESTS:    Imaging Personally Reviewed: EL    Consultant(s) Notes Reviewed:  EL    Care Discussed with Consultants/Other Providers: EL   Patient is a 52y old  Male who presents with a chief complaint of CP (04 Apr 2023 18:33)      SUBJECTIVE / OVERNIGHT EVENTS:  No acute events overnight  Patient denies CP, palpitation, SOB, visual disturbance, dizziness, lightheadedness.  R groin site benign.    Tele: SR       CONSTITUTIONAL: No fever, weight loss, or fatigue  EYES: No eye pain, visual disturbances, or discharge  ENMT:  No difficulty hearing, tinnitus, vertigo; No sinus or throat pain  RESPIRATORY: No cough, wheezing, chills or hemoptysis; No shortness of breath  CARDIOVASCULAR: No chest pain, palpitations, dizziness, or leg swelling  GASTROINTESTINAL: No abdominal or epigastric pain. No nausea, vomiting, or hematemesis; No diarrhea or constipation. No melena or hematochezia.  GENITOURINARY: No dysuria, frequency, hematuria, or incontinence  NEUROLOGICAL: No headaches, loss of strength, numbness, or tremors  SKIN: No itching, burning, rashes, or lesions   ENDOCRINE: No heat or cold intolerance; No polydipsia or polyuria  MUSCULOSKELETAL: No joint pain or swelling;   HEME/LYMPH: No easy bruising, or bleeding gums  ALLERGY AND IMMUNOLOGIC: No hives or eczema    MEDICATIONS  (STANDING):  aspirin  chewable 81 milliGRAM(s) Oral daily  atorvastatin 40 milliGRAM(s) Oral at bedtime  dextrose 5%. 1000 milliLiter(s) (100 mL/Hr) IV Continuous <Continuous>  dextrose 5%. 1000 milliLiter(s) (50 mL/Hr) IV Continuous <Continuous>  dextrose 50% Injectable 25 Gram(s) IV Push once  dextrose 50% Injectable 12.5 Gram(s) IV Push once  dextrose 50% Injectable 25 Gram(s) IV Push once  glucagon  Injectable 1 milliGRAM(s) IntraMuscular once  insulin glargine Injectable (LANTUS) 12 Unit(s) SubCutaneous at bedtime  insulin lispro (ADMELOG) corrective regimen sliding scale   SubCutaneous three times a day before meals  insulin lispro (ADMELOG) corrective regimen sliding scale   SubCutaneous at bedtime  insulin lispro Injectable (ADMELOG) 3 Unit(s) SubCutaneous three times a day before meals  lisinopril 2.5 milliGRAM(s) Oral daily  metoprolol succinate  milliGRAM(s) Oral daily  pantoprazole    Tablet 40 milliGRAM(s) Oral before breakfast  senna 2 Tablet(s) Oral at bedtime  tamsulosin 0.4 milliGRAM(s) Oral at bedtime  ticagrelor 90 milliGRAM(s) Oral every 12 hours    MEDICATIONS  (PRN):  acetaminophen     Tablet .. 650 milliGRAM(s) Oral every 6 hours PRN Temp greater or equal to 38C (100.4F), Mild Pain (1 - 3)  artificial tears (preservative free) Ophthalmic Solution 1 Drop(s) Both EYES three times a day PRN Dry Eyes  dextrose Oral Gel 15 Gram(s) Oral once PRN Blood Glucose LESS THAN 70 milliGRAM(s)/deciliter  melatonin 5 milliGRAM(s) Oral at bedtime PRN Insomnia      T(C): 36.9 (04-05-23 @ 05:01), Max: 36.9 (04-04-23 @ 21:56)  HR: 100 (04-05-23 @ 05:01) (93 - 100)  BP: 110/77 (04-05-23 @ 05:01) (110/77 - 116/84)  RR: 18 (04-05-23 @ 05:01) (18 - 19)  SpO2: 97% (04-05-23 @ 05:01) (97% - 100%)  CAPILLARY BLOOD GLUCOSE      POCT Blood Glucose.: 108 mg/dL (05 Apr 2023 08:21)  POCT Blood Glucose.: 167 mg/dL (04 Apr 2023 22:14)  POCT Blood Glucose.: 202 mg/dL (04 Apr 2023 17:02)  POCT Blood Glucose.: 166 mg/dL (04 Apr 2023 12:28)    I&O's Summary    04 Apr 2023 07:01  -  05 Apr 2023 07:00  --------------------------------------------------------  IN: 1110 mL / OUT: 0 mL / NET: 1110 mL        PHYSICAL EXAM:  Constitutional: NAD.   HEENT: AT/NC, EOMI, Supple neck;  Respiratory: no wheezing or crackles. no increase in WOB  Cardiovascular: tachycardic, S1, S2, no M/R/G. 2+ distal pulses. no JVD, no LE edema.  Procedure site: R groin c/d/i, no hematoma, no hemorrhage. no skin changes. distal pulse intact. distal motor and sensory intact.  Gastrointestinal: soft; NT/ND, +BS  Extremities: no cyanosis; non-tender to palpation, DP and Radial pulses intact.  Neurological: A&Ox 3;  Psychiatric: normal mood/affect.      LABS:    04-04    139  |  102  |  13  ----------------------------<  111<H>  3.6   |  23  |  0.86    Ca    8.9      04 Apr 2023 05:54                RADIOLOGY & ADDITIONAL TESTS:    Imaging Personally Reviewed: EL    Consultant(s) Notes Reviewed:  EL    Care Discussed with Consultants/Other Providers: EL

## 2023-04-05 NOTE — PROGRESS NOTE ADULT - ASSESSMENT
53yo M w/ PMH of HTN, HLD, DM2, CAD s/p CABG on 12/19/22 pw CP w/ ECG c/f diffuse ST changes and rising trops though ttp on exam      # Acute non-ST elevation myocardial infarction (NSTEMI).   - s/p ASA, Brilinta load in the ED  - c/w ASA 81mg qd and Brilinta 90mg BID  - c/w Atorvastatin 40mg qhs   - c/w Lisinopril 2.5mg qd  -  Torpol 200mg qd   - f/u official TTE (POCUS w. trace effusion and anterior wall hypokinesis) to assess for WMA    s/p cardiac cath ( diagnostic) done today : shows significant progression of coronaries dis obstruction and occlusion of graft   f/u with cardiology for further medical Rx   off heparin GTT   c/w asa/brilinta     # Type 2 diabetes mellitus, with long-term current use of insulin.   -c/w lantus / FSBS with RICss  - Diabetes education.    # Hypertension.   - c/w home Lisinopril 2.5mg qd w/ hold parameters  - Toprol 200mg qd as above w/ hold parameters.    # CAD (coronary artery disease).    - ASA 81mg qd.  started on brilinta     dispo: today cardiac MRI done , pt. is CP free. will plan for d/c once cleared by cardio

## 2023-04-05 NOTE — PROGRESS NOTE ADULT - SUBJECTIVE AND OBJECTIVE BOX
Patient is a 52y old  Male who presents with a chief complaint of CP (05 Apr 2023 08:40)      INTERVAL HPI/OVERNIGHT EVENTS: seen and examined , denies any CP   to day cardiac MRI done     T(C): 36.8 (04-05-23 @ 20:39), Max: 36.9 (04-05-23 @ 05:01)  HR: 94 (04-05-23 @ 20:39) (94 - 100)  BP: 101/72 (04-05-23 @ 20:39) (101/72 - 110/77)  RR: 18 (04-05-23 @ 20:39) (18 - 18)  SpO2: 98% (04-05-23 @ 20:39) (97% - 98%)  Wt(kg): --  I&O's Summary    04 Apr 2023 07:01  -  05 Apr 2023 07:00  --------------------------------------------------------  IN: 1110 mL / OUT: 0 mL / NET: 1110 mL    05 Apr 2023 07:01  -  05 Apr 2023 23:45  --------------------------------------------------------  IN: 1450 mL / OUT: 0 mL / NET: 1450 mL        PAST MEDICAL & SURGICAL HISTORY:  Hypertension      Diabetes mellitus      GERD (gastroesophageal reflux disease)      No significant past surgical history      No significant past surgical history          SOCIAL HISTORY  Alcohol:  Tobacco:  Illicit substance use:    FAMILY HISTORY:    REVIEW OF SYSTEMS:  CONSTITUTIONAL: No fever, weight loss, or fatigue  EYES: No eye pain, visual disturbances, or discharge  ENMT:  No difficulty hearing, tinnitus, vertigo; No sinus or throat pain  NECK: No pain or stiffness  RESPIRATORY: No cough, wheezing, chills or hemoptysis; No shortness of breath  CARDIOVASCULAR: No chest pain, palpitations, dizziness, or leg swelling  GASTROINTESTINAL: No abdominal or epigastric pain. No nausea, vomiting, or hematemesis; No diarrhea or constipation. No melena or hematochezia.  GENITOURINARY: No dysuria, frequency, hematuria, or incontinence  NEUROLOGICAL: No headaches, memory loss, loss of strength, numbness, or tremors  SKIN: No itching, burning, rashes, or lesions   LYMPH NODES: No enlarged glands  ENDOCRINE: No heat or cold intolerance; No hair loss  MUSCULOSKELETAL: No joint pain or swelling; No muscle, back, or extremity pain  PSYCHIATRIC: No depression, anxiety, mood swings, or difficulty sleeping  HEME/LYMPH: No easy bruising, or bleeding gums  ALLERY AND IMMUNOLOGIC: No hives or eczema    RADIOLOGY & ADDITIONAL TESTS:    Imaging Personally Reviewed:  [ ] YES  [ ] NO    Consultant(s) Notes Reviewed:  [ ] YES  [ ] NO    PHYSICAL EXAM:  GENERAL: NAD, well-groomed, well-developed  HEAD:  Atraumatic, Normocephalic  EYES: EOMI, PERRLA, conjunctiva and sclera clear  ENMT: No tonsillar erythema, exudates, or enlargement; Moist mucous membranes, Good dentition, No lesions  NECK: Supple, No JVD, Normal thyroid  NERVOUS SYSTEM:  Alert & Oriented X3, Good concentration; Motor Strength 5/5 B/L upper and lower extremities; DTRs 2+ intact and symmetric  CHEST/LUNG: Clear to percussion bilaterally; No rales, rhonchi, wheezing, or rubs  HEART: Regular rate and rhythm; No murmurs, rubs, or gallops  ABDOMEN: Soft, Nontender, Nondistended; Bowel sounds present  EXTREMITIES:  2+ Peripheral Pulses, No clubbing, cyanosis, or edema  LYMPH: No lymphadenopathy noted  SKIN: No rashes or lesions    LABS:    04-04    139  |  102  |  13  ----------------------------<  111<H>  3.6   |  23  |  0.86    Ca    8.9      04 Apr 2023 05:54          CAPILLARY BLOOD GLUCOSE      POCT Blood Glucose.: 179 mg/dL (05 Apr 2023 21:39)  POCT Blood Glucose.: 175 mg/dL (05 Apr 2023 16:43)  POCT Blood Glucose.: 260 mg/dL (05 Apr 2023 12:25)  POCT Blood Glucose.: 108 mg/dL (05 Apr 2023 08:21)            MEDICATIONS  (STANDING):  aspirin  chewable 81 milliGRAM(s) Oral daily  atorvastatin 40 milliGRAM(s) Oral at bedtime  dextrose 5%. 1000 milliLiter(s) (100 mL/Hr) IV Continuous <Continuous>  dextrose 5%. 1000 milliLiter(s) (50 mL/Hr) IV Continuous <Continuous>  dextrose 50% Injectable 25 Gram(s) IV Push once  dextrose 50% Injectable 12.5 Gram(s) IV Push once  dextrose 50% Injectable 25 Gram(s) IV Push once  glucagon  Injectable 1 milliGRAM(s) IntraMuscular once  insulin glargine Injectable (LANTUS) 12 Unit(s) SubCutaneous at bedtime  insulin lispro (ADMELOG) corrective regimen sliding scale   SubCutaneous three times a day before meals  insulin lispro (ADMELOG) corrective regimen sliding scale   SubCutaneous at bedtime  insulin lispro Injectable (ADMELOG) 3 Unit(s) SubCutaneous three times a day before meals  lisinopril 2.5 milliGRAM(s) Oral daily  metoprolol succinate  milliGRAM(s) Oral daily  pantoprazole    Tablet 40 milliGRAM(s) Oral before breakfast  senna 2 Tablet(s) Oral at bedtime  tamsulosin 0.4 milliGRAM(s) Oral at bedtime  ticagrelor 90 milliGRAM(s) Oral every 12 hours    MEDICATIONS  (PRN):  acetaminophen     Tablet .. 650 milliGRAM(s) Oral every 6 hours PRN Temp greater or equal to 38C (100.4F), Mild Pain (1 - 3)  artificial tears (preservative free) Ophthalmic Solution 1 Drop(s) Both EYES three times a day PRN Dry Eyes  dextrose Oral Gel 15 Gram(s) Oral once PRN Blood Glucose LESS THAN 70 milliGRAM(s)/deciliter  melatonin 5 milliGRAM(s) Oral at bedtime PRN Insomnia      Care Discussed with Consultants/Other Providers [ ] YES  [ ] NO

## 2023-04-06 LAB
CK MB CFR SERPL CALC: 3.3 NG/ML — SIGNIFICANT CHANGE UP (ref 0–6.7)
CK SERPL-CCNC: 64 U/L — SIGNIFICANT CHANGE UP (ref 30–200)
GLUCOSE BLDC GLUCOMTR-MCNC: 104 MG/DL — HIGH (ref 70–99)
GLUCOSE BLDC GLUCOMTR-MCNC: 142 MG/DL — HIGH (ref 70–99)
GLUCOSE BLDC GLUCOMTR-MCNC: 221 MG/DL — HIGH (ref 70–99)
TROPONIN T, HIGH SENSITIVITY RESULT: 366 NG/L — HIGH (ref 0–51)

## 2023-04-06 PROCEDURE — 92928 PRQ TCAT PLMT NTRAC ST 1 LES: CPT | Mod: LC

## 2023-04-06 PROCEDURE — 99233 SBSQ HOSP IP/OBS HIGH 50: CPT | Mod: GC

## 2023-04-06 PROCEDURE — 99152 MOD SED SAME PHYS/QHP 5/>YRS: CPT

## 2023-04-06 RX ORDER — SODIUM CHLORIDE 9 MG/ML
1000 INJECTION INTRAMUSCULAR; INTRAVENOUS; SUBCUTANEOUS
Refills: 0 | Status: DISCONTINUED | OUTPATIENT
Start: 2023-04-06 | End: 2023-04-07

## 2023-04-06 RX ORDER — VALSARTAN 80 MG/1
40 TABLET ORAL
Refills: 0 | Status: DISCONTINUED | OUTPATIENT
Start: 2023-04-07 | End: 2023-04-09

## 2023-04-06 RX ADMIN — Medication 3 UNIT(S): at 13:32

## 2023-04-06 RX ADMIN — PANTOPRAZOLE SODIUM 40 MILLIGRAM(S): 20 TABLET, DELAYED RELEASE ORAL at 05:55

## 2023-04-06 RX ADMIN — TICAGRELOR 90 MILLIGRAM(S): 90 TABLET ORAL at 21:51

## 2023-04-06 RX ADMIN — TICAGRELOR 90 MILLIGRAM(S): 90 TABLET ORAL at 05:55

## 2023-04-06 RX ADMIN — SODIUM CHLORIDE 50 MILLILITER(S): 9 INJECTION INTRAMUSCULAR; INTRAVENOUS; SUBCUTANEOUS at 18:19

## 2023-04-06 RX ADMIN — Medication 81 MILLIGRAM(S): at 11:42

## 2023-04-06 RX ADMIN — Medication 3 UNIT(S): at 08:55

## 2023-04-06 RX ADMIN — Medication 200 MILLIGRAM(S): at 10:13

## 2023-04-06 NOTE — PROGRESS NOTE ADULT - SUBJECTIVE AND OBJECTIVE BOX
Patient is a 52y old  Male who presents with a chief complaint of CP (05 Apr 2023 18:44)      SUBJECTIVE / OVERNIGHT EVENTS:    MEDICATIONS  (STANDING):  aspirin  chewable 81 milliGRAM(s) Oral daily  atorvastatin 40 milliGRAM(s) Oral at bedtime  dextrose 5%. 1000 milliLiter(s) (100 mL/Hr) IV Continuous <Continuous>  dextrose 5%. 1000 milliLiter(s) (50 mL/Hr) IV Continuous <Continuous>  dextrose 50% Injectable 25 Gram(s) IV Push once  dextrose 50% Injectable 12.5 Gram(s) IV Push once  dextrose 50% Injectable 25 Gram(s) IV Push once  glucagon  Injectable 1 milliGRAM(s) IntraMuscular once  insulin glargine Injectable (LANTUS) 12 Unit(s) SubCutaneous at bedtime  insulin lispro (ADMELOG) corrective regimen sliding scale   SubCutaneous three times a day before meals  insulin lispro (ADMELOG) corrective regimen sliding scale   SubCutaneous at bedtime  insulin lispro Injectable (ADMELOG) 3 Unit(s) SubCutaneous three times a day before meals  lisinopril 2.5 milliGRAM(s) Oral daily  metoprolol succinate  milliGRAM(s) Oral daily  pantoprazole    Tablet 40 milliGRAM(s) Oral before breakfast  senna 2 Tablet(s) Oral at bedtime  tamsulosin 0.4 milliGRAM(s) Oral at bedtime  ticagrelor 90 milliGRAM(s) Oral every 12 hours    MEDICATIONS  (PRN):  acetaminophen     Tablet .. 650 milliGRAM(s) Oral every 6 hours PRN Temp greater or equal to 38C (100.4F), Mild Pain (1 - 3)  artificial tears (preservative free) Ophthalmic Solution 1 Drop(s) Both EYES three times a day PRN Dry Eyes  dextrose Oral Gel 15 Gram(s) Oral once PRN Blood Glucose LESS THAN 70 milliGRAM(s)/deciliter  melatonin 5 milliGRAM(s) Oral at bedtime PRN Insomnia      T(C): 36.8 (04-06-23 @ 05:01), Max: 36.8 (04-05-23 @ 20:39)  HR: 96 (04-06-23 @ 05:01) (94 - 96)  BP: 94/67 (04-06-23 @ 05:01) (94/67 - 101/72)  RR: 18 (04-06-23 @ 05:01) (18 - 18)  SpO2: 98% (04-06-23 @ 05:01) (98% - 98%)  CAPILLARY BLOOD GLUCOSE      POCT Blood Glucose.: 179 mg/dL (05 Apr 2023 21:39)  POCT Blood Glucose.: 175 mg/dL (05 Apr 2023 16:43)  POCT Blood Glucose.: 260 mg/dL (05 Apr 2023 12:25)  POCT Blood Glucose.: 108 mg/dL (05 Apr 2023 08:21)    I&O's Summary    05 Apr 2023 07:01  -  06 Apr 2023 07:00  --------------------------------------------------------  IN: 1450 mL / OUT: 0 mL / NET: 1450 mL        PHYSICAL EXAM:  PHYSICAL EXAM:    Constitutional: NAD. well-developed; well-groomed; well-nourished;  HEENT: AT/NC, PERRLA; EOMI, MMM, no oropharyngeal lesions, no erythema, no exudates, Supple neck; normal thyroid gland, no cervical lymphadenopathy  Respiratory: CTAB. equal aeration bilaterally. no wheezing, no crackes, no rhonchi. no increase in WOB  Cardiovascular: RRR, no M/R/G. 2+ distal pulses. Cap refill < 2 seconds. no JVD  Gastrointestinal: soft; NT/ND, +BS, no rebounding tenderness / guarding / HSM / mass / ascites.  Genitourinary: not examined.  Extremities: no clubbing; no cyanosis; no pedal edema, non-tender to palpation, DP and Radial pulses intact.  Skin: warm and dry; color normal: no rash: no ulcers  Neurological: A&Ox 3; responds to pain; responds to verbal commands; epicritic and protopathic sensation intact: CN nerves grossly intact.   MSK/Back: no deformities. Active and passive ROM intact; strength intact, no CVA tenderness, No joint tenderness, swelling, erythema  Psychiatric: normal mood/affect. Denies SI/HI    LABS:                    RADIOLOGY & ADDITIONAL TESTS:    Imaging Personally Reviewed: NA    Consultant(s) Notes Reviewed:  NA    Care Discussed with Consultants/Other Providers: EL   Patient is a 52y old  Male who presents with a chief complaint of CP (05 Apr 2023 18:44)      SUBJECTIVE / OVERNIGHT EVENTS:  No acute events overnight  Patient denies CP, palpitation, SOB, visual disturbance, dizziness, lightheadedness.    Tele: SR         CONSTITUTIONAL: No fever, weight loss, or fatigue  EYES: No eye pain, visual disturbances, or discharge  ENMT:  No difficulty hearing, tinnitus, vertigo; No sinus or throat pain  RESPIRATORY: No cough, wheezing, chills or hemoptysis; No shortness of breath  CARDIOVASCULAR: No chest pain, palpitations, dizziness, or leg swelling  GASTROINTESTINAL: No abdominal or epigastric pain. No nausea, vomiting, or hematemesis; No diarrhea or constipation. No melena or hematochezia.  GENITOURINARY: No dysuria, frequency, hematuria, or incontinence  NEUROLOGICAL: No headaches, loss of strength, numbness, or tremors  SKIN: No itching, burning, rashes, or lesions   ENDOCRINE: No heat or cold intolerance; No polydipsia or polyuria  MUSCULOSKELETAL: No joint pain or swelling;   HEME/LYMPH: No easy bruising, or bleeding gums  ALLERGY AND IMMUNOLOGIC: No hives or eczema        MEDICATIONS  (STANDING):  aspirin  chewable 81 milliGRAM(s) Oral daily  atorvastatin 40 milliGRAM(s) Oral at bedtime  dextrose 5%. 1000 milliLiter(s) (100 mL/Hr) IV Continuous <Continuous>  dextrose 5%. 1000 milliLiter(s) (50 mL/Hr) IV Continuous <Continuous>  dextrose 50% Injectable 25 Gram(s) IV Push once  dextrose 50% Injectable 12.5 Gram(s) IV Push once  dextrose 50% Injectable 25 Gram(s) IV Push once  glucagon  Injectable 1 milliGRAM(s) IntraMuscular once  insulin glargine Injectable (LANTUS) 12 Unit(s) SubCutaneous at bedtime  insulin lispro (ADMELOG) corrective regimen sliding scale   SubCutaneous three times a day before meals  insulin lispro (ADMELOG) corrective regimen sliding scale   SubCutaneous at bedtime  insulin lispro Injectable (ADMELOG) 3 Unit(s) SubCutaneous three times a day before meals  lisinopril 2.5 milliGRAM(s) Oral daily  metoprolol succinate  milliGRAM(s) Oral daily  pantoprazole    Tablet 40 milliGRAM(s) Oral before breakfast  senna 2 Tablet(s) Oral at bedtime  tamsulosin 0.4 milliGRAM(s) Oral at bedtime  ticagrelor 90 milliGRAM(s) Oral every 12 hours    MEDICATIONS  (PRN):  acetaminophen     Tablet .. 650 milliGRAM(s) Oral every 6 hours PRN Temp greater or equal to 38C (100.4F), Mild Pain (1 - 3)  artificial tears (preservative free) Ophthalmic Solution 1 Drop(s) Both EYES three times a day PRN Dry Eyes  dextrose Oral Gel 15 Gram(s) Oral once PRN Blood Glucose LESS THAN 70 milliGRAM(s)/deciliter  melatonin 5 milliGRAM(s) Oral at bedtime PRN Insomnia      T(C): 36.8 (04-06-23 @ 05:01), Max: 36.8 (04-05-23 @ 20:39)  HR: 96 (04-06-23 @ 05:01) (94 - 96)  BP: 94/67 (04-06-23 @ 05:01) (94/67 - 101/72)  RR: 18 (04-06-23 @ 05:01) (18 - 18)  SpO2: 98% (04-06-23 @ 05:01) (98% - 98%)  CAPILLARY BLOOD GLUCOSE      POCT Blood Glucose.: 179 mg/dL (05 Apr 2023 21:39)  POCT Blood Glucose.: 175 mg/dL (05 Apr 2023 16:43)  POCT Blood Glucose.: 260 mg/dL (05 Apr 2023 12:25)  POCT Blood Glucose.: 108 mg/dL (05 Apr 2023 08:21)    I&O's Summary    05 Apr 2023 07:01  -  06 Apr 2023 07:00  --------------------------------------------------------  IN: 1450 mL / OUT: 0 mL / NET: 1450 mL          PHYSICAL EXAM:  Constitutional: NAD.   HEENT: AT/NC, EOMI, Supple neck;  Respiratory: no wheezing or crackles. no increase in WOB  Cardiovascular: tachycardic, S1, S2, no M/R/G. 2+ distal pulses. no JVD, no LE edema.  Procedure site: R groin c/d/i, no hematoma, no hemorrhage. no skin changes. distal pulse intact. distal motor and sensory intact.  Gastrointestinal: soft; NT/ND, +BS  Extremities: no cyanosis; non-tender to palpation, DP and Radial pulses intact.  Neurological: A&Ox 3;  Psychiatric: normal mood/affect.        LABS:                    RADIOLOGY & ADDITIONAL TESTS:    Imaging Personally Reviewed: NA    Consultant(s) Notes Reviewed:  NA    Care Discussed with Consultants/Other Providers: EL

## 2023-04-06 NOTE — PROGRESS NOTE ADULT - ASSESSMENT
52-year-old male with a past medical history of CAD status post CABG 3 months ago, HTN, DM 2, GERD presents to the ED with 1 week history of chest pain. s/p LHC finding early graft closure with progression of coronary disease in native valves.    #Multivessel CAD.  - Patient w/ hx of CAD s/p CABG in 12/2022 presented with recurrence of chest pain, found to have all grafts no longer functional and progression of his native coronary disease.  - c/w ASA/Brilinta.   - c/w high intensity statin.  - c/w metoprolol succinate 200mg daily  - c/w lisinopril 2.5mg daily  - Continue to monitor on telemetry  - Echo reviewed with echo attending and interventional cardiology attending  - cMR resulted with viable myocardium. Interventional cardiology attending notified. Will review the angiogram films and decide on revascularization strategy. 52-year-old male with a past medical history of CAD status post CABG 3 months ago, HTN, DM 2, GERD presents to the ED with 1 week history of chest pain. s/p LHC finding early graft closure with progression of coronary disease in native valves.    #Multivessel CAD.  - Patient w/ hx of CAD s/p CABG in 12/2022 presented with recurrence of chest pain, found to have all grafts no longer functional and progression of his native coronary disease.  - c/w ASA/Brilinta.   - c/w high intensity statin.  - c/w metoprolol succinate 200mg daily  - d/c lisinopril. start valsartan 40mg BID tomorrow. eventually plan to switch to entresto after 36hrs washout from last lisinopril dose if patient can tolerate.  - Continue to monitor on telemetry  - Echo reviewed with echo attending and interventional cardiology attending  - cMR resulted with viable myocardium. Interventional cardiology attending notified. Will review the angiogram films and decide on revascularization strategy.

## 2023-04-06 NOTE — PROGRESS NOTE ADULT - SUBJECTIVE AND OBJECTIVE BOX
Patient is a 52y old  Male who presents with a chief complaint of CP (06 Apr 2023 07:06)      INTERVAL HPI/OVERNIGHT EVENTS: seen and examined   T(C): 37 (04-06-23 @ 20:00), Max: 37 (04-06-23 @ 20:00)  HR: 86 (04-06-23 @ 20:00) (77 - 98)  BP: 126/86 (04-06-23 @ 20:00) (94/67 - 135/91)  RR: 18 (04-06-23 @ 20:00) (11 - 21)  SpO2: 99% (04-06-23 @ 20:00) (95% - 99%)  Wt(kg): --  I&O's Summary    05 Apr 2023 07:01  -  06 Apr 2023 07:00  --------------------------------------------------------  IN: 1450 mL / OUT: 0 mL / NET: 1450 mL    06 Apr 2023 07:01  -  07 Apr 2023 01:59  --------------------------------------------------------  IN: 1020 mL / OUT: 0 mL / NET: 1020 mL        PAST MEDICAL & SURGICAL HISTORY:  Hypertension      Diabetes mellitus      GERD (gastroesophageal reflux disease)      No significant past surgical history      No significant past surgical history          SOCIAL HISTORY  Alcohol:  Tobacco:  Illicit substance use:    FAMILY HISTORY:    REVIEW OF SYSTEMS:  CONSTITUTIONAL: No fever, weight loss, or fatigue  EYES: No eye pain, visual disturbances, or discharge  ENMT:  No difficulty hearing, tinnitus, vertigo; No sinus or throat pain  NECK: No pain or stiffness  RESPIRATORY: No cough, wheezing, chills or hemoptysis; No shortness of breath  CARDIOVASCULAR: No chest pain, palpitations, dizziness, or leg swelling  GASTROINTESTINAL: No abdominal or epigastric pain. No nausea, vomiting, or hematemesis; No diarrhea or constipation. No melena or hematochezia.  GENITOURINARY: No dysuria, frequency, hematuria, or incontinence  NEUROLOGICAL: No headaches, memory loss, loss of strength, numbness, or tremors  SKIN: No itching, burning, rashes, or lesions   LYMPH NODES: No enlarged glands  ENDOCRINE: No heat or cold intolerance; No hair loss  MUSCULOSKELETAL: No joint pain or swelling; No muscle, back, or extremity pain  PSYCHIATRIC: No depression, anxiety, mood swings, or difficulty sleeping  HEME/LYMPH: No easy bruising, or bleeding gums  ALLERY AND IMMUNOLOGIC: No hives or eczema    RADIOLOGY & ADDITIONAL TESTS:    Imaging Personally Reviewed:  [ ] YES  [ ] NO    Consultant(s) Notes Reviewed:  [ ] YES  [ ] NO    PHYSICAL EXAM:  GENERAL: NAD, well-groomed, well-developed  HEAD:  Atraumatic, Normocephalic  EYES: EOMI, PERRLA, conjunctiva and sclera clear  ENMT: No tonsillar erythema, exudates, or enlargement; Moist mucous membranes, Good dentition, No lesions  NECK: Supple, No JVD, Normal thyroid  NERVOUS SYSTEM:  Alert & Oriented X3, Good concentration; Motor Strength 5/5 B/L upper and lower extremities; DTRs 2+ intact and symmetric  CHEST/LUNG: Clear to percussion bilaterally; No rales, rhonchi, wheezing, or rubs  HEART: Regular rate and rhythm; No murmurs, rubs, or gallops  ABDOMEN: Soft, Nontender, Nondistended; Bowel sounds present  EXTREMITIES:  2+ Peripheral Pulses, No clubbing, cyanosis, or edema  LYMPH: No lymphadenopathy noted  SKIN: No rashes or lesions    LABS:              CAPILLARY BLOOD GLUCOSE      POCT Blood Glucose.: 221 mg/dL (06 Apr 2023 21:50)  POCT Blood Glucose.: 142 mg/dL (06 Apr 2023 13:00)  POCT Blood Glucose.: 104 mg/dL (06 Apr 2023 08:52)            MEDICATIONS  (STANDING):  aspirin  chewable 81 milliGRAM(s) Oral daily  atorvastatin 40 milliGRAM(s) Oral at bedtime  dextrose 5%. 1000 milliLiter(s) (100 mL/Hr) IV Continuous <Continuous>  dextrose 5%. 1000 milliLiter(s) (50 mL/Hr) IV Continuous <Continuous>  dextrose 50% Injectable 25 Gram(s) IV Push once  dextrose 50% Injectable 12.5 Gram(s) IV Push once  dextrose 50% Injectable 25 Gram(s) IV Push once  glucagon  Injectable 1 milliGRAM(s) IntraMuscular once  insulin glargine Injectable (LANTUS) 12 Unit(s) SubCutaneous at bedtime  insulin lispro (ADMELOG) corrective regimen sliding scale   SubCutaneous three times a day before meals  insulin lispro (ADMELOG) corrective regimen sliding scale   SubCutaneous at bedtime  insulin lispro Injectable (ADMELOG) 3 Unit(s) SubCutaneous three times a day before meals  metoprolol succinate  milliGRAM(s) Oral daily  pantoprazole    Tablet 40 milliGRAM(s) Oral before breakfast  senna 2 Tablet(s) Oral at bedtime  sodium chloride 0.9%. 1000 milliLiter(s) (50 mL/Hr) IV Continuous <Continuous>  tamsulosin 0.4 milliGRAM(s) Oral at bedtime  ticagrelor 90 milliGRAM(s) Oral every 12 hours  valsartan 40 milliGRAM(s) Oral two times a day    MEDICATIONS  (PRN):  acetaminophen     Tablet .. 650 milliGRAM(s) Oral every 6 hours PRN Temp greater or equal to 38C (100.4F), Mild Pain (1 - 3)  artificial tears (preservative free) Ophthalmic Solution 1 Drop(s) Both EYES three times a day PRN Dry Eyes  dextrose Oral Gel 15 Gram(s) Oral once PRN Blood Glucose LESS THAN 70 milliGRAM(s)/deciliter  melatonin 5 milliGRAM(s) Oral at bedtime PRN Insomnia      Care Discussed with Consultants/Other Providers [ ] YES  [ ] NO

## 2023-04-07 ENCOUNTER — TRANSCRIPTION ENCOUNTER (OUTPATIENT)
Age: 53
End: 2023-04-07

## 2023-04-07 LAB
ANION GAP SERPL CALC-SCNC: 15 MMOL/L — SIGNIFICANT CHANGE UP (ref 5–17)
BUN SERPL-MCNC: 13 MG/DL — SIGNIFICANT CHANGE UP (ref 7–23)
CALCIUM SERPL-MCNC: 9.2 MG/DL — SIGNIFICANT CHANGE UP (ref 8.4–10.5)
CHLORIDE SERPL-SCNC: 101 MMOL/L — SIGNIFICANT CHANGE UP (ref 96–108)
CO2 SERPL-SCNC: 20 MMOL/L — LOW (ref 22–31)
CREAT SERPL-MCNC: 0.76 MG/DL — SIGNIFICANT CHANGE UP (ref 0.5–1.3)
EGFR: 109 ML/MIN/1.73M2 — SIGNIFICANT CHANGE UP
GLUCOSE BLDC GLUCOMTR-MCNC: 125 MG/DL — HIGH (ref 70–99)
GLUCOSE BLDC GLUCOMTR-MCNC: 134 MG/DL — HIGH (ref 70–99)
GLUCOSE BLDC GLUCOMTR-MCNC: 158 MG/DL — HIGH (ref 70–99)
GLUCOSE BLDC GLUCOMTR-MCNC: 206 MG/DL — HIGH (ref 70–99)
GLUCOSE BLDC GLUCOMTR-MCNC: 211 MG/DL — HIGH (ref 70–99)
GLUCOSE BLDC GLUCOMTR-MCNC: 85 MG/DL — SIGNIFICANT CHANGE UP (ref 70–99)
GLUCOSE SERPL-MCNC: 188 MG/DL — HIGH (ref 70–99)
POTASSIUM SERPL-MCNC: 3.9 MMOL/L — SIGNIFICANT CHANGE UP (ref 3.5–5.3)
POTASSIUM SERPL-SCNC: 3.9 MMOL/L — SIGNIFICANT CHANGE UP (ref 3.5–5.3)
SODIUM SERPL-SCNC: 136 MMOL/L — SIGNIFICANT CHANGE UP (ref 135–145)

## 2023-04-07 PROCEDURE — 99152 MOD SED SAME PHYS/QHP 5/>YRS: CPT

## 2023-04-07 PROCEDURE — 93306 TTE W/DOPPLER COMPLETE: CPT | Mod: 26

## 2023-04-07 PROCEDURE — 99233 SBSQ HOSP IP/OBS HIGH 50: CPT

## 2023-04-07 PROCEDURE — 93010 ELECTROCARDIOGRAM REPORT: CPT

## 2023-04-07 PROCEDURE — 92937 PRQ TRLUML REVSC CAB GRF 1: CPT | Mod: LD,59

## 2023-04-07 RX ORDER — SODIUM CHLORIDE 9 MG/ML
1000 INJECTION INTRAMUSCULAR; INTRAVENOUS; SUBCUTANEOUS
Refills: 0 | Status: DISCONTINUED | OUTPATIENT
Start: 2023-04-07 | End: 2023-04-07

## 2023-04-07 RX ORDER — SODIUM CHLORIDE 9 MG/ML
250 INJECTION INTRAMUSCULAR; INTRAVENOUS; SUBCUTANEOUS ONCE
Refills: 0 | Status: COMPLETED | OUTPATIENT
Start: 2023-04-07 | End: 2023-04-07

## 2023-04-07 RX ORDER — ASPIRIN/CALCIUM CARB/MAGNESIUM 324 MG
81 TABLET ORAL DAILY
Refills: 0 | Status: DISCONTINUED | OUTPATIENT
Start: 2023-04-07 | End: 2023-04-09

## 2023-04-07 RX ADMIN — Medication 3 UNIT(S): at 17:03

## 2023-04-07 RX ADMIN — SENNA PLUS 2 TABLET(S): 8.6 TABLET ORAL at 22:07

## 2023-04-07 RX ADMIN — TICAGRELOR 90 MILLIGRAM(S): 90 TABLET ORAL at 22:08

## 2023-04-07 RX ADMIN — SODIUM CHLORIDE 250 MILLILITER(S): 9 INJECTION INTRAMUSCULAR; INTRAVENOUS; SUBCUTANEOUS at 11:16

## 2023-04-07 RX ADMIN — Medication 81 MILLIGRAM(S): at 10:02

## 2023-04-07 RX ADMIN — VALSARTAN 40 MILLIGRAM(S): 80 TABLET ORAL at 17:02

## 2023-04-07 RX ADMIN — PANTOPRAZOLE SODIUM 40 MILLIGRAM(S): 20 TABLET, DELAYED RELEASE ORAL at 07:12

## 2023-04-07 RX ADMIN — INSULIN GLARGINE 12 UNIT(S): 100 INJECTION, SOLUTION SUBCUTANEOUS at 22:15

## 2023-04-07 RX ADMIN — Medication 200 MILLIGRAM(S): at 05:02

## 2023-04-07 RX ADMIN — TAMSULOSIN HYDROCHLORIDE 0.4 MILLIGRAM(S): 0.4 CAPSULE ORAL at 22:07

## 2023-04-07 RX ADMIN — Medication 3 UNIT(S): at 08:16

## 2023-04-07 RX ADMIN — ATORVASTATIN CALCIUM 40 MILLIGRAM(S): 80 TABLET, FILM COATED ORAL at 22:07

## 2023-04-07 RX ADMIN — VALSARTAN 40 MILLIGRAM(S): 80 TABLET ORAL at 05:02

## 2023-04-07 RX ADMIN — TICAGRELOR 90 MILLIGRAM(S): 90 TABLET ORAL at 08:20

## 2023-04-07 NOTE — DISCHARGE NOTE PROVIDER - NSDCMRMEDTOKEN_GEN_ALL_CORE_FT
alogliptin-metformin 12.5 mg-1000 mg oral tablet: 1 tab(s) orally 2 times a day  aspirin 81 mg oral delayed release tablet: 1 tab(s) orally once a day  atorvastatin 40 mg oral tablet: 1 tab(s) orally once a day (at bedtime)  Basaglar KwikPen 100 units/mL subcutaneous solution: 18 unit(s) subcutaneous once a day (at bedtime)  ertugliflozin 5 mg oral tablet: 1 tab(s) orally once a day  lisinopril 2.5 mg oral tablet: 1 tab(s) orally once a day  metoprolol tartrate 100 mg oral tablet: 1 tab(s) orally 2 times a day  omeprazole 40 mg oral delayed release capsule: 1 cap(s) orally once a day  otc supplements: folic acid / vitamin b12 / fish oil / multivitamin / alpha lipoic acid  senna (sennosides) 8.6 mg oral tablet: 1 tab(s) orally once a day  Systane ophthalmic solution: 1 drop(s) in each eye once a day as needed for  dry eyes  tamsulosin 0.4 mg oral capsule: 1 cap(s) orally once a day

## 2023-04-07 NOTE — DISCHARGE NOTE PROVIDER - HOSPITAL COURSE
52-year-old male with a past medical history of CAD status post CABG 3 months ago, HTN, DM 2, GERD presents to the ED with 1 week history of chest pain.  Patient states her chest pain has progressively worsened since onset.  Pain is localized over the anterior chest and radiates to the left arm. It occurs at rest and is worsened with eating and it is reproducible on chest palpation.  Admitted with NSTEMI, received ASA load, Brilinta load, s/p Heparin gtt, BB, statin - Trop 412- > 453-> 366.   ROYAL not in concordant leads. ST depressions seen, s/p cardiac cath ( diagnostic) on 4/3 shows significant progression of coronaries dz and occlusion of all grafts, LIMA occluded at distal anastomosis to LAD with diffuse distal LAD disease, MRI indicates viability.  Followed by cards, Lisinopril held per cards, changed to Entresto after 36hrs, Added Valsartan.  s/p CABG 3 months ago.  On 4/6 pt underwent PCI with CHERRI to OM1 via RRA by Dr. Amador and 4/7 PCI to LIMA. Eventually plan to switch to Entresto after 36hrs washout from last lisinopril dose if patient can tolerate. After discharge will follow up with Dr. Andre Laughlin and should consider referral to lipid specialist, Dr. Luis Enrique Louie since despite LDL 39 has severe progression of atherosclerotic disease.  Acute issues resolved.  DC to home.

## 2023-04-07 NOTE — DISCHARGE NOTE PROVIDER - PROVIDER TOKENS
PROVIDER:[TOKEN:[[id number]],FOLLOWUP:[1 week]],PROVIDER:[TOKEN:[[id number]],FOLLOWUP:[1 week]]
(0) indicator not present

## 2023-04-07 NOTE — CHART NOTE - NSCHARTNOTEFT_GEN_A_CORE
The left ventricular systolic function is moderately-severely decreased with an ejection fraction visually estimated at 35 to 40%. There are regional wall motion abnormalities consistent with ischemic heart disease. The left ventricular diastolic function is indeterminate. Cannot exclude a thrombus in the left ventricle. There is nonperfused myocardium seen in the apex.  LV Wall Scoring:The apex is akinetic. The mid and apical anterior wall, mid and  apical inferior wall, apical septal segment, and apical lateral segment are  hypokinetic. All remaining scored segments are normal.     pt is s/p PCI with CHERRI to OM1 via RRA by Dr. Amador   4/7 PCI to LIMA today no hydration 2/2 to decreased EF. ( its discontinued)
Removal of Femoral Sheath    Pulses in the (left) lower extremity are (palpable). The patient was placed in the supine position. The insertion site was identified and the sutures were removed per protocol.  The _6___ Romanian femoral sheath was then removed. Direct pressure was applied for  ____20__ minutes. by Jacquelyn Mata NP-c    Monitoring of the (left) groin and both lower extremities including neuro-vascular checks and vital signs every 15 minutes x 4, then every 30 minutes x 2, then every 1 hour was ordered.    Complications: None    Comments:

## 2023-04-07 NOTE — DISCHARGE NOTE PROVIDER - NSDCFUSCHEDAPPT_GEN_ALL_CORE_FT
[facility]  CARDIOLOGY [id number]  Scheduled Appointment: 04/20/2023

## 2023-04-07 NOTE — PROGRESS NOTE ADULT - SUBJECTIVE AND OBJECTIVE BOX
INTERVENTIONAL CARDIOLOGY POST PCI FOLLOW UP NOTE  --------------------------------------------------------------------------------  Chief Complaint: denies any discomfort    24 hour events/subjective: no events overnight    [facility]    PAST HISTORY  --------------------------------------------------------------------------------  No significant changes to PMH, PSH, FHx, SHx, unless otherwise noted    ALLERGIES & MEDICATIONS  --------------------------------------------------------------------------------  Allergies    No Known Allergies    Intolerances    Standing Inpatient Medications  aspirin enteric coated 81 milliGRAM(s) Oral daily  atorvastatin 40 milliGRAM(s) Oral at bedtime  dextrose 5%. 1000 milliLiter(s) IV Continuous <Continuous>  dextrose 5%. 1000 milliLiter(s) IV Continuous <Continuous>  dextrose 50% Injectable 25 Gram(s) IV Push once  dextrose 50% Injectable 12.5 Gram(s) IV Push once  dextrose 50% Injectable 25 Gram(s) IV Push once  glucagon  Injectable 1 milliGRAM(s) IntraMuscular once  insulin glargine Injectable (LANTUS) 12 Unit(s) SubCutaneous at bedtime  insulin lispro (ADMELOG) corrective regimen sliding scale   SubCutaneous three times a day before meals  insulin lispro (ADMELOG) corrective regimen sliding scale   SubCutaneous at bedtime  insulin lispro Injectable (ADMELOG) 3 Unit(s) SubCutaneous three times a day before meals  metoprolol succinate  milliGRAM(s) Oral daily  pantoprazole    Tablet 40 milliGRAM(s) Oral before breakfast  senna 2 Tablet(s) Oral at bedtime  tamsulosin 0.4 milliGRAM(s) Oral at bedtime  ticagrelor 90 milliGRAM(s) Oral every 12 hours  valsartan 40 milliGRAM(s) Oral two times a day    PRN Inpatient Medications  acetaminophen     Tablet .. 650 milliGRAM(s) Oral every 6 hours PRN  artificial tears (preservative free) Ophthalmic Solution 1 Drop(s) Both EYES three times a day PRN  dextrose Oral Gel 15 Gram(s) Oral once PRN  melatonin 5 milliGRAM(s) Oral at bedtime PRN    REVIEW OF SYSTEMS  --------------------------------------------------------------------------------  Gen: No fevers/chills  Skin: No rashes  Head/Eyes/Ears: Normal hearing,   Respiratory: No dyspnea, cough  CV: No chest pain  GI: No abdominal pain, diarrhea or constipation  : No dysuria, hematuria  MSK: No muscle pain. No edema    All other systems were reviewed and are negative, except as noted.    VITALS/PHYSICAL EXAM  --------------------------------------------------------------------------------  T(C): 36.4 (04-07-23 @ 10:20), Max: 37 (04-06-23 @ 20:00)  HR: 92 (04-07-23 @ 10:20) (77 - 100)  BP: 112/83 (04-07-23 @ 10:20) (102/689 - 135/91)  RR: 15 (04-07-23 @ 10:20) (11 - 21)  SpO2: 100% (04-07-23 @ 10:20) (95% - 100%)  Wt(kg): --    04-06-23 @ 07:01  -  04-07-23 @ 07:00  --------------------------------------------------------  IN: 1020 mL / OUT: 0 mL / NET: 1020 mL    Physical Exam:  	Gen: NAD  	HEENT: MMM  	Pulm: CTA B/L  	CV: S1,S2  	Abd: Soft, +BS   	Ext: No LE edema B/L  	Neuro: Awake  	Skin: Warm and dry  	Cath access site: right radial 4/6 cath site benign    LABS/STUDIES  --------------------------------------------------------------------------------    136  |  101  |  13  ----------------------------<  188      [04-07-23 @ 06:57]  3.9   |  20  |  0.76        Ca     9.2     [04-07-23 @ 06:57]    CK 64      [04-06-23 @ 07:22]    Creatinine Trend:  SCr 0.76 [04-07 @ 06:57]  SCr 0.86 [04-04 @ 05:54]  SCr 0.82 [04-03 @ 06:55]  SCr 0.97 [04-02 @ 14:31]    TSH 1.03      [04-03-23 @ 06:55]  Lipid: chol 90, , HDL 34, LDL --      [04-03-23 @ 06:55]    A/P  52-year-old male with a past medical history of CAD status post CABG 3 months ago, HTN, DM 2, GERD presents to the ED with 1 week history of chest pain. s/p LHC finding early graft closure with progression of coronary disease in native valves.    Patient w/ hx of CAD s/p CABG in 12/2022 presented with recurrence of chest pain, found to have all grafts no longer functional and progression of his native coronary disease.  4/3 s/p cardiac cath ( diagnostic) done today : shows significant progression of coronaries dis obstruction and occlusion of graft   interventional card Dr. Amador   4/6 s/p Successful PCI with CHERRI to the proximal OM1 and balloon of the distal OM1. Radial cath site assessed and benign this am.  Pt being followed by cath ACP post cath for 48hrs post cath.  am labs reviewed  telemetry reviewed, SR 's overnight, no ectopy reported  Plan staged PCI of the LIMA to LAD 4/7, no pre and post cath hydration due to reduced EF 28%.    DAPT (ASA/Brilinta) for 1 year  Pt f/b Dr Pierce's team in the hospital, card team will give final recs prior discharge  pt will need to follow up with outpt cardiologist w/in 2 weeks of discharge from hospital.    Rajesh Mccormack NP  interventional cardiology  [phone]

## 2023-04-07 NOTE — PROGRESS NOTE ADULT - ASSESSMENT
52-year-old male with a past medical history of CAD status post CABG 3 months ago, HTN, DM 2, GERD presents to the ED with 1 week history of chest pain. s/p LHC finding early graft closure with progression of coronary disease in native valves.    #Multivessel CAD.  - Patient w/ hx of CAD s/p CABG in 12/2022 presented with recurrence of chest pain, found to have all grafts no longer functional and progression of his native coronary disease.  - c/w ASA/Brilinta.   - c/w high intensity statin.  - c/w metoprolol succinate 200mg daily  - c/w valsartan 40mg BID. eventually plan to switch to entresto after 36hrs washout from last lisinopril dose if patient can tolerate.  - Continue to monitor on telemetry  - Echo reviewed with echo attending and interventional cardiology attending  - cMR resulted with viable myocardium.  - s/p PCI to pOM1 w/ CHERRI 4/6.   - plan for staged PCI of LIMA graft to LAD today. 52-year-old male with a past medical history of CAD status post CABG 3 months ago, HTN, DM 2, GERD presents to the ED with 1 week history of chest pain. s/p LHC finding early graft closure with progression of coronary disease in native valves.    #Multivessel CAD.  - Patient w/ hx of CAD s/p CABG in 12/2022 presented with recurrence of chest pain, found to have all grafts no longer functional and progression of his native coronary disease.  - c/w ASA/Brilinta.   - c/w high intensity statin.  - c/w metoprolol succinate 200mg daily  - c/w valsartan 40mg BID. eventually plan to switch to entresto after 36hrs washout from last lisinopril dose if patient's Cr is stable after angiogram and his BP can tolerate.  - Continue to monitor on telemetry  - Echo reviewed with echo attending and interventional cardiology attending  - cMR resulted with viable myocardium.  - s/p PCI to pOM1 w/ CHERRI 4/6.   - plan for staged PCI of LIMA graft to LAD today. 52-year-old male with a past medical history of CAD status post CABG 3 months ago, HTN, DM 2, GERD presents to the ED with 1 week history of chest pain. s/p LHC finding early graft closure with progression of coronary disease in native valves.    #Multivessel CAD.  - Patient w/ hx of CAD s/p CABG in 12/2022 presented with recurrence of chest pain, found to have all grafts no longer functional and progression of his native coronary disease.  - c/w ASA/Brilinta.   - c/w high intensity statin.  - c/w metoprolol succinate 200mg daily  - c/w valsartan 40mg BID. eventually plan to switch to entresto after 36hrs washout from last lisinopril dose if patient's Cr is stable after angiogram and his BP can tolerate.  - Continue to monitor on telemetry  - Echo reviewed with echo attending and interventional cardiology attending  - cMR resulted with viable myocardium.  - s/p PCI to pOM1 w/ CHERRI 4/6.   - plan for staged PCI of LIMA graft to LAD today.    This patient was seen and examined personally by me and the plan was discussed with the fellow and/or resident above. Amendments were made as necessary to the above. Agree with the excellent note and plan above. Staged PCI today.    Remy Hidalgo MD, MPhil, [facility]  Cardiologist, [facility]  ; [facility] and [facility]  Email: [email or web address] Card[facility] and Cardiovascular Surgery on-service contact/call information, go to [email or web address] and use "[email or web address]" to login.  Outpatient Cardiology appointments, call [phone] to arrange with a colleague; I do not have outpatient Cardiology clinic.

## 2023-04-07 NOTE — PROGRESS NOTE ADULT - ASSESSMENT
53yo M w/ PMH of HTN, HLD, DM2, CAD s/p CABG on 12/19/22 pw CP w/ ECG c/f diffuse ST changes and rising trops though ttp on exam      # Acute non-ST elevation myocardial infarction (NSTEMI).   - s/p ASA, Brilinta load in the ED  - c/w ASA 81mg qd and Brilinta 90mg BID  - c/w Atorvastatin 40mg qhs   - c/w Lisinopril 2.5mg qd  -  Torpol 200mg qd   - f/u official TTE (POCUS w. trace effusion and anterior wall hypokinesis) to assess for WMA    s/p cardiac cath ( diagnostic) done today : shows significant progression of coronaries dis obstruction and occlusion of graft   f/u with cardiology for further medical Rx   off heparin GTT   c/w asa/brilinta     # Type 2 diabetes mellitus, with long-term current use of insulin.   -c/w lantus / FSBS with RICss  - Diabetes education.    # Hypertension.   - c/w home Lisinopril 2.5mg qd w/ hold parameters  - Toprol 200mg qd as above w/ hold parameters.    # CAD (coronary artery disease).    - ASA 81mg qd.  started on brilinta     dispo: s/p cardiac cath with stent placement. c/w asa/brilinta . d/c planning if cardio clears

## 2023-04-07 NOTE — DISCHARGE NOTE PROVIDER - CARE PROVIDER_API CALL
Andre Laughlin; PhD)  Cardiology; Internal Medicine; Vascular Medicine  [address] [place] [address], [address]  [place] [address]  Phone: [phone]  Fax: [phone]  Follow Up Time: 1 week    Martin Louie)  Cardiovascular Disease; Internal Medicine  [address] [place] [address]  Phone: [phone]  Fax: [phone]  Follow Up Time: 1 week

## 2023-04-07 NOTE — DISCHARGE NOTE PROVIDER - NSDCFUADDAPPT_GEN_ALL_CORE_FT
APPTS ARE READY TO BE MADE: [ ] YES    Best Family or Patient Contact (if needed):    Additional Information about above appointments (if needed):    1: pcp  2: cardio  3:     Other comments or requests:

## 2023-04-07 NOTE — PROGRESS NOTE ADULT - SUBJECTIVE AND OBJECTIVE BOX
Patient is a 52y old  Male who presents with a chief complaint of CP (07 Apr 2023 12:35)      INTERVAL HPI/OVERNIGHT EVENTS: s/p cardiac cath with stent placement   T(C): 36.7 (04-08-23 @ 00:16), Max: 37.1 (04-07-23 @ 17:15)  HR: 99 (04-08-23 @ 00:16) (76 - 100)  BP: 113/80 (04-08-23 @ 00:16) (105/91 - 134/91)  RR: 18 (04-08-23 @ 00:16) (15 - 18)  SpO2: 100% (04-08-23 @ 00:16) (96% - 100%)  Wt(kg): --  I&O's Summary    06 Apr 2023 07:01  -  07 Apr 2023 07:00  --------------------------------------------------------  IN: 1020 mL / OUT: 0 mL / NET: 1020 mL    07 Apr 2023 07:01  -  08 Apr 2023 04:23  --------------------------------------------------------  IN: 400 mL / OUT: 0 mL / NET: 400 mL        PAST MEDICAL & SURGICAL HISTORY:  Hypertension      Diabetes mellitus      GERD (gastroesophageal reflux disease)      No significant past surgical history      No significant past surgical history          SOCIAL HISTORY  Alcohol:  Tobacco:  Illicit substance use:    FAMILY HISTORY:    REVIEW OF SYSTEMS:  CONSTITUTIONAL: No fever, weight loss, or fatigue  EYES: No eye pain, visual disturbances, or discharge  ENMT:  No difficulty hearing, tinnitus, vertigo; No sinus or throat pain  NECK: No pain or stiffness  RESPIRATORY: No cough, wheezing, chills or hemoptysis; No shortness of breath  CARDIOVASCULAR: No chest pain, palpitations, dizziness, or leg swelling  GASTROINTESTINAL: No abdominal or epigastric pain. No nausea, vomiting, or hematemesis; No diarrhea or constipation. No melena or hematochezia.  GENITOURINARY: No dysuria, frequency, hematuria, or incontinence  NEUROLOGICAL: No headaches, memory loss, loss of strength, numbness, or tremors  SKIN: No itching, burning, rashes, or lesions   LYMPH NODES: No enlarged glands  ENDOCRINE: No heat or cold intolerance; No hair loss  MUSCULOSKELETAL: No joint pain or swelling; No muscle, back, or extremity pain  PSYCHIATRIC: No depression, anxiety, mood swings, or difficulty sleeping  HEME/LYMPH: No easy bruising, or bleeding gums  ALLERY AND IMMUNOLOGIC: No hives or eczema    RADIOLOGY & ADDITIONAL TESTS:    Imaging Personally Reviewed:  [ ] YES  [ ] NO    Consultant(s) Notes Reviewed:  [ ] YES  [ ] NO    PHYSICAL EXAM:  GENERAL: NAD, well-groomed, well-developed  HEAD:  Atraumatic, Normocephalic  EYES: EOMI, PERRLA, conjunctiva and sclera clear  ENMT: No tonsillar erythema, exudates, or enlargement; Moist mucous membranes, Good dentition, No lesions  NECK: Supple, No JVD, Normal thyroid  NERVOUS SYSTEM:  Alert & Oriented X3, Good concentration; Motor Strength 5/5 B/L upper and lower extremities; DTRs 2+ intact and symmetric  CHEST/LUNG: Clear to percussion bilaterally; No rales, rhonchi, wheezing, or rubs  HEART: Regular rate and rhythm; No murmurs, rubs, or gallops  ABDOMEN: Soft, Nontender, Nondistended; Bowel sounds present  EXTREMITIES:  2+ Peripheral Pulses, No clubbing, cyanosis, or edema  LYMPH: No lymphadenopathy noted  SKIN: No rashes or lesions    LABS:    04-07    136  |  101  |  13  ----------------------------<  188<H>  3.9   |  20<L>  |  0.76    Ca    9.2      07 Apr 2023 06:57          CAPILLARY BLOOD GLUCOSE      POCT Blood Glucose.: 211 mg/dL (07 Apr 2023 22:12)  POCT Blood Glucose.: 206 mg/dL (07 Apr 2023 21:08)  POCT Blood Glucose.: 85 mg/dL (07 Apr 2023 16:28)  POCT Blood Glucose.: 125 mg/dL (07 Apr 2023 12:52)  POCT Blood Glucose.: 158 mg/dL (07 Apr 2023 11:22)  POCT Blood Glucose.: 134 mg/dL (07 Apr 2023 08:06)            MEDICATIONS  (STANDING):  aspirin enteric coated 81 milliGRAM(s) Oral daily  atorvastatin 40 milliGRAM(s) Oral at bedtime  dextrose 5%. 1000 milliLiter(s) (50 mL/Hr) IV Continuous <Continuous>  dextrose 5%. 1000 milliLiter(s) (100 mL/Hr) IV Continuous <Continuous>  dextrose 50% Injectable 25 Gram(s) IV Push once  dextrose 50% Injectable 12.5 Gram(s) IV Push once  dextrose 50% Injectable 25 Gram(s) IV Push once  glucagon  Injectable 1 milliGRAM(s) IntraMuscular once  insulin glargine Injectable (LANTUS) 12 Unit(s) SubCutaneous at bedtime  insulin lispro (ADMELOG) corrective regimen sliding scale   SubCutaneous three times a day before meals  insulin lispro (ADMELOG) corrective regimen sliding scale   SubCutaneous at bedtime  insulin lispro Injectable (ADMELOG) 3 Unit(s) SubCutaneous three times a day before meals  metoprolol succinate  milliGRAM(s) Oral daily  pantoprazole    Tablet 40 milliGRAM(s) Oral before breakfast  senna 2 Tablet(s) Oral at bedtime  tamsulosin 0.4 milliGRAM(s) Oral at bedtime  ticagrelor 90 milliGRAM(s) Oral every 12 hours  valsartan 40 milliGRAM(s) Oral two times a day    MEDICATIONS  (PRN):  acetaminophen     Tablet .. 650 milliGRAM(s) Oral every 6 hours PRN Temp greater or equal to 38C (100.4F), Mild Pain (1 - 3)  artificial tears (preservative free) Ophthalmic Solution 1 Drop(s) Both EYES three times a day PRN Dry Eyes  dextrose Oral Gel 15 Gram(s) Oral once PRN Blood Glucose LESS THAN 70 milliGRAM(s)/deciliter  melatonin 5 milliGRAM(s) Oral at bedtime PRN Insomnia      Care Discussed with Consultants/Other Providers [ ] YES  [ ] NO

## 2023-04-07 NOTE — DISCHARGE NOTE PROVIDER - NSDCCPCAREPLAN_GEN_ALL_CORE_FT
PRINCIPAL DISCHARGE DIAGNOSIS  Diagnosis: ST elevation MI (STEMI)  Assessment and Plan of Treatment: Call your doctor if you have unusual chest pain, pressure, or discomfort, shortness of breath, nausea, vomiting, burping, heartburn, tingling upper body parts, sweating, cold, clammy sking, racing heartbeat  Call 911 if you think you are having a heart attack  Take all cardiac medications as prescribed - notify your doctor if you have any side effects  Follow cardiac diet - avoid fatty & fried foods, don't eat too much red meat, eat lots of fruits & vegetables, dairy products should be low fat  Lose weight if you are overweight  Become more active with walking, gardening, or any other activity that gets you to move      SECONDARY DISCHARGE DIAGNOSES  Diagnosis: CAD (coronary artery disease)  Assessment and Plan of Treatment: Coronary artery disease is a condition where the arteries the supply the heart muscle get clogged with fatty deposits & puts you at risk for a heart attack  Call your doctor if you have any new pain, pressure, or discomfort in the center of your chest, pain, tingling or discomfort in arms, back, neck, jaw, or stomach, shortness of breath, nausea, vomiting, burping or heartburn, sweating, cold and clammy skin, racing or abnormal heartbeat for more than 10 minutes or if they keep coming & going.  Call 911 and do not tr to get to hospital by care. You can help yourself with lefestyle changes (quitting smoking if you Informed pt of the various negative side effects of smoking including risk of COPD, Lung Ca etc  Strongly recommended that pt stops smoking and pt given various options of smoking cessasion tools such as NRT's and other pharmacotherapies), walk or some form of physical activity most days of the week, lose weight if you are overweight. Recommend a heart healthy diet which includes a variety of fruits and vegetables, whole grains, low fat dairy products, legumes and skinless poultry and fish; food prepared with little or no salt and minimize processed foods  Take your cardiac medication as prescribed to lower cholesterol, to lower blood pressure, aspirin to prevent blood clots, and diabetes control  Make sure to keep appointments with doctor for cardiac follow up care    Diagnosis: CAD S/P percutaneous coronary angioplasty  Assessment and Plan of Treatment: Continue your medications. Do not stop Aspirin or Plavix unless instructed by your cardiologist.  No heavy lifting or pushing/pulling or strenuous activity with procedure arm for 2 weeks. No driving for 2 days. No sex for 1 week.  You may shower 24 hours following procedure but no soaking of your wrist in water (such as in a pool, sink, or tub) for 1 week. Check wrist site for bleeding and/or swelling daily following procedure. Call your doctor/cardiologist immediately for bleeding or swelling or if you have increased/persistent pain or drainage at the wrist site or if you have numbness, tingling or blue or white coloring of your hand or fingers.  Follow up with your cardiologist in 1- 2 weeks. You may call [facility] at [phone] or [phone] after office hours and weekends with any questions or concerns following your procedure.    Diagnosis: Hypertension  Assessment and Plan of Treatment: Low salt diet  Activity as tolerated.  Take all medication as prescribed.  Follow up with your medical doctor for routine blood pressure monitoring at your next visit.  Notify your doctor if you have any of the following symptoms:   Dizziness, Lightheadedness, Blurry vision, Headache, Chest pain, Shortness of breath    Diagnosis: HLD (hyperlipidemia)  Assessment and Plan of Treatment: Low salt, low fat, low cholesterol, diabetic diet if appropriate  Continue medication as prescribed  Exercise, increase your activity level  Pt. verbalized an understanding of all instructions.  consider referral to lipid specialist, Dr. Luis Enrique Louie     PRINCIPAL DISCHARGE DIAGNOSIS  Diagnosis: ST elevation MI (STEMI)  Assessment and Plan of Treatment: Call your doctor if you have unusual chest pain, pressure, or discomfort, shortness of breath, nausea, vomiting, burping, heartburn, tingling upper body parts, sweating, cold, clammy sking, racing heartbeat  Call 911 if you think you are having a heart attack  Take all cardiac medications as prescribed - notify your doctor if you have any side effects  Follow cardiac diet - avoid fatty & fried foods, don't eat too much red meat, eat lots of fruits & vegetables, dairy products should be low fat  Lose weight if you are overweight  Become more active with walking, gardening, or any other activity that gets you to move      SECONDARY DISCHARGE DIAGNOSES  Diagnosis: CAD (coronary artery disease)  Assessment and Plan of Treatment: Coronary artery disease is a condition where the arteries the supply the heart muscle get clogged with fatty deposits & puts you at risk for a heart attack  Call your doctor if you have any new pain, pressure, or discomfort in the center of your chest, pain, tingling or discomfort in arms, back, neck, jaw, or stomach, shortness of breath, nausea, vomiting, burping or heartburn, sweating, cold and clammy skin, racing or abnormal heartbeat for more than 10 minutes or if they keep coming & going.  Call 911 and do not tr to get to hospital by care. You can help yourself with lefestyle changes (quitting smoking if you Informed pt of the various negative side effects of smoking including risk of COPD, Lung Ca etc  Strongly recommended that pt stops smoking and pt given various options of smoking cessasion tools such as NRT's and other pharmacotherapies), walk or some form of physical activity most days of the week, lose weight if you are overweight. Recommend a heart healthy diet which includes a variety of fruits and vegetables, whole grains, low fat dairy products, legumes and skinless poultry and fish; food prepared with little or no salt and minimize processed foods  Take your cardiac medication as prescribed to lower cholesterol, to lower blood pressure, aspirin to prevent blood clots, and diabetes control  Make sure to keep appointments with doctor for cardiac follow up care    Diagnosis: CAD S/P percutaneous coronary angioplasty  Assessment and Plan of Treatment: Continue your medications. Do not stop Aspirin or Plavix unless instructed by your cardiologist.  No heavy lifting or pushing/pulling or strenuous activity with procedure arm for 2 weeks. No driving for 2 days. No sex for 1 week.  You may shower 24 hours following procedure but no soaking of your wrist in water (such as in a pool, sink, or tub) for 1 week. Check wrist site for bleeding and/or swelling daily following procedure. Call your doctor/cardiologist immediately for bleeding or swelling or if you have increased/persistent pain or drainage at the wrist site or if you have numbness, tingling or blue or white coloring of your hand or fingers.  Follow up with your cardiologist in 1- 2 weeks. You may call [facility] at [phone] or [phone] after office hours and weekends with any questions or concerns following your procedure.    Diagnosis: Hypertension  Assessment and Plan of Treatment: Low salt diet  Activity as tolerated.  Take all medication as prescribed.  Follow up with your medical doctor for routine blood pressure monitoring at your next visit.  Notify your doctor if you have any of the following symptoms:   Dizziness, Lightheadedness, Blurry vision, Headache, Chest pain, Shortness of breath    Diagnosis: Type 2 diabetes mellitus, with long-term current use of insulin  Assessment and Plan of Treatment: HA1C 7.0  Take meds as prescribed and follow up with pcp    Diagnosis: HLD (hyperlipidemia)  Assessment and Plan of Treatment: Low salt, low fat, low cholesterol, diabetic diet if appropriate  Continue medication as prescribed  Exercise, increase your activity level  Pt. verbalized an understanding of all instructions.  consider referral to lipid specialist, Dr. Luis Enrique Louie

## 2023-04-07 NOTE — DISCHARGE NOTE PROVIDER - CARE PROVIDERS DIRECT ADDRESSES
,[email or web address]

## 2023-04-07 NOTE — PROGRESS NOTE ADULT - SUBJECTIVE AND OBJECTIVE BOX
Patient is a 52y old  Male who presents with a chief complaint of CP (06 Apr 2023 20:58)      SUBJECTIVE / OVERNIGHT EVENTS:    MEDICATIONS  (STANDING):  aspirin  chewable 81 milliGRAM(s) Oral daily  atorvastatin 40 milliGRAM(s) Oral at bedtime  dextrose 5%. 1000 milliLiter(s) (50 mL/Hr) IV Continuous <Continuous>  dextrose 5%. 1000 milliLiter(s) (100 mL/Hr) IV Continuous <Continuous>  dextrose 50% Injectable 25 Gram(s) IV Push once  dextrose 50% Injectable 12.5 Gram(s) IV Push once  dextrose 50% Injectable 25 Gram(s) IV Push once  glucagon  Injectable 1 milliGRAM(s) IntraMuscular once  insulin glargine Injectable (LANTUS) 12 Unit(s) SubCutaneous at bedtime  insulin lispro (ADMELOG) corrective regimen sliding scale   SubCutaneous three times a day before meals  insulin lispro (ADMELOG) corrective regimen sliding scale   SubCutaneous at bedtime  insulin lispro Injectable (ADMELOG) 3 Unit(s) SubCutaneous three times a day before meals  metoprolol succinate  milliGRAM(s) Oral daily  pantoprazole    Tablet 40 milliGRAM(s) Oral before breakfast  senna 2 Tablet(s) Oral at bedtime  sodium chloride 0.9%. 1000 milliLiter(s) (50 mL/Hr) IV Continuous <Continuous>  tamsulosin 0.4 milliGRAM(s) Oral at bedtime  ticagrelor 90 milliGRAM(s) Oral every 12 hours  valsartan 40 milliGRAM(s) Oral two times a day    MEDICATIONS  (PRN):  acetaminophen     Tablet .. 650 milliGRAM(s) Oral every 6 hours PRN Temp greater or equal to 38C (100.4F), Mild Pain (1 - 3)  artificial tears (preservative free) Ophthalmic Solution 1 Drop(s) Both EYES three times a day PRN Dry Eyes  dextrose Oral Gel 15 Gram(s) Oral once PRN Blood Glucose LESS THAN 70 milliGRAM(s)/deciliter  melatonin 5 milliGRAM(s) Oral at bedtime PRN Insomnia      T(C): 36.9 (04-07-23 @ 05:13), Max: 37 (04-06-23 @ 20:00)  HR: 100 (04-07-23 @ 05:13) (77 - 100)  BP: 108/76 (04-07-23 @ 05:13) (102/689 - 135/91)  RR: 18 (04-07-23 @ 05:13) (11 - 21)  SpO2: 98% (04-07-23 @ 05:13) (95% - 99%)  CAPILLARY BLOOD GLUCOSE      POCT Blood Glucose.: 134 mg/dL (07 Apr 2023 08:06)  POCT Blood Glucose.: 221 mg/dL (06 Apr 2023 21:50)  POCT Blood Glucose.: 142 mg/dL (06 Apr 2023 13:00)    I&O's Summary    06 Apr 2023 07:01  -  07 Apr 2023 07:00  --------------------------------------------------------  IN: 1020 mL / OUT: 0 mL / NET: 1020 mL        PHYSICAL EXAM:  PHYSICAL EXAM:    Constitutional: NAD. well-developed; well-groomed; well-nourished;  HEENT: AT/NC, PERRLA; EOMI, MMM, no oropharyngeal lesions, no erythema, no exudates, Supple neck; normal thyroid gland, no cervical lymphadenopathy  Respiratory: CTAB. equal aeration bilaterally. no wheezing, no crackes, no rhonchi. no increase in WOB  Cardiovascular: RRR, no M/R/G. 2+ distal pulses. Cap refill < 2 seconds. no JVD  Gastrointestinal: soft; NT/ND, +BS, no rebounding tenderness / guarding / HSM / mass / ascites.  Genitourinary: not examined.  Extremities: no clubbing; no cyanosis; no pedal edema, non-tender to palpation, DP and Radial pulses intact.  Skin: warm and dry; color normal: no rash: no ulcers  Neurological: A&Ox 3; responds to pain; responds to verbal commands; epicritic and protopathic sensation intact: CN nerves grossly intact.   MSK/Back: no deformities. Active and passive ROM intact; strength intact, no CVA tenderness, No joint tenderness, swelling, erythema  Psychiatric: normal mood/affect. Denies SI/HI    LABS:    04-07    136  |  101  |  13  ----------------------------<  188<H>  3.9   |  20<L>  |  0.76    Ca    9.2      07 Apr 2023 06:57        CARDIAC MARKERS ( 06 Apr 2023 07:22 )  x     / x     / 64 U/L / x     / 3.3 ng/mL          RADIOLOGY & ADDITIONAL TESTS:    Imaging Personally Reviewed: EL    Consultant(s) Notes Reviewed:  EL    Care Discussed with Consultants/Other Providers: EL   Patient is a 52y old  Male who presents with a chief complaint of CP (06 Apr 2023 20:58)      SUBJECTIVE / OVERNIGHT EVENTS:  No acute events overnight  Patient denies CP, palpitation, SOB, visual disturbance, dizziness, lightheadedness.    Tele: NSR       MEDICATIONS  (STANDING):  aspirin  chewable 81 milliGRAM(s) Oral daily  atorvastatin 40 milliGRAM(s) Oral at bedtime  dextrose 5%. 1000 milliLiter(s) (50 mL/Hr) IV Continuous <Continuous>  dextrose 5%. 1000 milliLiter(s) (100 mL/Hr) IV Continuous <Continuous>  dextrose 50% Injectable 25 Gram(s) IV Push once  dextrose 50% Injectable 12.5 Gram(s) IV Push once  dextrose 50% Injectable 25 Gram(s) IV Push once  glucagon  Injectable 1 milliGRAM(s) IntraMuscular once  insulin glargine Injectable (LANTUS) 12 Unit(s) SubCutaneous at bedtime  insulin lispro (ADMELOG) corrective regimen sliding scale   SubCutaneous three times a day before meals  insulin lispro (ADMELOG) corrective regimen sliding scale   SubCutaneous at bedtime  insulin lispro Injectable (ADMELOG) 3 Unit(s) SubCutaneous three times a day before meals  metoprolol succinate  milliGRAM(s) Oral daily  pantoprazole    Tablet 40 milliGRAM(s) Oral before breakfast  senna 2 Tablet(s) Oral at bedtime  sodium chloride 0.9%. 1000 milliLiter(s) (50 mL/Hr) IV Continuous <Continuous>  tamsulosin 0.4 milliGRAM(s) Oral at bedtime  ticagrelor 90 milliGRAM(s) Oral every 12 hours  valsartan 40 milliGRAM(s) Oral two times a day    MEDICATIONS  (PRN):  acetaminophen     Tablet .. 650 milliGRAM(s) Oral every 6 hours PRN Temp greater or equal to 38C (100.4F), Mild Pain (1 - 3)  artificial tears (preservative free) Ophthalmic Solution 1 Drop(s) Both EYES three times a day PRN Dry Eyes  dextrose Oral Gel 15 Gram(s) Oral once PRN Blood Glucose LESS THAN 70 milliGRAM(s)/deciliter  melatonin 5 milliGRAM(s) Oral at bedtime PRN Insomnia      T(C): 36.9 (04-07-23 @ 05:13), Max: 37 (04-06-23 @ 20:00)  HR: 100 (04-07-23 @ 05:13) (77 - 100)  BP: 108/76 (04-07-23 @ 05:13) (102/689 - 135/91)  RR: 18 (04-07-23 @ 05:13) (11 - 21)  SpO2: 98% (04-07-23 @ 05:13) (95% - 99%)  CAPILLARY BLOOD GLUCOSE      POCT Blood Glucose.: 134 mg/dL (07 Apr 2023 08:06)  POCT Blood Glucose.: 221 mg/dL (06 Apr 2023 21:50)  POCT Blood Glucose.: 142 mg/dL (06 Apr 2023 13:00)    I&O's Summary    06 Apr 2023 07:01  -  07 Apr 2023 07:00  --------------------------------------------------------  IN: 1020 mL / OUT: 0 mL / NET: 1020 mL        PHYSICAL EXAM:  Constitutional: NAD.   HEENT: AT/NC, EOMI, Supple neck;  Respiratory: no wheezing or crackles. no increase in WOB  Cardiovascular: tachycardic, S1, S2, no M/R/G. 2+ distal pulses. no JVD, no LE edema.  Procedure site: Right radial c/d/i, no hematoma, no hemorrhage. no skin changes. distal pulse intact. distal motor and sensory intact.  Gastrointestinal: soft; NT/ND, +BS  Extremities: no cyanosis; non-tender to palpation, DP and Radial pulses intact.  Neurological: A&Ox 3;  Psychiatric: normal mood/affect.    LABS:    04-07    136  |  101  |  13  ----------------------------<  188<H>  3.9   |  20<L>  |  0.76    Ca    9.2      07 Apr 2023 06:57        CARDIAC MARKERS ( 06 Apr 2023 07:22 )  x     / x     / 64 U/L / x     / 3.3 ng/mL          RADIOLOGY & ADDITIONAL TESTS:    Imaging Personally Reviewed: NA    Consultant(s) Notes Reviewed:  NA    Care Discussed with Consultants/Other Providers: EL

## 2023-04-08 LAB
ANION GAP SERPL CALC-SCNC: 11 MMOL/L — SIGNIFICANT CHANGE UP (ref 5–17)
BUN SERPL-MCNC: 11 MG/DL — SIGNIFICANT CHANGE UP (ref 7–23)
CALCIUM SERPL-MCNC: 9 MG/DL — SIGNIFICANT CHANGE UP (ref 8.4–10.5)
CHLORIDE SERPL-SCNC: 103 MMOL/L — SIGNIFICANT CHANGE UP (ref 96–108)
CO2 SERPL-SCNC: 22 MMOL/L — SIGNIFICANT CHANGE UP (ref 22–31)
CREAT SERPL-MCNC: 0.82 MG/DL — SIGNIFICANT CHANGE UP (ref 0.5–1.3)
EGFR: 106 ML/MIN/1.73M2 — SIGNIFICANT CHANGE UP
GLUCOSE BLDC GLUCOMTR-MCNC: 126 MG/DL — HIGH (ref 70–99)
GLUCOSE BLDC GLUCOMTR-MCNC: 209 MG/DL — HIGH (ref 70–99)
GLUCOSE BLDC GLUCOMTR-MCNC: 270 MG/DL — HIGH (ref 70–99)
GLUCOSE BLDC GLUCOMTR-MCNC: 279 MG/DL — HIGH (ref 70–99)
GLUCOSE SERPL-MCNC: 133 MG/DL — HIGH (ref 70–99)
HCT VFR BLD CALC: 44.5 % — SIGNIFICANT CHANGE UP (ref 39–50)
HGB BLD-MCNC: 14.8 G/DL — SIGNIFICANT CHANGE UP (ref 13–17)
MAGNESIUM SERPL-MCNC: 1.8 MG/DL — SIGNIFICANT CHANGE UP (ref 1.6–2.6)
MCHC RBC-ENTMCNC: 26.6 PG — LOW (ref 27–34)
MCHC RBC-ENTMCNC: 33.3 GM/DL — SIGNIFICANT CHANGE UP (ref 32–36)
MCV RBC AUTO: 80 FL — SIGNIFICANT CHANGE UP (ref 80–100)
NRBC # BLD: 0 /100 WBCS — SIGNIFICANT CHANGE UP (ref 0–0)
PHOSPHATE SERPL-MCNC: 4.2 MG/DL — SIGNIFICANT CHANGE UP (ref 2.5–4.5)
PLATELET # BLD AUTO: 261 K/UL — SIGNIFICANT CHANGE UP (ref 150–400)
POTASSIUM SERPL-MCNC: 3.8 MMOL/L — SIGNIFICANT CHANGE UP (ref 3.5–5.3)
POTASSIUM SERPL-SCNC: 3.8 MMOL/L — SIGNIFICANT CHANGE UP (ref 3.5–5.3)
RBC # BLD: 5.56 M/UL — SIGNIFICANT CHANGE UP (ref 4.2–5.8)
RBC # FLD: 13.3 % — SIGNIFICANT CHANGE UP (ref 10.3–14.5)
SODIUM SERPL-SCNC: 136 MMOL/L — SIGNIFICANT CHANGE UP (ref 135–145)
WBC # BLD: 5.69 K/UL — SIGNIFICANT CHANGE UP (ref 3.8–10.5)
WBC # FLD AUTO: 5.69 K/UL — SIGNIFICANT CHANGE UP (ref 3.8–10.5)

## 2023-04-08 PROCEDURE — 99232 SBSQ HOSP IP/OBS MODERATE 35: CPT | Mod: GC

## 2023-04-08 RX ADMIN — INSULIN GLARGINE 12 UNIT(S): 100 INJECTION, SOLUTION SUBCUTANEOUS at 21:53

## 2023-04-08 RX ADMIN — VALSARTAN 40 MILLIGRAM(S): 80 TABLET ORAL at 17:11

## 2023-04-08 RX ADMIN — PANTOPRAZOLE SODIUM 40 MILLIGRAM(S): 20 TABLET, DELAYED RELEASE ORAL at 06:07

## 2023-04-08 RX ADMIN — Medication 3: at 12:19

## 2023-04-08 RX ADMIN — Medication 3 UNIT(S): at 16:52

## 2023-04-08 RX ADMIN — Medication 81 MILLIGRAM(S): at 08:51

## 2023-04-08 RX ADMIN — TAMSULOSIN HYDROCHLORIDE 0.4 MILLIGRAM(S): 0.4 CAPSULE ORAL at 21:54

## 2023-04-08 RX ADMIN — TICAGRELOR 90 MILLIGRAM(S): 90 TABLET ORAL at 08:50

## 2023-04-08 RX ADMIN — TICAGRELOR 90 MILLIGRAM(S): 90 TABLET ORAL at 21:54

## 2023-04-08 RX ADMIN — SENNA PLUS 2 TABLET(S): 8.6 TABLET ORAL at 21:54

## 2023-04-08 RX ADMIN — Medication 200 MILLIGRAM(S): at 08:50

## 2023-04-08 RX ADMIN — Medication 3: at 16:51

## 2023-04-08 RX ADMIN — Medication 3 UNIT(S): at 12:20

## 2023-04-08 RX ADMIN — Medication 3 UNIT(S): at 08:09

## 2023-04-08 RX ADMIN — ATORVASTATIN CALCIUM 40 MILLIGRAM(S): 80 TABLET, FILM COATED ORAL at 21:54

## 2023-04-08 RX ADMIN — VALSARTAN 40 MILLIGRAM(S): 80 TABLET ORAL at 08:51

## 2023-04-08 NOTE — PROGRESS NOTE ADULT - PROVIDER SPECIALTY LIST ADULT
Internal Medicine
Cardiology
Internal Medicine
Internal Medicine
Intervent Cardiology
Cardiology
Cardiology
Internal Medicine
Internal Medicine
Cardiology
Internal Medicine

## 2023-04-08 NOTE — PROGRESS NOTE ADULT - ATTENDING COMMENTS
52 year old man 3 months since coronary revascularization surgery presents with increasingly frequent and severe chest pain for several days. Troponin elevated, EKG abnormal, though non-specific. Cardiac echo with preserved systolic function. Coronary angiography with occlusion of all grafts. LIMA occluded at distal anastomosis to LAD with diffuse distal LAD disease. Considering percutaneous interventions to improve myocardial perfusion and medication adjustments.    To contact call Cardiology Fellow or Attending as listed on [email or web address] password: [email or web address].
52 year old man 3 months since coronary revascularization surgery presents with increasingly frequent and severe chest pain for several days. Troponin elevated, EKG abnormal, though non-specific. Cardiac echo with preserved systolic function. Coronary angiography with occlusion of all grafts. LIMA occluded at distal anastomosis to LAD with diffuse distal LAD disease. Considering percutaneous interventions to improve myocardial perfusion, but first evaluating viability of LAD distribution with cardiac MRI. Also plan medication adjustments.    To contact call Cardiology Fellow or Attending as listed on [email or web address] password: [email or web address].
52 year old man 3 months since coronary revascularization surgery presents with increasingly frequent and severe chest pain for several days. Troponin elevated, EKG abnormal, though non-specific. Coronary angiography with occlusion of all grafts. LIMA occluded at distal anastomosis to LAD with diffuse distal LAD disease. Considering percutaneous interventions to improve myocardial perfusion, cardiac MRI indicates viability of LAD distribution and also severe LV dysfunction. Transitioning from ACE-i to ARB with plan to subsequently start ARNI, sacubitril/valsartan. After discharge will follow up with Dr. Andre Laughlin and should consider referral to lipid specialist, Dr. Luis Enrique Louie since despite LDL 39 has severe progression of atherosclerotic disease.    To contact call Cardiology Fellow or Attending as listed on [email or web address] password: [email or web address].
Mr. Montez reports no chest pain or dyspnea at this time. His LV systolic function is severely reduced. Continue DAPT, statin, and GDMT for LV systolic dysfunction as above.    A total of 35 minutes was spent on this patient encounter.

## 2023-04-08 NOTE — PROGRESS NOTE ADULT - SUBJECTIVE AND OBJECTIVE BOX
Patient is a 52y old  Male who presents with a chief complaint of CP (08 Apr 2023 10:53)      INTERVAL HPI/OVERNIGHT EVENTS: doing fair , no CP  T(C): 37 (04-08-23 @ 17:10), Max: 37 (04-08-23 @ 17:10)  HR: 84 (04-08-23 @ 17:10) (84 - 105)  BP: 105/83 (04-08-23 @ 17:10) (93/64 - 118/86)  RR: 18 (04-08-23 @ 17:10) (18 - 18)  SpO2: 98% (04-08-23 @ 17:10) (96% - 100%)  Wt(kg): --  I&O's Summary    07 Apr 2023 07:01  -  08 Apr 2023 07:00  --------------------------------------------------------  IN: 400 mL / OUT: 0 mL / NET: 400 mL        PAST MEDICAL & SURGICAL HISTORY:  Hypertension      Diabetes mellitus      GERD (gastroesophageal reflux disease)      No significant past surgical history      No significant past surgical history          SOCIAL HISTORY  Alcohol:  Tobacco:  Illicit substance use:    FAMILY HISTORY:    REVIEW OF SYSTEMS:  CONSTITUTIONAL: No fever, weight loss, or fatigue  EYES: No eye pain, visual disturbances, or discharge  ENMT:  No difficulty hearing, tinnitus, vertigo; No sinus or throat pain  NECK: No pain or stiffness  RESPIRATORY: No cough, wheezing, chills or hemoptysis; No shortness of breath  CARDIOVASCULAR: No chest pain, palpitations, dizziness, or leg swelling  GASTROINTESTINAL: No abdominal or epigastric pain. No nausea, vomiting, or hematemesis; No diarrhea or constipation. No melena or hematochezia.  GENITOURINARY: No dysuria, frequency, hematuria, or incontinence  NEUROLOGICAL: No headaches, memory loss, loss of strength, numbness, or tremors  SKIN: No itching, burning, rashes, or lesions   LYMPH NODES: No enlarged glands  ENDOCRINE: No heat or cold intolerance; No hair loss  MUSCULOSKELETAL: No joint pain or swelling; No muscle, back, or extremity pain  PSYCHIATRIC: No depression, anxiety, mood swings, or difficulty sleeping  HEME/LYMPH: No easy bruising, or bleeding gums  ALLERY AND IMMUNOLOGIC: No hives or eczema    RADIOLOGY & ADDITIONAL TESTS:    Imaging Personally Reviewed:  [ ] YES  [ ] NO    Consultant(s) Notes Reviewed:  [ ] YES  [ ] NO    PHYSICAL EXAM:  GENERAL: NAD, well-groomed, well-developed  HEAD:  Atraumatic, Normocephalic  EYES: EOMI, PERRLA, conjunctiva and sclera clear  ENMT: No tonsillar erythema, exudates, or enlargement; Moist mucous membranes, Good dentition, No lesions  NECK: Supple, No JVD, Normal thyroid  NERVOUS SYSTEM:  Alert & Oriented X3, Good concentration; Motor Strength 5/5 B/L upper and lower extremities; DTRs 2+ intact and symmetric  CHEST/LUNG: Clear to percussion bilaterally; No rales, rhonchi, wheezing, or rubs  HEART: Regular rate and rhythm; No murmurs, rubs, or gallops  ABDOMEN: Soft, Nontender, Nondistended; Bowel sounds present  EXTREMITIES:  2+ Peripheral Pulses, No clubbing, cyanosis, or edema  LYMPH: No lymphadenopathy noted  SKIN: No rashes or lesions    LABS:                        14.8   5.69  )-----------( 261      ( 08 Apr 2023 07:09 )             44.5     04-08    136  |  103  |  11  ----------------------------<  133<H>  3.8   |  22  |  0.82    Ca    9.0      08 Apr 2023 07:08  Phos  4.2     04-08  Mg     1.8     04-08          CAPILLARY BLOOD GLUCOSE      POCT Blood Glucose.: 270 mg/dL (08 Apr 2023 16:39)  POCT Blood Glucose.: 279 mg/dL (08 Apr 2023 12:13)  POCT Blood Glucose.: 126 mg/dL (08 Apr 2023 08:01)  POCT Blood Glucose.: 211 mg/dL (07 Apr 2023 22:12)  POCT Blood Glucose.: 206 mg/dL (07 Apr 2023 21:08)            MEDICATIONS  (STANDING):  aspirin enteric coated 81 milliGRAM(s) Oral daily  atorvastatin 40 milliGRAM(s) Oral at bedtime  dextrose 5%. 1000 milliLiter(s) (100 mL/Hr) IV Continuous <Continuous>  dextrose 5%. 1000 milliLiter(s) (50 mL/Hr) IV Continuous <Continuous>  dextrose 50% Injectable 25 Gram(s) IV Push once  dextrose 50% Injectable 12.5 Gram(s) IV Push once  dextrose 50% Injectable 25 Gram(s) IV Push once  glucagon  Injectable 1 milliGRAM(s) IntraMuscular once  insulin glargine Injectable (LANTUS) 12 Unit(s) SubCutaneous at bedtime  insulin lispro (ADMELOG) corrective regimen sliding scale   SubCutaneous three times a day before meals  insulin lispro (ADMELOG) corrective regimen sliding scale   SubCutaneous at bedtime  insulin lispro Injectable (ADMELOG) 3 Unit(s) SubCutaneous three times a day before meals  metoprolol succinate  milliGRAM(s) Oral daily  pantoprazole    Tablet 40 milliGRAM(s) Oral before breakfast  senna 2 Tablet(s) Oral at bedtime  tamsulosin 0.4 milliGRAM(s) Oral at bedtime  ticagrelor 90 milliGRAM(s) Oral every 12 hours  valsartan 40 milliGRAM(s) Oral two times a day    MEDICATIONS  (PRN):  acetaminophen     Tablet .. 650 milliGRAM(s) Oral every 6 hours PRN Temp greater or equal to 38C (100.4F), Mild Pain (1 - 3)  artificial tears (preservative free) Ophthalmic Solution 1 Drop(s) Both EYES three times a day PRN Dry Eyes  dextrose Oral Gel 15 Gram(s) Oral once PRN Blood Glucose LESS THAN 70 milliGRAM(s)/deciliter  melatonin 5 milliGRAM(s) Oral at bedtime PRN Insomnia      Care Discussed with Consultants/Other Providers [ ] YES  [ ] NO

## 2023-04-08 NOTE — PROGRESS NOTE ADULT - ASSESSMENT
52-year-old male with a past medical history of CAD status post CABG 3 months ago, HTN, DM 2, GERD presents to the ED with 1 week history of chest pain. s/p LHC finding early graft closure with progression of coronary disease in native valves.    #Multivessel CAD.  - Patient w/ hx of CAD s/p CABG in 12/2022 presented with recurrence of chest pain, found to have all grafts no longer functional and progression of his native coronary disease.  - c/w ASA/Brilinta.   - c/w high intensity statin.  - c/w metoprolol succinate 200mg daily  - c/w valsartan 40mg BID. Switch to entresto prior to discharge, possibly tomorrow   - Continue to monitor on telemetry  - Echo reviewed with echo attending and interventional cardiology attending  - cMR resulted with viable myocardium.  - s/p PCI to pOM1 w/ CHERRI 4/6.   - s/p staged PCI of LIMA graft to LAD 4/7.  - TTE 4/6 with LVEF 28%  - Monitor on Telemetry  - Keep K > 4, Mag > 2      Tex Acosta MD  Cardiology Fellow - PGY 5  For all New Consults and Questions:  [email or web address]   Login: [email or web address] [email or web address] 52-year-old male with a past medical history of CAD status post CABG 3 months ago, HTN, DM 2, history of tobacco use, and GERD presents to the ED with 1 week history of chest pain. s/p LHC finding early graft closure with progression of coronary disease in native valves.    #Multivessel CAD.  - Patient w/ hx of CAD s/p CABG in 12/2022 presented with recurrence of chest pain, found to have all grafts no longer functional and progression of his native coronary disease.  - c/w ASA/Brilinta.   - c/w high intensity statin.  - c/w metoprolol succinate 200mg daily  - c/w valsartan 40mg BID. Switch to entresto prior to discharge, possibly tomorrow   - Continue to monitor on telemetry  - Echo reviewed with echo attending and interventional cardiology attending  - cMR resulted with viable myocardium.  - s/p PCI to pOM1 w/ CHERRI 4/6.   - s/p staged PCI of LIMA graft to LAD 4/7.  - TTE 4/6 with LVEF 28%  - Monitor on Telemetry  - Keep K > 4, Mag > 2      Tex Acosta MD  Cardiology Fellow - PGY 5  For all New Consults and Questions:  [email or web address]   Login: [email or web address]

## 2023-04-08 NOTE — PROGRESS NOTE ADULT - SUBJECTIVE AND OBJECTIVE BOX
Interventional Cardiology Post Cath Progress Note:                Subjective:   Patient feels well- no current complaints- Denies chest pain, shortness of breath. Denies pain, numbness, tingling or swelling around (groin/wrist) access site    Tele 24hrs:      MEDICATIONS  (STANDING):  aspirin enteric coated 81 milliGRAM(s) Oral daily  atorvastatin 40 milliGRAM(s) Oral at bedtime  dextrose 5%. 1000 milliLiter(s) (50 mL/Hr) IV Continuous <Continuous>  dextrose 5%. 1000 milliLiter(s) (100 mL/Hr) IV Continuous <Continuous>  dextrose 50% Injectable 25 Gram(s) IV Push once  dextrose 50% Injectable 12.5 Gram(s) IV Push once  dextrose 50% Injectable 25 Gram(s) IV Push once  glucagon  Injectable 1 milliGRAM(s) IntraMuscular once  insulin glargine Injectable (LANTUS) 12 Unit(s) SubCutaneous at bedtime  insulin lispro (ADMELOG) corrective regimen sliding scale   SubCutaneous three times a day before meals  insulin lispro (ADMELOG) corrective regimen sliding scale   SubCutaneous at bedtime  insulin lispro Injectable (ADMELOG) 3 Unit(s) SubCutaneous three times a day before meals  metoprolol succinate  milliGRAM(s) Oral daily  pantoprazole    Tablet 40 milliGRAM(s) Oral before breakfast  senna 2 Tablet(s) Oral at bedtime  tamsulosin 0.4 milliGRAM(s) Oral at bedtime  ticagrelor 90 milliGRAM(s) Oral every 12 hours  valsartan 40 milliGRAM(s) Oral two times a day    MEDICATIONS  (PRN):  acetaminophen     Tablet .. 650 milliGRAM(s) Oral every 6 hours PRN Temp greater or equal to 38C (100.4F), Mild Pain (1 - 3)  artificial tears (preservative free) Ophthalmic Solution 1 Drop(s) Both EYES three times a day PRN Dry Eyes  dextrose Oral Gel 15 Gram(s) Oral once PRN Blood Glucose LESS THAN 70 milliGRAM(s)/deciliter  melatonin 5 milliGRAM(s) Oral at bedtime PRN Insomnia      Objective:  Vital Signs Last 24 Hrs  T(C): 36.7 (08 Apr 2023 04:55), Max: 37.1 (07 Apr 2023 17:15)  T(F): 98.1 (08 Apr 2023 04:55), Max: 98.7 (07 Apr 2023 17:15)  HR: 96 (08 Apr 2023 08:42) (76 - 105)  BP: 104/70 (08 Apr 2023 08:42) (93/64 - 134/91)  BP(mean): 105 (07 Apr 2023 15:55) (90 - 106)  RR: 18 (08 Apr 2023 08:42) (15 - 18)  SpO2: 98% (08 Apr 2023 08:42) (96% - 100%)    Parameters below as of 08 Apr 2023 08:42  Patient On (Oxygen Delivery Method): room air        04-07-23 @ 07:01  -  04-08-23 @ 07:00  --------------------------------------------------------  IN: 400 mL / OUT: 0 mL / NET: 400 mL                              14.8   5.69  )-----------( 261      ( 08 Apr 2023 07:09 )             44.5     04-08    136  |  103  |  11  ----------------------------<  133<H>  3.8   |  22  |  0.82    Ca    9.0      08 Apr 2023 07:08  Phos  4.2     04-08  Mg     1.8     04-08          Physical Exam:  No apparent distress, alert and oriented times three, appropriate affect  Right groin: Soft, non tender, no bleeding or hematoma, clean/dry/intact- RLE/LLE +2 (palpable femoral pulse and DP pulse  No clubbing, cyanosis or edema      Assessment/Plan:   HPI:  Pt is a 51yo M w/ PMH of HTN, HLD, DM2, CAD s/p CABG on 12/19/22 pw CP.     States he has been having progressively worsening CP for the past 10 days. Pain is located in the anterior chest b/l w/ radiation towards the L arm. Pain is intermittent occurring for brief periods throughout the day, rated as a 10/10 at its worst and worsened w/ movement and eating. He initially presented to his PMD one week ago but d/t persistent pain, he presented to the ED today. In the ED, pain is improved initially a 7/10 now "tolerable" stating the pain is worse w/ palpation of the chest and w/ eating, thus he's had decreased PO intake and [place] after he eats.       - 4/7 PCI to LIMA to LAD via LFA  - groin site is stable.   - Continue DAPT (aspirin 81mg and Brilinta 90mg)  - Continue statin  - Recommend a heart healthy diet which includes a variety of fruits and vegetables, whole grains, low fat dairy products, legumes and skinless poultry and fish; food prepared with little or no salt and minimize processed foods  - Avoid using NSAIDs  (Aleve, Motrin, ibuprofen, naproxen) while on DAPT, please utilize Tylenol for pain control (not to exceed 4gm in 24 hours)  - Patient aware to take DAPT  as prescribed and DO NOT STOP taking without consulting cardiologist first or STENT/s WILL CLOSE  - Reviewed and reinforced with patient:  site complications ( eg: bleeding, excruciating pain at the procedural site, large swelling ball size-  extremity numbness, tingling, temperature change), or CHEST PAIN; pt aware that if any of those occur he/she must call cardiologist IMMEDIATELY or 911 or go to nearest emergency room   - Patient verbalizes understanding of ALL OF THE ABOVE, and gives positive feedback   -please make sure DAPT is prescribed to pt's preferred pharmacy on dc   -f/u appt in 2 weeks post dc with outpt cardiologist  - Keep Mg >2 K>4   - cont to monitor on tele  - all other care as per primary     Please check [email or web address] password [email or web address] for cardiology service schedule and contact information via TEAMS.

## 2023-04-08 NOTE — PROGRESS NOTE ADULT - ASSESSMENT
51yo M w/ PMH of HTN, HLD, DM2, CAD s/p CABG on 12/19/22 pw CP w/ ECG c/f diffuse ST changes and rising trops though ttp on exam      # Acute non-ST elevation myocardial infarction (NSTEMI).   - s/p ASA, Brilinta load in the ED  - c/w ASA 81mg qd and Brilinta 90mg BID  - c/w Atorvastatin 40mg qhs   - c/w Lisinopril 2.5mg qd  -  Torpol 200mg qd   - f/u official TTE (POCUS w. trace effusion and anterior wall hypokinesis) to assess for WMA    s/p cardiac cath ( diagnostic) done today : shows significant progression of coronaries dis obstruction and occlusion of graft   f/u with cardiology for further medical Rx   off heparin GTT   c/w asa/brilinta     # Type 2 diabetes mellitus, with long-term current use of insulin.   -c/w lantus / FSBS with RICss  - Diabetes education.    # Hypertension.   - c/w home Lisinopril 2.5mg qd w/ hold parameters  - Toprol 200mg qd as above w/ hold parameters.    # CAD (coronary artery disease).    - ASA 81mg qd.  started on brilinta     dispo: pt. is cleared to be d/c home

## 2023-04-08 NOTE — PROGRESS NOTE ADULT - SUBJECTIVE AND OBJECTIVE BOX
Patient seen and examined at bedside.    Overnight Events: No acute events.     Review Of Systems: No chest pain, shortness of breath, or palpitations            Current Meds:  acetaminophen     Tablet .. 650 milliGRAM(s) Oral every 6 hours PRN  artificial tears (preservative free) Ophthalmic Solution 1 Drop(s) Both EYES three times a day PRN  aspirin enteric coated 81 milliGRAM(s) Oral daily  atorvastatin 40 milliGRAM(s) Oral at bedtime  dextrose 5%. 1000 milliLiter(s) IV Continuous <Continuous>  dextrose 5%. 1000 milliLiter(s) IV Continuous <Continuous>  dextrose 50% Injectable 25 Gram(s) IV Push once  dextrose 50% Injectable 12.5 Gram(s) IV Push once  dextrose 50% Injectable 25 Gram(s) IV Push once  dextrose Oral Gel 15 Gram(s) Oral once PRN  glucagon  Injectable 1 milliGRAM(s) IntraMuscular once  insulin glargine Injectable (LANTUS) 12 Unit(s) SubCutaneous at bedtime  insulin lispro (ADMELOG) corrective regimen sliding scale   SubCutaneous three times a day before meals  insulin lispro (ADMELOG) corrective regimen sliding scale   SubCutaneous at bedtime  insulin lispro Injectable (ADMELOG) 3 Unit(s) SubCutaneous three times a day before meals  melatonin 5 milliGRAM(s) Oral at bedtime PRN  metoprolol succinate  milliGRAM(s) Oral daily  pantoprazole    Tablet 40 milliGRAM(s) Oral before breakfast  senna 2 Tablet(s) Oral at bedtime  tamsulosin 0.4 milliGRAM(s) Oral at bedtime  ticagrelor 90 milliGRAM(s) Oral every 12 hours  valsartan 40 milliGRAM(s) Oral two times a day      Vitals:  T(F): 98.1 (04-08), Max: 98.7 (04-07)  HR: 104 (04-08) (76 - 105)  BP: 93/64 (04-08) (93/64 - 134/91)  RR: 18 (04-08)  SpO2: 97% (04-08)  I&O's Summary    07 Apr 2023 07:01  -  08 Apr 2023 07:00  --------------------------------------------------------  IN: 400 mL / OUT: 0 mL / NET: 400 mL        Physical Exam:  Constitutional: NAD.   HEENT: AT/NC, EOMI, Supple neck;  Respiratory: no wheezing or crackles. no increase in WOB  Cardiovascular: tachycardic, S1, S2, no M/R/G. 2+ distal pulses. no JVD, no LE edema.  Procedure site: Right radial c/d/i, no hematoma, no hemorrhage. no skin changes. distal pulse intact. distal motor and sensory intact.  Gastrointestinal: soft; NT/ND, +BS  Extremities: no cyanosis; non-tender to palpation, DP and Radial pulses intact.  Neurological: A&Ox 3;  Psychiatric: normal mood/affect.    04-07    136  |  101  |  13  ----------------------------<  188<H>  3.9   |  20<L>  |  0.76    Ca    9.2      07 Apr 2023 06:57        CARDIAC MARKERS ( 06 Apr 2023 07:22 )  366 ng/L / x     / x     / 64 U/L / x     / 3.3 ng/mL  CARDIAC MARKERS ( 03 Apr 2023 06:55 )  465 ng/L / x     / x     / 163 U/L / x     / 17.0 ng/mL  CARDIAC MARKERS ( 02 Apr 2023 22:48 )  413 ng/L / x     / x     / 181 U/L / x     / 20.2 ng/mL  CARDIAC MARKERS ( 02 Apr 2023 17:50 )  453 ng/L / x     / x     / x     / x     / x      CARDIAC MARKERS ( 02 Apr 2023 14:31 )  412 ng/L / x     / x     / 190 U/L / x     / 20.3 ng/mL

## 2023-04-09 ENCOUNTER — TRANSCRIPTION ENCOUNTER (OUTPATIENT)
Age: 53
End: 2023-04-09

## 2023-04-09 VITALS
SYSTOLIC BLOOD PRESSURE: 111 MMHG | HEART RATE: 70 BPM | OXYGEN SATURATION: 97 % | TEMPERATURE: 97 F | RESPIRATION RATE: 18 BRPM | DIASTOLIC BLOOD PRESSURE: 78 MMHG

## 2023-04-09 LAB
ANION GAP SERPL CALC-SCNC: 12 MMOL/L — SIGNIFICANT CHANGE UP (ref 5–17)
BUN SERPL-MCNC: 11 MG/DL — SIGNIFICANT CHANGE UP (ref 7–23)
CALCIUM SERPL-MCNC: 9.3 MG/DL — SIGNIFICANT CHANGE UP (ref 8.4–10.5)
CHLORIDE SERPL-SCNC: 102 MMOL/L — SIGNIFICANT CHANGE UP (ref 96–108)
CO2 SERPL-SCNC: 22 MMOL/L — SIGNIFICANT CHANGE UP (ref 22–31)
CREAT SERPL-MCNC: 0.75 MG/DL — SIGNIFICANT CHANGE UP (ref 0.5–1.3)
EGFR: 109 ML/MIN/1.73M2 — SIGNIFICANT CHANGE UP
GLUCOSE BLDC GLUCOMTR-MCNC: 122 MG/DL — HIGH (ref 70–99)
GLUCOSE BLDC GLUCOMTR-MCNC: 99 MG/DL — SIGNIFICANT CHANGE UP (ref 70–99)
GLUCOSE SERPL-MCNC: 106 MG/DL — HIGH (ref 70–99)
HCT VFR BLD CALC: 44.4 % — SIGNIFICANT CHANGE UP (ref 39–50)
HGB BLD-MCNC: 14.5 G/DL — SIGNIFICANT CHANGE UP (ref 13–17)
MAGNESIUM SERPL-MCNC: 1.9 MG/DL — SIGNIFICANT CHANGE UP (ref 1.6–2.6)
MCHC RBC-ENTMCNC: 26.3 PG — LOW (ref 27–34)
MCHC RBC-ENTMCNC: 32.7 GM/DL — SIGNIFICANT CHANGE UP (ref 32–36)
MCV RBC AUTO: 80.6 FL — SIGNIFICANT CHANGE UP (ref 80–100)
NRBC # BLD: 0 /100 WBCS — SIGNIFICANT CHANGE UP (ref 0–0)
PHOSPHATE SERPL-MCNC: 4.2 MG/DL — SIGNIFICANT CHANGE UP (ref 2.5–4.5)
PLATELET # BLD AUTO: 254 K/UL — SIGNIFICANT CHANGE UP (ref 150–400)
POTASSIUM SERPL-MCNC: 3.7 MMOL/L — SIGNIFICANT CHANGE UP (ref 3.5–5.3)
POTASSIUM SERPL-SCNC: 3.7 MMOL/L — SIGNIFICANT CHANGE UP (ref 3.5–5.3)
RBC # BLD: 5.51 M/UL — SIGNIFICANT CHANGE UP (ref 4.2–5.8)
RBC # FLD: 13.2 % — SIGNIFICANT CHANGE UP (ref 10.3–14.5)
SODIUM SERPL-SCNC: 136 MMOL/L — SIGNIFICANT CHANGE UP (ref 135–145)
WBC # BLD: 6.11 K/UL — SIGNIFICANT CHANGE UP (ref 3.8–10.5)
WBC # FLD AUTO: 6.11 K/UL — SIGNIFICANT CHANGE UP (ref 3.8–10.5)

## 2023-04-09 PROCEDURE — C1874: CPT

## 2023-04-09 PROCEDURE — 84132 ASSAY OF SERUM POTASSIUM: CPT

## 2023-04-09 PROCEDURE — 83690 ASSAY OF LIPASE: CPT

## 2023-04-09 PROCEDURE — 84295 ASSAY OF SERUM SODIUM: CPT

## 2023-04-09 PROCEDURE — 82550 ASSAY OF CK (CPK): CPT

## 2023-04-09 PROCEDURE — 85610 PROTHROMBIN TIME: CPT

## 2023-04-09 PROCEDURE — 82947 ASSAY GLUCOSE BLOOD QUANT: CPT

## 2023-04-09 PROCEDURE — 80053 COMPREHEN METABOLIC PANEL: CPT

## 2023-04-09 PROCEDURE — 85014 HEMATOCRIT: CPT

## 2023-04-09 PROCEDURE — 85027 COMPLETE CBC AUTOMATED: CPT

## 2023-04-09 PROCEDURE — 82553 CREATINE MB FRACTION: CPT

## 2023-04-09 PROCEDURE — 84443 ASSAY THYROID STIM HORMONE: CPT

## 2023-04-09 PROCEDURE — 85025 COMPLETE CBC W/AUTO DIFF WBC: CPT

## 2023-04-09 PROCEDURE — C1725: CPT

## 2023-04-09 PROCEDURE — C8929: CPT

## 2023-04-09 PROCEDURE — 82330 ASSAY OF CALCIUM: CPT

## 2023-04-09 PROCEDURE — 36415 COLL VENOUS BLD VENIPUNCTURE: CPT

## 2023-04-09 PROCEDURE — 85730 THROMBOPLASTIN TIME PARTIAL: CPT

## 2023-04-09 PROCEDURE — 75561 CARDIAC MRI FOR MORPH W/DYE: CPT

## 2023-04-09 PROCEDURE — 93005 ELECTROCARDIOGRAM TRACING: CPT

## 2023-04-09 PROCEDURE — 87637 SARSCOV2&INF A&B&RSV AMP PRB: CPT

## 2023-04-09 PROCEDURE — 85520 HEPARIN ASSAY: CPT

## 2023-04-09 PROCEDURE — C1894: CPT

## 2023-04-09 PROCEDURE — 82803 BLOOD GASES ANY COMBINATION: CPT

## 2023-04-09 PROCEDURE — 82435 ASSAY OF BLOOD CHLORIDE: CPT

## 2023-04-09 PROCEDURE — 83036 HEMOGLOBIN GLYCOSYLATED A1C: CPT

## 2023-04-09 PROCEDURE — 83605 ASSAY OF LACTIC ACID: CPT

## 2023-04-09 PROCEDURE — 82962 GLUCOSE BLOOD TEST: CPT

## 2023-04-09 PROCEDURE — 80061 LIPID PANEL: CPT

## 2023-04-09 PROCEDURE — 85018 HEMOGLOBIN: CPT

## 2023-04-09 PROCEDURE — 93455 CORONARY ART/GRFT ANGIO S&I: CPT

## 2023-04-09 PROCEDURE — C9600: CPT | Mod: LC

## 2023-04-09 PROCEDURE — 96365 THER/PROPH/DIAG IV INF INIT: CPT

## 2023-04-09 PROCEDURE — 86140 C-REACTIVE PROTEIN: CPT

## 2023-04-09 PROCEDURE — A9585: CPT

## 2023-04-09 PROCEDURE — 82565 ASSAY OF CREATININE: CPT

## 2023-04-09 PROCEDURE — C1769: CPT

## 2023-04-09 PROCEDURE — 84100 ASSAY OF PHOSPHORUS: CPT

## 2023-04-09 PROCEDURE — 99291 CRITICAL CARE FIRST HOUR: CPT

## 2023-04-09 PROCEDURE — 71045 X-RAY EXAM CHEST 1 VIEW: CPT

## 2023-04-09 PROCEDURE — 93567 NJX CAR CTH SPRVLV AORTGRPHY: CPT

## 2023-04-09 PROCEDURE — C1887: CPT

## 2023-04-09 PROCEDURE — 80048 BASIC METABOLIC PNL TOTAL CA: CPT

## 2023-04-09 PROCEDURE — 83880 ASSAY OF NATRIURETIC PEPTIDE: CPT

## 2023-04-09 PROCEDURE — 92937 PRQ TRLUML REVSC CAB GRF 1: CPT | Mod: LD

## 2023-04-09 PROCEDURE — 96366 THER/PROPH/DIAG IV INF ADDON: CPT

## 2023-04-09 PROCEDURE — 93308 TTE F-UP OR LMTD: CPT

## 2023-04-09 PROCEDURE — 84484 ASSAY OF TROPONIN QUANT: CPT

## 2023-04-09 PROCEDURE — 83735 ASSAY OF MAGNESIUM: CPT

## 2023-04-09 RX ORDER — POTASSIUM CHLORIDE 20 MEQ
40 PACKET (EA) ORAL ONCE
Refills: 0 | Status: COMPLETED | OUTPATIENT
Start: 2023-04-09 | End: 2023-04-09

## 2023-04-09 RX ORDER — LISINOPRIL 2.5 MG/1
1 TABLET ORAL
Refills: 0 | DISCHARGE

## 2023-04-09 RX ORDER — SACUBITRIL AND VALSARTAN 24; 26 MG/1; MG/1
1 TABLET, FILM COATED ORAL
Refills: 0 | Status: DISCONTINUED | OUTPATIENT
Start: 2023-04-09 | End: 2023-04-09

## 2023-04-09 RX ORDER — SACUBITRIL AND VALSARTAN 24; 26 MG/1; MG/1
1 TABLET, FILM COATED ORAL
Qty: 60 | Refills: 0
Start: 2023-04-09 | End: 2023-05-08

## 2023-04-09 RX ORDER — ATORVASTATIN CALCIUM 80 MG/1
1 TABLET, FILM COATED ORAL
Qty: 30 | Refills: 0
Start: 2023-04-09 | End: 2023-05-08

## 2023-04-09 RX ORDER — TICAGRELOR 90 MG/1
1 TABLET ORAL
Qty: 60 | Refills: 0
Start: 2023-04-09 | End: 2023-05-08

## 2023-04-09 RX ORDER — METOPROLOL TARTRATE 50 MG
1 TABLET ORAL
Qty: 60 | Refills: 0 | DISCHARGE

## 2023-04-09 RX ORDER — ASPIRIN/CALCIUM CARB/MAGNESIUM 324 MG
1 TABLET ORAL
Qty: 30 | Refills: 0
Start: 2023-04-09 | End: 2023-05-08

## 2023-04-09 RX ORDER — METOPROLOL TARTRATE 50 MG
1 TABLET ORAL
Qty: 30 | Refills: 0
Start: 2023-04-09 | End: 2023-05-08

## 2023-04-09 RX ORDER — ASPIRIN/CALCIUM CARB/MAGNESIUM 324 MG
1 TABLET ORAL
Refills: 0 | DISCHARGE

## 2023-04-09 RX ORDER — ATORVASTATIN CALCIUM 80 MG/1
1 TABLET, FILM COATED ORAL
Refills: 0 | DISCHARGE

## 2023-04-09 RX ADMIN — PANTOPRAZOLE SODIUM 40 MILLIGRAM(S): 20 TABLET, DELAYED RELEASE ORAL at 05:33

## 2023-04-09 RX ADMIN — SACUBITRIL AND VALSARTAN 1 TABLET(S): 24; 26 TABLET, FILM COATED ORAL at 17:05

## 2023-04-09 RX ADMIN — Medication 3 UNIT(S): at 12:23

## 2023-04-09 RX ADMIN — VALSARTAN 40 MILLIGRAM(S): 80 TABLET ORAL at 08:16

## 2023-04-09 RX ADMIN — Medication 200 MILLIGRAM(S): at 08:16

## 2023-04-09 RX ADMIN — Medication 40 MILLIEQUIVALENT(S): at 12:22

## 2023-04-09 RX ADMIN — Medication 81 MILLIGRAM(S): at 12:22

## 2023-04-09 RX ADMIN — TICAGRELOR 90 MILLIGRAM(S): 90 TABLET ORAL at 08:19

## 2023-04-09 RX ADMIN — Medication 3 UNIT(S): at 08:16

## 2023-04-09 NOTE — PROVIDER CONTACT NOTE (OTHER) - REASON
Pt BP 95/67, HR 88. Due for metoprolol and valsartan.
Pt /68 at 04:55, 93/64 at 05:59, HR 90's to low 100's on tele. Pt due for metoprolol and valsartan

## 2023-04-09 NOTE — DISCHARGE NOTE NURSING/CASE MANAGEMENT/SOCIAL WORK - PATIENT PORTAL LINK FT
You can access the FollowMyHealth Patient Portal offered by [facility] by registering at the following website: [email or web address]. By joining [email or web address]’s FollowMyHealth portal, you will also be able to view your health information using other applications (apps) compatible with our system.

## 2023-04-09 NOTE — DISCHARGE NOTE NURSING/CASE MANAGEMENT/SOCIAL WORK - NSTOBACCONEVERSMOKERY/N_GEN_A
The patient was told their pupil does not dilate well which carries an increased risk of surgical complications. Cyclogyl with surgery. Yes

## 2023-04-09 NOTE — PROVIDER CONTACT NOTE (OTHER) - SITUATION
Pt /68 at 04:55, 93/64 at 05:59, HR 90's to low 100's on tele. Pt due for metoprolol and valsartan
Pt BP 95/67, HR 88. Due for metoprolol and valsartan.

## 2023-04-10 ENCOUNTER — TRANSCRIPTION ENCOUNTER (OUTPATIENT)
Age: 53
End: 2023-04-10

## 2023-04-11 ENCOUNTER — TRANSCRIPTION ENCOUNTER (OUTPATIENT)
Age: 53
End: 2023-04-11

## 2023-04-11 ENCOUNTER — INPATIENT (INPATIENT)
Facility: HOSPITAL | Age: 53
LOS: 0 days | Discharge: HOME | DRG: 247 | End: 2023-04-12
Attending: INTERNAL MEDICINE | Admitting: INTERNAL MEDICINE
Payer: MEDICAID

## 2023-04-11 VITALS
HEIGHT: 70 IN | OXYGEN SATURATION: 98 % | RESPIRATION RATE: 18 BRPM | TEMPERATURE: 98 F | WEIGHT: 149.91 LBS | DIASTOLIC BLOOD PRESSURE: 80 MMHG | HEART RATE: 95 BPM | SYSTOLIC BLOOD PRESSURE: 110 MMHG

## 2023-04-11 DIAGNOSIS — Z95.1 PRESENCE OF AORTOCORONARY BYPASS GRAFT: Chronic | ICD-10-CM

## 2023-04-11 DIAGNOSIS — R07.9 CHEST PAIN, UNSPECIFIED: ICD-10-CM

## 2023-04-11 LAB
ALBUMIN SERPL ELPH-MCNC: 4.2 G/DL — SIGNIFICANT CHANGE UP (ref 3.3–5)
ALP SERPL-CCNC: 77 U/L — SIGNIFICANT CHANGE UP (ref 40–120)
ALT FLD-CCNC: 27 U/L — SIGNIFICANT CHANGE UP (ref 10–45)
ANION GAP SERPL CALC-SCNC: 14 MMOL/L — SIGNIFICANT CHANGE UP (ref 5–17)
APTT BLD: 33 SEC — SIGNIFICANT CHANGE UP (ref 27.5–35.5)
AST SERPL-CCNC: 68 U/L — HIGH (ref 10–40)
BASE EXCESS BLDV CALC-SCNC: -1.9 MMOL/L — SIGNIFICANT CHANGE UP (ref -2–3)
BASOPHILS # BLD AUTO: 0.03 K/UL — SIGNIFICANT CHANGE UP (ref 0–0.2)
BASOPHILS NFR BLD AUTO: 0.3 % — SIGNIFICANT CHANGE UP (ref 0–2)
BILIRUB SERPL-MCNC: 0.8 MG/DL — SIGNIFICANT CHANGE UP (ref 0.2–1.2)
BUN SERPL-MCNC: 14 MG/DL — SIGNIFICANT CHANGE UP (ref 7–23)
CA-I SERPL-SCNC: 1.21 MMOL/L — SIGNIFICANT CHANGE UP (ref 1.15–1.33)
CALCIUM SERPL-MCNC: 9.4 MG/DL — SIGNIFICANT CHANGE UP (ref 8.4–10.5)
CHLORIDE BLDV-SCNC: 97 MMOL/L — SIGNIFICANT CHANGE UP (ref 96–108)
CHLORIDE SERPL-SCNC: 99 MMOL/L — SIGNIFICANT CHANGE UP (ref 96–108)
CO2 BLDV-SCNC: 23 MMOL/L — SIGNIFICANT CHANGE UP (ref 22–26)
CO2 SERPL-SCNC: 19 MMOL/L — LOW (ref 22–31)
CREAT SERPL-MCNC: 0.73 MG/DL — SIGNIFICANT CHANGE UP (ref 0.5–1.3)
EGFR: 110 ML/MIN/1.73M2 — SIGNIFICANT CHANGE UP
EOSINOPHIL # BLD AUTO: 0.14 K/UL — SIGNIFICANT CHANGE UP (ref 0–0.5)
EOSINOPHIL NFR BLD AUTO: 1.4 % — SIGNIFICANT CHANGE UP (ref 0–6)
FLUAV AG NPH QL: SIGNIFICANT CHANGE UP
FLUBV AG NPH QL: SIGNIFICANT CHANGE UP
GAS PNL BLDV: 128 MMOL/L — LOW (ref 136–145)
GAS PNL BLDV: SIGNIFICANT CHANGE UP
GLUCOSE BLDC GLUCOMTR-MCNC: 154 MG/DL — HIGH (ref 70–99)
GLUCOSE BLDC GLUCOMTR-MCNC: 210 MG/DL — HIGH (ref 70–99)
GLUCOSE BLDV-MCNC: 188 MG/DL — HIGH (ref 70–99)
GLUCOSE SERPL-MCNC: 184 MG/DL — HIGH (ref 70–99)
HCO3 BLDV-SCNC: 22 MMOL/L — SIGNIFICANT CHANGE UP (ref 22–29)
HCT VFR BLD CALC: 47.2 % — SIGNIFICANT CHANGE UP (ref 39–50)
HCT VFR BLDA CALC: 49 % — SIGNIFICANT CHANGE UP (ref 39–51)
HGB BLD CALC-MCNC: 16.2 G/DL — SIGNIFICANT CHANGE UP (ref 12.6–17.4)
HGB BLD-MCNC: 15.6 G/DL — SIGNIFICANT CHANGE UP (ref 13–17)
IMM GRANULOCYTES NFR BLD AUTO: 0.3 % — SIGNIFICANT CHANGE UP (ref 0–0.9)
INR BLD: 1.25 RATIO — HIGH (ref 0.88–1.16)
LACTATE BLDV-MCNC: 2.3 MMOL/L — HIGH (ref 0.5–2)
LIDOCAIN IGE QN: 24 U/L — SIGNIFICANT CHANGE UP (ref 7–60)
LYMPHOCYTES # BLD AUTO: 1.54 K/UL — SIGNIFICANT CHANGE UP (ref 1–3.3)
LYMPHOCYTES # BLD AUTO: 15.4 % — SIGNIFICANT CHANGE UP (ref 13–44)
MAGNESIUM SERPL-MCNC: 1.7 MG/DL — SIGNIFICANT CHANGE UP (ref 1.6–2.6)
MCHC RBC-ENTMCNC: 26.4 PG — LOW (ref 27–34)
MCHC RBC-ENTMCNC: 33.1 GM/DL — SIGNIFICANT CHANGE UP (ref 32–36)
MCV RBC AUTO: 79.7 FL — LOW (ref 80–100)
MONOCYTES # BLD AUTO: 0.65 K/UL — SIGNIFICANT CHANGE UP (ref 0–0.9)
MONOCYTES NFR BLD AUTO: 6.5 % — SIGNIFICANT CHANGE UP (ref 2–14)
NEUTROPHILS # BLD AUTO: 7.58 K/UL — HIGH (ref 1.8–7.4)
NEUTROPHILS NFR BLD AUTO: 76.1 % — SIGNIFICANT CHANGE UP (ref 43–77)
NRBC # BLD: 0 /100 WBCS — SIGNIFICANT CHANGE UP (ref 0–0)
NT-PROBNP SERPL-SCNC: 1692 PG/ML — HIGH (ref 0–300)
PCO2 BLDV: 36 MMHG — LOW (ref 42–55)
PH BLDV: 7.4 — SIGNIFICANT CHANGE UP (ref 7.32–7.43)
PLATELET # BLD AUTO: 316 K/UL — SIGNIFICANT CHANGE UP (ref 150–400)
PO2 BLDV: 46 MMHG — HIGH (ref 25–45)
POTASSIUM BLDV-SCNC: 4.5 MMOL/L — SIGNIFICANT CHANGE UP (ref 3.5–5.1)
POTASSIUM SERPL-MCNC: 4.4 MMOL/L — SIGNIFICANT CHANGE UP (ref 3.5–5.3)
POTASSIUM SERPL-SCNC: 4.4 MMOL/L — SIGNIFICANT CHANGE UP (ref 3.5–5.3)
PROT SERPL-MCNC: 7.5 G/DL — SIGNIFICANT CHANGE UP (ref 6–8.3)
PROTHROM AB SERPL-ACNC: 14.5 SEC — HIGH (ref 10.5–13.4)
RBC # BLD: 5.92 M/UL — HIGH (ref 4.2–5.8)
RBC # FLD: 13.3 % — SIGNIFICANT CHANGE UP (ref 10.3–14.5)
RSV RNA NPH QL NAA+NON-PROBE: SIGNIFICANT CHANGE UP
SAO2 % BLDV: 77.7 % — SIGNIFICANT CHANGE UP (ref 67–88)
SARS-COV-2 RNA SPEC QL NAA+PROBE: SIGNIFICANT CHANGE UP
SODIUM SERPL-SCNC: 132 MMOL/L — LOW (ref 135–145)
TROPONIN T, HIGH SENSITIVITY RESULT: 738 NG/L — HIGH (ref 0–51)
WBC # BLD: 9.97 K/UL — SIGNIFICANT CHANGE UP (ref 3.8–10.5)
WBC # FLD AUTO: 9.97 K/UL — SIGNIFICANT CHANGE UP (ref 3.8–10.5)

## 2023-04-11 PROCEDURE — 92928 PRQ TCAT PLMT NTRAC ST 1 LES: CPT | Mod: LC

## 2023-04-11 PROCEDURE — 99291 CRITICAL CARE FIRST HOUR: CPT

## 2023-04-11 PROCEDURE — 71045 X-RAY EXAM CHEST 1 VIEW: CPT | Mod: 26

## 2023-04-11 PROCEDURE — 99222 1ST HOSP IP/OBS MODERATE 55: CPT

## 2023-04-11 PROCEDURE — 99152 MOD SED SAME PHYS/QHP 5/>YRS: CPT

## 2023-04-11 PROCEDURE — 93455 CORONARY ART/GRFT ANGIO S&I: CPT | Mod: 26,59

## 2023-04-11 RX ORDER — DEXTROSE 50 % IN WATER 50 %
25 SYRINGE (ML) INTRAVENOUS ONCE
Refills: 0 | Status: DISCONTINUED | OUTPATIENT
Start: 2023-04-11 | End: 2023-04-12

## 2023-04-11 RX ORDER — GLUCAGON INJECTION, SOLUTION 0.5 MG/.1ML
1 INJECTION, SOLUTION SUBCUTANEOUS ONCE
Refills: 0 | Status: DISCONTINUED | OUTPATIENT
Start: 2023-04-11 | End: 2023-04-12

## 2023-04-11 RX ORDER — SACUBITRIL AND VALSARTAN 24; 26 MG/1; MG/1
1 TABLET, FILM COATED ORAL
Refills: 0 | Status: DISCONTINUED | OUTPATIENT
Start: 2023-04-11 | End: 2023-04-12

## 2023-04-11 RX ORDER — SODIUM CHLORIDE 9 MG/ML
1000 INJECTION, SOLUTION INTRAVENOUS
Refills: 0 | Status: DISCONTINUED | OUTPATIENT
Start: 2023-04-11 | End: 2023-04-12

## 2023-04-11 RX ORDER — INSULIN LISPRO 100/ML
VIAL (ML) SUBCUTANEOUS
Refills: 0 | Status: DISCONTINUED | OUTPATIENT
Start: 2023-04-11 | End: 2023-04-12

## 2023-04-11 RX ORDER — TICAGRELOR 90 MG/1
90 TABLET ORAL EVERY 12 HOURS
Refills: 0 | Status: DISCONTINUED | OUTPATIENT
Start: 2023-04-11 | End: 2023-04-12

## 2023-04-11 RX ORDER — DEXTROSE 50 % IN WATER 50 %
12.5 SYRINGE (ML) INTRAVENOUS ONCE
Refills: 0 | Status: DISCONTINUED | OUTPATIENT
Start: 2023-04-11 | End: 2023-04-12

## 2023-04-11 RX ORDER — ATORVASTATIN CALCIUM 80 MG/1
40 TABLET, FILM COATED ORAL AT BEDTIME
Refills: 0 | Status: DISCONTINUED | OUTPATIENT
Start: 2023-04-11 | End: 2023-04-12

## 2023-04-11 RX ORDER — ASPIRIN/CALCIUM CARB/MAGNESIUM 324 MG
81 TABLET ORAL DAILY
Refills: 0 | Status: DISCONTINUED | OUTPATIENT
Start: 2023-04-11 | End: 2023-04-12

## 2023-04-11 RX ORDER — TAMSULOSIN HYDROCHLORIDE 0.4 MG/1
0.4 CAPSULE ORAL AT BEDTIME
Refills: 0 | Status: DISCONTINUED | OUTPATIENT
Start: 2023-04-11 | End: 2023-04-12

## 2023-04-11 RX ORDER — METOPROLOL TARTRATE 50 MG
200 TABLET ORAL DAILY
Refills: 0 | Status: DISCONTINUED | OUTPATIENT
Start: 2023-04-11 | End: 2023-04-12

## 2023-04-11 RX ORDER — DEXTROSE 50 % IN WATER 50 %
15 SYRINGE (ML) INTRAVENOUS ONCE
Refills: 0 | Status: DISCONTINUED | OUTPATIENT
Start: 2023-04-11 | End: 2023-04-12

## 2023-04-11 RX ORDER — INSULIN LISPRO 100/ML
VIAL (ML) SUBCUTANEOUS AT BEDTIME
Refills: 0 | Status: DISCONTINUED | OUTPATIENT
Start: 2023-04-11 | End: 2023-04-12

## 2023-04-11 RX ORDER — ASPIRIN/CALCIUM CARB/MAGNESIUM 324 MG
162 TABLET ORAL ONCE
Refills: 0 | Status: COMPLETED | OUTPATIENT
Start: 2023-04-11 | End: 2023-04-11

## 2023-04-11 RX ORDER — INSULIN GLARGINE 100 [IU]/ML
9 INJECTION, SOLUTION SUBCUTANEOUS AT BEDTIME
Refills: 0 | Status: DISCONTINUED | OUTPATIENT
Start: 2023-04-11 | End: 2023-04-12

## 2023-04-11 RX ORDER — PANTOPRAZOLE SODIUM 20 MG/1
40 TABLET, DELAYED RELEASE ORAL
Refills: 0 | Status: DISCONTINUED | OUTPATIENT
Start: 2023-04-11 | End: 2023-04-12

## 2023-04-11 RX ADMIN — TICAGRELOR 90 MILLIGRAM(S): 90 TABLET ORAL at 22:12

## 2023-04-11 RX ADMIN — ATORVASTATIN CALCIUM 40 MILLIGRAM(S): 80 TABLET, FILM COATED ORAL at 21:36

## 2023-04-11 RX ADMIN — INSULIN GLARGINE 9 UNIT(S): 100 INJECTION, SOLUTION SUBCUTANEOUS at 21:36

## 2023-04-11 RX ADMIN — Medication 162 MILLIGRAM(S): at 15:50

## 2023-04-11 RX ADMIN — SACUBITRIL AND VALSARTAN 1 TABLET(S): 24; 26 TABLET, FILM COATED ORAL at 21:35

## 2023-04-11 RX ADMIN — TAMSULOSIN HYDROCHLORIDE 0.4 MILLIGRAM(S): 0.4 CAPSULE ORAL at 21:36

## 2023-04-11 NOTE — H&P CARDIOLOGY - NSICDXPASTMEDICALHX_GEN_ALL_CORE_FT
PAST MEDICAL HISTORY:  CAD (coronary artery disease)     Diabetes mellitus     GERD (gastroesophageal reflux disease)     Hypertension

## 2023-04-11 NOTE — ED PROVIDER NOTE - NS ED ROS FT
GENERAL: no fever, no chills, no weight loss  EYES: no change in vision, no irritation, no discharge, no redness, no pain  HEENT: no trouble swallowing or speaking, no throat pain, no ear pain  CARDIAC: +chest pain, no palpitations   PULMONARY: no cough, no shortness of breath, no wheezing  GI: no abdominal pain, no nausea, no vomiting, no diarrhea, no constipation, no melena, no hematochezia, no hematemesis  : no changes in urination, no dysuria, no frequency, no hematuria, no discharge  SKIN: no rashes  NEURO: no headache, no numbness, no weakness  MSK: no joint pain, no muscle pain, no back pain, no calf pain

## 2023-04-11 NOTE — PATIENT PROFILE ADULT - FALL HARM RISK - HARM RISK INTERVENTIONS

## 2023-04-11 NOTE — PROGRESS NOTE ADULT - SUBJECTIVE AND OBJECTIVE BOX
2- Hours Post Removal of Right Femoral Sheath Assessment of Access Site    Vital signs are stable, neuro-vascular status of the lower extremities is intact, stable and there is no evidence of hematoma on the right lower extremities.     Complications: None    Comments:

## 2023-04-11 NOTE — ED PROVIDER NOTE - PHYSICAL EXAMINATION
GENERAL: no acute distress, non-toxic appearing  HEAD: normocephalic, atraumatic  HEENT: normal conjunctiva, oral mucosa moist, neck supple  CARDIAC: regular rate and rhythm, normal S1 and S2,  no appreciable murmurs  PULM: clear to ascultation bilaterally, no crackles, rales, rhonchi, or wheezing  GI: abdomen nondistended, soft, nontender, no guarding or rebound tenderness  MSK: no visible deformities, no peripheral edema, calf tenderness/redness/swelling  SKIN: no visible rashes, dry, well-perfused  PSYCH: appropriate mood and affect

## 2023-04-11 NOTE — ED PROVIDER NOTE - ATTENDING CONTRIBUTION TO CARE
Patient non compliant with DAPT presents with chest pain similar to prior ischemic presentation with 100%LIMA occlusion status post PCI 4/7/23.  mild distress secondary to pain  non-tachycardic   non-tachypneic   trachea midline  no gross deformity of extremities, no asymmetry   Anurag Montes MD, FACEP: In this physician's medical judgement based on clinical history and physical exam the patient's signs and symptoms lead to differential diagnoses which includes but is not limited to: acs, graft occlusion from plaque    Historical features, symptoms, and clinical exam not consistent with: dissection    Labs were ordered and independently reviewed by me.  EKG was ordered and independently reviewed by me. concern for STEMI due to new ROYAL in V2 and cardiology fellow consulted for PCI intervention  Imaging was ordered and reviewed by me.      Appropriate medications for the patient's presenting complaints were ordered, and effects were reassessed.     Patient's records including prior hospital visit, med and medical history were reviewed.  History assisted by family and prior records.   Escalation to admission/observation was considered.    Will follow up on labs, therapeutics, imaging, reassess and disposition as clinically indicated.  *The above represents an initial assessment/impression. Please refer to my progress notes below for potential changes in patient clinical course*

## 2023-04-11 NOTE — PROGRESS NOTE ADULT - SUBJECTIVE AND OBJECTIVE BOX
Removal of Right Femoral Sheath    Pulses in the right lower extremity are palpable. The patient was placed in the supine position. The insertion site was identified and the sutures were removed per protocol.  The 6 [place] femoral sheath was then removed. Direct pressure was applied for 20 minutes.     Monitoring of the right groin and both lower extremities including neuro-vascular checks and vital signs every 15 minutes x 4, then every 30 minutes x 2, then every 1 hour was ordered.    Complications: None    Comments:

## 2023-04-11 NOTE — ED ADULT NURSE NOTE - OBJECTIVE STATEMENT
51 yo male A&OX4 ambulatory on arrival from home c/o 3/10 burning, midsternal nonradiating chest pain since 1700 yesterday, Patient 53 yo male A&OX4 ambulatory on arrival from home c/o 3/10 burning, midsternal nonradiating chest pain since 1700 yesterday, Patient has hx of CABG 12/1022, and 2 cardiac stents placed 4/5/2023. Patient states symptoms feel similar to previous MI. States took 81mg ASA and 90mg brillinta at home. Denies SOB, LOC, and pain, nausea/ diarrhea, dizziness. Endorses 1 episodes non bloody vomit and generalized weakness. Denies fevers chills. EKG concerning for ischemia, Cardiology bedside, administered 90mg brilinta and 162 ASA, taken to Cath lab emergently.

## 2023-04-11 NOTE — CONSULT NOTE ADULT - ASSESSMENT
Mr. Montez is a 52yoM with history of CAD status post CABG 3 months ago, and - PCI to pOM1 w/ CHERRI 4/6 and staged PCI of LIMA graft to LAD 4/7 presenting with CP concerning for ACS.         #C/F ACS  HDS, Mild CP on exam.   Recent history of CAD s/p Stents and recent CABG. Pain hx similar to recent CAD pain per patient.   ECG with submilimeter elevations in V1-2, with TWI in precordial and lateral leads. TWI are c/w with previous.     Recommendations:   -tele  -Strict lytes  -Load ASA/Brilinta  -Heparin Gtt  -Atorvastatin 80mg  -TTE  -Hold home GDMT as patient is hypotensive 90's at this time with an elevated Lactate: 2.3  -Awaiting Troponin for plan to go to cath lab.          Mr. Montez is a 52yoM with history of CAD status post CABG 3 months ago, and - PCI to pOM1 w/ CHERRI 4/6 and staged PCI of LIMA graft to LAD 4/7 presenting with CP concerning for ACS.         #C/F ACS  HDS, Mild CP on exam.   Recent history of CAD s/p Stents and recent CABG. Pain hx similar to recent CAD pain per patient.   ECG with submilimeter elevations in V1-2, with TWI in precordial and lateral leads. TWI are c/w with previous.     Recommendations:   -tele  -Strict lytes  -Load ASA/Brilinta  -Heparin Gtt  -Atorvastatin 80mg  -TTE  -Hold home GDMT as patient is hypotensive 90's at this time with an elevated Lactate: 2.3  -Awaiting Troponin for plan to go to cath lab.     ****Update: Troponin 799, proceeding stat to cath lab.

## 2023-04-11 NOTE — PATIENT PROFILE ADULT - FUNCTIONAL ASSESSMENT - DAILY ACTIVITY 1.
4 = No assist / stand by assistance Bactrim Pregnancy And Lactation Text: This medication is Pregnancy Category D and is known to cause fetal risk.  It is also excreted in breast milk.

## 2023-04-11 NOTE — CONSULT NOTE ADULT - SUBJECTIVE AND OBJECTIVE BOX
Patient seen and evaluated at bedside    Reason for consult: c/f ACS     HPI:    Mr. Montez is a 52-year-old male with a past medical history of CAD status post CABG 3 months ago, and - PCI to pOM1 w/ CHERRI 4/6 and staged PCI of LIMA graft to LAD 4/7 HTN, DM 2, history of tobacco use, and GERD presents to the ED with CP x 24hrs.    He reports CP started yesterday around 8pm. Described as burning substernal, assoc with N/V around MN. The pain has persisted until now but has improved to about 4/10. He reports that it is similar to when he presented in april.   He reports that he was discharged from the hospital in the evening and did not get his pm brilinta, he did not get to take another dose until the next evening, missing two doses total.         PMHx:   No pertinent past medical history    Hypertension    Diabetes mellitus    GERD (gastroesophageal reflux disease)        PSHx:   No significant past surgical history    No significant past surgical history        Allergies:  No Known Allergies      Home Meds:    Current Medications:       FAMILY HISTORY:  FH: heart disease (Father)       Social History:  · Substance use	No  · Social History (marital status, living situation, occupation, and sexual history)	- Former tobacco use, quit at time of CABG. Prior to that 4-5cig/day for 30-40yrs  - Denies EtOH consumption  - Denies illicit drug use     Tobacco Screening:  · Core Measure Site	No  · Has the patient used tobacco in the past 30 days?	No      REVIEW OF SYSTEMS:  CONSTITUTIONAL: No weakness, fevers or chills  EYES/ENT: No visual changes;  No dysphagia  NECK: No pain or stiffness  RESPIRATORY: No cough, wheezing, hemoptysis; No shortness of breath  CARDIOVASCULAR: +chest pain No lower extremity edema  GASTROINTESTINAL: No abdominal or epigastric pain. No nausea, vomiting  BACK: No back pain  GENITOURINARY: No dysuria, frequency or hematuria  NEUROLOGICAL: No numbness or weakness  SKIN: No itching, burning, rashes, or lesions   All other review of systems is negative unless indicated above.      Physical Exam:  T(F): 98 (04-11), Max: 98 (04-11)  HR: 93 (04-11) (93 - 95)  BP: 108/75 (04-11) (108/75 - 110/80)  RR: 18 (04-11)  SpO2: 98% (04-11)  GENERAL: No acute distress, well-developed  HEAD:  Atraumatic, Normocephalic  ENT: EOMI, PERRLA, conjunctiva and sclera clear  CHEST/LUNG: Clear to auscultation bilaterally; No wheeze, equal breath sounds bilaterally   BACK: No spinal tenderness  HEART: Regular rate and rhythm; No murmurs  ABDOMEN: Soft, Nontender, Nondistended; Bowel sounds present  EXTREMITIES:  No clubbing, cyanosis, or edema  PSYCH: Nl behavior, nl affect  NEUROLOGY: AAOx3, non-focal, cranial nerves intact  SKIN: Normal color, No rashes or lesions    CXR: Personally reviewed    Labs: Personally reviewed                        15.6   9.97  )-----------( 316      ( 11 Apr 2023 15:41 )             47.2                    Patient seen and evaluated at bedside    Reason for consult: c/f ACS     HPI:    Mr. Montez is a 52-year-old male with a past medical history of CAD status post CABG 3 months ago, and - PCI to pOM1 w/ CHERRI 4/6 and staged PCI of LIMA graft to LAD 4/7 HTN, DM 2, history of tobacco use, and GERD presents to the ED with CP x 24hrs.    He reports CP started yesterday around 8pm. Described as burning substernal, assoc with N/V around MN. The pain has persisted until now but has improved to about 4/10. He reports that it is similar to when he presented in april.   He reports that he was discharged from the hospital in the evening and did not get his pm brilinta, he did not get to take another dose until the next evening, missing two doses total.     cMRI from 4/5 viability.  IMPRESSION:  1.  Normal LV size and reduced global systolic function. Hypokinesis of   the anterior wall, septum, and lateral wall with mild apical ballooning.   Less than 50% enhancement of the affected wall on delayed enhancement   indicating viability.      ADDENDUM TO PRIOR ECHOCARDIOGRAM REPORT    Reason for amended report: changed the LV function statement.    Ursula Ramakrishna on 4/9/2023 at 12:52:23 PM                        TRANSTHORACIC ECHOCARDIOGRAM REPORT  ________________________________________________________________________________                                      _______       Pt. Name:       SHAIKH FAMILIA Study Date:    4/6/2023  MRN:            [id number]   YOB: 1970  Accession #:    [id number]    Age:           52 years  Account#:       [id number] Gender:        M  Heart Rate:     83 bpm       Height:        68.90 in (175.00 cm)  Rhythm:         sinus rhythm Weight:        150.00 lb (68.04 kg)  Blood Pressure: 94/67 mmHg   BSA/BMI:       1.83 m² / 22.22 kg/m²  ________________________________________________________________________________________  Referring Physician:    JACKY ASHFORD  Interpreting Physician: Ursula Durbin  Primary Sonographer:    Gustavo Sauer RDCS    CPT:                ECHO TTE WITH CON COMP W DOPP - .m  Indication(s):      Heart failure, unspecified - I50.9  Procedure:          Transthoracic echocardiogram with 2-D, M-mode and complete                      spectral and color flow Doppler.  Ordering Location:  [facility]  Admission Status:   Inpatient  Contrast Injection: Verbal consent was obtained for injection of Ultrasonic                      Enhancing Agent following a discussion of risks and                      benefits.                      Endocardial visualization enhancedwith 2 ml of Definity                      Ultrasound enhancing agent (Lot#:[id number] Exp.Date:jun2024                      Discarded Dose:8ml).  UEA Reaction:       Patient had no adverse reaction after injection of                      Ultrasound Enhancing Agent.  Study Information:  Image quality for this study is adequate.    _______________________________________________________________________________________  CONCLUSIONS:      1. Normal left ventricular cavity size. The left ventricular wall thickness is normal. The left ventricular systolic function is severely decreased with an ejection fraction of 28 % by Salazar's method of disks. There are regional wall motion abnormalities No evidence of a thrombus in the left ventricle.   2. Multiple segmental abnormalities exist. See findings.   3. Normal right ventricular cavity size, normal wall thickness and borderline reduced systolic function.   4. Trace mitral regurgitation.   5. Trace tricuspid regurgitation. Pulmonary artery systolic pressure could not be estimated.   6. No pericardial effusion seen.   7. Compared to the transthoracic echocardiogram performed on 4/3/2023 The prior was performed while the patient was supine. The LVEF is calculated now to be lower using UEA and biplane Salazar's method.    ________________________________________________________________________________________  FINDINGS:  Left Ventricle:  After obtaining consent, Definity ultrasound enhancing agent was given for enhanced left ventricular opacification and improved delineation of the left ventricular endocardial borders. Normal left ventricular cavity size. The left ventricular wall thickness is normal. The left ventricular systolic function is severely decreased with a calculated ejection fraction of 28 % by the Salazar's biplane method of disks. There are regional wall motion abnormalities consistent with ischemic heart disease. There is no evidence of a thrombus in the left ventricle. There is a sigmoid septum measuring 1.2 cm.  LV Wall Scoring:The mid and apical inferior septum is aneurysmal. The mid and  apical anterior wall, mid anteroseptal segment, apical lateral segment, and  apical inferior segment are akinetic. The basal and mid anterolateral wall,  basal and mid inferior wall, basal and mid inferolateral wall, basal  anteroseptal segment, basal inferoseptal segment, and basal anterior segment are  hypokinetic.          Right Ventricle:  Normal right ventricular cavity size, normal wall thickness and borderline reduced systolic function.     Left Atrium:  The left atrium is normal.     Right Atrium:  The right atrium is normal.     Aortic Valve:  The aortic valve appears trileaflet. There is mild calcification of the aortic valve leaflets. There is mild thickening of the aortic valve leaflets. There is no evidence of aortic regurgitation.     Mitral Valve:  Structurally normal mitral valve with normal leaflet excursion. There is mild calcification of the mitral valve annulus. There is trace mitral regurgitation.    Tricuspid Valve:  Structurally normal tricuspid valve with normal leaflet excursion. There is trace tricuspid regurgitation. There is insufficient tricuspid regurgitation detected to calculate pulmonary artery systolic pressure.     Pulmonic Valve:  The pulmonic valve was not well visualized. Structurally normal pulmonic valve with normal leaflet excursion. There is trace pulmonic regurgitation.     Aorta:  The aortic annulus and aortic root appear normal in size.     Pericardium:  No pericardial effusion seen.     Systemic Veins:  The inferior vena cava is normal measuring 0.60 cm in diameter, (normal <2.1cm) with normal inspiratory collapse (normal >50%) consistent with normal right atrial pressure (~3, range 0-5mmHg).  ____________________________________________________________________  QUANTITATIVE DATA  Left Ventricle Measurements                         Indexed to BSA  IVSd (2D):   0.8 cm  LVPWd (2D):  0.9 cm  LVIDd (2D):  4.0 cm  LV Mass:     101 g  55.5 g/m²  LV EF BP: 28 %    Aorta Measurements     Ao Root at STJ: 2.9 cm    LVOT / RVOT/ Qp/Qs Data:     Tricuspid Valve Measurements     RA Pressure: 3 mmHg    ________________________________________________________________________________________  Electronically signed by Ursula Durbin on 4/7/2023 at 7:49:52 AM         *** Final (Updated) ***        PMHx:   No pertinent past medical history    Hypertension    Diabetes mellitus    GERD (gastroesophageal reflux disease)        PSHx:   No significant past surgical history    No significant past surgical history        Allergies:  No Known Allergies      Home Meds:    Current Medications:       FAMILY HISTORY:  FH: heart disease (Father)       Social History:  · Substance use	No  · Social History (marital status, living situation, occupation, and sexual history)	- Former tobacco use, quit at time of CABG. Prior to that 4-5cig/day for 30-40yrs  - Denies EtOH consumption  - Denies illicit drug use     Tobacco Screening:  · Core Measure Site	No  · Has the patient used tobacco in the past 30 days?	No      REVIEW OF SYSTEMS:  CONSTITUTIONAL: No weakness, fevers or chills  EYES/ENT: No visual changes;  No dysphagia  NECK: No pain or stiffness  RESPIRATORY: No cough, wheezing, hemoptysis; No shortness of breath  CARDIOVASCULAR: +chest pain No lower extremity edema  GASTROINTESTINAL: No abdominal or epigastric pain. No nausea, vomiting  BACK: No back pain  GENITOURINARY: No dysuria, frequency or hematuria  NEUROLOGICAL: No numbness or weakness  SKIN: No itching, burning, rashes, or lesions   All other review of systems is negative unless indicated above.      Physical Exam:  T(F): 98 (04-11), Max: 98 (04-11)  HR: 93 (04-11) (93 - 95)  BP: 108/75 (04-11) (108/75 - 110/80)  RR: 18 (04-11)  SpO2: 98% (04-11)  GENERAL: No acute distress, well-developed  HEAD:  Atraumatic, Normocephalic  ENT: EOMI, PERRLA, conjunctiva and sclera clear  CHEST/LUNG: Clear to auscultation bilaterally; No wheeze, equal breath sounds bilaterally   BACK: No spinal tenderness  HEART: Regular rate and rhythm; No murmurs  ABDOMEN: Soft, Nontender, Nondistended; Bowel sounds present  EXTREMITIES:  No clubbing, cyanosis, or edema  PSYCH: Nl behavior, nl affect  NEUROLOGY: AAOx3, non-focal, cranial nerves intact  SKIN: Normal color, No rashes or lesions    CXR: Personally reviewed    Labs: Personally reviewed                        15.6   9.97  )-----------( 316      ( 11 Apr 2023 15:41 )             47.2

## 2023-04-11 NOTE — ED PROVIDER NOTE - CLINICAL SUMMARY MEDICAL DECISION MAKING FREE TEXT BOX
51 yo hx of CAD s/p CABG, recent stenting presents with cp since this am similar to prior cp requiring intervention. VSS, nonfocal exam. Will get ekg, troponins. Will admit for cards.

## 2023-04-11 NOTE — H&P CARDIOLOGY - HISTORY OF PRESENT ILLNESS
53 y/o M with Pmhx of HTN, HLD, DM2, CAD s/p CABG on 12/19/22, recent stent here last week, presented with CP to ED today and sent directly to cath with Ekg changes and trop of 738    4/6 Cath:  Conclusions:   Successful PCi with CHERRI to the proximal OM1 and balloon of the distal  OM1.  Plan staged PCI of the LIMA to LAD tomorrow.    4/7 Cath: Conclusions:   Successful POBA LIMA to LAD

## 2023-04-11 NOTE — ED PROVIDER NOTE - OBJECTIVE STATEMENT
53 yo M PMHx of CAD s/p CABG, HTN, DM, GERD presents with chest pain since this morning. Patient states its burning pain that is similar to when he got his stents (discharged April 9th). Unclear if exertional as per patient. Took aspirin 81 mg prior to presentation.

## 2023-04-12 ENCOUNTER — TRANSCRIPTION ENCOUNTER (OUTPATIENT)
Age: 53
End: 2023-04-12

## 2023-04-12 ENCOUNTER — APPOINTMENT (OUTPATIENT)
Dept: CARDIOLOGY | Facility: CLINIC | Age: 53
End: 2023-04-12

## 2023-04-12 VITALS
RESPIRATION RATE: 17 BRPM | OXYGEN SATURATION: 98 % | SYSTOLIC BLOOD PRESSURE: 102 MMHG | DIASTOLIC BLOOD PRESSURE: 73 MMHG | HEART RATE: 111 BPM

## 2023-04-12 DIAGNOSIS — I10 ESSENTIAL (PRIMARY) HYPERTENSION: ICD-10-CM

## 2023-04-12 DIAGNOSIS — I25.10 ATHEROSCLEROTIC HEART DISEASE OF NATIVE CORONARY ARTERY WITHOUT ANGINA PECTORIS: ICD-10-CM

## 2023-04-12 DIAGNOSIS — E78.5 HYPERLIPIDEMIA, UNSPECIFIED: ICD-10-CM

## 2023-04-12 PROCEDURE — 93455 CORONARY ART/GRFT ANGIO S&I: CPT | Mod: 59

## 2023-04-12 PROCEDURE — C1894: CPT

## 2023-04-12 PROCEDURE — 84484 ASSAY OF TROPONIN QUANT: CPT

## 2023-04-12 PROCEDURE — 84100 ASSAY OF PHOSPHORUS: CPT

## 2023-04-12 PROCEDURE — 99232 SBSQ HOSP IP/OBS MODERATE 35: CPT

## 2023-04-12 PROCEDURE — 84295 ASSAY OF SERUM SODIUM: CPT

## 2023-04-12 PROCEDURE — 82947 ASSAY GLUCOSE BLOOD QUANT: CPT

## 2023-04-12 PROCEDURE — 71045 X-RAY EXAM CHEST 1 VIEW: CPT

## 2023-04-12 PROCEDURE — 82962 GLUCOSE BLOOD TEST: CPT

## 2023-04-12 PROCEDURE — C1753: CPT

## 2023-04-12 PROCEDURE — 85014 HEMATOCRIT: CPT

## 2023-04-12 PROCEDURE — C1769: CPT

## 2023-04-12 PROCEDURE — 87637 SARSCOV2&INF A&B&RSV AMP PRB: CPT

## 2023-04-12 PROCEDURE — 99291 CRITICAL CARE FIRST HOUR: CPT

## 2023-04-12 PROCEDURE — 82330 ASSAY OF CALCIUM: CPT

## 2023-04-12 PROCEDURE — 83605 ASSAY OF LACTIC ACID: CPT

## 2023-04-12 PROCEDURE — C1874: CPT

## 2023-04-12 PROCEDURE — C9600: CPT | Mod: LC

## 2023-04-12 PROCEDURE — 85730 THROMBOPLASTIN TIME PARTIAL: CPT

## 2023-04-12 PROCEDURE — 82435 ASSAY OF BLOOD CHLORIDE: CPT

## 2023-04-12 PROCEDURE — C1725: CPT

## 2023-04-12 PROCEDURE — 85018 HEMOGLOBIN: CPT

## 2023-04-12 PROCEDURE — 83690 ASSAY OF LIPASE: CPT

## 2023-04-12 PROCEDURE — 84132 ASSAY OF SERUM POTASSIUM: CPT

## 2023-04-12 PROCEDURE — 85610 PROTHROMBIN TIME: CPT

## 2023-04-12 PROCEDURE — 82565 ASSAY OF CREATININE: CPT

## 2023-04-12 PROCEDURE — C1887: CPT

## 2023-04-12 PROCEDURE — 80053 COMPREHEN METABOLIC PANEL: CPT

## 2023-04-12 PROCEDURE — 82803 BLOOD GASES ANY COMBINATION: CPT

## 2023-04-12 PROCEDURE — 83880 ASSAY OF NATRIURETIC PEPTIDE: CPT

## 2023-04-12 PROCEDURE — 83735 ASSAY OF MAGNESIUM: CPT

## 2023-04-12 PROCEDURE — 85025 COMPLETE CBC W/AUTO DIFF WBC: CPT

## 2023-04-12 RX ADMIN — TICAGRELOR 90 MILLIGRAM(S): 90 TABLET ORAL at 05:47

## 2023-04-12 RX ADMIN — Medication 81 MILLIGRAM(S): at 05:47

## 2023-04-12 RX ADMIN — Medication 200 MILLIGRAM(S): at 06:18

## 2023-04-12 RX ADMIN — PANTOPRAZOLE SODIUM 40 MILLIGRAM(S): 20 TABLET, DELAYED RELEASE ORAL at 05:47

## 2023-04-12 NOTE — PROGRESS NOTE ADULT - PROBLEM SELECTOR PLAN 1
Monitor groin site for swelling, bleeding  Continue ASA, Plavix Monitor groin site for swelling, bleeding  Continue ASA, Brilinta

## 2023-04-12 NOTE — DISCHARGE NOTE PROVIDER - CARE PROVIDER_API CALL
Martin Louie)  Cardiovascular Disease; Internal Medicine  [address] [place] [address]  Phone: [phone]  Fax: [phone]  Scheduled Appointment: 04/25/2023

## 2023-04-12 NOTE — DISCHARGE NOTE NURSING/CASE MANAGEMENT/SOCIAL WORK - NSDCPEFALRISK_GEN_ALL_CORE
For information on Fall & Injury Prevention, visit: [email or web address] OR  [email or web address] OR  https://www.cdc.gov/steadi/patient.html

## 2023-04-12 NOTE — DISCHARGE NOTE PROVIDER - NSDCCPTREATMENT_GEN_ALL_CORE_FT
PRINCIPAL PROCEDURE  Procedure: Placement of coronary artery stent  Findings and Treatment: One stent to the distal OM1. Continue DAPT with aspirin, Brilinta.

## 2023-04-12 NOTE — DISCHARGE NOTE PROVIDER - HOSPITAL COURSE
HPI:  51 y/o M with Pmhx of HTN, HLD, DM2, CAD s/p CABG on 12/19/22, recent stent here last week, presented with CP to ED today and sent directly to cath with Ekg changes and trop of 738    4/6 Cath:  Conclusions:   Successful PCi with CHERRI to the proximal OM1 and balloon of the distal  OM1.  Plan staged PCI of the LIMA to LAD tomorrow.    4/7 Cath: Conclusions:   Successful POBA LIMA to LAD   (11 Apr 2023 17:43)    4/11 cardiac cath with one stent to the OM1. Right femoral site without swelling, bleeding.

## 2023-04-12 NOTE — DISCHARGE NOTE PROVIDER - NSDCFUSCHEDAPPT_GEN_ALL_CORE_FT
Andre Laughlin  [facility]  CARDIOLOGY [id number]  Scheduled Appointment: 04/12/2023    Martin Louie  [facility]  CARDIOLOGY [address] [facility]   Scheduled Appointment: 04/25/2023

## 2023-04-12 NOTE — DISCHARGE NOTE PROVIDER - NSDCMRMEDTOKEN_GEN_ALL_CORE_FT
alogliptin-metformin 12.5 mg-1000 mg oral tablet: 1 tab(s) orally 2 times a day DO NOT TAKE on 4/12 or 4/13, restart on Friday 4/14.  aspirin 81 mg oral delayed release tablet: 1 tab(s) orally once a day  atorvastatin 40 mg oral tablet: 1 tab(s) orally once a day (at bedtime)  Basaglar KwikPen 100 units/mL subcutaneous solution: 12 unit(s) subcutaneous once a day (at bedtime)  ertugliflozin 5 mg oral tablet: 1 tab(s) orally once a day  metoprolol succinate 200 mg oral tablet, extended release: 1 tab(s) orally once a day  omeprazole 40 mg oral delayed release capsule: 1 cap(s) orally once a day  otc supplements: folic acid / vitamin b12 / fish oil / multivitamin / alpha lipoic acid  sacubitril-valsartan 24 mg-26 mg oral tablet: 1 tab(s) orally 2 times a day  senna (sennosides) 8.6 mg oral tablet: 1 tab(s) orally once a day  Systane ophthalmic solution: 1 drop(s) in each eye once a day as needed for  dry eyes  tamsulosin 0.4 mg oral capsule: 1 cap(s) orally once a day  ticagrelor 90 mg oral tablet: 1 tab(s) orally every 12 hours

## 2023-04-12 NOTE — DISCHARGE NOTE PROVIDER - NSDCCPCAREPLAN_GEN_ALL_CORE_FT
PRINCIPAL DISCHARGE DIAGNOSIS  Diagnosis: CAD (coronary artery disease)  Assessment and Plan of Treatment: Do not stop you Aspirin or Brilinta unless instructed to do so by your cardiologist, they help keep your stented arteries open.   No heavy lifting, strenuous activity, bending, straining, or unnecessary stair climbing for 2 weeks. No driving for 2 days. You may shower 24 hours following the procedure but avoid baths/swimming for 1 week. Check your groin site for bleeding and/or swelling daily following procedure and call your doctor immediately if it occurs or if you experience increased pain at the site. Follow up with your cardiologist in 1-2 weeks. You may call [facility] if you have any questions/concerns regarding your procedure [phone].      SECONDARY DISCHARGE DIAGNOSES  Diagnosis: HTN (hypertension)  Assessment and Plan of Treatment: Continue with your blood pressure medications; eat a heart healthy diet with low salt diet; exercise regularly (consult with your physician or cardiologist first); maintain a heart healthy weight; if you smoke - quit (A resource to help you stop smoking is the [facility] [employer] – phone number 779-102-2365.); include healthy ways to manage stress. Continue to follow with your primary care physician or cardiologist.    Diagnosis: HLD (hyperlipidemia)  Assessment and Plan of Treatment: Continue with your cholesterol medications. Eat a heart healthy diet that is low in saturated fats and salt, and includes whole grains, fruits, vegetables and lean protein; exercise regularly (consult with your physician or cardiologist first); maintain a heart healthy weight; if you smoke - quit (A resource to help you stop smoking is the [facility] [employer] for [employer] – phone number [phone].). Continue to follow with your primary physician or cardiologist.    Diagnosis: DM type 2, goal HbA1c < 7%  Assessment and Plan of Treatment: Your Hemoglobin A1c level is   Continue to follow with your primary care MD or your endocrinologist.  Follow a heart healthy diabetic diet. If you check your fingerstick glucose at home, call your MD if it is greater than 250mg/dL on 2 occasions or less than 100mg/dL on 2 occasions. Know signs of low blood sugar, such as: dizziness, shakiness, sweating, confusion, hunger, nervousness-drink 4 ounces apple juice if occurs and call your doctor. Know early signs of high blood sugar, such as: frequent urination, increased thirst, blurry vision, fatigue, headache - call your doctor if this occurs. Follow with other practitioners to care for your diabetes, such as ophthalmologist and podiatrist.

## 2023-04-12 NOTE — DISCHARGE NOTE NURSING/CASE MANAGEMENT/SOCIAL WORK - PATIENT PORTAL LINK FT
You can access the FollowMyHealth Patient Portal offered by [facility] by registering at the following website: [email or web address]. By joining [employer]’s FollowMyHealth portal, you will also be able to view your health information using other applications (apps) compatible with our system.

## 2023-04-12 NOTE — PROGRESS NOTE ADULT - SUBJECTIVE AND OBJECTIVE BOX
HPI:  53 y/o M with Pmhx of HTN, HLD, DM2, CAD s/p CABG on 12/19/22, recent stent here last week, presented with CP to ED today and sent directly to cath with Ekg changes and trop of 738    4/6 Cath:  Conclusions:   Successful PCi with CHERRI to the proximal OM1 and balloon of the distal  OM1.  Plan staged PCI of the LIMA to LAD tomorrow.    4/7 Cath: Conclusions:   Successful POBA LIMA to LAD   (11 Apr 2023 17:43)    Patient is a 52y old  Male who presents with a chief complaint of c/f ACS (11 Apr 2023 15:51)          Allergies    No Known Allergies    Intolerances        Medications:  aspirin enteric coated 81 milliGRAM(s) Oral daily  atorvastatin 40 milliGRAM(s) Oral at bedtime  dextrose 5%. 1000 milliLiter(s) IV Continuous <Continuous>  dextrose 5%. 1000 milliLiter(s) IV Continuous <Continuous>  dextrose 50% Injectable 25 Gram(s) IV Push once  dextrose 50% Injectable 12.5 Gram(s) IV Push once  dextrose 50% Injectable 25 Gram(s) IV Push once  dextrose Oral Gel 15 Gram(s) Oral once PRN  glucagon  Injectable 1 milliGRAM(s) IntraMuscular once  insulin glargine Injectable (LANTUS) 9 Unit(s) SubCutaneous at bedtime  insulin lispro (ADMELOG) corrective regimen sliding scale   SubCutaneous three times a day before meals  insulin lispro (ADMELOG) corrective regimen sliding scale   SubCutaneous at bedtime  metoprolol succinate  milliGRAM(s) Oral daily  pantoprazole    Tablet 40 milliGRAM(s) Oral before breakfast  sacubitril 24 mG/valsartan 26 mG 1 Tablet(s) Oral two times a day  tamsulosin 0.4 milliGRAM(s) Oral at bedtime  ticagrelor 90 milliGRAM(s) Oral every 12 hours      Vitals:  T(C): 36.6 (04-11-23 @ 17:55), Max: 36.7 (04-11-23 @ 15:06)  HR: 114 (04-12-23 @ 00:25) (90 - 118)  BP: 109/81 (04-12-23 @ 00:25) (107/78 - 135/92)  BP(mean): 91 (04-12-23 @ 00:25) (86 - 106)  RR: 17 (04-12-23 @ 00:25) (16 - 18)  SpO2: 100% (04-12-23 @ 00:25) (97% - 100%)  Wt(kg): --  Daily Height in cm: 177.8 (11 Apr 2023 15:06)    Daily   I&O's Summary    11 Apr 2023 07:01  -  12 Apr 2023 01:44  --------------------------------------------------------  IN: 240 mL / OUT: 300 mL / NET: -60 mL          Physical Exam:  Appearance: Normal  Eyes: PERRL, EOMI  HENT: Normal oral muscosa, NC/AT  Cardiovascular: S1S2, RRR, No M/R/G, no JVD, No Lower extremity edema  Procedural Access Site: No hematoma, Non-tender to palpation, 2+ pulse, No bruit, No Ecchymosis  Respiratory: Clear to auscultation bilaterally  Gastrointestinal: Soft, Non tender, Normal Bowel Sounds  Musculoskeletal: No clubbing, No joint deformity   Neurologic: Non-focal  Lymphatic: No lymphadenopathy  Psychiatry: AAOx3, Mood & affect appropriate  Skin: No rashes, No ecchymoses, No cyanosis    04-11    132<L>  |  99  |  14  ----------------------------<  184<H>  4.4   |  19<L>  |  0.73    Ca    9.4      11 Apr 2023 15:41  Phos  3.8     04-11  Mg     1.7     04-11    TPro  7.5  /  Alb  4.2  /  TBili  0.8  /  DBili  x   /  AST  68<H>  /  ALT  27  /  AlkPhos  77  04-11    PT/INR - ( 11 Apr 2023 15:41 )   PT: 14.5 sec;   INR: 1.25 ratio         PTT - ( 11 Apr 2023 15:41 )  PTT:33.0 sec        Lipid panel   Hgb A1c                         15.6   9.97  )-----------( 316      ( 11 Apr 2023 15:41 )             47.2         ECG: SR 94 bpm   HPI:  51 y/o M with Pmhx of HTN, HLD, DM2, CAD s/p CABG on 12/19/22, recent stent here last week, presented with CP to ED today and sent directly to cath with Ekg changes and trop of 738    4/6 Cath:  Conclusions:   Successful PCi with CHERRI to the proximal OM1 and balloon of the distal  OM1.  Plan staged PCI of the LIMA to LAD tomorrow.    4/7 Cath: Conclusions:   Successful POBA LIMA to LAD   (11 Apr 2023 17:43)          Allergies    No Known Allergies    Intolerances        Medications:  aspirin enteric coated 81 milliGRAM(s) Oral daily  atorvastatin 40 milliGRAM(s) Oral at bedtime  dextrose 5%. 1000 milliLiter(s) IV Continuous <Continuous>  dextrose 5%. 1000 milliLiter(s) IV Continuous <Continuous>  dextrose 50% Injectable 25 Gram(s) IV Push once  dextrose 50% Injectable 12.5 Gram(s) IV Push once  dextrose 50% Injectable 25 Gram(s) IV Push once  dextrose Oral Gel 15 Gram(s) Oral once PRN  glucagon  Injectable 1 milliGRAM(s) IntraMuscular once  insulin glargine Injectable (LANTUS) 9 Unit(s) SubCutaneous at bedtime  insulin lispro (ADMELOG) corrective regimen sliding scale   SubCutaneous three times a day before meals  insulin lispro (ADMELOG) corrective regimen sliding scale   SubCutaneous at bedtime  metoprolol succinate  milliGRAM(s) Oral daily  pantoprazole    Tablet 40 milliGRAM(s) Oral before breakfast  sacubitril 24 mG/valsartan 26 mG 1 Tablet(s) Oral two times a day  tamsulosin 0.4 milliGRAM(s) Oral at bedtime  ticagrelor 90 milliGRAM(s) Oral every 12 hours      Vitals:  T(C): 36.6 (04-11-23 @ 17:55), Max: 36.7 (04-11-23 @ 15:06)  HR: 114 (04-12-23 @ 00:25) (90 - 118)  BP: 109/81 (04-12-23 @ 00:25) (107/78 - 135/92)  BP(mean): 91 (04-12-23 @ 00:25) (86 - 106)  RR: 17 (04-12-23 @ 00:25) (16 - 18)  SpO2: 100% (04-12-23 @ 00:25) (97% - 100%)  Wt(kg): --  Daily Height in cm: 177.8 (11 Apr 2023 15:06)    Daily   I&O's Summary    11 Apr 2023 07:01  -  12 Apr 2023 01:44  --------------------------------------------------------  IN: 240 mL / OUT: 300 mL / NET: -60 mL          Physical Exam:  Appearance: Normal  Eyes: PERRL, EOMI  HENT: Normal oral muscosa, NC/AT  Cardiovascular: S1S2, RRR, No M/R/G, no JVD, No Lower extremity edema  Procedural Access Site: No hematoma, Non-tender to palpation, 2+ pulse, No bruit, No Ecchymosis  Respiratory: Clear to auscultation bilaterally  Gastrointestinal: Soft, Non tender, Normal Bowel Sounds  Musculoskeletal: No clubbing, No joint deformity   Neurologic: Non-focal  Lymphatic: No lymphadenopathy  Psychiatry: AAOx3, Mood & affect appropriate  Skin: No rashes, No ecchymoses, No cyanosis    04-11    132<L>  |  99  |  14  ----------------------------<  184<H>  4.4   |  19<L>  |  0.73    Ca    9.4      11 Apr 2023 15:41  Phos  3.8     04-11  Mg     1.7     04-11    TPro  7.5  /  Alb  4.2  /  TBili  0.8  /  DBili  x   /  AST  68<H>  /  ALT  27  /  AlkPhos  77  04-11    PT/INR - ( 11 Apr 2023 15:41 )   PT: 14.5 sec;   INR: 1.25 ratio         PTT - ( 11 Apr 2023 15:41 )  PTT:33.0 sec        Lipid panel   Hgb A1c                         15.6   9.97  )-----------( 316      ( 11 Apr 2023 15:41 )             47.2         ECG: SR 94 bpm

## 2023-04-13 ENCOUNTER — TRANSCRIPTION ENCOUNTER (OUTPATIENT)
Age: 53
End: 2023-04-13

## 2023-04-17 ENCOUNTER — TRANSCRIPTION ENCOUNTER (OUTPATIENT)
Age: 53
End: 2023-04-17

## 2023-04-20 PROBLEM — I25.10 ATHEROSCLEROTIC HEART DISEASE OF NATIVE CORONARY ARTERY WITHOUT ANGINA PECTORIS: Chronic | Status: ACTIVE | Noted: 2023-04-11

## 2023-04-25 ENCOUNTER — APPOINTMENT (OUTPATIENT)
Dept: CARDIOLOGY | Facility: CLINIC | Age: 53
End: 2023-04-25
Payer: MEDICAID

## 2023-04-25 ENCOUNTER — TRANSCRIPTION ENCOUNTER (OUTPATIENT)
Age: 53
End: 2023-04-25

## 2023-04-25 ENCOUNTER — NON-APPOINTMENT (OUTPATIENT)
Age: 53
End: 2023-04-25

## 2023-04-25 VITALS
HEIGHT: 69 IN | BODY MASS INDEX: 21.92 KG/M2 | OXYGEN SATURATION: 98 % | DIASTOLIC BLOOD PRESSURE: 73 MMHG | WEIGHT: 148 LBS | HEART RATE: 89 BPM | SYSTOLIC BLOOD PRESSURE: 105 MMHG

## 2023-04-25 PROCEDURE — 99215 OFFICE O/P EST HI 40 MIN: CPT | Mod: 25

## 2023-04-25 PROCEDURE — 93000 ELECTROCARDIOGRAM COMPLETE: CPT

## 2023-04-26 ENCOUNTER — TRANSCRIPTION ENCOUNTER (OUTPATIENT)
Age: 53
End: 2023-04-26

## 2023-04-26 PROBLEM — Z51.81 ENCOUNTER FOR MONITORING ANTIPLATELET THERAPY: Status: ACTIVE | Noted: 2023-01-19

## 2023-04-26 LAB
ALBUMIN SERPL ELPH-MCNC: 4.5 G/DL
ALP BLD-CCNC: 65 U/L
ALT SERPL-CCNC: 16 U/L
ANION GAP SERPL CALC-SCNC: 16 MMOL/L
AST SERPL-CCNC: 14 U/L
BILIRUB SERPL-MCNC: 0.3 MG/DL
BUN SERPL-MCNC: 10 MG/DL
CALCIUM SERPL-MCNC: 7.7 MG/DL
CHLORIDE SERPL-SCNC: 100 MMOL/L
CHOLEST SERPL-MCNC: 125 MG/DL
CO2 SERPL-SCNC: 22 MMOL/L
CREAT SERPL-MCNC: 0.79 MG/DL
EGFR: 108 ML/MIN/1.73M2
GLUCOSE SERPL-MCNC: 109 MG/DL
HDLC SERPL-MCNC: 38 MG/DL
LDLC SERPL CALC-MCNC: 56 MG/DL
NONHDLC SERPL-MCNC: 87 MG/DL
POTASSIUM SERPL-SCNC: 4.7 MMOL/L
PROT SERPL-MCNC: 7.3 G/DL
SODIUM SERPL-SCNC: 138 MMOL/L
TRIGL SERPL-MCNC: 155 MG/DL
TSH SERPL-ACNC: 0.73 UIU/ML

## 2023-04-27 ENCOUNTER — APPOINTMENT (OUTPATIENT)
Dept: CARDIOLOGY | Facility: CLINIC | Age: 53
End: 2023-04-27
Payer: MEDICAID

## 2023-04-27 ENCOUNTER — NON-APPOINTMENT (OUTPATIENT)
Age: 53
End: 2023-04-27

## 2023-04-27 VITALS
HEIGHT: 69 IN | DIASTOLIC BLOOD PRESSURE: 66 MMHG | OXYGEN SATURATION: 100 % | SYSTOLIC BLOOD PRESSURE: 100 MMHG | HEART RATE: 87 BPM | WEIGHT: 149.25 LBS | BODY MASS INDEX: 22.11 KG/M2

## 2023-04-27 DIAGNOSIS — Z79.02 ENCOUNTER FOR THERAPEUTIC DRUG LVL MONITORING: ICD-10-CM

## 2023-04-27 DIAGNOSIS — N52.9 MALE ERECTILE DYSFUNCTION, UNSPECIFIED: ICD-10-CM

## 2023-04-27 DIAGNOSIS — Z51.81 ENCOUNTER FOR THERAPEUTIC DRUG LVL MONITORING: ICD-10-CM

## 2023-04-27 LAB — APO LP(A) SERPL-MCNC: 20.8 NMOL/L

## 2023-04-27 PROCEDURE — 99214 OFFICE O/P EST MOD 30 MIN: CPT | Mod: 25

## 2023-04-27 PROCEDURE — 93000 ELECTROCARDIOGRAM COMPLETE: CPT

## 2023-04-27 RX ORDER — METOPROLOL TARTRATE 100 MG/1
100 TABLET, FILM COATED ORAL
Qty: 180 | Refills: 3 | Status: DISCONTINUED | COMMUNITY
Start: 2022-12-27 | End: 2023-04-27

## 2023-04-27 RX ORDER — ASPIRIN 325 MG/1
325 TABLET ORAL DAILY
Refills: 0 | Status: DISCONTINUED | COMMUNITY
Start: 2022-12-27 | End: 2023-04-27

## 2023-04-27 RX ORDER — SILDENAFIL 50 MG/1
50 TABLET ORAL
Qty: 30 | Refills: 3 | Status: ACTIVE | COMMUNITY
Start: 2023-04-27 | End: 1900-01-01

## 2023-06-27 ENCOUNTER — APPOINTMENT (OUTPATIENT)
Dept: CARDIOLOGY | Facility: CLINIC | Age: 53
End: 2023-06-27
Payer: MEDICAID

## 2023-06-27 ENCOUNTER — NON-APPOINTMENT (OUTPATIENT)
Age: 53
End: 2023-06-27

## 2023-06-27 VITALS — BODY MASS INDEX: 22.96 KG/M2 | HEIGHT: 69 IN | WEIGHT: 155 LBS

## 2023-06-27 VITALS — SYSTOLIC BLOOD PRESSURE: 108 MMHG | HEART RATE: 85 BPM | DIASTOLIC BLOOD PRESSURE: 70 MMHG | OXYGEN SATURATION: 99 %

## 2023-06-27 PROCEDURE — 93000 ELECTROCARDIOGRAM COMPLETE: CPT

## 2023-06-27 PROCEDURE — 99214 OFFICE O/P EST MOD 30 MIN: CPT | Mod: 25

## 2023-06-27 RX ORDER — METOPROLOL SUCCINATE 200 MG/1
200 TABLET, EXTENDED RELEASE ORAL
Qty: 90 | Refills: 3 | Status: ACTIVE | COMMUNITY
Start: 2023-04-27 | End: 1900-01-01

## 2023-06-27 RX ORDER — ATORVASTATIN CALCIUM 40 MG/1
40 TABLET, FILM COATED ORAL
Qty: 90 | Refills: 3 | Status: ACTIVE | COMMUNITY
Start: 2022-12-27 | End: 1900-01-01

## 2023-06-27 RX ORDER — LISINOPRIL 2.5 MG/1
2.5 TABLET ORAL DAILY
Qty: 90 | Refills: 3 | Status: ACTIVE | COMMUNITY
Start: 2023-01-11 | End: 1900-01-01

## 2023-10-12 ENCOUNTER — NON-APPOINTMENT (OUTPATIENT)
Age: 53
End: 2023-10-12

## 2023-10-12 ENCOUNTER — APPOINTMENT (OUTPATIENT)
Dept: CARDIOLOGY | Facility: CLINIC | Age: 53
End: 2023-10-12
Payer: MEDICAID

## 2023-10-12 VITALS — BODY MASS INDEX: 22.74 KG/M2 | DIASTOLIC BLOOD PRESSURE: 64 MMHG | SYSTOLIC BLOOD PRESSURE: 100 MMHG | WEIGHT: 154 LBS

## 2023-10-12 PROCEDURE — 99215 OFFICE O/P EST HI 40 MIN: CPT | Mod: 25

## 2023-10-12 PROCEDURE — 93000 ELECTROCARDIOGRAM COMPLETE: CPT

## 2023-10-20 DIAGNOSIS — Z95.1 PRESENCE OF AORTOCORONARY BYPASS GRAFT: ICD-10-CM

## 2023-11-01 ENCOUNTER — APPOINTMENT (OUTPATIENT)
Dept: CARDIOLOGY | Facility: CLINIC | Age: 53
End: 2023-11-01

## 2023-11-10 ENCOUNTER — APPOINTMENT (OUTPATIENT)
Dept: CARDIOLOGY | Facility: CLINIC | Age: 53
End: 2023-11-10
Payer: MEDICAID

## 2023-11-10 PROCEDURE — 93306 TTE W/DOPPLER COMPLETE: CPT

## 2023-11-13 ENCOUNTER — INPATIENT (INPATIENT)
Facility: HOSPITAL | Age: 53
LOS: 1 days | Discharge: HOME | DRG: 251 | End: 2023-11-15
Attending: STUDENT IN AN ORGANIZED HEALTH CARE EDUCATION/TRAINING PROGRAM | Admitting: HOSPITALIST
Payer: MEDICAID

## 2023-11-13 ENCOUNTER — APPOINTMENT (OUTPATIENT)
Dept: CARDIOLOGY | Facility: CLINIC | Age: 53
End: 2023-11-13
Payer: MEDICAID

## 2023-11-13 VITALS
WEIGHT: 145.06 LBS | OXYGEN SATURATION: 95 % | TEMPERATURE: 98 F | RESPIRATION RATE: 19 BRPM | HEIGHT: 68 IN | DIASTOLIC BLOOD PRESSURE: 86 MMHG | HEART RATE: 86 BPM | SYSTOLIC BLOOD PRESSURE: 128 MMHG

## 2023-11-13 DIAGNOSIS — I24.9 ACUTE ISCHEMIC HEART DISEASE, UNSPECIFIED: ICD-10-CM

## 2023-11-13 DIAGNOSIS — K21.9 GASTRO-ESOPHAGEAL REFLUX DISEASE WITHOUT ESOPHAGITIS: ICD-10-CM

## 2023-11-13 DIAGNOSIS — Z29.9 ENCOUNTER FOR PROPHYLACTIC MEASURES, UNSPECIFIED: ICD-10-CM

## 2023-11-13 DIAGNOSIS — E11.9 TYPE 2 DIABETES MELLITUS WITHOUT COMPLICATIONS: ICD-10-CM

## 2023-11-13 DIAGNOSIS — R07.9 CHEST PAIN, UNSPECIFIED: ICD-10-CM

## 2023-11-13 DIAGNOSIS — Z95.1 PRESENCE OF AORTOCORONARY BYPASS GRAFT: Chronic | ICD-10-CM

## 2023-11-13 LAB
ALBUMIN SERPL ELPH-MCNC: 4.8 G/DL — SIGNIFICANT CHANGE UP (ref 3.3–5)
ALBUMIN SERPL ELPH-MCNC: 4.8 G/DL — SIGNIFICANT CHANGE UP (ref 3.3–5)
ALP SERPL-CCNC: 63 U/L — SIGNIFICANT CHANGE UP (ref 40–120)
ALP SERPL-CCNC: 63 U/L — SIGNIFICANT CHANGE UP (ref 40–120)
ALT FLD-CCNC: 12 U/L — SIGNIFICANT CHANGE UP (ref 10–45)
ALT FLD-CCNC: 12 U/L — SIGNIFICANT CHANGE UP (ref 10–45)
ANION GAP SERPL CALC-SCNC: 14 MMOL/L — SIGNIFICANT CHANGE UP (ref 5–17)
ANION GAP SERPL CALC-SCNC: 14 MMOL/L — SIGNIFICANT CHANGE UP (ref 5–17)
APTT BLD: 34.9 SEC — SIGNIFICANT CHANGE UP (ref 24.5–35.6)
APTT BLD: 34.9 SEC — SIGNIFICANT CHANGE UP (ref 24.5–35.6)
AST SERPL-CCNC: 14 U/L — SIGNIFICANT CHANGE UP (ref 10–40)
AST SERPL-CCNC: 14 U/L — SIGNIFICANT CHANGE UP (ref 10–40)
BASE EXCESS BLDV CALC-SCNC: 0.9 MMOL/L — SIGNIFICANT CHANGE UP (ref -2–3)
BASE EXCESS BLDV CALC-SCNC: 0.9 MMOL/L — SIGNIFICANT CHANGE UP (ref -2–3)
BASOPHILS # BLD AUTO: 0.04 K/UL — SIGNIFICANT CHANGE UP (ref 0–0.2)
BASOPHILS # BLD AUTO: 0.04 K/UL — SIGNIFICANT CHANGE UP (ref 0–0.2)
BASOPHILS NFR BLD AUTO: 0.5 % — SIGNIFICANT CHANGE UP (ref 0–2)
BASOPHILS NFR BLD AUTO: 0.5 % — SIGNIFICANT CHANGE UP (ref 0–2)
BILIRUB SERPL-MCNC: 0.8 MG/DL — SIGNIFICANT CHANGE UP (ref 0.2–1.2)
BILIRUB SERPL-MCNC: 0.8 MG/DL — SIGNIFICANT CHANGE UP (ref 0.2–1.2)
BUN SERPL-MCNC: 13 MG/DL — SIGNIFICANT CHANGE UP (ref 7–23)
BUN SERPL-MCNC: 13 MG/DL — SIGNIFICANT CHANGE UP (ref 7–23)
CA-I SERPL-SCNC: 1.24 MMOL/L — SIGNIFICANT CHANGE UP (ref 1.15–1.33)
CA-I SERPL-SCNC: 1.24 MMOL/L — SIGNIFICANT CHANGE UP (ref 1.15–1.33)
CALCIUM SERPL-MCNC: 10 MG/DL — SIGNIFICANT CHANGE UP (ref 8.4–10.5)
CALCIUM SERPL-MCNC: 10 MG/DL — SIGNIFICANT CHANGE UP (ref 8.4–10.5)
CHLORIDE BLDV-SCNC: 100 MMOL/L — SIGNIFICANT CHANGE UP (ref 96–108)
CHLORIDE BLDV-SCNC: 100 MMOL/L — SIGNIFICANT CHANGE UP (ref 96–108)
CHLORIDE SERPL-SCNC: 100 MMOL/L — SIGNIFICANT CHANGE UP (ref 96–108)
CHLORIDE SERPL-SCNC: 100 MMOL/L — SIGNIFICANT CHANGE UP (ref 96–108)
CO2 BLDV-SCNC: 29 MMOL/L — HIGH (ref 22–26)
CO2 BLDV-SCNC: 29 MMOL/L — HIGH (ref 22–26)
CO2 SERPL-SCNC: 23 MMOL/L — SIGNIFICANT CHANGE UP (ref 22–31)
CO2 SERPL-SCNC: 23 MMOL/L — SIGNIFICANT CHANGE UP (ref 22–31)
CREAT SERPL-MCNC: 0.82 MG/DL — SIGNIFICANT CHANGE UP (ref 0.5–1.3)
CREAT SERPL-MCNC: 0.82 MG/DL — SIGNIFICANT CHANGE UP (ref 0.5–1.3)
EGFR: 106 ML/MIN/1.73M2 — SIGNIFICANT CHANGE UP
EGFR: 106 ML/MIN/1.73M2 — SIGNIFICANT CHANGE UP
EOSINOPHIL # BLD AUTO: 0.22 K/UL — SIGNIFICANT CHANGE UP (ref 0–0.5)
EOSINOPHIL # BLD AUTO: 0.22 K/UL — SIGNIFICANT CHANGE UP (ref 0–0.5)
EOSINOPHIL NFR BLD AUTO: 2.9 % — SIGNIFICANT CHANGE UP (ref 0–6)
EOSINOPHIL NFR BLD AUTO: 2.9 % — SIGNIFICANT CHANGE UP (ref 0–6)
FLUAV AG NPH QL: SIGNIFICANT CHANGE UP
FLUAV AG NPH QL: SIGNIFICANT CHANGE UP
FLUBV AG NPH QL: SIGNIFICANT CHANGE UP
FLUBV AG NPH QL: SIGNIFICANT CHANGE UP
GAS PNL BLDV: 133 MMOL/L — LOW (ref 136–145)
GAS PNL BLDV: 133 MMOL/L — LOW (ref 136–145)
GAS PNL BLDV: SIGNIFICANT CHANGE UP
GLUCOSE BLDV-MCNC: 86 MG/DL — SIGNIFICANT CHANGE UP (ref 70–99)
GLUCOSE BLDV-MCNC: 86 MG/DL — SIGNIFICANT CHANGE UP (ref 70–99)
GLUCOSE SERPL-MCNC: 85 MG/DL — SIGNIFICANT CHANGE UP (ref 70–99)
GLUCOSE SERPL-MCNC: 85 MG/DL — SIGNIFICANT CHANGE UP (ref 70–99)
HCO3 BLDV-SCNC: 28 MMOL/L — SIGNIFICANT CHANGE UP (ref 22–29)
HCO3 BLDV-SCNC: 28 MMOL/L — SIGNIFICANT CHANGE UP (ref 22–29)
HCT VFR BLD CALC: 49 % — SIGNIFICANT CHANGE UP (ref 39–50)
HCT VFR BLD CALC: 49 % — SIGNIFICANT CHANGE UP (ref 39–50)
HCT VFR BLDA CALC: 50 % — SIGNIFICANT CHANGE UP (ref 39–51)
HCT VFR BLDA CALC: 50 % — SIGNIFICANT CHANGE UP (ref 39–51)
HGB BLD CALC-MCNC: 16.7 G/DL — SIGNIFICANT CHANGE UP (ref 12.6–17.4)
HGB BLD CALC-MCNC: 16.7 G/DL — SIGNIFICANT CHANGE UP (ref 12.6–17.4)
HGB BLD-MCNC: 16.1 G/DL — SIGNIFICANT CHANGE UP (ref 13–17)
HGB BLD-MCNC: 16.1 G/DL — SIGNIFICANT CHANGE UP (ref 13–17)
IMM GRANULOCYTES NFR BLD AUTO: 0.4 % — SIGNIFICANT CHANGE UP (ref 0–0.9)
IMM GRANULOCYTES NFR BLD AUTO: 0.4 % — SIGNIFICANT CHANGE UP (ref 0–0.9)
INR BLD: 1.13 RATIO — SIGNIFICANT CHANGE UP (ref 0.85–1.18)
INR BLD: 1.13 RATIO — SIGNIFICANT CHANGE UP (ref 0.85–1.18)
LACTATE BLDV-MCNC: 2.3 MMOL/L — HIGH (ref 0.5–2)
LACTATE BLDV-MCNC: 2.3 MMOL/L — HIGH (ref 0.5–2)
LYMPHOCYTES # BLD AUTO: 2.05 K/UL — SIGNIFICANT CHANGE UP (ref 1–3.3)
LYMPHOCYTES # BLD AUTO: 2.05 K/UL — SIGNIFICANT CHANGE UP (ref 1–3.3)
LYMPHOCYTES # BLD AUTO: 27.3 % — SIGNIFICANT CHANGE UP (ref 13–44)
LYMPHOCYTES # BLD AUTO: 27.3 % — SIGNIFICANT CHANGE UP (ref 13–44)
MCHC RBC-ENTMCNC: 27.9 PG — SIGNIFICANT CHANGE UP (ref 27–34)
MCHC RBC-ENTMCNC: 27.9 PG — SIGNIFICANT CHANGE UP (ref 27–34)
MCHC RBC-ENTMCNC: 32.9 GM/DL — SIGNIFICANT CHANGE UP (ref 32–36)
MCHC RBC-ENTMCNC: 32.9 GM/DL — SIGNIFICANT CHANGE UP (ref 32–36)
MCV RBC AUTO: 84.9 FL — SIGNIFICANT CHANGE UP (ref 80–100)
MCV RBC AUTO: 84.9 FL — SIGNIFICANT CHANGE UP (ref 80–100)
MONOCYTES # BLD AUTO: 0.59 K/UL — SIGNIFICANT CHANGE UP (ref 0–0.9)
MONOCYTES # BLD AUTO: 0.59 K/UL — SIGNIFICANT CHANGE UP (ref 0–0.9)
MONOCYTES NFR BLD AUTO: 7.8 % — SIGNIFICANT CHANGE UP (ref 2–14)
MONOCYTES NFR BLD AUTO: 7.8 % — SIGNIFICANT CHANGE UP (ref 2–14)
NEUTROPHILS # BLD AUTO: 4.59 K/UL — SIGNIFICANT CHANGE UP (ref 1.8–7.4)
NEUTROPHILS # BLD AUTO: 4.59 K/UL — SIGNIFICANT CHANGE UP (ref 1.8–7.4)
NEUTROPHILS NFR BLD AUTO: 61.1 % — SIGNIFICANT CHANGE UP (ref 43–77)
NEUTROPHILS NFR BLD AUTO: 61.1 % — SIGNIFICANT CHANGE UP (ref 43–77)
NRBC # BLD: 0 /100 WBCS — SIGNIFICANT CHANGE UP (ref 0–0)
NRBC # BLD: 0 /100 WBCS — SIGNIFICANT CHANGE UP (ref 0–0)
NT-PROBNP SERPL-SCNC: 465 PG/ML — HIGH (ref 0–300)
NT-PROBNP SERPL-SCNC: 465 PG/ML — HIGH (ref 0–300)
PCO2 BLDV: 50 MMHG — SIGNIFICANT CHANGE UP (ref 42–55)
PCO2 BLDV: 50 MMHG — SIGNIFICANT CHANGE UP (ref 42–55)
PH BLDV: 7.35 — SIGNIFICANT CHANGE UP (ref 7.32–7.43)
PH BLDV: 7.35 — SIGNIFICANT CHANGE UP (ref 7.32–7.43)
PLATELET # BLD AUTO: 234 K/UL — SIGNIFICANT CHANGE UP (ref 150–400)
PLATELET # BLD AUTO: 234 K/UL — SIGNIFICANT CHANGE UP (ref 150–400)
PO2 BLDV: 34 MMHG — SIGNIFICANT CHANGE UP (ref 25–45)
PO2 BLDV: 34 MMHG — SIGNIFICANT CHANGE UP (ref 25–45)
POTASSIUM BLDV-SCNC: 3.7 MMOL/L — SIGNIFICANT CHANGE UP (ref 3.5–5.1)
POTASSIUM BLDV-SCNC: 3.7 MMOL/L — SIGNIFICANT CHANGE UP (ref 3.5–5.1)
POTASSIUM SERPL-MCNC: 3.7 MMOL/L — SIGNIFICANT CHANGE UP (ref 3.5–5.3)
POTASSIUM SERPL-MCNC: 3.7 MMOL/L — SIGNIFICANT CHANGE UP (ref 3.5–5.3)
POTASSIUM SERPL-SCNC: 3.7 MMOL/L — SIGNIFICANT CHANGE UP (ref 3.5–5.3)
POTASSIUM SERPL-SCNC: 3.7 MMOL/L — SIGNIFICANT CHANGE UP (ref 3.5–5.3)
PROT SERPL-MCNC: 8.1 G/DL — SIGNIFICANT CHANGE UP (ref 6–8.3)
PROT SERPL-MCNC: 8.1 G/DL — SIGNIFICANT CHANGE UP (ref 6–8.3)
PROTHROM AB SERPL-ACNC: 12.4 SEC — SIGNIFICANT CHANGE UP (ref 9.5–13)
PROTHROM AB SERPL-ACNC: 12.4 SEC — SIGNIFICANT CHANGE UP (ref 9.5–13)
RBC # BLD: 5.77 M/UL — SIGNIFICANT CHANGE UP (ref 4.2–5.8)
RBC # BLD: 5.77 M/UL — SIGNIFICANT CHANGE UP (ref 4.2–5.8)
RBC # FLD: 13.6 % — SIGNIFICANT CHANGE UP (ref 10.3–14.5)
RBC # FLD: 13.6 % — SIGNIFICANT CHANGE UP (ref 10.3–14.5)
RSV RNA NPH QL NAA+NON-PROBE: SIGNIFICANT CHANGE UP
RSV RNA NPH QL NAA+NON-PROBE: SIGNIFICANT CHANGE UP
SAO2 % BLDV: 40.2 % — LOW (ref 67–88)
SAO2 % BLDV: 40.2 % — LOW (ref 67–88)
SARS-COV-2 RNA SPEC QL NAA+PROBE: SIGNIFICANT CHANGE UP
SARS-COV-2 RNA SPEC QL NAA+PROBE: SIGNIFICANT CHANGE UP
SODIUM SERPL-SCNC: 137 MMOL/L — SIGNIFICANT CHANGE UP (ref 135–145)
SODIUM SERPL-SCNC: 137 MMOL/L — SIGNIFICANT CHANGE UP (ref 135–145)
TROPONIN T, HIGH SENSITIVITY RESULT: 13 NG/L — SIGNIFICANT CHANGE UP (ref 0–51)
WBC # BLD: 7.52 K/UL — SIGNIFICANT CHANGE UP (ref 3.8–10.5)
WBC # BLD: 7.52 K/UL — SIGNIFICANT CHANGE UP (ref 3.8–10.5)
WBC # FLD AUTO: 7.52 K/UL — SIGNIFICANT CHANGE UP (ref 3.8–10.5)
WBC # FLD AUTO: 7.52 K/UL — SIGNIFICANT CHANGE UP (ref 3.8–10.5)

## 2023-11-13 PROCEDURE — 93015 CV STRESS TEST SUPVJ I&R: CPT

## 2023-11-13 PROCEDURE — 99223 1ST HOSP IP/OBS HIGH 75: CPT

## 2023-11-13 PROCEDURE — 78452 HT MUSCLE IMAGE SPECT MULT: CPT

## 2023-11-13 PROCEDURE — 99285 EMERGENCY DEPT VISIT HI MDM: CPT

## 2023-11-13 PROCEDURE — 71045 X-RAY EXAM CHEST 1 VIEW: CPT | Mod: 26

## 2023-11-13 PROCEDURE — A9500: CPT

## 2023-11-13 RX ORDER — ATORVASTATIN CALCIUM 80 MG/1
80 TABLET, FILM COATED ORAL AT BEDTIME
Refills: 0 | Status: DISCONTINUED | OUTPATIENT
Start: 2023-11-13 | End: 2023-11-15

## 2023-11-13 RX ORDER — ASPIRIN/CALCIUM CARB/MAGNESIUM 324 MG
81 TABLET ORAL DAILY
Refills: 0 | Status: DISCONTINUED | OUTPATIENT
Start: 2023-11-13 | End: 2023-11-15

## 2023-11-13 RX ORDER — ALOGLIPTIN AND METFORMIN HYDROCHLORIDE 12.5; 5 MG/1; MG/1
1 TABLET, FILM COATED ORAL
Qty: 60 | Refills: 0 | DISCHARGE

## 2023-11-13 RX ORDER — DEXTROSE 50 % IN WATER 50 %
25 SYRINGE (ML) INTRAVENOUS ONCE
Refills: 0 | Status: DISCONTINUED | OUTPATIENT
Start: 2023-11-13 | End: 2023-11-15

## 2023-11-13 RX ORDER — LANOLIN ALCOHOL/MO/W.PET/CERES
3 CREAM (GRAM) TOPICAL AT BEDTIME
Refills: 0 | Status: DISCONTINUED | OUTPATIENT
Start: 2023-11-13 | End: 2023-11-15

## 2023-11-13 RX ORDER — HEPARIN SODIUM 5000 [USP'U]/ML
4400 INJECTION INTRAVENOUS; SUBCUTANEOUS EVERY 6 HOURS
Refills: 0 | Status: DISCONTINUED | OUTPATIENT
Start: 2023-11-13 | End: 2023-11-14

## 2023-11-13 RX ORDER — ONDANSETRON 8 MG/1
4 TABLET, FILM COATED ORAL EVERY 8 HOURS
Refills: 0 | Status: DISCONTINUED | OUTPATIENT
Start: 2023-11-13 | End: 2023-11-15

## 2023-11-13 RX ORDER — ACETAMINOPHEN 500 MG
650 TABLET ORAL EVERY 6 HOURS
Refills: 0 | Status: DISCONTINUED | OUTPATIENT
Start: 2023-11-13 | End: 2023-11-15

## 2023-11-13 RX ORDER — DEXTROSE 50 % IN WATER 50 %
12.5 SYRINGE (ML) INTRAVENOUS ONCE
Refills: 0 | Status: DISCONTINUED | OUTPATIENT
Start: 2023-11-13 | End: 2023-11-15

## 2023-11-13 RX ORDER — TAMSULOSIN HYDROCHLORIDE 0.4 MG/1
0.4 CAPSULE ORAL AT BEDTIME
Refills: 0 | Status: DISCONTINUED | OUTPATIENT
Start: 2023-11-13 | End: 2023-11-15

## 2023-11-13 RX ORDER — GLUCAGON INJECTION, SOLUTION 0.5 MG/.1ML
1 INJECTION, SOLUTION SUBCUTANEOUS ONCE
Refills: 0 | Status: DISCONTINUED | OUTPATIENT
Start: 2023-11-13 | End: 2023-11-15

## 2023-11-13 RX ORDER — HEPARIN SODIUM 5000 [USP'U]/ML
INJECTION INTRAVENOUS; SUBCUTANEOUS
Qty: 25000 | Refills: 0 | Status: DISCONTINUED | OUTPATIENT
Start: 2023-11-13 | End: 2023-11-14

## 2023-11-13 RX ORDER — TICAGRELOR 90 MG/1
180 TABLET ORAL ONCE
Refills: 0 | Status: COMPLETED | OUTPATIENT
Start: 2023-11-13 | End: 2023-11-13

## 2023-11-13 RX ORDER — HEPARIN SODIUM 5000 [USP'U]/ML
INJECTION INTRAVENOUS; SUBCUTANEOUS
Qty: 25000 | Refills: 0 | Status: DISCONTINUED | OUTPATIENT
Start: 2023-11-13 | End: 2023-11-13

## 2023-11-13 RX ORDER — HEPARIN SODIUM 5000 [USP'U]/ML
4100 INJECTION INTRAVENOUS; SUBCUTANEOUS ONCE
Refills: 0 | Status: DISCONTINUED | OUTPATIENT
Start: 2023-11-13 | End: 2023-11-13

## 2023-11-13 RX ORDER — SODIUM CHLORIDE 9 MG/ML
1000 INJECTION, SOLUTION INTRAVENOUS
Refills: 0 | Status: DISCONTINUED | OUTPATIENT
Start: 2023-11-13 | End: 2023-11-15

## 2023-11-13 RX ORDER — INSULIN LISPRO 100/ML
VIAL (ML) SUBCUTANEOUS
Refills: 0 | Status: DISCONTINUED | OUTPATIENT
Start: 2023-11-13 | End: 2023-11-15

## 2023-11-13 RX ORDER — TICAGRELOR 90 MG/1
90 TABLET ORAL EVERY 12 HOURS
Refills: 0 | Status: DISCONTINUED | OUTPATIENT
Start: 2023-11-14 | End: 2023-11-15

## 2023-11-13 RX ORDER — ASPIRIN/CALCIUM CARB/MAGNESIUM 324 MG
243 TABLET ORAL DAILY
Refills: 0 | Status: DISCONTINUED | OUTPATIENT
Start: 2023-11-13 | End: 2023-11-14

## 2023-11-13 RX ORDER — METOPROLOL TARTRATE 50 MG
200 TABLET ORAL DAILY
Refills: 0 | Status: DISCONTINUED | OUTPATIENT
Start: 2023-11-13 | End: 2023-11-15

## 2023-11-13 RX ORDER — INSULIN LISPRO 100/ML
VIAL (ML) SUBCUTANEOUS AT BEDTIME
Refills: 0 | Status: DISCONTINUED | OUTPATIENT
Start: 2023-11-13 | End: 2023-11-15

## 2023-11-13 RX ORDER — DEXTROSE 50 % IN WATER 50 %
15 SYRINGE (ML) INTRAVENOUS ONCE
Refills: 0 | Status: DISCONTINUED | OUTPATIENT
Start: 2023-11-13 | End: 2023-11-15

## 2023-11-13 RX ORDER — HEPARIN SODIUM 5000 [USP'U]/ML
4400 INJECTION INTRAVENOUS; SUBCUTANEOUS ONCE
Refills: 0 | Status: COMPLETED | OUTPATIENT
Start: 2023-11-13 | End: 2023-11-13

## 2023-11-13 RX ORDER — PANTOPRAZOLE SODIUM 20 MG/1
40 TABLET, DELAYED RELEASE ORAL
Refills: 0 | Status: DISCONTINUED | OUTPATIENT
Start: 2023-11-13 | End: 2023-11-15

## 2023-11-13 RX ORDER — ERTUGLIFLOZIN 5 MG/1
1 TABLET, FILM COATED ORAL
Refills: 0 | DISCHARGE

## 2023-11-13 RX ORDER — HEPARIN SODIUM 5000 [USP'U]/ML
4100 INJECTION INTRAVENOUS; SUBCUTANEOUS EVERY 6 HOURS
Refills: 0 | Status: DISCONTINUED | OUTPATIENT
Start: 2023-11-13 | End: 2023-11-13

## 2023-11-13 RX ADMIN — Medication 243 MILLIGRAM(S): at 21:34

## 2023-11-13 RX ADMIN — TICAGRELOR 180 MILLIGRAM(S): 90 TABLET ORAL at 21:34

## 2023-11-13 RX ADMIN — HEPARIN SODIUM 4400 UNIT(S): 5000 INJECTION INTRAVENOUS; SUBCUTANEOUS at 22:32

## 2023-11-13 RX ADMIN — HEPARIN SODIUM 900 UNIT(S)/HR: 5000 INJECTION INTRAVENOUS; SUBCUTANEOUS at 22:32

## 2023-11-13 NOTE — ED ADULT NURSE NOTE - NSFALLUNIVINTERV_ED_ALL_ED
Bed/Stretcher in lowest position, wheels locked, appropriate side rails in place/Call bell, personal items and telephone in reach/Instruct patient to call for assistance before getting out of bed/chair/stretcher/Non-slip footwear applied when patient is off stretcher/[place] to call system/Physically safe environment - no spills, clutter or unnecessary equipment/Purposeful proactive rounding/Room/bathroom lighting operational, light cord in reach

## 2023-11-13 NOTE — ED PROVIDER NOTE - CLINICAL SUMMARY MEDICAL DECISION MAKING FREE TEXT BOX
Trevon Singleton, DO: 54 yo M PMHx HTN, HLD, DM2, CAD s/p CABG 12/22, stent 4/23, presenting to the emergency department as directed by his doctor for episode of chest pain last night at 2 AM lasting approximately 20 minutes, woke him from sleep, described as substernal pressure.  Associated mild shortness of breath.  No nausea, sweating, lightheadedness, or swelling in legs.  Has not had any pain since then.  Presented for outpatient stress test today however was directed to the ER given his episode of chest pain.  Patient on dual antiplatelet therapy, took his Brilinta today but not his aspirin.  Exam as above, labs unremarkable.  Vital signs stable.  Will obtain cardiac evaluation, cardiac biomarkers, chest x-ray, provide dose of aspirin.  Patient currently asymptomatic.  EKG with improvement from previous in April 2023 regarding T wave inversions in precordial leads.  Now has ST elevation in V1 just over 1 mm.  Will discuss with cardiology, however given lack of chest pain shortness of breath, low concern for active ACS.  We will continue to monitor closely, remain on telemetry.  Will reassess following initial ED work-up.  Patient require admission for further management given EKG changes and high risk chest pain.

## 2023-11-13 NOTE — CONSULT NOTE ADULT - SUBJECTIVE AND OBJECTIVE BOX
INCOMPLETE    HPI:  54 yo man with history of DM, HTN, HLD, CAD s/p CABG (LIMA-LAD, SVG-rPL, SVG-OM1, SVG-D1 12/2022) & PCI (CHERRI to OM1 c/b IST s/p CHERRI and POBA-LIMA 4/2023), GERD, and hx of tobacco use referred to the ED from stress lab.     Per patient, he has been having chest pain waking him up at night for the past month. 2-3x/week. Midline. Described as tightness. Constant. lasts 30 minutes. Also reports a decrease in exercise tolerance for the past month as well. +SOB after walking 100m and after walking up two flights of stairs. +orthopnea. Denies palpitations, LE swelling, diaphoresis, chest pain, nausea, vomiting, and dizziness. smoked 1/2ppd for ~30 years and quit after his CABG in 2022. Denies alcohol and recreational drug use.   Follows with Dr. Louie and Dr. Laughlin and was last seen on 10/12 and due to hx of recurrent chest pain he was referred for stress test.     Today he came for stress test and was referred to the ED given history of chest pain.     ED course:  VSS  EKG: SR with TWI and sagging ST segments in leads I and aVL. ROYAL 1mm in lead V1.   Troponin 13>13  LA 2.3    Cardiology consulted given abnormal EKG findings.     PMHx:   No pertinent past medical history  Hypertension  Diabetes mellitus  GERD (gastroesophageal reflux disease)  CAD (coronary artery disease)        PSHx:   No significant past surgical history  No significant past surgical history  S/P CABG (coronary artery bypass graft)        Allergies:  No Known Allergies      Home Meds:        FAMILY HISTORY:  FH: heart disease (Father)    REVIEW OF SYSTEMS:  as above     Physical Exam:  T(F): 97.5 (11-13), Max: 98.1 (11-13)  HR: 80 (11-13) (80 - 86)  BP: 119/80 (11-13) (119/80 - 128/86)  RR: 18 (11-13)  SpO2: 97% (11-13)  GENERAL: No acute distress, well-developed  HEAD:  Atraumatic, Normocephalic  ENT: EOMI, PERRLA, conjunctiva and sclera clear, Neck supple, No JVD, moist mucosa  CHEST/LUNG: Clear to auscultation bilaterally; No wheeze, equal breath sounds bilaterally   BACK: No spinal tenderness  HEART: Regular rate and rhythm; No murmurs, rubs, or gallops  ABDOMEN: Soft, Nontender, Nondistended; Bowel sounds present  EXTREMITIES:  No clubbing, cyanosis, or edema  PSYCH: Nl behavior, nl affect  NEUROLOGY: AAOx3, non-focal, cranial nerves intact  SKIN: Normal color, No rashes or lesions  LINES:    Imaging:    CXR: Personally reviewed    Labs: Personally reviewed                        16.1   7.52  )-----------( 234      ( 13 Nov 2023 16:17 )             49.0     11-13    137  |  100  |  13  ----------------------------<  85  3.7   |  23  |  0.82    Ca    10.0      13 Nov 2023 16:17    TPro  8.1  /  Alb  4.8  /  TBili  0.8  /  DBili  x   /  AST  14  /  ALT  12  /  AlkPhos  63  11-13    PT/INR - ( 13 Nov 2023 16:17 )   PT: 12.4 sec;   INR: 1.13 ratio         PTT - ( 13 Nov 2023 16:17 )  PTT:34.9 sec    CARDIAC MARKERS ( 13 Nov 2023 17:44 )  13 ng/L / x     / x     / x     / x     / x      CARDIAC MARKERS ( 13 Nov 2023 16:17 )  13 ng/L / x     / x     / x     / x     / x                     HPI:  54 yo man with history of DM, HTN, HLD, CAD s/p CABG (LIMA-LAD, SVG-rPL, SVG-OM1, SVG-D1 2022) & PCI (CHERRI to OM1 c/b IST s/p CHERRI and POBA-LIMA 2023), GERD, and hx of tobacco use referred to the ED from stress lab.     Per patient, he has been having chest pain waking him up at night for the past month. 2-3x/week. Midline. Described as tightness. Constant. lasts 30 minutes. Also reports a decrease in exercise tolerance for the past month as well. +SOB after walking 100m and after walking up two flights of stairs. +orthopnea. Denies palpitations, LE swelling, diaphoresis, chest pain, nausea, vomiting, and dizziness. smoked 1/2ppd for ~30 years and quit after his CABG in . Denies alcohol and recreational drug use.   Follows with Dr. Louie and Dr. Laughlin and was last seen on 10/12 and due to hx of recurrent chest pain he was referred for stress test.     Today he came for stress test and was referred to the ED given history of chest pain.     ED course:  VSS  EKG: SR with TWI and sagging ST segments in leads I and aVL. ROYAL 1mm in lead V1.   Troponin 13>13  LA 2.3    Cardiology consulted given abnormal EKG findings.     PMHx:   No pertinent past medical history  Hypertension  Diabetes mellitus  GERD (gastroesophageal reflux disease)  CAD (coronary artery disease)        PSHx:   No significant past surgical history  No significant past surgical history  S/P CABG (coronary artery bypass graft)        Allergies:  No Known Allergies      Home Meds:  farxiga 5mg daily   metoprolol succinate 200mg daily   ASA 81mg daily   ticagrelor 90mg BID  Janumet  BID  atorvastatin 40mg   tamsulosin   vitamin b12   folic acid   basaglar   entresto   senna   omperazole         FAMILY HISTORY:  FH: heart disease (Father)    REVIEW OF SYSTEMS:  as above     Physical Exam:  T(F): 97.5 (-), Max: 98.1 (-)  HR: 80 (-) (80 - 86)  BP: 119/80 (-) (119/80 - 128/86)  RR: 18 (-)  SpO2: 97% (11-13)    GENERAL: No acute distress, well-developed  CHEST/LUNG: Clear to auscultation bilaterally; No wheeze, equal breath sounds bilaterally   HEART: Regular rate and rhythm; No murmurs, rubs, or gallops  ABDOMEN: Soft, Nontender, Nondistended; Bowel sounds present  EXTREMITIES:  No clubbing, cyanosis, or edema  PSYCH: Nl behavior, nl affect  NEUROLOGY: AAOx3, non-focal, cranial nerves intact  SKIN: Normal color, No rashes or lesions  LINES:    Imaging:    CXR: Personally reviewed    Labs: Personally reviewed                        16.1   7.52  )-----------( 234      ( 2023 16:17 )             49.0     11-13    137  |  100  |  13  ----------------------------<  85  3.7   |  23  |  0.82    Ca    10.0      2023 16:17    TPro  8.1  /  Alb  4.8  /  TBili  0.8  /  DBili  x   /  AST  14  /  ALT  12  /  AlkPhos  63  11-13    PT/INR - ( 2023 16:17 )   PT: 12.4 sec;   INR: 1.13 ratio         PTT - ( 2023 16:17 )  PTT:34.9 sec    CARDIAC MARKERS ( 2023 17:44 )  13 ng/L / x     / x     / x     / x     / x      CARDIAC MARKERS ( 2023 16:17 )  13 ng/L / x     / x     / x     / x     / x        TTE 2023  CONCLUSIONS:   1. Normal left ventricular cavity size. The left ventricular wall thickness is normal. The left ventricular systolic function is severely decreased with an ejection fraction of 28 % by Salazar's method of disks. There are regional wall motion abnormalities No evidence of a thrombus in the left ventricle.   2. Multiple segmental abnormalities exist. See findings.   3. Normal right ventricular cavity size, normal wall thickness and borderline reduced systolic function.   4. Trace mitral regurgitation.   5. Trace tricuspid regurgitation. Pulmonary artery systolic pressure could not be estimated.   6. No pericardial effusion seen.   7. Compared to the transthoracic echocardiogram performed on 4/3/2023 The prior was performed while the patient was supine. The LVEF is calculated now to be lower using UEA and biplane Salazar's method.    CATH 2023  Diagnostic Findings:     Coronary Angiography   The coronary circulation is right dominant.      LM   Left main artery: Angiography shows mild atherosclerosis.      LAD   Proximal left anterior descending: Angiography shows severe  atherosclerosis. There is a 90 % stenosis. Mid left anterior  descending: Angiography shows complete occlusion. First diagonal:  Angiography shows severe atherosclerosis.    CX   First obtuse marginal: Angiography shows severe atherosclerosis. There  is a 100 % stenosis. Mid circumflex: Angiography  shows complete occlusion.      RCA   Right posterior descending artery: Angiography shows severe  atherosclerosis. Right coronary artery: Angiography shows mild  atherosclerosis. First right posterolateral: Angiography shows severe  atherosclerosis.    Graft Angiography   LIMA graft to Distal left anterior descending: Angiography shows minor  irregularities.    Conclusions:   Acute 100% mid OM1 lesion treated successfully with HCERRI x1. Patent  LIMA-LAD graft. SVG grafts known to be closed from    CATH 4/3/2023  Diagnostic Findings:     Coronary Angiography   The coronary circulation is right dominant.      LM   Left main artery: Angiography shows mild atherosclerosis.      LAD   Proximal left anterior descending: There is a 90 % stenosis. Mid left  anterior descending: There is a 100 %stenosis. First  diagonal: Angiography shows severe atherosclerosis.      CX   Second obtuse marginal: There is a 95 % stenosis. Distal circumflex:  There is a 100 % stenosis. First obtuse marginal: small  vessel . There is an 80 % stenosis.      RCA  Right posterior descending artery: Angiography shows severe  atherosclerosis. First right posterolateral: There is a 90 %stenosis.      Graft Angiography   LIMA graft to Distal left anterior descendin% at the anastomosis  .  SVG graft to First right posterolateral: Angiography shows complete  occlusion.  SVG graft to First obtuse marginal: Angiography shows complete  occlusion.  SVG graft to First diagonal: Angiography shows complete occlusion.             HPI:  54 yo man with history of DM, HTN, HLD, CAD s/p CABG (LIMA-LAD, SVG-rPL, SVG-OM1, SVG-D1 2022) & PCI (CHERRI to OM1 c/b IST s/p CHERRI and POBA-LIMA 2023), GERD, and hx of tobacco use referred to the ED from stress lab.     Per patient, he has been having chest pain waking him up at night for the past month. 2-3x/week. Midline. Described as tightness. Constant. lasts 30 minutes. Also reports a decrease in exercise tolerance for the past month as well. +SOB after walking 100m and after walking up two flights of stairs. +orthopnea. Denies palpitations, LE swelling, diaphoresis, chest pain, nausea, vomiting, and dizziness. smoked 1/2ppd for ~30 years and quit after his CABG in . Denies alcohol and recreational drug use.   Follows with Dr. Louie and Dr. Laughlin and was last seen on 10/12 and due to hx of recurrent chest pain he was referred for stress test.     Today he came for stress test and was referred to the ED given history of chest pain.     ED course:  VSS  EKG: SR with TWI and sagging ST segments in leads I and aVL. ROYAL 1mm in lead V1. Submm STD in v5-6.   Troponin 13>13  LA 2.3    Cardiology consulted given abnormal EKG findings.     PMHx:   No pertinent past medical history  Hypertension  Diabetes mellitus  GERD (gastroesophageal reflux disease)  CAD (coronary artery disease)        PSHx:   No significant past surgical history  No significant past surgical history  S/P CABG (coronary artery bypass graft)        Allergies:  No Known Allergies      Home Meds:  farxiga 5mg daily   metoprolol succinate 200mg daily   ASA 81mg daily   ticagrelor 90mg BID  Janumet  BID  atorvastatin 40mg   tamsulosin   vitamin b12   folic acid   basaglar   entresto   senna   omperazole         FAMILY HISTORY:  FH: heart disease (Father)    REVIEW OF SYSTEMS:  as above     Physical Exam:  T(F): 97.5 (-), Max: 98.1 (-)  HR: 80 (-) (80 - 86)  BP: 119/80 (-) (119/80 - 128/86)  RR: 18 (11-13)  SpO2: 97% (11-13)    GENERAL: No acute distress, well-developed  CHEST/LUNG: Clear to auscultation bilaterally; No wheeze, equal breath sounds bilaterally   HEART: Regular rate and rhythm; No murmurs, rubs, or gallops  ABDOMEN: Soft, Nontender, Nondistended; Bowel sounds present  EXTREMITIES:  No clubbing, cyanosis, or edema  PSYCH: Nl behavior, nl affect  NEUROLOGY: AAOx3, non-focal, cranial nerves intact  SKIN: Normal color, No rashes or lesions  LINES:    Imaging:    CXR: Personally reviewed    Labs: Personally reviewed                        16.1   7.52  )-----------( 234      ( 2023 16:17 )             49.0     11-13    137  |  100  |  13  ----------------------------<  85  3.7   |  23  |  0.82    Ca    10.0      2023 16:17    TPro  8.1  /  Alb  4.8  /  TBili  0.8  /  DBili  x   /  AST  14  /  ALT  12  /  AlkPhos  63  11-13    PT/INR - ( 2023 16:17 )   PT: 12.4 sec;   INR: 1.13 ratio         PTT - ( 2023 16:17 )  PTT:34.9 sec    CARDIAC MARKERS ( 2023 17:44 )  13 ng/L / x     / x     / x     / x     / x      CARDIAC MARKERS ( 2023 16:17 )  13 ng/L / x     / x     / x     / x     / x        TTE 2023  CONCLUSIONS:   1. Normal left ventricular cavity size. The left ventricular wall thickness is normal. The left ventricular systolic function is severely decreased with an ejection fraction of 28 % by Salazar's method of disks. There are regional wall motion abnormalities No evidence of a thrombus in the left ventricle.   2. Multiple segmental abnormalities exist. See findings.   3. Normal right ventricular cavity size, normal wall thickness and borderline reduced systolic function.   4. Trace mitral regurgitation.   5. Trace tricuspid regurgitation. Pulmonary artery systolic pressure could not be estimated.   6. No pericardial effusion seen.   7. Compared to the transthoracic echocardiogram performed on 4/3/2023 The prior was performed while the patient was supine. The LVEF is calculated now to be lower using UEA and biplane Salazar's method.    CATH 2023  Diagnostic Findings:     Coronary Angiography   The coronary circulation is right dominant.      LM   Left main artery: Angiography shows mild atherosclerosis.      LAD   Proximal left anterior descending: Angiography shows severe  atherosclerosis. There is a 90 % stenosis. Mid left anterior  descending: Angiography shows complete occlusion. First diagonal:  Angiography shows severe atherosclerosis.    CX   First obtuse marginal: Angiography shows severe atherosclerosis. There  is a 100 % stenosis. Mid circumflex: Angiography  shows complete occlusion.      RCA   Right posterior descending artery: Angiography shows severe  atherosclerosis. Right coronary artery: Angiography shows mild  atherosclerosis. First right posterolateral: Angiography shows severe  atherosclerosis.    Graft Angiography   LIMA graft to Distal left anterior descending: Angiography shows minor  irregularities.    Conclusions:   Acute 100% mid OM1 lesion treated successfully with CHERRI x1. Patent  LIMA-LAD graft. SVG grafts known to be closed from    CATH 4/3/2023  Diagnostic Findings:     Coronary Angiography   The coronary circulation is right dominant.      LM   Left main artery: Angiography shows mild atherosclerosis.      LAD   Proximal left anterior descending: There is a 90 % stenosis. Mid left  anterior descending: There is a 100 %stenosis. First  diagonal: Angiography shows severe atherosclerosis.      CX   Second obtuse marginal: There is a 95 % stenosis. Distal circumflex:  There is a 100 % stenosis. First obtuse marginal: small  vessel . There is an 80 % stenosis.      RCA  Right posterior descending artery: Angiography shows severe  atherosclerosis. First right posterolateral: There is a 90 %stenosis.      Graft Angiography   LIMA graft to Distal left anterior descendin% at the anastomosis  .  SVG graft to First right posterolateral: Angiography shows complete  occlusion.  SVG graft to First obtuse marginal: Angiography shows complete  occlusion.  SVG graft to First diagonal: Angiography shows complete occlusion.

## 2023-11-13 NOTE — H&P ADULT - HISTORY OF PRESENT ILLNESS
53M PMHx T2DM, HTN/HLD, CAD s/p CABG x4 '22 and PCI '23, GERD.   Presenting after substernal chest pain last night and was advised by cardiologist to present to ED.   Pt has been having angina 2-3x/week, described as chest tightness that lasts ~30min. Pt also reports +orthopnea, decreased exercisee tolerance for the past month as well.   denies fever, chills, cough, palpitation, abd pain, n/v/d, or urinary sx.     On arrival, VSS. CBC and CMP unremarkable. Trop 13, 13.   proBNP 465.     Seen by cards in the Ed, rec hep gtt, reloading asa and brliinta, and plan for cath tmr.  53M PMHx T2DM, HTN/HLD, CAD s/p CABG x4 '22 and PCI '23, GERD.   Presenting after substernal chest pain last night and was advised by cardiologist to present to ED.   Pt has been having angina 2-3x/week, described as chest tightness that lasts ~30min. Pt also reports +orthopnea, decreased exercise tolerance for the past month as well.   denies fever, chills, cough, palpitation, abd pain, n/v/d, or urinary sx.     On arrival, VSS. CBC and CMP unremarkable. Trop 13, 13.   proBNP 465.     Seen by cards in the Ed, rec hep gtt, reloading asa and brliinta, and plan for cath tmr.

## 2023-11-13 NOTE — ED ADULT NURSE NOTE - PAIN: PRESENCE, MLM
kcal: 3306 (MSJ x1 AF) (-500 kcal/day to promote 1 lbs/week wt loss) obese BMI  protein: 64-77 g (1-1.2 g/kg IBW) aim low, CKD considered + obese BMI  fluid: 1500mL fluid restriction per LIP complains of pain/discomfort

## 2023-11-13 NOTE — H&P ADULT - PROBLEM SELECTOR PLAN 3
-in Aug, a1c was 6.4 per pt's report   -holding home oral meds  -while NPO, LDSS.  -when pt resumes PO diet, increase insulin

## 2023-11-13 NOTE — H&P ADULT - PROBLEM SELECTOR PLAN 1
-c/w hep gtt 48hr  -s/p asa and brilnta load. start asa 81mg and brilnta 90mg bid 11/14  -hold entresto until after cath  -c/w ome bb  -hold farxiga while inpatient  -f/u TTE  -NPO @[place] for cath 11/14 -c/w hep gtt 48hr  -s/p asa and brilnta load. start asa 81mg and brilnta 90mg bid 11/14  -hold entresto until after cath  -c/w home bb  -f/u TTE  -NPO @[place] for cath 11/14

## 2023-11-13 NOTE — ED ADULT NURSE NOTE - OBJECTIVE STATEMENT
Pt is 53y M with PMH HTN, HLD, DMII, CABG (2022), complaining of chest pain. Pt reports onset of chest pain last night, with associated SOB but no n/v. Symptoms subsided, today onset of L shoulder discomfort with no other symptoms. Reported to outpatient cardiologist for scheduled stress test when instructed to report to ED for cardiac evaluation. Upon assessment, A&Ox4, denies chest pain, SOB, n/v, but endorses L shoulder discomfort upon movement. CM NSR. 22g In LAC placed at outpatient cardiology office. Belongings at bedside.

## 2023-11-13 NOTE — CONSULT NOTE ADULT - ASSESSMENT
54 yo man with history of DM, HTN, HLD, CAD s/p CABG (LIMA-LAD, SVG-rPL, SVG-OM1, SVG-D1 12/2022) & PCI (CHERRI to OM1 c/b IST s/p CHERRI and POBA-LIMA 4/2023), GERD, and hx of tobacco use referred to the ED from stress lab.   Cardiology consulted for abnormal EKG.     Cath history above  Troponin 13>13  EKG: SR with SR with TWI and sagging ST segments in leads I and aVL. ROYAL 1mm in lead V1. (new V1 changes from prior)    This patients history of recurrent chest pain with his significant CAD history is concerning for cardiac chest pain. Given that at baseline he has anginal pain and this is worsening of his anginal sx his presentation is concerning for unstable angina. His troponins are negative x2 and his EKG although he has V1 elevations, is relatively unchanged from prior. He does not have chest pain at this moment and does not need to go for emergent cardiac cath. However, per guidelines would still manage as ACS and proceed with inpatient ischemic evaluation.     Recommendations:  -start heparin gtt as part of ACS protocol   -please load with ASA/ticagrelor   -repeat EKG in 2 hours   -TTE  -increase statin to atorva 80mg daily   -continue home BB   -can hold entresto till after cath   -hold farxiga while inpatient   -check TTE   -plan for cath tomorrow     Please see attending attestation for final recommendations.     Woodrow Blair MD   Cardiology Fellow  52 yo man with history of DM, HTN, HLD, CAD s/p CABG (LIMA-LAD, SVG-rPL, SVG-OM1, SVG-D1 12/2022) & PCI (CHERRI to OM1 c/b IST s/p CHERRI and POBA-LIMA 4/2023), GERD, and hx of tobacco use referred to the ED from stress lab.   Cardiology consulted for abnormal EKG.     Cath history above  Troponin 13>13  EKG: SR with SR with TWI and sagging ST segments in leads I and aVL. ROYAL 1mm in lead V1. (new V1 changes from prior)    This patients history of recurrent chest pain with his significant CAD history is concerning for cardiac chest pain. Given that at baseline he has anginal pain and this is worsening of his anginal sx his presentation is concerning for unstable angina. His troponins are negative x2 and his EKG although he has V1 elevations, is relatively unchanged from prior. He does not have chest pain at this moment and does not need to go for emergent cardiac cath. However, per guidelines would still manage as ACS and proceed with inpatient ischemic evaluation.     Recommendations:  -start heparin gtt as part of ACS protocol   -please reload with ASA/ticagrelor now  -start ASA 81mg daily and ticagrelor 90mg BID tomorrow   -repeat EKG in 2 hours   -TTE  -increase statin to atorva 80mg daily   -continue home BB   -can hold entresto till after cath   -hold farxiga while inpatient   -check TTE   -plan for cath tomorrow     Please see attending attestation for final recommendations.     Woodrow Blair MD   Cardiology Fellow  54 yo man with history of DM, HTN, HLD, CAD s/p CABG (LIMA-LAD, SVG-rPL, SVG-OM1, SVG-D1 12/2022) & PCI (CHERRI to OM1 c/b IST s/p CHERRI and POBA-LIMA 4/2023), GERD, and hx of tobacco use referred to the ED from stress lab.   Cardiology consulted for abnormal EKG.     Cath history above  Troponin 13>13  EKG: SR with SR with TWI and sagging ST segments in leads I and aVL. ROYAL 1mm in lead V1. Submm STD in v5-6. (new V1/v5-6 changes from prior)    This patients history of recurrent chest pain with his significant CAD history is concerning for cardiac chest pain. Given that at baseline he has anginal pain and this is worsening of his anginal sx his presentation is concerning for unstable angina. His troponins are negative x2 and his EKG although he has V1 elevations, is relatively unchanged from prior. He does not have chest pain at this moment and does not need to go for emergent cardiac cath. However, per guidelines would still manage as ACS and proceed with inpatient ischemic evaluation.     Recommendations:  -start heparin gtt as part of ACS protocol   -please reload with ASA/ticagrelor now  -start ASA 81mg daily and ticagrelor 90mg BID tomorrow   -repeat EKG in 2 hours   -TTE  -increase statin to atorva 80mg daily   -continue home BB   -can hold entresto till after cath   -hold farxiga while inpatient   -check TTE   -plan for cath tomorrow     Please see attending attestation for final recommendations.     Woodrow Blair MD   Cardiology Fellow

## 2023-11-13 NOTE — CONSULT NOTE ADULT - ATTENDING COMMENTS
54 yo man with history of DM, HTN, HLD, CAD s/p CABG (LIMA-LAD, SVG-rPL, SVG-OM1, SVG-D1 12/2022) & PCI (CHERRI to OM1 c/b IST s/p CHERRI and POBA-LIMA 4/2023), GERD, and hx of tobacco use referred to the ED from stress lab.   Cardiology consulted for abnormal EKG.     Cath history above  Troponin 13>13  EKG: SR with SR with TWI and sagging ST segments in leads I and aVL. ROYAL 1mm in lead V1. Submm STD in v5-6. (new V1/v5-6 changes from prior)    This patients history of recurrent chest pain with his significant CAD history is concerning for cardiac chest pain. Given that at baseline he has anginal pain and this is worsening of his anginal sx his presentation is concerning for unstable angina. His troponins are negative x2 and his EKG although he has V1 elevations, is relatively unchanged from prior. He does not have chest pain at this moment and does not need to go for emergent cardiac cath. However, per guidelines would still manage as ACS and proceed with inpatient ischemic evaluation.     Recommendations:  - s/p cath  = on DAPT  - hep is now off  - reloaded with ASA/ticagrelor   -start ASA 81mg daily and ticagrelor 90mg BID tomorrow   -TTE is pending  -increase statin to atorva 80mg daily   -continue home BB   -can resume entresto once post cath Cr is known  -hold farxiga while inpatient

## 2023-11-13 NOTE — H&P ADULT - ASSESSMENT
53M PMHx T2DM, HTN/HLD, CAD s/p CABG x4 '22 and PCI '23, GERD.  Presenting w/ anginal pain. admited for [facility] tmr.

## 2023-11-13 NOTE — H&P ADULT - NSHPADDITIONALINFOADULT_GEN_ALL_CORE
Fluid: po when able   Electrolytes: replete potassium, phosphorus and magnesium to 4/3/2 respectively  Nutrition: dash/dm diet. NPO @ [place]   Activity: as tolerated     CODE STATUS: full code   Preferred contact:  Joanna Thurman (wife) [phone]    Med rec:  Done with written list of meds provided by the patient.

## 2023-11-13 NOTE — H&P ADULT - NSHPPHYSICALEXAM_GEN_ALL_CORE
Vital Signs Last 24 Hrs  T(C): 36.4 (13 Nov 2023 23:18), Max: 36.7 (13 Nov 2023 15:18)  T(F): 97.5 (13 Nov 2023 23:18), Max: 98.1 (13 Nov 2023 15:18)  HR: 84 (13 Nov 2023 23:18) (80 - 87)  BP: 104/69 (13 Nov 2023 23:18) (104/69 - 128/86)  BP(mean): 91 (13 Nov 2023 19:30) (91 - 91)  RR: 18 (13 Nov 2023 23:18) (16 - 19)  SpO2: 98% (13 Nov 2023 23:18) (95% - 98%)    Parameters below as of 13 Nov 2023 23:18  Patient On (Oxygen Delivery Method): room air        CONSTITUTIONAL: Well-groomed, in no apparent distress  EYES: No conjunctival or scleral injection, non-icteric  ENMT: No external nasal lesions; MMM  RESPIRATORY: Breathing comfortably; lungs CTA without wheeze/rhonchi/rales  CARDIOVASCULAR: +S1S2, RRR, no M/G/R; pedal pulses full and symmetric; no lower extremity edema  GASTROINTESTINAL: No tenderness, +BS throughout, no rebound/guarding  SKIN: No rashes or ulcers noted  NEUROLOGIC: No gross focal neurological deficits, AAOX3  PSYCHIATRIC: mood and affect appropriate; appropriate insight and judgment

## 2023-11-13 NOTE — ED PROVIDER NOTE - PHYSICAL EXAMINATION
Gen: Alert Ox3. NAD.   HEENT: Atraumatic. Mucous membranes moist.  CV: RRR. No significant LE edema.   Resp: Unlabored-respirations. CTAB.  GI: Abdomen non tender to palpation, soft.  Skin/MSK: No open wounds.   Neuro: EOMI. Pupils ERRL. Following commands.   Psych: Appropriate mood, cooperative

## 2023-11-14 ENCOUNTER — TRANSCRIPTION ENCOUNTER (OUTPATIENT)
Age: 53
End: 2023-11-14

## 2023-11-14 LAB
A1C WITH ESTIMATED AVERAGE GLUCOSE RESULT: 6.4 % — HIGH (ref 4–5.6)
A1C WITH ESTIMATED AVERAGE GLUCOSE RESULT: 6.4 % — HIGH (ref 4–5.6)
ALBUMIN SERPL ELPH-MCNC: 3.9 G/DL — SIGNIFICANT CHANGE UP (ref 3.3–5)
ALBUMIN SERPL ELPH-MCNC: 3.9 G/DL — SIGNIFICANT CHANGE UP (ref 3.3–5)
ALP SERPL-CCNC: 57 U/L — SIGNIFICANT CHANGE UP (ref 40–120)
ALP SERPL-CCNC: 57 U/L — SIGNIFICANT CHANGE UP (ref 40–120)
ALT FLD-CCNC: 10 U/L — SIGNIFICANT CHANGE UP (ref 10–45)
ALT FLD-CCNC: 10 U/L — SIGNIFICANT CHANGE UP (ref 10–45)
ANION GAP SERPL CALC-SCNC: 12 MMOL/L — SIGNIFICANT CHANGE UP (ref 5–17)
ANION GAP SERPL CALC-SCNC: 12 MMOL/L — SIGNIFICANT CHANGE UP (ref 5–17)
APTT BLD: 106.8 SEC — HIGH (ref 24.5–35.6)
APTT BLD: 106.8 SEC — HIGH (ref 24.5–35.6)
AST SERPL-CCNC: 13 U/L — SIGNIFICANT CHANGE UP (ref 10–40)
AST SERPL-CCNC: 13 U/L — SIGNIFICANT CHANGE UP (ref 10–40)
BILIRUB SERPL-MCNC: 0.4 MG/DL — SIGNIFICANT CHANGE UP (ref 0.2–1.2)
BILIRUB SERPL-MCNC: 0.4 MG/DL — SIGNIFICANT CHANGE UP (ref 0.2–1.2)
BLD GP AB SCN SERPL QL: NEGATIVE — SIGNIFICANT CHANGE UP
BLD GP AB SCN SERPL QL: NEGATIVE — SIGNIFICANT CHANGE UP
BUN SERPL-MCNC: 18 MG/DL — SIGNIFICANT CHANGE UP (ref 7–23)
BUN SERPL-MCNC: 18 MG/DL — SIGNIFICANT CHANGE UP (ref 7–23)
CALCIUM SERPL-MCNC: 9 MG/DL — SIGNIFICANT CHANGE UP (ref 8.4–10.5)
CALCIUM SERPL-MCNC: 9 MG/DL — SIGNIFICANT CHANGE UP (ref 8.4–10.5)
CHLORIDE SERPL-SCNC: 102 MMOL/L — SIGNIFICANT CHANGE UP (ref 96–108)
CHLORIDE SERPL-SCNC: 102 MMOL/L — SIGNIFICANT CHANGE UP (ref 96–108)
CO2 SERPL-SCNC: 24 MMOL/L — SIGNIFICANT CHANGE UP (ref 22–31)
CO2 SERPL-SCNC: 24 MMOL/L — SIGNIFICANT CHANGE UP (ref 22–31)
CREAT SERPL-MCNC: 0.76 MG/DL — SIGNIFICANT CHANGE UP (ref 0.5–1.3)
CREAT SERPL-MCNC: 0.76 MG/DL — SIGNIFICANT CHANGE UP (ref 0.5–1.3)
EGFR: 108 ML/MIN/1.73M2 — SIGNIFICANT CHANGE UP
EGFR: 108 ML/MIN/1.73M2 — SIGNIFICANT CHANGE UP
ESTIMATED AVERAGE GLUCOSE: 137 MG/DL — HIGH (ref 68–114)
ESTIMATED AVERAGE GLUCOSE: 137 MG/DL — HIGH (ref 68–114)
GLUCOSE BLDC GLUCOMTR-MCNC: 106 MG/DL — HIGH (ref 70–99)
GLUCOSE BLDC GLUCOMTR-MCNC: 106 MG/DL — HIGH (ref 70–99)
GLUCOSE BLDC GLUCOMTR-MCNC: 110 MG/DL — HIGH (ref 70–99)
GLUCOSE BLDC GLUCOMTR-MCNC: 110 MG/DL — HIGH (ref 70–99)
GLUCOSE BLDC GLUCOMTR-MCNC: 114 MG/DL — HIGH (ref 70–99)
GLUCOSE BLDC GLUCOMTR-MCNC: 114 MG/DL — HIGH (ref 70–99)
GLUCOSE BLDC GLUCOMTR-MCNC: 121 MG/DL — HIGH (ref 70–99)
GLUCOSE BLDC GLUCOMTR-MCNC: 121 MG/DL — HIGH (ref 70–99)
GLUCOSE BLDC GLUCOMTR-MCNC: 183 MG/DL — HIGH (ref 70–99)
GLUCOSE BLDC GLUCOMTR-MCNC: 183 MG/DL — HIGH (ref 70–99)
GLUCOSE SERPL-MCNC: 137 MG/DL — HIGH (ref 70–99)
GLUCOSE SERPL-MCNC: 137 MG/DL — HIGH (ref 70–99)
HCT VFR BLD CALC: 43.5 % — SIGNIFICANT CHANGE UP (ref 39–50)
HCT VFR BLD CALC: 43.5 % — SIGNIFICANT CHANGE UP (ref 39–50)
HGB BLD-MCNC: 14.6 G/DL — SIGNIFICANT CHANGE UP (ref 13–17)
HGB BLD-MCNC: 14.6 G/DL — SIGNIFICANT CHANGE UP (ref 13–17)
LACTATE BLDV-MCNC: 1.1 MMOL/L — SIGNIFICANT CHANGE UP (ref 0.5–2)
LACTATE BLDV-MCNC: 1.1 MMOL/L — SIGNIFICANT CHANGE UP (ref 0.5–2)
MAGNESIUM SERPL-MCNC: 1.8 MG/DL — SIGNIFICANT CHANGE UP (ref 1.6–2.6)
MAGNESIUM SERPL-MCNC: 1.8 MG/DL — SIGNIFICANT CHANGE UP (ref 1.6–2.6)
MCHC RBC-ENTMCNC: 27.9 PG — SIGNIFICANT CHANGE UP (ref 27–34)
MCHC RBC-ENTMCNC: 27.9 PG — SIGNIFICANT CHANGE UP (ref 27–34)
MCHC RBC-ENTMCNC: 33.6 GM/DL — SIGNIFICANT CHANGE UP (ref 32–36)
MCHC RBC-ENTMCNC: 33.6 GM/DL — SIGNIFICANT CHANGE UP (ref 32–36)
MCV RBC AUTO: 83 FL — SIGNIFICANT CHANGE UP (ref 80–100)
MCV RBC AUTO: 83 FL — SIGNIFICANT CHANGE UP (ref 80–100)
NRBC # BLD: 0 /100 WBCS — SIGNIFICANT CHANGE UP (ref 0–0)
NRBC # BLD: 0 /100 WBCS — SIGNIFICANT CHANGE UP (ref 0–0)
PHOSPHATE SERPL-MCNC: 3.5 MG/DL — SIGNIFICANT CHANGE UP (ref 2.5–4.5)
PHOSPHATE SERPL-MCNC: 3.5 MG/DL — SIGNIFICANT CHANGE UP (ref 2.5–4.5)
PLATELET # BLD AUTO: 220 K/UL — SIGNIFICANT CHANGE UP (ref 150–400)
PLATELET # BLD AUTO: 220 K/UL — SIGNIFICANT CHANGE UP (ref 150–400)
POTASSIUM SERPL-MCNC: 3.4 MMOL/L — LOW (ref 3.5–5.3)
POTASSIUM SERPL-MCNC: 3.4 MMOL/L — LOW (ref 3.5–5.3)
POTASSIUM SERPL-SCNC: 3.4 MMOL/L — LOW (ref 3.5–5.3)
POTASSIUM SERPL-SCNC: 3.4 MMOL/L — LOW (ref 3.5–5.3)
PROT SERPL-MCNC: 7 G/DL — SIGNIFICANT CHANGE UP (ref 6–8.3)
PROT SERPL-MCNC: 7 G/DL — SIGNIFICANT CHANGE UP (ref 6–8.3)
RBC # BLD: 5.24 M/UL — SIGNIFICANT CHANGE UP (ref 4.2–5.8)
RBC # BLD: 5.24 M/UL — SIGNIFICANT CHANGE UP (ref 4.2–5.8)
RBC # FLD: 13.5 % — SIGNIFICANT CHANGE UP (ref 10.3–14.5)
RBC # FLD: 13.5 % — SIGNIFICANT CHANGE UP (ref 10.3–14.5)
RH IG SCN BLD-IMP: POSITIVE — SIGNIFICANT CHANGE UP
RH IG SCN BLD-IMP: POSITIVE — SIGNIFICANT CHANGE UP
SODIUM SERPL-SCNC: 138 MMOL/L — SIGNIFICANT CHANGE UP (ref 135–145)
SODIUM SERPL-SCNC: 138 MMOL/L — SIGNIFICANT CHANGE UP (ref 135–145)
WBC # BLD: 6.46 K/UL — SIGNIFICANT CHANGE UP (ref 3.8–10.5)
WBC # BLD: 6.46 K/UL — SIGNIFICANT CHANGE UP (ref 3.8–10.5)
WBC # FLD AUTO: 6.46 K/UL — SIGNIFICANT CHANGE UP (ref 3.8–10.5)
WBC # FLD AUTO: 6.46 K/UL — SIGNIFICANT CHANGE UP (ref 3.8–10.5)

## 2023-11-14 PROCEDURE — 99152 MOD SED SAME PHYS/QHP 5/>YRS: CPT

## 2023-11-14 PROCEDURE — 99233 SBSQ HOSP IP/OBS HIGH 50: CPT

## 2023-11-14 PROCEDURE — 93459 L HRT ART/GRFT ANGIO: CPT | Mod: 26,59

## 2023-11-14 PROCEDURE — 93010 ELECTROCARDIOGRAM REPORT: CPT

## 2023-11-14 PROCEDURE — 99221 1ST HOSP IP/OBS SF/LOW 40: CPT

## 2023-11-14 PROCEDURE — 93306 TTE W/DOPPLER COMPLETE: CPT | Mod: 26

## 2023-11-14 PROCEDURE — 92920 PRQ TRLUML C ANGIOP 1ART&/BR: CPT | Mod: LC

## 2023-11-14 RX ORDER — ASPIRIN/CALCIUM CARB/MAGNESIUM 324 MG
1 TABLET ORAL
Qty: 90 | Refills: 3
Start: 2023-11-14 | End: 2024-11-07

## 2023-11-14 RX ORDER — SODIUM CHLORIDE 9 MG/ML
250 INJECTION INTRAMUSCULAR; INTRAVENOUS; SUBCUTANEOUS ONCE
Refills: 0 | Status: COMPLETED | OUTPATIENT
Start: 2023-11-14 | End: 2023-11-14

## 2023-11-14 RX ORDER — POTASSIUM CHLORIDE 20 MEQ
40 PACKET (EA) ORAL ONCE
Refills: 0 | Status: COMPLETED | OUTPATIENT
Start: 2023-11-14 | End: 2023-11-14

## 2023-11-14 RX ORDER — SACUBITRIL AND VALSARTAN 24; 26 MG/1; MG/1
1 TABLET, FILM COATED ORAL
Refills: 0 | Status: DISCONTINUED | OUTPATIENT
Start: 2023-11-14 | End: 2023-11-15

## 2023-11-14 RX ORDER — OMEPRAZOLE 10 MG/1
1 CAPSULE, DELAYED RELEASE ORAL
Refills: 0 | DISCHARGE

## 2023-11-14 RX ORDER — TICAGRELOR 90 MG/1
1 TABLET ORAL
Qty: 180 | Refills: 3
Start: 2023-11-14 | End: 2024-11-07

## 2023-11-14 RX ORDER — SODIUM CHLORIDE 9 MG/ML
1000 INJECTION INTRAMUSCULAR; INTRAVENOUS; SUBCUTANEOUS
Refills: 0 | Status: DISCONTINUED | OUTPATIENT
Start: 2023-11-14 | End: 2023-11-15

## 2023-11-14 RX ORDER — PANTOPRAZOLE SODIUM 20 MG/1
1 TABLET, DELAYED RELEASE ORAL
Qty: 90 | Refills: 0
Start: 2023-11-14 | End: 2024-02-11

## 2023-11-14 RX ADMIN — HEPARIN SODIUM 0 UNIT(S)/HR: 5000 INJECTION INTRAVENOUS; SUBCUTANEOUS at 05:16

## 2023-11-14 RX ADMIN — SODIUM CHLORIDE 50 MILLILITER(S): 9 INJECTION INTRAMUSCULAR; INTRAVENOUS; SUBCUTANEOUS at 14:27

## 2023-11-14 RX ADMIN — Medication 200 MILLIGRAM(S): at 08:17

## 2023-11-14 RX ADMIN — TAMSULOSIN HYDROCHLORIDE 0.4 MILLIGRAM(S): 0.4 CAPSULE ORAL at 20:46

## 2023-11-14 RX ADMIN — PANTOPRAZOLE SODIUM 40 MILLIGRAM(S): 20 TABLET, DELAYED RELEASE ORAL at 06:41

## 2023-11-14 RX ADMIN — Medication 81 MILLIGRAM(S): at 08:17

## 2023-11-14 RX ADMIN — TICAGRELOR 90 MILLIGRAM(S): 90 TABLET ORAL at 20:46

## 2023-11-14 RX ADMIN — Medication 40 MILLIEQUIVALENT(S): at 09:15

## 2023-11-14 RX ADMIN — HEPARIN SODIUM 700 UNIT(S)/HR: 5000 INJECTION INTRAVENOUS; SUBCUTANEOUS at 07:15

## 2023-11-14 RX ADMIN — HEPARIN SODIUM 700 UNIT(S)/HR: 5000 INJECTION INTRAVENOUS; SUBCUTANEOUS at 06:20

## 2023-11-14 RX ADMIN — SODIUM CHLORIDE 250 MILLILITER(S): 9 INJECTION INTRAMUSCULAR; INTRAVENOUS; SUBCUTANEOUS at 09:35

## 2023-11-14 RX ADMIN — TICAGRELOR 90 MILLIGRAM(S): 90 TABLET ORAL at 08:17

## 2023-11-14 RX ADMIN — ATORVASTATIN CALCIUM 80 MILLIGRAM(S): 80 TABLET, FILM COATED ORAL at 20:46

## 2023-11-14 NOTE — DISCHARGE NOTE PROVIDER - CARE PROVIDERS DIRECT ADDRESSES
[email or web address]

## 2023-11-14 NOTE — PROGRESS NOTE ADULT - ASSESSMENT
52 yo man with history of DM, HTN, HLD, CAD s/p CABG (LIMA-LAD, SVG-rPL, SVG-OM1, SVG-D1 12/2022) & PCI (CHERRI to OM1 c/b IST s/p CHERRI and POBA-LIMA 4/2023), GERD, and hx of tobacco use presents to ED from stress lab w/ abnormal EKG.

## 2023-11-14 NOTE — DISCHARGE NOTE PROVIDER - NSDCFUADDINST_GEN_ALL_CORE_FT
Wound Care:   the day AFTER your procedure remove bandage GENTLY, and clean using  mild soap and gentle warm, water stream, pat dry. leave OPEN to air. YOU MAY SHOWER   DO NOT apply lotions, creams, ointments, powder, perfumes to your incision site  DO NOT SOAK your site for 1 week ( no baths, no pools, no tubs, etc...)  Check  your groin and/or wrist daily. A small amount of bruising, and soreness are normal    ACTIVITY: for 24 hours   - DO NOT DRIVE  - DO NOT make any important decisions or sign legal documents   - DO NOT operate heavy machineries   - you may resume sexual activity in 48 hours, unless otherwise instructed by your cardiologist     If your procedure was done through the WRIST: for the NEXT 3 DAYS  - avoid pushing, pulling, with that affected wrist   - avoid repeated movement of that hand and wrist ( eg: typing, hammering)  - DO NOT LIFT anything more than 5 lbs     If your procedure was done through the GROIN: for the NEXT 5 DAYS  - Limit climbing stairs, DO NOT soak in bathtub or pool  - no strenuous activities, pushing, pulling, straining  - Do not lift anything 10lbs or heavier     MEDICATION:   take your medications as explained ( see discharge paperwork)   If you received a STENT, you will be taking antiplatelet medications to KEEP YOUR STENT OPEN ( eg: Aspirin, Plavix, Brilinta, Effient, etc).  Take as prescribed DO NOT STOP taking them without consulting with your cardiologist first.     Follow heart healthy diet recommended by your doctor, , if you smoke STOP SMOKING ( may call [phone] for center of tobacco control if you need assistance)     CALL your doctor to make appointment in 2 WEEKS     ***CALL YOUR DOCTOR***  if you experience: fever, chills, body aches, or severe pain, swelling, redness, heat or yellow discharge at incision site  If you experience Bleeding or excruciating pain at the procedural site, swelling (golf ball size) at your procedural site  If you experience CHEST PAIN  If you experience extremity numbness, tingling, temperature change (of your procedural site)   If you are unable to reach your doctor, you may contact:   -Cardiology Office at [facility] at [phone] or   - [facility] [phone]  - [facility] [phone]

## 2023-11-14 NOTE — DISCHARGE NOTE PROVIDER - NSDCMRMEDTOKEN_GEN_ALL_CORE_FT
aspirin 81 mg oral delayed release tablet: 1 tab(s) orally once a day  atorvastatin 40 mg oral tablet: 1 tab(s) orally once a day (at bedtime)  Basaglar KwikPen 100 units/mL subcutaneous solution: 12 unit(s) subcutaneous once a day (at bedtime)  Janumet 50 mg-1000 mg oral tablet: 1 tab(s) orally 2 times a day RESUME 11/17/23  metoprolol succinate 200 mg oral tablet, extended release: 1 tab(s) orally once a day  otc supplements: folic acid / vitamin b12 / fish oil / multivitamin / alpha lipoic acid  pantoprazole 40 mg oral delayed release tablet: 1 tab(s) orally once a day (before a meal)  sacubitril-valsartan 24 mg-26 mg oral tablet: 1 tab(s) orally 2 times a day  senna (sennosides) 8.6 mg oral tablet: 1 tab(s) orally once a day  Systane ophthalmic solution: 1 drop(s) in each eye once a day as needed for  dry eyes  tamsulosin 0.4 mg oral capsule: 1 cap(s) orally once a day  ticagrelor 90 mg oral tablet: 1 tab(s) orally every 12 hours

## 2023-11-14 NOTE — PROGRESS NOTE ADULT - PROBLEM SELECTOR PLAN 1
Cards following   -c/w hep gtt 48hr  -c/w ASA/Brilinta   -Atorvastatin 80mg daily   -hold entresto until after cath  -c/w Toprol xl 200mg daily   -f/u TTE  -Plan for [facility] 11/14

## 2023-11-14 NOTE — DISCHARGE NOTE PROVIDER - NSDCFUSCHEDAPPT_GEN_ALL_CORE_FT
Martin Louie  [facility] Physician [facility]  CARDIOLOGY [address] [facility]   Scheduled Appointment: 12/21/2023

## 2023-11-14 NOTE — DISCHARGE NOTE PROVIDER - NSDCCPTREATMENT_GEN_ALL_CORE_FT
PRINCIPAL PROCEDURE  Procedure: Left heart catheterization  Findings and Treatment: 11/14/23: POBA to OM1 via RFA

## 2023-11-14 NOTE — PROVIDER CONTACT NOTE (OTHER) - ASSESSMENT
Pt is AxOx4, all VSS, pt on RA. No chest pain or SOB. Pt currently on heparin running at 9cc/hr. No active signs of bleeding.

## 2023-11-14 NOTE — DISCHARGE NOTE PROVIDER - HOSPITAL COURSE
HPI:  53M PMHx T2DM, HTN/HLD, CAD s/p CABG x4 '22 and PCI '23, GERD.   Presenting after substernal chest pain last night and was advised by cardiologist to present to ED.   Pt has been having angina 2-3x/week, described as chest tightness that lasts ~30min. Pt also reports +orthopnea, decreased exercise tolerance for the past month as well.   denies fever, chills, cough, palpitation, abd pain, n/v/d, or urinary sx.     On arrival, VSS. CBC and CMP unremarkable. Trop 13, 13.   proBNP 465.     Seen by cards in the Ed, rec hep gtt, reloading asa and brliinta, and plan for cath tmr.  (13 Nov 2023 22:10)    Now s/p cardiac cath 11/14: POBA to OM1 via RFA s/p manual pull/compression    Patient seen and examined at the bedside on 11/15/23. No significant events on telemetry overnight. AM labs wnl, VSS/HD stable. Right groin site stable; no hematoma or bruit. Denies any complaints at this time. Patient has been medically cleared for discharge as per Dr. Khalil. Patient has been given appropriate discharge instructions including medication regimen, access site management and follow up. Medications that patient needs refills on (+/- new medications) have been e-prescribed to preferred pharmacy. Patient will f/u with Dr. Jama in 1-2 weeks for further management.     VSS  Gen: NAD, A&O x3  Cards: RRR, clear S1 and S2 without murmur  Pulm: CTA B/L without w/r/r  Vascular: Right groin w/o hematoma, ooze or bruit. Peripheral pulses 2+ B/L  Abd: soft, NT  Ext: no LE edema or ulcerations B/L   HPI:  53M PMHx T2DM, HTN/HLD, CAD s/p CABG x4 '22 and PCI '23, GERD.   Presenting after substernal chest pain last night and was advised by cardiologist to present to ED.   Pt has been having angina 2-3x/week, described as chest tightness that lasts ~30min. Pt also reports +orthopnea, decreased exercise tolerance for the past month as well.   denies fever, chills, cough, palpitation, abd pain, n/v/d, or urinary sx.     On arrival, VSS. CBC and CMP unremarkable. Trop 13, 13.   proBNP 465.     Seen by cards in the Ed, rec hep gtt, reloading asa and brliinta, and plan for cath tmr.  (13 Nov 2023 22:10)    Now s/p cardiac cath 11/14: POBA to OM1 via RFA s/p manual pull/compression  Plan to return in 1-2 weeks for  LAD and D1    Patient seen and examined at the bedside on 11/15/23. No significant events on telemetry overnight. AM labs wnl, VSS/HD stable. Right groin site stable; no hematoma or bruit. Denies any complaints at this time. Patient has been medically cleared for discharge as per Dr. Khalil. Patient has been given appropriate discharge instructions including medication regimen, access site management and follow up. Medications that patient needs refills on (+/- new medications) have been e-prescribed to preferred pharmacy. Patient will f/u with Dr. Jama in 1-2 weeks for further management.     VSS  Gen: NAD, A&O x3  Cards: RRR, clear S1 and S2 without murmur  Pulm: CTA B/L without w/r/r  Vascular: Right groin w/o hematoma, ooze or bruit. Peripheral pulses 2+ B/L  Abd: soft, NT  Ext: no LE edema or ulcerations B/L

## 2023-11-14 NOTE — PROGRESS NOTE ADULT - SUBJECTIVE AND OBJECTIVE BOX
SUBJECTIVE / OVERNIGHT EVENTS:  Patient seen and examined at bedside. Denies any CP at this time, SOB, abd pain, diarrhea, dysuria.     MEDICATIONS  (STANDING):  aspirin  chewable 243 milliGRAM(s) Oral daily  aspirin enteric coated 81 milliGRAM(s) Oral daily  atorvastatin 80 milliGRAM(s) Oral at bedtime  dextrose 5%. 1000 milliLiter(s) (100 mL/Hr) IV Continuous <Continuous>  dextrose 5%. 1000 milliLiter(s) (50 mL/Hr) IV Continuous <Continuous>  dextrose 50% Injectable 25 Gram(s) IV Push once  dextrose 50% Injectable 25 Gram(s) IV Push once  dextrose 50% Injectable 12.5 Gram(s) IV Push once  glucagon  Injectable 1 milliGRAM(s) IntraMuscular once  heparin  Infusion.  Unit(s)/Hr (9 mL/Hr) IV Continuous <Continuous>  insulin lispro (ADMELOG) corrective regimen sliding scale   SubCutaneous three times a day before meals  insulin lispro (ADMELOG) corrective regimen sliding scale   SubCutaneous at bedtime  metoprolol succinate  milliGRAM(s) Oral daily  pantoprazole    Tablet 40 milliGRAM(s) Oral before breakfast  sodium chloride 0.9%. 1000 milliLiter(s) (50 mL/Hr) IV Continuous <Continuous>  tamsulosin 0.4 milliGRAM(s) Oral at bedtime  ticagrelor 90 milliGRAM(s) Oral every 12 hours    MEDICATIONS  (PRN):  acetaminophen     Tablet .. 650 milliGRAM(s) Oral every 6 hours PRN Temp greater or equal to 38C (100.4F), Mild Pain (1 - 3)  aluminum hydroxide/magnesium hydroxide/simethicone Suspension 30 milliLiter(s) Oral every 4 hours PRN Dyspepsia  dextrose Oral Gel 15 Gram(s) Oral once PRN Blood Glucose LESS THAN 70 milliGRAM(s)/deciliter  heparin   Injectable 4400 Unit(s) IV Push every 6 hours PRN For aPTT less than 40  melatonin 3 milliGRAM(s) Oral at bedtime PRN Insomnia  ondansetron Injectable 4 milliGRAM(s) IV Push every 8 hours PRN Nausea and/or Vomiting      I&O's Summary      PHYSICAL EXAM:  Vital Signs Last 24 Hrs  T(C): 36.7 (14 Nov 2023 12:00), Max: 37 (14 Nov 2023 05:08)  T(F): 98.1 (14 Nov 2023 12:00), Max: 98.6 (14 Nov 2023 05:08)  HR: 80 (14 Nov 2023 14:15) (72 - 91)  BP: 110/80 (14 Nov 2023 14:15) (102/69 - 128/86)  BP(mean): 91 (14 Nov 2023 14:15) (88 - 100)  RR: 17 (14 Nov 2023 14:15) (16 - 19)  SpO2: 98% (14 Nov 2023 14:15) (95% - 99%)    Parameters below as of 14 Nov 2023 14:15  Patient On (Oxygen Delivery Method): room air      CONSTITUTIONAL: NAD  EYES: EOMI; conjunctiva and sclera clear  ENMT: Moist oral mucosa, no pharyngeal injection or exudates  NECK: Supple, trachea midline   RESPIRATORY: Normal respiratory effort; lungs are clear to auscultation bilaterally  CARDIOVASCULAR: Regular rate and rhythm, normal S1 and S2; No lower extremity edema  ABDOMEN: Nontender to palpation, normoactive bowel sounds, no rebound/guarding  MUSCULOSKELETAL: Moves all extremities   PSYCH: A+O to person, place, and time; affect appropriate  NEUROLOGY: grossly nonfocal       LABS:                        14.6   6.46  )-----------( 220      ( 14 Nov 2023 04:57 )             43.5     11-14    138  |  102  |  18  ----------------------------<  137<H>  3.4<L>   |  24  |  0.76    Ca    9.0      14 Nov 2023 04:57  Phos  3.5     11-14  Mg     1.8     11-14    TPro  7.0  /  Alb  3.9  /  TBili  0.4  /  DBili  x   /  AST  13  /  ALT  10  /  AlkPhos  57  11-14    PT/INR - ( 13 Nov 2023 16:17 )   PT: 12.4 sec;   INR: 1.13 ratio         PTT - ( 14 Nov 2023 04:57 )  PTT:106.8 sec      Urinalysis Basic - ( 14 Nov 2023 04:57 )    Color: x / Appearance: x / SG: x / pH: x  Gluc: 137 mg/dL / Ketone: x  / Bili: x / Urobili: x   Blood: x / Protein: x / Nitrite: x   Leuk Esterase: x / RBC: x / WBC x   Sq Epi: x / Non Sq Epi: x / Bacteria: x
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
## 2023-11-14 NOTE — PATIENT PROFILE ADULT - FALL HARM RISK - HARM RISK INTERVENTIONS

## 2023-11-14 NOTE — DISCHARGE NOTE PROVIDER - CARE PROVIDER_API CALL
Martin Louie  Cardiovascular Disease  [address] [facility] [address] [facility] [address] [facility] [address]  [place] [address]  Phone: [phone]  Fax: [phone]  Established Patient  Follow Up Time: 2 weeks

## 2023-11-15 ENCOUNTER — TRANSCRIPTION ENCOUNTER (OUTPATIENT)
Age: 53
End: 2023-11-15

## 2023-11-15 ENCOUNTER — NON-APPOINTMENT (OUTPATIENT)
Age: 53
End: 2023-11-15

## 2023-11-15 VITALS
SYSTOLIC BLOOD PRESSURE: 115 MMHG | DIASTOLIC BLOOD PRESSURE: 76 MMHG | TEMPERATURE: 98 F | RESPIRATION RATE: 15 BRPM | HEART RATE: 71 BPM | OXYGEN SATURATION: 98 %

## 2023-11-15 LAB
GLUCOSE BLDC GLUCOMTR-MCNC: 131 MG/DL — HIGH (ref 70–99)
GLUCOSE BLDC GLUCOMTR-MCNC: 131 MG/DL — HIGH (ref 70–99)

## 2023-11-15 PROCEDURE — 82962 GLUCOSE BLOOD TEST: CPT

## 2023-11-15 PROCEDURE — C1894: CPT

## 2023-11-15 PROCEDURE — 80053 COMPREHEN METABOLIC PANEL: CPT

## 2023-11-15 PROCEDURE — C1769: CPT

## 2023-11-15 PROCEDURE — 85610 PROTHROMBIN TIME: CPT

## 2023-11-15 PROCEDURE — 93459 L HRT ART/GRFT ANGIO: CPT | Mod: 59

## 2023-11-15 PROCEDURE — 71045 X-RAY EXAM CHEST 1 VIEW: CPT

## 2023-11-15 PROCEDURE — 82435 ASSAY OF BLOOD CHLORIDE: CPT

## 2023-11-15 PROCEDURE — 84295 ASSAY OF SERUM SODIUM: CPT

## 2023-11-15 PROCEDURE — 83735 ASSAY OF MAGNESIUM: CPT

## 2023-11-15 PROCEDURE — 36415 COLL VENOUS BLD VENIPUNCTURE: CPT

## 2023-11-15 PROCEDURE — 84484 ASSAY OF TROPONIN QUANT: CPT

## 2023-11-15 PROCEDURE — 86900 BLOOD TYPING SEROLOGIC ABO: CPT

## 2023-11-15 PROCEDURE — 85027 COMPLETE CBC AUTOMATED: CPT

## 2023-11-15 PROCEDURE — 82947 ASSAY GLUCOSE BLOOD QUANT: CPT

## 2023-11-15 PROCEDURE — 82803 BLOOD GASES ANY COMBINATION: CPT

## 2023-11-15 PROCEDURE — C1725: CPT

## 2023-11-15 PROCEDURE — 93005 ELECTROCARDIOGRAM TRACING: CPT

## 2023-11-15 PROCEDURE — 84132 ASSAY OF SERUM POTASSIUM: CPT

## 2023-11-15 PROCEDURE — 87637 SARSCOV2&INF A&B&RSV AMP PRB: CPT

## 2023-11-15 PROCEDURE — 83036 HEMOGLOBIN GLYCOSYLATED A1C: CPT

## 2023-11-15 PROCEDURE — 86850 RBC ANTIBODY SCREEN: CPT

## 2023-11-15 PROCEDURE — 85730 THROMBOPLASTIN TIME PARTIAL: CPT

## 2023-11-15 PROCEDURE — 92920 PRQ TRLUML C ANGIOP 1ART&/BR: CPT | Mod: LC

## 2023-11-15 PROCEDURE — 99239 HOSP IP/OBS DSCHRG MGMT >30: CPT

## 2023-11-15 PROCEDURE — 83880 ASSAY OF NATRIURETIC PEPTIDE: CPT

## 2023-11-15 PROCEDURE — 83605 ASSAY OF LACTIC ACID: CPT

## 2023-11-15 PROCEDURE — C1887: CPT

## 2023-11-15 PROCEDURE — 85014 HEMATOCRIT: CPT

## 2023-11-15 PROCEDURE — 84100 ASSAY OF PHOSPHORUS: CPT

## 2023-11-15 PROCEDURE — 99285 EMERGENCY DEPT VISIT HI MDM: CPT

## 2023-11-15 PROCEDURE — 85025 COMPLETE CBC W/AUTO DIFF WBC: CPT

## 2023-11-15 PROCEDURE — 82330 ASSAY OF CALCIUM: CPT

## 2023-11-15 PROCEDURE — 85018 HEMOGLOBIN: CPT

## 2023-11-15 PROCEDURE — C8929: CPT

## 2023-11-15 PROCEDURE — 86901 BLOOD TYPING SEROLOGIC RH(D): CPT

## 2023-11-15 RX ADMIN — Medication 200 MILLIGRAM(S): at 05:36

## 2023-11-15 RX ADMIN — SACUBITRIL AND VALSARTAN 1 TABLET(S): 24; 26 TABLET, FILM COATED ORAL at 05:36

## 2023-11-15 RX ADMIN — PANTOPRAZOLE SODIUM 40 MILLIGRAM(S): 20 TABLET, DELAYED RELEASE ORAL at 05:36

## 2023-11-15 RX ADMIN — TICAGRELOR 90 MILLIGRAM(S): 90 TABLET ORAL at 09:21

## 2023-11-15 RX ADMIN — Medication 81 MILLIGRAM(S): at 05:36

## 2023-11-15 NOTE — CHART NOTE - NSCHARTNOTEFT_GEN_A_CORE
52 yo man with history of DM, HTN, HLD, CAD s/p CABG (LIMA-LAD, SVG-rPL, SVG-OM1, SVG-D1 12/2022) & PCI (CHERRI to OM1 c/b IST s/p CHERRI and POBA-LIMA 4/2023), GERD, and hx of tobacco use presents to ED from stress lab w/ abnormal EKG.      s/p POBA to OM, pt tolerated the procedure well.  Plan to continue on DAPT and return in 1-2 weeks for staged PCI.  Pt to follow up with Dr. Louie as outpatient    stable for discharge   43 minutes spent on discharge process    Fifi Mcallister MD  Division of Hospital Medicine  Contact via Microsoft Teams  Office: [phone]
Removal of Femoral Sheath    Pulses in the (right)  lower extremity are (palpable/audible by doppler/absent). The patient was placed in the supine position. The insertion site was identified and the sutures were removed per protocol.  The __6FA__ Cambodian femoral sheath was then removed. Direct pressure was applied for  ___20___ minutes.     Monitoring of the (right)  groin and both lower extremities including neuro-vascular checks and vital signs every 15 minutes x 4, then every 30 minutes x 2, then every 1 hour was ordered.    Complications: None    Comments:    Procedure results, benefits and risks and importance of compliance with DAPT, meds reviewed, diet and activity restrictions discussed and reportable symptoms explained. Will plan for discharge in AM. Will follow up for staged PCI in 1-2 weeks   outpatient cards Dr Louie

## 2023-11-15 NOTE — DISCHARGE NOTE NURSING/CASE MANAGEMENT/SOCIAL WORK - PATIENT PORTAL LINK FT
You can access the FollowMyHealth Patient Portal offered by [facility] by registering at the following website: [email or web address]. By joining [employer]’s FollowMyHealth portal, you will also be able to view your health information using other applications (apps) compatible with our system.

## 2023-11-15 NOTE — DISCHARGE NOTE NURSING/CASE MANAGEMENT/SOCIAL WORK - NSDCPEFALRISK_GEN_ALL_CORE
For information on Fall & Injury Prevention, visit: [email or web address] OR  [email or web address] OR  https://www.cdc.gov/steadi/patient.html

## 2023-11-21 ENCOUNTER — APPOINTMENT (OUTPATIENT)
Dept: CARDIOLOGY | Facility: CLINIC | Age: 53
End: 2023-11-21

## 2023-11-22 ENCOUNTER — NON-APPOINTMENT (OUTPATIENT)
Age: 53
End: 2023-11-22

## 2023-11-27 ENCOUNTER — OUTPATIENT (OUTPATIENT)
Dept: OUTPATIENT SERVICES | Facility: HOSPITAL | Age: 53
LOS: 1 days | End: 2023-11-27
Payer: MEDICAID

## 2023-11-27 ENCOUNTER — TRANSCRIPTION ENCOUNTER (OUTPATIENT)
Age: 53
End: 2023-11-27

## 2023-11-27 VITALS
RESPIRATION RATE: 16 BRPM | SYSTOLIC BLOOD PRESSURE: 120 MMHG | TEMPERATURE: 98 F | OXYGEN SATURATION: 97 % | DIASTOLIC BLOOD PRESSURE: 77 MMHG | HEART RATE: 102 BPM

## 2023-11-27 VITALS — WEIGHT: 154.1 LBS | HEIGHT: 69 IN

## 2023-11-27 DIAGNOSIS — I25.10 ATHEROSCLEROTIC HEART DISEASE OF NATIVE CORONARY ARTERY WITHOUT ANGINA PECTORIS: ICD-10-CM

## 2023-11-27 DIAGNOSIS — Z95.1 PRESENCE OF AORTOCORONARY BYPASS GRAFT: Chronic | ICD-10-CM

## 2023-11-27 LAB
ANION GAP SERPL CALC-SCNC: 15 MMOL/L — SIGNIFICANT CHANGE UP (ref 5–17)
ANION GAP SERPL CALC-SCNC: 15 MMOL/L — SIGNIFICANT CHANGE UP (ref 5–17)
BUN SERPL-MCNC: 11 MG/DL — SIGNIFICANT CHANGE UP (ref 7–23)
BUN SERPL-MCNC: 11 MG/DL — SIGNIFICANT CHANGE UP (ref 7–23)
CALCIUM SERPL-MCNC: 9.5 MG/DL — SIGNIFICANT CHANGE UP (ref 8.4–10.5)
CALCIUM SERPL-MCNC: 9.5 MG/DL — SIGNIFICANT CHANGE UP (ref 8.4–10.5)
CHLORIDE SERPL-SCNC: 101 MMOL/L — SIGNIFICANT CHANGE UP (ref 96–108)
CHLORIDE SERPL-SCNC: 101 MMOL/L — SIGNIFICANT CHANGE UP (ref 96–108)
CO2 SERPL-SCNC: 23 MMOL/L — SIGNIFICANT CHANGE UP (ref 22–31)
CO2 SERPL-SCNC: 23 MMOL/L — SIGNIFICANT CHANGE UP (ref 22–31)
CREAT SERPL-MCNC: 0.81 MG/DL — SIGNIFICANT CHANGE UP (ref 0.5–1.3)
CREAT SERPL-MCNC: 0.81 MG/DL — SIGNIFICANT CHANGE UP (ref 0.5–1.3)
EGFR: 106 ML/MIN/1.73M2 — SIGNIFICANT CHANGE UP
EGFR: 106 ML/MIN/1.73M2 — SIGNIFICANT CHANGE UP
GLUCOSE BLDC GLUCOMTR-MCNC: 109 MG/DL — HIGH (ref 70–99)
GLUCOSE BLDC GLUCOMTR-MCNC: 109 MG/DL — HIGH (ref 70–99)
GLUCOSE BLDC GLUCOMTR-MCNC: 111 MG/DL — HIGH (ref 70–99)
GLUCOSE BLDC GLUCOMTR-MCNC: 111 MG/DL — HIGH (ref 70–99)
GLUCOSE BLDC GLUCOMTR-MCNC: 117 MG/DL — HIGH (ref 70–99)
GLUCOSE BLDC GLUCOMTR-MCNC: 117 MG/DL — HIGH (ref 70–99)
GLUCOSE SERPL-MCNC: 130 MG/DL — HIGH (ref 70–99)
GLUCOSE SERPL-MCNC: 130 MG/DL — HIGH (ref 70–99)
HCT VFR BLD CALC: 45.8 % — SIGNIFICANT CHANGE UP (ref 39–50)
HCT VFR BLD CALC: 45.8 % — SIGNIFICANT CHANGE UP (ref 39–50)
HGB BLD-MCNC: 15.5 G/DL — SIGNIFICANT CHANGE UP (ref 13–17)
HGB BLD-MCNC: 15.5 G/DL — SIGNIFICANT CHANGE UP (ref 13–17)
MAGNESIUM SERPL-MCNC: 1.8 MG/DL — SIGNIFICANT CHANGE UP (ref 1.6–2.6)
MAGNESIUM SERPL-MCNC: 1.8 MG/DL — SIGNIFICANT CHANGE UP (ref 1.6–2.6)
MCHC RBC-ENTMCNC: 28.4 PG — SIGNIFICANT CHANGE UP (ref 27–34)
MCHC RBC-ENTMCNC: 28.4 PG — SIGNIFICANT CHANGE UP (ref 27–34)
MCHC RBC-ENTMCNC: 33.8 GM/DL — SIGNIFICANT CHANGE UP (ref 32–36)
MCHC RBC-ENTMCNC: 33.8 GM/DL — SIGNIFICANT CHANGE UP (ref 32–36)
MCV RBC AUTO: 84 FL — SIGNIFICANT CHANGE UP (ref 80–100)
MCV RBC AUTO: 84 FL — SIGNIFICANT CHANGE UP (ref 80–100)
NRBC # BLD: 0 /100 WBCS — SIGNIFICANT CHANGE UP (ref 0–0)
NRBC # BLD: 0 /100 WBCS — SIGNIFICANT CHANGE UP (ref 0–0)
PLATELET # BLD AUTO: 240 K/UL — SIGNIFICANT CHANGE UP (ref 150–400)
PLATELET # BLD AUTO: 240 K/UL — SIGNIFICANT CHANGE UP (ref 150–400)
POTASSIUM SERPL-MCNC: 3.6 MMOL/L — SIGNIFICANT CHANGE UP (ref 3.5–5.3)
POTASSIUM SERPL-MCNC: 3.6 MMOL/L — SIGNIFICANT CHANGE UP (ref 3.5–5.3)
POTASSIUM SERPL-SCNC: 3.6 MMOL/L — SIGNIFICANT CHANGE UP (ref 3.5–5.3)
POTASSIUM SERPL-SCNC: 3.6 MMOL/L — SIGNIFICANT CHANGE UP (ref 3.5–5.3)
RBC # BLD: 5.45 M/UL — SIGNIFICANT CHANGE UP (ref 4.2–5.8)
RBC # BLD: 5.45 M/UL — SIGNIFICANT CHANGE UP (ref 4.2–5.8)
RBC # FLD: 13.2 % — SIGNIFICANT CHANGE UP (ref 10.3–14.5)
RBC # FLD: 13.2 % — SIGNIFICANT CHANGE UP (ref 10.3–14.5)
SODIUM SERPL-SCNC: 139 MMOL/L — SIGNIFICANT CHANGE UP (ref 135–145)
SODIUM SERPL-SCNC: 139 MMOL/L — SIGNIFICANT CHANGE UP (ref 135–145)
WBC # BLD: 7.11 K/UL — SIGNIFICANT CHANGE UP (ref 3.8–10.5)
WBC # BLD: 7.11 K/UL — SIGNIFICANT CHANGE UP (ref 3.8–10.5)
WBC # FLD AUTO: 7.11 K/UL — SIGNIFICANT CHANGE UP (ref 3.8–10.5)
WBC # FLD AUTO: 7.11 K/UL — SIGNIFICANT CHANGE UP (ref 3.8–10.5)

## 2023-11-27 PROCEDURE — 93005 ELECTROCARDIOGRAM TRACING: CPT

## 2023-11-27 PROCEDURE — C1887: CPT

## 2023-11-27 PROCEDURE — 92943 PRQ TRLUML REVSC CH OCC ANT: CPT | Mod: LD

## 2023-11-27 PROCEDURE — 85027 COMPLETE CBC AUTOMATED: CPT

## 2023-11-27 PROCEDURE — 82962 GLUCOSE BLOOD TEST: CPT

## 2023-11-27 PROCEDURE — 99152 MOD SED SAME PHYS/QHP 5/>YRS: CPT | Mod: 59

## 2023-11-27 PROCEDURE — 93010 ELECTROCARDIOGRAM REPORT: CPT

## 2023-11-27 PROCEDURE — C1725: CPT

## 2023-11-27 PROCEDURE — 83735 ASSAY OF MAGNESIUM: CPT

## 2023-11-27 PROCEDURE — 80048 BASIC METABOLIC PNL TOTAL CA: CPT

## 2023-11-27 PROCEDURE — C1769: CPT

## 2023-11-27 PROCEDURE — 92943 PRQ TRLUML REVSC CH OCC ANT: CPT | Mod: LD,59

## 2023-11-27 PROCEDURE — C1894: CPT

## 2023-11-27 PROCEDURE — 93010 ELECTROCARDIOGRAM REPORT: CPT | Mod: 77

## 2023-11-27 RX ORDER — TICAGRELOR 90 MG/1
90 TABLET ORAL EVERY 12 HOURS
Refills: 0 | Status: DISCONTINUED | OUTPATIENT
Start: 2023-11-27 | End: 2023-12-11

## 2023-11-27 RX ORDER — ASPIRIN/CALCIUM CARB/MAGNESIUM 324 MG
81 TABLET ORAL ONCE
Refills: 0 | Status: COMPLETED | OUTPATIENT
Start: 2023-11-27 | End: 2023-11-27

## 2023-11-27 RX ORDER — ASPIRIN/CALCIUM CARB/MAGNESIUM 324 MG
81 TABLET ORAL DAILY
Refills: 0 | Status: DISCONTINUED | OUTPATIENT
Start: 2023-11-27 | End: 2023-11-27

## 2023-11-27 RX ADMIN — Medication 81 MILLIGRAM(S): at 10:12

## 2023-11-27 RX ADMIN — TICAGRELOR 90 MILLIGRAM(S): 90 TABLET ORAL at 18:14

## 2023-11-27 NOTE — ASU DISCHARGE PLAN (ADULT/PEDIATRIC) - CARE PROVIDER_API CALL
Jesenia Solomon  Cardiology  [address] [place] [address], [address] [place] [id number]  Phone: [phone]  Fax: [phone]  Follow Up Time:

## 2023-11-27 NOTE — ASU PATIENT PROFILE, ADULT - FALL HARM RISK - UNIVERSAL INTERVENTIONS
Bed in lowest position, wheels locked, appropriate side rails in place/Call bell, personal items and telephone in reach/Instruct patient to call for assistance before getting out of bed or chair/Non-slip footwear when patient is out of bed/[place] to call system/Physically safe environment - no spills, clutter or unnecessary equipment/Purposeful Proactive Rounding/Room/bathroom lighting operational, light cord in reach

## 2023-11-27 NOTE — ASU DISCHARGE PLAN (ADULT/PEDIATRIC) - ASU DC SPECIAL INSTRUCTIONSFT

## 2023-12-05 NOTE — DISCHARGE NOTE PROVIDER - NSDCCPCAREPLAN_GEN_ALL_CORE_FT
PRINCIPAL DISCHARGE DIAGNOSIS  Diagnosis: Chest pain  Assessment and Plan of Treatment: You came to the hospital because you were experiencing chest pain. Your troponin level (a cardiac enzyme that if positive can indicate cardiac injury) was negative. Your chest x-ray was negative. You were seen by the cardiology team and underwent a cardiac catheterization and received a balloon angioplasty to your obtuse marginal coronary artery. You have been started on Aspirin 81mg daily, Brilinta 90mg daily. Continue taking all medications as prescribed.   The procedure was done through the groin. Please avoid any heavy lifting (no more than 3 to 5 lbs), strenuous activity, bending, straining, or unnecessary stair climbing for 2 weeks. No driving for 2 days. You may shower 24 hours following the procedure but avoid baths/swimming for 1 week. If you develop any swelling, bleeding, hardening of the skin (hematoma formation), acute pain, numbness/tingling in your leg, or have any questions/concerns regarding your procedure, please call [facility] [phone]  NEVER miss a dose of Aspirin and Brilinta to ensure your stent does not close. DO NOT STOP THESE MEDICATIONS FOR ANY REASON UNLESS OTHERWISE INDICATED BY YOUR CARDIOLOGIST BECAUSE THIS WILL PUT YOU AT RISK OF YOUR STENT CLOSING AND HAVING A HEART ATTACK OR SUDDEN SEVERE LEG PAIN DUE TO BLOCKAGE OF BLOOD FLOW TO LEG.  You have been given a Stent Card to carry with you in your wallet.  Make Photocopies or take a picture of card so you have a backup copy.  This card has important information for any possible future Radiology/MRI studies.    Please make a follow up appointment with your cardiologist within 1-2 weeks of your discharge. All of your prescriptions have been sent electronically to your pharmacy.      SECONDARY DISCHARGE DIAGNOSES  Diagnosis: HTN (hypertension)  Assessment and Plan of Treatment: You have high blood pressure. Continue taking your blood pressure medications as prescribed. Eat a heart healthy diet with low salt; exercise regularly (if cleared by your primary care doctor or cardiologist). Maintain a heart healthy weight and include healthy ways to manage stress. If you smoke, quit. If you need assistance to help you stop smoking, please use the following resource: [facility] – ([phone]). Follow up with your primary care doctor to continue having your blood pressure checked on a continual basis.    Diagnosis: HLD (hyperlipidemia)  Assessment and Plan of Treatment: LDL<70   Goal is to keep LDL<70. Continue with your cholesterol medications as prescribed. Eat a heart healthy diet that is low in saturated fats and salt, and includes whole grains, fruits, vegetables and lean protein; exercise regularly (consult with your physician or cardiologist first); maintain a heart healthy weight; if you smoke - quit (A resource to help you stop smoking is the [facility] – phone number [phone].). Continue to follow with your primary physician or cardiologist.    Diagnosis: Type 2 diabetes mellitus  Assessment and Plan of Treatment: You have diabetes. Your HgA1c should be < 7. Continue taking your medication regimen as prescribed. Continue with a consistent carbohydrate diet, meaning eat the same amount of carbohydrates at the same time each day. Monitor your blood glucose levels throughout the day before meals and at bedtime. Record you blood sugars and bring the log to your outpatient doctor appointments in order to be reviewed for management modifications. If your blood sugars are more than 400 or less than 70, you should contact your primary care doctor immediately. Monitor for signs/symptoms of low blood glucose, such as: dizziness, altered mental status, or cool/clammy skin. In addition, monitor for signs/symptoms of high blood glucose, such as: feeling hot, dry, fatigued, or with increased thirst/urination. Make regular podiatry appointments in order to have your feet checked for wounds and uncontrolled toe nail growth to prevent infections, as well as appointments with an ophthalmologist to monitor your vision.     BMI: BMI (kg/m2): 35.2 (10-12-23 @ 17:39)  HbA1c: A1C with Estimated Average Glucose Result: 5.3 % (09-29-23 @ 10:00)    Glucose: POCT Blood Glucose.: 100 mg/dL (12-19-22 @ 15:06)    BP: --Vital Signs Last 24 Hrs  T(C): --  T(F): --  HR: --  BP: --  BP(mean): --  RR: --  SpO2: --      Lipid Panel: Date/Time: 09-29-23 @ 10:00  Cholesterol, Serum: 166  LDL Cholesterol Calculated: 94  HDL Cholesterol, Serum: 60  Total Cholesterol/HDL Ration Measurement: --  Triglycerides, Serum: 61

## 2023-12-13 PROBLEM — E11.9 DIABETES MELLITUS: Status: ACTIVE | Noted: 2022-12-28

## 2023-12-13 PROBLEM — Z79.02 ANTIPLATELET OR ANTITHROMBOTIC LONG-TERM USE: Status: ACTIVE | Noted: 2023-01-19

## 2023-12-18 ENCOUNTER — APPOINTMENT (OUTPATIENT)
Dept: CARDIOLOGY | Facility: CLINIC | Age: 53
End: 2023-12-18
Payer: MEDICAID

## 2023-12-18 VITALS
WEIGHT: 150 LBS | BODY MASS INDEX: 22.15 KG/M2 | SYSTOLIC BLOOD PRESSURE: 102 MMHG | HEART RATE: 80 BPM | OXYGEN SATURATION: 97 % | DIASTOLIC BLOOD PRESSURE: 70 MMHG

## 2023-12-18 DIAGNOSIS — E11.9 TYPE 2 DIABETES MELLITUS W/OUT COMPLICATIONS: ICD-10-CM

## 2023-12-18 DIAGNOSIS — Z79.02 LONG TERM (CURRENT) USE OF ANTITHROMBOTICS/ANTIPLATELETS: ICD-10-CM

## 2023-12-18 DIAGNOSIS — R42 DIZZINESS AND GIDDINESS: ICD-10-CM

## 2023-12-18 PROCEDURE — 99215 OFFICE O/P EST HI 40 MIN: CPT

## 2023-12-18 RX ORDER — ICOSAPENT ETHYL 1 G/1
1 CAPSULE ORAL TWICE DAILY
Qty: 360 | Refills: 3 | Status: ACTIVE | COMMUNITY
Start: 2023-12-18 | End: 1900-01-01

## 2023-12-18 RX ORDER — EZETIMIBE 10 MG/1
10 TABLET ORAL
Qty: 90 | Refills: 3 | Status: ACTIVE | COMMUNITY
Start: 2023-12-18 | End: 1900-01-01

## 2023-12-19 NOTE — PATIENT PROFILE ADULT - FUNCTIONAL ASSESSMENT - DAILY ACTIVITY 1.
Patient: Zaire Jara  : 1931    Encounter Date: 2023    Subjective  Zaire Jara is a 89 y.o. male Here for routine monthly visit.    HPI There are no new problems and multiple health problems have been reviewed.  The course has been unchanged.  The patient  is mildly confused.  There are no family members present during time of visit.    he  is sitting up in chair, denies any complaints when asked.  Eating and drinking well.   No concerns per staff.       Review of Systems   Constitutional:  Negative for chills, fatigue and fever.   Respiratory:  Negative for cough and shortness of breath.    Cardiovascular:  Negative for chest pain and palpitations.   Gastrointestinal:  Negative for abdominal pain, constipation, diarrhea, nausea and vomiting.   Genitourinary:  Negative for difficulty urinating.       Objective  /81   Pulse 80   Temp 36.7 °C (98.1 °F)   Resp 18   Wt 83.5 kg (184 lb)   SpO2 95%   BMI 24.98 kg/m²     Physical Exam  Constitutional:       General: He is not in acute distress.  HENT:      Head: Normocephalic and atraumatic.   Eyes:      Conjunctiva/sclera: Conjunctivae normal.   Cardiovascular:      Rate and Rhythm: Normal rate and regular rhythm.   Pulmonary:      Effort: Pulmonary effort is normal. No respiratory distress.      Breath sounds: Normal breath sounds.   Abdominal:      General: Bowel sounds are normal. There is no distension.      Palpations: Abdomen is soft.      Tenderness: There is no abdominal tenderness.   Musculoskeletal:      Right lower leg: No edema.      Left lower leg: No edema.      Comments: diffusely decreased muscle mass  Mild left sided weakness.   Skin:     General: Skin is warm and dry.   Neurological:      General: No focal deficit present.      Mental Status: He is alert.   Psychiatric:         Mood and Affect: Mood normal.         Behavior: Behavior normal.         Assessment/Plan  Problem List Items Addressed This Visit       Benign  hypertension - Primary     On coreg, Continue to monitor and adjust meds based on clinical course.           CVA (cerebral vascular accident) (CMS/[facility])     On statin.  continue with current medical management           Dementia (CMS/HCC)     Mild, forgetful.           Diabetes (CMS/[facility])     Blood sugars reviewed, acceptable.  Continue with current regimen and adjust meds based on clinical course.   On januvia,  A1C 6.5 6/23, at goal           Heart failure with preserved ejection fraction (CMS/[facility])     No symptoms of central fluid volume overload.          labs/meds/orders reviewed  staff to monitor and notify for any changes.  continue with supportive and restorative care  Due for labs, ordered.       Electronically Signed By: JACQUIE Keller   12/19/23  2:37 PM   4 = No assist / stand by assistance

## 2023-12-23 ENCOUNTER — INPATIENT (INPATIENT)
Facility: HOSPITAL | Age: 53
LOS: 1 days | Discharge: HOME | DRG: 303 | End: 2023-12-25
Attending: STUDENT IN AN ORGANIZED HEALTH CARE EDUCATION/TRAINING PROGRAM | Admitting: STUDENT IN AN ORGANIZED HEALTH CARE EDUCATION/TRAINING PROGRAM
Payer: MEDICAID

## 2023-12-23 VITALS
OXYGEN SATURATION: 99 % | RESPIRATION RATE: 16 BRPM | HEIGHT: 69 IN | SYSTOLIC BLOOD PRESSURE: 127 MMHG | WEIGHT: 149.91 LBS | HEART RATE: 85 BPM | DIASTOLIC BLOOD PRESSURE: 85 MMHG | TEMPERATURE: 98 F

## 2023-12-23 DIAGNOSIS — Z95.1 PRESENCE OF AORTOCORONARY BYPASS GRAFT: Chronic | ICD-10-CM

## 2023-12-23 LAB
ALBUMIN SERPL ELPH-MCNC: 4.6 G/DL — SIGNIFICANT CHANGE UP (ref 3.3–5)
ALBUMIN SERPL ELPH-MCNC: 4.6 G/DL — SIGNIFICANT CHANGE UP (ref 3.3–5)
ALP SERPL-CCNC: 56 U/L — SIGNIFICANT CHANGE UP (ref 40–120)
ALP SERPL-CCNC: 56 U/L — SIGNIFICANT CHANGE UP (ref 40–120)
ALT FLD-CCNC: 16 U/L — SIGNIFICANT CHANGE UP (ref 10–45)
ALT FLD-CCNC: 16 U/L — SIGNIFICANT CHANGE UP (ref 10–45)
ANION GAP SERPL CALC-SCNC: 14 MMOL/L — SIGNIFICANT CHANGE UP (ref 5–17)
ANION GAP SERPL CALC-SCNC: 14 MMOL/L — SIGNIFICANT CHANGE UP (ref 5–17)
AST SERPL-CCNC: 22 U/L — SIGNIFICANT CHANGE UP (ref 10–40)
AST SERPL-CCNC: 22 U/L — SIGNIFICANT CHANGE UP (ref 10–40)
BASOPHILS # BLD AUTO: 0.07 K/UL — SIGNIFICANT CHANGE UP (ref 0–0.2)
BASOPHILS # BLD AUTO: 0.07 K/UL — SIGNIFICANT CHANGE UP (ref 0–0.2)
BASOPHILS NFR BLD AUTO: 1 % — SIGNIFICANT CHANGE UP (ref 0–2)
BASOPHILS NFR BLD AUTO: 1 % — SIGNIFICANT CHANGE UP (ref 0–2)
BILIRUB SERPL-MCNC: 0.7 MG/DL — SIGNIFICANT CHANGE UP (ref 0.2–1.2)
BILIRUB SERPL-MCNC: 0.7 MG/DL — SIGNIFICANT CHANGE UP (ref 0.2–1.2)
BUN SERPL-MCNC: 17 MG/DL — SIGNIFICANT CHANGE UP (ref 7–23)
BUN SERPL-MCNC: 17 MG/DL — SIGNIFICANT CHANGE UP (ref 7–23)
CALCIUM SERPL-MCNC: 10.4 MG/DL — SIGNIFICANT CHANGE UP (ref 8.4–10.5)
CALCIUM SERPL-MCNC: 10.4 MG/DL — SIGNIFICANT CHANGE UP (ref 8.4–10.5)
CHLORIDE SERPL-SCNC: 98 MMOL/L — SIGNIFICANT CHANGE UP (ref 96–108)
CHLORIDE SERPL-SCNC: 98 MMOL/L — SIGNIFICANT CHANGE UP (ref 96–108)
CO2 SERPL-SCNC: 24 MMOL/L — SIGNIFICANT CHANGE UP (ref 22–31)
CO2 SERPL-SCNC: 24 MMOL/L — SIGNIFICANT CHANGE UP (ref 22–31)
CREAT SERPL-MCNC: 1 MG/DL — SIGNIFICANT CHANGE UP (ref 0.5–1.3)
CREAT SERPL-MCNC: 1 MG/DL — SIGNIFICANT CHANGE UP (ref 0.5–1.3)
EGFR: 91 ML/MIN/1.73M2 — SIGNIFICANT CHANGE UP
EGFR: 91 ML/MIN/1.73M2 — SIGNIFICANT CHANGE UP
EOSINOPHIL # BLD AUTO: 0.4 K/UL — SIGNIFICANT CHANGE UP (ref 0–0.5)
EOSINOPHIL # BLD AUTO: 0.4 K/UL — SIGNIFICANT CHANGE UP (ref 0–0.5)
EOSINOPHIL NFR BLD AUTO: 5.9 % — SIGNIFICANT CHANGE UP (ref 0–6)
EOSINOPHIL NFR BLD AUTO: 5.9 % — SIGNIFICANT CHANGE UP (ref 0–6)
GLUCOSE SERPL-MCNC: 107 MG/DL — HIGH (ref 70–99)
GLUCOSE SERPL-MCNC: 107 MG/DL — HIGH (ref 70–99)
HCT VFR BLD CALC: 47.6 % — SIGNIFICANT CHANGE UP (ref 39–50)
HCT VFR BLD CALC: 47.6 % — SIGNIFICANT CHANGE UP (ref 39–50)
HGB BLD-MCNC: 15.5 G/DL — SIGNIFICANT CHANGE UP (ref 13–17)
HGB BLD-MCNC: 15.5 G/DL — SIGNIFICANT CHANGE UP (ref 13–17)
IMM GRANULOCYTES NFR BLD AUTO: 0.4 % — SIGNIFICANT CHANGE UP (ref 0–0.9)
IMM GRANULOCYTES NFR BLD AUTO: 0.4 % — SIGNIFICANT CHANGE UP (ref 0–0.9)
LIDOCAIN IGE QN: 73 U/L — HIGH (ref 7–60)
LIDOCAIN IGE QN: 73 U/L — HIGH (ref 7–60)
LYMPHOCYTES # BLD AUTO: 1.73 K/UL — SIGNIFICANT CHANGE UP (ref 1–3.3)
LYMPHOCYTES # BLD AUTO: 1.73 K/UL — SIGNIFICANT CHANGE UP (ref 1–3.3)
LYMPHOCYTES # BLD AUTO: 25.5 % — SIGNIFICANT CHANGE UP (ref 13–44)
LYMPHOCYTES # BLD AUTO: 25.5 % — SIGNIFICANT CHANGE UP (ref 13–44)
MCHC RBC-ENTMCNC: 27.9 PG — SIGNIFICANT CHANGE UP (ref 27–34)
MCHC RBC-ENTMCNC: 27.9 PG — SIGNIFICANT CHANGE UP (ref 27–34)
MCHC RBC-ENTMCNC: 32.6 GM/DL — SIGNIFICANT CHANGE UP (ref 32–36)
MCHC RBC-ENTMCNC: 32.6 GM/DL — SIGNIFICANT CHANGE UP (ref 32–36)
MCV RBC AUTO: 85.6 FL — SIGNIFICANT CHANGE UP (ref 80–100)
MCV RBC AUTO: 85.6 FL — SIGNIFICANT CHANGE UP (ref 80–100)
MONOCYTES # BLD AUTO: 0.43 K/UL — SIGNIFICANT CHANGE UP (ref 0–0.9)
MONOCYTES # BLD AUTO: 0.43 K/UL — SIGNIFICANT CHANGE UP (ref 0–0.9)
MONOCYTES NFR BLD AUTO: 6.3 % — SIGNIFICANT CHANGE UP (ref 2–14)
MONOCYTES NFR BLD AUTO: 6.3 % — SIGNIFICANT CHANGE UP (ref 2–14)
NEUTROPHILS # BLD AUTO: 4.13 K/UL — SIGNIFICANT CHANGE UP (ref 1.8–7.4)
NEUTROPHILS # BLD AUTO: 4.13 K/UL — SIGNIFICANT CHANGE UP (ref 1.8–7.4)
NEUTROPHILS NFR BLD AUTO: 60.9 % — SIGNIFICANT CHANGE UP (ref 43–77)
NEUTROPHILS NFR BLD AUTO: 60.9 % — SIGNIFICANT CHANGE UP (ref 43–77)
NRBC # BLD: 0 /100 WBCS — SIGNIFICANT CHANGE UP (ref 0–0)
NRBC # BLD: 0 /100 WBCS — SIGNIFICANT CHANGE UP (ref 0–0)
PLATELET # BLD AUTO: 225 K/UL — SIGNIFICANT CHANGE UP (ref 150–400)
PLATELET # BLD AUTO: 225 K/UL — SIGNIFICANT CHANGE UP (ref 150–400)
POTASSIUM SERPL-MCNC: 4.4 MMOL/L — SIGNIFICANT CHANGE UP (ref 3.5–5.3)
POTASSIUM SERPL-MCNC: 4.4 MMOL/L — SIGNIFICANT CHANGE UP (ref 3.5–5.3)
POTASSIUM SERPL-SCNC: 4.4 MMOL/L — SIGNIFICANT CHANGE UP (ref 3.5–5.3)
POTASSIUM SERPL-SCNC: 4.4 MMOL/L — SIGNIFICANT CHANGE UP (ref 3.5–5.3)
PROT SERPL-MCNC: 7.8 G/DL — SIGNIFICANT CHANGE UP (ref 6–8.3)
PROT SERPL-MCNC: 7.8 G/DL — SIGNIFICANT CHANGE UP (ref 6–8.3)
RBC # BLD: 5.56 M/UL — SIGNIFICANT CHANGE UP (ref 4.2–5.8)
RBC # BLD: 5.56 M/UL — SIGNIFICANT CHANGE UP (ref 4.2–5.8)
RBC # FLD: 13.5 % — SIGNIFICANT CHANGE UP (ref 10.3–14.5)
RBC # FLD: 13.5 % — SIGNIFICANT CHANGE UP (ref 10.3–14.5)
SODIUM SERPL-SCNC: 136 MMOL/L — SIGNIFICANT CHANGE UP (ref 135–145)
SODIUM SERPL-SCNC: 136 MMOL/L — SIGNIFICANT CHANGE UP (ref 135–145)
TROPONIN T, HIGH SENSITIVITY RESULT: 44 NG/L — SIGNIFICANT CHANGE UP (ref 0–51)
TROPONIN T, HIGH SENSITIVITY RESULT: 44 NG/L — SIGNIFICANT CHANGE UP (ref 0–51)
WBC # BLD: 6.79 K/UL — SIGNIFICANT CHANGE UP (ref 3.8–10.5)
WBC # BLD: 6.79 K/UL — SIGNIFICANT CHANGE UP (ref 3.8–10.5)
WBC # FLD AUTO: 6.79 K/UL — SIGNIFICANT CHANGE UP (ref 3.8–10.5)
WBC # FLD AUTO: 6.79 K/UL — SIGNIFICANT CHANGE UP (ref 3.8–10.5)

## 2023-12-23 PROCEDURE — 99285 EMERGENCY DEPT VISIT HI MDM: CPT

## 2023-12-23 PROCEDURE — 71045 X-RAY EXAM CHEST 1 VIEW: CPT | Mod: 26

## 2023-12-23 PROCEDURE — 93010 ELECTROCARDIOGRAM REPORT: CPT

## 2023-12-23 RX ORDER — ASPIRIN/CALCIUM CARB/MAGNESIUM 324 MG
162 TABLET ORAL ONCE
Refills: 0 | Status: COMPLETED | OUTPATIENT
Start: 2023-12-23 | End: 2023-12-23

## 2023-12-23 RX ORDER — ASPIRIN/CALCIUM CARB/MAGNESIUM 324 MG
324 TABLET ORAL ONCE
Refills: 0 | Status: DISCONTINUED | OUTPATIENT
Start: 2023-12-23 | End: 2023-12-23

## 2023-12-23 RX ADMIN — Medication 162 MILLIGRAM(S): at 21:56

## 2023-12-23 NOTE — ED ADULT NURSE NOTE - NSFALLUNIVINTERV_ED_ALL_ED
Bed/Stretcher in lowest position, wheels locked, appropriate side rails in place/Call bell, personal items and telephone in reach/Instruct patient to call for assistance before getting out of bed/chair/stretcher/Non-slip footwear applied when patient is off stretcher/[place] to call system/Physically safe environment - no spills, clutter or unnecessary equipment/Purposeful proactive rounding/Room/bathroom lighting operational, light cord in reach Bed/Stretcher in lowest position, wheels locked, appropriate side rails in place/Call bell, personal items and telephone in reach/Instruct patient to call for assistance before getting out of bed/chair/stretcher/Non-slip footwear applied when patient is off stretcher/[place] to call system/Physically safe environment - no spills, clutter or unnecessary equipment/Purposeful proactive rounding/Room/bathroom lighting operational, light cord in reach

## 2023-12-23 NOTE — ED ADULT NURSE NOTE - OBJECTIVE STATEMENT
54 y/o male complaining of chest pain. Pt states the pain began yesterday, mid sternal with radiation to left arm. Pt states the pain is "burning and sharp". Also endorsing SANCHEZ since the pain began. States this pain feels the same as when he had his previous heart issues. Pt A&Ox4, ambulatory, speaking in complete sentences on RA, abdomen soft and nontender. Denies n/v/d, fever, chills. 20G LAC IV inserted and labs sent. VSS. Second EKG performed. Wife at bedside. Safety and comfort measures maintained. 52 y/o male complaining of chest pain. Pt states the pain began yesterday, mid sternal with radiation to left arm. Pt states the pain is "burning and sharp". Also endorsing SANCHEZ since the pain began. States this pain feels the same as when he had his previous heart issues. Pt A&Ox4, ambulatory, speaking in complete sentences on RA, abdomen soft and nontender. Denies n/v/d, fever, chills. 20G LAC IV inserted and labs sent. VSS. Second EKG performed. Wife at bedside. Safety and comfort measures maintained. 52 y/o male complaining of chest pain. Pt states the pain began yesterday, mid sternal with radiation to left arm. Pt states the pain is "burning and sharp". Also endorsing SANCHEZ since the pain began. States this pain feels the same as when he had his previous heart issues. Pt A&Ox4, ambulatory, speaking in complete sentences on RA, abdomen soft and nontender. Denies n/v/d, fever, chills. 20G LAC IV inserted and labs sent. VSS, pt placed on cardiac monitor. Second EKG performed. Wife at bedside. Safety and comfort measures maintained. 54 y/o male complaining of chest pain. PMHx T2DM, HTN, CAD s/p CABG x4 in 2022.Pt states the pain began yesterday, mid sternal with radiation to left arm. Pt states the pain is "burning and sharp". Also endorsing SANCHEZ since the pain began. States this pain feels the same as when he had his previous heart issues. Took 81mg ASA earlier today around 2pm. Pt A&Ox4, ambulatory, speaking in complete sentences on RA, abdomen soft and nontender. Denies n/v/d, fever, chills. 20G LAC IV inserted and labs sent. VSS, pt placed on cardiac monitor. Second EKG performed. Wife at bedside. Safety and comfort measures maintained. 52 y/o male complaining of chest pain. PMHx T2DM, HTN, CAD s/p CABG x4 in 2022.Pt states the pain began yesterday, mid sternal with radiation to left arm. Pt states the pain is "burning and sharp". Also endorsing SANCHEZ since the pain began. States this pain feels the same as when he had his previous heart issues. Took 81mg ASA earlier today around 2pm. Pt A&Ox4, ambulatory, speaking in complete sentences on RA, abdomen soft and nontender. Denies n/v/d, fever, chills. 20G LAC IV inserted and labs sent. VSS, pt placed on cardiac monitor. Second EKG performed. Wife at bedside. Safety and comfort measures maintained.

## 2023-12-24 DIAGNOSIS — N40.0 BENIGN PROSTATIC HYPERPLASIA WITHOUT LOWER URINARY TRACT SYMPTOMS: ICD-10-CM

## 2023-12-24 DIAGNOSIS — N39.0 URINARY TRACT INFECTION, SITE NOT SPECIFIED: ICD-10-CM

## 2023-12-24 DIAGNOSIS — Z29.9 ENCOUNTER FOR PROPHYLACTIC MEASURES, UNSPECIFIED: ICD-10-CM

## 2023-12-24 DIAGNOSIS — E11.9 TYPE 2 DIABETES MELLITUS WITHOUT COMPLICATIONS: ICD-10-CM

## 2023-12-24 DIAGNOSIS — I20.0 UNSTABLE ANGINA: ICD-10-CM

## 2023-12-24 DIAGNOSIS — I50.20 UNSPECIFIED SYSTOLIC (CONGESTIVE) HEART FAILURE: ICD-10-CM

## 2023-12-24 DIAGNOSIS — E78.5 HYPERLIPIDEMIA, UNSPECIFIED: ICD-10-CM

## 2023-12-24 DIAGNOSIS — R07.9 CHEST PAIN, UNSPECIFIED: ICD-10-CM

## 2023-12-24 DIAGNOSIS — I10 ESSENTIAL (PRIMARY) HYPERTENSION: ICD-10-CM

## 2023-12-24 DIAGNOSIS — J06.9 ACUTE UPPER RESPIRATORY INFECTION, UNSPECIFIED: ICD-10-CM

## 2023-12-24 LAB
GLUCOSE BLDC GLUCOMTR-MCNC: 110 MG/DL — HIGH (ref 70–99)
GLUCOSE BLDC GLUCOMTR-MCNC: 110 MG/DL — HIGH (ref 70–99)
GLUCOSE BLDC GLUCOMTR-MCNC: 116 MG/DL — HIGH (ref 70–99)
GLUCOSE BLDC GLUCOMTR-MCNC: 116 MG/DL — HIGH (ref 70–99)
GLUCOSE BLDC GLUCOMTR-MCNC: 217 MG/DL — HIGH (ref 70–99)
GLUCOSE BLDC GLUCOMTR-MCNC: 217 MG/DL — HIGH (ref 70–99)
GLUCOSE BLDC GLUCOMTR-MCNC: 90 MG/DL — SIGNIFICANT CHANGE UP (ref 70–99)
GLUCOSE BLDC GLUCOMTR-MCNC: 90 MG/DL — SIGNIFICANT CHANGE UP (ref 70–99)
TROPONIN T, HIGH SENSITIVITY RESULT: 43 NG/L — SIGNIFICANT CHANGE UP (ref 0–51)
TROPONIN T, HIGH SENSITIVITY RESULT: 43 NG/L — SIGNIFICANT CHANGE UP (ref 0–51)
TROPONIN T, HIGH SENSITIVITY RESULT: 49 NG/L — SIGNIFICANT CHANGE UP (ref 0–51)
TROPONIN T, HIGH SENSITIVITY RESULT: 49 NG/L — SIGNIFICANT CHANGE UP (ref 0–51)

## 2023-12-24 PROCEDURE — 99223 1ST HOSP IP/OBS HIGH 75: CPT

## 2023-12-24 PROCEDURE — 93010 ELECTROCARDIOGRAM REPORT: CPT | Mod: 76

## 2023-12-24 PROCEDURE — 99291 CRITICAL CARE FIRST HOUR: CPT | Mod: GC

## 2023-12-24 RX ORDER — DEXTROSE 50 % IN WATER 50 %
12.5 SYRINGE (ML) INTRAVENOUS ONCE
Refills: 0 | Status: DISCONTINUED | OUTPATIENT
Start: 2023-12-24 | End: 2023-12-25

## 2023-12-24 RX ORDER — INSULIN LISPRO 100/ML
VIAL (ML) SUBCUTANEOUS
Refills: 0 | Status: DISCONTINUED | OUTPATIENT
Start: 2023-12-24 | End: 2023-12-25

## 2023-12-24 RX ORDER — NITROGLYCERIN 6.5 MG
0.4 CAPSULE, EXTENDED RELEASE ORAL ONCE
Refills: 0 | Status: COMPLETED | OUTPATIENT
Start: 2023-12-24 | End: 2023-12-24

## 2023-12-24 RX ORDER — SPIRONOLACTONE 25 MG/1
12.5 TABLET, FILM COATED ORAL ONCE
Refills: 0 | Status: DISCONTINUED | OUTPATIENT
Start: 2023-12-24 | End: 2023-12-25

## 2023-12-24 RX ORDER — DEXTROSE 50 % IN WATER 50 %
25 SYRINGE (ML) INTRAVENOUS ONCE
Refills: 0 | Status: DISCONTINUED | OUTPATIENT
Start: 2023-12-24 | End: 2023-12-25

## 2023-12-24 RX ORDER — ASPIRIN/CALCIUM CARB/MAGNESIUM 324 MG
81 TABLET ORAL DAILY
Refills: 0 | Status: DISCONTINUED | OUTPATIENT
Start: 2023-12-24 | End: 2023-12-25

## 2023-12-24 RX ORDER — ACETAMINOPHEN 500 MG
650 TABLET ORAL EVERY 6 HOURS
Refills: 0 | Status: DISCONTINUED | OUTPATIENT
Start: 2023-12-24 | End: 2023-12-25

## 2023-12-24 RX ORDER — SACUBITRIL AND VALSARTAN 24; 26 MG/1; MG/1
1 TABLET, FILM COATED ORAL
Refills: 0 | Status: DISCONTINUED | OUTPATIENT
Start: 2023-12-24 | End: 2023-12-25

## 2023-12-24 RX ORDER — DEXTROSE 50 % IN WATER 50 %
15 SYRINGE (ML) INTRAVENOUS ONCE
Refills: 0 | Status: DISCONTINUED | OUTPATIENT
Start: 2023-12-24 | End: 2023-12-25

## 2023-12-24 RX ORDER — ISOSORBIDE MONONITRATE 60 MG/1
30 TABLET, EXTENDED RELEASE ORAL DAILY
Refills: 0 | Status: DISCONTINUED | OUTPATIENT
Start: 2023-12-24 | End: 2023-12-25

## 2023-12-24 RX ORDER — METOPROLOL TARTRATE 50 MG
200 TABLET ORAL DAILY
Refills: 0 | Status: DISCONTINUED | OUTPATIENT
Start: 2023-12-24 | End: 2023-12-25

## 2023-12-24 RX ORDER — INSULIN LISPRO 100/ML
VIAL (ML) SUBCUTANEOUS AT BEDTIME
Refills: 0 | Status: DISCONTINUED | OUTPATIENT
Start: 2023-12-24 | End: 2023-12-25

## 2023-12-24 RX ORDER — SODIUM CHLORIDE 9 MG/ML
1000 INJECTION, SOLUTION INTRAVENOUS
Refills: 0 | Status: DISCONTINUED | OUTPATIENT
Start: 2023-12-24 | End: 2023-12-25

## 2023-12-24 RX ORDER — NITROGLYCERIN 6.5 MG
0.4 CAPSULE, EXTENDED RELEASE ORAL ONCE
Refills: 0 | Status: DISCONTINUED | OUTPATIENT
Start: 2023-12-24 | End: 2023-12-25

## 2023-12-24 RX ORDER — PANTOPRAZOLE SODIUM 20 MG/1
40 TABLET, DELAYED RELEASE ORAL
Refills: 0 | Status: DISCONTINUED | OUTPATIENT
Start: 2023-12-24 | End: 2023-12-25

## 2023-12-24 RX ORDER — SPIRONOLACTONE 25 MG/1
12.5 TABLET, FILM COATED ORAL DAILY
Refills: 0 | Status: DISCONTINUED | OUTPATIENT
Start: 2023-12-25 | End: 2023-12-25

## 2023-12-24 RX ORDER — GLUCAGON INJECTION, SOLUTION 0.5 MG/.1ML
1 INJECTION, SOLUTION SUBCUTANEOUS ONCE
Refills: 0 | Status: DISCONTINUED | OUTPATIENT
Start: 2023-12-24 | End: 2023-12-25

## 2023-12-24 RX ORDER — TICAGRELOR 90 MG/1
90 TABLET ORAL EVERY 12 HOURS
Refills: 0 | Status: DISCONTINUED | OUTPATIENT
Start: 2023-12-24 | End: 2023-12-25

## 2023-12-24 RX ORDER — ATORVASTATIN CALCIUM 80 MG/1
40 TABLET, FILM COATED ORAL AT BEDTIME
Refills: 0 | Status: DISCONTINUED | OUTPATIENT
Start: 2023-12-24 | End: 2023-12-25

## 2023-12-24 RX ORDER — TAMSULOSIN HYDROCHLORIDE 0.4 MG/1
0.4 CAPSULE ORAL AT BEDTIME
Refills: 0 | Status: DISCONTINUED | OUTPATIENT
Start: 2023-12-24 | End: 2023-12-25

## 2023-12-24 RX ORDER — INSULIN GLARGINE 100 [IU]/ML
5 INJECTION, SOLUTION SUBCUTANEOUS AT BEDTIME
Refills: 0 | Status: DISCONTINUED | OUTPATIENT
Start: 2023-12-24 | End: 2023-12-25

## 2023-12-24 RX ADMIN — INSULIN GLARGINE 5 UNIT(S): 100 INJECTION, SOLUTION SUBCUTANEOUS at 21:45

## 2023-12-24 RX ADMIN — Medication 200 MILLIGRAM(S): at 18:13

## 2023-12-24 RX ADMIN — Medication 0.4 MILLIGRAM(S): at 02:25

## 2023-12-24 RX ADMIN — TICAGRELOR 90 MILLIGRAM(S): 90 TABLET ORAL at 18:12

## 2023-12-24 RX ADMIN — SACUBITRIL AND VALSARTAN 1 TABLET(S): 24; 26 TABLET, FILM COATED ORAL at 18:11

## 2023-12-24 RX ADMIN — Medication 81 MILLIGRAM(S): at 13:08

## 2023-12-24 RX ADMIN — ATORVASTATIN CALCIUM 40 MILLIGRAM(S): 80 TABLET, FILM COATED ORAL at 21:40

## 2023-12-24 RX ADMIN — TAMSULOSIN HYDROCHLORIDE 0.4 MILLIGRAM(S): 0.4 CAPSULE ORAL at 21:40

## 2023-12-24 RX ADMIN — ISOSORBIDE MONONITRATE 30 MILLIGRAM(S): 60 TABLET, EXTENDED RELEASE ORAL at 13:08

## 2023-12-24 NOTE — PATIENT PROFILE ADULT - NSPROGENOTHERPROVIDER_GEN_A_NUR
none Ear Star Wedge Flap Text: The defect edges were debeveled with a #15 blade scalpel.  Given the location of the defect and the proximity to free margins (helical rim) an ear star wedge flap was deemed most appropriate. Using a sterile surgical marker, the appropriate flap was drawn incorporating the defect and placing the expected incisions between the helical rim and antihelix where possible.  The area thus outlined was incised through and through with a #15 scalpel blade. Following this, the designed flap was carried over into the primary defect and sutured into place.

## 2023-12-24 NOTE — CONSULT NOTE ADULT - PROBLEM SELECTOR RECOMMENDATION 9
- Troponins negative x2, ECG without significant ischemic changes  - Has had multiple recent LHCs with interventions as above  - If remains inpatient, would recommend LHC/RHC this week followed by possible CPET   - Cont ASA, Brillinta, Statin, Zetia, Vascepa

## 2023-12-24 NOTE — CONSULT NOTE ADULT - SUBJECTIVE AND OBJECTIVE BOX
Patient seen and evaluated at bedside    Chief Complaint: Chest Pain    HPI:  52 yo man with history of DM, HTN, HLD, CAD s/p CABG (LIMA-LAD, SVG-rPL, SVG-OM1, SVG-D1 12/2022) & PCI (CHERRI to OM1 c/b IST s/p CHERRI and POBA-LIMA 4/2023, POBA to OM1 11/14, and POBA to  of mLAD 11/27/2023), ICM/HFrEF (EF 35%), GERD, and hx of tobacco presenting to the ED with CP.  Reports 2 days of intermittent CP. First episode occurred while walking and resolved with rest. Subsequent episodes 2 and 3 occurred after eating and resolved with maalox. He had a 4th episode that again occurred after eating but this time persisted prompting ED presentation. While in the ED, his CP fluctuated in intensity without intervention. During ED interview, patient described acute worsening of CP, was given sublingual nitro x1 with resolution. Chest pain is pressure like, does not radiate, no other associated symptoms Has baseline SANCHEZ that has not worsened, and 1 month of left shoulder pain.    In ED, VSS, trop negative x2. EKG with new T wave inversion in V2 otherwise unchanged.   Per outpatient notes, further initiation of antianginals limited by orthostasis.     Seen by General Cardiology who recommended trial of low dose Imdur and Heart Failure consult for further workup and possible consideration of advanced therapies. Currently chest pain free, ambulating around the unit without difficulty. At home can walk ~5-10 minutes before getting SOB.       PMHx:   No pertinent past medical history    Hypertension    Diabetes mellitus    GERD (gastroesophageal reflux disease)    CAD (coronary artery disease)        PSHx:   No significant past surgical history    No significant past surgical history    S/P CABG (coronary artery bypass graft)        Allergies:  No Known Allergies      Home Meds:  Home Medications:  Basaglar KwikPen 100 units/mL subcutaneous solution: 12 unit(s) subcutaneous once a day (at bedtime) (24 Dec 2023 09:21)  ezetimibe 10 mg oral tablet: 1 tab(s) orally once a day (24 Dec 2023 09:19)  Farxiga 5 mg oral tablet: 1 tab(s) orally once a day (24 Dec 2023 09:18)  Janumet 50 mg-1000 mg oral tablet: 1 tab(s) orally 2 times a day RESUME 11/30/23 (24 Dec 2023 09:21)  otc supplements: folic acid / vitamin b12 / fish oil / multivitamin / alpha lipoic acid (24 Dec 2023 09:21)  senna (sennosides) 8.6 mg oral tablet: 1 tab(s) orally once a day (24 Dec 2023 09:21)  Systane ophthalmic solution: 1 drop(s) in each eye once a day as needed for  dry eyes (24 Dec 2023 09:21)  tamsulosin 0.4 mg oral capsule: 1 cap(s) orally once a day (24 Dec 2023 09:21)  Vascepa 1 g oral capsule: 2 cap(s) orally 2 times a day (24 Dec 2023 09:20)      Current Medications:   acetaminophen     Tablet .. 650 milliGRAM(s) Oral every 6 hours PRN  aspirin enteric coated 81 milliGRAM(s) Oral daily  atorvastatin 40 milliGRAM(s) Oral at bedtime  dextrose 5%. 1000 milliLiter(s) IV Continuous <Continuous>  dextrose 5%. 1000 milliLiter(s) IV Continuous <Continuous>  dextrose 50% Injectable 25 Gram(s) IV Push once  dextrose 50% Injectable 25 Gram(s) IV Push once  dextrose 50% Injectable 12.5 Gram(s) IV Push once  dextrose Oral Gel 15 Gram(s) Oral once PRN  glucagon  Injectable 1 milliGRAM(s) IntraMuscular once  insulin glargine Injectable (LANTUS) 5 Unit(s) SubCutaneous at bedtime  insulin lispro (ADMELOG) corrective regimen sliding scale   SubCutaneous at bedtime  insulin lispro (ADMELOG) corrective regimen sliding scale   SubCutaneous three times a day before meals  isosorbide   mononitrate ER Tablet (IMDUR) 30 milliGRAM(s) Oral daily  metoprolol succinate  milliGRAM(s) Oral daily  nitroglycerin     SubLingual 0.4 milliGRAM(s) SubLingual once  pantoprazole    Tablet 40 milliGRAM(s) Oral before breakfast  sacubitril 24 mG/valsartan 26 mG 1 Tablet(s) Oral two times a day  tamsulosin 0.4 milliGRAM(s) Oral at bedtime  ticagrelor 90 milliGRAM(s) Oral every 12 hours      FAMILY HISTORY:  FH: heart disease (Father)      REVIEW OF SYSTEMS:  All other review of systems is negative unless indicated above.    Physical Exam:  T(F): 98 (12-24), Max: 98.1 (12-24)  HR: 103 (12-24) (78 - 103)  BP: 112/78 (12-24) (101/71 - 142/97)  RR: 18 (12-24)  SpO2: 95% (12-24)  GENERAL: No acute distress, well-developed  HEAD:  Atraumatic, Normocephalic  ENT: EOMI, PERRLA, conjunctiva and sclera clear, Neck supple, No JVD, moist mucosa  CHEST/LUNG: Clear to auscultation bilaterally; No wheeze, equal breath sounds bilaterally   BACK: No spinal tenderness  HEART: Regular rate and rhythm; No murmurs, rubs, or gallops  ABDOMEN: Soft, Nontender, Nondistended; Bowel sounds present  EXTREMITIES:  No clubbing, cyanosis, or edema  PSYCH: Nl behavior, nl affect  NEUROLOGY: AAOx3, non-focal, cranial nerves intact  SKIN: Normal color, No rashes or lesions  LINES:    Cardiovascular Diagnostic Testing:    ECG: Personally reviewed    Echo: Personally reviewed:  _______________________________________________________________________________________     CONCLUSIONS:      1. Left ventricular cavity is small. Left ventricular wall thickness is normal. Left ventricular systolic function is moderately to severely decreased with an ejection fraction of 35 % by 3D. Regional wall motion abnormalities consistent with ischemic heart disease.   2. Multiple segmental abnormalities exist. See findings.   3. There is mild (grade 1) left ventricular diastolic dysfunction, with normal filling pressure.   4. Normal right ventricular cavity size, wall thickness, and systolic function.   5. No pericardial effusion seen.   6. There is no evidence of a left ventricular thrombus.   7. There is normal LV mass and concentric remodeling.    ________________________________________________________________________________________    Stress Testing:    Cath: 11/27/23  Conclusions:   Successful POBA to a 100% mLAD lesion.      Recommendations:     Continue DAPT as previously recommended. LAD will be left as POBA  given small caliber vessel.    Presentation:   53M w/ CAD s/p CABG, recent PCI to the LCx/OM, HTN, HLD and DM here  for staged PCI of the LAD.    Procedure Narrative:   The risks and alternatives of the procedures and conscious sedation  were explained to the patient and informed consent was  obtained. The patient was brought to the cath lab and placed on the  exam table.  Access   Right radial artery:   The puncture site was infiltrated with 1% Lidocaine. Vascular access  was obtained using modified seldinger technique.    Diagnostic Findings:     Coronary Angiography   LAD   Mid left anterior descending: There is a 100 % stenosis. SASHA Flow 0.      Interventional Findings:     Interventional Details   Mid left anterior descending: This was a 100 % total occlusion  stenosis.    Imaging:    CXR: Personally reviewed    Labs: Personally reviewed                        15.5   6.79  )-----------( 225      ( 23 Dec 2023 22:12 )             47.6     12-23    136  |  98  |  17  ----------------------------<  107<H>  4.4   |  24  |  1.00    Ca    10.4      23 Dec 2023 22:12    TPro  7.8  /  Alb  4.6  /  TBili  0.7  /  DBili  x   /  AST  22  /  ALT  16  /  AlkPhos  56  12-23        CARDIAC MARKERS ( 24 Dec 2023 01:37 )  43 ng/L / x     / x     / x     / x     / x      CARDIAC MARKERS ( 23 Dec 2023 23:58 )  49 ng/L / x     / x     / x     / x     / x      CARDIAC MARKERS ( 23 Dec 2023 22:12 )  44 ng/L / x     / x     / x     / x     / x

## 2023-12-24 NOTE — H&P ADULT - TIME BILLING
Time spent reviewing outpatient/inpatient chart, performing history and physical, medication reconciliation, medical decision making, ordering labs/tests, discussion with consultants (cards), discussion with patient/family (wife) at bedside, and documentation.

## 2023-12-24 NOTE — ED PROVIDER NOTE - ATTENDING CONTRIBUTION TO CARE
Attending MD GREG Barton I performed a history and physical exam of the patient and discussed their management with the resident. I reviewed the resident's note and agree with the documented findings and plan of care, except as noted. My medical decision making and observations are as follows:    53 M with PMH HTN, HLD, DM, CAD s/p CABG x 4 and PCI, GERD presenting with intermittent exertional substernal burning chest pain since yesterday, similar to previous cardiac chest pains.  Symptoms resolved at rest.  Associated with dyspnea on exertion and palpitations.  No chest pain at the time of my evaluation.  No fever, cough, abdominal pain, GI symptoms, dysuria.  On review of EMR, patient was admitted from 11/13 to 11/15 for similar symptoms, underwent cardiac catheterization on 11/14 for balloon angioplasty and was recommended to return in 1 to 2 weeks for staged PCI.  Patient reports he did not undergo PCI, but was instructed to return immediately to emergency department if he develops chest pain again.    On exam, patient no acute distress, A&O 3, mucous membranes moist.  Heart and lungs clear to auscultation, abdomen soft nontender, no pedal edema.    MDM–53 M with significant cardiac history presenting with typical chest pain, EKG with sinus rhythm, diffuse ST depressions/TWI with new T wave inversions/ST depressions to V1 and V2 compared to previous EKG on 11/27; stable on repeat EKG today.  Obtain ACS workup, patient will require admission.

## 2023-12-24 NOTE — H&P ADULT - ASSESSMENT
52 yo man with history of DM, HTN, HLD, CAD s/p CABG (LIMA-LAD, SVG-rPL, SVG-OM1, SVG-D1 12/2022) & PCI (CHERRI to OM1 c/b IST s/p CHERRI and POBA-LIMA 4/2023, POBA to OM1 11/14, and POBA to  of mLAD 11/27/2023), ICM/HFrEF (EF 35%), GERD, and hx of tobacco presenting to the ED with CP.  54 yo man with history of DM, HTN, HLD, CAD s/p CABG (LIMA-LAD, SVG-rPL, SVG-OM1, SVG-D1 12/2022) & PCI (CHERRI to OM1 c/b IST s/p CHERRI and POBA-LIMA 4/2023, POBA to OM1 11/14, and POBA to  of mLAD 11/27/2023), ICM/HFrEF (EF 35%), GERD, and hx of tobacco presenting to the ED with CP.

## 2023-12-24 NOTE — H&P ADULT - PROBLEM SELECTOR PLAN 1
CP/angina given patient's extensive cardiac history, especially given his CP relief with nitro. Additional aspects of his CP are non-cardiac including occurring after meals with resolution from ant-acids. He is currently CP free, trop WNL, Some EKG changes. SASHA 4  - SL nitro PRN  - Continue home anti-platelet ASA and Brilinta   - Continue home meds: toprol 200 mg, lipitor 40 mg, entresto 24-26  - restart farxiga on discharge  - consider trial imdur 30 mg, will need close monitoring of BP as has had orthostasis in the past  - Await final input from cards regarding need for cath vs. medical management  - Differential also includes GERD - patient reports some relief with PPI - will continue

## 2023-12-24 NOTE — CONSULT NOTE ADULT - ATTENDING COMMENTS
Agree with plan as outlined above.  Patient well known to me  Unfortunately, bypass grafts down in 1 year with multiple subsequent PCIs  He has NYHA Class III-IV CHF/End Stage Ischemic Heart Disease  OMT limited by Orthostatics  On Toprol 200 XL, Entresto. Will Try Imdur with Compression Stockings  **He needs a Heart Failure Consult.**  Discussed at length with Patient/Wife                                                                                            Eighty minutes spent in direct patient care and communication
Patient was seen and examined with the fellow.  I agree with the above except for the following:    Briefly, Mr. Montez is a 52 yo M with ICM x/p CABG 1 yr ago with serial PCI post due to refractory angina.  Anti-anginals and HF meds have been limited by orthostasis.  At baseline, he is only able to walk 5-10min before getting chest tightness accompanied by SOB.  Here is euvolemic on exam.  LVEF has been stable at 35% post CABG. He has preserved end organ function.  Troponin X 2 negative.  No plans for repeat LHC per gen cards team.  He is chest pain free on my exam and walked 2 laps on the floor without chest pain.  If there is no further role for revascularization with him, will need to risk stratify his cardiomyopathy to better understand his limitation.    With regards to GDMT, currently on metoprolol 200mg XL daily, low dose Entresto, and Farxiga. Not on standing diuretics.  Obtain pro BNP.  Last in 11/2023 was 465 (low).  With his low volume status he will not be able to tolerate increase in diuretics including ARNI.  May need to switch to losartan and as an outpatient.  Will need to trial on spironolactone. Can add on 12.5mg daily here.       Patient egar for discharge home.  He can be seen in the HF office with plan for outpatient RHC +/- CPET with later consideration for advanced therapies.

## 2023-12-24 NOTE — PATIENT PROFILE ADULT - FUNCTIONAL ASSESSMENT - BASIC MOBILITY 6.
4-calculated by average/Not able to assess (calculate score using [facility] averaging method) 4-calculated by average/Not able to assess (calculate score using [facility] averaging method)

## 2023-12-24 NOTE — H&P ADULT - HISTORY OF PRESENT ILLNESS
52 yo man with history of DM, HTN, HLD, CAD s/p CABG (LIMA-LAD, SVG-rPL, SVG-OM1, SVG-D1 12/2022) & PCI (CHERRI to OM1 c/b IST s/p CHERRI and POBA-LIMA 4/2023, POBA to OM1 11/14, and POBA to  of mLAD 11/27/2023), ICM/HFrEF (EF 35%), GERD, and hx of tobacco presenting to the ED with CP.  Reports 2 days of intermittent CP. First episode occurred while walking and resolved with rest. Subsequent episodes 2 and 3 occurred after eating and resolved with maalox. He had a 4th episode that again occurred after eating but this time persisted prompting ED presentation. While in the ED, his CP fluctuated in intensity without intervention. During interview, patient described acute worsening of CP, was given sublingual nitro x1 with resolution. Chest pain is pressure like, does not radiate, no other associated symptoms Has baseline SANCHEZ that has not worsened, and 1 month of left shoulder pain.    In ED, VSS, trop negative x2. EKG with new T wave inversion in V2 otherwise unchanged.   Per outpatient notes, further initiation of antianginals limited by orthostasis.    54 yo man with history of DM, HTN, HLD, CAD s/p CABG (LIMA-LAD, SVG-rPL, SVG-OM1, SVG-D1 12/2022) & PCI (CHERRI to OM1 c/b IST s/p CHERRI and POBA-LIMA 4/2023, POBA to OM1 11/14, and POBA to  of mLAD 11/27/2023), ICM/HFrEF (EF 35%), GERD, and hx of tobacco presenting to the ED with CP.  Reports 2 days of intermittent CP. First episode occurred while walking and resolved with rest. Subsequent episodes 2 and 3 occurred after eating and resolved with maalox. He had a 4th episode that again occurred after eating but this time persisted prompting ED presentation. While in the ED, his CP fluctuated in intensity without intervention. During interview, patient described acute worsening of CP, was given sublingual nitro x1 with resolution. Chest pain is pressure like, does not radiate, no other associated symptoms Has baseline SANCHEZ that has not worsened, and 1 month of left shoulder pain.    In ED, VSS, trop negative x2. EKG with new T wave inversion in V2 otherwise unchanged.   Per outpatient notes, further initiation of antianginals limited by orthostasis.

## 2023-12-24 NOTE — H&P ADULT - PROBLEM SELECTOR PLAN 4
patient with history of orthostatic BP  c/w metoprolol 200mg   hold on imdur until final reccs from cards

## 2023-12-24 NOTE — H&P ADULT - NSHPLABSRESULTS_GEN_ALL_CORE
15.5   6.79  )-----------( 225      ( 23 Dec 2023 22:12 )             47.6       12-23    136  |  98  |  17  ----------------------------<  107<H>  4.4   |  24  |  1.00    Ca    10.4      23 Dec 2023 22:12    TPro  7.8  /  Alb  4.6  /  TBili  0.7  /  DBili  x   /  AST  22  /  ALT  16  /  AlkPhos  56  12-23                    Urinalysis Basic - ( 23 Dec 2023 22:12 )    Color: x / Appearance: x / SG: x / pH: x  Gluc: 107 mg/dL / Ketone: x  / Bili: x / Urobili: x   Blood: x / Protein: x / Nitrite: x   Leuk Esterase: x / RBC: x / WBC x   Sq Epi: x / Non Sq Epi: x / Bacteria: x        EKG: personally reviewed TWI V2-4, flattening T wave, mild ST depression   CXR: personally reviewed NAD    SASHA 4

## 2023-12-24 NOTE — PATIENT PROFILE ADULT - NSPROIMPLANTSMEDDEV_GEN_A_NUR
If you need a refill on your prescription, please call your pharmacy and let them know. Please be proactive and call before your medication runs out. The pharmacy will then contact us for the refill. Please allow 24-48 hours for the refill to be processed.     If your physician has ordered additional laboratory, or radiology testing, the results will be discussed at your next visit. This will allow you the opportunity to go over the results in person with your provider.  Lab results, imaging results, or other diagnostics, as ordered at today's visit will be called to you when available. Some results take 3-5 working days before available.    If your results require immediate intervention, you will be contacted sooner by phone call.    Results of labs, imaging, or other diagnostics ordered by another health care provider will be called by the ordering provider.    Mammogram results are mailed by the [facility] imaging center where obtained. If results are abnormal, imaging center will contact you for further diagnostics as needed.     You may be receiving a patient satisfaction survey in the mail. Please take the time to complete, as your feedback is very important to us. We strive to make your experience exceptional and your comments help us with that goal. We look forward to hearing from you.     
None

## 2023-12-24 NOTE — CONSULT NOTE ADULT - SUBJECTIVE AND OBJECTIVE BOX
Patient seen and evaluated at bedside    Chief Complaint:    HPI:  54 yo man with history of DM, HTN, HLD, CAD s/p CABG (LIMA-LAD, SVG-rPL, SVG-OM1, SVG-D1 12/2022) & PCI (CHERRI to OM1 c/b IST s/p CHERRI and POBA-LIMA 4/2023, POBA to OM1 11/14, and POBA to  of mLAD 11/27/2023), GERD, and hx of tobacco presenting to the ED with CP    PMHx:   No pertinent past medical history  Hypertension  Diabetes mellitus  GERD (gastroesophageal reflux disease)  CAD (coronary artery disease)        PSHx:   No significant past surgical history  No significant past surgical history  S/P CABG (coronary artery bypass graft)        Allergies:  No Known Allergies      Home Meds:    Current Medications:   nitroglycerin     SubLingual 0.4 milliGRAM(s) SubLingual once      FAMILY HISTORY:  FH: heart disease (Father)        Social History:  Smoking History:  Alcohol Use:  Drug Use:    REVIEW OF SYSTEMS:  All other review of systems is negative unless indicated above.    Physical Exam:  T(F): 97.8 (12-24), Max: 97.8 (12-24)  HR: 78 (12-24) (78 - 85)  BP: 142/92 (12-24) (122/81 - 142/92)  RR: 17 (12-24)  SpO2: 98% (12-24)  Appearance: No acute distress; well appearing  Eyes:  EOMI  HEENT: Normal oral mucosa  Cardiovascular: RRR, S1, S2, no murmurs, rubs, or gallops; no edema; no JVD  Respiratory: Clear to auscultation bilaterally  Gastrointestinal: soft, non-tender, non-distended  Extremities: no lower extremity edema   Neurologic: Non-focal  Psychiatry: AAOx3, mood & affect appropriate    Cardiovascular Diagnostic Testing:    ECG:     Echo:     Stress Testing:    Cath:    Imaging:    CXR:     Labs: Personally reviewed                        15.5   6.79  )-----------( 225      ( 23 Dec 2023 22:12 )             47.6     12-23    136  |  98  |  17  ----------------------------<  107<H>  4.4   |  24  |  1.00    Ca    10.4      23 Dec 2023 22:12    TPro  7.8  /  Alb  4.6  /  TBili  0.7  /  DBili  x   /  AST  22  /  ALT  16  /  AlkPhos  56  12-23        CARDIAC MARKERS ( 24 Dec 2023 01:37 )  43 ng/L / x     / x     / x     / x     / x      CARDIAC MARKERS ( 23 Dec 2023 23:58 )  49 ng/L / x     / x     / x     / x     / x      CARDIAC MARKERS ( 23 Dec 2023 22:12 )  44 ng/L / x     / x     / x     / x     / x                       Patient seen and evaluated at bedside    Chief Complaint:    HPI:  52 yo man with history of DM, HTN, HLD, CAD s/p CABG (LIMA-LAD, SVG-rPL, SVG-OM1, SVG-D1 12/2022) & PCI (CHERRI to OM1 c/b IST s/p CHERRI and POBA-LIMA 4/2023, POBA to OM1 11/14, and POBA to  of mLAD 11/27/2023), GERD, and hx of tobacco presenting to the ED with CP    PMHx:   No pertinent past medical history  Hypertension  Diabetes mellitus  GERD (gastroesophageal reflux disease)  CAD (coronary artery disease)        PSHx:   No significant past surgical history  No significant past surgical history  S/P CABG (coronary artery bypass graft)        Allergies:  No Known Allergies      Home Meds:    Current Medications:   nitroglycerin     SubLingual 0.4 milliGRAM(s) SubLingual once      FAMILY HISTORY:  FH: heart disease (Father)        Social History:  Smoking History:  Alcohol Use:  Drug Use:    REVIEW OF SYSTEMS:  All other review of systems is negative unless indicated above.    Physical Exam:  T(F): 97.8 (12-24), Max: 97.8 (12-24)  HR: 78 (12-24) (78 - 85)  BP: 142/92 (12-24) (122/81 - 142/92)  RR: 17 (12-24)  SpO2: 98% (12-24)  Appearance: No acute distress; well appearing  Eyes:  EOMI  HEENT: Normal oral mucosa  Cardiovascular: RRR, S1, S2, no murmurs, rubs, or gallops; no edema; no JVD  Respiratory: Clear to auscultation bilaterally  Gastrointestinal: soft, non-tender, non-distended  Extremities: no lower extremity edema   Neurologic: Non-focal  Psychiatry: AAOx3, mood & affect appropriate    Cardiovascular Diagnostic Testing:    ECG:     Echo:     Stress Testing:    Cath:    Imaging:    CXR:     Labs: Personally reviewed                        15.5   6.79  )-----------( 225      ( 23 Dec 2023 22:12 )             47.6     12-23    136  |  98  |  17  ----------------------------<  107<H>  4.4   |  24  |  1.00    Ca    10.4      23 Dec 2023 22:12    TPro  7.8  /  Alb  4.6  /  TBili  0.7  /  DBili  x   /  AST  22  /  ALT  16  /  AlkPhos  56  12-23        CARDIAC MARKERS ( 24 Dec 2023 01:37 )  43 ng/L / x     / x     / x     / x     / x      CARDIAC MARKERS ( 23 Dec 2023 23:58 )  49 ng/L / x     / x     / x     / x     / x      CARDIAC MARKERS ( 23 Dec 2023 22:12 )  44 ng/L / x     / x     / x     / x     / x                       Patient seen and evaluated at bedside      HPI:  52 yo man with history of DM, HTN, HLD, CAD s/p CABG (LIMA-LAD, SVG-rPL, SVG-OM1, SVG-D1 12/2022) & PCI (CHERRI to OM1 c/b IST s/p CHERRI and POBA-LIMA 4/2023, POBA to OM1 11/14, and POBA to  of mLAD 11/27/2023), ICM/HFrEF (EF 35%), GERD, and hx of tobacco presenting to the ED with CP.  Reports 2 days of intermittent CP. First episode occurred while walking and resolved with rest. Subsequent episodes 2 and 3 occurred after eating and resolved with maalox. He had a 4th episode that again occurred after eating but this time persisted prompting ED presentation. While in the ED, his CP fluctuated in intensity without intervention. During interview, patient described acute worsening of CP, was given sublingual nitro x1 with resolution. Chest pain is pressure like, does not radiate, no other associated symptoms Has baseline SANCHEZ that has not worsened, and 1 month of left shoulder pain.    In ED, VSS, trop negative x2. EKG with new T wave inversion in V2 otherwise unchanged.   Per outpatient notes, further initiation of antianginals limited by orthostasis.     PMHx:   No pertinent past medical history  Hypertension  Diabetes mellitus  GERD (gastroesophageal reflux disease)  CAD (coronary artery disease)        PSHx:   No significant past surgical history  No significant past surgical history  S/P CABG (coronary artery bypass graft)        Allergies:  No Known Allergies      Home Meds:    Current Medications:   nitroglycerin     SubLingual 0.4 milliGRAM(s) SubLingual once      FAMILY HISTORY:  FH: heart disease (Father)      REVIEW OF SYSTEMS:  All other review of systems is negative unless indicated above.    Physical Exam:  T(F): 97.8 (12-24), Max: 97.8 (12-24)  HR: 78 (12-24) (78 - 85)  BP: 142/92 (12-24) (122/81 - 142/92)  RR: 17 (12-24)  SpO2: 98% (12-24)  Appearance: No acute distress; well appearing  Eyes:  EOMI  HEENT: Normal oral mucosa  Cardiovascular: RRR, S1, S2, no murmurs, rubs, or gallops; no edema; no JVD  Respiratory: Clear to auscultation bilaterally  Gastrointestinal: soft, non-tender, non-distended  Extremities: no lower extremity edema   Neurologic: Non-focal  Psychiatry: AAOx3, mood & affect appropriate      Labs: Personally reviewed                        15.5   6.79  )-----------( 225      ( 23 Dec 2023 22:12 )             47.6     12-23    136  |  98  |  17  ----------------------------<  107<H>  4.4   |  24  |  1.00    Ca    10.4      23 Dec 2023 22:12    TPro  7.8  /  Alb  4.6  /  TBili  0.7  /  DBili  x   /  AST  22  /  ALT  16  /  AlkPhos  56  12-23        CARDIAC MARKERS ( 24 Dec 2023 01:37 )  43 ng/L / x     / x     / x     / x     / x      CARDIAC MARKERS ( 23 Dec 2023 23:58 )  49 ng/L / x     / x     / x     / x     / x      CARDIAC MARKERS ( 23 Dec 2023 22:12 )  44 ng/L / x     / x     / x     / x     / x

## 2023-12-24 NOTE — H&P ADULT - PROBLEM SELECTOR PLAN 2
HgA1C 6.4 11/23  at home on 12 units basaglar - will give lantus 5 units - adjust as needed  holding PO meds: janumet 50/1000 bid and farxiga - restart on discharge  c/w ISS check FS AC/HS  start diabetic diet

## 2023-12-24 NOTE — CONSULT NOTE ADULT - ASSESSMENT
54 yo man with history of DM, HTN, HLD, CAD s/p CABG (LIMA-LAD, SVG-rPL, SVG-OM1, SVG-D1 12/2022) & PCI (CHERRI to OM1 c/b IST s/p CHERRI and POBA-LIMA 4/2023, POBA to OM1 11/14, and POBA to  of mLAD 11/27/2023), ICM/HFrEF (EF 35%), GERD, and hx of tobacco presenting to the ED with CP. CP is c.f angina given patient's extensive cardiac history, especially given his CP relief with nitro. Additional aspects of his CP are non-cardiac including occurring after meals with resolution from ant-acids. He is currently CP free, trop WNL, EKG near baseline. VSS    Recs  - Continue home anti-platelet ASA and Brilinta   - Continue remainder of home meds at home doses: toprol 200 mg, lipitor 40 mg, entresto 24-26, zetia 10 mg, vascepa (if not available inpaatient to continue on dc)  - Will need to clarfiy if paitent taking SGLT2i  - Will discuss feasibility of initiating nitrate as anti-anginal     Please see attending attestation for final recommendations        Samy Quiroga MD  Cardiology Fellow     All Cardiology service information can be found 24/7 on [email or web address], password: [email or web address] 54 yo man with history of DM, HTN, HLD, CAD s/p CABG (LIMA-LAD, SVG-rPL, SVG-OM1, SVG-D1 12/2022) & PCI (CHERRI to OM1 c/b IST s/p CHERRI and POBA-LIMA 4/2023, POBA to OM1 11/14, and POBA to  of mLAD 11/27/2023), ICM/HFrEF (EF 35%), GERD, and hx of tobacco presenting to the ED with CP. CP is c.f angina given patient's extensive cardiac history, especially given his CP relief with nitro. Additional aspects of his CP are non-cardiac including occurring after meals with resolution from ant-acids. He is currently CP free, trop WNL, EKG near baseline. VSS    Recs  - Continue home anti-platelet ASA and Brilinta   - Continue remainder of home meds at home doses: toprol 200 mg, lipitor 40 mg, entresto 24-26, zetia 10 mg, vascepa (if not available inpaatient to continue on dc)  - Will need to clarfiy if paitent taking SGLT2i  - Will discuss feasibility of initiating nitrate as anti-anginal     Please see attending attestation for final recommendations        Samy Quiroga MD  Cardiology Fellow     All Cardiology service information can be found 24/7 on [email or web address], password: [email or web address] 54 yo man with history of DM, HTN, HLD, CAD s/p CABG (LIMA-LAD, SVG-rPL, SVG-OM1, SVG-D1 12/2022) & PCI (CHERRI to OM1 c/b IST s/p CHERRI and POBA-LIMA 4/2023, POBA to OM1 11/14, and POBA to  of mLAD 11/27/2023), ICM/HFrEF (EF 35%), GERD, and hx of tobacco presenting to the ED with CP. CP is c.f angina given patient's extensive cardiac history, especially given his CP relief with nitro. Additional aspects of his CP are non-cardiac including occurring after meals with resolution from ant-acids. He is currently CP free, trop WNL, EKG near baseline. VSS    Recs  - Continue home anti-platelet ASA and Brilinta   - Continue remainder of home meds at home doses: toprol 200 mg, lipitor 40 mg, entresto 24-26, zetia 10 mg, vascepa (if not available inpaatient to continue on dc)  - Will need to clarfiy if paitent taking SGLT2i  - Trial imdur 30 mg, will need close monitoring of BP as has had orthostasis in the past  - Will need close int. cards f/u    Please see attending attestation for final recommendations        Samy Quiroga MD  Cardiology Fellow     All Cardiology service information can be found 24/7 on [email or web address], password: [email or web address] 54 yo man with history of DM, HTN, HLD, CAD s/p CABG (LIMA-LAD, SVG-rPL, SVG-OM1, SVG-D1 12/2022) & PCI (CHERRI to OM1 c/b IST s/p CHERRI and POBA-LIMA 4/2023, POBA to OM1 11/14, and POBA to  of mLAD 11/27/2023), ICM/HFrEF (EF 35%), GERD, and hx of tobacco presenting to the ED with CP. CP is c.f angina given patient's extensive cardiac history, especially given his CP relief with nitro. Additional aspects of his CP are non-cardiac including occurring after meals with resolution from ant-acids. He is currently CP free, trop WNL, EKG near baseline. VSS    Recs  - Continue home anti-platelet ASA and Brilinta   - Continue remainder of home meds at home doses: toprol 200 mg, lipitor 40 mg, entresto 24-26, zetia 10 mg, vascepa (if not available inpaatient to continue on dc)  - Will need to clarfiy if paitent taking SGLT2i  - Trial imdur 30 mg, will need close monitoring of BP as has had orthostasis in the past  - Will need close int. cards f/u    Please see attending attestation for final recommendations        Samy Quiroga MD  Cardiology Fellow     All Cardiology service information can be found 24/7 on [email or web address], password: [email or web address]

## 2023-12-24 NOTE — H&P ADULT - PROBLEM SELECTOR PLAN 3
Last few days with URI symptoms (son was sick) - no fever, just runny nose  low suspicion for pericarditis, laying flat in bed, no pain  supportive care, afebrile

## 2023-12-24 NOTE — H&P ADULT - NSHPPHYSICALEXAM_GEN_ALL_CORE
Vital Signs Last 24 Hrs  T(C): 36.4 (24 Dec 2023 04:13), Max: 36.7 (24 Dec 2023 03:00)  T(F): 97.5 (24 Dec 2023 04:13), Max: 98.1 (24 Dec 2023 03:00)  HR: 96 (24 Dec 2023 04:13) (78 - 97)  BP: 116/82 (24 Dec 2023 04:13) (101/71 - 142/97)  BP(mean): --  RR: 18 (24 Dec 2023 04:13) (16 - 18)  SpO2: 93% (24 Dec 2023 04:13) (93% - 99%)    Parameters below as of 24 Dec 2023 04:13  Patient On (Oxygen Delivery Method): nasal cannula  O2 Flow (L/min): 2      CONSTITUTIONAL: Well-groomed, in no apparent distress  EYES: No conjunctival or scleral injection, non-icteric;   ENMT: No external nasal lesions; MMM  NECK: Trachea midline without palpable neck mass; thyroid not enlarged and non-tender  RESPIRATORY: Breathing comfortably; no dullness to percussion; lungs CTA without wheeze/rhonchi/rales  CARDIOVASCULAR: +S1S2, RRR, no M/G/R; pedal pulses full and symmetric; no lower extremity edema  GASTROINTESTINAL: No palpable masses or tenderness, +BS throughout, no rebound/guarding; no hepatosplenomegaly; no hernia palpated  LYMPHATIC: No cervical LAD or tenderness  SKIN: No rashes or ulcers noted  NEUROLOGIC: CN II-XII intact; sensation intact in LEs b/l to light touch  PSYCHIATRIC: A+O x 3; mood and affect appropriate; appropriate insight and judgment

## 2023-12-25 ENCOUNTER — TRANSCRIPTION ENCOUNTER (OUTPATIENT)
Age: 53
End: 2023-12-25

## 2023-12-25 VITALS
TEMPERATURE: 98 F | RESPIRATION RATE: 18 BRPM | HEART RATE: 83 BPM | OXYGEN SATURATION: 97 % | DIASTOLIC BLOOD PRESSURE: 66 MMHG | SYSTOLIC BLOOD PRESSURE: 99 MMHG

## 2023-12-25 LAB
ALBUMIN SERPL ELPH-MCNC: 3.8 G/DL — SIGNIFICANT CHANGE UP (ref 3.3–5)
ALBUMIN SERPL ELPH-MCNC: 3.8 G/DL — SIGNIFICANT CHANGE UP (ref 3.3–5)
ALP SERPL-CCNC: 48 U/L — SIGNIFICANT CHANGE UP (ref 40–120)
ALP SERPL-CCNC: 48 U/L — SIGNIFICANT CHANGE UP (ref 40–120)
ALT FLD-CCNC: 10 U/L — SIGNIFICANT CHANGE UP (ref 10–45)
ALT FLD-CCNC: 10 U/L — SIGNIFICANT CHANGE UP (ref 10–45)
ANION GAP SERPL CALC-SCNC: 10 MMOL/L — SIGNIFICANT CHANGE UP (ref 5–17)
ANION GAP SERPL CALC-SCNC: 10 MMOL/L — SIGNIFICANT CHANGE UP (ref 5–17)
AST SERPL-CCNC: 15 U/L — SIGNIFICANT CHANGE UP (ref 10–40)
AST SERPL-CCNC: 15 U/L — SIGNIFICANT CHANGE UP (ref 10–40)
BILIRUB SERPL-MCNC: 0.7 MG/DL — SIGNIFICANT CHANGE UP (ref 0.2–1.2)
BILIRUB SERPL-MCNC: 0.7 MG/DL — SIGNIFICANT CHANGE UP (ref 0.2–1.2)
BUN SERPL-MCNC: 15 MG/DL — SIGNIFICANT CHANGE UP (ref 7–23)
BUN SERPL-MCNC: 15 MG/DL — SIGNIFICANT CHANGE UP (ref 7–23)
CALCIUM SERPL-MCNC: 8.8 MG/DL — SIGNIFICANT CHANGE UP (ref 8.4–10.5)
CALCIUM SERPL-MCNC: 8.8 MG/DL — SIGNIFICANT CHANGE UP (ref 8.4–10.5)
CHLORIDE SERPL-SCNC: 103 MMOL/L — SIGNIFICANT CHANGE UP (ref 96–108)
CHLORIDE SERPL-SCNC: 103 MMOL/L — SIGNIFICANT CHANGE UP (ref 96–108)
CHOLEST SERPL-MCNC: 88 MG/DL — SIGNIFICANT CHANGE UP
CHOLEST SERPL-MCNC: 88 MG/DL — SIGNIFICANT CHANGE UP
CO2 SERPL-SCNC: 23 MMOL/L — SIGNIFICANT CHANGE UP (ref 22–31)
CO2 SERPL-SCNC: 23 MMOL/L — SIGNIFICANT CHANGE UP (ref 22–31)
CREAT SERPL-MCNC: 0.86 MG/DL — SIGNIFICANT CHANGE UP (ref 0.5–1.3)
CREAT SERPL-MCNC: 0.86 MG/DL — SIGNIFICANT CHANGE UP (ref 0.5–1.3)
EGFR: 104 ML/MIN/1.73M2 — SIGNIFICANT CHANGE UP
EGFR: 104 ML/MIN/1.73M2 — SIGNIFICANT CHANGE UP
GLUCOSE BLDC GLUCOMTR-MCNC: 132 MG/DL — HIGH (ref 70–99)
GLUCOSE BLDC GLUCOMTR-MCNC: 132 MG/DL — HIGH (ref 70–99)
GLUCOSE BLDC GLUCOMTR-MCNC: 143 MG/DL — HIGH (ref 70–99)
GLUCOSE BLDC GLUCOMTR-MCNC: 143 MG/DL — HIGH (ref 70–99)
GLUCOSE SERPL-MCNC: 124 MG/DL — HIGH (ref 70–99)
GLUCOSE SERPL-MCNC: 124 MG/DL — HIGH (ref 70–99)
HCT VFR BLD CALC: 42.3 % — SIGNIFICANT CHANGE UP (ref 39–50)
HCT VFR BLD CALC: 42.3 % — SIGNIFICANT CHANGE UP (ref 39–50)
HDLC SERPL-MCNC: 26 MG/DL — LOW
HDLC SERPL-MCNC: 26 MG/DL — LOW
HGB BLD-MCNC: 13.8 G/DL — SIGNIFICANT CHANGE UP (ref 13–17)
HGB BLD-MCNC: 13.8 G/DL — SIGNIFICANT CHANGE UP (ref 13–17)
HMPV RNA SPEC QL NAA+PROBE: DETECTED
HMPV RNA SPEC QL NAA+PROBE: DETECTED
LIPID PNL WITH DIRECT LDL SERPL: 45 MG/DL — SIGNIFICANT CHANGE UP
LIPID PNL WITH DIRECT LDL SERPL: 45 MG/DL — SIGNIFICANT CHANGE UP
MCHC RBC-ENTMCNC: 27.8 PG — SIGNIFICANT CHANGE UP (ref 27–34)
MCHC RBC-ENTMCNC: 27.8 PG — SIGNIFICANT CHANGE UP (ref 27–34)
MCHC RBC-ENTMCNC: 32.6 GM/DL — SIGNIFICANT CHANGE UP (ref 32–36)
MCHC RBC-ENTMCNC: 32.6 GM/DL — SIGNIFICANT CHANGE UP (ref 32–36)
MCV RBC AUTO: 85.3 FL — SIGNIFICANT CHANGE UP (ref 80–100)
MCV RBC AUTO: 85.3 FL — SIGNIFICANT CHANGE UP (ref 80–100)
NON HDL CHOLESTEROL: 62 MG/DL — SIGNIFICANT CHANGE UP
NON HDL CHOLESTEROL: 62 MG/DL — SIGNIFICANT CHANGE UP
NRBC # BLD: 0 /100 WBCS — SIGNIFICANT CHANGE UP (ref 0–0)
NRBC # BLD: 0 /100 WBCS — SIGNIFICANT CHANGE UP (ref 0–0)
PLATELET # BLD AUTO: 191 K/UL — SIGNIFICANT CHANGE UP (ref 150–400)
PLATELET # BLD AUTO: 191 K/UL — SIGNIFICANT CHANGE UP (ref 150–400)
POTASSIUM SERPL-MCNC: 3.7 MMOL/L — SIGNIFICANT CHANGE UP (ref 3.5–5.3)
POTASSIUM SERPL-MCNC: 3.7 MMOL/L — SIGNIFICANT CHANGE UP (ref 3.5–5.3)
POTASSIUM SERPL-SCNC: 3.7 MMOL/L — SIGNIFICANT CHANGE UP (ref 3.5–5.3)
POTASSIUM SERPL-SCNC: 3.7 MMOL/L — SIGNIFICANT CHANGE UP (ref 3.5–5.3)
PROT SERPL-MCNC: 6.5 G/DL — SIGNIFICANT CHANGE UP (ref 6–8.3)
PROT SERPL-MCNC: 6.5 G/DL — SIGNIFICANT CHANGE UP (ref 6–8.3)
RAPID RVP RESULT: DETECTED
RAPID RVP RESULT: DETECTED
RBC # BLD: 4.96 M/UL — SIGNIFICANT CHANGE UP (ref 4.2–5.8)
RBC # BLD: 4.96 M/UL — SIGNIFICANT CHANGE UP (ref 4.2–5.8)
RBC # FLD: 13.6 % — SIGNIFICANT CHANGE UP (ref 10.3–14.5)
RBC # FLD: 13.6 % — SIGNIFICANT CHANGE UP (ref 10.3–14.5)
SARS-COV-2 RNA SPEC QL NAA+PROBE: SIGNIFICANT CHANGE UP
SARS-COV-2 RNA SPEC QL NAA+PROBE: SIGNIFICANT CHANGE UP
SODIUM SERPL-SCNC: 136 MMOL/L — SIGNIFICANT CHANGE UP (ref 135–145)
SODIUM SERPL-SCNC: 136 MMOL/L — SIGNIFICANT CHANGE UP (ref 135–145)
TRIGL SERPL-MCNC: 78 MG/DL — SIGNIFICANT CHANGE UP
TRIGL SERPL-MCNC: 78 MG/DL — SIGNIFICANT CHANGE UP
TSH SERPL-MCNC: 1.23 UIU/ML — SIGNIFICANT CHANGE UP (ref 0.27–4.2)
TSH SERPL-MCNC: 1.23 UIU/ML — SIGNIFICANT CHANGE UP (ref 0.27–4.2)
WBC # BLD: 7.07 K/UL — SIGNIFICANT CHANGE UP (ref 3.8–10.5)
WBC # BLD: 7.07 K/UL — SIGNIFICANT CHANGE UP (ref 3.8–10.5)
WBC # FLD AUTO: 7.07 K/UL — SIGNIFICANT CHANGE UP (ref 3.8–10.5)
WBC # FLD AUTO: 7.07 K/UL — SIGNIFICANT CHANGE UP (ref 3.8–10.5)

## 2023-12-25 PROCEDURE — 36415 COLL VENOUS BLD VENIPUNCTURE: CPT

## 2023-12-25 PROCEDURE — 84443 ASSAY THYROID STIM HORMONE: CPT

## 2023-12-25 PROCEDURE — 71045 X-RAY EXAM CHEST 1 VIEW: CPT

## 2023-12-25 PROCEDURE — 85025 COMPLETE CBC W/AUTO DIFF WBC: CPT

## 2023-12-25 PROCEDURE — 83690 ASSAY OF LIPASE: CPT

## 2023-12-25 PROCEDURE — 84484 ASSAY OF TROPONIN QUANT: CPT

## 2023-12-25 PROCEDURE — 82962 GLUCOSE BLOOD TEST: CPT

## 2023-12-25 PROCEDURE — 93005 ELECTROCARDIOGRAM TRACING: CPT

## 2023-12-25 PROCEDURE — 80053 COMPREHEN METABOLIC PANEL: CPT

## 2023-12-25 PROCEDURE — 85027 COMPLETE CBC AUTOMATED: CPT

## 2023-12-25 PROCEDURE — 99285 EMERGENCY DEPT VISIT HI MDM: CPT | Mod: 25

## 2023-12-25 PROCEDURE — 99239 HOSP IP/OBS DSCHRG MGMT >30: CPT

## 2023-12-25 PROCEDURE — 80061 LIPID PANEL: CPT

## 2023-12-25 PROCEDURE — 99233 SBSQ HOSP IP/OBS HIGH 50: CPT

## 2023-12-25 PROCEDURE — 0225U NFCT DS DNA&RNA 21 SARSCOV2: CPT

## 2023-12-25 RX ORDER — ISOSORBIDE MONONITRATE 60 MG/1
1 TABLET, EXTENDED RELEASE ORAL
Qty: 30 | Refills: 0
Start: 2023-12-25 | End: 2024-01-23

## 2023-12-25 RX ORDER — SPIRONOLACTONE 25 MG/1
0.5 TABLET, FILM COATED ORAL
Qty: 15 | Refills: 0
Start: 2023-12-25 | End: 2024-01-23

## 2023-12-25 RX ADMIN — SACUBITRIL AND VALSARTAN 1 TABLET(S): 24; 26 TABLET, FILM COATED ORAL at 05:52

## 2023-12-25 RX ADMIN — Medication 100 MILLIGRAM(S): at 07:39

## 2023-12-25 RX ADMIN — Medication 200 MILLIGRAM(S): at 05:49

## 2023-12-25 RX ADMIN — ISOSORBIDE MONONITRATE 30 MILLIGRAM(S): 60 TABLET, EXTENDED RELEASE ORAL at 11:53

## 2023-12-25 RX ADMIN — PANTOPRAZOLE SODIUM 40 MILLIGRAM(S): 20 TABLET, DELAYED RELEASE ORAL at 07:41

## 2023-12-25 RX ADMIN — Medication 81 MILLIGRAM(S): at 11:52

## 2023-12-25 RX ADMIN — TICAGRELOR 90 MILLIGRAM(S): 90 TABLET ORAL at 05:53

## 2023-12-25 RX ADMIN — SPIRONOLACTONE 12.5 MILLIGRAM(S): 25 TABLET, FILM COATED ORAL at 05:52

## 2023-12-25 NOTE — DISCHARGE NOTE PROVIDER - NSDCCPCAREPLAN_GEN_ALL_CORE_FT
PRINCIPAL DISCHARGE DIAGNOSIS  Diagnosis: Chest pain  Assessment and Plan of Treatment: Cardiac enzymes soft   Outpatient follow up with Heart failure clinic in 1-3 days   You have a scheduled visit with Cardiology - please follow up with Dr. Jama  Possible  LHC/RHC  outpatietnt scheduled  followed by possible CPET  Cont ASA, Brillinta, Statin, Zetia, Vascepa.        SECONDARY DISCHARGE DIAGNOSES  Diagnosis: Unstable angina pectoris  Assessment and Plan of Treatment: Continue cardiac Medication   Outpatient follow up with cardiology   Cont ASA, Brillinta, Statin, Zetia, Vascepa.      Diagnosis: Type 2 diabetes mellitus  Assessment and Plan of Treatment: HgA1C this admission.  Make sure you get your HgA1c checked every three months.  If you take oral diabetes medications, check your blood glucose two times a day.  If you take insulin, check your blood glucose before meals and at bedtime.  It's important not to skip any meals.  Keep a log of your blood glucose results and always take it with you to your doctor appointments.  Keep a list of your current medications including injectables and over the counter medications and bring this medication list with you to all your doctor appointments.  If you have not seen your opthalmologist this year call for appointment.  Check your feet daily for redness, sores, or openings. Do not self treat. If no improvement in two days call your primary care physician for an appointment.  Low blood sugar (hypoglycemia) is a blood sugar below 70mg/dl. Check your blood sugar if you feel signs/symptoms of hypoglycemia. If your blood sugar is below 70 take 15 grams of carbohydrates (ex 4 oz of apple juice, 3-4 glucosr tablets, or 4-6 oz of regular soda) wait 15 minutes and repeat blood sugar to make sure it comes up above 70.  If your blood sugar is above 70 and you are due for a meal, have a meal.  If you are not due for a meal have a snack.  This snack helps keeps your blood sugar at a safe range.      Diagnosis: HTN (hypertension)  Assessment and Plan of Treatment: Follow up with your medical doctor to establish long term blood pressure treatment goals.  Continue Metoprolol, Imdur       Diagnosis: BPH (benign prostatic hyperplasia)  Assessment and Plan of Treatment: continue Flowmax    Diagnosis: Systolic heart failure  Assessment and Plan of Treatment: continue Imdur  Continue Entresto  Continue Spiralactone   outpatient follow up with Heart failure Clinic in 1-3 days   Hold Farxiga pending possible Cath per heart failure team   possible LH/RHC  to be scheduled outpatient     PRINCIPAL DISCHARGE DIAGNOSIS  Diagnosis: Chest pain  Assessment and Plan of Treatment: Cardiac enzymes soft   Outpatient follow up with Heart failure clinic in 1-3 days   You have a scheduled visit with Cardiology - please follow up with Dr. Jama  Possible  LHC/RHC  outpatietnt scheduled  followed by possible CPET  Cont ASA, Brillinta, Statin, Zetia, Vascepa.        SECONDARY DISCHARGE DIAGNOSES  Diagnosis: Unstable angina pectoris  Assessment and Plan of Treatment: Continue cardiac Medication   Outpatient follow up with cardiology   Cont ASA, Brillinta, Statin, Zetia, Vascepa.      Diagnosis: Type 2 diabetes mellitus  Assessment and Plan of Treatment: HgA1C this admission.  Make sure you get your HgA1c checked every three months.  If you take oral diabetes medications, check your blood glucose two times a day.  If you take insulin, check your blood glucose before meals and at bedtime.  It's important not to skip any meals.  Keep a log of your blood glucose results and always take it with you to your doctor appointments.  Keep a list of your current medications including injectables and over the counter medications and bring this medication list with you to all your doctor appointments.  If you have not seen your opthalmologist this year call for appointment.  Check your feet daily for redness, sores, or openings. Do not self treat. If no improvement in two days call your primary care physician for an appointment.  Low blood sugar (hypoglycemia) is a blood sugar below 70mg/dl. Check your blood sugar if you feel signs/symptoms of hypoglycemia. If your blood sugar is below 70 take 15 grams of carbohydrates (ex 4 oz of apple juice, 3-4 glucosr tablets, or 4-6 oz of regular soda) wait 15 minutes and repeat blood sugar to make sure it comes up above 70.  If your blood sugar is above 70 and you are due for a meal, have a meal.  If you are not due for a meal have a snack.  This snack helps keeps your blood sugar at a safe range.      Diagnosis: HTN (hypertension)  Assessment and Plan of Treatment: Follow up with your medical doctor to establish long term blood pressure treatment goals.  Continue Metoprolol, Imdur       Diagnosis: BPH (benign prostatic hyperplasia)  Assessment and Plan of Treatment: continue Flowmax    Diagnosis: Systolic heart failure  Assessment and Plan of Treatment: continue Imdur  Continue Entresto  Continue Spiralactone   outpatient follow up with Heart failure Clinic in 1-3 days   possible LH/RHC  to be scheduled outpatient

## 2023-12-25 NOTE — DISCHARGE NOTE PROVIDER - NSDCMRMEDTOKEN_GEN_ALL_CORE_FT
aspirin 81 mg oral delayed release tablet: 1 tab(s) orally once a day  atorvastatin 40 mg oral tablet: 1 tab(s) orally once a day (at bedtime)  Basaglar KwikPen 100 units/mL subcutaneous solution: 12 unit(s) subcutaneous once a day (at bedtime)  ezetimibe 10 mg oral tablet: 1 tab(s) orally once a day  Farxiga 5 mg oral tablet: 1 tab(s) orally once a day  guaiFENesin 100 mg/5 mL oral liquid: 5 milliliter(s) orally every 6 hours as needed for Cough  isosorbide mononitrate 30 mg oral tablet, extended release: 1 tab(s) orally once a day  Janumet 50 mg-1000 mg oral tablet: 1 tab(s) orally 2 times a day RESUME 11/30/23  metoprolol succinate 200 mg oral tablet, extended release: 1 tab(s) orally once a day  pantoprazole 40 mg oral delayed release tablet: 1 tab(s) orally once a day (before a meal)  sacubitril-valsartan 24 mg-26 mg oral tablet: 1 tab(s) orally 2 times a day  senna (sennosides) 8.6 mg oral tablet: 1 tab(s) orally once a day  spironolactone 25 mg oral tablet: 0.5 tab(s) orally once a day  Systane ophthalmic solution: 1 drop(s) in each eye once a day as needed for  dry eyes  tamsulosin 0.4 mg oral capsule: 1 cap(s) orally once a day  ticagrelor 90 mg oral tablet: 1 tab(s) orally every 12 hours  Vascepa 1 g oral capsule: 2 cap(s) orally 2 times a day   aspirin 81 mg oral delayed release tablet: 1 tab(s) orally once a day  atorvastatin 40 mg oral tablet: 1 tab(s) orally once a day (at bedtime)  Basaglar KwikPen 100 units/mL subcutaneous solution: 12 unit(s) subcutaneous once a day (at bedtime)  ezetimibe 10 mg oral tablet: 1 tab(s) orally once a day  Farxiga 5 mg oral tablet: 1 tab(s) orally once a day please hold for now pending cardiac cath per heart failure team  guaiFENesin 100 mg/5 mL oral liquid: 5 milliliter(s) orally every 6 hours as needed for Cough  isosorbide mononitrate 30 mg oral tablet, extended release: 1 tab(s) orally once a day  Janumet 50 mg-1000 mg oral tablet: 1 tab(s) orally 2 times a day RESUME 11/30/23  metoprolol succinate 200 mg oral tablet, extended release: 1 tab(s) orally once a day  pantoprazole 40 mg oral delayed release tablet: 1 tab(s) orally once a day (before a meal)  sacubitril-valsartan 24 mg-26 mg oral tablet: 1 tab(s) orally 2 times a day  senna (sennosides) 8.6 mg oral tablet: 1 tab(s) orally once a day  spironolactone 25 mg oral tablet: 0.5 tab(s) orally once a day  Systane ophthalmic solution: 1 drop(s) in each eye once a day as needed for  dry eyes  tamsulosin 0.4 mg oral capsule: 1 cap(s) orally once a day  ticagrelor 90 mg oral tablet: 1 tab(s) orally every 12 hours  Vascepa 1 g oral capsule: 2 cap(s) orally 2 times a day

## 2023-12-25 NOTE — DISCHARGE NOTE NURSING/CASE MANAGEMENT/SOCIAL WORK - PATIENT PORTAL LINK FT
You can access the FollowMyHealth Patient Portal offered by [facility] by registering at the following website: [email or web address]. By joining [employer]’s FollowMyHealth portal, you will also be able to view your health information using other applications (apps) compatible with our system. You can access the FollowMyHealth Patient Portal offered by [facility] by registering at the following website: [email or web address]. By joining [employer]’s FollowMyHealth portal, you will also be able to view your health information using other applications (apps) compatible with our system.

## 2023-12-25 NOTE — DISCHARGE NOTE PROVIDER - NSDCCAREPROVSEEN_GEN_ALL_CORE_FT
2/20/2020          To Whom It May Concern:    Kevin Toth had a PPD (tuberculin skin test) placed on the left forearm on 02/18/2020.  The results were read on 02/20/2020 by Santana Packer CMA    RESULTS:  00 mm induration noted;  Negative    Sincerely,        Santana Packer, [address] Phone: [phone] Jacy, Martin Xiong, Carlos A Kaur, Vandana Alba

## 2023-12-25 NOTE — DISCHARGE NOTE PROVIDER - NSFOLLOWUPCLINICS_GEN_ALL_ED_FT
Cardiology at [facility] [place]  Cardiology  [address] [place] [address]  Phone: [phone]  Fax:   Follow Up Time: 1-3 days     Cardiology at [facility]  Cardiology  [address] [place] [address]  Phone: [phone]  Fax:   Follow Up Time: 1-3 days

## 2023-12-25 NOTE — PROGRESS NOTE ADULT - ASSESSMENT
54 yo man with history of DM, HTN, HLD, CAD s/p CABG (LIMA-LAD, SVG-rPL, SVG-OM1, SVG-D1 12/2022) & PCI (CHERRI to OM1 c/b IST s/p CHERRI and POBA-LIMA 4/2023, POBA to OM1 11/14, and POBA to  of mLAD 11/27/2023), ICM/HFrEF (EF 35%), GERD, and hx of tobacco presenting to the ED with CP and cough,  found to have hMPV    #1 Chest Pain  Presentation: URI, hMPV  -HStrops: 44-->29-->43  -Obtain TTE  - Continue home anti-platelet ASA and Brilinta   - Continue remainder of home meds at home doses: toprol 200 mg, lipitor 40 mg, entresto 24-26,farxiga, zetia 10 mg, vascepa (if not available in-patient to continue on dc)  -yesenia 12.5 mg per HF recs  - can Trial imdur 30 mg, will need close monitoring of BP as has had orthostasis in the past, and hold parameters   - Will need close int. cards f/u  -Appreciate HF recs    Miles Andrew, Cardiology Fellow, F-2    For all New Consults and Questions:  [email or web address]   Login: [email or web address]    *** Note not final until signed by attending           52 yo man with history of DM, HTN, HLD, CAD s/p CABG (LIMA-LAD, SVG-rPL, SVG-OM1, SVG-D1 12/2022) & PCI (CHERRI to OM1 c/b IST s/p CHERRI and POBA-LIMA 4/2023, POBA to OM1 11/14, and POBA to  of mLAD 11/27/2023), ICM/HFrEF (EF 35%), GERD, and hx of tobacco presenting to the ED with CP and cough,  found to have hMPV    #1 Chest Pain  Presentation: URI, hMPV  -HStrops: 44-->29-->43  -Obtain TTE  - Continue home anti-platelet ASA and Brilinta   - Continue remainder of home meds at home doses: toprol 200 mg, lipitor 40 mg, entresto 24-26,farxiga, zetia 10 mg, vascepa (if not available in-patient to continue on dc)  -yesenia 12.5 mg per HF recs  - can Trial imdur 30 mg, will need close monitoring of BP as has had orthostasis in the past, and hold parameters   - Will need close int. cards f/u  -Appreciate HF recs    Miles Andrew, Cardiology Fellow, F-2    For all New Consults and Questions:  [email or web address]   Login: [email or web address]    *** Note not final until signed by attending           52 yo man with history of DM, HTN, HLD, CAD s/p CABG (LIMA-LAD, SVG-rPL, SVG-OM1, SVG-D1 12/2022) & PCI (CHERRI to OM1 c/b IST s/p CHERRI and POBA-LIMA 4/2023, POBA to OM1 11/14, and POBA to  of mLAD 11/27/2023), ICM/HFrEF (EF 35%), GERD, and hx of tobacco presenting to the ED with CP and cough,  found to have hMPV    #1 Chest Pain  Presentation: URI, hMPV  -HStrops: 44-->29-->43  -Obtain TTE  - Continue home anti-platelet ASA and Brilinta   - Continue remainder of home meds at home doses: toprol 200 mg, lipitor 40 mg, entresto 24-26,farxiga, zetia 10 mg, vascepa (if not available in-patient to continue on dc)  -D/c on Imdur 30 XL in addition to home meds   - Will need close int. pamela f/u  -Appreciate HF recs    Miles Andrew, Cardiology Fellow, F-2    For all New Consults and Questions:  [email or web address]   Login: [email or web address]    *** Note not final until signed by attending           52 yo man with history of DM, HTN, HLD, CAD s/p CABG (LIMA-LAD, SVG-rPL, SVG-OM1, SVG-D1 12/2022) & PCI (CHERRI to OM1 c/b IST s/p CHERRI and POBA-LIMA 4/2023, POBA to OM1 11/14, and POBA to  of mLAD 11/27/2023), ICM/HFrEF (EF 35%), GERD, and hx of tobacco presenting to the ED with CP and cough,  found to have hMPV    #1 Chest Pain  Presentation: URI, hMPV  -HStrops: 44-->29-->43  -Obtain TTE  - Continue home anti-platelet ASA and Brilinta   - Continue remainder of home meds at home doses: toprol 200 mg, lipitor 40 mg, entresto 24-26,farxiga, zetia 10 mg, vascepa (if not available in-patient to continue on dc)  -D/c on Imdur 30 XL in addition to home meds   - Will need close int. pamela f/u  -Appreciate HF recs    Miles Andrew, Cardiology Fellow, F-2    For all New Consults and Questions:  [email or web address]   Login: [email or web address]    *** Note not final until signed by attending

## 2023-12-25 NOTE — DISCHARGE NOTE PROVIDER - ATTENDING DISCHARGE PHYSICAL EXAMINATION:
Patient says he feels well, wishes to go home. Tolerating the imdur and spironolactone well. No dizziness/lightheadedness. Tested positive for human metapneumovirus, counseled him that he will start to feel better within a few days, it is a strain of virus that causes the common cold. No chest pain currently, though has some pain only on palpation over his left chest, likely musculoskeletal in nature. He has been walking around in the room and in the hallways without symptoms. He knows he should follow up with heart failure clinic within the next week.    CONSTITUTIONAL: Well-groomed, in no apparent distress, well-appearing  EYES: No conjunctival or scleral injection, non-icteric; PERRLA and symmetric  ENMT: No external nasal lesions; no pharyngeal injection or exudates, oral mucosa with moist membranes  NECK: Trachea midline without palpable neck mass; thyroid not enlarged and non-tender  RESPIRATORY: Breathing comfortably; lungs CTA without wheeze/rhonchi/rales  CARDIOVASCULAR: +S1S2, RRR, no M/G/R; pedal pulses full and symmetric; no lower extremity edema  GASTROINTESTINAL: No palpable masses or tenderness, +BS throughout, no rebound/guarding  MUSCULOSKELETAL: no digital clubbing or cyanosis; no paraspinal tenderness; normal strength and tone of extremities  SKIN: No rashes or ulcers noted; no subcutaneous nodules or induration palpable  NEUROLOGIC: CN II-XII intact; sensation intact in LEs b/l to light touch  PSYCHIATRIC: A+O x 3; mood and affect appropriate; appropriate insight and judgment

## 2023-12-25 NOTE — DISCHARGE NOTE PROVIDER - PROVIDER TOKENS
PROVIDER:[TOKEN:[[id number]],FOLLOWUP:[1 week]] PROVIDER:[TOKEN:[[id number]],FOLLOWUP:[1 week]]

## 2023-12-25 NOTE — CHART NOTE - NSCHARTNOTEFT_GEN_A_CORE
Medicine NP Episodic Note    Notified by RN overnight, pt. c/o cough.  Pt. seen and examined at bedside, A+Ox3, in NAD.  Pt. endorsed c/o non-productive cough x 4 days, along w/ runny nose and teary eyes.  Denies any other significant complaints.  No SOB, fever, diaphoresis or chills.  Report sick contact prior to admission, kids tested positive for influenza.        Vital Signs Last 24 Hrs  T(C): 36.4 (25 Dec 2023 04:38), Max: 36.7 (24 Dec 2023 12:00)  T(F): 97.5 (25 Dec 2023 04:38), Max: 98 (24 Dec 2023 12:00)  HR: 87 (25 Dec 2023 04:38) (86 - 103)  BP: 99/63 (25 Dec 2023 04:38) (99/63 - 112/78)  BP(mean): 90 (24 Dec 2023 12:00) (90 - 90)  RR: 18 (25 Dec 2023 04:38) (18 - 18)  SpO2: 94% (25 Dec 2023 04:38) (94% - 99%)    Parameters below as of 25 Dec 2023 04:38  Patient On (Oxygen Delivery Method): room air      Labs:                        15.5   6.79  )-----------( 225      ( 23 Dec 2023 22:12 )             47.6     12-23    136  |  98  |  17  ----------------------------<  107<H>  4.4   |  24  |  1.00    Ca    10.4      23 Dec 2023 22:12    TPro  7.8  /  Alb  4.6  /  TBili  0.7  /  DBili  x   /  AST  22  /  ALT  16  /  AlkPhos  56  12-23      Radiology:  < from: Xray Chest 1 View- PORTABLE-Urgent (12.23.23 @ 22:27) >  IMPRESSION:  Clear lungs.  < end of copied text >      MEDICATIONS  (STANDING):  aspirin enteric coated 81 milliGRAM(s) Oral daily  atorvastatin 40 milliGRAM(s) Oral at bedtime  dextrose 5%. 1000 milliLiter(s) (50 mL/Hr) IV Continuous <Continuous>  dextrose 5%. 1000 milliLiter(s) (100 mL/Hr) IV Continuous <Continuous>  dextrose 50% Injectable 25 Gram(s) IV Push once  dextrose 50% Injectable 25 Gram(s) IV Push once  dextrose 50% Injectable 12.5 Gram(s) IV Push once  glucagon  Injectable 1 milliGRAM(s) IntraMuscular once  insulin glargine Injectable (LANTUS) 5 Unit(s) SubCutaneous at bedtime  insulin lispro (ADMELOG) corrective regimen sliding scale   SubCutaneous at bedtime  insulin lispro (ADMELOG) corrective regimen sliding scale   SubCutaneous three times a day before meals  isosorbide   mononitrate ER Tablet (IMDUR) 30 milliGRAM(s) Oral daily  metoprolol succinate  milliGRAM(s) Oral daily  nitroglycerin     SubLingual 0.4 milliGRAM(s) SubLingual once  pantoprazole    Tablet 40 milliGRAM(s) Oral before breakfast  sacubitril 24 mG/valsartan 26 mG 1 Tablet(s) Oral two times a day  spironolactone 12.5 milliGRAM(s) Oral daily  spironolactone 12.5 milliGRAM(s) Oral once  tamsulosin 0.4 milliGRAM(s) Oral at bedtime  ticagrelor 90 milliGRAM(s) Oral every 12 hours    MEDICATIONS  (PRN):  acetaminophen     Tablet .. 650 milliGRAM(s) Oral every 6 hours PRN Temp greater or equal to 38C (100.4F), Mild Pain (1 - 3)  dextrose Oral Gel 15 Gram(s) Oral once PRN Blood Glucose LESS THAN 70 milliGRAM(s)/deciliter  guaiFENesin Oral Liquid (Sugar-Free) 100 milliGRAM(s) Oral every 6 hours PRN Cough      Physical Exam:  General: WN/WD NAD  Neurology: A&Ox3, nonfocal, BLANTON x 4  Head:  Normocephalic, atraumatic  Respiratory: CTA B/L  CV: RRR, S1S2, no murmur  Abdominal: Soft, NT, ND no palpable mass  MSK: No edema, + peripheral pulses, FROM all 4 extremity    Assessment & Plan:  HPI:  52 yo man with history of DM, HTN, HLD, CAD s/p CABG (LIMA-LAD, SVG-rPL, SVG-OM1, SVG-D1 12/2022) & PCI (CHERRI to OM1 c/b IST s/p CHERRI and POBA-LIMA 4/2023, POBA to OM1 11/14, and POBA to  of mLAD 11/27/2023), ICM/HFrEF (EF 35%), GERD, and hx of tobacco presenting to the ED w/ CP.  Now p/w non-productive cough, runny nose and teary eyes.    # Concern for Viral Illness   > RVP w/ COVID  > Robitussin PRN for cough   > Monitor resp. status   > Symptomatic management     Will endorse to primary team in am.  Attending to follow.    Ashley Langston, VINITA-BC  [phone] Medicine NP Episodic Note    Notified by RN overnight, pt. c/o cough.  Pt. seen and examined at bedside, A+Ox3, in NAD.  Pt. endorsed c/o non-productive cough x 4 days, along w/ runny nose and teary eyes.  Denies any other significant complaints.  No SOB, fever, diaphoresis or chills.  Report sick contact prior to admission, kids tested positive for influenza.        Vital Signs Last 24 Hrs  T(C): 36.4 (25 Dec 2023 04:38), Max: 36.7 (24 Dec 2023 12:00)  T(F): 97.5 (25 Dec 2023 04:38), Max: 98 (24 Dec 2023 12:00)  HR: 87 (25 Dec 2023 04:38) (86 - 103)  BP: 99/63 (25 Dec 2023 04:38) (99/63 - 112/78)  BP(mean): 90 (24 Dec 2023 12:00) (90 - 90)  RR: 18 (25 Dec 2023 04:38) (18 - 18)  SpO2: 94% (25 Dec 2023 04:38) (94% - 99%)    Parameters below as of 25 Dec 2023 04:38  Patient On (Oxygen Delivery Method): room air      Labs:                        15.5   6.79  )-----------( 225      ( 23 Dec 2023 22:12 )             47.6     12-23    136  |  98  |  17  ----------------------------<  107<H>  4.4   |  24  |  1.00    Ca    10.4      23 Dec 2023 22:12    TPro  7.8  /  Alb  4.6  /  TBili  0.7  /  DBili  x   /  AST  22  /  ALT  16  /  AlkPhos  56  12-23      Radiology:  < from: Xray Chest 1 View- PORTABLE-Urgent (12.23.23 @ 22:27) >  IMPRESSION:  Clear lungs.  < end of copied text >      MEDICATIONS  (STANDING):  aspirin enteric coated 81 milliGRAM(s) Oral daily  atorvastatin 40 milliGRAM(s) Oral at bedtime  dextrose 5%. 1000 milliLiter(s) (50 mL/Hr) IV Continuous <Continuous>  dextrose 5%. 1000 milliLiter(s) (100 mL/Hr) IV Continuous <Continuous>  dextrose 50% Injectable 25 Gram(s) IV Push once  dextrose 50% Injectable 25 Gram(s) IV Push once  dextrose 50% Injectable 12.5 Gram(s) IV Push once  glucagon  Injectable 1 milliGRAM(s) IntraMuscular once  insulin glargine Injectable (LANTUS) 5 Unit(s) SubCutaneous at bedtime  insulin lispro (ADMELOG) corrective regimen sliding scale   SubCutaneous at bedtime  insulin lispro (ADMELOG) corrective regimen sliding scale   SubCutaneous three times a day before meals  isosorbide   mononitrate ER Tablet (IMDUR) 30 milliGRAM(s) Oral daily  metoprolol succinate  milliGRAM(s) Oral daily  nitroglycerin     SubLingual 0.4 milliGRAM(s) SubLingual once  pantoprazole    Tablet 40 milliGRAM(s) Oral before breakfast  sacubitril 24 mG/valsartan 26 mG 1 Tablet(s) Oral two times a day  spironolactone 12.5 milliGRAM(s) Oral daily  spironolactone 12.5 milliGRAM(s) Oral once  tamsulosin 0.4 milliGRAM(s) Oral at bedtime  ticagrelor 90 milliGRAM(s) Oral every 12 hours    MEDICATIONS  (PRN):  acetaminophen     Tablet .. 650 milliGRAM(s) Oral every 6 hours PRN Temp greater or equal to 38C (100.4F), Mild Pain (1 - 3)  dextrose Oral Gel 15 Gram(s) Oral once PRN Blood Glucose LESS THAN 70 milliGRAM(s)/deciliter  guaiFENesin Oral Liquid (Sugar-Free) 100 milliGRAM(s) Oral every 6 hours PRN Cough      Physical Exam:  General: WN/WD NAD  Neurology: A&Ox3, nonfocal, BLANTON x 4  Head:  Normocephalic, atraumatic  Respiratory: CTA B/L  CV: RRR, S1S2, no murmur  Abdominal: Soft, NT, ND no palpable mass  MSK: No edema, + peripheral pulses, FROM all 4 extremity    Assessment & Plan:  HPI:  52 yo man with history of DM, HTN, HLD, CAD s/p CABG (LIMA-LAD, SVG-rPL, SVG-OM1, SVG-D1 12/2022) & PCI (CHERRI to OM1 c/b IST s/p CHERRI and POBA-LIMA 4/2023, POBA to OM1 11/14, and POBA to  of mLAD 11/27/2023), ICM/HFrEF (EF 35%), GERD, and hx of tobacco presenting to the ED w/ CP.  Now p/w non-productive cough, runny nose and teary eyes.    # Concern for Viral Illness   > RVP w/ COVID  > Robitussin PRN for cough   > Monitor resp. status   > Symptomatic management     Will endorse to primary team in am.  Attending to follow.    Ashley Langston, VINITA-BC  [phone] Medicine NP Episodic Note    Notified by RN overnight, pt. c/o cough.  Pt. seen and examined at bedside, A+Ox3, in NAD.  Pt. endorsed c/o non-productive cough x 4 days, along w/ runny nose and teary eyes.  Denies any other significant complaints.  No SOB, fever, diaphoresis or chills.  Report sick contact prior to admission, kids tested positive for influenza.        Vital Signs Last 24 Hrs  T(C): 36.4 (25 Dec 2023 04:38), Max: 36.7 (24 Dec 2023 12:00)  T(F): 97.5 (25 Dec 2023 04:38), Max: 98 (24 Dec 2023 12:00)  HR: 87 (25 Dec 2023 04:38) (86 - 103)  BP: 99/63 (25 Dec 2023 04:38) (99/63 - 112/78)  BP(mean): 90 (24 Dec 2023 12:00) (90 - 90)  RR: 18 (25 Dec 2023 04:38) (18 - 18)  SpO2: 94% (25 Dec 2023 04:38) (94% - 99%)    Parameters below as of 25 Dec 2023 04:38  Patient On (Oxygen Delivery Method): room air      Labs:                        15.5   6.79  )-----------( 225      ( 23 Dec 2023 22:12 )             47.6     12-23    136  |  98  |  17  ----------------------------<  107<H>  4.4   |  24  |  1.00    Ca    10.4      23 Dec 2023 22:12    TPro  7.8  /  Alb  4.6  /  TBili  0.7  /  DBili  x   /  AST  22  /  ALT  16  /  AlkPhos  56  12-23      Radiology:  < from: Xray Chest 1 View- PORTABLE-Urgent (12.23.23 @ 22:27) >  IMPRESSION:  Clear lungs.  < end of copied text >      MEDICATIONS  (STANDING):  aspirin enteric coated 81 milliGRAM(s) Oral daily  atorvastatin 40 milliGRAM(s) Oral at bedtime  dextrose 5%. 1000 milliLiter(s) (50 mL/Hr) IV Continuous <Continuous>  dextrose 5%. 1000 milliLiter(s) (100 mL/Hr) IV Continuous <Continuous>  dextrose 50% Injectable 25 Gram(s) IV Push once  dextrose 50% Injectable 25 Gram(s) IV Push once  dextrose 50% Injectable 12.5 Gram(s) IV Push once  glucagon  Injectable 1 milliGRAM(s) IntraMuscular once  insulin glargine Injectable (LANTUS) 5 Unit(s) SubCutaneous at bedtime  insulin lispro (ADMELOG) corrective regimen sliding scale   SubCutaneous at bedtime  insulin lispro (ADMELOG) corrective regimen sliding scale   SubCutaneous three times a day before meals  isosorbide   mononitrate ER Tablet (IMDUR) 30 milliGRAM(s) Oral daily  metoprolol succinate  milliGRAM(s) Oral daily  nitroglycerin     SubLingual 0.4 milliGRAM(s) SubLingual once  pantoprazole    Tablet 40 milliGRAM(s) Oral before breakfast  sacubitril 24 mG/valsartan 26 mG 1 Tablet(s) Oral two times a day  spironolactone 12.5 milliGRAM(s) Oral daily  spironolactone 12.5 milliGRAM(s) Oral once  tamsulosin 0.4 milliGRAM(s) Oral at bedtime  ticagrelor 90 milliGRAM(s) Oral every 12 hours    MEDICATIONS  (PRN):  acetaminophen     Tablet .. 650 milliGRAM(s) Oral every 6 hours PRN Temp greater or equal to 38C (100.4F), Mild Pain (1 - 3)  dextrose Oral Gel 15 Gram(s) Oral once PRN Blood Glucose LESS THAN 70 milliGRAM(s)/deciliter  guaiFENesin Oral Liquid (Sugar-Free) 100 milliGRAM(s) Oral every 6 hours PRN Cough      Physical Exam:  General: WN/WD NAD  Neurology: A&Ox3, nonfocal, BLANTON x 4  Head:  Normocephalic, atraumatic  Respiratory: CTA B/L  CV: RRR, S1S2  Abdominal: Soft, NT, ND no palpable mass  MSK: No edema, + peripheral pulses, FROM all 4 extremity    Assessment & Plan:  HPI:  53M w/ PMHx of DM, HTN, HLD, CAD s/p CABG (LIMA-LAD, SVG-rPL, SVG-OM1, SVG-D1 12/2022) & PCI (CHERRI to OM1 c/b IST s/p CHERRI and POBA-LIMA 4/2023, POBA to OM1 11/14, and POBA to  of mLAD 11/27/2023), ICM/HFrEF (EF 35%), GERD, and h/o tobacco presenting to the ED w/ CP.  Now p/w non-productive cough, runny nose and teary eyes.    # Concern for Viral Illness   > RVP w/ COVID  > Robitussin PRN for cough   > Monitor resp. status   > Symptomatic management     Will endorse to primary team in am.  Attending to follow.    Ashley Langston, VINITA-BC  [phone] Medicine NP Episodic Note    Notified by RN overnight, pt. c/o cough.  Pt. seen and examined at bedside, A+Ox3, in NAD.  Pt. endorsed c/o non-productive cough x 4 days, along w/ runny nose and teary eyes.  Denies any other significant complaints.  No SOB, fever, diaphoresis or chills.  Report sick contact prior to admission, kids tested positive for influenza.        Vital Signs Last 24 Hrs  T(C): 36.4 (25 Dec 2023 04:38), Max: 36.7 (24 Dec 2023 12:00)  T(F): 97.5 (25 Dec 2023 04:38), Max: 98 (24 Dec 2023 12:00)  HR: 87 (25 Dec 2023 04:38) (86 - 103)  BP: 99/63 (25 Dec 2023 04:38) (99/63 - 112/78)  BP(mean): 90 (24 Dec 2023 12:00) (90 - 90)  RR: 18 (25 Dec 2023 04:38) (18 - 18)  SpO2: 94% (25 Dec 2023 04:38) (94% - 99%)    Parameters below as of 25 Dec 2023 04:38  Patient On (Oxygen Delivery Method): room air      Labs:                        15.5   6.79  )-----------( 225      ( 23 Dec 2023 22:12 )             47.6     12-23    136  |  98  |  17  ----------------------------<  107<H>  4.4   |  24  |  1.00    Ca    10.4      23 Dec 2023 22:12    TPro  7.8  /  Alb  4.6  /  TBili  0.7  /  DBili  x   /  AST  22  /  ALT  16  /  AlkPhos  56  12-23      Radiology:  < from: Xray Chest 1 View- PORTABLE-Urgent (12.23.23 @ 22:27) >  IMPRESSION:  Clear lungs.  < end of copied text >      MEDICATIONS  (STANDING):  aspirin enteric coated 81 milliGRAM(s) Oral daily  atorvastatin 40 milliGRAM(s) Oral at bedtime  dextrose 5%. 1000 milliLiter(s) (50 mL/Hr) IV Continuous <Continuous>  dextrose 5%. 1000 milliLiter(s) (100 mL/Hr) IV Continuous <Continuous>  dextrose 50% Injectable 25 Gram(s) IV Push once  dextrose 50% Injectable 25 Gram(s) IV Push once  dextrose 50% Injectable 12.5 Gram(s) IV Push once  glucagon  Injectable 1 milliGRAM(s) IntraMuscular once  insulin glargine Injectable (LANTUS) 5 Unit(s) SubCutaneous at bedtime  insulin lispro (ADMELOG) corrective regimen sliding scale   SubCutaneous at bedtime  insulin lispro (ADMELOG) corrective regimen sliding scale   SubCutaneous three times a day before meals  isosorbide   mononitrate ER Tablet (IMDUR) 30 milliGRAM(s) Oral daily  metoprolol succinate  milliGRAM(s) Oral daily  nitroglycerin     SubLingual 0.4 milliGRAM(s) SubLingual once  pantoprazole    Tablet 40 milliGRAM(s) Oral before breakfast  sacubitril 24 mG/valsartan 26 mG 1 Tablet(s) Oral two times a day  spironolactone 12.5 milliGRAM(s) Oral daily  spironolactone 12.5 milliGRAM(s) Oral once  tamsulosin 0.4 milliGRAM(s) Oral at bedtime  ticagrelor 90 milliGRAM(s) Oral every 12 hours    MEDICATIONS  (PRN):  acetaminophen     Tablet .. 650 milliGRAM(s) Oral every 6 hours PRN Temp greater or equal to 38C (100.4F), Mild Pain (1 - 3)  dextrose Oral Gel 15 Gram(s) Oral once PRN Blood Glucose LESS THAN 70 milliGRAM(s)/deciliter  guaiFENesin Oral Liquid (Sugar-Free) 100 milliGRAM(s) Oral every 6 hours PRN Cough      Physical Exam:  General: WN/WD NAD  Neurology: A&Ox3, nonfocal, BLANTON x 4  Head:  Normocephalic, atraumatic  Respiratory: CTA B/L  CV: RRR, S1S2  Abdominal: Soft, NT, ND no palpable mass  MSK: No edema, + peripheral pulses, FROM all 4 extremity    Assessment & Plan:  HPI:  53M w/ PMHx of DM, HTN, HLD, CAD s/p CABG (LIMA-LAD, SVG-rPL, SVG-OM1, SVG-D1 12/2022) & PCI (CHERRI to OM1 c/b IST s/p CHERRI and POBA-LIMA 4/2023, POBA to OM1 11/14, and POBA to  of mLAD 11/27/2023), ICM/HFrEF (EF 35%), GERD, and h/o tobacco presenting to the ED w/ CP.  Now p/w non-productive cough, runny nose and teary eyes.    # Concern for Viral Illness   > RVP w/ COVID  > Robitussin PRN for cough   > Monitor resp. status   > Symptomatic management     Will endorse to primary team in am.  Attending to follow.    Ashley Langston, VINITA-BC  [phone]

## 2023-12-25 NOTE — DISCHARGE NOTE PROVIDER - CARE PROVIDER_API CALL
Figueroa Urbina  Internal Medicine  [address] [place] [address]  Phone: [phone]  Fax: [phone]  Follow Up Time: 1 week   Figueroa Urbina  Internal Medicine  [address] [place] [address]  Phone: [phone]  Fax: [phone]  Follow Up Time: 1 week

## 2023-12-25 NOTE — DISCHARGE NOTE NURSING/CASE MANAGEMENT/SOCIAL WORK - NSDCPEFALRISK_GEN_ALL_CORE
For information on Fall & Injury Prevention, visit: [email or web address] OR  [email or web address] OR  https://www.cdc.gov/steadi/patient.html For information on Fall & Injury Prevention, visit: [email or web address] OR  [email or web address] OR  https://www.cdc.gov/steadi/patient.html

## 2023-12-25 NOTE — PROGRESS NOTE ADULT - SUBJECTIVE AND OBJECTIVE BOX
Patient seen and examined at bedside.    Overnight Events: No acute events overnight.      REVIEW OF SYSTEMS:  Constitutional:     [ x] negative [ ] fevers [ ] chills [ ] weight loss [ ] weight gain  HEENT:                  [ x] negative [ ] dry eyes [ ] eye irritation [ ] postnasal drip [ ] nasal congestion  CV:                         [x ] negative  [ ] chest pain [ ] orthopnea [ ] palpitations [ ] murmur  Resp:                     [ x] negative [ ] cough [ ] shortness of breath [ ] dyspnea [ ] wheezing [ ] sputum [ ]hemoptysis  GI:                          [ x] negative [ ] nausea [ ] vomiting [ ] diarrhea [ ] constipation [ ] abd pain [ ] dysphagia   :                        [ x] negative [ ] dysuria [ ] nocturia [ ] hematuria [ ] increased urinary frequency  Musculoskeletal: [ x] negative [ ] back pain [ ] myalgias [ ] arthralgias [ ] fracture  Skin:                       [ x] negative [ ] rash [ ] itch  Neurological:        [ x] negative [ ] headache [ ] dizziness [ ] syncope [ ] weakness [ ] numbness  Psychiatric:           [ x] negative [ ] anxiety [ ] depression  Endocrine:            [ x] negative [ ] diabetes [ ] thyroid problem  Heme/Lymph:      [ x] negative [ ] anemia [ ] bleeding problem  Allergic/Immune: [x ] negative [ ] itchy eyes [ ] nasal discharge [ ] hives [ ] angioedema    [x ] All other systems negative  [ ] Unable to assess ROS due to    Current Meds:  acetaminophen     Tablet .. 650 milliGRAM(s) Oral every 6 hours PRN  aspirin enteric coated 81 milliGRAM(s) Oral daily  atorvastatin 40 milliGRAM(s) Oral at bedtime  dextrose 5%. 1000 milliLiter(s) IV Continuous <Continuous>  dextrose 5%. 1000 milliLiter(s) IV Continuous <Continuous>  dextrose 50% Injectable 25 Gram(s) IV Push once  dextrose 50% Injectable 12.5 Gram(s) IV Push once  dextrose 50% Injectable 25 Gram(s) IV Push once  dextrose Oral Gel 15 Gram(s) Oral once PRN  glucagon  Injectable 1 milliGRAM(s) IntraMuscular once  guaiFENesin Oral Liquid (Sugar-Free) 100 milliGRAM(s) Oral every 6 hours PRN  insulin glargine Injectable (LANTUS) 5 Unit(s) SubCutaneous at bedtime  insulin lispro (ADMELOG) corrective regimen sliding scale   SubCutaneous at bedtime  insulin lispro (ADMELOG) corrective regimen sliding scale   SubCutaneous three times a day before meals  isosorbide   mononitrate ER Tablet (IMDUR) 30 milliGRAM(s) Oral daily  metoprolol succinate  milliGRAM(s) Oral daily  nitroglycerin     SubLingual 0.4 milliGRAM(s) SubLingual once  pantoprazole    Tablet 40 milliGRAM(s) Oral before breakfast  sacubitril 24 mG/valsartan 26 mG 1 Tablet(s) Oral two times a day  spironolactone 12.5 milliGRAM(s) Oral daily  spironolactone 12.5 milliGRAM(s) Oral once  tamsulosin 0.4 milliGRAM(s) Oral at bedtime  ticagrelor 90 milliGRAM(s) Oral every 12 hours      PAST MEDICAL & SURGICAL HISTORY:  Hypertension      Diabetes mellitus      GERD (gastroesophageal reflux disease)      CAD (coronary artery disease)      S/P CABG (coronary artery bypass graft)          Vitals:  T(F): 97.5 (12-25), Max: 98 (12-24)  HR: 87 (12-25) (86 - 103)  BP: 99/63 (12-25) (99/63 - 112/78)  RR: 18 (12-25)  SpO2: 94% (12-25)  I&O's Summary      Physical Exam:  Appearance: No acute distress; well appearing  Eyes:  EOMI  HEENT: Normal oral mucosa  Cardiovascular: RRR, S1, S2, no murmurs, rubs, or gallops; no edema; no JVD  Respiratory: Clear to auscultation bilaterally  Gastrointestinal: soft, non-tender, non-distended  Extremities: no lower extremity edema   Neurologic: Non-focal  Psychiatry: AAOx3, mood & affect appropriate                            13.8   7.07  )-----------( 191      ( 25 Dec 2023 07:00 )             42.3     12-25    136  |  103  |  15  ----------------------------<  124<H>  3.7   |  23  |  0.86    Ca    8.8      25 Dec 2023 06:58    TPro  6.5  /  Alb  3.8  /  TBili  0.7  /  DBili  x   /  AST  15  /  ALT  10  /  AlkPhos  48  12-25

## 2023-12-25 NOTE — DISCHARGE NOTE PROVIDER - NSDCFUADDAPPT_GEN_ALL_CORE_FT
APPTS ARE READY TO BE MADE: [ ] YES    Best Family or Patient Contact (if needed):    Additional Information about above appointments (if needed):    1:  Please schedule an appointment with the [facility]         with Dr. Valdo Nielsen  in 1-3 days   2:   3:     Other comments or requests:

## 2023-12-26 ENCOUNTER — APPOINTMENT (OUTPATIENT)
Dept: CARDIOLOGY | Facility: CLINIC | Age: 53
End: 2023-12-26

## 2024-02-14 PROBLEM — I25.10 CORONARY ARTERIOSCLEROSIS: Status: ACTIVE | Noted: 2023-01-19

## 2024-02-14 PROBLEM — E78.2 COMBINED HYPERLIPIDEMIA: Status: ACTIVE | Noted: 2023-01-19

## 2024-02-16 ENCOUNTER — APPOINTMENT (OUTPATIENT)
Dept: CARDIOLOGY | Facility: CLINIC | Age: 54
End: 2024-02-16

## 2024-02-16 DIAGNOSIS — E78.2 MIXED HYPERLIPIDEMIA: ICD-10-CM

## 2024-02-16 DIAGNOSIS — I25.10 ATHEROSCLEROTIC HEART DISEASE OF NATIVE CORONARY ARTERY W/OUT ANGINA PECTORIS: ICD-10-CM

## 2024-04-22 NOTE — DIETITIAN INITIAL EVALUATION ADULT - NSFNSPHYEXAMSKINFT_GEN_A_CORE
Telephone conversation with the patient.  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  Discussed surgical resection as the best option.  I think it would be best for her to see one of our head and neck subspecialists so will place referral for Dr. Gutierrez or Samantha to see her about surgical removal.   No noted pressure injuries as per documentation. +Midsternal incision.

## 2024-05-15 ENCOUNTER — INPATIENT (INPATIENT)
Facility: HOSPITAL | Age: 54
LOS: 1 days | Discharge: ANOTHER HOSPITAL | End: 2024-05-17
Attending: INTERNAL MEDICINE | Admitting: INTERNAL MEDICINE
Payer: MEDICAID

## 2024-05-15 VITALS — HEIGHT: 69 IN | WEIGHT: 147.93 LBS

## 2024-05-15 DIAGNOSIS — Z95.1 PRESENCE OF AORTOCORONARY BYPASS GRAFT: Chronic | ICD-10-CM

## 2024-05-15 LAB
ALBUMIN SERPL ELPH-MCNC: 4.4 G/DL — SIGNIFICANT CHANGE UP (ref 3.3–5)
ALP SERPL-CCNC: 119 U/L — SIGNIFICANT CHANGE UP (ref 40–120)
ALT FLD-CCNC: 112 U/L — HIGH (ref 12–78)
ANION GAP SERPL CALC-SCNC: 12 MMOL/L — SIGNIFICANT CHANGE UP (ref 5–17)
AST SERPL-CCNC: 176 U/L — HIGH (ref 15–37)
BASOPHILS # BLD AUTO: 0.05 K/UL — SIGNIFICANT CHANGE UP (ref 0–0.2)
BASOPHILS NFR BLD AUTO: 0.4 % — SIGNIFICANT CHANGE UP (ref 0–2)
BILIRUB SERPL-MCNC: 2.2 MG/DL — HIGH (ref 0.2–1.2)
BUN SERPL-MCNC: 12 MG/DL — SIGNIFICANT CHANGE UP (ref 7–23)
CALCIUM SERPL-MCNC: 10 MG/DL — SIGNIFICANT CHANGE UP (ref 8.5–10.1)
CHLORIDE SERPL-SCNC: 99 MMOL/L — SIGNIFICANT CHANGE UP (ref 96–108)
CO2 SERPL-SCNC: 24 MMOL/L — SIGNIFICANT CHANGE UP (ref 22–31)
CREAT SERPL-MCNC: 1.13 MG/DL — SIGNIFICANT CHANGE UP (ref 0.5–1.3)
EGFR: 78 ML/MIN/1.73M2 — SIGNIFICANT CHANGE UP
EOSINOPHIL # BLD AUTO: 0.03 K/UL — SIGNIFICANT CHANGE UP (ref 0–0.5)
EOSINOPHIL NFR BLD AUTO: 0.3 % — SIGNIFICANT CHANGE UP (ref 0–6)
GLUCOSE SERPL-MCNC: 170 MG/DL — HIGH (ref 70–99)
HCT VFR BLD CALC: 48.3 % — SIGNIFICANT CHANGE UP (ref 39–50)
HGB BLD-MCNC: 15.6 G/DL — SIGNIFICANT CHANGE UP (ref 13–17)
IMM GRANULOCYTES NFR BLD AUTO: 0.3 % — SIGNIFICANT CHANGE UP (ref 0–0.9)
LACTATE SERPL-SCNC: 3.6 MMOL/L — HIGH (ref 0.7–2)
LIDOCAIN IGE QN: 42 U/L — SIGNIFICANT CHANGE UP (ref 13–75)
LYMPHOCYTES # BLD AUTO: 1.25 K/UL — SIGNIFICANT CHANGE UP (ref 1–3.3)
LYMPHOCYTES # BLD AUTO: 11.2 % — LOW (ref 13–44)
MAGNESIUM SERPL-MCNC: 2.4 MG/DL — SIGNIFICANT CHANGE UP (ref 1.6–2.6)
MCHC RBC-ENTMCNC: 27.3 PG — SIGNIFICANT CHANGE UP (ref 27–34)
MCHC RBC-ENTMCNC: 32.3 G/DL — SIGNIFICANT CHANGE UP (ref 32–36)
MCV RBC AUTO: 84.4 FL — SIGNIFICANT CHANGE UP (ref 80–100)
MONOCYTES # BLD AUTO: 0.6 K/UL — SIGNIFICANT CHANGE UP (ref 0–0.9)
MONOCYTES NFR BLD AUTO: 5.4 % — SIGNIFICANT CHANGE UP (ref 2–14)
NEUTROPHILS # BLD AUTO: 9.22 K/UL — HIGH (ref 1.8–7.4)
NEUTROPHILS NFR BLD AUTO: 82.4 % — HIGH (ref 43–77)
NRBC # BLD: 0 /100 WBCS — SIGNIFICANT CHANGE UP (ref 0–0)
PLATELET # BLD AUTO: 323 K/UL — SIGNIFICANT CHANGE UP (ref 150–400)
POTASSIUM SERPL-MCNC: 5 MMOL/L — SIGNIFICANT CHANGE UP (ref 3.5–5.3)
POTASSIUM SERPL-SCNC: 5 MMOL/L — SIGNIFICANT CHANGE UP (ref 3.5–5.3)
PROT SERPL-MCNC: 9.2 GM/DL — HIGH (ref 6–8.3)
RBC # BLD: 5.72 M/UL — SIGNIFICANT CHANGE UP (ref 4.2–5.8)
RBC # FLD: 14 % — SIGNIFICANT CHANGE UP (ref 10.3–14.5)
SODIUM SERPL-SCNC: 135 MMOL/L — SIGNIFICANT CHANGE UP (ref 135–145)
TROPONIN I, HIGH SENSITIVITY RESULT: 68.2 NG/L — SIGNIFICANT CHANGE UP
WBC # BLD: 11.18 K/UL — HIGH (ref 3.8–10.5)
WBC # FLD AUTO: 11.18 K/UL — HIGH (ref 3.8–10.5)

## 2024-05-15 PROCEDURE — 71045 X-RAY EXAM CHEST 1 VIEW: CPT | Mod: 26

## 2024-05-15 PROCEDURE — 99285 EMERGENCY DEPT VISIT HI MDM: CPT

## 2024-05-15 PROCEDURE — 93010 ELECTROCARDIOGRAM REPORT: CPT

## 2024-05-15 PROCEDURE — 74177 CT ABD & PELVIS W/CONTRAST: CPT | Mod: 26,MC

## 2024-05-15 RX ORDER — ONDANSETRON 8 MG/1
4 TABLET, FILM COATED ORAL ONCE
Refills: 0 | Status: COMPLETED | OUTPATIENT
Start: 2024-05-15 | End: 2024-05-15

## 2024-05-15 RX ORDER — MORPHINE SULFATE 50 MG/1
4 CAPSULE, EXTENDED RELEASE ORAL ONCE
Refills: 0 | Status: DISCONTINUED | OUTPATIENT
Start: 2024-05-15 | End: 2024-05-15

## 2024-05-15 RX ORDER — FAMOTIDINE 10 MG/ML
20 INJECTION INTRAVENOUS ONCE
Refills: 0 | Status: COMPLETED | OUTPATIENT
Start: 2024-05-15 | End: 2024-05-15

## 2024-05-15 RX ORDER — PIPERACILLIN AND TAZOBACTAM 4; .5 G/20ML; G/20ML
3.38 INJECTION, POWDER, LYOPHILIZED, FOR SOLUTION INTRAVENOUS ONCE
Refills: 0 | Status: COMPLETED | OUTPATIENT
Start: 2024-05-15 | End: 2024-05-15

## 2024-05-15 RX ORDER — FUROSEMIDE 40 MG
20 TABLET ORAL ONCE
Refills: 0 | Status: COMPLETED | OUTPATIENT
Start: 2024-05-15 | End: 2024-05-15

## 2024-05-15 RX ORDER — SUCRALFATE 1 G
1 TABLET ORAL ONCE
Refills: 0 | Status: COMPLETED | OUTPATIENT
Start: 2024-05-15 | End: 2024-05-15

## 2024-05-15 RX ORDER — ASPIRIN/CALCIUM CARB/MAGNESIUM 324 MG
162 TABLET ORAL ONCE
Refills: 0 | Status: COMPLETED | OUTPATIENT
Start: 2024-05-15 | End: 2024-05-15

## 2024-05-15 RX ORDER — SODIUM CHLORIDE 9 MG/ML
1000 INJECTION, SOLUTION INTRAVENOUS ONCE
Refills: 0 | Status: COMPLETED | OUTPATIENT
Start: 2024-05-15 | End: 2024-05-15

## 2024-05-15 RX ORDER — ACETAMINOPHEN 500 MG
1000 TABLET ORAL ONCE
Refills: 0 | Status: COMPLETED | OUTPATIENT
Start: 2024-05-15 | End: 2024-05-15

## 2024-05-15 RX ADMIN — Medication 1 GRAM(S): at 22:28

## 2024-05-15 RX ADMIN — MORPHINE SULFATE 4 MILLIGRAM(S): 50 CAPSULE, EXTENDED RELEASE ORAL at 22:30

## 2024-05-15 RX ADMIN — ONDANSETRON 4 MILLIGRAM(S): 8 TABLET, FILM COATED ORAL at 21:10

## 2024-05-15 RX ADMIN — Medication 400 MILLIGRAM(S): at 22:28

## 2024-05-15 RX ADMIN — SODIUM CHLORIDE 1000 MILLILITER(S): 9 INJECTION, SOLUTION INTRAVENOUS at 21:10

## 2024-05-15 RX ADMIN — MORPHINE SULFATE 4 MILLIGRAM(S): 50 CAPSULE, EXTENDED RELEASE ORAL at 21:10

## 2024-05-15 RX ADMIN — FAMOTIDINE 20 MILLIGRAM(S): 10 INJECTION INTRAVENOUS at 21:11

## 2024-05-15 NOTE — ED PROVIDER NOTE - OBJECTIVE STATEMENT
53M pmhx HTN, DM, CAD sp CABG & stents 12/2022, POBA to OM 11/14/23 and to mLAD 11/27/23, ICM/ CHF, GERD, hx tobacco use who presents with diffuse abd pain, bloating, intermittent vomiting, decreased po tolerance , intermittent x 4 days, with decreased po intake with decreased BM and constipation and decreased flatus x 2 days. Pt reports he was prescribed laxatives by outpt physician without relief, meds included polyethylene glycol. pt denies fevers, chest pain or sob or difficulty urinating/ dysuria.

## 2024-05-15 NOTE — ED PROVIDER NOTE - CLINICAL SUMMARY MEDICAL DECISION MAKING FREE TEXT BOX
53M pmhx HTN, DM, CAD sp CABG & stents 12/2022, POBA to OM 11/14/23 and to mLAD 11/27/23, ICM/ CHF, GERD, hx tobacco use who presents with diffuse abd pain, bloating, intermittent vomiting, decreased po tolerance , intermittent x 4 days, with decreased po intake with decreased BM and constipation and decreased flatus x 2 days. Pt reports he was prescribed laxatives by outpt physician without relief, meds included polyethylene glycol. pt denies fevers, chest pain or sob or difficulty urinating/ dysuria.  - gentle fluids in setting of hx chf but pt appears hypovolemic with emesis, concern for SBO/ obstruction, eval labs for metabolic derangements or pancreatitis, analgesia, antacids, reassess. check cardiac enzymes rule out atypical ACS presentation as noted std on ekg but when compared to prior ekg on file, similar. and abd pain is reproducible.

## 2024-05-15 NOTE — ED ADULT NURSE NOTE - NSFALLUNIVINTERV_ED_ALL_ED
Bed/Stretcher in lowest position, wheels locked, appropriate side rails in place/Call bell, personal items and telephone in reach/Instruct patient to call for assistance before getting out of bed/chair/stretcher/Non-slip footwear applied when patient is off stretcher/[place] to call system/Physically safe environment - no spills, clutter or unnecessary equipment/Purposeful proactive rounding/Room/bathroom lighting operational, light cord in reach

## 2024-05-15 NOTE — ED PROVIDER NOTE - PROGRESS NOTE DETAILS
Bang DO: pt returned from CT scan with hypoxia O2 sat to 80s on room air, placed on nonrebreather, pt denies any shortness of breath, states he noticed some chest discomfort while on the CT scanner which resolved, on assessment, scant wheeze, suspect flash pulm edema though pt appears very comfortable, dose lasix, re-eval, continue oxygen supplementation, reassess Bang DO: pt weaned to 2-3L NC sat 98% with normal work of breathing prior to lasix administration, stable, attempted to reach GI - left message. surgery saw pt, full consult pending - PA to speak w/ surgeon and get back to us will admit to medicine as per surgery rec, surgery will follow, recommend mrcp in AM to evaluate for choledocolithiasis  pt. stable and comfortable, improved, asking for tea to drink  will admit

## 2024-05-15 NOTE — ED PROVIDER NOTE - PHYSICAL EXAMINATION
Gen: aox3, nad,   Head: NCAT  ENT: Airway patent, moist mucous membranes, nasal passageways clear   Cardiac: Normal rate, normal rhythm   Respiratory: Lungs CTA B/L  Gastrointestinal: Abdomen mod ttp upper abd, moderately distended, no rebound, no guarding  MSK: No gross abnormalities, FROM of all four extremities, no edema  HEME: Extremities warm and well perfused   Skin: No rashes, no lesions  Neuro: No gross neurologic deficits

## 2024-05-16 DIAGNOSIS — E11.9 TYPE 2 DIABETES MELLITUS WITHOUT COMPLICATIONS: ICD-10-CM

## 2024-05-16 DIAGNOSIS — Z95.1 PRESENCE OF AORTOCORONARY BYPASS GRAFT: ICD-10-CM

## 2024-05-16 DIAGNOSIS — K80.50 CALCULUS OF BILE DUCT WITHOUT CHOLANGITIS OR CHOLECYSTITIS WITHOUT OBSTRUCTION: ICD-10-CM

## 2024-05-16 DIAGNOSIS — I10 ESSENTIAL (PRIMARY) HYPERTENSION: ICD-10-CM

## 2024-05-16 DIAGNOSIS — I50.9 HEART FAILURE, UNSPECIFIED: ICD-10-CM

## 2024-05-16 LAB
ALBUMIN SERPL ELPH-MCNC: 3.6 G/DL — SIGNIFICANT CHANGE UP (ref 3.3–5)
ALP SERPL-CCNC: 201 U/L — HIGH (ref 40–120)
ALT FLD-CCNC: 820 U/L — HIGH (ref 12–78)
ANION GAP SERPL CALC-SCNC: 12 MMOL/L — SIGNIFICANT CHANGE UP (ref 5–17)
APTT BLD: 34.7 SEC — SIGNIFICANT CHANGE UP (ref 24.5–35.6)
AST SERPL-CCNC: 804 U/L — HIGH (ref 15–37)
BILIRUB SERPL-MCNC: 5 MG/DL — HIGH (ref 0.2–1.2)
BLD GP AB SCN SERPL QL: SIGNIFICANT CHANGE UP
BUN SERPL-MCNC: 12 MG/DL — SIGNIFICANT CHANGE UP (ref 7–23)
CALCIUM SERPL-MCNC: 9.2 MG/DL — SIGNIFICANT CHANGE UP (ref 8.5–10.1)
CHLORIDE SERPL-SCNC: 98 MMOL/L — SIGNIFICANT CHANGE UP (ref 96–108)
CO2 SERPL-SCNC: 24 MMOL/L — SIGNIFICANT CHANGE UP (ref 22–31)
CREAT SERPL-MCNC: 0.92 MG/DL — SIGNIFICANT CHANGE UP (ref 0.5–1.3)
EGFR: 100 ML/MIN/1.73M2 — SIGNIFICANT CHANGE UP
GLUCOSE SERPL-MCNC: 133 MG/DL — HIGH (ref 70–99)
HCT VFR BLD CALC: 44.4 % — SIGNIFICANT CHANGE UP (ref 39–50)
HGB BLD-MCNC: 14.7 G/DL — SIGNIFICANT CHANGE UP (ref 13–17)
INR BLD: 1.12 RATIO — SIGNIFICANT CHANGE UP (ref 0.85–1.18)
LACTATE SERPL-SCNC: 2 MMOL/L — SIGNIFICANT CHANGE UP (ref 0.7–2)
MCHC RBC-ENTMCNC: 27.3 PG — SIGNIFICANT CHANGE UP (ref 27–34)
MCHC RBC-ENTMCNC: 33.1 G/DL — SIGNIFICANT CHANGE UP (ref 32–36)
MCV RBC AUTO: 82.5 FL — SIGNIFICANT CHANGE UP (ref 80–100)
NRBC # BLD: 0 /100 WBCS — SIGNIFICANT CHANGE UP (ref 0–0)
NT-PROBNP SERPL-SCNC: 2313 PG/ML — HIGH (ref 0–125)
PLATELET # BLD AUTO: 295 K/UL — SIGNIFICANT CHANGE UP (ref 150–400)
POTASSIUM SERPL-MCNC: 4 MMOL/L — SIGNIFICANT CHANGE UP (ref 3.5–5.3)
POTASSIUM SERPL-SCNC: 4 MMOL/L — SIGNIFICANT CHANGE UP (ref 3.5–5.3)
PROT SERPL-MCNC: 8.1 GM/DL — SIGNIFICANT CHANGE UP (ref 6–8.3)
PROTHROM AB SERPL-ACNC: 13.3 SEC — HIGH (ref 9.5–13)
RBC # BLD: 5.38 M/UL — SIGNIFICANT CHANGE UP (ref 4.2–5.8)
RBC # FLD: 13.9 % — SIGNIFICANT CHANGE UP (ref 10.3–14.5)
SODIUM SERPL-SCNC: 134 MMOL/L — LOW (ref 135–145)
TROPONIN I, HIGH SENSITIVITY RESULT: 514.4 NG/L — HIGH
TROPONIN I, HIGH SENSITIVITY RESULT: 592.7 NG/L — HIGH
WBC # BLD: 9.48 K/UL — SIGNIFICANT CHANGE UP (ref 3.8–10.5)
WBC # FLD AUTO: 9.48 K/UL — SIGNIFICANT CHANGE UP (ref 3.8–10.5)

## 2024-05-16 PROCEDURE — 99222 1ST HOSP IP/OBS MODERATE 55: CPT

## 2024-05-16 PROCEDURE — 99223 1ST HOSP IP/OBS HIGH 75: CPT

## 2024-05-16 PROCEDURE — 76705 ECHO EXAM OF ABDOMEN: CPT | Mod: 26

## 2024-05-16 PROCEDURE — 74181 MRI ABDOMEN W/O CONTRAST: CPT | Mod: 26

## 2024-05-16 RX ORDER — ISOSORBIDE MONONITRATE 60 MG/1
30 TABLET, EXTENDED RELEASE ORAL DAILY
Refills: 0 | Status: DISCONTINUED | OUTPATIENT
Start: 2024-05-16 | End: 2024-05-17

## 2024-05-16 RX ORDER — MORPHINE SULFATE 50 MG/1
2 CAPSULE, EXTENDED RELEASE ORAL EVERY 6 HOURS
Refills: 0 | Status: DISCONTINUED | OUTPATIENT
Start: 2024-05-16 | End: 2024-05-16

## 2024-05-16 RX ORDER — HYDROMORPHONE HYDROCHLORIDE 2 MG/ML
1 INJECTION INTRAMUSCULAR; INTRAVENOUS; SUBCUTANEOUS EVERY 4 HOURS
Refills: 0 | Status: DISCONTINUED | OUTPATIENT
Start: 2024-05-16 | End: 2024-05-17

## 2024-05-16 RX ORDER — ACETAMINOPHEN 500 MG
650 TABLET ORAL EVERY 6 HOURS
Refills: 0 | Status: DISCONTINUED | OUTPATIENT
Start: 2024-05-16 | End: 2024-05-17

## 2024-05-16 RX ORDER — ASPIRIN/CALCIUM CARB/MAGNESIUM 324 MG
81 TABLET ORAL DAILY
Refills: 0 | Status: DISCONTINUED | OUTPATIENT
Start: 2024-05-16 | End: 2024-05-17

## 2024-05-16 RX ORDER — PANTOPRAZOLE SODIUM 20 MG/1
40 TABLET, DELAYED RELEASE ORAL
Refills: 0 | Status: DISCONTINUED | OUTPATIENT
Start: 2024-05-16 | End: 2024-05-17

## 2024-05-16 RX ORDER — ICOSAPENT ETHYL 500 MG/1
2 CAPSULE, LIQUID FILLED ORAL
Refills: 0 | DISCHARGE

## 2024-05-16 RX ORDER — TAMSULOSIN HYDROCHLORIDE 0.4 MG/1
0.4 CAPSULE ORAL AT BEDTIME
Refills: 0 | Status: DISCONTINUED | OUTPATIENT
Start: 2024-05-16 | End: 2024-05-17

## 2024-05-16 RX ORDER — METOPROLOL TARTRATE 50 MG
200 TABLET ORAL DAILY
Refills: 0 | Status: DISCONTINUED | OUTPATIENT
Start: 2024-05-16 | End: 2024-05-17

## 2024-05-16 RX ORDER — ONDANSETRON 8 MG/1
4 TABLET, FILM COATED ORAL EVERY 8 HOURS
Refills: 0 | Status: DISCONTINUED | OUTPATIENT
Start: 2024-05-16 | End: 2024-05-17

## 2024-05-16 RX ORDER — ATORVASTATIN CALCIUM 80 MG/1
40 TABLET, FILM COATED ORAL AT BEDTIME
Refills: 0 | Status: DISCONTINUED | OUTPATIENT
Start: 2024-05-16 | End: 2024-05-17

## 2024-05-16 RX ORDER — FUROSEMIDE 40 MG
20 TABLET ORAL ONCE
Refills: 0 | Status: COMPLETED | OUTPATIENT
Start: 2024-05-16 | End: 2024-05-16

## 2024-05-16 RX ORDER — SITAGLIPTIN AND METFORMIN HYDROCHLORIDE 500; 50 MG/1; MG/1
1 TABLET, FILM COATED ORAL
Qty: 0 | Refills: 0 | DISCHARGE

## 2024-05-16 RX ORDER — PIPERACILLIN AND TAZOBACTAM 4; .5 G/20ML; G/20ML
3.38 INJECTION, POWDER, LYOPHILIZED, FOR SOLUTION INTRAVENOUS EVERY 8 HOURS
Refills: 0 | Status: DISCONTINUED | OUTPATIENT
Start: 2024-05-16 | End: 2024-05-17

## 2024-05-16 RX ORDER — SACUBITRIL AND VALSARTAN 24; 26 MG/1; MG/1
1 TABLET, FILM COATED ORAL
Refills: 0 | Status: DISCONTINUED | OUTPATIENT
Start: 2024-05-16 | End: 2024-05-17

## 2024-05-16 RX ORDER — LANOLIN ALCOHOL/MO/W.PET/CERES
3 CREAM (GRAM) TOPICAL AT BEDTIME
Refills: 0 | Status: DISCONTINUED | OUTPATIENT
Start: 2024-05-16 | End: 2024-05-17

## 2024-05-16 RX ORDER — SPIRONOLACTONE 25 MG/1
12.5 TABLET, FILM COATED ORAL DAILY
Refills: 0 | Status: DISCONTINUED | OUTPATIENT
Start: 2024-05-16 | End: 2024-05-17

## 2024-05-16 RX ADMIN — Medication 162 MILLIGRAM(S): at 00:17

## 2024-05-16 RX ADMIN — HYDROMORPHONE HYDROCHLORIDE 1 MILLIGRAM(S): 2 INJECTION INTRAMUSCULAR; INTRAVENOUS; SUBCUTANEOUS at 19:19

## 2024-05-16 RX ADMIN — HYDROMORPHONE HYDROCHLORIDE 1 MILLIGRAM(S): 2 INJECTION INTRAMUSCULAR; INTRAVENOUS; SUBCUTANEOUS at 13:36

## 2024-05-16 RX ADMIN — PIPERACILLIN AND TAZOBACTAM 25 GRAM(S): 4; .5 INJECTION, POWDER, LYOPHILIZED, FOR SOLUTION INTRAVENOUS at 13:23

## 2024-05-16 RX ADMIN — ATORVASTATIN CALCIUM 40 MILLIGRAM(S): 80 TABLET, FILM COATED ORAL at 21:09

## 2024-05-16 RX ADMIN — ISOSORBIDE MONONITRATE 30 MILLIGRAM(S): 60 TABLET, EXTENDED RELEASE ORAL at 11:10

## 2024-05-16 RX ADMIN — Medication 81 MILLIGRAM(S): at 11:10

## 2024-05-16 RX ADMIN — Medication 1000 MILLIGRAM(S): at 00:11

## 2024-05-16 RX ADMIN — MORPHINE SULFATE 2 MILLIGRAM(S): 50 CAPSULE, EXTENDED RELEASE ORAL at 08:45

## 2024-05-16 RX ADMIN — MORPHINE SULFATE 2 MILLIGRAM(S): 50 CAPSULE, EXTENDED RELEASE ORAL at 09:37

## 2024-05-16 RX ADMIN — MORPHINE SULFATE 2 MILLIGRAM(S): 50 CAPSULE, EXTENDED RELEASE ORAL at 02:49

## 2024-05-16 RX ADMIN — SACUBITRIL AND VALSARTAN 1 TABLET(S): 24; 26 TABLET, FILM COATED ORAL at 06:02

## 2024-05-16 RX ADMIN — Medication 650 MILLIGRAM(S): at 06:02

## 2024-05-16 RX ADMIN — Medication 20 MILLIGRAM(S): at 00:17

## 2024-05-16 RX ADMIN — PANTOPRAZOLE SODIUM 40 MILLIGRAM(S): 20 TABLET, DELAYED RELEASE ORAL at 06:02

## 2024-05-16 RX ADMIN — SPIRONOLACTONE 12.5 MILLIGRAM(S): 25 TABLET, FILM COATED ORAL at 06:02

## 2024-05-16 RX ADMIN — HYDROMORPHONE HYDROCHLORIDE 1 MILLIGRAM(S): 2 INJECTION INTRAMUSCULAR; INTRAVENOUS; SUBCUTANEOUS at 12:34

## 2024-05-16 RX ADMIN — Medication 200 MILLIGRAM(S): at 06:02

## 2024-05-16 RX ADMIN — HYDROMORPHONE HYDROCHLORIDE 1 MILLIGRAM(S): 2 INJECTION INTRAMUSCULAR; INTRAVENOUS; SUBCUTANEOUS at 20:39

## 2024-05-16 RX ADMIN — TAMSULOSIN HYDROCHLORIDE 0.4 MILLIGRAM(S): 0.4 CAPSULE ORAL at 21:09

## 2024-05-16 RX ADMIN — PIPERACILLIN AND TAZOBACTAM 25 GRAM(S): 4; .5 INJECTION, POWDER, LYOPHILIZED, FOR SOLUTION INTRAVENOUS at 08:45

## 2024-05-16 RX ADMIN — MORPHINE SULFATE 2 MILLIGRAM(S): 50 CAPSULE, EXTENDED RELEASE ORAL at 03:19

## 2024-05-16 RX ADMIN — Medication 20 MILLIGRAM(S): at 10:16

## 2024-05-16 RX ADMIN — SACUBITRIL AND VALSARTAN 1 TABLET(S): 24; 26 TABLET, FILM COATED ORAL at 18:09

## 2024-05-16 RX ADMIN — PIPERACILLIN AND TAZOBACTAM 200 GRAM(S): 4; .5 INJECTION, POWDER, LYOPHILIZED, FOR SOLUTION INTRAVENOUS at 00:17

## 2024-05-16 RX ADMIN — PIPERACILLIN AND TAZOBACTAM 25 GRAM(S): 4; .5 INJECTION, POWDER, LYOPHILIZED, FOR SOLUTION INTRAVENOUS at 21:08

## 2024-05-16 NOTE — PROGRESS NOTE ADULT - SUBJECTIVE AND OBJECTIVE BOX
CHIEF COMPLAINT: severe abd pain reported  iv morphine helping but not lasting enough.   did not pass the stone  no n/v/d/cp/sob at this time  on NC oxygen  No fever      PHYSICAL EXAM:    GENERAL: Moderately built, +NC oxygen  CHEST/LUNG:  No wheezing, no crackles   HEART: Regular rate and rhythm; No murmurs  ABDOMEN: Soft, +tender to deep palpation suprapubic area.  Nondistended; Bowel sounds present  EXTREMITIES:  No clubbing, cyanosis, or edema  Psychiatry: AA Ox3      OBJECTIVE DATA:   Vital Signs Last 24 Hrs  T(C): 36.7 (16 May 2024 10:44), Max: 36.9 (15 May 2024 23:36)  T(F): 98 (16 May 2024 10:44), Max: 98.4 (15 May 2024 23:36)  HR: 91 (16 May 2024 10:44) (86 - 95)  BP: 143/83 (16 May 2024 10:44) (125/79 - 167/101)  BP(mean): --  RR: 18 (16 May 2024 10:44) (18 - 26)  SpO2: 99% (16 May 2024 10:44) (94% - 110%)    Parameters below as of 16 May 2024 10:44  Patient On (Oxygen Delivery Method): nasal cannula  O2 Flow (L/min): 2             Daily Height in cm: 175.26 (16 May 2024 03:14)    Daily   LABS:                        14.7   9.48  )-----------( 295      ( 16 May 2024 08:10 )             44.4             05-16    134<L>  |  98  |  12  ----------------------------<  133<H>  4.0   |  24  |  0.92    Ca    9.2      16 May 2024 08:10  Mg     2.4     05-15    TPro  8.1  /  Alb  3.6  /  TBili  5.0<H>  /  DBili  x   /  AST  804<H>  /  ALT  820<H>  /  AlkPhos  201<H>  05-16              PT/INR - ( 16 May 2024 08:10 )   PT: 13.3 sec;   INR: 1.12 ratio         PTT - ( 16 May 2024 08:10 )  PTT:34.7 sec  Urinalysis Basic - ( 16 May 2024 08:10 )    Color: x / Appearance: x / SG: x / pH: x  Gluc: 133 mg/dL / Ketone: x  / Bili: x / Urobili: x   Blood: x / Protein: x / Nitrite: x   Leuk Esterase: x / RBC: x / WBC x   Sq Epi: x / Non Sq Epi: x / Bacteria: x            CAPILLARY BLOOD GLUCOSE      POCT Blood Glucose.: 153 mg/dL (15 May 2024 18:36)         Interval Radiology studies: reviewed by me    < from: CT Abdomen and Pelvis w/ IV Cont (05.15.24 @ 23:16) >  Choledocholithiasis with extrahepatic and intrahepatic biliary ductal   dilatation.    Cholelithiasis with mild gallbladder distention, wall thickening, and   pericholecystic inflammation. Follow-up right upper quadrant sonogram may   be obtained for further evaluation.    Nonspecific mild gaseous distention of the ascending and transverse colon   without evidence of mechanical obstruction.    7 mm right distal ureteral calculus without hydroureteronephrosis.    < end of copied text >    Tele reviewed by me showed no significant arrhythmias     MEDICATIONS  (STANDING):  aspirin enteric coated 81 milliGRAM(s) Oral daily  atorvastatin 40 milliGRAM(s) Oral at bedtime  isosorbide   mononitrate ER Tablet (IMDUR) 30 milliGRAM(s) Oral daily  metoprolol succinate  milliGRAM(s) Oral daily  pantoprazole    Tablet 40 milliGRAM(s) Oral before breakfast  piperacillin/tazobactam IVPB.. 3.375 Gram(s) IV Intermittent every 8 hours  sacubitril 24 mG/valsartan 26 mG 1 Tablet(s) Oral two times a day  spironolactone 12.5 milliGRAM(s) Oral daily  tamsulosin 0.4 milliGRAM(s) Oral at bedtime    MEDICATIONS  (PRN):  acetaminophen     Tablet .. 650 milliGRAM(s) Oral every 6 hours PRN Temp greater or equal to 38C (100.4F), Mild Pain (1 - 3)  aluminum hydroxide/magnesium hydroxide/simethicone Suspension 30 milliLiter(s) Oral every 4 hours PRN Dyspepsia  HYDROmorphone  Injectable 1 milliGRAM(s) IV Push every 4 hours PRN Severe Pain (7 - 10)  melatonin 3 milliGRAM(s) Oral at bedtime PRN Insomnia  ondansetron Injectable 4 milliGRAM(s) IV Push every 8 hours PRN Nausea and/or Vomiting

## 2024-05-16 NOTE — CONSULT NOTE ADULT - NS ATTEND AMEND GEN_ALL_CORE FT
Pt w/ numerous medical comorbidities, p/w most likely choledocholithiasis. Would trend Tbili and MRCP, GI consult

## 2024-05-16 NOTE — H&P ADULT - HISTORY OF PRESENT ILLNESS
53 year old male with a PMH of  HTN, DM, CAD sp CABG & stents 12/2022, POBA to OM 11/14/23 and to mLAD 11/27/23, ICM/ CHF, GERD presents to the ED for abdominal pain and SOB. Endorses 4 day history of diffused abdominal pain associated with nausea, vomiting, and decrease PO tolerance. Also endorses 2 days history of worsen constipation without any relief with laxative. While in CT patient became hypoxic, saturating in the 80s on RA, responded to O2 2l-NC. Endorses while he was in CT, experienced acute onset of chest tightness which has then subsided. Denies SOB, palpitation, chills, fever or any other acute symptoms. Labs remarkable for BNP: 1330, lactate: 3.6 AST/ALT: 176/112. Normotensive. Afebrile. Received Aspirin, Lasix, Zosyn, Ofirmev, Pepcid, Zofran.   US-ABD: Cholelithiasis without sonographic evidence of acute cholecystitis. Dilated common bile duct; correlate clinically to exclude a distal obstructive process.  CT-ABD: Choledocholithiasis with extrahepatic and intrahepatic biliary ductal dilatation. Cholelithiasis with mild gallbladder distention, wall thickening, and pericholecystic inflammation.   Surgery consulted - No acute surgical intervention is needed

## 2024-05-16 NOTE — PATIENT PROFILE ADULT - FALL HARM RISK - HARM RISK INTERVENTIONS

## 2024-05-16 NOTE — CONSULT NOTE ADULT - ASSESSMENT
54 y/o male PMHx HTN, DM, CAD s/p CABG & stents 12/2022, POBA to OM 11/14/23 and to mLAD 11/27/23, ICM/ CHF, EF of 35%, GERD presents with suprapubic pain x 4 days.   CT shows Choledocholithiasis with extrahepatic and intrahepatic biliary ductal dilatation. Cholelithiasis with mild gallbladder distention, wall thickening, and pericholecystic inflammation. 7 mm right distal ureteral calculus without hydroureteronephrosis.  Pt had hypoxia after CT scan and was placed on NRB. Now on NC.     Plan:  Recommend flomax and to strain urine  No acute surgical intervention for ureteral stone  MRCP, NPO, IVF, antibiotics  Follow up US  Follow up AM labs  Discussed with Dr. Parry

## 2024-05-16 NOTE — CONSULT NOTE ADULT - ASSESSMENT
54 y/o male PMHx HTN, DM, CAD s/p CABG & stents 12/2022, POBA to OM 11/14/23 and to mLAD 11/27/23, ICM/ CHF, EF of 35%, GERD presents with suprapubic pain x 4 days.   CT shows Choledocholithiasis with extrahepatic and intrahepatic biliary ductal dilatation. Cholelithiasis with mild gallbladder distention, wall thickening, and pericholecystic inflammation.   Pt had hypoxia after CT scan and was placed on NRB. Now on NC.     Plan:  Recommend MRCP, NPO, IVF, antibiotics  Follow up US  Follow up AM labs

## 2024-05-16 NOTE — PROGRESS NOTE ADULT - ASSESSMENT
53 year old male with a PMH of  HTN, DM, CAD sp CABG & stents 12/2022, POBA to OM 11/14/23 and to mLAD 11/27/23, ICM/ CHF, GERD presents to the ED for abdominal pain and SOB. Endorses 4 day history of diffused abdominal pain associated with nausea, vomiting, and decrease PO tolerance. Also endorses 2 days history of worsen constipation without any relief with laxative. While in CT patient became hypoxic, saturating in the 80s on RA, responded to O2 2l-NC. Endorses while he was in CT, experienced acute onset of chest tightness which has then subsided. Denies SOB, palpitation, chills, fever or any other acute symptoms. Labs remarkable for BNP: 1330, lactate: 3.6 AST/ALT: 176/112. Normotensive. Afebrile. Received Aspirin, Lasix, Zosyn, Ofirmev, Pepcid, Zofran.         Problem/Plan - 1:  ·  Problem: Severe abd pain. Likely from obstructive right ureteral stone. Uncontrolled pain still. Change iv morphine to iv dilaudid.   cont IVF. Keep NPO. Follow further urology consult recs. cont flomax.     2. Choledocholithiasis.   CT-ABD: Choledocholithiasis with extrahepatic and intrahepatic biliary ductal dilatation. Cholelithiasis with mild gallbladder distention, wall thickening, and pericholecystic inflammation.   Follow MRCP. cont iv zosyn empirically. Trend CMP.       Problem/Plan - 2:  ·  Problem: Acute on chronic systolic CHF.   ·  Plan: Acute onset of hypoxia & chest tightness while in CT   cont lasix and other home meds. Follow I and O. follow BMP. ECHO    Problem/Plan - 3:  ·  Problem: Hypertension.   controlled.  cont current meds and monitor.     Problem/Plan - 4:  ·  Problem: Diabetes mellitus type 2.   ·  Plan: ISS with hypoglycemia protocol   - F/u finger sticks.     Problem/Plan - 5:  ·  Problem: CAD S/P CABG (coronary artery bypass graft).   · + chest pain tightness but none at this time. no specific ST-T wave changes. troponinemia is likely from demand ischemia. cont tele. cont current cardiac meds.     Acute transaminitis. Trend CMP    FC  DVT ppx:   SCDs    Time spent by me managing the patient including but not limited to reviewing the chart, discussion with the nurse and Family/consultants, physical exam and assessment and plan is 52 mins

## 2024-05-16 NOTE — CONSULT NOTE ADULT - ASSESSMENT
53M with CAD/CABG/PCI (last 11/23), ICM HFrEF HTN DM p/w abd pain, found to have cholelithiasis.     abd pain  -surgical team following  -no active cp or sob  -euvolemic  -low suspicion for active cardiac issues here  -ekg with twi (pt with known cad), can trend CE  -trop elevation noted, please check ck and ckmb  -on tele  -will follow with you 53M with CAD/CABG/PCI (last 11/23), ICM HFrEF HTN DM p/w abd pain, found to have cholelithiasis.     abd pain  -surgical team following  -no active cp or sob  -euvolemic  -low suspicion for active cardiac issues here  -ekg with twi (pt with known cad), can trend CE  -trop elevation noted, please check ck and ckmb  -on tele  -will follow with you  -if concern for acute cholystitis/cholangitis and pt requires intervention, as this is urgent, no further cardiac testing is indicated and pt may proceed

## 2024-05-16 NOTE — H&P ADULT - ASSESSMENT
53 year old male with a PMH of  HTN, DM, CAD sp CABG & stents 12/2022, POBA to OM 11/14/23 and to mLAD 11/27/23, ICM/ CHF, GERD presents to the ED for abdominal pain and SOB. Endorses 4 day history of diffused abdominal pain associated with nausea, vomiting, and decrease PO tolerance. Also endorses 2 days history of worsen constipation without any relief with laxative. While in CT patient became hypoxic, saturating in the 80s on RA, responded to O2 2l-NC. Endorses while he was in CT, experienced acute onset of chest tightness which has then subsided. Denies SOB, palpitation, chills, fever or any other acute symptoms. Labs remarkable for BNP: 1330, lactate: 3.6 AST/ALT: 176/112. Normotensive. Afebrile. Received Aspirin, Lasix, Zosyn, Ofirmev, Pepcid, Zofran.

## 2024-05-16 NOTE — H&P ADULT - NSHPPHYSICALEXAM_GEN_ALL_CORE
GENERAL: NAD, comfortable at bedside   HEAD:  Atraumatic, Normocephalic  EYES: EOMI, PERRL, conjunctiva and sclera clear  NECK: Supple, No JVD  LUNG: Clear to auscultation bilaterally; No wheezes, rales or rhonchi  HEART: Regular rate and rhythm; No murmurs, rubs, or gallops, (+)S1, S2  ABDOMEN: +BS, Soft, Diffused TTP,  Nondistended  EXTREMITIES:  2+ Peripheral Pulses, No clubbing, cyanosis, or edema  PSYCH: normal mood and affect  NEUROLOGY: AAOx3, non-focal No gross neurologic deficits, CN II-XII intact, no facial asymmetry, no dysarthria, no dysmetria, no drift, strength equal in all four extremities, no sensory deficits  SKIN: No rashes or lesions

## 2024-05-16 NOTE — PATIENT PROFILE ADULT - DO YOU FEEL UNSAFE AT HOME, WORK, OR SCHOOL?
EMS arrived to transport pt to [facility] via stretcher at approx 2230. No visible distress noted.  Electronically signed by Silas Pineda RN on 11/20/2018 at 10:34 PM no

## 2024-05-16 NOTE — H&P ADULT - NSHPLABSRESULTS_GEN_ALL_CORE
15.6   11.18 )-----------( 323      ( 15 May 2024 20:54 )             48.3     135  |  99  |  12  ----------------------------<  170<H>     05-15  5.0   |  24  |  1.13    Ca    10.0      15 May 2024 20:54  Mg     2.4     05-15    TPro  9.2<H>  /  Alb  4.4  /  TBili  2.2<H>  /  DBili  x   /  AST  176<H>  /  ALT  112<H>  /  AlkPhos  119  05-15    Pro-BNP: 1330 (05-15-24 @ 20:54)      CT-ABD  Choledocholithiasis with extrahepatic and intrahepatic biliary ductal dilatation.    Cholelithiasis with mild gallbladder distention, wall thickening, and pericholecystic inflammation. Follow-up right upper quadrant sonogram may be obtained for further evaluation.    Nonspecific mild gaseous distention of the ascending and transverse colon without evidence of mechanical obstruction.    7 mm right distal ureteral calculus without hydroureteronephrosis.      ABD-US   Liver: Within normal limits.  Bile ducts: Normal caliber. Common bile duct measures 8 mm; mildly dilated.  Gallbladder: Cholelithiasis. Sludge also noted.  Pancreas: Poorly visualized.  Right kidney: 11.2 cm. No hydronephrosis. Small upper pole cyst.  Ascites: None.  IVC: Visualized portions are within normal limits.    IMPRESSION:    1. Cholelithiasis without sonographic evidence of acute cholecystitis.  2. Dilated common bile duct; correlate clinically to exclude a distal obstructive process.

## 2024-05-16 NOTE — H&P ADULT - PROBLEM SELECTOR PLAN 1
3 day history of abdominal pain associated with N/V decrease PO intake  CT-ABD: Choledocholithiasis with extrahepatic and intrahepatic biliary ductal dilatation. Cholelithiasis with mild gallbladder distention, wall thickening, and pericholecystic inflammation.   - Zosyn   - Pain management   - Zofran   - Surgical consult to continue follow up   - MRCP  - Flomax   - Strain Urine  - DVT prophylaxis

## 2024-05-16 NOTE — H&P ADULT - PROBLEM SELECTOR PLAN 2
Acute onset of hypoxia & chest tightness while in CT   BNP: 1330  11/2023 echo: EF-35%   - Tele  - Strict I & O   - Daily Weight   - Lasix   - Entresto   - Spironolactone   - IMDUR

## 2024-05-17 ENCOUNTER — INPATIENT (INPATIENT)
Facility: HOSPITAL | Age: 54
LOS: 4 days | Discharge: HOME | End: 2024-05-22
Attending: SURGERY | Admitting: SURGERY
Payer: MEDICAID

## 2024-05-17 VITALS — OXYGEN SATURATION: 100 % | RESPIRATION RATE: 23 BRPM | HEART RATE: 90 BPM

## 2024-05-17 VITALS — HEART RATE: 88 BPM | OXYGEN SATURATION: 98 %

## 2024-05-17 DIAGNOSIS — Z95.1 PRESENCE OF AORTOCORONARY BYPASS GRAFT: Chronic | ICD-10-CM

## 2024-05-17 DIAGNOSIS — K80.50 CALCULUS OF BILE DUCT WITHOUT CHOLANGITIS OR CHOLECYSTITIS WITHOUT OBSTRUCTION: ICD-10-CM

## 2024-05-17 DIAGNOSIS — R17 UNSPECIFIED JAUNDICE: ICD-10-CM

## 2024-05-17 LAB
A1C WITH ESTIMATED AVERAGE GLUCOSE RESULT: 6.1 % — HIGH (ref 4–5.6)
ADD ON TEST-SPECIMEN IN LAB: SIGNIFICANT CHANGE UP
ALBUMIN SERPL ELPH-MCNC: 2.8 G/DL — LOW (ref 3.3–5)
ALBUMIN SERPL ELPH-MCNC: 2.8 G/DL — LOW (ref 3.3–5)
ALBUMIN SERPL ELPH-MCNC: 3.1 G/DL — LOW (ref 3.3–5)
ALBUMIN SERPL ELPH-MCNC: 3.6 G/DL — SIGNIFICANT CHANGE UP (ref 3.3–5)
ALP SERPL-CCNC: 250 U/L — HIGH (ref 40–120)
ALP SERPL-CCNC: 257 U/L — HIGH (ref 40–120)
ALP SERPL-CCNC: 260 U/L — HIGH (ref 40–120)
ALP SERPL-CCNC: 276 U/L — HIGH (ref 40–120)
ALT FLD-CCNC: 406 U/L — HIGH (ref 4–41)
ALT FLD-CCNC: 468 U/L — HIGH (ref 12–78)
ALT FLD-CCNC: 491 U/L — HIGH (ref 12–78)
ALT FLD-CCNC: 534 U/L — HIGH (ref 12–78)
ANION GAP SERPL CALC-SCNC: 12 MMOL/L — SIGNIFICANT CHANGE UP (ref 5–17)
ANION GAP SERPL CALC-SCNC: 13 MMOL/L — SIGNIFICANT CHANGE UP (ref 5–17)
ANION GAP SERPL CALC-SCNC: 22 MMOL/L — HIGH (ref 7–14)
APPEARANCE UR: CLEAR — SIGNIFICANT CHANGE UP
APTT BLD: 36.4 SEC — HIGH (ref 24.5–35.6)
AST SERPL-CCNC: 243 U/L — HIGH (ref 4–40)
AST SERPL-CCNC: 263 U/L — HIGH (ref 15–37)
AST SERPL-CCNC: 275 U/L — HIGH (ref 15–37)
AST SERPL-CCNC: 297 U/L — HIGH (ref 15–37)
B-OH-BUTYR SERPL-SCNC: 5.1 MMOL/L — HIGH (ref 0–0.4)
BACTERIA # UR AUTO: ABNORMAL /HPF
BASOPHILS # BLD AUTO: 0.04 K/UL — SIGNIFICANT CHANGE UP (ref 0–0.2)
BASOPHILS NFR BLD AUTO: 0.3 % — SIGNIFICANT CHANGE UP (ref 0–2)
BASOPHILS NFR BLD AUTO: 0.4 % — SIGNIFICANT CHANGE UP (ref 0–2)
BASOPHILS NFR BLD AUTO: 0.6 % — SIGNIFICANT CHANGE UP (ref 0–2)
BILIRUB DIRECT SERPL-MCNC: 7.1 MG/DL — HIGH (ref 0–0.3)
BILIRUB INDIRECT FLD-MCNC: 2 MG/DL — HIGH (ref 0.2–1)
BILIRUB SERPL-MCNC: 6.4 MG/DL — HIGH (ref 0.2–1.2)
BILIRUB SERPL-MCNC: 6.9 MG/DL — HIGH (ref 0.2–1.2)
BILIRUB SERPL-MCNC: 7.7 MG/DL — HIGH (ref 0.2–1.2)
BILIRUB SERPL-MCNC: 9.1 MG/DL — HIGH (ref 0.2–1.2)
BILIRUB UR-MCNC: ABNORMAL
BLD GP AB SCN SERPL QL: NEGATIVE — SIGNIFICANT CHANGE UP
BLOOD GAS ARTERIAL - LYTES,HGB,ICA,LACT RESULT: SIGNIFICANT CHANGE UP
BUN SERPL-MCNC: 32 MG/DL — HIGH (ref 7–23)
BUN SERPL-MCNC: 32 MG/DL — HIGH (ref 7–23)
BUN SERPL-MCNC: 33 MG/DL — HIGH (ref 7–23)
CALCIUM SERPL-MCNC: 8.6 MG/DL — SIGNIFICANT CHANGE UP (ref 8.5–10.1)
CALCIUM SERPL-MCNC: 8.6 MG/DL — SIGNIFICANT CHANGE UP (ref 8.5–10.1)
CALCIUM SERPL-MCNC: 8.7 MG/DL — SIGNIFICANT CHANGE UP (ref 8.4–10.5)
CHLORIDE SERPL-SCNC: 93 MMOL/L — LOW (ref 98–107)
CHLORIDE SERPL-SCNC: 95 MMOL/L — LOW (ref 96–108)
CHLORIDE SERPL-SCNC: 98 MMOL/L — SIGNIFICANT CHANGE UP (ref 96–108)
CHOLEST SERPL-MCNC: 65 MG/DL — SIGNIFICANT CHANGE UP
CK MB BLD-MCNC: 1.8 % — SIGNIFICANT CHANGE UP (ref 0–3.5)
CK MB CFR SERPL CALC: 1 NG/ML — SIGNIFICANT CHANGE UP (ref 0.5–3.6)
CK SERPL-CCNC: 55 U/L — SIGNIFICANT CHANGE UP (ref 26–308)
CO2 SERPL-SCNC: 18 MMOL/L — LOW (ref 22–31)
CO2 SERPL-SCNC: 24 MMOL/L — SIGNIFICANT CHANGE UP (ref 22–31)
CO2 SERPL-SCNC: 24 MMOL/L — SIGNIFICANT CHANGE UP (ref 22–31)
COLOR SPEC: SIGNIFICANT CHANGE UP
CREAT SERPL-MCNC: 1.17 MG/DL — SIGNIFICANT CHANGE UP (ref 0.5–1.3)
CREAT SERPL-MCNC: 1.33 MG/DL — HIGH (ref 0.5–1.3)
CREAT SERPL-MCNC: 2.26 MG/DL — HIGH (ref 0.5–1.3)
DIFF PNL FLD: ABNORMAL
EGFR: 34 ML/MIN/1.73M2 — LOW
EGFR: 64 ML/MIN/1.73M2 — SIGNIFICANT CHANGE UP
EGFR: 75 ML/MIN/1.73M2 — SIGNIFICANT CHANGE UP
EOSINOPHIL # BLD AUTO: 0.01 K/UL — SIGNIFICANT CHANGE UP (ref 0–0.5)
EOSINOPHIL # BLD AUTO: 0.02 K/UL — SIGNIFICANT CHANGE UP (ref 0–0.5)
EOSINOPHIL # BLD AUTO: 0.04 K/UL — SIGNIFICANT CHANGE UP (ref 0–0.5)
EOSINOPHIL NFR BLD AUTO: 0.1 % — SIGNIFICANT CHANGE UP (ref 0–6)
EOSINOPHIL NFR BLD AUTO: 0.2 % — SIGNIFICANT CHANGE UP (ref 0–6)
EOSINOPHIL NFR BLD AUTO: 0.6 % — SIGNIFICANT CHANGE UP (ref 0–6)
EPI CELLS # UR: PRESENT
ESTIMATED AVERAGE GLUCOSE: 128 MG/DL — HIGH (ref 68–114)
GLUCOSE BLDC GLUCOMTR-MCNC: 146 MG/DL — HIGH (ref 70–99)
GLUCOSE BLDC GLUCOMTR-MCNC: 183 MG/DL — HIGH (ref 70–99)
GLUCOSE SERPL-MCNC: 145 MG/DL — HIGH (ref 70–99)
GLUCOSE SERPL-MCNC: 190 MG/DL — HIGH (ref 70–99)
GLUCOSE SERPL-MCNC: 190 MG/DL — HIGH (ref 70–99)
GLUCOSE UR QL: >=1000 MG/DL
HCT VFR BLD CALC: 39.8 % — SIGNIFICANT CHANGE UP (ref 39–50)
HCT VFR BLD CALC: 40.2 % — SIGNIFICANT CHANGE UP (ref 39–50)
HCT VFR BLD CALC: 42.1 % — SIGNIFICANT CHANGE UP (ref 39–50)
HCT VFR BLD CALC: 43.3 % — SIGNIFICANT CHANGE UP (ref 39–50)
HDLC SERPL-MCNC: 29 MG/DL — LOW
HGB BLD-MCNC: 13.3 G/DL — SIGNIFICANT CHANGE UP (ref 13–17)
HGB BLD-MCNC: 13.4 G/DL — SIGNIFICANT CHANGE UP (ref 13–17)
HGB BLD-MCNC: 14 G/DL — SIGNIFICANT CHANGE UP (ref 13–17)
HGB BLD-MCNC: 14.2 G/DL — SIGNIFICANT CHANGE UP (ref 13–17)
IMM GRANULOCYTES NFR BLD AUTO: 0.3 % — SIGNIFICANT CHANGE UP (ref 0–0.9)
IMM GRANULOCYTES NFR BLD AUTO: 0.3 % — SIGNIFICANT CHANGE UP (ref 0–0.9)
IMM GRANULOCYTES NFR BLD AUTO: 0.4 % — SIGNIFICANT CHANGE UP (ref 0–0.9)
INR BLD: 1.27 RATIO — HIGH (ref 0.85–1.18)
KETONES UR-MCNC: 40 MG/DL
LACTATE SERPL-SCNC: 0.8 MMOL/L — SIGNIFICANT CHANGE UP (ref 0.7–2)
LACTATE SERPL-SCNC: 1.7 MMOL/L — SIGNIFICANT CHANGE UP (ref 0.7–2)
LEUKOCYTE ESTERASE UR-ACNC: NEGATIVE — SIGNIFICANT CHANGE UP
LIPID PNL WITH DIRECT LDL SERPL: 21 MG/DL — SIGNIFICANT CHANGE UP
LYMPHOCYTES # BLD AUTO: 1.23 K/UL — SIGNIFICANT CHANGE UP (ref 1–3.3)
LYMPHOCYTES # BLD AUTO: 1.36 K/UL — SIGNIFICANT CHANGE UP (ref 1–3.3)
LYMPHOCYTES # BLD AUTO: 1.37 K/UL — SIGNIFICANT CHANGE UP (ref 1–3.3)
LYMPHOCYTES # BLD AUTO: 10.2 % — LOW (ref 13–44)
LYMPHOCYTES # BLD AUTO: 13.5 % — SIGNIFICANT CHANGE UP (ref 13–44)
LYMPHOCYTES # BLD AUTO: 19.1 % — SIGNIFICANT CHANGE UP (ref 13–44)
MAGNESIUM SERPL-MCNC: 2.1 MG/DL — SIGNIFICANT CHANGE UP (ref 1.6–2.6)
MAGNESIUM SERPL-MCNC: 2.1 MG/DL — SIGNIFICANT CHANGE UP (ref 1.6–2.6)
MAGNESIUM SERPL-MCNC: 2.2 MG/DL — SIGNIFICANT CHANGE UP (ref 1.6–2.6)
MCHC RBC-ENTMCNC: 27 PG — SIGNIFICANT CHANGE UP (ref 27–34)
MCHC RBC-ENTMCNC: 27.3 PG — SIGNIFICANT CHANGE UP (ref 27–34)
MCHC RBC-ENTMCNC: 27.3 PG — SIGNIFICANT CHANGE UP (ref 27–34)
MCHC RBC-ENTMCNC: 27.6 PG — SIGNIFICANT CHANGE UP (ref 27–34)
MCHC RBC-ENTMCNC: 32.8 G/DL — SIGNIFICANT CHANGE UP (ref 32–36)
MCHC RBC-ENTMCNC: 33.3 G/DL — SIGNIFICANT CHANGE UP (ref 32–36)
MCHC RBC-ENTMCNC: 33.3 GM/DL — SIGNIFICANT CHANGE UP (ref 32–36)
MCHC RBC-ENTMCNC: 33.4 G/DL — SIGNIFICANT CHANGE UP (ref 32–36)
MCV RBC AUTO: 82 FL — SIGNIFICANT CHANGE UP (ref 80–100)
MCV RBC AUTO: 82.2 FL — SIGNIFICANT CHANGE UP (ref 80–100)
MCV RBC AUTO: 82.5 FL — SIGNIFICANT CHANGE UP (ref 80–100)
MCV RBC AUTO: 82.6 FL — SIGNIFICANT CHANGE UP (ref 80–100)
MONOCYTES # BLD AUTO: 0.64 K/UL — SIGNIFICANT CHANGE UP (ref 0–0.9)
MONOCYTES # BLD AUTO: 0.98 K/UL — HIGH (ref 0–0.9)
MONOCYTES # BLD AUTO: 1 K/UL — HIGH (ref 0–0.9)
MONOCYTES NFR BLD AUTO: 8.1 % — SIGNIFICANT CHANGE UP (ref 2–14)
MONOCYTES NFR BLD AUTO: 9 % — SIGNIFICANT CHANGE UP (ref 2–14)
MONOCYTES NFR BLD AUTO: 9.9 % — SIGNIFICANT CHANGE UP (ref 2–14)
NEUTROPHILS # BLD AUTO: 5.02 K/UL — SIGNIFICANT CHANGE UP (ref 1.8–7.4)
NEUTROPHILS # BLD AUTO: 7.66 K/UL — HIGH (ref 1.8–7.4)
NEUTROPHILS # BLD AUTO: 9.78 K/UL — HIGH (ref 1.8–7.4)
NEUTROPHILS NFR BLD AUTO: 70.4 % — SIGNIFICANT CHANGE UP (ref 43–77)
NEUTROPHILS NFR BLD AUTO: 75.7 % — SIGNIFICANT CHANGE UP (ref 43–77)
NEUTROPHILS NFR BLD AUTO: 80.9 % — HIGH (ref 43–77)
NITRITE UR-MCNC: NEGATIVE — SIGNIFICANT CHANGE UP
NON HDL CHOLESTEROL: 36 MG/DL — SIGNIFICANT CHANGE UP
NRBC # BLD: 0 /100 WBCS — SIGNIFICANT CHANGE UP (ref 0–0)
NRBC # FLD: 0 K/UL — SIGNIFICANT CHANGE UP (ref 0–0)
PH UR: 5.5 — SIGNIFICANT CHANGE UP (ref 5–8)
PHOSPHATE SERPL-MCNC: 2.2 MG/DL — LOW (ref 2.5–4.5)
PHOSPHATE SERPL-MCNC: 2.8 MG/DL — SIGNIFICANT CHANGE UP (ref 2.5–4.5)
PHOSPHATE SERPL-MCNC: 4.1 MG/DL — SIGNIFICANT CHANGE UP (ref 2.5–4.5)
PLATELET # BLD AUTO: 227 K/UL — SIGNIFICANT CHANGE UP (ref 150–400)
PLATELET # BLD AUTO: 279 K/UL — SIGNIFICANT CHANGE UP (ref 150–400)
PLATELET # BLD AUTO: 295 K/UL — SIGNIFICANT CHANGE UP (ref 150–400)
PLATELET # BLD AUTO: 333 K/UL — SIGNIFICANT CHANGE UP (ref 150–400)
POTASSIUM SERPL-MCNC: 3.3 MMOL/L — LOW (ref 3.5–5.3)
POTASSIUM SERPL-MCNC: 3.3 MMOL/L — LOW (ref 3.5–5.3)
POTASSIUM SERPL-MCNC: 3.8 MMOL/L — SIGNIFICANT CHANGE UP (ref 3.5–5.3)
POTASSIUM SERPL-SCNC: 3.3 MMOL/L — LOW (ref 3.5–5.3)
POTASSIUM SERPL-SCNC: 3.3 MMOL/L — LOW (ref 3.5–5.3)
POTASSIUM SERPL-SCNC: 3.8 MMOL/L — SIGNIFICANT CHANGE UP (ref 3.5–5.3)
PROT SERPL-MCNC: 6.6 GM/DL — SIGNIFICANT CHANGE UP (ref 6–8.3)
PROT SERPL-MCNC: 6.6 GM/DL — SIGNIFICANT CHANGE UP (ref 6–8.3)
PROT SERPL-MCNC: 6.9 G/DL — SIGNIFICANT CHANGE UP (ref 6–8.3)
PROT SERPL-MCNC: 6.9 GM/DL — SIGNIFICANT CHANGE UP (ref 6–8.3)
PROT UR-MCNC: 30 MG/DL
PROTHROM AB SERPL-ACNC: 14.1 SEC — HIGH (ref 9.5–13)
RBC # BLD: 4.82 M/UL — SIGNIFICANT CHANGE UP (ref 4.2–5.8)
RBC # BLD: 4.9 M/UL — SIGNIFICANT CHANGE UP (ref 4.2–5.8)
RBC # BLD: 5.12 M/UL — SIGNIFICANT CHANGE UP (ref 4.2–5.8)
RBC # BLD: 5.25 M/UL — SIGNIFICANT CHANGE UP (ref 4.2–5.8)
RBC # FLD: 13.9 % — SIGNIFICANT CHANGE UP (ref 10.3–14.5)
RBC # FLD: 14.2 % — SIGNIFICANT CHANGE UP (ref 10.3–14.5)
RBC # FLD: 14.2 % — SIGNIFICANT CHANGE UP (ref 10.3–14.5)
RBC # FLD: 14.4 % — SIGNIFICANT CHANGE UP (ref 10.3–14.5)
RBC CASTS # UR COMP ASSIST: 5 /HPF — HIGH (ref 0–4)
RH IG SCN BLD-IMP: POSITIVE — SIGNIFICANT CHANGE UP
SODIUM SERPL-SCNC: 132 MMOL/L — LOW (ref 135–145)
SODIUM SERPL-SCNC: 133 MMOL/L — LOW (ref 135–145)
SODIUM SERPL-SCNC: 134 MMOL/L — LOW (ref 135–145)
SP GR SPEC: 1.02 — SIGNIFICANT CHANGE UP (ref 1–1.03)
TRIGL SERPL-MCNC: 58 MG/DL — SIGNIFICANT CHANGE UP
TROPONIN I, HIGH SENSITIVITY RESULT: 288 NG/L — HIGH
UROBILINOGEN FLD QL: 1 MG/DL — SIGNIFICANT CHANGE UP (ref 0.2–1)
WBC # BLD: 10.12 K/UL — SIGNIFICANT CHANGE UP (ref 3.8–10.5)
WBC # BLD: 11.06 K/UL — HIGH (ref 3.8–10.5)
WBC # BLD: 12.09 K/UL — HIGH (ref 3.8–10.5)
WBC # BLD: 7.12 K/UL — SIGNIFICANT CHANGE UP (ref 3.8–10.5)
WBC # FLD AUTO: 10.12 K/UL — SIGNIFICANT CHANGE UP (ref 3.8–10.5)
WBC # FLD AUTO: 11.06 K/UL — HIGH (ref 3.8–10.5)
WBC # FLD AUTO: 12.09 K/UL — HIGH (ref 3.8–10.5)
WBC # FLD AUTO: 7.12 K/UL — SIGNIFICANT CHANGE UP (ref 3.8–10.5)
WBC UR QL: 2 /HPF — SIGNIFICANT CHANGE UP (ref 0–5)

## 2024-05-17 PROCEDURE — 99291 CRITICAL CARE FIRST HOUR: CPT

## 2024-05-17 PROCEDURE — 99233 SBSQ HOSP IP/OBS HIGH 50: CPT

## 2024-05-17 PROCEDURE — 71045 X-RAY EXAM CHEST 1 VIEW: CPT | Mod: 26

## 2024-05-17 PROCEDURE — 71045 X-RAY EXAM CHEST 1 VIEW: CPT | Mod: 26,77

## 2024-05-17 PROCEDURE — 99292 CRITICAL CARE ADDL 30 MIN: CPT

## 2024-05-17 DEVICE — HYDRATOME 44: Type: IMPLANTABLE DEVICE | Status: FUNCTIONAL

## 2024-05-17 RX ORDER — INSULIN HUMAN 100 [IU]/ML
3 INJECTION, SOLUTION SUBCUTANEOUS
Qty: 100 | Refills: 0 | Status: DISCONTINUED | OUTPATIENT
Start: 2024-05-17 | End: 2024-05-18

## 2024-05-17 RX ORDER — POTASSIUM PHOSPHATE, MONOBASIC POTASSIUM PHOSPHATE, DIBASIC 236; 224 MG/ML; MG/ML
15 INJECTION, SOLUTION INTRAVENOUS ONCE
Refills: 0 | Status: COMPLETED | OUTPATIENT
Start: 2024-05-17 | End: 2024-05-17

## 2024-05-17 RX ORDER — SODIUM CHLORIDE 9 MG/ML
1000 INJECTION, SOLUTION INTRAVENOUS
Refills: 0 | Status: DISCONTINUED | OUTPATIENT
Start: 2024-05-17 | End: 2024-05-22

## 2024-05-17 RX ORDER — SENNA PLUS 8.6 MG/1
1 TABLET ORAL
Refills: 0 | DISCHARGE

## 2024-05-17 RX ORDER — SODIUM CHLORIDE 9 MG/ML
1000 INJECTION, SOLUTION INTRAVENOUS
Refills: 0 | Status: DISCONTINUED | OUTPATIENT
Start: 2024-05-17 | End: 2024-05-19

## 2024-05-17 RX ORDER — DEXTROSE 50 % IN WATER 50 %
25 SYRINGE (ML) INTRAVENOUS
Refills: 0 | Status: DISCONTINUED | OUTPATIENT
Start: 2024-05-17 | End: 2024-05-22

## 2024-05-17 RX ORDER — PIPERACILLIN AND TAZOBACTAM 4; .5 G/20ML; G/20ML
3.38 INJECTION, POWDER, LYOPHILIZED, FOR SOLUTION INTRAVENOUS EVERY 8 HOURS
Refills: 0 | Status: COMPLETED | OUTPATIENT
Start: 2024-05-17 | End: 2024-05-21

## 2024-05-17 RX ORDER — HYDROMORPHONE HYDROCHLORIDE 2 MG/ML
0.5 INJECTION INTRAMUSCULAR; INTRAVENOUS; SUBCUTANEOUS EVERY 4 HOURS
Refills: 0 | Status: DISCONTINUED | OUTPATIENT
Start: 2024-05-17 | End: 2024-05-18

## 2024-05-17 RX ORDER — SODIUM CHLORIDE 9 MG/ML
1000 INJECTION, SOLUTION INTRAVENOUS
Refills: 0 | Status: DISCONTINUED | OUTPATIENT
Start: 2024-05-17 | End: 2024-05-17

## 2024-05-17 RX ORDER — INSULIN LISPRO 100/ML
VIAL (ML) SUBCUTANEOUS EVERY 6 HOURS
Refills: 0 | Status: DISCONTINUED | OUTPATIENT
Start: 2024-05-17 | End: 2024-05-17

## 2024-05-17 RX ORDER — EZETIMIBE 10 MG/1
1 TABLET ORAL
Refills: 0 | DISCHARGE

## 2024-05-17 RX ORDER — HYDROMORPHONE HYDROCHLORIDE 2 MG/ML
1 INJECTION INTRAMUSCULAR; INTRAVENOUS; SUBCUTANEOUS EVERY 4 HOURS
Refills: 0 | Status: DISCONTINUED | OUTPATIENT
Start: 2024-05-17 | End: 2024-05-18

## 2024-05-17 RX ORDER — MIDODRINE HYDROCHLORIDE 2.5 MG/1
10 TABLET ORAL ONCE
Refills: 0 | Status: COMPLETED | OUTPATIENT
Start: 2024-05-17 | End: 2024-05-17

## 2024-05-17 RX ORDER — DEXTROSE 50 % IN WATER 50 %
50 SYRINGE (ML) INTRAVENOUS
Refills: 0 | Status: DISCONTINUED | OUTPATIENT
Start: 2024-05-17 | End: 2024-05-22

## 2024-05-17 RX ORDER — SODIUM CHLORIDE 9 MG/ML
1000 INJECTION INTRAMUSCULAR; INTRAVENOUS; SUBCUTANEOUS
Refills: 0 | Status: DISCONTINUED | OUTPATIENT
Start: 2024-05-17 | End: 2024-05-17

## 2024-05-17 RX ORDER — CHLORHEXIDINE GLUCONATE 213 G/1000ML
1 SOLUTION TOPICAL
Refills: 0 | Status: DISCONTINUED | OUTPATIENT
Start: 2024-05-17 | End: 2024-05-17

## 2024-05-17 RX ORDER — NOREPINEPHRINE BITARTRATE/D5W 8 MG/250ML
0.18 PLASTIC BAG, INJECTION (ML) INTRAVENOUS
Qty: 16 | Refills: 0 | Status: DISCONTINUED | OUTPATIENT
Start: 2024-05-17 | End: 2024-05-18

## 2024-05-17 RX ORDER — HEPARIN SODIUM 5000 [USP'U]/ML
5000 INJECTION INTRAVENOUS; SUBCUTANEOUS EVERY 8 HOURS
Refills: 0 | Status: DISCONTINUED | OUTPATIENT
Start: 2024-05-17 | End: 2024-05-17

## 2024-05-17 RX ORDER — INSULIN GLARGINE 100 [IU]/ML
5 INJECTION, SOLUTION SUBCUTANEOUS AT BEDTIME
Refills: 0 | Status: DISCONTINUED | OUTPATIENT
Start: 2024-05-17 | End: 2024-05-17

## 2024-05-17 RX ORDER — SODIUM CHLORIDE 9 MG/ML
10 INJECTION INTRAMUSCULAR; INTRAVENOUS; SUBCUTANEOUS
Refills: 0 | Status: DISCONTINUED | OUTPATIENT
Start: 2024-05-17 | End: 2024-05-19

## 2024-05-17 RX ORDER — INSULIN LISPRO 100/ML
VIAL (ML) SUBCUTANEOUS
Refills: 0 | Status: DISCONTINUED | OUTPATIENT
Start: 2024-05-17 | End: 2024-05-17

## 2024-05-17 RX ORDER — POTASSIUM CHLORIDE 20 MEQ
20 PACKET (EA) ORAL
Refills: 0 | Status: COMPLETED | OUTPATIENT
Start: 2024-05-17 | End: 2024-05-18

## 2024-05-17 RX ORDER — DEXTROSE 50 % IN WATER 50 %
12.5 SYRINGE (ML) INTRAVENOUS ONCE
Refills: 0 | Status: DISCONTINUED | OUTPATIENT
Start: 2024-05-17 | End: 2024-05-22

## 2024-05-17 RX ORDER — PROPOFOL 10 MG/ML
10 INJECTION, EMULSION INTRAVENOUS
Qty: 1000 | Refills: 0 | Status: DISCONTINUED | OUTPATIENT
Start: 2024-05-17 | End: 2024-05-17

## 2024-05-17 RX ORDER — CHLORHEXIDINE GLUCONATE 213 G/1000ML
15 SOLUTION TOPICAL EVERY 12 HOURS
Refills: 0 | Status: DISCONTINUED | OUTPATIENT
Start: 2024-05-17 | End: 2024-05-17

## 2024-05-17 RX ORDER — SODIUM CHLORIDE 9 MG/ML
1000 INJECTION, SOLUTION INTRAVENOUS
Refills: 0 | Status: DISCONTINUED | OUTPATIENT
Start: 2024-05-17 | End: 2024-05-18

## 2024-05-17 RX ORDER — CHLORHEXIDINE GLUCONATE 213 G/1000ML
1 SOLUTION TOPICAL
Refills: 0 | Status: DISCONTINUED | OUTPATIENT
Start: 2024-05-17 | End: 2024-05-22

## 2024-05-17 RX ORDER — TAMSULOSIN HYDROCHLORIDE 0.4 MG/1
1 CAPSULE ORAL
Refills: 0 | DISCHARGE

## 2024-05-17 RX ORDER — SODIUM CHLORIDE 9 MG/ML
10 INJECTION INTRAMUSCULAR; INTRAVENOUS; SUBCUTANEOUS
Refills: 0 | Status: DISCONTINUED | OUTPATIENT
Start: 2024-05-17 | End: 2024-05-17

## 2024-05-17 RX ORDER — SODIUM CHLORIDE 9 MG/ML
1000 INJECTION, SOLUTION INTRAVENOUS ONCE
Refills: 0 | Status: DISCONTINUED | OUTPATIENT
Start: 2024-05-17 | End: 2024-05-17

## 2024-05-17 RX ORDER — SODIUM CHLORIDE 9 MG/ML
500 INJECTION, SOLUTION INTRAVENOUS ONCE
Refills: 0 | Status: COMPLETED | OUTPATIENT
Start: 2024-05-17 | End: 2024-05-17

## 2024-05-17 RX ORDER — FENTANYL CITRATE 50 UG/ML
100 INJECTION INTRAVENOUS ONCE
Refills: 0 | Status: DISCONTINUED | OUTPATIENT
Start: 2024-05-17 | End: 2024-05-17

## 2024-05-17 RX ORDER — DEXTROSE 50 % IN WATER 50 %
25 SYRINGE (ML) INTRAVENOUS ONCE
Refills: 0 | Status: DISCONTINUED | OUTPATIENT
Start: 2024-05-17 | End: 2024-05-22

## 2024-05-17 RX ORDER — PANTOPRAZOLE SODIUM 20 MG/1
40 TABLET, DELAYED RELEASE ORAL DAILY
Refills: 0 | Status: DISCONTINUED | OUTPATIENT
Start: 2024-05-17 | End: 2024-05-17

## 2024-05-17 RX ORDER — DEXTROSE 10 % IN WATER 10 %
125 INTRAVENOUS SOLUTION INTRAVENOUS ONCE
Refills: 0 | Status: DISCONTINUED | OUTPATIENT
Start: 2024-05-17 | End: 2024-05-19

## 2024-05-17 RX ORDER — PIPERACILLIN AND TAZOBACTAM 4; .5 G/20ML; G/20ML
3.38 INJECTION, POWDER, LYOPHILIZED, FOR SOLUTION INTRAVENOUS EVERY 6 HOURS
Refills: 0 | Status: DISCONTINUED | OUTPATIENT
Start: 2024-05-17 | End: 2024-05-17

## 2024-05-17 RX ORDER — MIDODRINE HYDROCHLORIDE 2.5 MG/1
10 TABLET ORAL EVERY 8 HOURS
Refills: 0 | Status: DISCONTINUED | OUTPATIENT
Start: 2024-05-17 | End: 2024-05-17

## 2024-05-17 RX ORDER — PROPOFOL 10 MG/ML
50 INJECTION, EMULSION INTRAVENOUS
Qty: 1000 | Refills: 0 | Status: DISCONTINUED | OUTPATIENT
Start: 2024-05-17 | End: 2024-05-18

## 2024-05-17 RX ORDER — DEXTROSE 50 % IN WATER 50 %
15 SYRINGE (ML) INTRAVENOUS ONCE
Refills: 0 | Status: DISCONTINUED | OUTPATIENT
Start: 2024-05-17 | End: 2024-05-22

## 2024-05-17 RX ORDER — CHLORHEXIDINE GLUCONATE 213 G/1000ML
15 SOLUTION TOPICAL EVERY 12 HOURS
Refills: 0 | Status: DISCONTINUED | OUTPATIENT
Start: 2024-05-17 | End: 2024-05-18

## 2024-05-17 RX ORDER — NOREPINEPHRINE BITARTRATE/D5W 8 MG/250ML
0.05 PLASTIC BAG, INJECTION (ML) INTRAVENOUS
Qty: 8 | Refills: 0 | Status: DISCONTINUED | OUTPATIENT
Start: 2024-05-17 | End: 2024-05-17

## 2024-05-17 RX ORDER — GLUCAGON INJECTION, SOLUTION 0.5 MG/.1ML
1 INJECTION, SOLUTION SUBCUTANEOUS ONCE
Refills: 0 | Status: DISCONTINUED | OUTPATIENT
Start: 2024-05-17 | End: 2024-05-19

## 2024-05-17 RX ORDER — SODIUM CHLORIDE 9 MG/ML
500 INJECTION INTRAMUSCULAR; INTRAVENOUS; SUBCUTANEOUS ONCE
Refills: 0 | Status: DISCONTINUED | OUTPATIENT
Start: 2024-05-17 | End: 2024-05-17

## 2024-05-17 RX ADMIN — PROPOFOL 20.4 MICROGRAM(S)/KG/MIN: 10 INJECTION, EMULSION INTRAVENOUS at 21:24

## 2024-05-17 RX ADMIN — Medication 6.27 MICROGRAM(S)/KG/MIN: at 13:37

## 2024-05-17 RX ADMIN — MIDODRINE HYDROCHLORIDE 10 MILLIGRAM(S): 2.5 TABLET ORAL at 11:44

## 2024-05-17 RX ADMIN — PANTOPRAZOLE SODIUM 40 MILLIGRAM(S): 20 TABLET, DELAYED RELEASE ORAL at 05:28

## 2024-05-17 RX ADMIN — PIPERACILLIN AND TAZOBACTAM 25 GRAM(S): 4; .5 INJECTION, POWDER, LYOPHILIZED, FOR SOLUTION INTRAVENOUS at 05:28

## 2024-05-17 RX ADMIN — Medication 11.5 MICROGRAM(S)/KG/MIN: at 21:24

## 2024-05-17 RX ADMIN — SPIRONOLACTONE 12.5 MILLIGRAM(S): 25 TABLET, FILM COATED ORAL at 05:28

## 2024-05-17 RX ADMIN — PIPERACILLIN AND TAZOBACTAM 25 GRAM(S): 4; .5 INJECTION, POWDER, LYOPHILIZED, FOR SOLUTION INTRAVENOUS at 21:26

## 2024-05-17 RX ADMIN — SODIUM CHLORIDE 500 MILLILITER(S): 9 INJECTION, SOLUTION INTRAVENOUS at 13:36

## 2024-05-17 RX ADMIN — SODIUM CHLORIDE 1000 MILLILITER(S): 9 INJECTION INTRAMUSCULAR; INTRAVENOUS; SUBCUTANEOUS at 11:41

## 2024-05-17 RX ADMIN — PROPOFOL 3.98 MICROGRAM(S)/KG/MIN: 10 INJECTION, EMULSION INTRAVENOUS at 16:53

## 2024-05-17 RX ADMIN — CHLORHEXIDINE GLUCONATE 1 APPLICATION(S): 213 SOLUTION TOPICAL at 11:47

## 2024-05-17 RX ADMIN — SODIUM CHLORIDE 100 MILLILITER(S): 9 INJECTION, SOLUTION INTRAVENOUS at 23:19

## 2024-05-17 RX ADMIN — CHLORHEXIDINE GLUCONATE 15 MILLILITER(S): 213 SOLUTION TOPICAL at 17:44

## 2024-05-17 RX ADMIN — PANTOPRAZOLE SODIUM 40 MILLIGRAM(S): 20 TABLET, DELAYED RELEASE ORAL at 11:53

## 2024-05-17 RX ADMIN — PIPERACILLIN AND TAZOBACTAM 25 GRAM(S): 4; .5 INJECTION, POWDER, LYOPHILIZED, FOR SOLUTION INTRAVENOUS at 11:48

## 2024-05-17 RX ADMIN — SODIUM CHLORIDE 125 MILLILITER(S): 9 INJECTION, SOLUTION INTRAVENOUS at 21:24

## 2024-05-17 RX ADMIN — Medication 50 MILLIEQUIVALENT(S): at 23:19

## 2024-05-17 RX ADMIN — POTASSIUM PHOSPHATE, MONOBASIC POTASSIUM PHOSPHATE, DIBASIC 62.5 MILLIMOLE(S): 236; 224 INJECTION, SOLUTION INTRAVENOUS at 23:19

## 2024-05-17 RX ADMIN — FENTANYL CITRATE 100 MICROGRAM(S): 50 INJECTION INTRAVENOUS at 18:37

## 2024-05-17 RX ADMIN — Medication 81 MILLIGRAM(S): at 11:48

## 2024-05-17 NOTE — CONSULT NOTE ADULT - SUBJECTIVE AND OBJECTIVE BOX
UJJOL  [id number]    Date of Consult:  5/16/24  CHIEF COMPLAINT:  abd pain  HISTORY OF PRESENT ILLNESS:  53M with CAD/CABG/PCI (last 11/23), ICM HFrEF HTN DM p/w abd pain, found to have cholelithiasis. denies active or recent cp at rest or on exertion. states only sob when abd pain is uncontrolled and reoslved when abd pain improves. pt endorsing waxing/waning abd pain. surgery following. pt without palpitations dizizness syncope diaphoresis. abd tender on exam.       Allergies    No Known Allergies    Intolerances    	    MEDICATIONS:  aspirin enteric coated 81 milliGRAM(s) Oral daily  isosorbide   mononitrate ER Tablet (IMDUR) 30 milliGRAM(s) Oral daily  metoprolol succinate  milliGRAM(s) Oral daily  sacubitril 24 mG/valsartan 26 mG 1 Tablet(s) Oral two times a day  spironolactone 12.5 milliGRAM(s) Oral daily    piperacillin/tazobactam IVPB.. 3.375 Gram(s) IV Intermittent every 8 hours      acetaminophen     Tablet .. 650 milliGRAM(s) Oral every 6 hours PRN  melatonin 3 milliGRAM(s) Oral at bedtime PRN  morphine  - Injectable 2 milliGRAM(s) IV Push every 6 hours PRN  ondansetron Injectable 4 milliGRAM(s) IV Push every 8 hours PRN    aluminum hydroxide/magnesium hydroxide/simethicone Suspension 30 milliLiter(s) Oral every 4 hours PRN  pantoprazole    Tablet 40 milliGRAM(s) Oral before breakfast    atorvastatin 40 milliGRAM(s) Oral at bedtime    tamsulosin 0.4 milliGRAM(s) Oral at bedtime      PAST MEDICAL & SURGICAL HISTORY:  Hypertension      Diabetes mellitus      GERD (gastroesophageal reflux disease)      CAD (coronary artery disease)      S/P CABG (coronary artery bypass graft)          FAMILY HISTORY:  FH: heart disease (Father)        SOCIAL HISTORY:    no tob etoh    REVIEW OF SYSTEMS:  See HPI. Otherwise, 10 point ROS done and otherwise negative.    PHYSICAL EXAM:  T(C): 36.7 (05-16-24 @ 10:44), Max: 36.9 (05-15-24 @ 23:36)  HR: 91 (05-16-24 @ 10:44) (86 - 95)  BP: 143/83 (05-16-24 @ 10:44) (125/79 - 167/101)  RR: 18 (05-16-24 @ 10:44) (18 - 26)  SpO2: 99% (05-16-24 @ 10:44) (94% - 110%)  Wt(kg): --  I&O's Summary    15 May 2024 07:01  -  16 May 2024 07:00  --------------------------------------------------------  IN: 150 mL / OUT: 0 mL / NET: 150 mL        Appearance: Normal	  HEENT:   Normal oral mucosa, PERRL, EOMI	  Lymphatic: No lymphadenopathy  Cardiovascular: Normal S1 S2, No JVD, No murmurs, No edema  Respiratory: Lungs clear to auscultation	  Psychiatry: A & O x 3, Mood & affect appropriate  Gastrointestinal:  Soft, tender without rebound or guarding, + BS	  Skin: No rashes, No ecchymoses, No cyanosis	  Neurologic: Non-focal  Extremities: Normal range of motion, No clubbing, cyanosis       LABS:	 	    CBC Full  -  ( 16 May 2024 08:10 )  WBC Count : 9.48 K/uL  Hemoglobin : 14.7 g/dL  Hematocrit : 44.4 %  Platelet Count - Automated : 295 K/uL  Mean Cell Volume : 82.5 fl  Mean Cell Hemoglobin : 27.3 pg  Mean Cell Hemoglobin Concentration : 33.1 g/dL  Auto Neutrophil # : x  Auto Lymphocyte # : x  Auto Monocyte # : x  Auto Eosinophil # : x  Auto Basophil # : x  Auto Neutrophil % : x  Auto Lymphocyte % : x  Auto Monocyte % : x  Auto Eosinophil % : x  Auto Basophil % : x    05-16    134<L>  |  98  |  12  ----------------------------<  133<H>  4.0   |  24  |  0.92  05-15    135  |  99  |  12  ----------------------------<  170<H>  5.0   |  24  |  1.13    Ca    9.2      16 May 2024 08:10  Ca    10.0      15 May 2024 20:54  Mg     2.4     05-15    TPro  8.1  /  Alb  3.6  /  TBili  5.0<H>  /  DBili  x   /  AST  804<H>  /  ALT  820<H>  /  AlkPhos  201<H>  05-16  TPro  9.2<H>  /  Alb  4.4  /  TBili  2.2<H>  /  DBili  x   /  AST  176<H>  /  ALT  112<H>  /  AlkPhos  119  05-15      proBNP:   Lipid Profile:   HgA1c:   TSH:       CARDIAC MARKERS:            TELEMETRY: 	    ECG:  	  RADIOLOGY:  OTHER: 	    PREVIOUS DIAGNOSTIC TESTING:    [ ] Echocardiogram:  [ ]  Catheterization:  [ ] Stress Test:  	  	  ASSESSMENT/PLAN: 	    Anuarg Acosta MD    
CHIEF COMPLAINT: abdominal pain     HPI:   53 year old male with a PMH of  HTN, DM, CAD sp CABG & stents 12/2022, POBA to OM 11/14/23 and to mLAD 11/27/23, ICM/ CHF, GERD presents to the ED for abdominal pain and SOB. Endorses 4 day history of diffused abdominal pain associated with nausea, vomiting, and decrease PO tolerance. Also endorses 2 days history of worsen constipation without any relief with laxative. While in CT patient became hypoxic, saturating in the 80s on RA, responded to O2 2l-NC. Endorses while he was in CT, experienced acute onset of chest tightness which has then subsided. Denies SOB, palpitation, chills, fever or any other acute symptoms. Labs remarkable for BNP: 1330, lactate: 3.6 AST/ALT: 176/112. Normotensive. Afebrile. Received Aspirin, Lasix, Zosyn, Ofirmev, Pepcid, Zofran.   US-ABD: Cholelithiasis without sonographic evidence of acute cholecystitis. Dilated common bile duct; correlate clinically to exclude a distal obstructive process.  CT-ABD: Choledocholithiasis with extrahepatic and intrahepatic biliary ductal dilatation. Cholelithiasis with mild gallbladder distention, wall thickening, and pericholecystic inflammation.   Surgery consulted - No acute surgical intervention is needed  (16 May 2024 04:55)    Subjective:  ICU consult called for hypotension Pt seen and examined at bedside during RRT. Pt laying trendelenburg with a systolic in the 70s. Currently getting 1L IVF bolus. Pt awake and alert, answering questions appropriately Reports feeling dizziness/ light-headedness. Repeat BP 70s/40s. Levophed ordered and will start. Will admit to ICU for further management.       PAST MEDICAL & SURGICAL HISTORY:  Hypertension      Diabetes mellitus      GERD (gastroesophageal reflux disease)      CAD (coronary artery disease)      S/P CABG (coronary artery bypass graft)          FAMILY HISTORY:  FH: heart disease (Father)      Allergies    No Known Allergies    Intolerances        HOME MEDICATIONS:    REVIEW OF SYSTEMS:  [x ] All other systems negative other than what is stated above      OBJECTIVE:  ICU Vital Signs Last 24 Hrs  T(C): 36 (17 May 2024 11:30), Max: 37.6 (17 May 2024 10:55)  T(F): 96.8 (17 May 2024 11:30), Max: 99.7 (17 May 2024 10:55)  HR: 80 (17 May 2024 12:30) (80 - 114)  BP: 89/64 (17 May 2024 12:30) (68/40 - 161/85)  BP(mean): 73 (17 May 2024 12:30) (73 - 73)  ABP: --  ABP(mean): --  RR: 24 (17 May 2024 12:30) (18 - 28)  SpO2: 99% (17 May 2024 12:30) (92% - 99%)    O2 Parameters below as of 17 May 2024 11:30  Patient On (Oxygen Delivery Method): nasal cannula  O2 Flow (L/min): 2            05-16 @ 07:01  -  05-17 @ 07:00  --------------------------------------------------------  IN: 250 mL / OUT: 4 mL / NET: 246 mL      CAPILLARY BLOOD GLUCOSE      POCT Blood Glucose.: 183 mg/dL (17 May 2024 10:45)      PHYSICAL EXAM:  GENERAL: NAD, lying in bed comfortably in trendelenburg   HEAD:  Atraumatic, normocephalic  EYES: EOMI, PERRLA, conjunctiva jaundice   NECK: Supple, trachea midline  HEART: Regular rate and rhythm  LUNGS: Unlabored respirations.  Clear to auscultation bilaterally, no crackles, wheezing, or rhonchi  ABDOMEN: soft, nontender, nondistended  EXTREMITIES:  No clubbing, cyanosis, or edema  NERVOUS SYSTEM:  [address]&Ox[address] [place] [address] MEDICATIONS:  MEDICATIONS  (STANDING):  aspirin enteric coated 81 milliGRAM(s) Oral daily  atorvastatin 40 milliGRAM(s) Oral at bedtime  chlorhexidine 2% Cloths 1 Application(s) Topical <User Schedule>  heparin   Injectable 5000 Unit(s) SubCutaneous every 8 hours  insulin lispro (ADMELOG) corrective regimen sliding scale   SubCutaneous three times a day before meals  lactated ringers Bolus 1000 milliLiter(s) IV Bolus once  midodrine 10 milliGRAM(s) Oral every 8 hours  norepinephrine Infusion 0.05 MICROgram(s)/kG/Min (6.27 mL/Hr) IV Continuous <Continuous>  pantoprazole  Injectable 40 milliGRAM(s) IV Push daily  piperacillin/tazobactam IVPB.. 3.375 Gram(s) IV Intermittent every 8 hours  tamsulosin 0.4 milliGRAM(s) Oral at bedtime    MEDICATIONS  (PRN):  acetaminophen     Tablet .. 650 milliGRAM(s) Oral every 6 hours PRN Temp greater or equal to 38C (100.4F), Mild Pain (1 - 3)  aluminum hydroxide/magnesium hydroxide/simethicone Suspension 30 milliLiter(s) Oral every 4 hours PRN Dyspepsia  HYDROmorphone  Injectable 1 milliGRAM(s) IV Push every 4 hours PRN Severe Pain (7 - 10)  ondansetron Injectable 4 milliGRAM(s) IV Push every 8 hours PRN Nausea and/or Vomiting      LABS:                        14.2   12.09 )-----------( 295      ( 17 May 2024 06:49 )             43.3     05-16    134<L>  |  98  |  12  ----------------------------<  133<H>  4.0   |  24  |  0.92    Ca    9.2      16 May 2024 08:10  Mg     2.4     05-15    TPro  6.9  /  Alb  3.1<L>  /  TBili  9.1<H>  /  DBili  7.1<H>  /  AST  297<H>  /  ALT  534<H>  /  AlkPhos  257<H>  05-17    PT/INR - ( 16 May 2024 08:10 )   PT: 13.3 sec;   INR: 1.12 ratio         PTT - ( 16 May 2024 08:10 )  PTT:34.7 sec  Urinalysis Basic - ( 16 May 2024 08:10 )    Color: x / Appearance: x / SG: x / pH: x  Gluc: 133 mg/dL / Ketone: x  / Bili: x / Urobili: x   Blood: x / Protein: x / Nitrite: x   Leuk Esterase: x / RBC: x / WBC x   Sq Epi: x / Non Sq Epi: x / Bacteria: x            MICROBIOLOGY:     RADIOLOGY:  [x ] Reviewed and interpreted by me    
53M with CAD/CABG/PCI (last 11/23), on DAPT, ICM HFrEF HTN DM p/w abd pain x4d. Reports fever to 103 on Tuesday night. Came in on Wednesday as pain persisted. Labs notable for mildly elevated WBC count, abnormal LFTs, and CT with stone in CBD. Patient has never had pain like this before.     PMH/PSH--  HFrEF  Hypertension  Diabetes mellitus  GERD (gastroesophageal reflux disease)  CAD (coronary artery disease)  S/P CABG (coronary artery bypass graft)  s/p PCI 11/2023 on DAPT    Allergies--  No Known Allergies    Medications--  Other: acetaminophen     Tablet .. PRN  aluminum hydroxide/magnesium hydroxide/simethicone Suspension PRN  aspirin enteric coated  atorvastatin  HYDROmorphone  Injectable PRN  isosorbide   mononitrate ER Tablet (IMDUR)  melatonin PRN  metoprolol succinate ER  ondansetron Injectable PRN  pantoprazole    Tablet  sacubitril 24 mG/valsartan 26 mG  spironolactone  tamsulosin    Social History--  EtOH: denies   Tobacco: denies   Drug Use: denies     Family/Marital History--  No pertinent family history in first degree relatives    FH: heart disease (Father)    Review of Systems:  A >=10-point review of systems was obtained.     Pertinent positives and negatives--  Constitutional: +fevers. +Chills.   Eyes: No visual change or eye pain  ENMT: No hearing trouble or throat pain  Cardiovascular: No chest pain. No palpitations.  Respiratory: No shortness of breath. No cough.  Gastrointestinal: +abdominal pain  Musculoskeletal: No arthralgia or myalgia  Skin: No rashes or lesions  Neurologic: No weakness or disorientation  Psychiatric: Pleasant. Appropriate affect.  Endocrine: No polyuria or polydipsia  Heme/Lymphatic: No easy bruising or immune issues    Review of systems otherwise negative except as previously noted.    Physical Exam--  Vital Signs: T(F): 98.2 (05-16-24 @ 15:24), Max: 98.4 (05-15-24 @ 23:36)  HR: 92 (05-16-24 @ 15:24)  BP: 119/82 (05-16-24 @ 15:24)  RR: 18 (05-16-24 @ 15:24)  SpO2: 92% (05-16-24 @ 15:24)    General: Nontoxic-appearing in no acute distress.  HEENT: AT/NC. +icteric. Conjunctiva pink and moist. .  Lungs: Clear bilaterally without rales, wheezing or rhonchi  Heart: Regular rate and rhythm. No Murmur. Healed CABG scars.   Abdomen:  Soft. Nondistended. ttp epigastrium.   Extremities: No cyanosis or clubbing.    Skin: Warm. Dry. Good turgor. No rash.   Psychiatric: Appropriate affect and mood for situation.     Laboratory & Imaging Data--  CBC                        14.7   9.48  )-----------( 295      ( 16 May 2024 08:10 )             44.4       Chemistries  05-16    134<L>  |  98  |  12  ----------------------------<  133<H>  4.0   |  24  |  0.92    Ca    9.2      16 May 2024 08:10  Mg     2.4     05-15    TPro  8.1  /  Alb  3.6  /  TBili  5.0<H>  /  DBili  x   /  AST  804<H>  /  ALT  820<H>  /  AlkPhos  201<H>  05-16    Lipase normal    ACC: [id number] EXAM:  CT ABDOMEN AND PELVIS IC   ORDERED BY: Ashley Bang     PROCEDURE DATE:  05/15/2024          INTERPRETATION:  CLINICAL INFORMATION: Abdominal distention and vomiting.   Rule out bowel obstruction.    COMPARISON: None.    CONTRAST/COMPLICATIONS:  IV Contrast: Omnipaque 350  90 cc administered   10 cc discarded  Oral Contrast: NONE  Complications: None reported at time of study completion    PROCEDURE:  CT of the Abdomen and Pelvis was performed.  Sagittal and coronal reformats were performed.    FINDINGS:  LOWER CHEST: Status post median sternotomy. Coronary artery calcification   and/or stents. Bilateral relatively symmetric groundglass airspace   opacities predominantly within the lower lobes.    LIVER: Within normal limits.  BILE DUCTS: 2 mm calculus within the distal common bile duct (2:66) with   associated extrahepatic and intrahepatic biliary ductal dilatation.   Common bile duct measures up to 1.0 cm.  GALLBLADDER: Cholelithiasis. Mild gallbladder distention with suggestion   of wall thickening and pericholecystic inflammation.  SPLEEN: Within normal limits.  PANCREAS: Within normal limits.  ADRENALS: Within normal limits.  KIDNEYS/URETERS: No hydronephrosis. 7 mm calculus within the distal right   ureter (4:70). 1.1 cm nonobstructing left upper pole renal calculus.   Low-attenuation lesion within the right kidney too small to accurately   characterize.    BLADDER: Within normal limits.  REPRODUCTIVE ORGANS: Prostate within normal limits in size.    BOWEL: No small bowel obstruction. Nonspecific mild distention of the   ascending and transverse colon without evidence of mechanical   obstruction. Appendix is normal.  PERITONEUM: Trace perihepatic/right upper quadrant ascites.  VESSELS: Within normal limits.  RETROPERITONEUM/LYMPH NODES: No lymphadenopathy.  ABDOMINAL WALL: Within normal limits.  BONES: Degenerative changes.    IMPRESSION:  Choledocholithiasis with extrahepatic and intrahepatic biliary ductal   dilatation.    Cholelithiasis with mild gallbladder distention, wall thickening, and   pericholecystic inflammation. Follow-up right upper quadrant sonogram may   be obtained for further evaluation.    Nonspecific mild gaseous distention of the ascending and transverse colon   without evidence of mechanical obstruction.    7 mm right distal ureteral calculus without hydroureteronephrosis.    Impression: 53M with cardiac disease presenting with likely cholangitis given fevers at home.     Recommend:  - blood cx x2  - agree with zosyn  - daily LFTs  - would confirm with cardiology of patient can be off Brillinta  - NPO after MN for ERCP tomorrow  - ultimate cholecystectomy per surgery    
  Chief Complaint:  Patient is a 53y old  Male who presents with a chief complaint of Choledocholithiasis (16 May 2024 19:32)      HPI:   53 year old male with a PMH of  HTN, DM, CAD sp CABG & stents 12/2022, POBA to OM 11/14/23 and to mLAD 11/27/23, ICM/ CHF, GERD presents to the ED for abdominal pain and SOB. Endorses 4 day history of diffused abdominal pain associated with nausea, vomiting, and decrease PO tolerance. Also endorses 2 days history of worsen constipation without any relief with laxative. While in CT patient became hypoxic, saturating in the 80s on RA, responded to O2 2l-NC. Endorses while he was in CT, experienced acute onset of chest tightness which has then subsided. Denies SOB, palpitation, chills, fever or any other acute symptoms. Labs remarkable for BNP: 1330, lactate: 3.6 AST/ALT: 176/112. Normotensive. Afebrile. Received Aspirin, Lasix, Zosyn, Ofirmev, Pepcid, Zofran.   US-ABD: Cholelithiasis without sonographic evidence of acute cholecystitis. Dilated common bile duct; correlate clinically to exclude a distal obstructive process.  CT-ABD: Choledocholithiasis with extrahepatic and intrahepatic biliary ductal dilatation. Cholelithiasis with mild gallbladder distention, wall thickening, and pericholecystic inflammation.   Surgery consulted - No acute surgical intervention is needed  (16 May 2024 04:55)  We were asked to evaluate patient for for above with progressively rising LFTS       PMH/PSH:PAST MEDICAL & SURGICAL HISTORY:  Hypertension  Diabetes mellitus  GERD (gastroesophageal reflux disease)  CAD (coronary artery disease)  S/P CABG (coronary artery bypass graft)  last colonoscopy in [facility] DR gustafson per patient negative in 2023    Allergies:  No Known Allergies      Medications:  acetaminophen     Tablet .. 650 milliGRAM(s) Oral every 6 hours PRN  aluminum hydroxide/magnesium hydroxide/simethicone Suspension 30 milliLiter(s) Oral every 4 hours PRN  aspirin enteric coated 81 milliGRAM(s) Oral daily  atorvastatin 40 milliGRAM(s) Oral at bedtime  HYDROmorphone  Injectable 1 milliGRAM(s) IV Push every 4 hours PRN  isosorbide   mononitrate ER Tablet (IMDUR) 30 milliGRAM(s) Oral daily  melatonin 3 milliGRAM(s) Oral at bedtime PRN  metoprolol succinate  milliGRAM(s) Oral daily  ondansetron Injectable 4 milliGRAM(s) IV Push every 8 hours PRN  pantoprazole    Tablet 40 milliGRAM(s) Oral before breakfast  piperacillin/tazobactam IVPB.. 3.375 Gram(s) IV Intermittent every 8 hours  sacubitril 24 mG/valsartan 26 mG 1 Tablet(s) Oral two times a day  spironolactone 12.5 milliGRAM(s) Oral daily  tamsulosin 0.4 milliGRAM(s) Oral at bedtime      Review of Systems:    General:  No weight loss, fevers, chills, night sweats, fatigue,   Eyes:  Good vision, no reported pain, +"yellow eyes"  ENT:  No sore throat, pain, runny nose, dysphagia  CV:  No pain, palpitations, hypo/hypertension  Resp:  No dyspnea, cough, tachypnea, wheezing  GI:  mild ruq pain, No nausea, No vomiting, No diarrhea, No constipation, No pruritis, No rectal bleeding, No tarry stools, No dysphagia,  :  No pain, bleeding, incontinence, nocturia  Muscle:  No pain, weakness  Breast:  No pain, abscess, mass, discharge  Neuro:  No weakness, tingling, memory problems  Psych:  No fatigue, insomnia, mood problems, depression  Endocrine:  No polyuria, polydipsia, cold/heat intolerance  Heme:  No petechiae, ecchymosis, easy bruisability  Skin:  No rash, tattoos, scars, edema    Relevant Family History:   FAMILY HISTORY:  FH: heart disease (Father)        Relevant Social History: Alcohol ( -) , Tobacco ( -) , Illicit drugs (- )     Physical Exam:    Vital Signs:  Vital Signs Last 24 Hrs  T(C): 37.3 (17 May 2024 05:06), Max: 37.3 (17 May 2024 05:06)  T(F): 99.1 (17 May 2024 05:06), Max: 99.1 (17 May 2024 05:06)  HR: 99 (17 May 2024 05:14) (89 - 114)  BP: 100/50 (17 May 2024 05:14) (99/50 - 161/85)  BP(mean): --  RR: 18 (17 May 2024 05:06) (18 - 18)  SpO2: 99% (17 May 2024 05:06) (92% - 99%)    Parameters below as of 17 May 2024 01:05  Patient On (Oxygen Delivery Method): nasal cannula  O2 Flow (L/min): 2        General:  Appears stated age, well-groomed, well-nourished, no distress  HEENT:  NC/AT,  conjunctivae clear and pink, no thyromegaly, nodules, adenopathy, no JVD,+ICTERIC sclera  Chest:  well healed midline  scar Full & symmetric excursion, no increased effort, breath sounds clear  Cardiovascular:  Regular rhythm, S1, S2, no murmur/rub/S3/S4, no abdominal bruit, no edema  Abdomen:  Soft, RUQ tender, non distended, normoactive bowel sounds,  no masses , no hepatosplenomegaly, no signs of chronic liver disease  Extremities:  no cyanosis, clubbing or edema  Skin:  + JAUNDICE No rash/erythema/ecchymoses/petechiae/wounds/abscess/warm/dry  Neuro/Psych:  Alert, oriented, no asterixis, no tremor, no encephalopathy    Laboratory:                          14.2   12.09 )-----------( 295      ( 17 May 2024 06:49 )             43.3     05-16    134<L>  |  98  |  12  ----------------------------<  133<H>  4.0   |  24  |  0.92    Ca    9.2      16 May 2024 08:10  Mg     2.4     05-15    TPro  8.1  /  Alb  3.6  /  TBili  5.0<H>  /  DBili  x   /  AST  804<H>  /  ALT  820<H>  /  AlkPhos  201<H>  05-16    LIVER FUNCTIONS - ( 16 May 2024 08:10 )  Alb: 3.6 g/dL / Pro: 8.1 gm/dL / ALK PHOS: 201 U/L / ALT: 820 U/L / AST: 804 U/L / GGT: x           PT/INR - ( 16 May 2024 08:10 )   PT: 13.3 sec;   INR: 1.12 ratio         PTT - ( 16 May 2024 08:10 )  PTT:34.7 sec  Urinalysis Basic - ( 16 May 2024 08:10 )    Color: x / Appearance: x / SG: x / pH: x  Gluc: 133 mg/dL / Ketone: x  / Bili: x / Urobili: x   Blood: x / Protein: x / Nitrite: x   Leuk Esterase: x / RBC: x / WBC x   Sq Epi: x / Non Sq Epi: x / Bacteria: x  Lipase serum42 U/L     Intake and Output    05-16-24 @ 07:01  -  05-17-24 @ 07:00  --------------------------------------------------------  IN: 250 mL / OUT: 4 mL / NET: 246 mL  Imaging:    ACC: [id number] EXAM:  US GALLBLADDER   ORDERED BY: Ashley Bang     PROCEDURE DATE:  05/16/2024          INTERPRETATION:  CLINICAL INFORMATION: Clinical concern for cholecystitis.    COMPARISON: Correlation to CT abdomen pelvis dated 5/15/2024    TECHNIQUE: Sonography of the right upper quadrant.    FINDINGS:    Liver: Within normal limits.  Bile ducts: Normal caliber. Common bile duct measures 8 mm; mildly   dilated.  Gallbladder: Cholelithiasis. Sludge also noted.  Pancreas: Poorly visualized.  Right kidney: 11.2 cm. No hydronephrosis. Small upper pole cyst.  Ascites: None.  IVC: Visualized portions are within normal limits.    IMPRESSION:    1.  Cholelithiasis without sonographic evidence of acute cholecystitis.  2.  Dilated common bile duct; correlate clinically to exclude a distal   obstructive process.      Moses Kay MD  This document has been electronically signed. May 16 2024  1:17AM    < from: CT Abdomen and Pelvis w/ IV Cont (05.15.24 @ 23:16) >  ACC: [id number] EXAM:  CT ABDOMEN AND PELVIS IC   ORDERED BY: Ashley Bang     PROCEDURE DATE:  05/15/2024          INTERPRETATION:  CLINICAL INFORMATION: Abdominal distention and vomiting.   Rule out bowel obstruction.    COMPARISON: None.    CONTRAST/COMPLICATIONS:  IV Contrast: Omnipaque 350  90 cc administered   10 cc discarded  Oral Contrast: NONE  Complications: None reported at time of study completion    PROCEDURE:  CT of the Abdomen and Pelvis was performed.  Sagittal and coronal reformats were performed.    FINDINGS:  LOWER CHEST: Status post median sternotomy. Coronary artery calcification   and/or stents. Bilateral relatively symmetric groundglass airspace   opacities predominantly within the lower lobes.    LIVER: Within normal limits.  BILE DUCTS: 2 mm calculus within the distal common bile duct (2:66) with   associated extrahepatic and intrahepatic biliary ductal dilatation.   Common bile duct measures up to 1.0 cm.  GALLBLADDER: Cholelithiasis. Mild gallbladder distention with suggestion   of wall thickening and pericholecystic inflammation.  SPLEEN: Within normal limits.  PANCREAS: Within normal limits.  ADRENALS: Within normal limits.  KIDNEYS/URETERS: No hydronephrosis. 7 mm calculus within the distal right   ureter (4:70). 1.1 cm nonobstructing left upper pole renal calculus.   Low-attenuation lesion within the right kidney too small to accurately   characterize.    BLADDER: Within normal limits.  REPRODUCTIVE ORGANS: Prostate within normal limits in size.    BOWEL: No small bowel obstruction. Nonspecific mild distention of the   ascending and transverse colon without evidence of mechanical   obstruction. Appendix is normal.  PERITONEUM: Trace perihepatic/right upper quadrant ascites.  VESSELS: Within normallimits.  RETROPERITONEUM/LYMPH NODES: No lymphadenopathy.  ABDOMINAL WALL: Within normal limits.  BONES: Degenerative changes.    IMPRESSION:  Choledocholithiasis with extrahepatic and intrahepatic biliary ductal   dilatation.    Cholelithiasis with mild gallbladder distention, wall thickening, and   pericholecystic inflammation. Follow-up right upper quadrant sonogram may   be obtained for further evaluation.    Nonspecific mild gaseous distention of the ascending and transverse colon   without evidence of mechanical obstruction.    7 mm right distal ureteral calculus without hydroureteronephrosis.      KM MILES MD; Attending Radiologist  This document has been electronically signed. May 15 2024 11:41PM    < end of copied text >  
GENERAL SURGERY CONSULT NOTE    Patient is a 53 year old male who presents with a chief complaint of suprapubic pain.    HPI: 54 y/o male PMHx HTN, DM, CAD s/p CABG & stents 12/2022, POBA to OM 11/14/23 and to mLAD 11/27/23, ICM/ CHF, EF of 35%, GERD presents with suprapubic pain x 4 days. Pain radiates to his right back. Reports nausea and vomiting. States that he had a 103.2 fever at home yesterday. Feels pain especially after eating. Patient denies constipation, diarrhea, melena, hematochezia, dysuria, hematuria, chest pain, shortness of breath, dizziness, cough. Patient denies prior incident.     REVIEW OF SYSTEMS:  CONSTITUTIONAL: No fever, weight loss, or fatigue  EYES: No eye pain, visual disturbances, discharge  ENMT:  No difficulty hearing, tinnitus, vertigo; No sinus or throat pain  NECK: No pain or stiffness  BREASTS: No pain, masses, or nipple discharge  RESPIRATORY: No cough, wheezing, chills or hemoptysis; No shortness of breath  CARDIOVASCULAR: No chest pain, palpitations, dizziness, or leg swelling  GASTROINTESTINAL: +suprapubic pain, nausea, vomiting. No hematemesis; No diarrhea or constipation. No melena or hematochezia.  GENITOURINARY: No dysuria, frequency, hematuria, or incontinence  NEUROLOGICAL: No headaches, memory loss, loss of strength, numbness, or tremors  SKIN: No itching, burning, rashes, or lesions   LYMPH NODES: No enlarged glands  ENDOCRINE: No heat or cold intolerance; No hair loss  MUSCULOSKELETAL: No joint pain or swelling; No muscle, back, or extremity pain  PSYCHIATRIC: No depression, anxiety, mood swings, or difficulty sleeping  HEME/LYMPH: No easy bruising, or bleeding gums  ALLERY AND IMMUNOLOGIC: No hives or eczema     PAST MEDICAL & SURGICAL HISTORY:  Hypertension  Diabetes mellitus  GERD (gastroesophageal reflux disease)  CAD (coronary artery disease)  S/P CABG (coronary artery bypass graft)    MEDICATIONS: On ASA/Brilinta    Allergies  No Known Allergies    SOCIAL HISTORY: Tobacco history. Denies alcohol and drugs.           FAMILY HISTORY:  FH: heart disease (Father)    Vital Signs Last 24 Hrs  T(C): 36.9 (15 May 2024 23:36), Max: 36.9 (15 May 2024 23:36)  T(F): 98.4 (15 May 2024 23:36), Max: 98.4 (15 May 2024 23:36)  HR: 95 (15 May 2024 23:36) (86 - 95)  BP: 125/79 (15 May 2024 23:36) (125/79 - 167/101)  BP(mean): --  RR: 26 (15 May 2024 23:36) (24 - 26)  SpO2: 110% (15 May 2024 23:36) (100% - 110%)    Parameters below as of 15 May 2024 23:36  Patient On (Oxygen Delivery Method): room air    PHYSICAL EXAM:  CONSTITUTIONAL: NAD, well-developed  HEAD:  Atraumatic, Normocephalic  EYES: Conjunctiva and sclera clear  ENMT: No tonsillar erythema, exudates, or enlargement; Moist mucous membranes, No lesions  NECK: Supple, No JVD, Normal thyroid  NERVOUS SYSTEM:  Alert & Oriented X3  RESPIRATORY: Clear to auscultation bilaterally; No rales, rhonchi, wheezing  CARDIOVASCULAR: Regular rate and rhythm. S1S2  GASTROINTESTINAL: Nondistended, +BS, soft, suprapubic tenderness, no guarding, no rigidity   : No bladder distention  MUSCULOSKELETAL: 2+ Peripheral Pulses, No clubbing, cyanosis, or edema     LABS:                        15.6   11.18 )-----------( 323      ( 15 May 2024 20:54 )             48.3     05-15    135  |  99  |  12  ----------------------------<  170<H>  5.0   |  24  |  1.13    Ca    10.0      15 May 2024 20:54  Mg     2.4     05-15    TPro  9.2<H>  /  Alb  4.4  /  TBili  2.2<H>  /  DBili  x   /  AST  176<H>  /  ALT  112<H>  /  AlkPhos  119  05-15    Urinalysis Basic - ( 15 May 2024 20:54 )    Color: x / Appearance: x / SG: x / pH: x  Gluc: 170 mg/dL / Ketone: x  / Bili: x / Urobili: x   Blood: x / Protein: x / Nitrite: x   Leuk Esterase: x / RBC: x / WBC x   Sq Epi: x / Non Sq Epi: x / Bacteria: x    < from: CT Abdomen and Pelvis w/ IV Cont (05.15.24 @ 23:16) >  FINDINGS:  LOWER CHEST: Status post median sternotomy. Coronary artery calcification   and/or stents. Bilateral relatively symmetric groundglass airspace   opacities predominantly within the lower lobes.    LIVER: Within normal limits.  BILE DUCTS: 2 mm calculus within the distal common bile duct (2:66) with   associated extrahepatic and intrahepatic biliary ductal dilatation.   Common bile duct measures up to 1.0 cm.  GALLBLADDER: Cholelithiasis. Mild gallbladder distention with suggestion   of wall thickening and pericholecystic inflammation.  SPLEEN: Within normal limits.  PANCREAS: Within normal limits.  ADRENALS: Within normal limits.  KIDNEYS/URETERS: No hydronephrosis. 7 mm calculus within the distal right   ureter (4:70). 1.1 cm nonobstructing left upper pole renal calculus.   Low-attenuation lesion within the right kidney too small to accurately   characterize.    BLADDER: Within normal limits.  REPRODUCTIVE ORGANS: Prostate within normal limits in size.    BOWEL: No small bowel obstruction. Nonspecific mild distention of the   ascending and transverse colon without evidence of mechanical   obstruction. Appendix is normal.  PERITONEUM: Trace perihepatic/right upper quadrant ascites.  VESSELS: Within normallimits.  RETROPERITONEUM/LYMPH NODES: No lymphadenopathy.  ABDOMINAL WALL: Within normal limits.  BONES: Degenerative changes.    IMPRESSION:  Choledocholithiasis with extrahepatic and intrahepatic biliary ductal   dilatation.    Cholelithiasis with mild gallbladder distention, wall thickening, and   pericholecystic inflammation. Follow-up right upper quadrant sonogram may   be obtained for further evaluation.    Nonspecific mild gaseous distention of the ascending and transverse colon   without evidence of mechanical obstruction.    7 mm right distal ureteral calculus without hydroureteronephrosis.    < end of copied text >    
UROLOGY CONSULT NOTE    Patient is a 53 year old male who presents with a chief complaint of suprapubic pain.    HPI: 54 y/o male PMHx HTN, DM, CAD s/p CABG & stents 12/2022, POBA to OM 11/14/23 and to mLAD 11/27/23, ICM/ CHF, EF of 35%, GERD presents with suprapubic pain x 4 days. Pain radiates to his right back. Reports nausea and vomiting. States that he had a 103.2 fever at home yesterday. Feels pain especially after eating. Patient denies constipation, diarrhea, melena, hematochezia, dysuria, hematuria, chest pain, shortness of breath, dizziness, cough. Patient denies prior incident.     REVIEW OF SYSTEMS:  CONSTITUTIONAL: No fever, weight loss, or fatigue  EYES: No eye pain, visual disturbances, discharge  ENMT:  No difficulty hearing, tinnitus, vertigo; No sinus or throat pain  NECK: No pain or stiffness  BREASTS: No pain, masses, or nipple discharge  RESPIRATORY: No cough, wheezing, chills or hemoptysis; No shortness of breath  CARDIOVASCULAR: No chest pain, palpitations, dizziness, or leg swelling  GASTROINTESTINAL: +suprapubic pain, nausea, vomiting. No hematemesis; No diarrhea or constipation. No melena or hematochezia.  GENITOURINARY: No dysuria, frequency, hematuria, or incontinence  NEUROLOGICAL: No headaches, memory loss, loss of strength, numbness, or tremors  SKIN: No itching, burning, rashes, or lesions   LYMPH NODES: No enlarged glands  ENDOCRINE: No heat or cold intolerance; No hair loss  MUSCULOSKELETAL: No joint pain or swelling; No muscle, back, or extremity pain  PSYCHIATRIC: No depression, anxiety, mood swings, or difficulty sleeping  HEME/LYMPH: No easy bruising, or bleeding gums  ALLERY AND IMMUNOLOGIC: No hives or eczema     PAST MEDICAL & SURGICAL HISTORY:  Hypertension  Diabetes mellitus  GERD (gastroesophageal reflux disease)  CAD (coronary artery disease)  S/P CABG (coronary artery bypass graft)    MEDICATIONS: On ASA/Brilinta    Allergies  No Known Allergies    SOCIAL HISTORY: Tobacco history. Denies alcohol and drugs.           FAMILY HISTORY:  FH: heart disease (Father)    Vital Signs Last 24 Hrs  T(C): 36.9 (15 May 2024 23:36), Max: 36.9 (15 May 2024 23:36)  T(F): 98.4 (15 May 2024 23:36), Max: 98.4 (15 May 2024 23:36)  HR: 95 (15 May 2024 23:36) (86 - 95)  BP: 125/79 (15 May 2024 23:36) (125/79 - 167/101)  BP(mean): --  RR: 26 (15 May 2024 23:36) (24 - 26)  SpO2: 110% (15 May 2024 23:36) (100% - 110%)    Parameters below as of 15 May 2024 23:36  Patient On (Oxygen Delivery Method): room air    PHYSICAL EXAM:  CONSTITUTIONAL: NAD, well-developed  HEAD:  Atraumatic, Normocephalic  EYES: Conjunctiva and sclera clear  ENMT: No tonsillar erythema, exudates, or enlargement; Moist mucous membranes, No lesions  NECK: Supple, No JVD, Normal thyroid  NERVOUS SYSTEM:  Alert & Oriented X3  RESPIRATORY: Clear to auscultation bilaterally; No rales, rhonchi, wheezing  CARDIOVASCULAR: Regular rate and rhythm. S1S2  GASTROINTESTINAL: Nondistended, +BS, soft, suprapubic tenderness, no guarding, no rigidity   : No bladder distention  MUSCULOSKELETAL: 2+ Peripheral Pulses, No clubbing, cyanosis, or edema     LABS:                        15.6   11.18 )-----------( 323      ( 15 May 2024 20:54 )             48.3     05-15    135  |  99  |  12  ----------------------------<  170<H>  5.0   |  24  |  1.13    Ca    10.0      15 May 2024 20:54  Mg     2.4     05-15    TPro  9.2<H>  /  Alb  4.4  /  TBili  2.2<H>  /  DBili  x   /  AST  176<H>  /  ALT  112<H>  /  AlkPhos  119  05-15    Urinalysis Basic - ( 15 May 2024 20:54 )    Color: x / Appearance: x / SG: x / pH: x  Gluc: 170 mg/dL / Ketone: x  / Bili: x / Urobili: x   Blood: x / Protein: x / Nitrite: x   Leuk Esterase: x / RBC: x / WBC x   Sq Epi: x / Non Sq Epi: x / Bacteria: x    < from: CT Abdomen and Pelvis w/ IV Cont (05.15.24 @ 23:16) >  FINDINGS:  LOWER CHEST: Status post median sternotomy. Coronary artery calcification   and/or stents. Bilateral relatively symmetric groundglass airspace   opacities predominantly within the lower lobes.    LIVER: Within normal limits.  BILE DUCTS: 2 mm calculus within the distal common bile duct (2:66) with   associated extrahepatic and intrahepatic biliary ductal dilatation.   Common bile duct measures up to 1.0 cm.  GALLBLADDER: Cholelithiasis. Mild gallbladder distention with suggestion   of wall thickening and pericholecystic inflammation.  SPLEEN: Within normal limits.  PANCREAS: Within normal limits.  ADRENALS: Within normal limits.  KIDNEYS/URETERS: No hydronephrosis. 7 mm calculus within the distal right   ureter (4:70). 1.1 cm nonobstructing left upper pole renal calculus.   Low-attenuation lesion within the right kidney too small to accurately   characterize.    BLADDER: Within normal limits.  REPRODUCTIVE ORGANS: Prostate within normal limits in size.    BOWEL: No small bowel obstruction. Nonspecific mild distention of the   ascending and transverse colon without evidence of mechanical   obstruction. Appendix is normal.  PERITONEUM: Trace perihepatic/right upper quadrant ascites.  VESSELS: Within normallimits.  RETROPERITONEUM/LYMPH NODES: No lymphadenopathy.  ABDOMINAL WALL: Within normal limits.  BONES: Degenerative changes.    IMPRESSION:  Choledocholithiasis with extrahepatic and intrahepatic biliary ductal   dilatation.    Cholelithiasis with mild gallbladder distention, wall thickening, and   pericholecystic inflammation. Follow-up right upper quadrant sonogram may   be obtained for further evaluation.    Nonspecific mild gaseous distention of the ascending and transverse colon   without evidence of mechanical obstruction.    7 mm right distal ureteral calculus without hydroureteronephrosis.    < end of copied text >

## 2024-05-17 NOTE — PROGRESS NOTE ADULT - ASSESSMENT
54 y/o male PMHx HTN, DM, CAD s/p CABG & stents 12/2022, POBA to OM 11/14/23 and to mLAD 11/27/23, ICM/ CHF, EF of 35%, GERD presents with suprapubic pain x 4 days.   CT shows Choledocholithiasis with extrahepatic and intrahepatic biliary ductal dilatation. Cholelithiasis with mild gallbladder distention, wall thickening, and pericholecystic inflammation. 7 mm right distal ureteral calculus without hydroureteronephrosis.  Pt had hypoxia after CT scan and was placed on NRB. Now on NC.     Plan:  For ERCP today with GI sec to Choledocolithiasis  Continue flomax and strain all urine  No acute surgical intervention for ureteral stone  Discussed with Dr Parry

## 2024-05-17 NOTE — CONSULT NOTE ADULT - CONSULT REASON
choledocholithiasis
Choledocholithiasis
Hypotension
Ureteral stone
abd pain sob
HEPATO-BILIARY CONSULT    OBSTRUCTIVE JAUNDICE

## 2024-05-17 NOTE — PATIENT PROFILE ADULT - PRO INTERPRETER NEED 2
I have reviewed the surgical (or preoperative) H&P that is linked to this encounter, and examined the patient. There are no significant changes  
English

## 2024-05-17 NOTE — CONSULT NOTE ADULT - ASSESSMENT
Interventional Radiology    Evaluate for Procedure: percutaneous cholecystostomy    HPI: 52 yo male with hx of HTN, DM, CAD s/p CABG & stents 2022, CHF, with acute cholecystitis, on pressers, transferred from OSH for possible intervention.    Allergies: No Known Allergies    Medications (Abx/Cardiac/Anticoagulation/Blood Products)    aspirin enteric coated: 81 milliGRAM(s) Oral ( @ 11:48)  furosemide   Injectable: 20 milliGRAM(s) IV Push ( @ 10:16)  isosorbide   mononitrate ER Tablet (IMDUR): 30 milliGRAM(s) Oral ( @ 11:10)  midodrine.: 10 milliGRAM(s) Oral ( @ 11:44)  norepinephrine Infusion: 11.5 mL/Hr IV Continuous ( @ 20:10)  norepinephrine Infusion: 6.27 mL/Hr IV Continuous ( @ 10:59)  piperacillin/tazobactam IVPB..: 25 mL/Hr IV Intermittent ( @ 11:48)  piperacillin/tazobactam IVPB..: 25 mL/Hr IV Intermittent ( @ 05:35)  sacubitril 24 mG/valsartan 26 m Tablet(s) Oral ( @ 18:09)  spironolactone: 12.5 milliGRAM(s) Oral ( @ 05:28)    Data:  175  67.9, 66.4  T(C): 38  HR: 82  BP: 85/61  RR: 14  SpO2: 100%    -WBC 8.28 / HgB 12.6 / Hct 38.1 / Plt 225  -Na 135 / Cl 101 / BUN 25 / Glucose 170  -K 3.8 / CO2 18 / Cr 1.02  - / Alk Phos 248 / T.Bili 4.2  -INR 1.27 / PTT 36.4      Radiology: US, CT and MRCP reviewed    Assessment/Plan:   52 yo male with hx of HTN, DM, CAD s/p CABG & stents 2022, CHF, with acute cholecystitis, on pressers, transferred from OSH for possible intervention.  -- imaging & labs reviewed  -- patient seen & evaluated at the bedside by IR attending Dr. Forbes  -- case discussed with SICU attending & resident  -- no intervention at this time but will re-evaluate in morning  -- IR available for more emergent intervention if clinical status changes at any time    --  Andre Ann DO/HAYLEY/HUBER, PGY-5  Vascular and Interventional Radiology   Available on Microsoft Teams    - Non-emergent consults: Place IR consult order in [place]  - Emergent issues (pager): [facility] [phone]; [facility] [phone]; [phone]  - Scheduling questions: [facility] [phone]; [facility] [phone]  - Clinic/outpatient booking: [facility] [phone]; [facility] [phone]

## 2024-05-17 NOTE — PROGRESS NOTE ADULT - ASSESSMENT
53 year old male with a PMH of  HTN, DM, CAD sp CABG & stents 12/2022, POBA to OM 11/14/23 and to mLAD 11/27/23, ICM/ CHF, GERD presents to the ED for abdominal pain and SOB. Endorses 4 day history of diffused abdominal pain associated with nausea, vomiting, and decrease PO tolerance. Also endorses 2 days history of worsen constipation without any relief with laxative. While in CT patient became hypoxic, saturating in the 80s on RA, responded to O2 2l-NC. Endorses while he was in CT, experienced acute onset of chest tightness which has then subsided. Denies SOB, palpitation, chills, fever or any other acute symptoms. Labs remarkable for BNP: 1330, lactate: 3.6 AST/ALT: 176/112. Normotensive. Afebrile. Received Aspirin, Lasix, Zosyn, Ofirmev, Pepcid, Zofran.         Problem/Plan - 1:  ·  Problem: Septic shock with acute cholangitis with CBD stone. Hypotensive despite IVF. Patient does have CMP with EF 35%. consulted ICU and patient is transferred to ICU for vasopressors. cont IVF. At high risk for respiratory distress with volume overload due to CMP. Follow cultures, lactic acid. MOnitor on tele.   GI following. NPO for ERCP later today. WIll reassess improvement in the ICU.     2. Right ureteral stone. cont IVF and pain meds. Urology following.     Problem/Plan - 2:  ·  Problem: Acute on chronic systolic CHF.   ·  Plan: Acute onset of hypoxia & chest tightness while in CT   Pending ECHO. lasix on hold. metoprolol with holding parameters     Problem/Plan - 4:  ·  Problem: Diabetes mellitus type 2.   ·  Plan: ISS with hypoglycemia protocol   - F/u finger sticks.     Problem/Plan - 5:  ·  Problem: CAD S/P CABG (coronary artery bypass graft).   · + chest pain tightness but none at this time. no specific ST-T wave changes. troponinemia is likely from demand ischemia. cont tele. cont current cardiac meds.     Acute transaminitis. Trend.    FC  DVT ppx:   SCDs    Patient is critically ill and is at high risk for sudden deterioration. Patient needs high degree of preparedness to improve his prognsis and outcome. Critical care time spent me (chart review, patient exam and documentation of assessment and plan,, discussion with care team  including but not limited to nurse, care manager, consultants and family) excluding billable procedures is 38 mins

## 2024-05-17 NOTE — PROGRESS NOTE ADULT - ASSESSMENT
53M with CAD/CABG/PCI (last 11/23), ICM HFrEF HTN DM p/w abd pain, found to have cholelithiasis.     Cholelithiasis, history of CAD  -Plan for ERCP today, surgery and GI following  -No active chest pain or sob, appears euvolemic  -EKG with TWI (pt with known cad)  -Trop elevation noted, downtrending  -On tele, no events  -if concern for acute cholystitis/cholangitis and pt requires intervention, as this is urgent, no further cardiac testing is indicated and pt may proceed  -Would consider reinitiation of Brillinta post procedure   -Will follow with you 53M with CAD/CABG/PCI (last 11/23), ICM HFrEF HTN DM p/w abd pain, found to have choledocholithiasis.   TTE 11/23 with EF 35% and multiple wall motion abnormalities    Choledocholithiasis, obstructive jaundice, history of severe CAD with ICM  -Plan for ERCP today, surgery and GI following  -No active chest pain or sob, appears euvolemic  -EKG with new TWI in anterior and lateral leads compared to EKG 12/23  -Trop elevation noted, downtrending  -On tele, no events  -If emergent concern for acute cholystitis/cholangitis and pt requires intervention, as this is urgent, no further cardiac testing is indicated and pt may proceed  -Would consider reinitiation of Brillinta post procedure   -Will follow with you

## 2024-05-17 NOTE — PROGRESS NOTE ADULT - SUBJECTIVE AND OBJECTIVE BOX
ENDOSCOPIC RETROGRADE CHOLANGIO-PANCREATOGRAPHY (ERCP)    Anesthesia provided by: DR SMITH    Assistant: Joel GONZALEZ    INDICATION: Choledocholithiasis obstructive jaundice     Procedure: Attending physician present for entire viewing of procedure. A history and physical examination were performed. The procedure, indications, potential complications (bleeding, perforation, infection, adverse medication reaction), and alternatives available were explained to the patient who appeared to understand and indicated this. Throughout the procedure the patient's blood pressure, pulse, and oxygen saturations were monitored continuously. Opportunity for questions was provided and informed consent obtained by the medical team.     The duodenoscope was introduced through the mouth, and under direct visualization advanced to the second part of the duodenum. The ERCP was accomplished without difficulty. The patient tolerated the procedure well.    Findings:   -The esophagus was normal with limited view secondary to side-viewing scope   -The stomach was normal in appearance.   -The bile duct was unavailable to cannulate the biliary tree Contrast was injected and CBD is visualized a stone is seen floating no strictures, or filling defects were seen  - Procedure was halted after multiple attempts at advancing wire

## 2024-05-17 NOTE — PROCEDURAL SAFETY CHECKLIST WITH OR WITHOUT SEDATION - NSPRESEDATION2FT_GEN_ALL_CORE
Anesthesia confirms case reviewed for anesthesia risk alert.
hand grasp, leg strength strong and equal bilaterally

## 2024-05-17 NOTE — CONSULT NOTE ADULT - ASSESSMENT
53 year old male with a PMH of  HTN, DM, CAD sp CABG & stents 12/2022, POBA to OM 11/14/23 and to mLAD 11/27/23, ICM/ CHF, GERD presents to the ED for abdominal pain and SOB. Pt admitted to medicine with choledocholithiasis with extrahepatic and intrahepatic biliary ductal dilatation. Today RRT for hypotension, refractory to fluids requiring pressor support. Admitted to ICU for further management.     # Neuro:  -A/Ox3  - Continue current pain regimen     #Resp:  - Pt currently on 2L NC with O2 saturation of 100%. Will continue to titrate down oxygen supplementation as tolerated.   - maintain sats>92%     #CV:  //Hypotension likely 2/2 septic shock in the setting of acute cholangitis   - Pt RRT this am for hypotension, systolic in the 70s. S/p 1L IVF bolus with minimal improvement, ultimately started on levophed at bedside and transferred to ICU.  - MAP goal>65  - Will continue to downtrending pressor as tolerated  - C/w midodrine 10q8    //Grade 1 diastolic CHF  - TTE ON 11/2023 showing mild (grade 1) left ventricular diastolic dysfunction with an EF of 35%  - EKG with new TWI in anterior and lateral leads compared to EKG 12/23  -Trop elevation noted, downtrending have peaked   -Will hold all chf/ antihypertensive medications at this time  - Cardiology following, will appreciate recs    #GI:  // Acute cholangitis with CBD stone  - CT Abd/pelvis on admission: "Choledocholithiasis with extrahepatic and intrahepatic biliary ductal dilatation. Cholelithiasis with mild gallbladder distention, wall thickening, and pericholecystic inflammation. 7 mm right distal ureteral calculus without hydroureteronephrosis."  - US gallblader: "Cholelithiasis without sonographic evidence of acute cholecystitis. Dilated common bile duct."  - MRCP: Cholelithiasis and gallbladder sludge. Mild CBD dilatation (8 mm). CBD sludge and question of a small 2 mm distal CBD signal void, which may represent calculus or artifact."  - Bilirubin 9.1  - LFTS downtrending  - Pending ERCP today with GI  - GI following, will appreciate recs  - No surgical intervention at this time per surgery, will continue to f/u recs  -Diet: NPO for now  - GI ppx: PPI    #Renal:  // Ureteral calculus   - CT abd/pelvis: 7 mm right distal ureteral calculus without hydroureteronephrosis.  - No surgical intervention at this time per urology. Urology following, will continue to appreciate recs  - C/w flomax   - fluids/IVL: s/p 1l IVF bolus, will administer another 500cc bolus   - voiding freely, making adequate UO  - replete lytes as appropriate     #ID:  //Sepsis in the setting of acute cholangitis  - Continue with empiric treatment with zosyn   - F/u infectious workup: blood cxs, urine cx, ua    #Heme:  //DVT ppx: HSQ   - cbc stable    #Endo:  //sliding scale   - maintaining goal glucose<180 with ISS  - cont to monitor FS    Case discussed with ICU attending, Dr. Elliott

## 2024-05-17 NOTE — ACUTE INTERFACILITY TRANSFER NOTE - PLAN OF CARE
53 year old male with a PMH of  HTN, DM, CAD sp CABG & stents 12/2022, POBA to OM 11/14/23 and to mLAD 11/27/23, ICM/ CHF, GERD presents to the ED for abdominal pain and SOB. Pt admitted to medicine with choledocholithiasis with extrahepatic and intrahepatic biliary ductal dilatation. Today RRT for hypotension, refractory to fluids requiring pressor support. Admitted to ICU for further management. Pt taken to OR for ERCP today however difficult anatomy periampullary diverticulum pt requiring transfer for percutaneous GB drainage. Pt returned to ICU intubated with increasing levo requirements. Pt now pending transfer to SICU at [facility].

## 2024-05-17 NOTE — ACUTE INTERFACILITY TRANSFER NOTE - HOSPITAL COURSE
53 year old male with a PMH of  HTN, DM, CAD sp CABG & stents 12/2022, POBA to OM 11/14/23 and to mLAD 11/27/23, ICM/ CHF, GERD presents to the ED for abdominal pain and SOB. Pt admitted to medicine with choledocholithiasis with extrahepatic and intrahepatic biliary ductal dilatation. Today RRT for hypotension, refractory to fluids requiring pressor support. Admitted to ICU for further management. Pt taken to OR for ERCP today with GI however unable to advance wire as pt had difficult anatomy; found to have a periampullary diverticulum and unable to proceed with ERCP. Pt brought back to ICU intubated with increasing pressor requirements. Central Line placed. Transfer center called for percutaneous GB drainage. Pt accepted at [facility] SICU. Accepting attending: Dr. Chiang. Pt now pending transfer to SICU at [facility].

## 2024-05-17 NOTE — PROGRESS NOTE ADULT - NS ATTEND AMEND GEN_ALL_CORE FT
ERCP results noted, discussed with GI, Dr. Lockwood post procedure. Pt remains on vasopressor, tachycardic. He will need ICU-ICU transfer to [facility] for emergent perc cholecystostomy by IR, followed by repeat ERCP by GI with IR to cannulate rest of the CBD. Discussed the case with Surgical attending on call at [facility], pt to be transferred to [facility] SICU
personally saw and examined pt  labs and vitals reviewed  agree with above assessment and plan  worsening jaundice, concern for cholangitis, plan for ercp today, as procedure is emergent (per GI/surgery) no further cardiac testing is indicated and pt may proceed  CE and ekg noted, no cp or sob, would trend trop, ck and ckmb  pt with abd pain.   cont care per primary team

## 2024-05-17 NOTE — PATIENT PROFILE ADULT - MEDICATIONS/VISITS
no Principal Discharge DX:	Diverticulitis  Instructions for follow-up, activity and diet:	Rest, drink plenty of fluids.  Advance activity as tolerated.  Continue all previously prescribed medications as directed.  Take Motrin 600 mg (three 200 mg over the counter pills) every 8 hours as needed for moderate pain -- take with food. Take Tylenol 650mg (Two 325 mg pills) every 4-6 hours as needed for pain. Follow up with your primary care physician in 48-72 hours- bring copies of your results.  Return to the ER for worsening or persistent symptoms, including but not limited to fevers, bloody stools, worsening/persistent pain, vomiting and/or ANY NEW OR CONCERNING SYMPTOMS. If you have issues obtaining follow up, please call: [phone] to obtain a doctor or specialist who takes your insurance in your area.

## 2024-05-17 NOTE — CONSULT NOTE ADULT - PROBLEM SELECTOR RECOMMENDATION 9
-  Risks (higher with underlying cardiac issues), benefits and alternatives including but not limited to pancreatitis were discussed at length with patient regarding endoscopic retrograde cholangio pancreatography   and informed consent obtained. All questions were answered. patient seen and examined with Wife at bedside   - NPO for addon OR schedule

## 2024-05-17 NOTE — PROGRESS NOTE ADULT - PROBLEM SELECTOR PLAN 1
- will need percutaneous GB drainage and a possible future ERCP with [facility] in conjunction with IR in a Tertiary hospital setting

## 2024-05-17 NOTE — CONSULT NOTE ADULT - CRITICAL CARE ATTENDING COMMENT
53M w/ HTN, DM, CAD, CHF (EF 35%), GERD. Admitted w/ abdominal pain, found to have CBD dilation, choledocolithiasis w/ extra and intrahepatic ductal dilation on imaging, planning for ERCP today. This morning, pt developed hypotension refractory to IVF, transferred to ICU for pressors.     #Neuro - awake and alert, pain control as needed  #CV - shock state, likely septic shock in setting of known cholangitis, on norepi, titrate to MAP >/65-70; will give additional 500 cc IVF, as IVC appeared to be indetereminate but lungs w/ A lines; continue midodrine 10 mg q8  #Pulm - satting well on 2LNC  #ID- continue zosyn to cover for cholangitis; blood cx were never sent; send blood cx x2, UA, urine cx; awaiting ERCP for definitive source control  #Renal/metabolic - normal renal function, monitor I/Os, electrolytes  #GI- likely cholangitis in setting of CBD stone, awaiting ERCP today; noted rise in t-bilirubin and LFTs, continue to trend; keep NPO for ERCP; PPI; GI and surgery following and aware of upgrade to ICU  #Heme - no active issues  #Endo - FS w/ ISS coverage  #PPx - HSQ q8 for DVT ppx  #Dispo- transfer to ICU in setting of septic shock secondary to cholangitis; prognosis guarded; full code    Plan of care discussed w/ pt and his wife at bedside

## 2024-05-17 NOTE — PROGRESS NOTE ADULT - SUBJECTIVE AND OBJECTIVE BOX
SURGERY PA NOTE  Patient seen and examined bedside resting comfortably.   No complaints offered.   Pt reports abdominal pain has improved  Admits to nausea, denies vomiting  Denies chest pain, dyspnea, cough.    T(F): 96.8 (05-17-24 @ 11:30), Max: 99.7 (05-17-24 @ 10:55)  HR: 92 (05-17-24 @ 11:30) (89 - 114)  BP: 86/65 (05-17-24 @ 11:30) (68/40 - 161/85)  RR: 28 (05-17-24 @ 11:30) (18 - 28)  SpO2: 97% (05-17-24 @ 11:30) (92% - 99%)  Wt(kg): --  CAPILLARY BLOOD GLUCOSE      POCT Blood Glucose.: 183 mg/dL (17 May 2024 10:45)      PHYSICAL EXAM:  General: NAD  Neuro:  Alert & oriented x 3  HEENT: NCAT, EOMI, conjunctiva jaundiced  CV: +S1+S2 regular rate and rhythm  Lung: Respirations nonlabored, good inspiratory effort, on 2L nasal canula   Abdomen: soft, nontender, nondistended  Extremities: no pedal edema or calf tenderness noted     LABS:                        14.2   12.09 )-----------( 295      ( 17 May 2024 06:49 )             43.3     05-16    134<L>  |  98  |  12  ----------------------------<  133<H>  4.0   |  24  |  0.92    Ca    9.2      16 May 2024 08:10  Mg     2.4     05-15    TPro  6.9  /  Alb  3.1<L>  /  TBili  9.1<H>  /  DBili  7.1<H>  /  AST  297<H>  /  ALT  534<H>  /  AlkPhos  257<H>  05-17    PT/INR - ( 16 May 2024 08:10 )   PT: 13.3 sec;   INR: 1.12 ratio         PTT - ( 16 May 2024 08:10 )  PTT:34.7 sec    I&O: I&O's Detail    16 May 2024 07:01  -  17 May 2024 07:00  --------------------------------------------------------  IN:    Oral Fluid: 250 mL  Total IN: 250 mL    OUT:    Voided (mL): 4 mL  Total OUT: 4 mL    Total NET: 246 mL      < from: MR MRCP No Cont (05.16.24 @ 17:37) >  IMPRESSION:  Cholelithiasis and gallbladder sludge.  Mild CBD dilatation (8 mm).  CBD sludge and question of a small 2 mm distal CBD signal void, which may   represent calculus or artifact.    < end of copied text >      Impression: 53y Male admitted with Choledocholithiasis with extrahepatic and intrahepatic biliary ductal dilatation.   PMHx  HTN, DM, CAD s/p CABG & stents 12/2022, POBA to OM 11/14/23 and to mLAD 11/27/23, ICM/ CHF, EF of 35%, GERD   Pt upgraded to ICU for hypotension and tachycardia despite fluid resuscitation   On levo and 2L NC  Afebrile, WBC 12.09, Tbili up to 9.1 from 5.0 yesterday  MRCP shows CBD of 8mm with sludge and possible stone    Plan:  - NPO for ERCP today  - Continue IV antibiotics  - 500cc NS bolus  - Medical management per ICU   - Continue VTE prophylaxis   - Discussed with Dr. Loja SURGERY PA NOTE  Patient seen and examined in ICU s/p RRT for hypotension, resting comfortably. On low dose pressor  No complaints offered.   Pt reports abdominal pain has resolved since yesterday  Denies fever and chills  Admits to mild nausea, denies vomiting  Denies chest pain, dyspnea, cough.    T(F): 96.8 (05-17-24 @ 11:30), Max: 99.7 (05-17-24 @ 10:55)  HR: 92 (05-17-24 @ 11:30) (89 - 114)  BP: 86/65 (05-17-24 @ 11:30) (68/40 - 161/85)  RR: 28 (05-17-24 @ 11:30) (18 - 28)  SpO2: 97% (05-17-24 @ 11:30) (92% - 99%)  Wt(kg): --  CAPILLARY BLOOD GLUCOSE      POCT Blood Glucose.: 183 mg/dL (17 May 2024 10:45)      PHYSICAL EXAM:  General: NAD  Neuro:  Alert & oriented x 3  HEENT: NCAT, EOMI, conjunctiva jaundiced  CV: +S1+S2 regular rate and rhythm  Lung: Respirations nonlabored, good inspiratory effort, on 2L nasal canula   Abdomen: soft, nontender, nondistended, negative Pack's sign, no guarding, no rebound  Extremities: no pedal edema or calf tenderness noted     LABS:                        14.2   12.09 )-----------( 295      ( 17 May 2024 06:49 )             43.3     05-16    134<L>  |  98  |  12  ----------------------------<  133<H>  4.0   |  24  |  0.92    Ca    9.2      16 May 2024 08:10  Mg     2.4     05-15    TPro  6.9  /  Alb  3.1<L>  /  TBili  9.1<H>  /  DBili  7.1<H>  /  AST  297<H>  /  ALT  534<H>  /  AlkPhos  257<H>  05-17    PT/INR - ( 16 May 2024 08:10 )   PT: 13.3 sec;   INR: 1.12 ratio         PTT - ( 16 May 2024 08:10 )  PTT:34.7 sec    I&O: I&O's Detail    16 May 2024 07:01  -  17 May 2024 07:00  --------------------------------------------------------  IN:    Oral Fluid: 250 mL  Total IN: 250 mL    OUT:    Voided (mL): 4 mL  Total OUT: 4 mL    Total NET: 246 mL      < from: MR MRCP No Cont (05.16.24 @ 17:37) >  IMPRESSION:  Cholelithiasis and gallbladder sludge.  Mild CBD dilatation (8 mm).  CBD sludge and question of a small 2 mm distal CBD signal void, which may   represent calculus or artifact.    < end of copied text >      Impression: 53y Male admitted with Choledocholithiasis with extrahepatic and intrahepatic biliary ductal dilatation.   PMHx  HTN, DM, CAD s/p CABG & stents 12/2022, POBA to OM 11/14/23 and to mLAD 11/27/23, ICM/ CHF, EF of 35%, GERD   Pt upgraded to ICU for hypotension and tachycardia despite fluid resuscitation, probable acute cholangitis   On levo and 2L NC  Afebrile, WBC 12.09, Tbili up to 9.1 from 5.0 yesterday  MRCP 5/16: cholelithiasis, gallbladder sludge, CBD of 8mm, ?2mm distal CBD stone    Plan:  - ERCP today with Dr. Lockwood, made aware of patient's current status  - Continue IV Zosyn, NPO  - Continue IV fluid resuscitation by ICU team   - Medical management per ICU   - Discussed with Dr. RADHA Loja, no acute surgical intervention indicated at present

## 2024-05-17 NOTE — PROGRESS NOTE ADULT - SUBJECTIVE AND OBJECTIVE BOX
Patient seen and examined bedside resting comfortably.  No complaints offered.   Voiding without difficulty.  Denies hematuria and dysuria.  Admits to nausea, denies vomiting, NPO  Denies chest pain, dyspnea, cough.    T(F): 96.8 (05-17-24 @ 11:30), Max: 99.7 (05-17-24 @ 10:55)  HR: 80 (05-17-24 @ 12:30) (80 - 114)  BP: 89/64 (05-17-24 @ 12:30) (68/40 - 161/85)  RR: 24 (05-17-24 @ 12:30) (18 - 28)  SpO2: 99% (05-17-24 @ 12:30) (92% - 99%)  Wt(kg): --  CAPILLARY BLOOD GLUCOSE      POCT Blood Glucose.: 183 mg/dL (17 May 2024 10:45)      PHYSICAL EXAM:  General: NAD, alert and awake  HEENT: NCAT, EOMI, conjunctiva clear  Chest: nonlabored respirations, good inspiratory effort  Abdomen: soft, NTND.   Extremities: no pedal edema or calf tenderness noted   : No CVA or SP tenderness.     LABS:                        14.2   12.09 )-----------( 295      ( 17 May 2024 06:49 )             43.3   05-16    134<L>  |  98  |  12  ----------------------------<  133<H>  4.0   |  24  |  0.92    Ca    9.2      16 May 2024 08:10  Mg     2.4     05-15    TPro  6.9  /  Alb  3.1<L>  /  TBili  9.1<H>  /  DBili  7.1<H>  /  AST  297<H>  /  ALT  534<H>  /  AlkPhos  257<H>  05-17  PT/INR - ( 16 May 2024 08:10 )   PT: 13.3 sec;   INR: 1.12 ratio         PTT - ( 16 May 2024 08:10 )  PTT:34.7 sec  I&O's Detail    16 May 2024 07:01  -  17 May 2024 07:00  --------------------------------------------------------  IN:    Oral Fluid: 250 mL  Total IN: 250 mL    OUT:    Voided (mL): 4 mL  Total OUT: 4 mL    Total NET: 246 mL

## 2024-05-17 NOTE — CONSULT NOTE ADULT - SUBJECTIVE AND OBJECTIVE BOX
=====================================================                                                             SICU Consultation Note                                                             =====================================================        HISTORY  53 year old male with a PMH of  HTN, DM, CAD sp CABG & stents 12/2022, POBA to OM 11/14/23 and to mLAD 11/27/23, ICM/ CHF, GERD presents to the ED for abdominal pain and SOB. Pt admitted to medicine with choledocholithiasis with extrahepatic and intrahepatic biliary ductal dilatation. Cardiology consulted for new TWI in anterior and lateral leads compared to EKG 12/23, Trop elevation noted, downtrending. Today RRT for hypotension, refractory to fluids requiring pressor support. Admitted to ICU for further management. GI consulted for ERCP which was performed, bile duct was unavailable to cannulate biliary tree, +CBD stone seen on cholangiogram, procedure halted after multiple attempts at advancing wire. Patient remained intubated post procedure, requiring pressors, central line/summer placed prior to transfer. Transferred to [facility] for IR consult for possible perc ashley.     Allergies: No Known Allergies    PAST MEDICAL & SURGICAL HISTORY:  Hypertension  Diabetes mellitus  GERD (gastresophageal reflux disease)  CAD (coronary artery disease)  S/P CABG (coronary artery bypass graft)    FAMilY HISTORY:  FH: heart disease (Father)    ALLERGIES  No Known Allergies      HOME MEDICATIONS:   chlorhexidine 0.12% Liquid 15 milliLiter(s) Oral Mucosa every 12 hours  HYDROmorphone  Injectable 0.5 milliGRAM(s) IV Push every 4 hours PRN  HYDROmorphone  Injectable 1 milliGRAM(s) IV Push every 4 hours PRN  lactated ringers. 1000 milliLiter(s) IV Continuous <Continuous>  norepinephrine Infusion 0.18 MICROgram(s)/kG/Min IV Continuous <Continuous>  propofol Infusion 50 MICROgram(s)/kG/Min IV Continuous <Continuous>      CURRENT MEDICATIONS:   --------------------------------------------------------------------------------------  Neurologic Medications  HYDROmorphone  Injectable 0.5 milliGRAM(s) IV Push every 4 hours PRN Moderate Pain (4 - 6)  HYDROmorphone  Injectable 1 milliGRAM(s) IV Push every 4 hours PRN Severe Pain (7 - 10)  propofol Infusion 50 MICROgram(s)/kG/Min IV Continuous <Continuous>    Cardiovascular Medications  norepinephrine Infusion 0.18 MICROgram(s)/kG/Min IV Continuous <Continuous>    Gastrointestinal Medications  lactated ringers. 1000 milliLiter(s) IV Continuous <Continuous>    Topical/Other Medications  chlorhexidine 0.12% Liquid 15 milliLiter(s) Oral Mucosa every 12 hours    --------------------------------------------------------------------------------------    VITAL SIGNS, INS/OUTS (last 24 hours):    T(C): 35.4 (05-17-24 @ 16:48), Max: 37.6 (05-17-24 @ 10:55)  HR: 88 (05-17-24 @ 19:58) (76 - 114)  BP: 85/61 (05-17-24 @ 14:30) (68/40 - 161/85)  ABP: 159/79 (05-17-24 @ 18:30) (77/52 - 159/79)  ABP(mean): 108 (05-17-24 @ 18:30) (62 - 108)  RR: 23 (05-17-24 @ 18:30) (16 - 28)  SpO2: 98% (05-17-24 @ 19:58) (96% - 100%)  --------------------------------------------------------------------------------------    PHYSICAL EXAM:  NEURO: sedated, resting comfortably, HARVINDER WALKER: NC/AT  CARDIO: RRR, palpable distal pulses, A line present  RESPIRATORY: intubated, mechanically ventilated, Mode: AC/ CMV (Assist Control/ Continuous Mandatory Ventilation), RR (machine): 16, TV (machine): 450, FiO2: 40, PEEP: 5, ITime: 0.82, MAP: 9, PIP: 22  ABDOMEN: softly distended, RUQ tenderness to palpation.  EXTREMITIES: no deformities, warm    Tubes/Lines:  A line  VAN ledbetter  --------------------------------------------------------------------------------------                                                                =====================================================                                                             SICU Consultation Note                                                             =====================================================        HISTORY  53 year old male with a PMH of  HTN, DM, CAD sp CABG & stents 12/2022, POBA to OM 11/14/23 and to mLAD 11/27/23, ICM/ CHF, GERD presents to the ED for abdominal pain and SOB. Pt admitted to medicine with choledocholithiasis with extrahepatic and intrahepatic biliary ductal dilatation. Cardiology consulted for new TWI in anterior and lateral leads compared to EKG 12/23, Trop elevation noted, downtrending. Today RRT for hypotension, refractory to fluids requiring pressor support. Admitted to ICU for further management. GI consulted for ERCP which was performed, bile duct was unavailable to cannulate biliary tree, +CBD stone seen on cholangiogram, procedure halted after multiple attempts at advancing wire. Patient remained intubated post procedure, requiring pressors, central line/summer placed prior to transfer. Transferred to [facility] for IR consult for possible perc ashley.     Allergies: No Known Allergies    PAST MEDICAL & SURGICAL HISTORY:  Hypertension  Diabetes mellitus  GERD (gastresophageal reflux disease)  CAD (coronary artery disease)  S/P CABG (coronary artery bypass graft)    FAMilY HISTORY:  FH: heart disease (Father)    ALLERGIES  No Known Allergies      HOME MEDICATIONS:   chlorhexidine 0.12% Liquid 15 milliLiter(s) Oral Mucosa every 12 hours  HYDROmorphone  Injectable 0.5 milliGRAM(s) IV Push every 4 hours PRN  HYDROmorphone  Injectable 1 milliGRAM(s) IV Push every 4 hours PRN  lactated ringers. 1000 milliLiter(s) IV Continuous <Continuous>  norepinephrine Infusion 0.18 MICROgram(s)/kG/Min IV Continuous <Continuous>  propofol Infusion 50 MICROgram(s)/kG/Min IV Continuous <Continuous>      CURRENT MEDICATIONS:   --------------------------------------------------------------------------------------  Neurologic Medications  HYDROmorphone  Injectable 0.5 milliGRAM(s) IV Push every 4 hours PRN Moderate Pain (4 - 6)  HYDROmorphone  Injectable 1 milliGRAM(s) IV Push every 4 hours PRN Severe Pain (7 - 10)  propofol Infusion 50 MICROgram(s)/kG/Min IV Continuous <Continuous>    Cardiovascular Medications  norepinephrine Infusion 0.18 MICROgram(s)/kG/Min IV Continuous <Continuous>    Gastrointestinal Medications  lactated ringers. 1000 milliLiter(s) IV Continuous <Continuous>    Topical/Other Medications  chlorhexidine 0.12% Liquid 15 milliLiter(s) Oral Mucosa every 12 hours    --------------------------------------------------------------------------------------    VITAL SIGNS, INS/OUTS (last 24 hours):    T(C): 35.4 (05-17-24 @ 16:48), Max: 37.6 (05-17-24 @ 10:55)  HR: 88 (05-17-24 @ 19:58) (76 - 114)  BP: 85/61 (05-17-24 @ 14:30) (68/40 - 161/85)  ABP: 159/79 (05-17-24 @ 18:30) (77/52 - 159/79)  ABP(mean): 108 (05-17-24 @ 18:30) (62 - 108)  RR: 23 (05-17-24 @ 18:30) (16 - 28)  SpO2: 98% (05-17-24 @ 19:58) (96% - 100%)  --------------------------------------------------------------------------------------    PHYSICAL EXAM:  NEURO: sedated, resting comfortably, HARVINDER WALKER: NC/AT  CARDIO: RRR, palpable distal pulses, A line present  RESPIRATORY: intubated, mechanically ventilated, Mode: AC/ CMV (Assist Control/ Continuous Mandatory Ventilation), RR (machine): 16, TV (machine): 450, FiO2: 40, PEEP: 5, ITime: 0.82, MAP: 9, PIP: 22  ABDOMEN: softly distended, RUQ tenderness to palpation.  EXTREMITIES: no deformities, warm    Tubes/Lines:  A line  VAN ledbetter  --------------------------------------------------------------------------------------                          14.0   11.06 )-----------( 333      ( 17 May 2024 20:30 )             42.1         05-17    133<L>  |  93<L>  |  32<H>  ----------------------------<  190<H>  3.3<L>   |  18<L>  |  1.17    Ca    8.7      17 May 2024 20:30  Phos  2.2     05-17  Mg     2.20     05-17    TPro  6.9  /  Alb  3.6  /  TBili  6.4<H>  /  DBili  x   /  AST  243<H>  /  ALT  406<H>  /  AlkPhos  276<H>  05-17                                                                =====================================================                                                             SICU Consultation Note                                                             =====================================================        HISTORY  53 year old male with a PMH of  HTN, DM, CAD sp CABG & stents 12/2022, POBA to OM 11/14/23 and to mLAD 11/27/23, ICM/ CHF, GERD presents to the ED for abdominal pain and SOB. Pt admitted to medicine with choledocholithiasis with extrahepatic and intrahepatic biliary ductal dilatation. Cardiology consulted for new TWI in anterior and lateral leads compared to EKG 12/23, Trop elevation noted, downtrending. Today RRT for hypotension, refractory to fluids requiring pressor support. Admitted to ICU for further management. GI consulted for ERCP which was performed, bile duct was unavailable to cannulate biliary tree, +CBD stone seen on cholangiogram, procedure halted after multiple attempts at advancing wire. Patient remained intubated post procedure, requiring pressors, central line/summer placed prior to transfer. Transferred to [facility] for IR consult for possible perc ashley.     Allergies: No Known Allergies    PAST MEDICAL & SURGICAL HISTORY:  Hypertension  Diabetes mellitus  GERD (gastresophageal reflux disease)  CAD (coronary artery disease)  S/P CABG (coronary artery bypass graft)    FAMilY HISTORY:  FH: heart disease (Father)    ALLERGIES  No Known Allergies      HOME MEDICATIONS:   chlorhexidine 0.12% Liquid 15 milliLiter(s) Oral Mucosa every 12 hours  HYDROmorphone  Injectable 0.5 milliGRAM(s) IV Push every 4 hours PRN  HYDROmorphone  Injectable 1 milliGRAM(s) IV Push every 4 hours PRN  lactated ringers. 1000 milliLiter(s) IV Continuous <Continuous>  norepinephrine Infusion 0.18 MICROgram(s)/kG/Min IV Continuous <Continuous>  propofol Infusion 50 MICROgram(s)/kG/Min IV Continuous <Continuous>      CURRENT MEDICATIONS:   --------------------------------------------------------------------------------------  Neurologic Medications  HYDROmorphone  Injectable 0.5 milliGRAM(s) IV Push every 4 hours PRN Moderate Pain (4 - 6)  HYDROmorphone  Injectable 1 milliGRAM(s) IV Push every 4 hours PRN Severe Pain (7 - 10)  propofol Infusion 50 MICROgram(s)/kG/Min IV Continuous <Continuous>    Cardiovascular Medications  norepinephrine Infusion 0.18 MICROgram(s)/kG/Min IV Continuous <Continuous>    Gastrointestinal Medications  lactated ringers. 1000 milliLiter(s) IV Continuous <Continuous>    Topical/Other Medications  chlorhexidine 0.12% Liquid 15 milliLiter(s) Oral Mucosa every 12 hours    --------------------------------------------------------------------------------------    VITAL SIGNS, INS/OUTS (last 24 hours):    T(C): 35.4 (05-17-24 @ 16:48), Max: 37.6 (05-17-24 @ 10:55)  HR: 88 (05-17-24 @ 19:58) (76 - 114)  BP: 85/61 (05-17-24 @ 14:30) (68/40 - 161/85)  ABP: 159/79 (05-17-24 @ 18:30) (77/52 - 159/79)  ABP(mean): 108 (05-17-24 @ 18:30) (62 - 108)  RR: 23 (05-17-24 @ 18:30) (16 - 28)  SpO2: 98% (05-17-24 @ 19:58) (96% - 100%)  --------------------------------------------------------------------------------------    PHYSICAL EXAM:  NEURO: sedated, resting comfortably, BLANTON  HEENT: NC/AT  CARDIO: RRR, palpable distal pulses, A line present  RESPIRATORY: intubated, mechanically ventilated, Mode: AC/ CMV (Assist Control/ Continuous Mandatory Ventilation), RR (machine): 16, TV (machine): 450, FiO2: 40, PEEP: 5, ITime: 0.82, MAP: 9, PIP: 22  ABDOMEN: softly distended, RUQ tenderness to palpation.  EXTREMITIES: no deformities, warm    Tubes/Lines:  A line  RIJ TLC  ledbetter  --------------------------------------------------------------------------------------                          14.0   11.06 )-----------( 333      ( 17 May 2024 20:30 )             42.1         05-17    133<L>  |  93<L>  |  32<H>  ----------------------------<  190<H>  3.3<L>   |  18<L>  |  1.17    Ca    8.7      17 May 2024 20:30  Phos  2.2     05-17  Mg     2.20     05-17    TPro  6.9  /  Alb  3.6  /  TBili  6.4<H>  /  DBili  x   /  AST  243<H>  /  ALT  406<H>  /  AlkPhos  276<H>  05-17        CT Abdomen/Pelvis with IV Contrast    IMPRESSION:  Choledocholithiasis with extrahepatic and intrahepatic biliary ductal   dilatation.    Cholelithiasis with mild gallbladder distention, wall thickening, and   pericholecystic inflammation. Follow-up right upper quadrant sonogram may   be obtained for further evaluation.    Nonspecific mild gaseous distention of the ascending and transverse colon   without evidence of mechanical obstruction.    7 mm right distal ureteral calculus without hydroureteronephrosis.      --- End of Report ---      US RUQ  TECHNIQUE: Sonography of the right upper quadrant.    FINDINGS:    Liver: Within normal limits.  Bile ducts: Normal caliber. Common bile duct measures 8 mm; mildly   dilated.  Gallbladder: Cholelithiasis. Sludge also noted.  Pancreas: Poorly visualized.  Right kidney: 11.2 cm. No hydronephrosis. Small upper pole cyst.  Ascites: None.  IVC: Visualized portions are within normal limits.    IMPRESSION:    1.  Cholelithiasis without sonographic evidence of acute cholecystitis.  2.  Dilated common bile duct; correlate clinically to exclude a distal   obstructive process.      KM MILES MD; Attending Radiologist  This document has been electronically signed. May 15 2024 11:41PM      MRCP  IMPRESSION:  Cholelithiasis and gallbladder sludge.  Mild CBD dilatation (8 mm).  CBD sludge and question of a small 2 mm distal CBD signal void, which may   represent calculus or artifact.    --- End of Report ---

## 2024-05-17 NOTE — PROGRESS NOTE ADULT - SUBJECTIVE AND OBJECTIVE BOX
Patient is a 53y old  Male who presents with a chief complaint of Choledocholithiasis (17 May 2024 08:26)    INTERVAL HISTORY:Patient appears jaundiced, no chest pain or dyspnea    PAST MEDICAL & SURGICAL HISTORY:  Hypertension  Diabetes mellitus  GERD (gastroesophageal reflux disease)  CAD (coronary artery disease)  S/P CABG (coronary artery bypass graft)      MEDICATIONS:  MEDICATIONS  (STANDING):  aspirin enteric coated 81 milliGRAM(s) Oral daily  atorvastatin 40 milliGRAM(s) Oral at bedtime  isosorbide   mononitrate ER Tablet (IMDUR) 30 milliGRAM(s) Oral daily  metoprolol succinate  milliGRAM(s) Oral daily  pantoprazole    Tablet 40 milliGRAM(s) Oral before breakfast  piperacillin/tazobactam IVPB.. 3.375 Gram(s) IV Intermittent every 8 hours  sacubitril 24 mG/valsartan 26 mG 1 Tablet(s) Oral two times a day  spironolactone 12.5 milliGRAM(s) Oral daily  tamsulosin 0.4 milliGRAM(s) Oral at bedtime    MEDICATIONS  (PRN):  acetaminophen     Tablet .. 650 milliGRAM(s) Oral every 6 hours PRN Temp greater or equal to 38C (100.4F), Mild Pain (1 - 3)  aluminum hydroxide/magnesium hydroxide/simethicone Suspension 30 milliLiter(s) Oral every 4 hours PRN Dyspepsia  HYDROmorphone  Injectable 1 milliGRAM(s) IV Push every 4 hours PRN Severe Pain (7 - 10)  melatonin 3 milliGRAM(s) Oral at bedtime PRN Insomnia  ondansetron Injectable 4 milliGRAM(s) IV Push every 8 hours PRN Nausea and/or Vomiting      Vitals:  T(F): 99.1 (05-17-24 @ 05:06), Max: 99.1 (05-17-24 @ 05:06)  HR: 99 (05-17-24 @ 05:14) (89 - 114)  BP: 100/50 (05-17-24 @ 05:14) (99/50 - 161/85)  RR: 18 (05-17-24 @ 05:06) (18 - 18)  SpO2: 99% (05-17-24 @ 05:06) (92% - 99%)  Wt(kg): --    05-16 @ 07:01  -  05-17 @ 07:00  --------------------------------------------------------  IN:    Oral Fluid: 250 mL  Total IN: 250 mL    OUT:    Voided (mL): 4 mL  Total OUT: 4 mL    Total NET: 246 mL        Weight (kg): 66.9 (05-16 @ 03:14)  BMI (kg/m2): 21.8 (05-16 @ 03:14)    PHYSICAL EXAM:  Neuro: Awake, responsive, jaundice  CV: + S1 S2 RRR  Lungs: CTABL  GI: Soft, BS x4, ND, NT  Extremities: No edema    TELEMETRY: sinus tachy 102  	    ECG:  	< from: 12 Lead ECG (05.15.24 @ 20:22) >  Diagnosis Line Normal sinus rhythm  Possible Left atrial enlargement  Left axis deviation  ST & T wave abnormality, consider anterolateral ischemia  Abnormal ECG    < end of copied text >      DIAGNOSTIC TESTING:    [ ] Echocardiogram: < from: TTE W or WO Ultrasound Enhancing Agent (11.14.23 @ 17:01) >  CONCLUSIONS:      1. Left ventricular cavity is small. Left ventricular wall thickness is normal. Left ventricular systolic function is moderately to severely decreased with an ejection fraction of 35 % by 3D. Regional wall motion abnormalities consistent with ischemic heart disease.   2. Multiple segmental abnormalities exist. See findings.   3. There is mild (grade 1) left ventricular diastolic dysfunction, with normal filling pressure.   4. Normal right ventricular cavity size, wall thickness, and systolic function.   5. No pericardial effusion seen.   6. There is no evidence of a left ventricular thrombus.   7. There is normal LV mass and concentric remodeling.    < end of copied text >    [ ] Cardiac Catheterization:  [ ] Stress Test:      LABS:	 	    CARDIAC MARKERS:          16 May 2024 08:10    134    |  98     |  12     ----------------------------<  133    4.0     |  24     |  0.92   15 May 2024 20:54    135    |  99     |  12     ----------------------------<  170    5.0     |  24     |  1.13     Ca    9.2        16 May 2024 08:10  Mg     2.4       15 May 2024 20:54    TPro  6.9    /  Alb  3.1    /  TBili  9.1    /  DBili  7.1    /  AST  297    /  ALT  534    /  AlkPhos  257    17 May 2024 06:49                          14.2   12.09 )-----------( 295      ( 17 May 2024 06:49 )             43.3 ,                       14.7   9.48  )-----------( 295      ( 16 May 2024 08:10 )             44.4 ,                       15.6   11.18 )-----------( 323      ( 15 May 2024 20:54 )             48.3   proBNP:   Lipid Profile:   HgA1c:   TSH:

## 2024-05-17 NOTE — CONSULT NOTE ADULT - ASSESSMENT
53 year old male with a PMH of  HTN, DM, CAD sp CABG & stents 12/2022, CHF, GERD presents to the with abdominal pain and jaundice CT + CBD stone and + GB stones

## 2024-05-17 NOTE — PROGRESS NOTE ADULT - ASSESSMENT
53 year old male with a PMH of  HTN, DM, CAD sp CABG & stents 12/2022, CHF, GERD presents to the with abdominal pain and jaundice CT + CBD stone and + GB stones   ERCP - unable to selectively cannulate CBD secondary to difficult anatomy periampullary diverticulum   + CBD stone seen on cholangiogram

## 2024-05-17 NOTE — PROGRESS NOTE ADULT - SUBJECTIVE AND OBJECTIVE BOX
CHIEF COMPLAINT: Hypotensive to 60's despite IVF resuscitation  + abd Pain  + dizziness   no fever  RRT was called.     PHYSICAL EXAM:    GENERAL: Moderately built, +NC oxygen  CHEST/LUNG:  No wheezing, no crackles   HEART: Regular rate and rhythm; No murmurs. tachycardic  ABDOMEN: Soft, +tender to deep palpation suprapubic area.  Nondistended; Bowel sounds present  EXTREMITIES:  No clubbing, cyanosis, or edema  Psychiatry: AA Ox3      OBJECTIVE DATA:     Vital Signs Last 24 Hrs  T(C): 37.6 (17 May 2024 10:55), Max: 37.6 (17 May 2024 10:55)  T(F): 99.7 (17 May 2024 10:55), Max: 99.7 (17 May 2024 10:55)  HR: 100 (17 May 2024 10:55) (89 - 114)  BP: 71/43 (17 May 2024 10:55) (68/40 - 161/85)  BP(mean): --  RR: 18 (17 May 2024 10:55) (18 - 18)  SpO2: 97% (17 May 2024 10:55) (92% - 99%)    Parameters below as of 17 May 2024 10:51  Patient On (Oxygen Delivery Method): nasal cannula  O2 Flow (L/min): 2             Daily     Daily   LABS:                        14.2   12.09 )-----------( 295      ( 17 May 2024 06:49 )             43.3             05-16    134<L>  |  98  |  12  ----------------------------<  133<H>  4.0   |  24  |  0.92    Ca    9.2      16 May 2024 08:10  Mg     2.4     05-15    TPro  6.9  /  Alb  3.1<L>  /  TBili  9.1<H>  /  DBili  7.1<H>  /  AST  297<H>  /  ALT  534<H>  /  AlkPhos  257<H>  05-17              PT/INR - ( 16 May 2024 08:10 )   PT: 13.3 sec;   INR: 1.12 ratio         PTT - ( 16 May 2024 08:10 )  PTT:34.7 sec  Urinalysis Basic - ( 16 May 2024 08:10 )    Color: x / Appearance: x / SG: x / pH: x  Gluc: 133 mg/dL / Ketone: x  / Bili: x / Urobili: x   Blood: x / Protein: x / Nitrite: x   Leuk Esterase: x / RBC: x / WBC x   Sq Epi: x / Non Sq Epi: x / Bacteria: x       CARDIAC MARKERS ( 17 May 2024 06:49 )  x     / x     / 55 U/L / x     / 1.0 ng/mL      CAPILLARY BLOOD GLUCOSE      POCT Blood Glucose.: 183 mg/dL (17 May 2024 10:45)      MEDICATIONS  (STANDING):  aspirin enteric coated 81 milliGRAM(s) Oral daily  atorvastatin 40 milliGRAM(s) Oral at bedtime  chlorhexidine 2% Cloths 1 Application(s) Topical <User Schedule>  midodrine. 10 milliGRAM(s) Oral once  norepinephrine Infusion 0.05 MICROgram(s)/kG/Min (6.27 mL/Hr) IV Continuous <Continuous>  pantoprazole    Tablet 40 milliGRAM(s) Oral before breakfast  piperacillin/tazobactam IVPB.. 3.375 Gram(s) IV Intermittent every 8 hours  sodium chloride 0.9% Bolus 500 milliLiter(s) IV Bolus once  tamsulosin 0.4 milliGRAM(s) Oral at bedtime    MEDICATIONS  (PRN):  acetaminophen     Tablet .. 650 milliGRAM(s) Oral every 6 hours PRN Temp greater or equal to 38C (100.4F), Mild Pain (1 - 3)  aluminum hydroxide/magnesium hydroxide/simethicone Suspension 30 milliLiter(s) Oral every 4 hours PRN Dyspepsia  HYDROmorphone  Injectable 1 milliGRAM(s) IV Push every 4 hours PRN Severe Pain (7 - 10)  ondansetron Injectable 4 milliGRAM(s) IV Push every 8 hours PRN Nausea and/or Vomiting

## 2024-05-17 NOTE — PATIENT PROFILE ADULT - FALL HARM RISK - HARM RISK INTERVENTIONS

## 2024-05-17 NOTE — CONSULT NOTE ADULT - ASSESSMENT
53 year old male with a PMH of  HTN, DM, CAD sp CABG & stents 12/2022, POBA to OM 11/14/23 and to mLAD 11/27/23, ICM/ CHF, GERD presents to the ED for abdominal pain and SOB. Pt admitted to medicine with choledocholithiasis with extrahepatic and intrahepatic biliary ductal dilatation. Cardiology consulted for new TWI in anterior and lateral leads compared to EKG 12/23, Trop elevation noted, downtrending. Today RRT for hypotension, refractory to fluids requiring pressor support. Admitted to ICU for further management. GI consulted for ERCP which was performed, bile duct was unavailable to cannulate biliary tree, +CBD stone seen on cholangiogram, procedure halted after multiple attempts at advancing wire. Patient remained intubated post procedure, requiring pressors, central line/summer placed prior to transfer. Transferred to [facility] SICU for IR consult for possible perc ashley.    NEUROLOGIC   - propofol for sedation  - Pain control: dilaudid PRN    RESPIRATORY   - Mechanical Ventilation 400/16/5/40  - Monitor SpO2 goal >92%    CARDIOVASCULAR   - h/o CAD sp CABG & stents 12/2022, POBA to OM 11/14/23 and to mLAD 11/27/23, ICM/ CHF (EF 35%) on ASA/Brillinta   - Levo gtt, wean as tolerated    GASTROINTESTINAL   - Diet: NPO  - IR consult for per ashley    /RENAL   - IV fluids: LR @ 125cc/hr  - Maintain ledbetter catheter, strict Is/Os  - Monitor electrolytes, replete PRN    HEMATOLOGIC  - Monitor H/H   - DVT ppx: holding chemical dvt ppx for IR procedure, SCD  - on ASA/Brillinta (held)    INFECTIOUS DISEASE  - Zosyn    ENDOCRINE  - Monitor gluc  - moderate ISS  - hgb a1c 6.1 on 5/17    LINES  - IJ CVC ( 5 / 17 )  - A line ( 5 / 17  )  - Ledbetter ( 5 / 17 )  - PIV     DISPO: SICU  --------------------------------------------------------------------------------------    Critical Care Diagnoses:   53 year old male with a PMH of  HTN, DM, CAD sp CABG & stents 12/2022, POBA to OM 11/14/23 and to mLAD 11/27/23, ICM/ CHF, GERD presents to the ED for abdominal pain and SOB. Pt admitted to medicine with choledocholithiasis with extrahepatic and intrahepatic biliary ductal dilatation. Cardiology consulted for new TWI in anterior and lateral leads compared to EKG 12/23, Trop elevation noted, downtrending. Today RRT for hypotension, refractory to fluids requiring pressor support. Admitted to ICU for further management. GI consulted for ERCP which was performed, bile duct was unavailable to cannulate biliary tree, +CBD stone seen on cholangiogram, procedure halted after multiple attempts at advancing wire. Patient remained intubated post procedure, requiring pressors, central line/summer placed prior to transfer. Transferred to [facility] SICU for IR consult for possible perc ashley.    NEUROLOGIC   - propofol for sedation  - Pain control: dilaudid PRN    RESPIRATORY   - Mechanical Ventilation 400/16/5/40  - Monitor SpO2 goal >92%    CARDIOVASCULAR   - h/o CAD sp CABG & stents 12/2022, POBA to OM 11/14/23 and to mLAD 11/27/23, ICM/ CHF (EF 35%) on ASA/Brillinta   - Levo gtt, wean as tolerated    GASTROINTESTINAL   - Diet: NPO  - IR consult for per ashley  - GI consult for repeat ERCP    /RENAL   - IV fluids: LR @ 125cc/hr  - Maintain ledbetter catheter, strict Is/Os  - Monitor electrolytes, replete PRN    HEMATOLOGIC  - Monitor H/H   - DVT ppx: holding chemical dvt ppx for IR procedure, SCD  - on ASA/Brillinta (held)    INFECTIOUS DISEASE  - Zosyn    ENDOCRINE  - Monitor gluc  - Lantus 5i QHS, moderate ISS  - Basaglar 12u + Farxiga at home  - hgb a1c 6.1 on 5/17    LINES  - IJ CVC ( 5 / 17 )  - A line ( 5 / 17  )  - Ledbetter ( 5 / 17 )  - PIV     DISPO: SICU  --------------------------------------------------------------------------------------    Critical Care Diagnoses:   53 year old male with a PMH of  HTN, DM, CAD sp CABG & stents 12/2022, POBA to OM 11/14/23 and to mLAD 11/27/23, ICM/ CHF, GERD presents to the ED for abdominal pain and SOB. Pt admitted to medicine with choledocholithiasis with extrahepatic and intrahepatic biliary ductal dilatation. Cardiology consulted for new TWI in anterior and lateral leads compared to EKG 12/23, Trop elevation noted, downtrending. Today RRT for hypotension, refractory to fluids requiring pressor support. Admitted to ICU for further management. GI consulted for ERCP which was performed, bile duct was unavailable to cannulate biliary tree, +CBD stone seen on cholangiogram, procedure halted after multiple attempts at advancing wire. Patient remained intubated post procedure, requiring pressors, central line/summer placed prior to transfer. Transferred to [facility] SICU for IR consult for possible perc ashley.    NEUROLOGIC   - propofol for sedation  - Pain control: dilaudid PRN    RESPIRATORY   - Mechanical Ventilation 400/16/5/40  - Monitor SpO2 goal >92%    CARDIOVASCULAR   - h/o CAD sp CABG & stents 12/2022, POBA to OM 11/14/23 and to mLAD 11/27/23, ICM/ CHF (EF 35%) on ASA/Brillinta   - Levo gtt, wean as tolerated    GASTROINTESTINAL   - Diet: NPO  - IR consult for per ashley  - GI consult for repeat ERCP    /RENAL   - IV fluids: LR @ 125cc/hr  - Maintain ledbetter catheter, strict Is/Os  - Monitor electrolytes, replete PRN    HEMATOLOGIC  - Monitor H/H   - DVT ppx: holding chemical dvt ppx for IR procedure, SCD  - on ASA/Brillinta (held)    INFECTIOUS DISEASE  - Zosyn    ENDOCRINE  - Monitor gluc  - Lantus 5i QHS, moderate ISS  - Basaglar 12u + Farxiga at home  - anion gap 22, will check BHB  - hgb a1c 6.1 on 5/17    LINES  - IJ CVC ( 5 / 17 )  - A line ( 5 / 17  )  - Ledbetter ( 5 / 17 )  - PIV     DISPO: SICU  --------------------------------------------------------------------------------------    Critical Care Diagnoses:   53 year old male with a PMH of  HTN, DM, CAD sp CABG & stents 12/2022, POBA to OM 11/14/23 and to mLAD 11/27/23, ICM/ CHF, GERD presents to the ED for abdominal pain and SOB. Pt admitted to medicine with choledocholithiasis with extrahepatic and intrahepatic biliary ductal dilatation. Cardiology consulted for new TWI in anterior and lateral leads compared to EKG 12/23, Trop elevation noted, downtrending. Today RRT for hypotension, refractory to fluids requiring pressor support. Admitted to ICU for further management. GI consulted for ERCP which was performed, bile duct was unavailable to cannulate biliary tree, +CBD stone seen on cholangiogram, procedure halted after multiple attempts at advancing wire. Patient remained intubated post procedure, requiring pressors, central line/summer placed prior to transfer. Transferred to [facility] SICU for IR consult for possible perc ashley.    NEUROLOGIC   - propofol for sedation  - Pain control: dilaudid PRN    RESPIRATORY   - Mechanical Ventilation 400/16/5/40  - Monitor SpO2 goal >92%    CARDIOVASCULAR   - h/o CAD sp CABG & stents 12/2022, POBA to OM 11/14/23 and to mLAD 11/27/23, ICM/ CHF (EF 35%) on ASA/Brillinta   - Levo gtt, wean as tolerated  - hold home Metoprolol 200mg ER QD, Sacubitril-Valsartan 24-26 while requiring vasopressors.    GASTROINTESTINAL   - Diet: NPO  - IR consult for per ashley  - GI consult for repeat ERCP    /RENAL   - IV fluids: LR @ 125cc/hr  - Maintain ledbetter catheter, strict Is/Os  - Monitor electrolytes, replete PRN    HEMATOLOGIC  - Monitor H/H   - DVT ppx: holding chemical dvt ppx for IR procedure, SCD  - on ASA/Brillinta (held)    INFECTIOUS DISEASE  - Zosyn    ENDOCRINE  - Monitor gluc  - Lantus 5i QHS, moderate ISS  - Basaglar 12u + Farxiga at home  - anion gap 22, will check BHB  - hgb a1c 6.1 on 5/17    LINES  - IJ CVC ( 5 / 17 )  - A line ( 5 / 17  )  - Ledbetter ( 5 / 17 )  - PIV     DISPO: SICU  --------------------------------------------------------------------------------------    Critical Care Diagnoses:

## 2024-05-18 ENCOUNTER — RESULT REVIEW (OUTPATIENT)
Age: 54
End: 2024-05-18

## 2024-05-18 LAB
ADD ON TEST-SPECIMEN IN LAB: SIGNIFICANT CHANGE UP
ALBUMIN SERPL ELPH-MCNC: 2.9 G/DL — LOW (ref 3.3–5)
ALBUMIN SERPL ELPH-MCNC: 2.9 G/DL — LOW (ref 3.3–5)
ALBUMIN SERPL ELPH-MCNC: 3.4 G/DL — SIGNIFICANT CHANGE UP (ref 3.3–5)
ALBUMIN SERPL ELPH-MCNC: 3.5 G/DL — SIGNIFICANT CHANGE UP (ref 3.3–5)
ALBUMIN SERPL ELPH-MCNC: 3.5 G/DL — SIGNIFICANT CHANGE UP (ref 3.3–5)
ALP SERPL-CCNC: 218 U/L — HIGH (ref 40–120)
ALP SERPL-CCNC: 228 U/L — HIGH (ref 40–120)
ALP SERPL-CCNC: 247 U/L — HIGH (ref 40–120)
ALP SERPL-CCNC: 248 U/L — HIGH (ref 40–120)
ALP SERPL-CCNC: 252 U/L — HIGH (ref 40–120)
ALT FLD-CCNC: 262 U/L — HIGH (ref 4–41)
ALT FLD-CCNC: 268 U/L — HIGH (ref 4–41)
ALT FLD-CCNC: 327 U/L — HIGH (ref 4–41)
ALT FLD-CCNC: 338 U/L — HIGH (ref 4–41)
ALT FLD-CCNC: 353 U/L — HIGH (ref 4–41)
ANION GAP SERPL CALC-SCNC: 13 MMOL/L — SIGNIFICANT CHANGE UP (ref 7–14)
ANION GAP SERPL CALC-SCNC: 16 MMOL/L — HIGH (ref 7–14)
ANION GAP SERPL CALC-SCNC: 17 MMOL/L — HIGH (ref 7–14)
ANION GAP SERPL CALC-SCNC: 20 MMOL/L — HIGH (ref 7–14)
ANION GAP SERPL CALC-SCNC: 20 MMOL/L — HIGH (ref 7–14)
APPEARANCE UR: CLEAR — SIGNIFICANT CHANGE UP
AST SERPL-CCNC: 154 U/L — HIGH (ref 4–40)
AST SERPL-CCNC: 156 U/L — HIGH (ref 4–40)
AST SERPL-CCNC: 178 U/L — HIGH (ref 4–40)
AST SERPL-CCNC: 180 U/L — HIGH (ref 4–40)
AST SERPL-CCNC: 190 U/L — HIGH (ref 4–40)
B-OH-BUTYR SERPL-SCNC: 1.9 MMOL/L — HIGH (ref 0–0.4)
B-OH-BUTYR SERPL-SCNC: 2.8 MMOL/L — HIGH (ref 0–0.4)
B-OH-BUTYR SERPL-SCNC: 4.4 MMOL/L — HIGH (ref 0–0.4)
BACTERIA # UR AUTO: NEGATIVE /HPF — SIGNIFICANT CHANGE UP
BILIRUB DIRECT SERPL-MCNC: 2.7 MG/DL — HIGH (ref 0–0.3)
BILIRUB DIRECT SERPL-MCNC: 2.9 MG/DL — HIGH (ref 0–0.3)
BILIRUB INDIRECT FLD-MCNC: 0.3 MG/DL — SIGNIFICANT CHANGE UP (ref 0–1)
BILIRUB INDIRECT FLD-MCNC: 0.3 MG/DL — SIGNIFICANT CHANGE UP (ref 0–1)
BILIRUB SERPL-MCNC: 3 MG/DL — HIGH (ref 0.2–1.2)
BILIRUB SERPL-MCNC: 3.2 MG/DL — HIGH (ref 0.2–1.2)
BILIRUB SERPL-MCNC: 3.7 MG/DL — HIGH (ref 0.2–1.2)
BILIRUB SERPL-MCNC: 4.2 MG/DL — HIGH (ref 0.2–1.2)
BILIRUB SERPL-MCNC: 4.9 MG/DL — HIGH (ref 0.2–1.2)
BILIRUB UR-MCNC: ABNORMAL
BLOOD GAS ARTERIAL - LYTES,HGB,ICA,LACT RESULT: SIGNIFICANT CHANGE UP
BUN SERPL-MCNC: 13 MG/DL — SIGNIFICANT CHANGE UP (ref 7–23)
BUN SERPL-MCNC: 16 MG/DL — SIGNIFICANT CHANGE UP (ref 7–23)
BUN SERPL-MCNC: 21 MG/DL — SIGNIFICANT CHANGE UP (ref 7–23)
BUN SERPL-MCNC: 25 MG/DL — HIGH (ref 7–23)
BUN SERPL-MCNC: 28 MG/DL — HIGH (ref 7–23)
CALCIUM SERPL-MCNC: 7.9 MG/DL — LOW (ref 8.4–10.5)
CALCIUM SERPL-MCNC: 8.1 MG/DL — LOW (ref 8.4–10.5)
CALCIUM SERPL-MCNC: 8.2 MG/DL — LOW (ref 8.4–10.5)
CALCIUM SERPL-MCNC: 8.2 MG/DL — LOW (ref 8.4–10.5)
CALCIUM SERPL-MCNC: 8.3 MG/DL — LOW (ref 8.4–10.5)
CAST: 0 /LPF — SIGNIFICANT CHANGE UP (ref 0–4)
CHLORIDE SERPL-SCNC: 101 MMOL/L — SIGNIFICANT CHANGE UP (ref 98–107)
CHLORIDE SERPL-SCNC: 101 MMOL/L — SIGNIFICANT CHANGE UP (ref 98–107)
CHLORIDE SERPL-SCNC: 102 MMOL/L — SIGNIFICANT CHANGE UP (ref 98–107)
CHLORIDE SERPL-SCNC: 103 MMOL/L — SIGNIFICANT CHANGE UP (ref 98–107)
CHLORIDE SERPL-SCNC: 95 MMOL/L — LOW (ref 98–107)
CO2 SERPL-SCNC: 16 MMOL/L — LOW (ref 22–31)
CO2 SERPL-SCNC: 17 MMOL/L — LOW (ref 22–31)
CO2 SERPL-SCNC: 18 MMOL/L — LOW (ref 22–31)
CO2 SERPL-SCNC: 18 MMOL/L — LOW (ref 22–31)
CO2 SERPL-SCNC: 19 MMOL/L — LOW (ref 22–31)
COLOR SPEC: SIGNIFICANT CHANGE UP
CREAT SERPL-MCNC: 0.75 MG/DL — SIGNIFICANT CHANGE UP (ref 0.5–1.3)
CREAT SERPL-MCNC: 0.76 MG/DL — SIGNIFICANT CHANGE UP (ref 0.5–1.3)
CREAT SERPL-MCNC: 0.9 MG/DL — SIGNIFICANT CHANGE UP (ref 0.5–1.3)
CREAT SERPL-MCNC: 1.02 MG/DL — SIGNIFICANT CHANGE UP (ref 0.5–1.3)
CREAT SERPL-MCNC: 1.08 MG/DL — SIGNIFICANT CHANGE UP (ref 0.5–1.3)
CULTURE RESULTS: SIGNIFICANT CHANGE UP
DIFF PNL FLD: ABNORMAL
EGFR: 103 ML/MIN/1.73M2 — SIGNIFICANT CHANGE UP
EGFR: 108 ML/MIN/1.73M2 — SIGNIFICANT CHANGE UP
EGFR: 109 ML/MIN/1.73M2 — SIGNIFICANT CHANGE UP
EGFR: 83 ML/MIN/1.73M2 — SIGNIFICANT CHANGE UP
EGFR: 88 ML/MIN/1.73M2 — SIGNIFICANT CHANGE UP
GLUCOSE BLDC GLUCOMTR-MCNC: 107 MG/DL — HIGH (ref 70–99)
GLUCOSE BLDC GLUCOMTR-MCNC: 130 MG/DL — HIGH (ref 70–99)
GLUCOSE BLDC GLUCOMTR-MCNC: 137 MG/DL — HIGH (ref 70–99)
GLUCOSE BLDC GLUCOMTR-MCNC: 139 MG/DL — HIGH (ref 70–99)
GLUCOSE BLDC GLUCOMTR-MCNC: 152 MG/DL — HIGH (ref 70–99)
GLUCOSE BLDC GLUCOMTR-MCNC: 159 MG/DL — HIGH (ref 70–99)
GLUCOSE BLDC GLUCOMTR-MCNC: 160 MG/DL — HIGH (ref 70–99)
GLUCOSE BLDC GLUCOMTR-MCNC: 167 MG/DL — HIGH (ref 70–99)
GLUCOSE BLDC GLUCOMTR-MCNC: 172 MG/DL — HIGH (ref 70–99)
GLUCOSE BLDC GLUCOMTR-MCNC: 174 MG/DL — HIGH (ref 70–99)
GLUCOSE BLDC GLUCOMTR-MCNC: 178 MG/DL — HIGH (ref 70–99)
GLUCOSE BLDC GLUCOMTR-MCNC: 187 MG/DL — HIGH (ref 70–99)
GLUCOSE BLDC GLUCOMTR-MCNC: 199 MG/DL — HIGH (ref 70–99)
GLUCOSE BLDC GLUCOMTR-MCNC: 97 MG/DL — SIGNIFICANT CHANGE UP (ref 70–99)
GLUCOSE SERPL-MCNC: 170 MG/DL — HIGH (ref 70–99)
GLUCOSE SERPL-MCNC: 184 MG/DL — HIGH (ref 70–99)
GLUCOSE SERPL-MCNC: 188 MG/DL — HIGH (ref 70–99)
GLUCOSE SERPL-MCNC: 262 MG/DL — HIGH (ref 70–99)
GLUCOSE SERPL-MCNC: 91 MG/DL — SIGNIFICANT CHANGE UP (ref 70–99)
GLUCOSE UR QL: >=1000 MG/DL
HCT VFR BLD CALC: 35.4 % — LOW (ref 39–50)
HCT VFR BLD CALC: 37.7 % — LOW (ref 39–50)
HCT VFR BLD CALC: 38.1 % — LOW (ref 39–50)
HCT VFR BLD CALC: 38.4 % — LOW (ref 39–50)
HGB BLD-MCNC: 12.1 G/DL — LOW (ref 13–17)
HGB BLD-MCNC: 12.6 G/DL — LOW (ref 13–17)
HGB BLD-MCNC: 12.6 G/DL — LOW (ref 13–17)
HGB BLD-MCNC: 12.9 G/DL — LOW (ref 13–17)
KETONES UR-MCNC: >=160 MG/DL
LEUKOCYTE ESTERASE UR-ACNC: NEGATIVE — SIGNIFICANT CHANGE UP
MAGNESIUM SERPL-MCNC: 1.8 MG/DL — SIGNIFICANT CHANGE UP (ref 1.6–2.6)
MAGNESIUM SERPL-MCNC: 1.9 MG/DL — SIGNIFICANT CHANGE UP (ref 1.6–2.6)
MAGNESIUM SERPL-MCNC: 2 MG/DL — SIGNIFICANT CHANGE UP (ref 1.6–2.6)
MCHC RBC-ENTMCNC: 27.4 PG — SIGNIFICANT CHANGE UP (ref 27–34)
MCHC RBC-ENTMCNC: 27.5 PG — SIGNIFICANT CHANGE UP (ref 27–34)
MCHC RBC-ENTMCNC: 27.8 PG — SIGNIFICANT CHANGE UP (ref 27–34)
MCHC RBC-ENTMCNC: 27.9 PG — SIGNIFICANT CHANGE UP (ref 27–34)
MCHC RBC-ENTMCNC: 33.1 GM/DL — SIGNIFICANT CHANGE UP (ref 32–36)
MCHC RBC-ENTMCNC: 33.4 GM/DL — SIGNIFICANT CHANGE UP (ref 32–36)
MCHC RBC-ENTMCNC: 33.6 GM/DL — SIGNIFICANT CHANGE UP (ref 32–36)
MCHC RBC-ENTMCNC: 34.2 GM/DL — SIGNIFICANT CHANGE UP (ref 32–36)
MCV RBC AUTO: 81.6 FL — SIGNIFICANT CHANGE UP (ref 80–100)
MCV RBC AUTO: 82 FL — SIGNIFICANT CHANGE UP (ref 80–100)
MCV RBC AUTO: 82.8 FL — SIGNIFICANT CHANGE UP (ref 80–100)
MCV RBC AUTO: 83 FL — SIGNIFICANT CHANGE UP (ref 80–100)
MRSA PCR RESULT.: SIGNIFICANT CHANGE UP
NITRITE UR-MCNC: NEGATIVE — SIGNIFICANT CHANGE UP
NRBC # BLD: 0 /100 WBCS — SIGNIFICANT CHANGE UP (ref 0–0)
NRBC # FLD: 0 K/UL — SIGNIFICANT CHANGE UP (ref 0–0)
PH UR: 5.5 — SIGNIFICANT CHANGE UP (ref 5–8)
PHOSPHATE SERPL-MCNC: 1.9 MG/DL — LOW (ref 2.5–4.5)
PHOSPHATE SERPL-MCNC: 2.1 MG/DL — LOW (ref 2.5–4.5)
PHOSPHATE SERPL-MCNC: 2.2 MG/DL — LOW (ref 2.5–4.5)
PHOSPHATE SERPL-MCNC: 2.4 MG/DL — LOW (ref 2.5–4.5)
PHOSPHATE SERPL-MCNC: 2.9 MG/DL — SIGNIFICANT CHANGE UP (ref 2.5–4.5)
PLATELET # BLD AUTO: 200 K/UL — SIGNIFICANT CHANGE UP (ref 150–400)
PLATELET # BLD AUTO: 204 K/UL — SIGNIFICANT CHANGE UP (ref 150–400)
PLATELET # BLD AUTO: 225 K/UL — SIGNIFICANT CHANGE UP (ref 150–400)
PLATELET # BLD AUTO: 296 K/UL — SIGNIFICANT CHANGE UP (ref 150–400)
POTASSIUM SERPL-MCNC: 3.8 MMOL/L — SIGNIFICANT CHANGE UP (ref 3.5–5.3)
POTASSIUM SERPL-MCNC: 3.8 MMOL/L — SIGNIFICANT CHANGE UP (ref 3.5–5.3)
POTASSIUM SERPL-MCNC: 3.9 MMOL/L — SIGNIFICANT CHANGE UP (ref 3.5–5.3)
POTASSIUM SERPL-MCNC: 4.1 MMOL/L — SIGNIFICANT CHANGE UP (ref 3.5–5.3)
POTASSIUM SERPL-MCNC: 4.2 MMOL/L — SIGNIFICANT CHANGE UP (ref 3.5–5.3)
POTASSIUM SERPL-SCNC: 3.8 MMOL/L — SIGNIFICANT CHANGE UP (ref 3.5–5.3)
POTASSIUM SERPL-SCNC: 3.8 MMOL/L — SIGNIFICANT CHANGE UP (ref 3.5–5.3)
POTASSIUM SERPL-SCNC: 3.9 MMOL/L — SIGNIFICANT CHANGE UP (ref 3.5–5.3)
POTASSIUM SERPL-SCNC: 4.1 MMOL/L — SIGNIFICANT CHANGE UP (ref 3.5–5.3)
POTASSIUM SERPL-SCNC: 4.2 MMOL/L — SIGNIFICANT CHANGE UP (ref 3.5–5.3)
PROT SERPL-MCNC: 6 G/DL — SIGNIFICANT CHANGE UP (ref 6–8.3)
PROT SERPL-MCNC: 6 G/DL — SIGNIFICANT CHANGE UP (ref 6–8.3)
PROT SERPL-MCNC: 6.1 G/DL — SIGNIFICANT CHANGE UP (ref 6–8.3)
PROT SERPL-MCNC: 6.4 G/DL — SIGNIFICANT CHANGE UP (ref 6–8.3)
PROT SERPL-MCNC: 6.4 G/DL — SIGNIFICANT CHANGE UP (ref 6–8.3)
PROT UR-MCNC: SIGNIFICANT CHANGE UP MG/DL
RBC # BLD: 4.34 M/UL — SIGNIFICANT CHANGE UP (ref 4.2–5.8)
RBC # BLD: 4.59 M/UL — SIGNIFICANT CHANGE UP (ref 4.2–5.8)
RBC # BLD: 4.6 M/UL — SIGNIFICANT CHANGE UP (ref 4.2–5.8)
RBC # BLD: 4.64 M/UL — SIGNIFICANT CHANGE UP (ref 4.2–5.8)
RBC # FLD: 13.8 % — SIGNIFICANT CHANGE UP (ref 10.3–14.5)
RBC # FLD: 13.9 % — SIGNIFICANT CHANGE UP (ref 10.3–14.5)
RBC # FLD: 14.2 % — SIGNIFICANT CHANGE UP (ref 10.3–14.5)
RBC # FLD: 14.3 % — SIGNIFICANT CHANGE UP (ref 10.3–14.5)
RBC CASTS # UR COMP ASSIST: 6 /HPF — HIGH (ref 0–4)
S AUREUS DNA NOSE QL NAA+PROBE: SIGNIFICANT CHANGE UP
SODIUM SERPL-SCNC: 132 MMOL/L — LOW (ref 135–145)
SODIUM SERPL-SCNC: 134 MMOL/L — LOW (ref 135–145)
SODIUM SERPL-SCNC: 135 MMOL/L — SIGNIFICANT CHANGE UP (ref 135–145)
SODIUM SERPL-SCNC: 137 MMOL/L — SIGNIFICANT CHANGE UP (ref 135–145)
SODIUM SERPL-SCNC: 138 MMOL/L — SIGNIFICANT CHANGE UP (ref 135–145)
SP GR SPEC: 1.03 — HIGH (ref 1–1.03)
SPECIMEN SOURCE: SIGNIFICANT CHANGE UP
SQUAMOUS # UR AUTO: 2 /HPF — SIGNIFICANT CHANGE UP (ref 0–5)
UROBILINOGEN FLD QL: 1 MG/DL — SIGNIFICANT CHANGE UP (ref 0.2–1)
WBC # BLD: 6.36 K/UL — SIGNIFICANT CHANGE UP (ref 3.8–10.5)
WBC # BLD: 6.47 K/UL — SIGNIFICANT CHANGE UP (ref 3.8–10.5)
WBC # BLD: 7.98 K/UL — SIGNIFICANT CHANGE UP (ref 3.8–10.5)
WBC # BLD: 8.28 K/UL — SIGNIFICANT CHANGE UP (ref 3.8–10.5)
WBC # FLD AUTO: 6.36 K/UL — SIGNIFICANT CHANGE UP (ref 3.8–10.5)
WBC # FLD AUTO: 6.47 K/UL — SIGNIFICANT CHANGE UP (ref 3.8–10.5)
WBC # FLD AUTO: 7.98 K/UL — SIGNIFICANT CHANGE UP (ref 3.8–10.5)
WBC # FLD AUTO: 8.28 K/UL — SIGNIFICANT CHANGE UP (ref 3.8–10.5)
WBC UR QL: 2 /HPF — SIGNIFICANT CHANGE UP (ref 0–5)

## 2024-05-18 PROCEDURE — 99222 1ST HOSP IP/OBS MODERATE 55: CPT | Mod: GC

## 2024-05-18 PROCEDURE — 93306 TTE W/DOPPLER COMPLETE: CPT | Mod: 26

## 2024-05-18 PROCEDURE — 99291 CRITICAL CARE FIRST HOUR: CPT

## 2024-05-18 PROCEDURE — 74328 X-RAY BILE DUCT ENDOSCOPY: CPT | Mod: 26,GC

## 2024-05-18 PROCEDURE — 43274 ERCP DUCT STENT PLACEMENT: CPT | Mod: GC

## 2024-05-18 PROCEDURE — 99291 CRITICAL CARE FIRST HOUR: CPT | Mod: GC

## 2024-05-18 DEVICE — HYDRATOME 44: Type: IMPLANTABLE DEVICE | Status: FUNCTIONAL

## 2024-05-18 DEVICE — STENT BIL ADVANIX 10FRX5CM PRELOADED: Type: IMPLANTABLE DEVICE | Status: FUNCTIONAL

## 2024-05-18 RX ORDER — POTASSIUM PHOSPHATE, MONOBASIC POTASSIUM PHOSPHATE, DIBASIC 236; 224 MG/ML; MG/ML
15 INJECTION, SOLUTION INTRAVENOUS ONCE
Refills: 0 | Status: COMPLETED | OUTPATIENT
Start: 2024-05-18 | End: 2024-05-19

## 2024-05-18 RX ORDER — POTASSIUM PHOSPHATE, MONOBASIC POTASSIUM PHOSPHATE, DIBASIC 236; 224 MG/ML; MG/ML
15 INJECTION, SOLUTION INTRAVENOUS ONCE
Refills: 0 | Status: COMPLETED | OUTPATIENT
Start: 2024-05-18 | End: 2024-05-18

## 2024-05-18 RX ORDER — ALBUMIN HUMAN 25 %
250 VIAL (ML) INTRAVENOUS ONCE
Refills: 0 | Status: COMPLETED | OUTPATIENT
Start: 2024-05-18 | End: 2024-05-18

## 2024-05-18 RX ORDER — SODIUM CHLORIDE 9 MG/ML
1000 INJECTION, SOLUTION INTRAVENOUS
Refills: 0 | Status: DISCONTINUED | OUTPATIENT
Start: 2024-05-18 | End: 2024-05-18

## 2024-05-18 RX ORDER — INDOMETHACIN 50 MG
100 CAPSULE ORAL ONCE
Refills: 0 | Status: DISCONTINUED | OUTPATIENT
Start: 2024-05-18 | End: 2024-05-19

## 2024-05-18 RX ORDER — HEPARIN SODIUM 5000 [USP'U]/ML
5000 INJECTION INTRAVENOUS; SUBCUTANEOUS EVERY 8 HOURS
Refills: 0 | Status: DISCONTINUED | OUTPATIENT
Start: 2024-05-18 | End: 2024-05-22

## 2024-05-18 RX ORDER — INSULIN GLARGINE 100 [IU]/ML
6 INJECTION, SOLUTION SUBCUTANEOUS
Qty: 1 | Refills: 0 | DISCHARGE

## 2024-05-18 RX ORDER — POTASSIUM CHLORIDE 20 MEQ
20 PACKET (EA) ORAL ONCE
Refills: 0 | Status: COMPLETED | OUTPATIENT
Start: 2024-05-18 | End: 2024-05-18

## 2024-05-18 RX ORDER — INSULIN HUMAN 100 [IU]/ML
3 INJECTION, SOLUTION SUBCUTANEOUS
Qty: 100 | Refills: 0 | Status: DISCONTINUED | OUTPATIENT
Start: 2024-05-18 | End: 2024-05-19

## 2024-05-18 RX ORDER — HUMAN INSULIN 100 [IU]/ML
10 INJECTION, SUSPENSION SUBCUTANEOUS
Refills: 0 | Status: DISCONTINUED | OUTPATIENT
Start: 2024-05-18 | End: 2024-05-18

## 2024-05-18 RX ORDER — DEXTROSE 10 % IN WATER 10 %
1000 INTRAVENOUS SOLUTION INTRAVENOUS
Refills: 0 | Status: DISCONTINUED | OUTPATIENT
Start: 2024-05-18 | End: 2024-05-18

## 2024-05-18 RX ORDER — SODIUM CHLORIDE 9 MG/ML
1000 INJECTION INTRAMUSCULAR; INTRAVENOUS; SUBCUTANEOUS
Refills: 0 | Status: DISCONTINUED | OUTPATIENT
Start: 2024-05-18 | End: 2024-05-18

## 2024-05-18 RX ORDER — ACETAMINOPHEN 500 MG
1000 TABLET ORAL EVERY 6 HOURS
Refills: 0 | Status: DISCONTINUED | OUTPATIENT
Start: 2024-05-18 | End: 2024-05-20

## 2024-05-18 RX ORDER — CALCIUM GLUCONATE 100 MG/ML
1 VIAL (ML) INTRAVENOUS ONCE
Refills: 0 | Status: COMPLETED | OUTPATIENT
Start: 2024-05-18 | End: 2024-05-18

## 2024-05-18 RX ORDER — NOREPINEPHRINE BITARTRATE/D5W 8 MG/250ML
0.18 PLASTIC BAG, INJECTION (ML) INTRAVENOUS
Qty: 16 | Refills: 0 | Status: DISCONTINUED | OUTPATIENT
Start: 2024-05-18 | End: 2024-05-19

## 2024-05-18 RX ORDER — INSULIN LISPRO 100/ML
VIAL (ML) SUBCUTANEOUS EVERY 6 HOURS
Refills: 0 | Status: DISCONTINUED | OUTPATIENT
Start: 2024-05-18 | End: 2024-05-18

## 2024-05-18 RX ORDER — DEXTROSE 50 % IN WATER 50 %
12.5 SYRINGE (ML) INTRAVENOUS ONCE
Refills: 0 | Status: COMPLETED | OUTPATIENT
Start: 2024-05-18 | End: 2024-05-18

## 2024-05-18 RX ORDER — SODIUM CHLORIDE 9 MG/ML
1000 INJECTION, SOLUTION INTRAVENOUS
Refills: 0 | Status: DISCONTINUED | OUTPATIENT
Start: 2024-05-18 | End: 2024-05-20

## 2024-05-18 RX ORDER — ACETAMINOPHEN 500 MG
650 TABLET ORAL ONCE
Refills: 0 | Status: COMPLETED | OUTPATIENT
Start: 2024-05-18 | End: 2024-05-18

## 2024-05-18 RX ORDER — SODIUM CHLORIDE 9 MG/ML
500 INJECTION INTRAMUSCULAR; INTRAVENOUS; SUBCUTANEOUS ONCE
Refills: 0 | Status: COMPLETED | OUTPATIENT
Start: 2024-05-18 | End: 2024-05-18

## 2024-05-18 RX ADMIN — Medication 260 MILLIGRAM(S): at 03:22

## 2024-05-18 RX ADMIN — SODIUM CHLORIDE 100 MILLILITER(S): 9 INJECTION, SOLUTION INTRAVENOUS at 05:36

## 2024-05-18 RX ADMIN — INSULIN HUMAN 3 UNIT(S)/HR: 100 INJECTION, SOLUTION SUBCUTANEOUS at 20:37

## 2024-05-18 RX ADMIN — CHLORHEXIDINE GLUCONATE 15 MILLILITER(S): 213 SOLUTION TOPICAL at 05:35

## 2024-05-18 RX ADMIN — PIPERACILLIN AND TAZOBACTAM 25 GRAM(S): 4; .5 INJECTION, POWDER, LYOPHILIZED, FOR SOLUTION INTRAVENOUS at 22:00

## 2024-05-18 RX ADMIN — PIPERACILLIN AND TAZOBACTAM 25 GRAM(S): 4; .5 INJECTION, POWDER, LYOPHILIZED, FOR SOLUTION INTRAVENOUS at 14:10

## 2024-05-18 RX ADMIN — Medication 50 MILLIEQUIVALENT(S): at 02:16

## 2024-05-18 RX ADMIN — PIPERACILLIN AND TAZOBACTAM 25 GRAM(S): 4; .5 INJECTION, POWDER, LYOPHILIZED, FOR SOLUTION INTRAVENOUS at 05:35

## 2024-05-18 RX ADMIN — Medication 12.5 GRAM(S): at 00:41

## 2024-05-18 RX ADMIN — HUMAN INSULIN 10 UNIT(S): 100 INJECTION, SUSPENSION SUBCUTANEOUS at 11:56

## 2024-05-18 RX ADMIN — Medication 100 GRAM(S): at 05:36

## 2024-05-18 RX ADMIN — Medication 100 MILLIEQUIVALENT(S): at 06:17

## 2024-05-18 RX ADMIN — Medication 650 MILLIGRAM(S): at 03:45

## 2024-05-18 RX ADMIN — SODIUM CHLORIDE 1000 MILLILITER(S): 9 INJECTION INTRAMUSCULAR; INTRAVENOUS; SUBCUTANEOUS at 03:22

## 2024-05-18 RX ADMIN — Medication 125 MILLILITER(S): at 12:55

## 2024-05-18 RX ADMIN — SODIUM CHLORIDE 100 MILLILITER(S): 9 INJECTION, SOLUTION INTRAVENOUS at 16:13

## 2024-05-18 RX ADMIN — Medication 100 MILLIEQUIVALENT(S): at 20:42

## 2024-05-18 RX ADMIN — POTASSIUM PHOSPHATE, MONOBASIC POTASSIUM PHOSPHATE, DIBASIC 62.5 MILLIMOLE(S): 236; 224 INJECTION, SOLUTION INTRAVENOUS at 06:17

## 2024-05-18 RX ADMIN — Medication 63.75 MILLIMOLE(S): at 12:58

## 2024-05-18 RX ADMIN — CHLORHEXIDINE GLUCONATE 1 APPLICATION(S): 213 SOLUTION TOPICAL at 05:36

## 2024-05-18 RX ADMIN — INSULIN HUMAN 3 UNIT(S)/HR: 100 INJECTION, SOLUTION SUBCUTANEOUS at 00:41

## 2024-05-18 NOTE — PROGRESS NOTE ADULT - SUBJECTIVE AND OBJECTIVE BOX
INTERVAL EVENTS:  - Lactate 1.7; no emergent perc ashley   - started insulin drip for euglycemic DKA  - Levo weaned    SUBJECTIVE/ROS:  [ ] A ten-point review of systems was otherwise negative except as noted.  [ ] Due to altered mental status/intubation, subjective information were not able to be obtained from the patient. History was obtained, to the extent possible, from review of the chart and collateral sources of information.    NEURO  Exam: sedated, follows  Meds: HYDROmorphone  Injectable 0.5 milliGRAM(s) IV Push every 4 hours PRN Moderate Pain (4 - 6)  HYDROmorphone  Injectable 1 milliGRAM(s) IV Push every 4 hours PRN Severe Pain (7 - 10)  propofol Infusion 50 MICROgram(s)/kG/Min IV Continuous <Continuous>      RESPIRATORY  RR: 16 (05-17-24 @ 23:00) (16 - 28)  SpO2: 100% (05-17-24 @ 23:25) (96% - 100%)  Wt(kg): --  Exam: unlabored, no increased work of breathing, Mech Vent  Mechanical Ventilation: Mode: AC/ CMV (Assist Control/ Continuous Mandatory Ventilation), RR (machine): 16, RR (patient): 17, TV (machine): 450, FiO2: 40, PEEP: 5, ITime: 0.82, MAP: 10, PIP: 20  ABG - ( 17 May 2024 20:30 )  pH: 7.43  /  pCO2: 31    /  pO2: 176   / HCO3: 21    / Base Excess: -2.7  /  SaO2: 99.0    Lactate: x                Meds:     CARDIOVASCULAR  HR: 93 (05-17-24 @ 23:25) (76 - 114)  BP: 85/61 (05-17-24 @ 14:30) (68/40 - 161/85)  BP(mean): 70 (05-17-24 @ 14:30) (66 - 73)  ABP: 90/56 (05-17-24 @ 23:00) (77/52 - 159/79)  ABP(mean): 67 (05-17-24 @ 23:00) (62 - 108)  Wt(kg): --  CVP(cm H2O): --    Lactate, Blood: 0.8 mmol/L (05-17 @ 17:06)    Exam: regular rate and rhythm  Cardiac Rhythm: sinus  Perfusion     [x]Adequate   [ ]Inadequate  Mentation   [x]Normal       [ ]Reduced  Extremities  [x]Warm         [ ]Cool  Volume Status [ ]Hypervolemic [x]Euvolemic [ ]Hypovolemic  Meds: norepinephrine Infusion 0.18 MICROgram(s)/kG/Min IV Continuous <Continuous>      GI/NUTRITION  Exam: soft, nondistended, non tender, epigastric keloid scars  Diet: npo    GENITOURINARY  I&O's Detail    05-17 @ 07:01  -  05-18 @ 00:02  --------------------------------------------------------  IN:    IV PiggyBack: 200 mL    Lactated Ringers: 250 mL    Norepinephrine: 28.5 mL    Propofol: 78 mL  Total IN: 556.5 mL    OUT:    Indwelling Catheter - Urethral (mL): 430 mL  Total OUT: 430 mL    Total NET: 126.5 mL        Weight (kg): 67.9 (05-17 @ 20:10), 66.4 (05-17 @ 11:30)  05-17    133<L>  |  93<L>  |  32<H>  ----------------------------<  190<H>  3.3<L>   |  18<L>  |  1.17    Ca    8.7      17 May 2024 20:30  Phos  2.2     05-17  Mg     2.20     05-17    TPro  6.9  /  Alb  3.6  /  TBili  6.4<H>  /  DBili  x   /  AST  243<H>  /  ALT  406<H>  /  AlkPhos  276<H>  05-17    [ ] Radford catheter, indication: N/A  Meds: dextrose 10% Bolus 125 milliLiter(s) IV Bolus once  dextrose 5% + sodium chloride 0.9%. 1000 milliLiter(s) IV Continuous <Continuous>  dextrose 5%. 1000 milliLiter(s) IV Continuous <Continuous>  dextrose 5%. 1000 milliLiter(s) IV Continuous <Continuous>  potassium chloride  20 mEq/100 mL IVPB 20 milliEquivalent(s) IV Intermittent every 2 hours  sodium chloride 0.9% lock flush 10 milliLiter(s) IV Push every 1 hour PRN Pre/post blood products, medications, blood draw, and to maintain line patency      HEMATOLOGIC  Meds:   [x] VTE Prophylaxis                        14.0   11.06 )-----------( 333      ( 17 May 2024 20:30 )             42.1     PT/INR - ( 17 May 2024 20:30 )   PT: 14.1 sec;   INR: 1.27 ratio         PTT - ( 17 May 2024 20:30 )  PTT:36.4 sec  Transfusion     [ ] PRBC   [ ] Platelets   [ ] FFP   [ ] Cryoprecipitate    INFECTIOUS DISEASES  WBC Count: 11.06 K/uL (05-17 @ 20:30)  WBC Count: 7.12 K/uL (05-17 @ 17:06)  WBC Count: 10.12 K/uL (05-17 @ 11:00)  WBC Count: 12.09 K/uL (05-17 @ 06:49)    RECENT CULTURES:    Meds: piperacillin/tazobactam IVPB.. 3.375 Gram(s) IV Intermittent every 8 hours      ENDOCRINE  CAPILLARY BLOOD GLUCOSE      POCT Blood Glucose.: 146 mg/dL (17 May 2024 17:31)  POCT Blood Glucose.: 183 mg/dL (17 May 2024 10:45)    Meds: dextrose 50% Injectable 50 milliLiter(s) IV Push every 15 minutes  dextrose 50% Injectable 25 milliLiter(s) IV Push every 15 minutes  dextrose 50% Injectable 25 Gram(s) IV Push once  dextrose 50% Injectable 12.5 Gram(s) IV Push once  dextrose Oral Gel 15 Gram(s) Oral once PRN  glucagon  Injectable 1 milliGRAM(s) IntraMuscular once  insulin regular Infusion 3 Unit(s)/Hr IV Continuous <Continuous>      ACCESS DEVICES:  [ ] Peripheral IV  [ ] Central Venous Line	  [ ] Arterial Line		   [ ] PICC					  [ ] Mediport  [ ] Urinary Catheter, Date Placed:     OTHER MEDICATIONS:  chlorhexidine 0.12% Liquid 15 milliLiter(s) Oral Mucosa every 12 hours  chlorhexidine 2% Cloths 1 Application(s) Topical <User Schedule>      CODE STATUS:      IMAGING:

## 2024-05-18 NOTE — CONSULT NOTE ADULT - SUBJECTIVE AND OBJECTIVE BOX
Chief Complaint:  Patient is a 53y old  Male who presents with a chief complaint of     HPI:  53M with CAD/CABG/PCI (last 12/2022 and POBA 11/2023) on DAPT (ASA/Brilinta), HFrEF (35%), HTN, and DM presented with abd pain x4d associated with fever of 103 on Tuesday night and nausea/vomiting. Also noted a transient episode of chest tightness while in CT scan with hypoxia that improved with 2LC. Initially presented initially to [facility], where imaging notable for choledocholithiasis of 2mm with CBD dilation of 8mm with intrahepatic biliary dilation; LFT elevation. S/P ERCP 5/17 that was unsuccessful with CBD cannulation due to difficult anatomy and periampullary diverticulum; cholangiogram with stone seen; no strictures. Transferred to [facility] for IR evaluation    Allergies:  No Known Allergies      Home Medications:    Hospital Medications:  acetaminophen   IVPB .. 1000 milliGRAM(s) IV Intermittent every 6 hours PRN  chlorhexidine 2% Cloths 1 Application(s) Topical <User Schedule>  dextrose 10% Bolus 125 milliLiter(s) IV Bolus once  dextrose 5% + lactated ringers. 1000 milliLiter(s) IV Continuous <Continuous>  dextrose 5%. 1000 milliLiter(s) IV Continuous <Continuous>  dextrose 5%. 1000 milliLiter(s) IV Continuous <Continuous>  dextrose 50% Injectable 25 milliLiter(s) IV Push every 15 minutes  dextrose 50% Injectable 25 Gram(s) IV Push once  dextrose 50% Injectable 12.5 Gram(s) IV Push once  dextrose 50% Injectable 50 milliLiter(s) IV Push every 15 minutes  dextrose Oral Gel 15 Gram(s) Oral once PRN  glucagon  Injectable 1 milliGRAM(s) IntraMuscular once  insulin lispro (ADMELOG) corrective regimen sliding scale   SubCutaneous every 6 hours  insulin NPH human recombinant 10 Unit(s) SubCutaneous two times a day  norepinephrine Infusion 0.181 MICROgram(s)/kG/Min IV Continuous <Continuous>  piperacillin/tazobactam IVPB.. 3.375 Gram(s) IV Intermittent every 8 hours  sodium chloride 0.9% lock flush 10 milliLiter(s) IV Push every 1 hour PRN      PMHX/PSHX:  No pertinent past medical history    Hypertension    Diabetes mellitus    GERD (gastroesophageal reflux disease)    CAD (coronary artery disease)    No significant past surgical history    No significant past surgical history    S/P CABG (coronary artery bypass graft)        Family history:  No pertinent family history in first degree relatives    FH: heart disease (Father)    ROS:  General:  No wt loss, fevers, chills  ENT:  No dysphagia  CV:  No pain, palpitations  Pulm:  No dyspnea, cough  GI:  (+) pain, No nausea, No vomiting, No diarrhea, No constipation, No rectal bleeding, No tarry stools  Muscle:  No pain, weakness  Neuro:  No weakness  Heme:  No ecchymosis  Skin:  No rash    PHYSICAL EXAM:  GENERAL:  No acute distress  HEENT:  (+) scleral icterus  CHEST:  no accessory muscle use  HEART:  Regular rate and rhythm  ABDOMEN:  Soft, non-tender, non-distended  EXTREMITIES: No edema  SKIN:  No rash/ecchymoses  NEURO:  Alert and oriented x 3    Vital Signs:  Vital Signs Last 24 Hrs  T(C): 37.2 (18 May 2024 12:00), Max: 38 (18 May 2024 03:00)  T(F): 98.9 (18 May 2024 12:00), Max: 100.4 (18 May 2024 03:00)  HR: 102 (18 May 2024 14:00) (76 - 118)  BP: 103/65 (18 May 2024 09:00) (103/65 - 103/65)  BP(mean): 77 (18 May 2024 09:00) (77 - 77)  RR: 19 (18 May 2024 14:00) (12 - 23)  SpO2: 100% (18 May 2024 14:00) (98% - 100%)    Parameters below as of 18 May 2024 12:00  Patient On (Oxygen Delivery Method): room air      Daily Height in cm: 175.26 (17 May 2024 20:10)    Daily     LABS:                        12.1   6.36  )-----------( 200      ( 18 May 2024 14:45 )             35.4     Mean Cell Volume: 81.6 fL (05-18-24 @ 14:45)    05-18    134<L>  |  102  |  21  ----------------------------<  184<H>  3.9   |  19<L>  |  0.90    Ca    8.3<L>      18 May 2024 08:00  Phos  2.1     05-18  Mg     2.00     05-18    TPro  6.4  /  Alb  3.4  /  TBili  3.7<H>  /  DBili  x   /  AST  190<H>  /  ALT  327<H>  /  AlkPhos  252<H>  05-18    LIVER FUNCTIONS - ( 18 May 2024 08:00 )  Alb: 3.4 g/dL / Pro: 6.4 g/dL / ALK PHOS: 252 U/L / ALT: 327 U/L / AST: 190 U/L / GGT: x           PT/INR - ( 17 May 2024 20:30 )   PT: 14.1 sec;   INR: 1.27 ratio         PTT - ( 17 May 2024 20:30 )  PTT:36.4 sec  Urinalysis Basic - ( 18 May 2024 08:00 )    Color: x / Appearance: x / SG: x / pH: x  Gluc: 184 mg/dL / Ketone: x  / Bili: x / Urobili: x   Blood: x / Protein: x / Nitrite: x   Leuk Esterase: x / RBC: x / WBC x   Sq Epi: x / Non Sq Epi: x / Bacteria: x                              12.1   6.36  )-----------( 200      ( 18 May 2024 14:45 )             35.4                         12.9   7.98  )-----------( 296      ( 18 May 2024 08:00 )             38.4                         12.6   8.28  )-----------( 225      ( 18 May 2024 00:53 )             38.1                         14.0   11.06 )-----------( 333      ( 17 May 2024 20:30 )             42.1                         13.4   7.12  )-----------( 227      ( 17 May 2024 17:06 )             40.2       Imaging:  FINDINGS:    Multiple sequences are degraded by motion.    LOWER CHEST: Sternotomy.    LIVER: Within normal limits.  BILE DUCTS: CBD 8 mm. Question 2 mm distal CBD signal void. Sludge.  GALLBLADDER: Cholelithiasis and sludge.  SPLEEN: Within normal limits.  PANCREAS: Within normal limits.  ADRENALS: Within normal limits.  KIDNEYS/URETERS: Small renal cysts. No hydronephrosis.    VISUALIZED PORTIONS:  BOWEL: Within normal limits.  PERITONEUM: No ascites.  VESSELS: Within normal limits.  RETROPERITONEUM/LYMPH NODES: No lymphadenopathy.  ABDOMINAL WALL: Within normal limits.  BONES: Degenerative changes.    IMPRESSION:  Cholelithiasis and gallbladder sludge.  Mild CBD dilatation (8 mm).  CBD sludge and question of a small 2 mm distal CBD signal void, which may   represent calculus or artifact.

## 2024-05-18 NOTE — H&P ADULT - ATTENDING COMMENTS
Pt seen and examined.  Agree with resident eval and plan.  Imp: Acute biliary obstruction choledocholithiasis with possible cholangitis.  IR for perc cholecystostomy possible PTC.  SICU management.

## 2024-05-18 NOTE — H&P ADULT - NSHPLABSRESULTS_GEN_ALL_CORE
14.0   11.06 )-----------( 333      ( 17 May 2024 20:30 )             42.1     05-17    133<L>  |  93<L>  |  32<H>  ----------------------------<  190<H>  3.3<L>   |  18<L>  |  1.17    Ca    8.7      17 May 2024 20:30  Phos  2.2     05-17  Mg     2.20     05-17    TPro  6.9  /  Alb  3.6  /  TBili  6.4<H>  /  DBili  x   /  AST  243<H>  /  ALT  406<H>  /  AlkPhos  276<H>  05-17    < from: MR MRCP No Cont (05.16.24 @ 17:37) >    LIVER: Within normal limits.  BILE DUCTS: CBD 8 mm. Question 2 mm distal CBD signal void. Sludge.  GALLBLADDER: Cholelithiasis and sludge.  SPLEEN: Within normal limits.  PANCREAS: Within normal limits.  ADRENALS: Within normal limits.  KIDNEYS/URETERS: Small renal cysts. No hydronephrosis.    VISUALIZED PORTIONS:  BOWEL: Within normal limits.  PERITONEUM: No ascites.  VESSELS: Within normal limits.  RETROPERITONEUM/LYMPH NODES: No lymphadenopathy.  ABDOMINAL WALL: Within normal limits.  BONES: Degenerative changes.    IMPRESSION:  Cholelithiasis and gallbladder sludge.  Mild CBD dilatation (8 mm).  CBD sludge and question of a small 2 mm distal CBD signal void, which may   represent calculus or artifact.    < end of copied text >

## 2024-05-18 NOTE — PROGRESS NOTE ADULT - ASSESSMENT
53M w/ PMH of HTN, DM, CAD sp CABG & stents 12/2022, POBA to OM 11/14/23 and to mLAD 11/27/23, ICM/ CHF, GERD presents to the ED for abdominal pain and SOB. Pt admitted to medicine with choledocholithiasis with extrahepatic and intrahepatic biliary ductal dilatation. Cardiology consulted for new TWI in anterior and lateral leads compared to EKG 12/23, Trop elevation noted, downtrending. Today RRT for hypotension, refractory to fluids requiring pressor support. Admitted to ICU for further management. GI consulted for ERCP which was performed, bile duct was unavailable to cannulate biliary tree, +CBD stone seen on cholangiogram, procedure halted after multiple attempts at advancing wire. Patient remained intubated post procedure, requiring pressors, central line/summer placed prior to transfer. Transferred to [facility] SICU for IR consult for possible perc ashley.    PLAN  NEUROLOGIC   - propofol for sedation  - Pain control: dilaudid PRN    RESPIRATORY   - Mechanical Ventilation 400/16/5/40  - Monitor SpO2 goal >92%    CARDIOVASCULAR   - h/o CAD sp CABG & stents 12/2022, POBA to OM 11/14/23 and to mLAD 11/27/23, ICM/ CHF (EF 35%) on ASA/Brillinta   - Levo gtt, wean as tolerated  - Lactate wnl    GASTROINTESTINAL   - Diet: NPO  - IR consult for per ashley  - GI consult for repeat ERCP    /RENAL   - IV fluids: D5NS @ 100 cc/hr  - Maintain ledbetter catheter, strict Is/Os  - Monitor electrolytes, replete PRN    HEMATOLOGIC  - Monitor H/H   - DVT ppx: holding chemical dvt ppx for IR procedure, SCD  - on ASA/Brillinta (held) (last dose Tuesday 5/14)    INFECTIOUS DISEASE  - Zosyn    ENDOCRINE  - Monitor gluc  - Lantus 5i QHS, moderate ISS  - Basaglar 12u + Farxiga at home  - hgb a1c 6.1 on 5/17    LINES  - IJ CVC ( 5 / 17 )  - A line ( 5 / 17  )  - Ledbetter ( 5 / 17 )  - PIV

## 2024-05-18 NOTE — PROGRESS NOTE ADULT - ASSESSMENT
53M hx HTN, DM, CAD sp CABG & stents 12/2022, POBA to OM 11/14/23 and to mLAD 11/27/23, ICM/ CHF, GERD presented to OSH with abdominal pain and SOB. Pt admitted to medicine with choledocholithiasis on with extrahepatic biliary ductal dilatation on CT and MRCP. Cardiology consulted for new TWI in anterior and lateral leads compared to EKG 12/23, Trop elevation noted, downtrending. Admitted to ICU for septic shock. GI consulted for ERCP, CBD was unable to be intubated, +CBD stone seen on cholangiogram, procedure halted after multiple attempts at advancing wire. Patient remained intubated post procedure, requiring pressors, central line/summer placed prior to transfer. Transferred to [facility] SICU for IR consult for PTC. Extubated and weaned off pressors on 5/18.     PLAN  NEUROLOGIC   - Pain control: Tylenol prn    RESPIRATORY   - Monitor SpO2 goal >92%    CARDIOVASCULAR   - h/o CAD sp CABG & stents 12/2022, POBA to OM 11/14/23 and to mLAD 11/27/23, ICM/ CHF (EF 35%) on ASA/Brillinta   - F/u TTE    GASTROINTESTINAL   - Diet: NPO  - IR consult for PTC-> intrahepatic ducts insufficiently dilated  - GI consult for repeat ERCP    /RENAL   - IV fluids: D5LR @ 100  - Maintain ledbetter catheter, strict Is/Os  - Monitor electrolytes, replete PRN    HEMATOLOGIC  - Monitor H/H   - DVT ppx: holding chemical dvt ppx for IR procedure, SCD  - On ASA/Brillinta (held) (last dose Tuesday 5/14)    INFECTIOUS DISEASE  - Zosyn    ENDOCRINE  - Monitor gluc  - Tx to NPH 10 bid, with NAKIA  - Basaglar 12u + Farxiga at home  - hgb a1c 6.1 on 5/17    LINES  - IJ CVC ( 5 / 17 )  - A line ( 5 / 17  )  - Ledbetter ( 5 / 17 )  - PIV     Code status: full code    Dispo: SICU

## 2024-05-18 NOTE — CONSULT NOTE ADULT - ASSESSMENT
53M with CAD/CABG/PCI (last 12/2022 and POBA 11/2023) on DAPT (ASA/Brilinta), HFrEF (35%), HTN, and DM presented with abd pain x4d associated with fever of 103 on Tuesday night and nausea/vomiting; s/p unsuccessful ERCP and transferred to [facility] for IR evalation    . Also noted a transient episode of chest tightness while in CT scan with hypoxia that improved with 2LC. Initially presented initially to [facility], where imaging notable for choledocholithiasis of 2mm with CBD dilation of 8mm with intrahepatic biliary dilation; LFT elevation. S/P ERCP 5/17 that was unsuccessful with CBD cannulation due to difficult anatomy and periampullary diverticulum; cholangiogram with stone seen; no strictures. Transferred to [facility] for IR evaluation and possible rendezvous procedure    Impression  #cholangitis 2/2 obstructing gallstone, [place] grade III  #septic shock on pressors; on zosyn  - Note: fever 103 noted at home 5/14 (per initial GI consult note); afebrile while inpatient  - CT showing choledocholithiasis with extrahepatic and intrahepatic biliary ductal dilatation; mild gallbladder distention, wall thickening, and pericholecystic inflammation  - MRCP with 8mm CBD dilation; ?2mm distal CBD stone  - ERCP 5/17 that was unsuccessful with CBD cannulation due to difficult anatomy and periampullary diverticulum; cholangiogram with stone seen; no strictures  - LFT peak at AST//820; ; TB 9.1; overall downtrending    #CABG on ASA/Brilinta  #HFrEF (35%)  #SHAMEKA    Recommendations  - continue antibiotics  - discussed with IR; no ideal window for percutaneous biliary drainage; role of perc ashley drainage limited due to choledocholithiasis  - plan for repeat attempt at ERCP today pending OR availability  - keep NPO  - pressor support as needed  - trend CBC, CMP, INR    Discussed with advanced attending Dr. Loja  Note and recommendations are incomplete until reviewed and attested by attending.    Shira Geller MD  GI/Hepatology Fellow, PGY4  [place] Pager [phone] or [facility] Pager [phone]  Teams preferred (7AM to 5PM); after 5PM, call GI fellow on call    On Weekends/Holidays (All Day) and Weekdays after 5PM to 8AM  For non-urgent consults, please email [email or web address] and [email or web address]  For urgent consults, please contact on call GI team. See Amion schedule ([facility]), Desi Hitsk paging system ([facility]), or call hospital  ([facility])

## 2024-05-18 NOTE — PROGRESS NOTE ADULT - SUBJECTIVE AND OBJECTIVE BOX
Interval Events:  -Extubated  -Off pressors  -Reconsulted GI  -NPH 10 bid, NAKIA  -D5LR @100  -TTE pending    NEURO  RASS (if intubated): 		CAM ICU (if concern for delirium):  Exam: AO X4  Meds: acetaminophen   IVPB .. 1000 milliGRAM(s) IV Intermittent every 6 hours PRN Temp greater or equal to 38C (100.4F), Mild Pain (1 - 3)      RESPIRATORY  RR: 14 (05-18-24 @ 07:00) (14 - 28)  SpO2: 100% (05-18-24 @ 09:23) (96% - 100%)  Wt(kg): --  Exam: nonlabored respirations        Meds:     CARDIOVASCULAR  HR: 81 (05-18-24 @ 09:23) (76 - 118)  BP: 85/61 (05-17-24 @ 14:30) (68/40 - 91/59)  BP(mean): 70 (05-17-24 @ 14:30) (66 - 73)  ABP: 117/62 (05-18-24 @ 07:00) (69/45 - 159/79)  ABP(mean): 81 (05-18-24 @ 07:00) (54 - 108)  Wt(kg): --  CVP(cm H2O): --    Lactate, Blood: 0.8 mmol/L (05-17 @ 17:06)    Exam: regular rate  Perfusion     [X]Adequate   [ ]Inadequate  Mentation   [X]Normal       [ ]Reduced  Extremities  [X]Warm         [ ]Cool  Volume Status [ ]Hypervolemic [XEuvolemic [ ]Hypovolemic  Meds:     GI/NUTRITION  Exam: soft, nontender, mildly distended  Diet: NPO  Meds:     GENITOURINARY  I&O's Detail    05-17 @ 07:01  -  05-18 @ 07:00  --------------------------------------------------------  IN:    dextrose 10%: 100 mL    dextrose 10% + sodium chloride 0.9%: 200 mL    dextrose 5% + sodium chloride 0.9%: 500 mL    Insulin: 9 mL    Insulin: 7.5 mL    Insulin: 9 mL    IV PiggyBack: 1150 mL    Lactated Ringers: 250 mL    Norepinephrine: 63.9 mL    Propofol: 158 mL    Sodium Chloride 0.9% Bolus: 500 mL  Total IN: 2947.4 mL    OUT:    Indwelling Catheter - Urethral (mL): 1070 mL  Total OUT: 1070 mL    Total NET: 1877.4 mL        Weight (kg): 67.9 (05-17 @ 20:10), 66.4 (05-17 @ 11:30)  05-18    134<L>  |  102  |  21  ----------------------------<  184<H>  3.9   |  19<L>  |  0.90    Ca    8.3<L>      18 May 2024 08:00  Phos  2.1     05-18  Mg     2.00     05-18    TPro  6.4  /  Alb  3.4  /  TBili  3.7<H>  /  DBili  x   /  AST  190<H>  /  ALT  327<H>  /  AlkPhos  252<H>  05-18    Meds: dextrose 10% Bolus 125 milliLiter(s) IV Bolus once  dextrose 5% + lactated ringers. 1000 milliLiter(s) IV Continuous <Continuous>  dextrose 5%. 1000 milliLiter(s) IV Continuous <Continuous>  dextrose 5%. 1000 milliLiter(s) IV Continuous <Continuous>  sodium chloride 0.9% lock flush 10 milliLiter(s) IV Push every 1 hour PRN Pre/post blood products, medications, blood draw, and to maintain line patency  sodium phosphate 15 milliMole(s)/250 mL IVPB 15 milliMole(s) IV Intermittent once      HEMATOLOGIC  Meds:                         12.9   7.98  )-----------( 296      ( 18 May 2024 08:00 )             38.4     PT/INR - ( 17 May 2024 20:30 )   PT: 14.1 sec;   INR: 1.27 ratio         PTT - ( 17 May 2024 20:30 )  PTT:36.4 sec    INFECTIOUS DISEASES  T(C): 38 (05-18-24 @ 03:00), Max: 38 (05-18-24 @ 03:00)  Wt(kg): --  WBC Count: 7.98 K/uL (05-18 @ 08:00)  WBC Count: 8.28 K/uL (05-18 @ 00:53)  WBC Count: 11.06 K/uL (05-17 @ 20:30)  WBC Count: 7.12 K/uL (05-17 @ 17:06)  WBC Count: 10.12 K/uL (05-17 @ 11:00)    Recent Cultures:    Meds: piperacillin/tazobactam IVPB.. 3.375 Gram(s) IV Intermittent every 8 hours      ENDOCRINE  Capillary Blood Glucose    Meds: dextrose 50% Injectable 50 milliLiter(s) IV Push every 15 minutes  dextrose 50% Injectable 25 Gram(s) IV Push once  dextrose 50% Injectable 12.5 Gram(s) IV Push once  dextrose 50% Injectable 25 milliLiter(s) IV Push every 15 minutes  dextrose Oral Gel 15 Gram(s) Oral once PRN  glucagon  Injectable 1 milliGRAM(s) IntraMuscular once  insulin lispro (ADMELOG) corrective regimen sliding scale   SubCutaneous every 6 hours  insulin NPH human recombinant 10 Unit(s) SubCutaneous two times a day      ACCESS DEVICES:  [ ] Peripheral IV  [ ] Central Venous Line		[ ] R	[ ] L	[ ] IJ	[ ] Fem	[ ] SC	Placed:   [ ] Arterial Line			[ ] R	[ ] L	[ ] Fem	[ ] Rad	[ ] Ax	Placed:   [ ] PICC:					[ ] Mediport  [ ] Urinary Catheter, Date Placed:   [ ] Necessity of urinary, arterial, and venous catheters discussed    OTHER MEDICATIONS:  chlorhexidine 0.12% Liquid 15 milliLiter(s) Oral Mucosa every 12 hours  chlorhexidine 2% Cloths 1 Application(s) Topical <User Schedule>      IMAGING:

## 2024-05-18 NOTE — H&P ADULT - HISTORY OF PRESENT ILLNESS
53 year old male with a PMH of  HTN, DM, CAD sp CABG & stents 12/2022, POBA to OM 11/14/23 and to mLAD 11/27/23, ICM/ CHF, GERD presents to the ED for abdominal pain and SOB. Pt admitted to medicine with choledocholithiasis with extrahepatic and intrahepatic biliary ductal dilatation. Cardiology consulted for new TWI in anterior and lateral leads compared to EKG 12/23, Trop elevation noted, downtrending. Today RRT for hypotension, refractory to fluids requiring pressor support. Admitted to ICU for further management. GI consulted for ERCP which was performed, bile duct was unavailable to cannulate biliary tree, +CBD stone seen on cholangiogram, procedure halted after multiple attempts at advancing wire. Patient remained intubated post procedure, requiring pressors, central line/summer placed prior to transfer. Transferred to [facility] for IR consult for possible perc ashley.

## 2024-05-18 NOTE — H&P ADULT - ASSESSMENT
53 year old male with a PMH of  HTN, DM, CAD sp CABG & stents 12/2022, POBA to OM 11/14/23 and to mLAD 11/27/23, ICM/ CHF, GERD presents to the ED for abdominal pain and SOB. Pt admitted to medicine with choledocholithiasis with extrahepatic and intrahepatic biliary ductal dilatation. Cardiology consulted for new TWI in anterior and lateral leads compared to EKG 12/23, Trop elevation noted, downtrending. Today RRT for hypotension, refractory to fluids requiring pressor support. Admitted to ICU for further management. GI consulted for ERCP which was performed, bile duct was unavailable to cannulate biliary tree, +CBD stone seen on cholangiogram, procedure halted after multiple attempts at advancing wire. Patient remained intubated post procedure, requiring pressors, central line/summer placed prior to transfer. Transferred to [facility] SICU for IR consult for possible perc ashley.    Plan:  - IR consulted, no emergent intervention planned  - IV antibiotics  - Continue care per SICU    B Team  [id number]

## 2024-05-18 NOTE — CONSULT NOTE ADULT - ATTENDING COMMENTS
53M, initially admitted to OSH with abd pain and fevers.   Clnical picture concerning for cholangitis   ERCP on 5/17 unsuccessful due to difficult anatomy   Transferred to [facility] for further management  Patient currently in ICU, on pressors     IR unable to to perform perc drainage   Plan for ERCP today   Keep NPO   continue IV abx   case d/w advanced GI team   GI to follow, please call w questions
The patient was seen and examined, chart and notes reviewed.  The current diagnosis, plan of care and alternatives have been discussed with the patient.  All questions have been answered and updates have been discussed.  The case was discussed with B team residents/PA's and medical students at morning B surgical rounds and throughout the course of the day.    Septic shocks secondary to cholangitis  a.  Transferred from [facility] VS  b.  Remains on levophed and vasopressin to maintain MAP>65  c.  Continue IVF resuscitation with prn POCUS/flowtrac for fluid status evaluation  d.  Obtain blood cultures.  Continue IV zosyn  e.  CT/MRCP reviewed with IR attending (Andrei)  f.  Possible PTC/perc ashley vs repeat ERCP   g.  Likely interval cholecystectomy once improved HD given severity and morbidity of patient chronic disease.    Euglycemic DKA  a.  Elevated BHB  b.  Start insulin gtt  c.  Monitor AG    Respiratory insufficiency  a.  With adequate gas exchange  b.  On propofol gtt for sedation  c.  Obtain CXR, trend ABG    CHF  a.  lopressor, entresto on hold secondary to hypotension  b.  Follow up ECHO

## 2024-05-18 NOTE — H&P ADULT - NSHPPHYSICALEXAM_GEN_ALL_CORE
PHYSICAL EXAM:  GENERAL: Intubated, lightly sedated, not in distress  HEAD:  Atraumatic, Normocephalic  EYES: EOMI, scleral icterus  NECK: Apparently normal ROM  CHEST/LUNG: Mechanically ventilated  HEART: Regular rate and rhythm.   ABDOMEN: Soft, non-tender, non-distended; epigastric surgical scars   EXTREMITIES:  WWP  SKIN: Jaudiced

## 2024-05-19 ENCOUNTER — TRANSCRIPTION ENCOUNTER (OUTPATIENT)
Age: 54
End: 2024-05-19

## 2024-05-19 LAB
ALBUMIN SERPL ELPH-MCNC: 2.6 G/DL — LOW (ref 3.3–5)
ALBUMIN SERPL ELPH-MCNC: 2.8 G/DL — LOW (ref 3.3–5)
ALBUMIN SERPL ELPH-MCNC: 3 G/DL — LOW (ref 3.3–5)
ALP SERPL-CCNC: 194 U/L — HIGH (ref 40–120)
ALP SERPL-CCNC: 195 U/L — HIGH (ref 40–120)
ALP SERPL-CCNC: 207 U/L — HIGH (ref 40–120)
ALT FLD-CCNC: 201 U/L — HIGH (ref 4–41)
ALT FLD-CCNC: 221 U/L — HIGH (ref 4–41)
ALT FLD-CCNC: 224 U/L — HIGH (ref 4–41)
ANION GAP SERPL CALC-SCNC: 10 MMOL/L — SIGNIFICANT CHANGE UP (ref 7–14)
ANION GAP SERPL CALC-SCNC: 10 MMOL/L — SIGNIFICANT CHANGE UP (ref 7–14)
ANION GAP SERPL CALC-SCNC: 11 MMOL/L — SIGNIFICANT CHANGE UP (ref 7–14)
APPEARANCE UR: CLEAR — SIGNIFICANT CHANGE UP
AST SERPL-CCNC: 109 U/L — HIGH (ref 4–40)
AST SERPL-CCNC: 116 U/L — HIGH (ref 4–40)
AST SERPL-CCNC: 123 U/L — HIGH (ref 4–40)
B-OH-BUTYR SERPL-SCNC: 0.4 MMOL/L — SIGNIFICANT CHANGE UP (ref 0–0.4)
B-OH-BUTYR SERPL-SCNC: 0.6 MMOL/L — HIGH (ref 0–0.4)
B-OH-BUTYR SERPL-SCNC: 0.7 MMOL/L — HIGH (ref 0–0.4)
B-OH-BUTYR SERPL-SCNC: <0 MMOL/L — SIGNIFICANT CHANGE UP (ref 0–0.4)
B-OH-BUTYR SERPL-SCNC: <0 MMOL/L — SIGNIFICANT CHANGE UP (ref 0–0.4)
BACTERIA # UR AUTO: NEGATIVE /HPF — SIGNIFICANT CHANGE UP
BILIRUB DIRECT SERPL-MCNC: 1.4 MG/DL — HIGH (ref 0–0.3)
BILIRUB DIRECT SERPL-MCNC: 1.4 MG/DL — HIGH (ref 0–0.3)
BILIRUB DIRECT SERPL-MCNC: 1.6 MG/DL — HIGH (ref 0–0.3)
BILIRUB INDIRECT FLD-MCNC: 0.4 MG/DL — SIGNIFICANT CHANGE UP (ref 0–1)
BILIRUB INDIRECT FLD-MCNC: 0.5 MG/DL — SIGNIFICANT CHANGE UP (ref 0–1)
BILIRUB INDIRECT FLD-MCNC: 0.5 MG/DL — SIGNIFICANT CHANGE UP (ref 0–1)
BILIRUB SERPL-MCNC: 1.8 MG/DL — HIGH (ref 0.2–1.2)
BILIRUB SERPL-MCNC: 1.9 MG/DL — HIGH (ref 0.2–1.2)
BILIRUB SERPL-MCNC: 2.1 MG/DL — HIGH (ref 0.2–1.2)
BILIRUB UR-MCNC: NEGATIVE — SIGNIFICANT CHANGE UP
BLOOD GAS ARTERIAL - LYTES,HGB,ICA,LACT RESULT: SIGNIFICANT CHANGE UP
BUN SERPL-MCNC: 11 MG/DL — SIGNIFICANT CHANGE UP (ref 7–23)
BUN SERPL-MCNC: 4 MG/DL — LOW (ref 7–23)
BUN SERPL-MCNC: 5 MG/DL — LOW (ref 7–23)
BUN SERPL-MCNC: 7 MG/DL — SIGNIFICANT CHANGE UP (ref 7–23)
BUN SERPL-MCNC: 8 MG/DL — SIGNIFICANT CHANGE UP (ref 7–23)
CALCIUM SERPL-MCNC: 7.7 MG/DL — LOW (ref 8.4–10.5)
CALCIUM SERPL-MCNC: 7.9 MG/DL — LOW (ref 8.4–10.5)
CALCIUM SERPL-MCNC: 8 MG/DL — LOW (ref 8.4–10.5)
CAST: 1 /LPF — SIGNIFICANT CHANGE UP (ref 0–4)
CHLORIDE SERPL-SCNC: 104 MMOL/L — SIGNIFICANT CHANGE UP (ref 98–107)
CHLORIDE SERPL-SCNC: 104 MMOL/L — SIGNIFICANT CHANGE UP (ref 98–107)
CHLORIDE SERPL-SCNC: 105 MMOL/L — SIGNIFICANT CHANGE UP (ref 98–107)
CHLORIDE SERPL-SCNC: 107 MMOL/L — SIGNIFICANT CHANGE UP (ref 98–107)
CHLORIDE SERPL-SCNC: 107 MMOL/L — SIGNIFICANT CHANGE UP (ref 98–107)
CO2 SERPL-SCNC: 19 MMOL/L — LOW (ref 22–31)
CO2 SERPL-SCNC: 20 MMOL/L — LOW (ref 22–31)
CO2 SERPL-SCNC: 21 MMOL/L — LOW (ref 22–31)
COLOR SPEC: SIGNIFICANT CHANGE UP
CREAT SERPL-MCNC: 0.66 MG/DL — SIGNIFICANT CHANGE UP (ref 0.5–1.3)
CREAT SERPL-MCNC: 0.69 MG/DL — SIGNIFICANT CHANGE UP (ref 0.5–1.3)
CREAT SERPL-MCNC: 0.71 MG/DL — SIGNIFICANT CHANGE UP (ref 0.5–1.3)
CREAT SERPL-MCNC: 0.75 MG/DL — SIGNIFICANT CHANGE UP (ref 0.5–1.3)
CREAT SERPL-MCNC: 0.75 MG/DL — SIGNIFICANT CHANGE UP (ref 0.5–1.3)
DIFF PNL FLD: ABNORMAL
EGFR: 109 ML/MIN/1.73M2 — SIGNIFICANT CHANGE UP
EGFR: 109 ML/MIN/1.73M2 — SIGNIFICANT CHANGE UP
EGFR: 110 ML/MIN/1.73M2 — SIGNIFICANT CHANGE UP
EGFR: 111 ML/MIN/1.73M2 — SIGNIFICANT CHANGE UP
EGFR: 113 ML/MIN/1.73M2 — SIGNIFICANT CHANGE UP
GLUCOSE BLDC GLUCOMTR-MCNC: 137 MG/DL — HIGH (ref 70–99)
GLUCOSE BLDC GLUCOMTR-MCNC: 141 MG/DL — HIGH (ref 70–99)
GLUCOSE BLDC GLUCOMTR-MCNC: 150 MG/DL — HIGH (ref 70–99)
GLUCOSE BLDC GLUCOMTR-MCNC: 151 MG/DL — HIGH (ref 70–99)
GLUCOSE BLDC GLUCOMTR-MCNC: 158 MG/DL — HIGH (ref 70–99)
GLUCOSE BLDC GLUCOMTR-MCNC: 161 MG/DL — HIGH (ref 70–99)
GLUCOSE BLDC GLUCOMTR-MCNC: 168 MG/DL — HIGH (ref 70–99)
GLUCOSE BLDC GLUCOMTR-MCNC: 172 MG/DL — HIGH (ref 70–99)
GLUCOSE BLDC GLUCOMTR-MCNC: 177 MG/DL — HIGH (ref 70–99)
GLUCOSE BLDC GLUCOMTR-MCNC: 181 MG/DL — HIGH (ref 70–99)
GLUCOSE BLDC GLUCOMTR-MCNC: 193 MG/DL — HIGH (ref 70–99)
GLUCOSE BLDC GLUCOMTR-MCNC: 216 MG/DL — HIGH (ref 70–99)
GLUCOSE SERPL-MCNC: 144 MG/DL — HIGH (ref 70–99)
GLUCOSE SERPL-MCNC: 156 MG/DL — HIGH (ref 70–99)
GLUCOSE SERPL-MCNC: 163 MG/DL — HIGH (ref 70–99)
GLUCOSE SERPL-MCNC: 170 MG/DL — HIGH (ref 70–99)
GLUCOSE SERPL-MCNC: 242 MG/DL — HIGH (ref 70–99)
GLUCOSE UR QL: >=1000 MG/DL
HCT VFR BLD CALC: 33.8 % — LOW (ref 39–50)
HGB BLD-MCNC: 11.4 G/DL — LOW (ref 13–17)
KETONES UR-MCNC: 40 MG/DL
LEUKOCYTE ESTERASE UR-ACNC: NEGATIVE — SIGNIFICANT CHANGE UP
MAGNESIUM SERPL-MCNC: 1.7 MG/DL — SIGNIFICANT CHANGE UP (ref 1.6–2.6)
MAGNESIUM SERPL-MCNC: 1.8 MG/DL — SIGNIFICANT CHANGE UP (ref 1.6–2.6)
MAGNESIUM SERPL-MCNC: 1.9 MG/DL — SIGNIFICANT CHANGE UP (ref 1.6–2.6)
MAGNESIUM SERPL-MCNC: 1.9 MG/DL — SIGNIFICANT CHANGE UP (ref 1.6–2.6)
MAGNESIUM SERPL-MCNC: 2.1 MG/DL — SIGNIFICANT CHANGE UP (ref 1.6–2.6)
MCHC RBC-ENTMCNC: 27.5 PG — SIGNIFICANT CHANGE UP (ref 27–34)
MCHC RBC-ENTMCNC: 33.7 GM/DL — SIGNIFICANT CHANGE UP (ref 32–36)
MCV RBC AUTO: 81.6 FL — SIGNIFICANT CHANGE UP (ref 80–100)
NITRITE UR-MCNC: NEGATIVE — SIGNIFICANT CHANGE UP
NRBC # BLD: 0 /100 WBCS — SIGNIFICANT CHANGE UP (ref 0–0)
NRBC # FLD: 0 K/UL — SIGNIFICANT CHANGE UP (ref 0–0)
PH UR: 6 — SIGNIFICANT CHANGE UP (ref 5–8)
PHOSPHATE SERPL-MCNC: 1.8 MG/DL — LOW (ref 2.5–4.5)
PHOSPHATE SERPL-MCNC: 1.9 MG/DL — LOW (ref 2.5–4.5)
PHOSPHATE SERPL-MCNC: 2.2 MG/DL — LOW (ref 2.5–4.5)
PHOSPHATE SERPL-MCNC: 2.6 MG/DL — SIGNIFICANT CHANGE UP (ref 2.5–4.5)
PHOSPHATE SERPL-MCNC: 3.3 MG/DL — SIGNIFICANT CHANGE UP (ref 2.5–4.5)
PLATELET # BLD AUTO: 197 K/UL — SIGNIFICANT CHANGE UP (ref 150–400)
POTASSIUM SERPL-MCNC: 3.7 MMOL/L — SIGNIFICANT CHANGE UP (ref 3.5–5.3)
POTASSIUM SERPL-MCNC: 3.8 MMOL/L — SIGNIFICANT CHANGE UP (ref 3.5–5.3)
POTASSIUM SERPL-MCNC: 3.9 MMOL/L — SIGNIFICANT CHANGE UP (ref 3.5–5.3)
POTASSIUM SERPL-MCNC: 4 MMOL/L — SIGNIFICANT CHANGE UP (ref 3.5–5.3)
POTASSIUM SERPL-MCNC: 4.2 MMOL/L — SIGNIFICANT CHANGE UP (ref 3.5–5.3)
POTASSIUM SERPL-SCNC: 3.7 MMOL/L — SIGNIFICANT CHANGE UP (ref 3.5–5.3)
POTASSIUM SERPL-SCNC: 3.8 MMOL/L — SIGNIFICANT CHANGE UP (ref 3.5–5.3)
POTASSIUM SERPL-SCNC: 3.9 MMOL/L — SIGNIFICANT CHANGE UP (ref 3.5–5.3)
POTASSIUM SERPL-SCNC: 4 MMOL/L — SIGNIFICANT CHANGE UP (ref 3.5–5.3)
POTASSIUM SERPL-SCNC: 4.2 MMOL/L — SIGNIFICANT CHANGE UP (ref 3.5–5.3)
PROT SERPL-MCNC: 5.6 G/DL — LOW (ref 6–8.3)
PROT SERPL-MCNC: 5.8 G/DL — LOW (ref 6–8.3)
PROT SERPL-MCNC: 5.8 G/DL — LOW (ref 6–8.3)
PROT UR-MCNC: NEGATIVE MG/DL — SIGNIFICANT CHANGE UP
RBC # BLD: 4.14 M/UL — LOW (ref 4.2–5.8)
RBC # FLD: 13.8 % — SIGNIFICANT CHANGE UP (ref 10.3–14.5)
RBC CASTS # UR COMP ASSIST: 20 /HPF — HIGH (ref 0–4)
SODIUM SERPL-SCNC: 135 MMOL/L — SIGNIFICANT CHANGE UP (ref 135–145)
SODIUM SERPL-SCNC: 135 MMOL/L — SIGNIFICANT CHANGE UP (ref 135–145)
SODIUM SERPL-SCNC: 136 MMOL/L — SIGNIFICANT CHANGE UP (ref 135–145)
SODIUM SERPL-SCNC: 138 MMOL/L — SIGNIFICANT CHANGE UP (ref 135–145)
SODIUM SERPL-SCNC: 138 MMOL/L — SIGNIFICANT CHANGE UP (ref 135–145)
SP GR SPEC: 1.03 — HIGH (ref 1–1.03)
SQUAMOUS # UR AUTO: 0 /HPF — SIGNIFICANT CHANGE UP (ref 0–5)
UROBILINOGEN FLD QL: 1 MG/DL — SIGNIFICANT CHANGE UP (ref 0.2–1)
WBC # BLD: 6.41 K/UL — SIGNIFICANT CHANGE UP (ref 3.8–10.5)
WBC # FLD AUTO: 6.41 K/UL — SIGNIFICANT CHANGE UP (ref 3.8–10.5)
WBC UR QL: 1 /HPF — SIGNIFICANT CHANGE UP (ref 0–5)

## 2024-05-19 PROCEDURE — 99232 SBSQ HOSP IP/OBS MODERATE 35: CPT | Mod: GC

## 2024-05-19 PROCEDURE — 99291 CRITICAL CARE FIRST HOUR: CPT | Mod: GC

## 2024-05-19 RX ORDER — POTASSIUM PHOSPHATE, MONOBASIC POTASSIUM PHOSPHATE, DIBASIC 236; 224 MG/ML; MG/ML
15 INJECTION, SOLUTION INTRAVENOUS ONCE
Refills: 0 | Status: DISCONTINUED | OUTPATIENT
Start: 2024-05-19 | End: 2024-05-19

## 2024-05-19 RX ORDER — INSULIN LISPRO 100/ML
VIAL (ML) SUBCUTANEOUS EVERY 6 HOURS
Refills: 0 | Status: DISCONTINUED | OUTPATIENT
Start: 2024-05-19 | End: 2024-05-20

## 2024-05-19 RX ORDER — MAGNESIUM SULFATE 500 MG/ML
2 VIAL (ML) INJECTION ONCE
Refills: 0 | Status: COMPLETED | OUTPATIENT
Start: 2024-05-19 | End: 2024-05-19

## 2024-05-19 RX ORDER — ATORVASTATIN CALCIUM 80 MG/1
40 TABLET, FILM COATED ORAL AT BEDTIME
Refills: 0 | Status: DISCONTINUED | OUTPATIENT
Start: 2024-05-19 | End: 2024-05-22

## 2024-05-19 RX ORDER — ASPIRIN/CALCIUM CARB/MAGNESIUM 324 MG
81 TABLET ORAL DAILY
Refills: 0 | Status: DISCONTINUED | OUTPATIENT
Start: 2024-05-19 | End: 2024-05-22

## 2024-05-19 RX ORDER — HUMAN INSULIN 100 [IU]/ML
12 INJECTION, SUSPENSION SUBCUTANEOUS EVERY 12 HOURS
Refills: 0 | Status: DISCONTINUED | OUTPATIENT
Start: 2024-05-19 | End: 2024-05-19

## 2024-05-19 RX ORDER — POTASSIUM CHLORIDE 20 MEQ
20 PACKET (EA) ORAL ONCE
Refills: 0 | Status: COMPLETED | OUTPATIENT
Start: 2024-05-19 | End: 2024-05-19

## 2024-05-19 RX ORDER — POTASSIUM PHOSPHATE, MONOBASIC POTASSIUM PHOSPHATE, DIBASIC 236; 224 MG/ML; MG/ML
30 INJECTION, SOLUTION INTRAVENOUS ONCE
Refills: 0 | Status: DISCONTINUED | OUTPATIENT
Start: 2024-05-19 | End: 2024-05-19

## 2024-05-19 RX ORDER — HUMAN INSULIN 100 [IU]/ML
14 INJECTION, SUSPENSION SUBCUTANEOUS EVERY 12 HOURS
Refills: 0 | Status: DISCONTINUED | OUTPATIENT
Start: 2024-05-19 | End: 2024-05-20

## 2024-05-19 RX ORDER — SODIUM,POTASSIUM PHOSPHATES 278-250MG
1 POWDER IN PACKET (EA) ORAL EVERY 6 HOURS
Refills: 0 | Status: COMPLETED | OUTPATIENT
Start: 2024-05-19 | End: 2024-05-20

## 2024-05-19 RX ADMIN — Medication 100 MILLIEQUIVALENT(S): at 11:01

## 2024-05-19 RX ADMIN — PIPERACILLIN AND TAZOBACTAM 25 GRAM(S): 4; .5 INJECTION, POWDER, LYOPHILIZED, FOR SOLUTION INTRAVENOUS at 05:10

## 2024-05-19 RX ADMIN — SODIUM CHLORIDE 125 MILLILITER(S): 9 INJECTION, SOLUTION INTRAVENOUS at 01:13

## 2024-05-19 RX ADMIN — Medication 400 MILLIGRAM(S): at 00:10

## 2024-05-19 RX ADMIN — PIPERACILLIN AND TAZOBACTAM 25 GRAM(S): 4; .5 INJECTION, POWDER, LYOPHILIZED, FOR SOLUTION INTRAVENOUS at 14:21

## 2024-05-19 RX ADMIN — Medication 100 MILLIEQUIVALENT(S): at 02:21

## 2024-05-19 RX ADMIN — CHLORHEXIDINE GLUCONATE 1 APPLICATION(S): 213 SOLUTION TOPICAL at 05:10

## 2024-05-19 RX ADMIN — HEPARIN SODIUM 5000 UNIT(S): 5000 INJECTION INTRAVENOUS; SUBCUTANEOUS at 14:21

## 2024-05-19 RX ADMIN — HEPARIN SODIUM 5000 UNIT(S): 5000 INJECTION INTRAVENOUS; SUBCUTANEOUS at 21:46

## 2024-05-19 RX ADMIN — Medication 11.5 MICROGRAM(S)/KG/MIN: at 01:14

## 2024-05-19 RX ADMIN — Medication 1 PACKET(S): at 17:29

## 2024-05-19 RX ADMIN — POTASSIUM PHOSPHATE, MONOBASIC POTASSIUM PHOSPHATE, DIBASIC 62.5 MILLIMOLE(S): 236; 224 INJECTION, SOLUTION INTRAVENOUS at 01:13

## 2024-05-19 RX ADMIN — Medication 81 MILLIGRAM(S): at 11:01

## 2024-05-19 RX ADMIN — Medication 25 GRAM(S): at 11:01

## 2024-05-19 RX ADMIN — Medication 400 MILLIGRAM(S): at 20:53

## 2024-05-19 RX ADMIN — PIPERACILLIN AND TAZOBACTAM 25 GRAM(S): 4; .5 INJECTION, POWDER, LYOPHILIZED, FOR SOLUTION INTRAVENOUS at 21:46

## 2024-05-19 RX ADMIN — HEPARIN SODIUM 5000 UNIT(S): 5000 INJECTION INTRAVENOUS; SUBCUTANEOUS at 05:09

## 2024-05-19 RX ADMIN — HUMAN INSULIN 14 UNIT(S): 100 INJECTION, SUSPENSION SUBCUTANEOUS at 17:24

## 2024-05-19 RX ADMIN — ATORVASTATIN CALCIUM 40 MILLIGRAM(S): 80 TABLET, FILM COATED ORAL at 21:46

## 2024-05-19 NOTE — CONSULT NOTE ADULT - ASSESSMENT
Patient was reviewed and discussed with the fellow. I agree with the plan of care.     The patient was evaluated by Cardiology prior to the ERCP. There have been no changes in the patient's cardiovascular condition or risk since that assessment.  Increased risk for complications, but no contraindication to proceed.  Should minimize interruption in dual antiplatelet therapy given the recent PCI with CHERRI placement (Late November 2023).  Continue aspirin if bleeding risk permits  Resume ticagrelor as soon as feasible    JHOAN Boogie MD

## 2024-05-19 NOTE — PROGRESS NOTE ADULT - ASSESSMENT
53M with CAD/CABG/PCI (last 12/2022 and POBA 11/2023) on DAPT (ASA/Brilinta), HFrEF (35%), HTN, and DM presented with abd pain x4d associated with fever of 103 on Tuesday night and nausea/vomiting; s/p unsuccessful ERCP and transferred to [facility] for IR evalation    Impression  #cholangitis 2/2 obstructing gallstone, [place] grade III  #septic shock on pressors; on zosyn  - Note: fever 103 noted at home 5/14 (per initial GI consult note); afebrile while inpatient  - CT showing choledocholithiasis with extrahepatic and intrahepatic biliary ductal dilatation; mild gallbladder distention, wall thickening, and pericholecystic inflammation  - MRCP with 8mm CBD dilation; ?2mm distal CBD stone  - ERCP 5/17 that was unsuccessful with CBD cannulation due to difficult anatomy and periampullary diverticulum; cholangiogram with stone seen; no strictures  - LFT peak at AST//820; ; TB 9.1; overall downtrending  - IR evaluated; no good access for percutaneous drain placement  - repeat ERCP attempted 5/18: long intraduodenal segment on rim of shallow diverticulum; biliary tree cannulated; small biliary sphincterotomy with 10Fr x 5cm biliary stent placement; cholangiogram with stone noted  - 5/19: pressor requirements and LFTs downtrending    #CABG on ASA/Brilinta  #HFrEF (35%)  #SHAMEKA    Recommendations  - continue antibiotics  - advance diet as tolerated  - trend CBC/LFTs  - Repeat ERCP in 2 months for retreatment    Note and recommendations are incomplete until reviewed and attested by attending.    Shira Geller MD  GI/Hepatology Fellow, PGY4  [place] Pager [phone] or [facility] Pager [phone]  Teams preferred (7AM to 5PM); after 5PM, call GI fellow on call    On Weekends/Holidays (All Day) and Weekdays after 5PM to 8AM  For non-urgent consults, please email [email or web address] and [email or web address]  For urgent consults, please contact on call GI team. See Amion schedule ([facility]), [place] paging system ([facility]), or call hospital  ([facility])

## 2024-05-19 NOTE — PROGRESS NOTE ADULT - SUBJECTIVE AND OBJECTIVE BOX
Chief Complaint:  Patient is a 53y old  Male who presents with a chief complaint of     Interval Events:  s/p ERCP (see full report)  no abd pain, nausea, vomiting  pressors downtrending    Allergies:  No Known Allergies        Hospital Medications:  acetaminophen   IVPB .. 1000 milliGRAM(s) IV Intermittent every 6 hours PRN  aspirin  chewable 81 milliGRAM(s) Oral daily  atorvastatin 40 milliGRAM(s) Oral at bedtime  chlorhexidine 2% Cloths 1 Application(s) Topical <User Schedule>  dextrose 10% + sodium chloride 0.9%. 1000 milliLiter(s) IV Continuous <Continuous>  dextrose 5%. 1000 milliLiter(s) IV Continuous <Continuous>  dextrose 50% Injectable 25 Gram(s) IV Push once  dextrose 50% Injectable 12.5 Gram(s) IV Push once  dextrose 50% Injectable 25 milliLiter(s) IV Push every 15 minutes  dextrose 50% Injectable 50 milliLiter(s) IV Push every 15 minutes  dextrose Oral Gel 15 Gram(s) Oral once PRN  heparin   Injectable 5000 Unit(s) SubCutaneous every 8 hours  insulin NPH human recombinant 12 Unit(s) SubCutaneous every 12 hours  insulin regular Infusion 3 Unit(s)/Hr IV Continuous <Continuous>  norepinephrine Infusion 0.181 MICROgram(s)/kG/Min IV Continuous <Continuous>  piperacillin/tazobactam IVPB.. 3.375 Gram(s) IV Intermittent every 8 hours  potassium phosphate IVPB 30 milliMole(s) IV Intermittent once      PMHX/PSHX:  No pertinent past medical history    Hypertension    Diabetes mellitus    GERD (gastroesophageal reflux disease)    CAD (coronary artery disease)    No significant past surgical history    No significant past surgical history    S/P CABG (coronary artery bypass graft)        Family history:  No pertinent family history in first degree relatives    FH: heart disease (Father)        PHYSICAL EXAM:   Vital Signs:  Vital Signs Last 24 Hrs  T(C): 36.6 (19 May 2024 04:00), Max: 37.9 (19 May 2024 00:00)  T(F): 97.8 (19 May 2024 04:00), Max: 100.2 (19 May 2024 00:00)  HR: 91 (19 May 2024 12:00) (62 - 128)  BP: --  BP(mean): --  RR: 17 (19 May 2024 12:00) (7 - 25)  SpO2: 100% (19 May 2024 12:00) (98% - 100%)    Parameters below as of 19 May 2024 07:00  Patient On (Oxygen Delivery Method): room air      Daily     Daily Weight in k.2 (19 May 2024 00:00)    GENERAL:  No acute distress  HEENT:  (+) scleral icterus  CHEST:  no accessory muscle use  HEART:  Regular rate and rhythm  ABDOMEN:  Soft, non-tender, non-distended  EXTREMITIES:  No edema  SKIN:  No rash/ecchymoses  NEURO:  Alert and oriented x 3    LABS:                        11.4   6.41  )-----------( 197      ( 19 May 2024 00:30 )             33.8     Mean Cell Volume: 81.6 fL (- @ 00:30)        138  |  107  |  7   ----------------------------<  170<H>  3.7   |  21<L>  |  0.69    Ca    7.9<L>      19 May 2024 08:00  Phos  2.6       Mg     1.90         TPro  5.8<L>  /  Alb  3.0<L>  /  TBili  1.8<H>  /  DBili  1.4<H>  /  AST  109<H>  /  ALT  201<H>  /  AlkPhos  194<H>      LIVER FUNCTIONS - ( 19 May 2024 08:00 )  Alb: 3.0 g/dL / Pro: 5.8 g/dL / ALK PHOS: 194 U/L / ALT: 201 U/L / AST: 109 U/L / GGT: x           PT/INR - ( 17 May 2024 20:30 )   PT: 14.1 sec;   INR: 1.27 ratio         PTT - ( 17 May 2024 20:30 )  PTT:36.4 sec  Urinalysis Basic - ( 19 May 2024 08:00 )    Color: x / Appearance: x / SG: x / pH: x  Gluc: 170 mg/dL / Ketone: x  / Bili: x / Urobili: x   Blood: x / Protein: x / Nitrite: x   Leuk Esterase: x / RBC: x / WBC x   Sq Epi: x / Non Sq Epi: x / Bacteria: x                              11.4   6.41  )-----------( 197      ( 19 May 2024 00:30 )             33.8                         12.6   6.47  )-----------( 204      ( 18 May 2024 18:52 )             37.7                         12.1   6.36  )-----------( 200      ( 18 May 2024 14:45 )             35.4                         12.9   7.98  )-----------( 296      ( 18 May 2024 08:00 )             38.4                         12.6   8.28  )-----------( 225      ( 18 May 2024 00:53 )             38.1       Impression:          EGD:                       - Tortuous esophagus.                       - Erythematous mucosa in the antrum.                       ERCP:   - The major papilla had a long intraduodenal segment and                        was on the rim of a shallow diverticulum.                       - Choledocholithiasis was found. Small biliary                        sphincterotomy was performed and a 10 Fr x 5 cm biliary                        stent was inserted.

## 2024-05-19 NOTE — PROGRESS NOTE ADULT - SUBJECTIVE AND OBJECTIVE BOX
Interval Events:  -S/p repeat ERCP  -Q2 fingersticks, BMP and BHB Q6  -Insulin gtt restarted @3  -SQH     NEURO  RASS (if intubated): 		CAM ICU (if concern for delirium):  Exam: AO X4  Meds: acetaminophen   IVPB .. 1000 milliGRAM(s) IV Intermittent every 6 hours PRN Temp greater or equal to 38C (100.4F), Mild Pain (1 - 3)  indomethacin Suppository 100 milliGRAM(s) Rectal once  indomethacin Suppository 100 milliGRAM(s) Rectal once      RESPIRATORY  RR: 16 (05-18-24 @ 23:00) (7 - 25)  SpO2: 100% (05-18-24 @ 23:00) (100% - 100%)  Wt(kg): --  Exam: nonlabored respirations  Mechanical Ventilation: Mode: standby  ABG - ( 18 May 2024 18:52 )  pH: 7.36  /  pCO2: 32    /  pO2: 147   / HCO3: 18    / Base Excess: -6.3  /  SaO2: 99.1    Lactate: x                Meds:     CARDIOVASCULAR  HR: 101 (05-18-24 @ 23:00) (76 - 128)  BP: 103/65 (05-18-24 @ 09:00) (103/65 - 103/65)  BP(mean): 77 (05-18-24 @ 09:00) (77 - 77)  ABP: 109/52 (05-18-24 @ 23:00) (69/45 - 144/69)  ABP(mean): 69 (05-18-24 @ 23:00) (54 - 96)  Wt(kg): --  CVP(cm H2O): --      Exam: pulse regularly present  Meds: norepinephrine Infusion 0.181 MICROgram(s)/kG/Min IV Continuous <Continuous>      GI/NUTRITION  Exam: soft, non-tender, non-distended  Diet: NPO  Meds:     GENITOURINARY  I&O's Detail    05-17 @ 07:01  -  05-18 @ 07:00  --------------------------------------------------------  IN:    dextrose 10%: 100 mL    dextrose 10% + sodium chloride 0.9%: 200 mL    dextrose 5% + sodium chloride 0.9%: 500 mL    Insulin: 9 mL    Insulin: 7.5 mL    Insulin: 9 mL    IV PiggyBack: 1150 mL    Lactated Ringers: 250 mL    Norepinephrine: 63.9 mL    Propofol: 158 mL    Sodium Chloride 0.9% Bolus: 500 mL  Total IN: 2947.4 mL    OUT:    Indwelling Catheter - Urethral (mL): 1070 mL  Total OUT: 1070 mL    Total NET: 1877.4 mL      05-18 @ 07:01  -  05-19 @ 00:00  --------------------------------------------------------  IN:    dextrose 10% + sodium chloride 0.9%: 950 mL    dextrose 5% + lactated ringers: 800 mL    Insulin: 3 mL    Insulin: 6 mL    IV PiggyBack: 300 mL    Norepinephrine: 3.2 mL  Total IN: 2062.2 mL    OUT:    Indwelling Catheter - Urethral (mL): 1450 mL  Total OUT: 1450 mL    Total NET: 612.2 mL          05-18    137  |  101  |  13  ----------------------------<  188<H>  3.8   |  16<L>  |  0.75    Ca    8.2<L>      18 May 2024 18:52  Phos  2.2     05-18  Mg     1.80     05-18    TPro  6.4  /  Alb  3.5  /  TBili  3.2<H>  /  DBili  2.9<H>  /  AST  154<H>  /  ALT  262<H>  /  AlkPhos  228<H>  05-18    Meds: dextrose 10% + sodium chloride 0.9%. 1000 milliLiter(s) IV Continuous <Continuous>  dextrose 10% Bolus 125 milliLiter(s) IV Bolus once  dextrose 5%. 1000 milliLiter(s) IV Continuous <Continuous>  dextrose 5%. 1000 milliLiter(s) IV Continuous <Continuous>  potassium phosphate IVPB 15 milliMole(s) IV Intermittent once  sodium chloride 0.9% lock flush 10 milliLiter(s) IV Push every 1 hour PRN Pre/post blood products, medications, blood draw, and to maintain line patency      HEMATOLOGIC  Meds: heparin   Injectable 5000 Unit(s) SubCutaneous every 8 hours                          12.6   6.47  )-----------( 204      ( 18 May 2024 18:52 )             37.7     PT/INR - ( 17 May 2024 20:30 )   PT: 14.1 sec;   INR: 1.27 ratio         PTT - ( 17 May 2024 20:30 )  PTT:36.4 sec    INFECTIOUS DISEASES  T(C): 36.6 (05-18-24 @ 20:00), Max: 38 (05-18-24 @ 03:00)  Wt(kg): --  WBC Count: 6.47 K/uL (05-18 @ 18:52)  WBC Count: 6.36 K/uL (05-18 @ 14:45)  WBC Count: 7.98 K/uL (05-18 @ 08:00)  WBC Count: 8.28 K/uL (05-18 @ 00:53)    Recent Cultures:  Specimen Source: Clean Catch Clean Catch (Midstream), 05-17 @ 17:21; Results   <10,000 CFU/mL Normal Urogenital Anne Marie; Gram Stain: --; Organism: --  Specimen Source: .Blood Blood-Peripheral, 05-17 @ 12:24; Results   No growth at 24 hours; Gram Stain: --; Organism: --    Meds: piperacillin/tazobactam IVPB.. 3.375 Gram(s) IV Intermittent every 8 hours      ENDOCRINE  Capillary Blood Glucose    Meds: dextrose 50% Injectable 50 milliLiter(s) IV Push every 15 minutes  dextrose 50% Injectable 25 Gram(s) IV Push once  dextrose 50% Injectable 12.5 Gram(s) IV Push once  dextrose 50% Injectable 25 milliLiter(s) IV Push every 15 minutes  dextrose Oral Gel 15 Gram(s) Oral once PRN  glucagon  Injectable 1 milliGRAM(s) IntraMuscular once  insulin regular Infusion 3 Unit(s)/Hr IV Continuous <Continuous>    OTHER MEDICATIONS:  chlorhexidine 2% Cloths 1 Application(s) Topical <User Schedule>      IMAGING: Interval Events:  -S/p repeat ERCP  -Insulin gtt restarted @3 for eDKA  -ok for CLD per GI but kept NPO for eDKA overnight  -SQH     NEURO  RASS (if intubated): 		[facility] ICU (if concern for delirium):  Exam: AO X4  Meds: acetaminophen   IVPB .. 1000 milliGRAM(s) IV Intermittent every 6 hours PRN Temp greater or equal to 38C (100.4F), Mild Pain (1 - 3)  indomethacin Suppository 100 milliGRAM(s) Rectal once  indomethacin Suppository 100 milliGRAM(s) Rectal once      RESPIRATORY  RR: 16 (05-18-24 @ 23:00) (7 - 25)  SpO2: 100% (05-18-24 @ 23:00) (100% - 100%)  Wt(kg): --  Exam: nonlabored respirations  Mechanical Ventilation: Mode: standby  ABG - ( 18 May 2024 18:52 )  pH: 7.36  /  pCO2: 32    /  pO2: 147   / HCO3: 18    / Base Excess: -6.3  /  SaO2: 99.1    Lactate: x                Meds:     CARDIOVASCULAR  HR: 101 (05-18-24 @ 23:00) (76 - 128)  BP: 103/65 (05-18-24 @ 09:00) (103/65 - 103/65)  BP(mean): 77 (05-18-24 @ 09:00) (77 - 77)  ABP: 109/52 (05-18-24 @ 23:00) (69/45 - 144/69)  ABP(mean): 69 (05-18-24 @ 23:00) (54 - 96)  Wt(kg): --  CVP(cm H2O): --      Exam: pulse regularly present  Meds: norepinephrine Infusion 0.181 MICROgram(s)/kG/Min IV Continuous <Continuous>      GI/NUTRITION  Exam: soft, non-tender, non-distended  Diet: NPO  Meds:     GENITOURINARY  I&O's Detail    05-17 @ 07:01  -  05-18 @ 07:00  --------------------------------------------------------  IN:    dextrose 10%: 100 mL    dextrose 10% + sodium chloride 0.9%: 200 mL    dextrose 5% + sodium chloride 0.9%: 500 mL    Insulin: 9 mL    Insulin: 7.5 mL    Insulin: 9 mL    IV PiggyBack: 1150 mL    Lactated Ringers: 250 mL    Norepinephrine: 63.9 mL    Propofol: 158 mL    Sodium Chloride 0.9% Bolus: 500 mL  Total IN: 2947.4 mL    OUT:    Indwelling Catheter - Urethral (mL): 1070 mL  Total OUT: 1070 mL    Total NET: 1877.4 mL      05-18 @ 07:01  -  05-19 @ 00:00  --------------------------------------------------------  IN:    dextrose 10% + sodium chloride 0.9%: 950 mL    dextrose 5% + lactated ringers: 800 mL    Insulin: 3 mL    Insulin: 6 mL    IV PiggyBack: 300 mL    Norepinephrine: 3.2 mL  Total IN: 2062.2 mL    OUT:    Indwelling Catheter - Urethral (mL): 1450 mL  Total OUT: 1450 mL    Total NET: 612.2 mL          05-18    137  |  101  |  13  ----------------------------<  188<H>  3.8   |  16<L>  |  0.75    Ca    8.2<L>      18 May 2024 18:52  Phos  2.2     05-18  Mg     1.80     05-18    TPro  6.4  /  Alb  3.5  /  TBili  3.2<H>  /  DBili  2.9<H>  /  AST  154<H>  /  ALT  262<H>  /  AlkPhos  228<H>  05-18    Meds: dextrose 10% + sodium chloride 0.9%. 1000 milliLiter(s) IV Continuous <Continuous>  dextrose 10% Bolus 125 milliLiter(s) IV Bolus once  dextrose 5%. 1000 milliLiter(s) IV Continuous <Continuous>  dextrose 5%. 1000 milliLiter(s) IV Continuous <Continuous>  potassium phosphate IVPB 15 milliMole(s) IV Intermittent once  sodium chloride 0.9% lock flush 10 milliLiter(s) IV Push every 1 hour PRN Pre/post blood products, medications, blood draw, and to maintain line patency      HEMATOLOGIC  Meds: heparin   Injectable 5000 Unit(s) SubCutaneous every 8 hours                          12.6   6.47  )-----------( 204      ( 18 May 2024 18:52 )             37.7     PT/INR - ( 17 May 2024 20:30 )   PT: 14.1 sec;   INR: 1.27 ratio         PTT - ( 17 May 2024 20:30 )  PTT:36.4 sec    INFECTIOUS DISEASES  T(C): 36.6 (05-18-24 @ 20:00), Max: 38 (05-18-24 @ 03:00)  Wt(kg): --  WBC Count: 6.47 K/uL (05-18 @ 18:52)  WBC Count: 6.36 K/uL (05-18 @ 14:45)  WBC Count: 7.98 K/uL (05-18 @ 08:00)  WBC Count: 8.28 K/uL (05-18 @ 00:53)    Recent Cultures:  Specimen Source: Clean Catch Clean Catch (Midstream), 05-17 @ 17:21; Results   <10,000 CFU/mL Normal Urogenital Anne Marie; Gram Stain: --; Organism: --  Specimen Source: .Blood Blood-Peripheral, 05-17 @ 12:24; Results   No growth at 24 hours; Gram Stain: --; Organism: --    Meds: piperacillin/tazobactam IVPB.. 3.375 Gram(s) IV Intermittent every 8 hours      ENDOCRINE  Capillary Blood Glucose    Meds: dextrose 50% Injectable 50 milliLiter(s) IV Push every 15 minutes  dextrose 50% Injectable 25 Gram(s) IV Push once  dextrose 50% Injectable 12.5 Gram(s) IV Push once  dextrose 50% Injectable 25 milliLiter(s) IV Push every 15 minutes  dextrose Oral Gel 15 Gram(s) Oral once PRN  glucagon  Injectable 1 milliGRAM(s) IntraMuscular once  insulin regular Infusion 3 Unit(s)/Hr IV Continuous <Continuous>    OTHER MEDICATIONS:  chlorhexidine 2% Cloths 1 Application(s) Topical <User Schedule>      IMAGING:

## 2024-05-19 NOTE — PROGRESS NOTE ADULT - SUBJECTIVE AND OBJECTIVE BOX
B Team Surgery Daily Progress Note  =====================================================    SUBJECTIVE: Patient reports that they're feeling well this morning. Denies fever, chills, N/V.    --------------------------------------------------------------------------------------  VITAL SIGNS:  T(C): 36.6 (05-19-24 @ 04:00), Max: 37.9 (05-19-24 @ 00:00)  HR: 73 (05-19-24 @ 08:00) (62 - 128)  BP: --  RR: 16 (05-19-24 @ 08:00) (7 - 25)  SpO2: 98% (05-19-24 @ 08:00) (98% - 100%)  --------------------------------------------------------------------------------------    EXAM    General: NAD, resting in bed comfortably  Cardiac: Regular rate, warm and well perfused  Respiratory: Nonlabored respirations, normal cw expansion, on RA  Abdomen: Soft, nondistended, nontender to palpation  Extremities: Warm, well perfused    --------------------------------------------------------------------------------------

## 2024-05-19 NOTE — CONSULT NOTE ADULT - SUBJECTIVE AND OBJECTIVE BOX
CARDIOLOGY FELLOW CONSULT NOTE    Consulting service: SICU  Reason for consult: Pre Op Risk Stratification     HPI:  53 year old male with a PMH of  HTN, DM, CAD s/p CABG & PCI 12/2022, POBA to OM 11/14/23 and to mLAD 11/27/23, ICM HFimpEF (35% --> 76% 5/2024) and GERD presented to the ED for abdominal pain and SOB. Pt admitted to medicine with choledocholithiasis with extrahepatic and intrahepatic biliary ductal dilatation. Cardiology consulted for new TWI in anterior and lateral leads compared to EKG 12/23, Trop elevation noted, downtrending. Today RRT for hypotension, refractory to fluids requiring pressor support. Admitted to ICU for further management. GI consulted for ERCP which was performed, bile duct was unavailable to cannulate biliary tree, +CBD stone seen on cholangiogram, procedure halted after multiple attempts at advancing wire. Patient remained intubated post procedure, requiring pressors, central line/summer placed prior to transfer. Transferred to [facility] for IR consult for possible perc ashley. Pt had repeat ERCP with stone removal, CBD stent, and sphincterotomy at [facility].    Cardiology called for pre op risk stratification for possible laparoscopic cholecystectomy.    Patient seen in SICU. He notes that he is feeling much better at this time. He denies any FC NV CP SOB LE edema orthopnea or PND. He notes that at baseline he can walk 2 miles without any CP or SOB. He has not had any recent chest pain, palpitations or syncope. He reports compliance with his home medications. Patient and wife at bedside note that they may not be interested in surgery at this time due to his weakness. Pt last took DAPT w. brillinta 5 days ago, then ASA for 2 days at OSH, then missed one day of anything at [facility], and is now back on ASA 81 after missing 1 day. He has not any CP diaphoresis or sx at rest.  PMH:   Hypertension  Diabetes mellitus  GERD (gastroesophageal reflux disease)  CAD (coronary artery disease)    PSH:   S/P CABG (coronary artery bypass graft)    FAMILY HISTORY:  FH: heart disease (Father)    Allergies:  No Known Allergies  Home Meds:    Current Medications:   acetaminophen   IVPB .. 1000 milliGRAM(s) IV Intermittent every 6 hours PRN  aspirin  chewable 81 milliGRAM(s) Oral daily  atorvastatin 40 milliGRAM(s) Oral at bedtime  chlorhexidine 2% Cloths 1 Application(s) Topical <User Schedule>  dextrose 10% + sodium chloride 0.9%. 1000 milliLiter(s) IV Continuous <Continuous>  dextrose 5%. 1000 milliLiter(s) IV Continuous <Continuous>  dextrose 50% Injectable 50 milliLiter(s) IV Push every 15 minutes  dextrose 50% Injectable 25 milliLiter(s) IV Push every 15 minutes  dextrose 50% Injectable 25 Gram(s) IV Push once  dextrose 50% Injectable 12.5 Gram(s) IV Push once  dextrose Oral Gel 15 Gram(s) Oral once PRN  heparin   Injectable 5000 Unit(s) SubCutaneous every 8 hours  insulin NPH human recombinant 12 Unit(s) SubCutaneous every 12 hours  insulin regular Infusion 3 Unit(s)/Hr IV Continuous <Continuous>  norepinephrine Infusion 0.181 MICROgram(s)/kG/Min IV Continuous <Continuous>  piperacillin/tazobactam IVPB.. 3.375 Gram(s) IV Intermittent every 8 hours  potassium phosphate IVPB 30 milliMole(s) IV Intermittent once    REVIEW OF SYSTEMS:  CONSTITUTIONAL: No weakness, fevers or chills  EYES/ENT: No visual changes;  No dysphagia  NECK: No pain or stiffness  RESPIRATORY: No cough, wheezing, hemoptysis; No shortness of breath  CARDIOVASCULAR: No chest pain or palpitations; No lower extremity edema  GASTROINTESTINAL: No abdominal or epigastric pain. No nausea, vomiting, or hematemesis; No diarrhea or constipation. No melena or hematochezia.  BACK: No back pain  GENITOURINARY: No dysuria, frequency or hematuria  NEUROLOGICAL: No numbness or weakness  SKIN: No itching, burning, rashes, or lesions   All other review of systems is negative unless indicated above.    Physical Exam:  T(F): 97.8 (05-19), Max: 100.2 (05-19)  HR: 91 (05-19) (62 - 128)  BP: --  RR: 17 (05-19)  SpO2: 100% (05-19)  GENERAL: No acute distress, well-developed  HEAD:  Atraumatic, Normocephalic  ENT: EOMI, PERRLA, conjunctiva and sclera clear, Neck supple, No JVD, moist mucosa  CHEST/LUNG: Clear to auscultation bilaterally; No wheeze, equal breath sounds bilaterally   BACK: No spinal tenderness  HEART: Regular rate and rhythm; No murmurs, rubs, or gallops  ABDOMEN: Soft, Nontender, Nondistended; Bowel sounds present  EXTREMITIES:  No clubbing, cyanosis, or edema  PSYCH: Nl behavior, nl affect  NEUROLOGY: AAOx3, non-focal, cranial nerves intact  SKIN: Normal color, No rashes or lesions  LINES:    Echo:   5/18/24 TTE  CONCLUSIONS:      1. Left ventricular cavity is small. Left ventricular wall thickness is normal. Left ventricular systolic function is hyperdynamic with anejection fraction of 76 % by Salazar's method of disks. There are no regional wall motion abnormalities seen.   2. There is mild (grade 1) left ventricular diastolic dysfunction, with normal filling pressure.   3. Normal right ventricular cavity size, with normal wall thickness, and normal systolic function.   4. Normal left and right atrial size.   5. No significant valvular disease.   6. No pericardial effusion seen.    11/27/23 PCI  Procedures Performed   Procedures:                1.    Arterial Access - Right Radial     2.    PCI:    3.    PCI: POBA     Indications: Staged Procedures     Conclusions:   Successful POBA to a 100% mLAD lesion.      Recommendations:     Continue DAPT as previously recommended. LAD will be left as POBA  given small caliber vessel.    Presentation:   53M w/ CAD s/p CABG, recent PCI to the LCx/OM, HTN, HLD and DM here  for staged PCI of the LAD.    Procedure Narrative:   The risks and alternatives of the procedures and conscious sedation  were explained to the patient and informed consent was  obtained. The patient was brought to the cath lab and placed on the  exam table.  Access Right radial artery:   The puncture site was infiltrated with 1% Lidocaine. Vascular access  was obtained using modified seldinger technique.    Diagnostic Findings:     Coronary Angiography   LAD   Mid left anterior descending: There is a 100 % stenosis. SASHA Flow 0.    Interventional Findings:     Interventional Details   Mid left anterior descending: This was a 100 % total occlusion  stenosis.    A successful Balloon angioplasty was performed using a 6FR JL 3.5  LAUNCHER, a BRENNEN BLACK 190CM, and a SAPPHIRE  1.25 X 15.      The inflation pressure was 12 guru for the duration of 20.0 seconds.     The inflation pressure was 12 guru for the duration of 31.0 seconds.     The inflation pressure was 12 guru for the duration of 15.0 seconds.     The inflation pressure was 14 guru forthe duration of 11.0 seconds.     The inflation pressure was 14 guru for the duration of 6.0 seconds.     A successful Balloon angioplasty was performed using a SAPPHIRE 1.0 X  8.    The inflation pressure was 18 guru for the duration of 4.0 seconds.     The inflation pressure was 18 guru for the duration of 10.0 seconds.     The inflation pressure was 18 guru for the duration of 8.0 seconds.     The inflation pressure was 18 guru for the duration of 8.0 seconds.     The inflation pressure was 14 guru for the duration of 9.0 seconds.     A successful Balloon angioplasty was performed using a 2.0 X 15  EUPHORA.    The inflation pressure was 14 guru for the duration of 9.0 seconds.     The inflation pressure was 18 guru for the duration of 16.0 seconds.     Following intervention there is a 10 % residual stenosis. There was  SASHA Flow 0 before the procedure and SASHA Flow 3 following  the procedure.      Labs: Personally reviewed                        11.4   6.41  )-----------( 197      ( 19 May 2024 00:30 )             33.8     05-19    138  |  107  |  7   ----------------------------<  170<H>  3.7   |  21<L>  |  0.69    Ca    7.9<L>      19 May 2024 08:00  Phos  2.6     05-19  Mg     1.90     05-19    TPro  5.8<L>  /  Alb  3.0<L>  /  TBili  1.8<H>  /  DBili  1.4<H>  /  AST  109<H>  /  ALT  201<H>  /  AlkPhos  194<H>  05-19    PT/INR - ( 17 May 2024 20:30 )   PT: 14.1 sec;   INR: 1.27 ratio     PTT - ( 17 May 2024 20:30 )  PTT:36.4 sec    CARDIAC MARKERS ( 17 May 2024 06:49 )  x     / x     / x     / 55 U/L / x     / 1.0 ng/mL    A/P  53 year old male with a PMH of  HTN, DM, CAD s/p CABG & PCI 12/2022, POBA to OM 11/14/23 and to mLAD 11/27/23, ICM HFimpEF (35% --> 76% 5/2024) and GERD presented to the ED for abdominal pain and SOB. Found to have gallstone pancreatitis c/p ERCP/CBD stent/sphincterotomy. Cardiology consulted for pre op risk stratification pending lap ashley    #Pre Op Risk Stratification  - Pt with significant medical history p/w gallstone pancreatitis, now pending non-emergent laparoscopic cholecystectomy   - Cardiovascular Risk Stratification - RCRI   1. History of ischemic heart disease: 1  2. History of congestive heart failure: 1  3. History of cerebrovascular disease (stroke or transient ischemic attack): 0  4. History of diabetes requiring preoperative insulin use: 1  5. Chronic kidney disease (creatinine > 2 mg/dL): 0  6. Undergoing suprainguinal vascular, intraperitoneal, or intrathoracic surgery: 1  0 predictors = 3.9%, 1 predictor = 6%, 2 predictors = 10.1%, =3 predictors = >15% risk of 30 day death, MI, or cardiac arrest   - RCRI  4 --> >15% risk of MACE  - Hernández 0.4% risk of BONI  - Patient able to perform >4 mets at baseline  - Pt euvolemic on exam with no e/o ADHF, she has no CP or exertional angina c/f ACS, and no signs or symptoms life threatening arrhythmias  Plan:  - Patient is still on levo 0.07 with an episode of 70s/30s BP overnight. His BCx are negative and he is afebrile, but primary team c/f possible septic shock. It is unclear what is driving the hypotension but it should be worked up and pt ideally weaned off pressors before going for elective gallbladder surgery. Not to mention pt states he is not sure he wants the surgery as he already feels very weak at this time. As such would not recommend proceeding to OR until pressors weaned off and etiology of hypotension confirmed outside of emergent situations in which cardiac clearance is not necessary per ACC guidelines. Additionally pt reportedly had 1 day with no antiplatelet agents with stents that are ~6 months old. He has nothing on his history or his exam c/f stent thrombosis, however he is still at higher risk and would benefit from close monitoring to ensure no e/o ACS.  - Please do not stop ASA in the setting of recent PCI, continue through surgery     > Low threshold for trop/EKG if pt develops any CP  - Can hold ticagrelor until after surgery complete      Signed by:  Leland Priest PGY5  Cardiology Fellow    DOROTHY Boogie  CARDIOLOGY FELLOW CONSULT NOTE    Consulting service: SICU  Reason for consult: Pre Op Risk Stratification     HPI:  53 year old male with a PMH of  HTN, DM, CAD s/p CABG & PCI 12/2022, POBA to OM 11/14/23 and to mLAD 11/27/23, ICM HFimpEF (35% --> 76% 5/2024) and GERD presented to the ED for abdominal pain and SOB. Pt admitted to medicine with choledocholithiasis with extrahepatic and intrahepatic biliary ductal dilatation. Cardiology consulted for new TWI in anterior and lateral leads compared to EKG 12/23, Trop elevation noted, downtrending. Today RRT for hypotension, refractory to fluids requiring pressor support. Admitted to ICU for further management. GI consulted for ERCP which was performed, bile duct was unavailable to cannulate biliary tree, +CBD stone seen on cholangiogram, procedure halted after multiple attempts at advancing wire. Patient remained intubated post procedure, requiring pressors, central line/summer placed prior to transfer. Transferred to [facility] for IR consult for possible perc ashley. Pt had repeat ERCP with stone removal, CBD stent, and sphincterotomy at [facility].    Cardiology called for pre op risk stratification for possible laparoscopic cholecystectomy.    Patient seen in SICU. He notes that he is feeling much better at this time. He denies any FC NV CP SOB LE edema orthopnea or PND. He notes that at baseline he can walk 2 miles without any CP or SOB. He has not had any recent chest pain, palpitations or syncope. He reports compliance with his home medications. Patient and wife at bedside note that they may not be interested in surgery at this time due to his weakness. Pt last took DAPT w. brillinta 5 days ago, then ASA for 2 days at OSH, then missed one day of anything at [facility], and is now back on ASA 81 after missing 1 day. He has not any CP diaphoresis or sx at rest.  PMH:   Hypertension  Diabetes mellitus  GERD (gastroesophageal reflux disease)  CAD (coronary artery disease)    PSH:   S/P CABG (coronary artery bypass graft)    FAMILY HISTORY:  FH: heart disease (Father)    Allergies:  No Known Allergies  Home Meds:    Current Medications:   acetaminophen   IVPB .. 1000 milliGRAM(s) IV Intermittent every 6 hours PRN  aspirin  chewable 81 milliGRAM(s) Oral daily  atorvastatin 40 milliGRAM(s) Oral at bedtime  chlorhexidine 2% Cloths 1 Application(s) Topical <User Schedule>  dextrose 10% + sodium chloride 0.9%. 1000 milliLiter(s) IV Continuous <Continuous>  dextrose 5%. 1000 milliLiter(s) IV Continuous <Continuous>  dextrose 50% Injectable 50 milliLiter(s) IV Push every 15 minutes  dextrose 50% Injectable 25 milliLiter(s) IV Push every 15 minutes  dextrose 50% Injectable 25 Gram(s) IV Push once  dextrose 50% Injectable 12.5 Gram(s) IV Push once  dextrose Oral Gel 15 Gram(s) Oral once PRN  heparin   Injectable 5000 Unit(s) SubCutaneous every 8 hours  insulin NPH human recombinant 12 Unit(s) SubCutaneous every 12 hours  insulin regular Infusion 3 Unit(s)/Hr IV Continuous <Continuous>  norepinephrine Infusion 0.181 MICROgram(s)/kG/Min IV Continuous <Continuous>  piperacillin/tazobactam IVPB.. 3.375 Gram(s) IV Intermittent every 8 hours  potassium phosphate IVPB 30 milliMole(s) IV Intermittent once    REVIEW OF SYSTEMS:  CONSTITUTIONAL: No weakness, fevers or chills  EYES/ENT: No visual changes;  No dysphagia  NECK: No pain or stiffness  RESPIRATORY: No cough, wheezing, hemoptysis; No shortness of breath  CARDIOVASCULAR: No chest pain or palpitations; No lower extremity edema  GASTROINTESTINAL: No abdominal or epigastric pain. No nausea, vomiting, or hematemesis; No diarrhea or constipation. No melena or hematochezia.  BACK: No back pain  GENITOURINARY: No dysuria, frequency or hematuria  NEUROLOGICAL: No numbness or weakness  SKIN: No itching, burning, rashes, or lesions   All other review of systems is negative unless indicated above.    Physical Exam:  T(F): 97.8 (05-19), Max: 100.2 (05-19)  HR: 91 (05-19) (62 - 128)  BP: --  RR: 17 (05-19)  SpO2: 100% (05-19)  GENERAL: No acute distress, well-developed  HEAD:  Atraumatic, Normocephalic  ENT: EOMI, PERRLA, conjunctiva and sclera clear, Neck supple, No JVD, moist mucosa  CHEST/LUNG: Clear to auscultation bilaterally; No wheeze, equal breath sounds bilaterally   BACK: No spinal tenderness  HEART: Regular rate and rhythm; No murmurs, rubs, or gallops  ABDOMEN: Soft, Nontender, Nondistended; Bowel sounds present  EXTREMITIES:  No clubbing, cyanosis, or edema  PSYCH: Nl behavior, nl affect  NEUROLOGY: AAOx3, non-focal, cranial nerves intact  SKIN: Normal color, No rashes or lesions  LINES:    Echo:   5/18/24 TTE  CONCLUSIONS:      1. Left ventricular cavity is small. Left ventricular wall thickness is normal. Left ventricular systolic function is hyperdynamic with anejection fraction of 76 % by Salazar's method of disks. There are no regional wall motion abnormalities seen.   2. There is mild (grade 1) left ventricular diastolic dysfunction, with normal filling pressure.   3. Normal right ventricular cavity size, with normal wall thickness, and normal systolic function.   4. Normal left and right atrial size.   5. No significant valvular disease.   6. No pericardial effusion seen.    11/27/23 PCI  Procedures Performed   Procedures:                1.    Arterial Access - Right Radial     2.    PCI:    3.    PCI: POBA     Indications: Staged Procedures     Conclusions:   Successful POBA to a 100% mLAD lesion.      Recommendations:     Continue DAPT as previously recommended. LAD will be left as POBA  given small caliber vessel.    Presentation:   53M w/ CAD s/p CABG, recent PCI to the LCx/OM, HTN, HLD and DM here  for staged PCI of the LAD.    Procedure Narrative:   The risks and alternatives of the procedures and conscious sedation  were explained to the patient and informed consent was  obtained. The patient was brought to the cath lab and placed on the  exam table.  Access Right radial artery:   The puncture site was infiltrated with 1% Lidocaine. Vascular access  was obtained using modified seldinger technique.    Diagnostic Findings:     Coronary Angiography   LAD   Mid left anterior descending: There is a 100 % stenosis. SASHA Flow 0.    Interventional Findings:     Interventional Details   Mid left anterior descending: This was a 100 % total occlusion  stenosis.    A successful Balloon angioplasty was performed using a 6FR JL 3.5  LAUNCHER, a BRENNEN BLACK 190CM, and a SAPPHIRE  1.25 X 15.      The inflation pressure was 12 guru for the duration of 20.0 seconds.     The inflation pressure was 12 guru for the duration of 31.0 seconds.     The inflation pressure was 12 guru for the duration of 15.0 seconds.     The inflation pressure was 14 guru forthe duration of 11.0 seconds.     The inflation pressure was 14 guru for the duration of 6.0 seconds.     A successful Balloon angioplasty was performed using a SAPPHIRE 1.0 X  8.    The inflation pressure was 18 guru for the duration of 4.0 seconds.     The inflation pressure was 18 guru for the duration of 10.0 seconds.     The inflation pressure was 18 guru for the duration of 8.0 seconds.     The inflation pressure was 18 guru for the duration of 8.0 seconds.     The inflation pressure was 14 guru for the duration of 9.0 seconds.     A successful Balloon angioplasty was performed using a 2.0 X 15  EUPHORA.    The inflation pressure was 14 guru for the duration of 9.0 seconds.     The inflation pressure was 18 guru for the duration of 16.0 seconds.     Following intervention there is a 10 % residual stenosis. There was  SASHA Flow 0 before the procedure and SASHA Flow 3 following  the procedure.      Labs: Personally reviewed                        11.4   6.41  )-----------( 197      ( 19 May 2024 00:30 )             33.8     05-19    138  |  107  |  7   ----------------------------<  170<H>  3.7   |  21<L>  |  0.69    Ca    7.9<L>      19 May 2024 08:00  Phos  2.6     05-19  Mg     1.90     05-19    TPro  5.8<L>  /  Alb  3.0<L>  /  TBili  1.8<H>  /  DBili  1.4<H>  /  AST  109<H>  /  ALT  201<H>  /  AlkPhos  194<H>  05-19    PT/INR - ( 17 May 2024 20:30 )   PT: 14.1 sec;   INR: 1.27 ratio     PTT - ( 17 May 2024 20:30 )  PTT:36.4 sec    CARDIAC MARKERS ( 17 May 2024 06:49 )  x     / x     / x     / 55 U/L / x     / 1.0 ng/mL    A/P  53 year old male with a PMH of  HTN, DM, CAD s/p CABG & PCI 12/2022, POBA to OM 11/14/23 and to mLAD 11/27/23, ICM HFimpEF (35% --> 76% 5/2024) and GERD presented to the ED for abdominal pain and SOB. Found to have gallstone pancreatitis c/p ERCP/CBD stent/sphincterotomy. Cardiology consulted for pre-operative optimization in anticipation of laparoscopic cholecystectomy.    Patient is s/p ERCP and remain on low dose norepinephrine for hypotension (episode of 70s/30s BP overnight). Now pending laparoscopic cholecystectomy for cholangitis.  Ticagrelor held for ERCP and aspirin recommended to continue, though missed one day.      #Pre Op Risk Stratification  - Cardiovascular Risk Stratification - RCRI   1. History of ischemic heart disease: 1  2. History of congestive heart failure: 1  3. History of cerebrovascular disease (stroke or transient ischemic attack): 0  4. History of diabetes requiring preoperative insulin use: 1  5. Chronic kidney disease (creatinine > 2 mg/dL): 0  6. Undergoing suprainguinal vascular, intraperitoneal, or intrathoracic surgery: 1  0 predictors = 3.9%, 1 predictor = 6%, 2 predictors = 10.1%, =3 predictors = >15% risk of 30 day death, MI, or cardiac arrest   - RCRI  4 --> >15% risk of MACE  - Hernández 0.4% risk of BONI  - Patient able to perform >4 mets at baseline  - Pt euvolemic on exam with no e/o ADHF, she has no CP or exertional angina c/f ACS, and no signs or symptoms life threatening arrhythmias    Recommendations:  - wean pressors  - continue aspirin 81 mg daily given recent PCI with CHERRI (11/2023)  - resume ticagrelor as soon as feasible    Signed by:  Leland Priest PGY5  Cardiology Fellow    DOROTHY Boogie

## 2024-05-19 NOTE — PROGRESS NOTE ADULT - ASSESSMENT
53M hx HTN, DM, CAD sp CABG & stents 12/2022, POBA to OM 11/14/23 and to mLAD 11/27/23, ICM/ CHF, GERD presented to OSH with abdominal pain and SOB. Pt admitted to medicine with choledocholithiasis on with extrahepatic biliary ductal dilatation on CT and MRCP. Cardiology consulted for new TWI in anterior and lateral leads compared to EKG 12/23, Trop elevation noted, downtrending. Admitted to ICU for septic shock. GI consulted for ERCP, CBD was unable to be intubated, +CBD stone seen on cholangiogram, procedure halted after multiple attempts at advancing wire. Patient remained intubated post procedure, requiring pressors, central line/summer placed prior to transfer. Transferred to [facility] SICU for IR consult for PTC. Extubated and weaned off pressors on 5/18. S/p repeat ERCP on 5/18 with sphincterotomy and stent placement.    Plan:  NEUROLOGIC   - Pain control: Tylenol prn    RESPIRATORY   - Monitor SpO2 goal >92%    CARDIOVASCULAR   - h/o CAD sp CABG & stents 12/2022, POBA to OM 11/14/23 and to mLAD 11/27/23, ICM/ CHF (EF 35%) on ASA/Brillinta   - TTE 5/18 EF 76%, grade one diastolic failure    GASTROINTESTINAL   - Diet: NPO  - S/p ERCP with sphincterotomy and stent placement on 5/18  - IR consult for PTC-> intrahepatic ducts insufficiently dilated    /RENAL   - IV fluids: D5LR @ 100  - Maintain ledbetter catheter, strict Is/Os  - Monitor electrolytes, replete PRN    HEMATOLOGIC  - Monitor H/H   - DVT ppx: SQH  - On ASA/Brillinta (held) (last dose Tuesday 5/14)    INFECTIOUS DISEASE  - Zosyn    ENDOCRINE  - Monitor gluc  - Insulin gtt  - Basaglar 12u + Farxiga at home  - hgb a1c 6.1 on 5/17    LINES  - IJ CVC ( 5 / 17 )  - A line ( 5 / 17  )  - Ledbetter ( 5 / 17 )  - PIV     Code status: full code    Dispo: SICU

## 2024-05-19 NOTE — PROGRESS NOTE ADULT - ASSESSMENT
53 year old male with a PMH of  HTN, DM, CAD sp CABG & stents 12/2022, POBA to OM 11/14/23 and to mLAD 11/27/23, ICM/ CHF, GERD presents to the ED for abdominal pain and SOB. Pt admitted to medicine with choledocholithiasis with extrahepatic and intrahepatic biliary ductal dilatation. Cardiology consulted for new TWI in anterior and lateral leads compared to EKG 12/23, Trop elevation noted, downtrending. Today RRT for hypotension, refractory to fluids requiring pressor support. Admitted to ICU for further management. GI consulted for ERCP which was performed, bile duct was unavailable to cannulate biliary tree, +CBD stone seen on cholangiogram, procedure halted after multiple attempts at advancing wire. Patient remained intubated post procedure, requiring pressors, central line/summer placed prior to transfer. Transferred to [facility] SICU for IR consult for possible perc ashley. [facility] GI able to preform ERCP with successful placement of a CBD stent.    Plan:  - Tentative plan for laparoscopic cholecystectomy tomorrow  - Insulin drip + humulin 12u q12h  - IV antibiotics  - Continue care per SICU    B Team  [id number]

## 2024-05-20 ENCOUNTER — RESULT REVIEW (OUTPATIENT)
Age: 54
End: 2024-05-20

## 2024-05-20 LAB
ALBUMIN SERPL ELPH-MCNC: 2.8 G/DL — LOW (ref 3.3–5)
ALP SERPL-CCNC: 206 U/L — HIGH (ref 40–120)
ALT FLD-CCNC: 184 U/L — HIGH (ref 4–41)
ANION GAP SERPL CALC-SCNC: 10 MMOL/L — SIGNIFICANT CHANGE UP (ref 7–14)
ANION GAP SERPL CALC-SCNC: 14 MMOL/L — SIGNIFICANT CHANGE UP (ref 7–14)
APTT BLD: 41.5 SEC — HIGH (ref 24.5–35.6)
AST SERPL-CCNC: 104 U/L — HIGH (ref 4–40)
B-OH-BUTYR SERPL-SCNC: <0 MMOL/L — SIGNIFICANT CHANGE UP (ref 0–0.4)
BILIRUB DIRECT SERPL-MCNC: 1 MG/DL — HIGH (ref 0–0.3)
BILIRUB INDIRECT FLD-MCNC: 0.4 MG/DL — SIGNIFICANT CHANGE UP (ref 0–1)
BILIRUB SERPL-MCNC: 1.4 MG/DL — HIGH (ref 0.2–1.2)
BLD GP AB SCN SERPL QL: NEGATIVE — SIGNIFICANT CHANGE UP
BUN SERPL-MCNC: 5 MG/DL — LOW (ref 7–23)
BUN SERPL-MCNC: 6 MG/DL — LOW (ref 7–23)
CALCIUM SERPL-MCNC: 8.1 MG/DL — LOW (ref 8.4–10.5)
CALCIUM SERPL-MCNC: 8.1 MG/DL — LOW (ref 8.4–10.5)
CHLORIDE SERPL-SCNC: 104 MMOL/L — SIGNIFICANT CHANGE UP (ref 98–107)
CHLORIDE SERPL-SCNC: 106 MMOL/L — SIGNIFICANT CHANGE UP (ref 98–107)
CO2 SERPL-SCNC: 19 MMOL/L — LOW (ref 22–31)
CO2 SERPL-SCNC: 20 MMOL/L — LOW (ref 22–31)
CREAT SERPL-MCNC: 0.69 MG/DL — SIGNIFICANT CHANGE UP (ref 0.5–1.3)
CREAT SERPL-MCNC: 0.71 MG/DL — SIGNIFICANT CHANGE UP (ref 0.5–1.3)
EGFR: 110 ML/MIN/1.73M2 — SIGNIFICANT CHANGE UP
EGFR: 111 ML/MIN/1.73M2 — SIGNIFICANT CHANGE UP
GLUCOSE BLDA-MCNC: 161 MG/DL — HIGH (ref 70–99)
GLUCOSE BLDC GLUCOMTR-MCNC: 136 MG/DL — HIGH (ref 70–99)
GLUCOSE BLDC GLUCOMTR-MCNC: 141 MG/DL — HIGH (ref 70–99)
GLUCOSE BLDC GLUCOMTR-MCNC: 157 MG/DL — HIGH (ref 70–99)
GLUCOSE BLDC GLUCOMTR-MCNC: 159 MG/DL — HIGH (ref 70–99)
GLUCOSE BLDC GLUCOMTR-MCNC: 192 MG/DL — HIGH (ref 70–99)
GLUCOSE BLDC GLUCOMTR-MCNC: 204 MG/DL — HIGH (ref 70–99)
GLUCOSE BLDC GLUCOMTR-MCNC: 220 MG/DL — HIGH (ref 70–99)
GLUCOSE SERPL-MCNC: 161 MG/DL — HIGH (ref 70–99)
GLUCOSE SERPL-MCNC: 167 MG/DL — HIGH (ref 70–99)
HCT VFR BLD CALC: 35.4 % — LOW (ref 39–50)
HCT VFR BLD CALC: 39.4 % — SIGNIFICANT CHANGE UP (ref 39–50)
HGB BLD-MCNC: 12 G/DL — LOW (ref 13–17)
HGB BLD-MCNC: 12.8 G/DL — LOW (ref 13–17)
INR BLD: 1.19 RATIO — HIGH (ref 0.85–1.18)
MAGNESIUM SERPL-MCNC: 1.8 MG/DL — SIGNIFICANT CHANGE UP (ref 1.6–2.6)
MAGNESIUM SERPL-MCNC: 2.2 MG/DL — SIGNIFICANT CHANGE UP (ref 1.6–2.6)
MCHC RBC-ENTMCNC: 27.3 PG — SIGNIFICANT CHANGE UP (ref 27–34)
MCHC RBC-ENTMCNC: 27.9 PG — SIGNIFICANT CHANGE UP (ref 27–34)
MCHC RBC-ENTMCNC: 32.5 GM/DL — SIGNIFICANT CHANGE UP (ref 32–36)
MCHC RBC-ENTMCNC: 33.9 GM/DL — SIGNIFICANT CHANGE UP (ref 32–36)
MCV RBC AUTO: 82.3 FL — SIGNIFICANT CHANGE UP (ref 80–100)
MCV RBC AUTO: 84 FL — SIGNIFICANT CHANGE UP (ref 80–100)
NRBC # BLD: 0 /100 WBCS — SIGNIFICANT CHANGE UP (ref 0–0)
NRBC # BLD: 0 /100 WBCS — SIGNIFICANT CHANGE UP (ref 0–0)
NRBC # FLD: 0 K/UL — SIGNIFICANT CHANGE UP (ref 0–0)
NRBC # FLD: 0 K/UL — SIGNIFICANT CHANGE UP (ref 0–0)
PHOSPHATE SERPL-MCNC: 2.1 MG/DL — LOW (ref 2.5–4.5)
PHOSPHATE SERPL-MCNC: 3.3 MG/DL — SIGNIFICANT CHANGE UP (ref 2.5–4.5)
PLATELET # BLD AUTO: 184 K/UL — SIGNIFICANT CHANGE UP (ref 150–400)
PLATELET # BLD AUTO: 195 K/UL — SIGNIFICANT CHANGE UP (ref 150–400)
POTASSIUM SERPL-MCNC: 3.9 MMOL/L — SIGNIFICANT CHANGE UP (ref 3.5–5.3)
POTASSIUM SERPL-MCNC: 3.9 MMOL/L — SIGNIFICANT CHANGE UP (ref 3.5–5.3)
POTASSIUM SERPL-SCNC: 3.9 MMOL/L — SIGNIFICANT CHANGE UP (ref 3.5–5.3)
POTASSIUM SERPL-SCNC: 3.9 MMOL/L — SIGNIFICANT CHANGE UP (ref 3.5–5.3)
PROT SERPL-MCNC: 6 G/DL — SIGNIFICANT CHANGE UP (ref 6–8.3)
PROTHROM AB SERPL-ACNC: 13.3 SEC — HIGH (ref 9.5–13)
RBC # BLD: 4.3 M/UL — SIGNIFICANT CHANGE UP (ref 4.2–5.8)
RBC # BLD: 4.69 M/UL — SIGNIFICANT CHANGE UP (ref 4.2–5.8)
RBC # FLD: 14.1 % — SIGNIFICANT CHANGE UP (ref 10.3–14.5)
RBC # FLD: 14.3 % — SIGNIFICANT CHANGE UP (ref 10.3–14.5)
RH IG SCN BLD-IMP: POSITIVE — SIGNIFICANT CHANGE UP
SODIUM SERPL-SCNC: 136 MMOL/L — SIGNIFICANT CHANGE UP (ref 135–145)
SODIUM SERPL-SCNC: 137 MMOL/L — SIGNIFICANT CHANGE UP (ref 135–145)
WBC # BLD: 4.2 K/UL — SIGNIFICANT CHANGE UP (ref 3.8–10.5)
WBC # BLD: 5.18 K/UL — SIGNIFICANT CHANGE UP (ref 3.8–10.5)
WBC # FLD AUTO: 4.2 K/UL — SIGNIFICANT CHANGE UP (ref 3.8–10.5)
WBC # FLD AUTO: 5.18 K/UL — SIGNIFICANT CHANGE UP (ref 3.8–10.5)

## 2024-05-20 PROCEDURE — 47563 LAPARO CHOLECYSTECTOMY/GRAPH: CPT

## 2024-05-20 PROCEDURE — 88304 TISSUE EXAM BY PATHOLOGIST: CPT | Mod: 26

## 2024-05-20 PROCEDURE — 99291 CRITICAL CARE FIRST HOUR: CPT

## 2024-05-20 DEVICE — SURGICEL POWDER 3 GRAMS: Type: IMPLANTABLE DEVICE | Status: FUNCTIONAL

## 2024-05-20 DEVICE — CLIP APPLIER COVIDIEN ENDOCLIP III 5MM: Type: IMPLANTABLE DEVICE | Status: FUNCTIONAL

## 2024-05-20 RX ORDER — METOPROLOL TARTRATE 50 MG
200 TABLET ORAL DAILY
Refills: 0 | Status: DISCONTINUED | OUTPATIENT
Start: 2024-05-20 | End: 2024-05-20

## 2024-05-20 RX ORDER — MAGNESIUM SULFATE 500 MG/ML
1 VIAL (ML) INJECTION ONCE
Refills: 0 | Status: COMPLETED | OUTPATIENT
Start: 2024-05-20 | End: 2024-05-20

## 2024-05-20 RX ORDER — ACETAMINOPHEN 500 MG
1000 TABLET ORAL ONCE
Refills: 0 | Status: DISCONTINUED | OUTPATIENT
Start: 2024-05-20 | End: 2024-05-20

## 2024-05-20 RX ORDER — INSULIN LISPRO 100/ML
VIAL (ML) SUBCUTANEOUS
Refills: 0 | Status: DISCONTINUED | OUTPATIENT
Start: 2024-05-20 | End: 2024-05-22

## 2024-05-20 RX ORDER — LANOLIN ALCOHOL/MO/W.PET/CERES
3 CREAM (GRAM) TOPICAL AT BEDTIME
Refills: 0 | Status: DISCONTINUED | OUTPATIENT
Start: 2024-05-20 | End: 2024-05-22

## 2024-05-20 RX ORDER — HYDROMORPHONE HYDROCHLORIDE 2 MG/ML
0.2 INJECTION INTRAMUSCULAR; INTRAVENOUS; SUBCUTANEOUS ONCE
Refills: 0 | Status: DISCONTINUED | OUTPATIENT
Start: 2024-05-20 | End: 2024-05-20

## 2024-05-20 RX ORDER — OXYCODONE HYDROCHLORIDE 5 MG/1
5 TABLET ORAL EVERY 4 HOURS
Refills: 0 | Status: DISCONTINUED | OUTPATIENT
Start: 2024-05-20 | End: 2024-05-22

## 2024-05-20 RX ORDER — DEXTROSE 10 % IN WATER 10 %
125 INTRAVENOUS SOLUTION INTRAVENOUS ONCE
Refills: 0 | Status: DISCONTINUED | OUTPATIENT
Start: 2024-05-20 | End: 2024-05-22

## 2024-05-20 RX ORDER — METOPROLOL TARTRATE 50 MG
200 TABLET ORAL DAILY
Refills: 0 | Status: DISCONTINUED | OUTPATIENT
Start: 2024-05-20 | End: 2024-05-22

## 2024-05-20 RX ORDER — POTASSIUM PHOSPHATE, MONOBASIC POTASSIUM PHOSPHATE, DIBASIC 236; 224 MG/ML; MG/ML
30 INJECTION, SOLUTION INTRAVENOUS ONCE
Refills: 0 | Status: COMPLETED | OUTPATIENT
Start: 2024-05-20 | End: 2024-05-20

## 2024-05-20 RX ORDER — INSULIN GLARGINE 100 [IU]/ML
28 INJECTION, SOLUTION SUBCUTANEOUS AT BEDTIME
Refills: 0 | Status: DISCONTINUED | OUTPATIENT
Start: 2024-05-20 | End: 2024-05-22

## 2024-05-20 RX ORDER — GLUCAGON INJECTION, SOLUTION 0.5 MG/.1ML
1 INJECTION, SOLUTION SUBCUTANEOUS ONCE
Refills: 0 | Status: DISCONTINUED | OUTPATIENT
Start: 2024-05-20 | End: 2024-05-22

## 2024-05-20 RX ORDER — OXYCODONE HYDROCHLORIDE 5 MG/1
2.5 TABLET ORAL EVERY 4 HOURS
Refills: 0 | Status: DISCONTINUED | OUTPATIENT
Start: 2024-05-20 | End: 2024-05-22

## 2024-05-20 RX ORDER — ACETAMINOPHEN 500 MG
1000 TABLET ORAL EVERY 6 HOURS
Refills: 0 | Status: DISCONTINUED | OUTPATIENT
Start: 2024-05-20 | End: 2024-05-21

## 2024-05-20 RX ORDER — METOCLOPRAMIDE HCL 10 MG
10 TABLET ORAL ONCE
Refills: 0 | Status: COMPLETED | OUTPATIENT
Start: 2024-05-20 | End: 2024-05-20

## 2024-05-20 RX ORDER — SODIUM CHLORIDE 9 MG/ML
1000 INJECTION, SOLUTION INTRAVENOUS
Refills: 0 | Status: DISCONTINUED | OUTPATIENT
Start: 2024-05-20 | End: 2024-05-20

## 2024-05-20 RX ADMIN — Medication 100 GRAM(S): at 14:10

## 2024-05-20 RX ADMIN — HEPARIN SODIUM 5000 UNIT(S): 5000 INJECTION INTRAVENOUS; SUBCUTANEOUS at 21:06

## 2024-05-20 RX ADMIN — OXYCODONE HYDROCHLORIDE 5 MILLIGRAM(S): 5 TABLET ORAL at 16:04

## 2024-05-20 RX ADMIN — SODIUM CHLORIDE 100 MILLILITER(S): 9 INJECTION, SOLUTION INTRAVENOUS at 12:00

## 2024-05-20 RX ADMIN — PIPERACILLIN AND TAZOBACTAM 25 GRAM(S): 4; .5 INJECTION, POWDER, LYOPHILIZED, FOR SOLUTION INTRAVENOUS at 05:04

## 2024-05-20 RX ADMIN — Medication 2: at 17:13

## 2024-05-20 RX ADMIN — HYDROMORPHONE HYDROCHLORIDE 0.2 MILLIGRAM(S): 2 INJECTION INTRAMUSCULAR; INTRAVENOUS; SUBCUTANEOUS at 12:25

## 2024-05-20 RX ADMIN — Medication 1000 MILLIGRAM(S): at 17:36

## 2024-05-20 RX ADMIN — Medication 10 MILLIGRAM(S): at 22:52

## 2024-05-20 RX ADMIN — Medication 1: at 00:22

## 2024-05-20 RX ADMIN — HYDROMORPHONE HYDROCHLORIDE 0.2 MILLIGRAM(S): 2 INJECTION INTRAMUSCULAR; INTRAVENOUS; SUBCUTANEOUS at 12:07

## 2024-05-20 RX ADMIN — CHLORHEXIDINE GLUCONATE 1 APPLICATION(S): 213 SOLUTION TOPICAL at 05:04

## 2024-05-20 RX ADMIN — ATORVASTATIN CALCIUM 40 MILLIGRAM(S): 80 TABLET, FILM COATED ORAL at 21:06

## 2024-05-20 RX ADMIN — Medication 200 MILLIGRAM(S): at 15:04

## 2024-05-20 RX ADMIN — Medication 2: at 21:07

## 2024-05-20 RX ADMIN — HEPARIN SODIUM 5000 UNIT(S): 5000 INJECTION INTRAVENOUS; SUBCUTANEOUS at 05:04

## 2024-05-20 RX ADMIN — HEPARIN SODIUM 5000 UNIT(S): 5000 INJECTION INTRAVENOUS; SUBCUTANEOUS at 14:10

## 2024-05-20 RX ADMIN — PIPERACILLIN AND TAZOBACTAM 25 GRAM(S): 4; .5 INJECTION, POWDER, LYOPHILIZED, FOR SOLUTION INTRAVENOUS at 14:10

## 2024-05-20 RX ADMIN — POTASSIUM PHOSPHATE, MONOBASIC POTASSIUM PHOSPHATE, DIBASIC 83.33 MILLIMOLE(S): 236; 224 INJECTION, SOLUTION INTRAVENOUS at 02:19

## 2024-05-20 RX ADMIN — Medication 1: at 06:01

## 2024-05-20 RX ADMIN — HUMAN INSULIN 14 UNIT(S): 100 INJECTION, SUSPENSION SUBCUTANEOUS at 06:01

## 2024-05-20 RX ADMIN — PIPERACILLIN AND TAZOBACTAM 25 GRAM(S): 4; .5 INJECTION, POWDER, LYOPHILIZED, FOR SOLUTION INTRAVENOUS at 21:06

## 2024-05-20 RX ADMIN — SODIUM CHLORIDE 100 MILLILITER(S): 9 INJECTION, SOLUTION INTRAVENOUS at 00:45

## 2024-05-20 RX ADMIN — Medication 1000 MILLIGRAM(S): at 23:02

## 2024-05-20 RX ADMIN — Medication 1 PACKET(S): at 00:05

## 2024-05-20 RX ADMIN — SODIUM CHLORIDE 100 MILLILITER(S): 9 INJECTION, SOLUTION INTRAVENOUS at 07:46

## 2024-05-20 RX ADMIN — INSULIN GLARGINE 28 UNIT(S): 100 INJECTION, SOLUTION SUBCUTANEOUS at 21:07

## 2024-05-20 RX ADMIN — OXYCODONE HYDROCHLORIDE 5 MILLIGRAM(S): 5 TABLET ORAL at 15:04

## 2024-05-20 RX ADMIN — Medication 3 MILLIGRAM(S): at 23:02

## 2024-05-20 RX ADMIN — Medication 81 MILLIGRAM(S): at 12:31

## 2024-05-20 NOTE — PROGRESS NOTE ADULT - SUBJECTIVE AND OBJECTIVE BOX
Chief Complaint:  Patient is a 53y old  Male who presents with a chief complaint of     Interval Events:  off pressors  vitals stable    Allergies:  No Known Allergies        Hospital Medications:  acetaminophen   IVPB .. 1000 milliGRAM(s) IV Intermittent every 6 hours PRN  aspirin  chewable 81 milliGRAM(s) Oral daily  atorvastatin 40 milliGRAM(s) Oral at bedtime  chlorhexidine 2% Cloths 1 Application(s) Topical <User Schedule>  dextrose 5% + sodium chloride 0.9%. 1000 milliLiter(s) IV Continuous <Continuous>  dextrose 5%. 1000 milliLiter(s) IV Continuous <Continuous>  dextrose 50% Injectable 50 milliLiter(s) IV Push every 15 minutes  dextrose 50% Injectable 25 Gram(s) IV Push once  dextrose 50% Injectable 12.5 Gram(s) IV Push once  dextrose 50% Injectable 25 milliLiter(s) IV Push every 15 minutes  dextrose Oral Gel 15 Gram(s) Oral once PRN  heparin   Injectable 5000 Unit(s) SubCutaneous every 8 hours  insulin lispro (ADMELOG) corrective regimen sliding scale   SubCutaneous every 6 hours  insulin NPH human recombinant 14 Unit(s) SubCutaneous every 12 hours  piperacillin/tazobactam IVPB.. 3.375 Gram(s) IV Intermittent every 8 hours      PMHX/PSHX:  No pertinent past medical history    Hypertension    Diabetes mellitus    GERD (gastroesophageal reflux disease)    CAD (coronary artery disease)    No significant past surgical history    No significant past surgical history    S/P CABG (coronary artery bypass graft)        Family history:  No pertinent family history in first degree relatives    FH: heart disease (Father)        PHYSICAL EXAM:   Vital Signs:  Vital Signs Last 24 Hrs  T(C): 36.7 (20 May 2024 08:06), Max: 37.8 (19 May 2024 20:00)  T(F): 98.1 (20 May 2024 08:06), Max: 100 (19 May 2024 20:00)  HR: 90 (20 May 2024 08:06) (83 - 105)  BP: 129/62 (20 May 2024 08:06) (122/79 - 129/62)  BP(mean): 92 (20 May 2024 08:00) (92 - 92)  RR: 27 (20 May 2024 08:06) (13 - 27)  SpO2: 100% (20 May 2024 08:06) (97% - 100%)    Parameters below as of 20 May 2024 08:00  Patient On (Oxygen Delivery Method): room air      Daily Height in cm: 175 (20 May 2024 08:06)    Daily Weight in k.9 (20 May 2024 00:00)    GENERAL:  No acute distress  HEENT:  no scleral icterus  CHEST:  no accessory muscle use  HEART:  Regular rate and rhythm  ABDOMEN:  Soft, non-tender, non-distended  EXTREMITIES:  No edema  SKIN:  No rash/ecchymoses  NEURO:  Alert and oriented x 3    LABS:                        12.0   4.20  )-----------( 184      ( 20 May 2024 00:30 )             35.4     Mean Cell Volume: 82.3 fL (-24 @ 00:30)        136  |  106  |  6<L>  ----------------------------<  167<H>  3.9   |  20<L>  |  0.69    Ca    8.1<L>      20 May 2024 00:30  Phos  2.1       Mg     2.20         TPro  6.0  /  Alb  2.8<L>  /  TBili  1.4<H>  /  DBili  1.0<H>  /  AST  104<H>  /  ALT  184<H>  /  AlkPhos  206<H>      LIVER FUNCTIONS - ( 20 May 2024 00:30 )  Alb: 2.8 g/dL / Pro: 6.0 g/dL / ALK PHOS: 206 U/L / ALT: 184 U/L / AST: 104 U/L / GGT: x           PT/INR - ( 20 May 2024 00:30 )   PT: 13.3 sec;   INR: 1.19 ratio         PTT - ( 20 May 2024 00:30 )  PTT:41.5 sec  Urinalysis Basic - ( 20 May 2024 00:30 )    Color: x / Appearance: x / SG: x / pH: x  Gluc: 167 mg/dL / Ketone: x  / Bili: x / Urobili: x   Blood: x / Protein: x / Nitrite: x   Leuk Esterase: x / RBC: x / WBC x   Sq Epi: x / Non Sq Epi: x / Bacteria: x                              12.0   4.20  )-----------( 184      ( 20 May 2024 00:30 )             35.4                         11.4   6.41  )-----------( 197      ( 19 May 2024 00:30 )             33.8                         12.6   6.47  )-----------( 204      ( 18 May 2024 18:52 )             37.7                         12.1   6.36  )-----------( 200      ( 18 May 2024 14:45 )             35.4                         12.9   7.98  )-----------( 296      ( 18 May 2024 08:00 )             38.4

## 2024-05-20 NOTE — PROGRESS NOTE ADULT - SUBJECTIVE AND OBJECTIVE BOX
SUBJECTIVE: Patient seen and examined on AM rounds. Patient is in SICU, off pressors, extubated. Patient states that pain is better controlled. Understands plan for OR today.      Vital Signs Last 24 Hrs  T(C): 36.8 (20 May 2024 04:00), Max: 37.8 (19 May 2024 20:00)  T(F): 98.2 (20 May 2024 04:00), Max: 100 (19 May 2024 20:00)  HR: 95 (20 May 2024 06:00) (73 - 105)  BP: --  BP(mean): --  RR: 20 (20 May 2024 06:00) (10 - 24)  SpO2: 99% (20 May 2024 06:00) (97% - 100%)    Parameters below as of 20 May 2024 06:00  Patient On (Oxygen Delivery Method): room air        I&O's Detail    19 May 2024 07:01  -  20 May 2024 07:00  --------------------------------------------------------  IN:    dextrose 10% + sodium chloride 0.9%: 2020 mL    Insulin: 3 mL    IV PiggyBack: 1100 mL    Norepinephrine: 11.4 mL  Total IN: 3134.4 mL    OUT:    Indwelling Catheter - Urethral (mL): 2525 mL  Total OUT: 2525 mL    Total NET: 609.4 mL          Physical Exam:  General: NAD, resting in bed comfortably  Cardiac: Regular rate, warm and well perfused  Respiratory: Nonlabored respirations, normal cw expansion, on RA  Abdomen: Soft, nondistended, nontender to palpation  Extremities: Warm, well perfused    LABS:                        12.0   4.20  )-----------( 184      ( 20 May 2024 00:30 )             35.4     05-20    136  |  106  |  6<L>  ----------------------------<  167<H>  3.9   |  20<L>  |  0.69    Ca    8.1<L>      20 May 2024 00:30  Phos  2.1     05-20  Mg     2.20     05-20    TPro  6.0  /  Alb  2.8<L>  /  TBili  1.4<H>  /  DBili  1.0<H>  /  AST  104<H>  /  ALT  184<H>  /  AlkPhos  206<H>  05-20    PT/INR - ( 20 May 2024 00:30 )   PT: 13.3 sec;   INR: 1.19 ratio         PTT - ( 20 May 2024 00:30 )  PTT:41.5 sec  Urinalysis Basic - ( 20 May 2024 00:30 )    Color: x / Appearance: x / SG: x / pH: x  Gluc: 167 mg/dL / Ketone: x  / Bili: x / Urobili: x   Blood: x / Protein: x / Nitrite: x   Leuk Esterase: x / RBC: x / WBC x   Sq Epi: x / Non Sq Epi: x / Bacteria: x        RADIOLOGY & ADDITIONAL STUDIES:

## 2024-05-20 NOTE — PROGRESS NOTE ADULT - ASSESSMENT
53 year old male with a PMH of  HTN, DM, CAD sp CABG & stents 12/2022, POBA to OM 11/14/23 and to mLAD 11/27/23, ICM/ CHF, GERD presents to the ED for abdominal pain and SOB. Pt admitted to medicine with choledocholithiasis with extrahepatic and intrahepatic biliary ductal dilatation. Cardiology consulted for new TWI in anterior and lateral leads compared to EKG 12/23, Trop elevation noted, downtrending. Today RRT for hypotension, refractory to fluids requiring pressor support. Admitted to ICU for further management. GI consulted for ERCP which was performed, bile duct was unavailable to cannulate biliary tree, +CBD stone seen on cholangiogram, procedure halted after multiple attempts at advancing wire. Patient remained intubated post procedure, requiring pressors, central line/summer placed prior to transfer. Transferred to [facility] SICU for IR consult for possible perc ashley. [facility] GI able to preform ERCP with successful placement of a CBD stent.    Plan:  - plan for laparoscopic cholecystectomy today  - off insulin drip  - IV zosyn  - ASA, SQH  - Continue care per SICU    B Team  [id number]

## 2024-05-20 NOTE — PROGRESS NOTE ADULT - ASSESSMENT
53M with CAD/CABG/PCI (last 12/2022 and POBA 11/2023) on DAPT (ASA/Brilinta), HFrEF (35%), HTN, and DM presented with abd pain x4d associated with fever of 103 on Tuesday night and nausea/vomiting; s/p unsuccessful ERCP and transferred to [facility] for IR evalation    Impression  #cholangitis 2/2 obstructing gallstone, [place] grade III  #septic shock on pressors; on zosyn  - Note: fever 103 noted at home 5/14 (per initial GI consult note); afebrile while inpatient  - CT showing choledocholithiasis with extrahepatic and intrahepatic biliary ductal dilatation; mild gallbladder distention, wall thickening, and pericholecystic inflammation  - MRCP with 8mm CBD dilation; ?2mm distal CBD stone  - ERCP 5/17 that was unsuccessful with CBD cannulation due to difficult anatomy and periampullary diverticulum; cholangiogram with stone seen; no strictures  - LFT peak at AST//820; ; TB 9.1; overall downtrending  - IR evaluated; no good access for percutaneous drain placement  - repeat ERCP attempted 5/18: long intraduodenal segment on rim of shallow diverticulum; biliary tree cannulated; small biliary sphincterotomy with 10Fr x 5cm biliary stent placement; cholangiogram with stone noted  - 5/19: pressor requirements and LFTs downtrending  - 5/20: off pressors    #CABG on ASA/Brilinta  #HFrEF (35%)  #SHAMEKA    Recommendations  - continue antibiotics  - follow up surgery  - advance diet per surgery  - trend CBC/LFTs  - Repeat ERCP in 2 months for retreatment  - will sign off at this time, however please call back with questions or should any worsening/persistent issues arise. Follow up in Gastroenterology Clinic: [phone] (Faculty Practice at [facility]) or [phone] ([facility] at [facility]) or [phone] ([facility] at [address] [place] [address])  or [place] Office: [phone] ([address] [place] [address]. [address] [place], [address])    Note and recommendations are incomplete until reviewed and attested by attending.    Shira Geller MD  GI/Hepatology Fellow, PGY4  [place] Pager [phone] or [facility] Pager [phone]  Teams preferred (7AM to 5PM); after 5PM, call GI fellow on call    On Weekends/Holidays (All Day) and Weekdays after 5PM to 8AM  For non-urgent consults, please email [email or web address] and [email or web address]  For urgent consults, please contact on call GI team. See Amion schedule ([facility]), [employer] paging system ([facility]), or call hospital  ([facility])

## 2024-05-20 NOTE — PROGRESS NOTE ADULT - SUBJECTIVE AND OBJECTIVE BOX
SICU Progress Note    Interval Events:   -Off pressors  -NPO @ midnight for OR     Vital Signs Last 24 Hrs  T(C): 37.8 (19 May 2024 20:00), Max: 37.8 (19 May 2024 20:00)  T(F): 100 (19 May 2024 20:00), Max: 100 (19 May 2024 20:00)  HR: 96 (19 May 2024 23:00) (62 - 105)  BP: --  BP(mean): --  RR: 23 (19 May 2024 23:00) (10 - 24)  SpO2: 98% (19 May 2024 23:00) (98% - 100%)    Parameters below as of 19 May 2024 23:00  Patient On (Oxygen Delivery Method): room air    Physical Exam:  General Appearance: Appears well, NAD  Respiratory: No labored breathing  CV: Pulse regularly present  Abdomen: Soft, TTP in RUQ, minimally distended     LABS:                        11.4   6.41  )-----------( 197      ( 19 May 2024 00:30 )             33.8     05-19    136  |  104  |  4<L>  ----------------------------<  156<H>  3.9   |  21<L>  |  0.66    Ca    7.9<L>      19 May 2024 17:01  Phos  1.9     05-19  Mg     2.10     05-19    TPro  5.8<L>  /  Alb  3.0<L>  /  TBili  1.8<H>  /  DBili  1.4<H>  /  AST  109<H>  /  ALT  201<H>  /  AlkPhos  194<H>  05-19      Urinalysis Basic - ( 19 May 2024 17:01 )    Color: x / Appearance: x / SG: x / pH: x  Gluc: 156 mg/dL / Ketone: x  / Bili: x / Urobili: x   Blood: x / Protein: x / Nitrite: x   Leuk Esterase: x / RBC: x / WBC x   Sq Epi: x / Non Sq Epi: x / Bacteria: x        INs and OUTs:    05-18-24 @ 07:01  -  05-19-24 @ 07:00  --------------------------------------------------------  IN: 3440 mL / OUT: 2035 mL / NET: 1405 mL    05-19-24 @ 07:01  -  05-20-24 @ 00:07  --------------------------------------------------------  IN: 1534.4 mL / OUT: 1850 mL / NET: -315.6 mL     SICU Progress Note    Interval Events:   -Off pressors  - NPO for lap ashley   - Transitioned to D5 1/2 NS      Vital Signs Last 24 Hrs  T(C): 37.8 (19 May 2024 20:00), Max: 37.8 (19 May 2024 20:00)  T(F): 100 (19 May 2024 20:00), Max: 100 (19 May 2024 20:00)  HR: 96 (19 May 2024 23:00) (62 - 105)  BP: --  BP(mean): --  RR: 23 (19 May 2024 23:00) (10 - 24)  SpO2: 98% (19 May 2024 23:00) (98% - 100%)    Parameters below as of 19 May 2024 23:00  Patient On (Oxygen Delivery Method): room air    Physical Exam:  General Appearance: Appears well, NAD  Respiratory: No labored breathing  CV: Pulse regularly present  Abdomen: Soft, TTP in RUQ, minimally distended     LABS:                        11.4   6.41  )-----------( 197      ( 19 May 2024 00:30 )             33.8     05-19    136  |  104  |  4<L>  ----------------------------<  156<H>  3.9   |  21<L>  |  0.66    Ca    7.9<L>      19 May 2024 17:01  Phos  1.9     05-19  Mg     2.10     05-19    TPro  5.8<L>  /  Alb  3.0<L>  /  TBili  1.8<H>  /  DBili  1.4<H>  /  AST  109<H>  /  ALT  201<H>  /  AlkPhos  194<H>  05-19      Urinalysis Basic - ( 19 May 2024 17:01 )    Color: x / Appearance: x / SG: x / pH: x  Gluc: 156 mg/dL / Ketone: x  / Bili: x / Urobili: x   Blood: x / Protein: x / Nitrite: x   Leuk Esterase: x / RBC: x / WBC x   Sq Epi: x / Non Sq Epi: x / Bacteria: x        INs and OUTs:    05-18-24 @ 07:01  -  05-19-24 @ 07:00  --------------------------------------------------------  IN: 3440 mL / OUT: 2035 mL / NET: 1405 mL    05-19-24 @ 07:01  -  05-20-24 @ 00:07  --------------------------------------------------------  IN: 1534.4 mL / OUT: 1850 mL / NET: -315.6 mL

## 2024-05-20 NOTE — DIETITIAN INITIAL EVALUATION ADULT - NS FNS DIET ORDER
Diet, Consistent Carbohydrate w/Evening Snack (05-20-24 @ 12:05)  Diet, NPO after Midnight:      NPO Start Date: 19-May-2024,   NPO Start Time: 23:59  Except Medications (05-19-24 @ 16:34)

## 2024-05-20 NOTE — DIETITIAN INITIAL EVALUATION ADULT - ADD RECOMMEND
1. Provide encouragement with PO intake, menu selections, and assistance with meals as needed. Reinforce nutrition education as needed - RD remains available. Continue to monitor nutritional intake, labs, weights, BM, skin, clinical course. 2. Consider DASH/TLC restriction once patient is consuming >50% of his meals. 3. Follow pt as per protocol.

## 2024-05-20 NOTE — DIETITIAN INITIAL EVALUATION ADULT - PERTINENT MEDS FT
MEDICATIONS  (STANDING):  acetaminophen     Tablet .. 1000 milliGRAM(s) Oral every 6 hours  aspirin  chewable 81 milliGRAM(s) Oral daily  atorvastatin 40 milliGRAM(s) Oral at bedtime  chlorhexidine 2% Cloths 1 Application(s) Topical <User Schedule>  dextrose 5% + sodium chloride 0.9%. 1000 milliLiter(s) (100 mL/Hr) IV Continuous <Continuous>  dextrose 5%. 1000 milliLiter(s) (50 mL/Hr) IV Continuous <Continuous>  dextrose 50% Injectable 50 milliLiter(s) IV Push every 15 minutes  dextrose 50% Injectable 25 Gram(s) IV Push once  dextrose 50% Injectable 12.5 Gram(s) IV Push once  dextrose 50% Injectable 25 milliLiter(s) IV Push every 15 minutes  heparin   Injectable 5000 Unit(s) SubCutaneous every 8 hours  insulin lispro (ADMELOG) corrective regimen sliding scale   SubCutaneous every 6 hours  insulin NPH human recombinant 14 Unit(s) SubCutaneous every 12 hours  metoprolol succinate  milliGRAM(s) Oral daily  piperacillin/tazobactam IVPB.. 3.375 Gram(s) IV Intermittent every 8 hours    MEDICATIONS  (PRN):  dextrose Oral Gel 15 Gram(s) Oral once PRN Blood Glucose LESS THAN 70 milliGRAM(s)/deciliter  oxyCODONE    IR 5 milliGRAM(s) Oral every 4 hours PRN Severe Pain (7 - 10)  oxyCODONE    IR 2.5 milliGRAM(s) Oral every 4 hours PRN Moderate Pain (4 - 6)

## 2024-05-20 NOTE — DIETITIAN INITIAL EVALUATION ADULT - PERTINENT LABORATORY DATA
05-20    136  |  106  |  6<L>  ----------------------------<  167<H>  3.9   |  20<L>  |  0.69    Ca    8.1<L>      20 May 2024 00:30  Phos  2.1     05-20  Mg     2.20     05-20    TPro  6.0  /  Alb  2.8<L>  /  TBili  1.4<H>  /  DBili  1.0<H>  /  AST  104<H>  /  ALT  184<H>  /  AlkPhos  206<H>  05-20  POCT Blood Glucose.: 136 mg/dL (05-20-24 @ 12:21)  A1C with Estimated Average Glucose Result: 6.1 % (05-17-24 @ 06:49)  A1C with Estimated Average Glucose Result: 6.4 % (11-14-23 @ 04:57)

## 2024-05-20 NOTE — BRIEF OPERATIVE NOTE - OPERATION/FINDINGS
4 port laparoscopic cholecystectomy. Cystic artery clipped x 3. Gallbladder dissected off liver bed using top down approach, mild spillage from gallbladder encountered. Posterior artery branch similarly clipped x 3. Endoloop placed around large cystic duct and ligated. Hemostasis obtained using Dillan powder as well as cautery onto liver bed. Endocatch bag used for specimen retrieval.  4 port laparoscopic cholecystectomy. Cystic artery clipped x 3. Gallbladder dissected off liver bed using top down approach, planes difficult to discern due to significant inflammation and fusion of gallbladder to cystic plate. Mild spillage from gallbladder encountered. Posterior artery branch identified and similarly clipped x 3. Endoloop placed around large cystic duct and ligated. Hemostasis obtained using Dillan powder as well as cautery onto liver bed. Endocatch bag used for specimen retrieval. ICG used throughout case to ensure avoiding injury to common bile duct.

## 2024-05-21 ENCOUNTER — TRANSCRIPTION ENCOUNTER (OUTPATIENT)
Age: 54
End: 2024-05-21

## 2024-05-21 LAB
ALBUMIN SERPL ELPH-MCNC: 2.8 G/DL — LOW (ref 3.3–5)
ALP SERPL-CCNC: 211 U/L — HIGH (ref 40–120)
ALT FLD-CCNC: 214 U/L — HIGH (ref 4–41)
AMYLASE P1 CFR SERPL: 44 U/L — SIGNIFICANT CHANGE UP (ref 25–125)
ANION GAP SERPL CALC-SCNC: 9 MMOL/L — SIGNIFICANT CHANGE UP (ref 7–14)
APPEARANCE UR: CLEAR — SIGNIFICANT CHANGE UP
AST SERPL-CCNC: 163 U/L — HIGH (ref 4–40)
BACTERIA # UR AUTO: NEGATIVE /HPF — SIGNIFICANT CHANGE UP
BILIRUB DIRECT SERPL-MCNC: 0.9 MG/DL — HIGH (ref 0–0.3)
BILIRUB INDIRECT FLD-MCNC: 0.6 MG/DL — SIGNIFICANT CHANGE UP (ref 0–1)
BILIRUB SERPL-MCNC: 1.5 MG/DL — HIGH (ref 0.2–1.2)
BILIRUB UR-MCNC: NEGATIVE — SIGNIFICANT CHANGE UP
BUN SERPL-MCNC: 7 MG/DL — SIGNIFICANT CHANGE UP (ref 7–23)
CALCIUM SERPL-MCNC: 8.6 MG/DL — SIGNIFICANT CHANGE UP (ref 8.4–10.5)
CAST: 0 /LPF — SIGNIFICANT CHANGE UP (ref 0–4)
CHLORIDE SERPL-SCNC: 102 MMOL/L — SIGNIFICANT CHANGE UP (ref 98–107)
CO2 SERPL-SCNC: 20 MMOL/L — LOW (ref 22–31)
COLOR SPEC: YELLOW — SIGNIFICANT CHANGE UP
CREAT SERPL-MCNC: 0.73 MG/DL — SIGNIFICANT CHANGE UP (ref 0.5–1.3)
DIFF PNL FLD: ABNORMAL
EGFR: 109 ML/MIN/1.73M2 — SIGNIFICANT CHANGE UP
GLUCOSE BLDC GLUCOMTR-MCNC: 142 MG/DL — HIGH (ref 70–99)
GLUCOSE BLDC GLUCOMTR-MCNC: 142 MG/DL — HIGH (ref 70–99)
GLUCOSE BLDC GLUCOMTR-MCNC: 148 MG/DL — HIGH (ref 70–99)
GLUCOSE BLDC GLUCOMTR-MCNC: 181 MG/DL — HIGH (ref 70–99)
GLUCOSE SERPL-MCNC: 160 MG/DL — HIGH (ref 70–99)
GLUCOSE UR QL: >=1000 MG/DL
HCT VFR BLD CALC: 37 % — LOW (ref 39–50)
HGB BLD-MCNC: 12.5 G/DL — LOW (ref 13–17)
KETONES UR-MCNC: NEGATIVE MG/DL — SIGNIFICANT CHANGE UP
LEUKOCYTE ESTERASE UR-ACNC: NEGATIVE — SIGNIFICANT CHANGE UP
MAGNESIUM SERPL-MCNC: 2 MG/DL — SIGNIFICANT CHANGE UP (ref 1.6–2.6)
MCHC RBC-ENTMCNC: 27.9 PG — SIGNIFICANT CHANGE UP (ref 27–34)
MCHC RBC-ENTMCNC: 33.8 GM/DL — SIGNIFICANT CHANGE UP (ref 32–36)
MCV RBC AUTO: 82.6 FL — SIGNIFICANT CHANGE UP (ref 80–100)
NITRITE UR-MCNC: NEGATIVE — SIGNIFICANT CHANGE UP
NRBC # BLD: 0 /100 WBCS — SIGNIFICANT CHANGE UP (ref 0–0)
NRBC # FLD: 0 K/UL — SIGNIFICANT CHANGE UP (ref 0–0)
OSMOLALITY UR: 354 MOSM/KG — SIGNIFICANT CHANGE UP (ref 50–1200)
PH UR: 6 — SIGNIFICANT CHANGE UP (ref 5–8)
PHOSPHATE SERPL-MCNC: 3.2 MG/DL — SIGNIFICANT CHANGE UP (ref 2.5–4.5)
PLATELET # BLD AUTO: 244 K/UL — SIGNIFICANT CHANGE UP (ref 150–400)
POTASSIUM SERPL-MCNC: 4.6 MMOL/L — SIGNIFICANT CHANGE UP (ref 3.5–5.3)
POTASSIUM SERPL-SCNC: 4.6 MMOL/L — SIGNIFICANT CHANGE UP (ref 3.5–5.3)
PROT SERPL-MCNC: 6.4 G/DL — SIGNIFICANT CHANGE UP (ref 6–8.3)
PROT UR-MCNC: NEGATIVE MG/DL — SIGNIFICANT CHANGE UP
RBC # BLD: 4.48 M/UL — SIGNIFICANT CHANGE UP (ref 4.2–5.8)
RBC # FLD: 14 % — SIGNIFICANT CHANGE UP (ref 10.3–14.5)
RBC CASTS # UR COMP ASSIST: 63 /HPF — HIGH (ref 0–4)
SODIUM SERPL-SCNC: 131 MMOL/L — LOW (ref 135–145)
SODIUM UR-SCNC: 82 MMOL/L — SIGNIFICANT CHANGE UP
SP GR SPEC: 1.02 — SIGNIFICANT CHANGE UP (ref 1–1.03)
SQUAMOUS # UR AUTO: 0 /HPF — SIGNIFICANT CHANGE UP (ref 0–5)
UROBILINOGEN FLD QL: 0.2 MG/DL — SIGNIFICANT CHANGE UP (ref 0.2–1)
WBC # BLD: 6.93 K/UL — SIGNIFICANT CHANGE UP (ref 3.8–10.5)
WBC # FLD AUTO: 6.93 K/UL — SIGNIFICANT CHANGE UP (ref 3.8–10.5)
WBC UR QL: 1 /HPF — SIGNIFICANT CHANGE UP (ref 0–5)

## 2024-05-21 PROCEDURE — 99233 SBSQ HOSP IP/OBS HIGH 50: CPT

## 2024-05-21 RX ORDER — TICAGRELOR 90 MG/1
60 TABLET ORAL EVERY 12 HOURS
Refills: 0 | Status: DISCONTINUED | OUTPATIENT
Start: 2024-05-21 | End: 2024-05-22

## 2024-05-21 RX ORDER — ACETAMINOPHEN 500 MG
650 TABLET ORAL EVERY 6 HOURS
Refills: 0 | Status: DISCONTINUED | OUTPATIENT
Start: 2024-05-21 | End: 2024-05-22

## 2024-05-21 RX ORDER — ACETAMINOPHEN 500 MG
650 TABLET ORAL EVERY 6 HOURS
Refills: 0 | Status: DISCONTINUED | OUTPATIENT
Start: 2024-05-21 | End: 2024-05-21

## 2024-05-21 RX ADMIN — INSULIN GLARGINE 28 UNIT(S): 100 INJECTION, SOLUTION SUBCUTANEOUS at 21:39

## 2024-05-21 RX ADMIN — Medication 1000 MILLIGRAM(S): at 07:00

## 2024-05-21 RX ADMIN — PIPERACILLIN AND TAZOBACTAM 25 GRAM(S): 4; .5 INJECTION, POWDER, LYOPHILIZED, FOR SOLUTION INTRAVENOUS at 21:40

## 2024-05-21 RX ADMIN — PIPERACILLIN AND TAZOBACTAM 25 GRAM(S): 4; .5 INJECTION, POWDER, LYOPHILIZED, FOR SOLUTION INTRAVENOUS at 06:10

## 2024-05-21 RX ADMIN — Medication 200 MILLIGRAM(S): at 06:10

## 2024-05-21 RX ADMIN — Medication 3 MILLIGRAM(S): at 21:43

## 2024-05-21 RX ADMIN — CHLORHEXIDINE GLUCONATE 1 APPLICATION(S): 213 SOLUTION TOPICAL at 06:10

## 2024-05-21 RX ADMIN — HEPARIN SODIUM 5000 UNIT(S): 5000 INJECTION INTRAVENOUS; SUBCUTANEOUS at 06:10

## 2024-05-21 RX ADMIN — PIPERACILLIN AND TAZOBACTAM 25 GRAM(S): 4; .5 INJECTION, POWDER, LYOPHILIZED, FOR SOLUTION INTRAVENOUS at 14:58

## 2024-05-21 RX ADMIN — Medication 1: at 16:55

## 2024-05-21 RX ADMIN — OXYCODONE HYDROCHLORIDE 5 MILLIGRAM(S): 5 TABLET ORAL at 11:37

## 2024-05-21 RX ADMIN — HEPARIN SODIUM 5000 UNIT(S): 5000 INJECTION INTRAVENOUS; SUBCUTANEOUS at 21:42

## 2024-05-21 RX ADMIN — OXYCODONE HYDROCHLORIDE 5 MILLIGRAM(S): 5 TABLET ORAL at 19:29

## 2024-05-21 RX ADMIN — HEPARIN SODIUM 5000 UNIT(S): 5000 INJECTION INTRAVENOUS; SUBCUTANEOUS at 14:58

## 2024-05-21 RX ADMIN — TICAGRELOR 60 MILLIGRAM(S): 90 TABLET ORAL at 17:21

## 2024-05-21 RX ADMIN — OXYCODONE HYDROCHLORIDE 5 MILLIGRAM(S): 5 TABLET ORAL at 12:00

## 2024-05-21 RX ADMIN — Medication 81 MILLIGRAM(S): at 11:37

## 2024-05-21 RX ADMIN — OXYCODONE HYDROCHLORIDE 5 MILLIGRAM(S): 5 TABLET ORAL at 20:00

## 2024-05-21 RX ADMIN — ATORVASTATIN CALCIUM 40 MILLIGRAM(S): 80 TABLET, FILM COATED ORAL at 21:42

## 2024-05-21 RX ADMIN — Medication 1000 MILLIGRAM(S): at 06:10

## 2024-05-21 NOTE — CONSULT NOTE ADULT - ASSESSMENT
53 year old male with a PMH of  HTN, DM, CAD sp CABG & stents 12/2022, POBA to OM 11/14/23 and to mLAD 11/27/23, ICM/ CHF, GERD presents to the ED for abdominal pain and SOB. Nephrology consulted for hyponatremia.    A/P   Hyponatremia   - Na 131 today   - Fluid restriction 1L/day  - Get UrNa and UrOsm  - Monitor Na   - Repeat UA, previous UAs show blood in urine.     Cholecystectomy  - Management per SICU 53 year old male with a PMH of  HTN, DM, CAD sp CABG & stents 12/2022, POBA to OM 11/14/23 and to mLAD 11/27/23, ICM/ CHF, GERD presents to the ED for abdominal pain and SOB. Nephrology consulted for hyponatremia.    A/P   Hyponatremia   - Patient known to our practice  - Na 131 today   - Fluid restriction 1L/day  - Get Ur Na and Ur Osm  - Monitor Na level    Hematuria   - Repeat UA, if persistent will need further work up    Cholecystectomy  - Management per SICU

## 2024-05-21 NOTE — DISCHARGE NOTE PROVIDER - NSDCFUADDINST_GEN_ALL_CORE_FT
WOUND CARE:  Please keep incisions clean and dry. Please do not Scrub or rub incisions. Do not use lotion or powder on incisions.   BATHING: You may shower and/or sponge bathe. You may use warm soapy water in the shower and rinse, pat dry.  ACTIVITY: No heavy lifting or straining. Otherwise, you may return to your usual level of physical activity. If you are taking narcotic pain medication DO NOT drive a car, operate machinery or make important decisions.  DIET: Return to your usual diet.  NOTIFY YOUR SURGEON IF YOU HAVE: any bleeding that does not stop, any pus draining from your wound(s), any fever (over 100.4 F) persistent nausea/vomiting, or if your pain is not controlled on your discharge pain medications, unable to urinate.  Please follow up with your primary care physician in one week regarding your hospitalization, bring copies of your discharge paperwork.  Please follow up with your surgeon, Dr. Villar. Please call [phone] to make an appointment 2-3 weeks after discharge from the hospital.

## 2024-05-21 NOTE — PROGRESS NOTE ADULT - SUBJECTIVE AND OBJECTIVE BOX
SICU Progress Note    Overnight events:  -HIccups overnight, Reglan x1     SUBJECTIVE: Pt seen and examined at bedside in AM by surgical team. Patient comfortable and in no-apparent distress.     Vital Signs Last 24 Hrs  T(C): 36.9 (20 May 2024 20:00), Max: 36.9 (20 May 2024 20:00)  T(F): 98.5 (20 May 2024 20:00), Max: 98.5 (20 May 2024 20:00)  HR: 97 (20 May 2024 23:00) (73 - 97)  BP: 143/98 (20 May 2024 12:00) (122/79 - 143/98)  BP(mean): 112 (20 May 2024 12:00) (92 - 112)  RR: 23 (20 May 2024 23:00) (15 - 27)  SpO2: 99% (20 May 2024 23:00) (97% - 100%)    Parameters below as of 20 May 2024 23:00  Patient On (Oxygen Delivery Method): room air    Physical Exam:  General Appearance: Appears well, NAD  Respiratory: No labored breathing  CV: Pulse regularly present  Abdomen: Soft, ATTP, incision c/d/i    LABS:                        12.8   5.18  )-----------( 195      ( 20 May 2024 12:30 )             39.4     05-20    137  |  104  |  5<L>  ----------------------------<  161<H>  3.9   |  19<L>  |  0.71    Ca    8.1<L>      20 May 2024 12:30  Phos  3.3     05-20  Mg     1.80     05-20    TPro  6.0  /  Alb  2.8<L>  /  TBili  1.4<H>  /  DBili  1.0<H>  /  AST  104<H>  /  ALT  184<H>  /  AlkPhos  206<H>  05-20    PT/INR - ( 20 May 2024 00:30 )   PT: 13.3 sec;   INR: 1.19 ratio         PTT - ( 20 May 2024 00:30 )  PTT:41.5 sec  Urinalysis Basic - ( 20 May 2024 12:30 )    Color: x / Appearance: x / SG: x / pH: x  Gluc: 161 mg/dL / Ketone: x  / Bili: x / Urobili: x   Blood: x / Protein: x / Nitrite: x   Leuk Esterase: x / RBC: x / WBC x   Sq Epi: x / Non Sq Epi: x / Bacteria: x        INs and OUTs:    05-19-24 @ 07:01  -  05-20-24 @ 07:00  --------------------------------------------------------  IN: 3134.4 mL / OUT: 2600 mL / NET: 534.4 mL    05-20-24 @ 07:01  -  05-21-24 @ 00:07  --------------------------------------------------------  IN: 1150 mL / OUT: 1065 mL / NET: 85 mL

## 2024-05-21 NOTE — DISCHARGE NOTE PROVIDER - NSDCFUADDAPPT_GEN_ALL_CORE_FT
Please follow up with your primary care provider 1-2 weeks after discharge regarding recent hospitalization.  Please follow up with your primary care provider 1-2 weeks after discharge regarding recent hospitalization.     ** Please follow up with gastroenterology within 2 months for repeat ERCP **  Follow up in Gastroenterology Clinic: [phone] (Faculty Practice at [address] [place] [address]) or [phone] ([facility] at [address] [place] [address]) or [phone] ([facility] at [address] [place] [address])  or [place] Office: [phone] ([address] [place] [address]. [address] [place], [address])

## 2024-05-21 NOTE — DISCHARGE NOTE PROVIDER - CARE PROVIDER_API CALL
Hong Villar  Surgical Critical Care  [address] [place] [address], Department of Surgery 2nd [place] [address]  Phone: [phone]  Fax: [phone]  Follow Up Time: 2 weeks

## 2024-05-21 NOTE — DISCHARGE NOTE PROVIDER - NSDCCPTREATMENT_GEN_ALL_CORE_FT
PRINCIPAL PROCEDURE  Procedure: ERCP  Findings and Treatment:       SECONDARY PROCEDURE  Procedure: Laparoscopic cholecystectomy  Findings and Treatment:

## 2024-05-21 NOTE — PROGRESS NOTE ADULT - ASSESSMENT
53M with PMHx HTN, DM, CAD sp CABG & stents 12/2022, POBA to OM 11/14/23 and to mLAD 11/27/23, ICM/ CHF, GERD presented to OSH with abdominal pain and SOB. Pt admitted to medicine with choledocholithiasis on with extrahepatic biliary ductal dilatation on CT and MRCP. Cardiology consulted for new TWI in anterior and lateral leads compared to EKG 12/23, Trop elevation noted, downtrending. Admitted to ICU for septic shock. GI consulted for ERCP, CBD was unable to be intubated, +CBD stone seen on cholangiogram, procedure halted after multiple attempts at advancing wire. Patient remained intubated post procedure, requiring pressors, central line/summer placed prior to transfer. Transferred to [facility] SICU for IR consult for PTC. Extubated and weaned off pressors on 5/18. S/p repeat ERCP with sphincterotomy and stent placement on 5/18. S/p laparoscopic cholecystectomy on 5/20.     Interval Events:  - 1x Reglan for hiccups   - Diet: Regular     Plan:  NEUROLOGIC   - Pain control: Tylenol prn    RESPIRATORY   - Monitor SpO2 goal >92%    CARDIOVASCULAR   -H/o CAD sp CABG & stents 12/2022, POBA to OM 11/14/23 and to mLAD 11/27/23, ICM/ CHF (EF 35%) on ASA/Brillinta   -TTE 5/18 EF 76%, grade one diastolic failure  -Lipitor 40qhs  -Home Metoprolol 200qd    GASTROINTESTINAL   - Diet: CC  - S/p ERCP with sphincterotomy and stent placement on 5/18    /RENAL   - IVL  - Maintain ledbetter catheter, strict Is/Os  - Monitor electrolytes, replete PRN    HEMATOLOGIC  - Monitor H/H   - DVT ppx: SQH  - ASA   - Brillinta (held) (last dose Tuesday 5/14)    INFECTIOUS DISEASE  - Zosyn    ENDOCRINE  - Monitor Glc/ BHB   - Lantus 28 + NAKIA   - Basaglar 12u + Farxiga at home  - Hgb a1c 6.1 on 5/17    LINES  - IJ CVC ( 5 / 17 )  - Ledbetter ( 5 / 17 )  - PIV     Dispo: SICU

## 2024-05-21 NOTE — DISCHARGE NOTE PROVIDER - HOSPITAL COURSE
53 year old male with PMH HTN, CAD s/p CABG and stents 12/2022 on Aspirin and Brilinta, ICM, CHF (EF 35%), DM, GERD presents as transfer from [facility] for acute biliary obstruction choledocholithiasis w possible cholangitis s/p unsuccessful ERCP 2/2 difficult anatomy and periampullary diverticulum. 5/18 S/p EGD/ERCP demonstrating tortuous esophagus and small biliary sphincterotomy w biliary stent placement for choledocholithiasis. S/p lap ashley on 5/20. Patient tolerated operation well and there were no post-operative complications identified. Patient remained hemodynamically stable in the PACU and transferred to SICU. Diet was restarted and advanced as tolerated. Pain control was transitioned from IV to PO pain meds. At this time, patient is currently ambulating, voiding, tolerating a regular diet. Patient has been deemed stable for discharge home with follow up as an outpatient.

## 2024-05-21 NOTE — PROGRESS NOTE ADULT - SUBJECTIVE AND OBJECTIVE BOX
TEAM [ B ] Surgery Daily Progress Note  =====================================================    SUBJECTIVE: Patient seen and examined at bedside on AM rounds. Patient reports that they're feeling well. He is tolerating clear liquids, but reports hesistancy trying regular food. Denies fever, chills, N/V, chest pain, SOB    ALLERGIES:  No Known Allergies    -------------------------------------------------------------------------------------    MEDICATIONS:  acetaminophen     Tablet .. 650 milliGRAM(s) Oral every 6 hours PRN  aspirin  chewable 81 milliGRAM(s) Oral daily  atorvastatin 40 milliGRAM(s) Oral at bedtime  chlorhexidine 2% Cloths 1 Application(s) Topical <User Schedule>  dextrose 10% Bolus 125 milliLiter(s) IV Bolus once  dextrose 5%. 1000 milliLiter(s) IV Continuous <Continuous>  dextrose 50% Injectable 50 milliLiter(s) IV Push every 15 minutes  dextrose 50% Injectable 25 Gram(s) IV Push once  dextrose 50% Injectable 12.5 Gram(s) IV Push once  dextrose 50% Injectable 25 milliLiter(s) IV Push every 15 minutes  dextrose Oral Gel 15 Gram(s) Oral once PRN  glucagon  Injectable 1 milliGRAM(s) IntraMuscular once  heparin   Injectable 5000 Unit(s) SubCutaneous every 8 hours  insulin glargine Injectable (LANTUS) 28 Unit(s) SubCutaneous at bedtime  insulin lispro (ADMELOG) corrective regimen sliding scale   SubCutaneous Before meals and at bedtime  melatonin 3 milliGRAM(s) Oral at bedtime  metoprolol succinate  milliGRAM(s) Oral daily  oxyCODONE    IR 5 milliGRAM(s) Oral every 4 hours PRN  oxyCODONE    IR 2.5 milliGRAM(s) Oral every 4 hours PRN  piperacillin/tazobactam IVPB.. 3.375 Gram(s) IV Intermittent every 8 hours    --------------------------------------------------------------------------------------    VITAL SIGNS:  T(C): 36.9 (05-21-24 @ 04:00), Max: 36.9 (05-20-24 @ 20:00)  HR: 77 (05-21-24 @ 07:00) (73 - 97)  BP: 103/70 (05-21-24 @ 07:00) (103/70 - 143/98)  RR: 23 (05-21-24 @ 07:00) (15 - 27)  SpO2: 98% (05-21-24 @ 07:00) (97% - 100%)  --------------------------------------------------------------------------------------    INS AND OUTS:    05-20-24 @ 07:01  -  05-21-24 @ 07:00  --------------------------------------------------------  IN: 1150 mL / OUT: 1765 mL / NET: -615 mL      --------------------------------------------------------------------------------------      EXAM    General: NAD, resting in bed comfortably.  Cardiac: regular rate, warm and well perfused  Respiratory: Nonlabored respirations, normal cw expansion.  Abdomen: soft, nontender, nondistended, port sites c/d/i  Extremities: normal strength, FROM, no deformities    --------------------------------------------------------------------------------------    LABS                          12.5   6.93  )-----------( 244      ( 21 May 2024 00:45 )             37.0   05-21    131<L>  |  102  |  7   ----------------------------<  160<H>  4.6   |  20<L>  |  0.73    Ca    8.6      21 May 2024 00:45  Phos  3.2     05-21  Mg     2.00     05-21    TPro  6.4  /  Alb  2.8<L>  /  TBili  1.5<H>  /  DBili  0.9<H>  /  AST  163<H>  /  ALT  214<H>  /  AlkPhos  211<H>  05-21

## 2024-05-21 NOTE — PROGRESS NOTE ADULT - SUBJECTIVE AND OBJECTIVE BOX
POST ANESTHESIA EVALUATION    53y Male POSTOP DAY 1 S/P     MENTAL STATUS: Patient participation [ x ] Awake     [  ] Arousable     [  ] Sedated    AIRWAY PATENCY: [ x ] Satisfactory  [  ] Other:     Vital Signs Last 24 Hrs  T(C): 36.9 (21 May 2024 08:00), Max: 36.9 (20 May 2024 20:00)  T(F): 98.5 (21 May 2024 08:00), Max: 98.5 (20 May 2024 20:00)  HR: 82 (21 May 2024 08:00) (73 - 97)  BP: 114/68 (21 May 2024 08:00) (103/70 - 143/98)  BP(mean): 83 (21 May 2024 08:00) (82 - 112)  RR: 21 (21 May 2024 08:00) (15 - 24)  SpO2: 99% (21 May 2024 08:00) (97% - 100%)    Parameters below as of 21 May 2024 08:00  Patient On (Oxygen Delivery Method): room air      I&O's Summary    20 May 2024 07:01  -  21 May 2024 07:00  --------------------------------------------------------  IN: 1150 mL / OUT: 1765 mL / NET: -615 mL    21 May 2024 07:01  -  21 May 2024 10:11  --------------------------------------------------------  IN: 0 mL / OUT: 125 mL / NET: -125 mL          NAUSEA/ VOMITTING:  [ x ] NONE  [  ] CONTROLLED [  ] OTHER     PAIN: [ x ] CONTROLLED WITH CURRENT REGIMEN  [  ] OTHER  x  [x  ] NO APPARENT ANESTHESIA COMPLICATIONS      Comments:

## 2024-05-21 NOTE — PROGRESS NOTE ADULT - ASSESSMENT
53 year old male with PMH HTN, CAD s/p CABG and stents 12/2022 on Aspirin and Brilinta, ICM, CHF (EF 35%), DM, GERD presents as transfer from [facility] for acute biliary obstruction choledocholithiasis w possible cholangitis s/p unsuccessful ERCP 2/2 difficult anatomy and periampullary diverticulum. 5/18 S/p EGD/ERCP demonstrating tortuous esophagus and small biliary sphincterotomy w biliary stent placement for choledocholithiasis. S/p lap ashley on 5/20    Plan  - Diet: regular  - Pain control  - Home meds restarted  - Zosyn  - Aspirin and DVT prophylaxis  - Appreciate cardio and GI recs  - Appreciate SICU care    B team surgery [id number]

## 2024-05-21 NOTE — DISCHARGE NOTE PROVIDER - NSDCMRMEDTOKEN_GEN_ALL_CORE_FT
aspirin 81 mg oral delayed release tablet: 1 tab(s) orally once a day  atorvastatin 40 mg oral tablet: 1 tab(s) orally once a day (at bedtime)  Basaglar KwikPen 100 units/mL subcutaneous solution: 6 unit(s) subcutaneous once a day (at bedtime)  ezetimibe 10 mg oral tablet: 1 tab(s) orally once a day  Farxiga 5 mg oral tablet: 1 tab(s) orally once a day please hold for now pending cardiac cath per heart failure team  Janumet 50 mg-1000 mg oral tablet: 1 tab(s) orally 2 times a day RESUME 11/30/23  metoprolol succinate 200 mg oral tablet, extended release: 1 tab(s) orally once a day  pantoprazole 40 mg oral delayed release tablet: 1 tab(s) orally once a day (before a meal)  sacubitril-valsartan 24 mg-26 mg oral tablet: 1 tab(s) orally 2 times a day  senna (sennosides) 8.6 mg oral tablet: 1 tab(s) orally once a day  spironolactone 25 mg oral tablet: 0.5 tab(s) orally once a day  tamsulosin 0.4 mg oral capsule: 1 cap(s) orally once a day  ticagrelor 90 mg oral tablet: 1 tab(s) orally every 12 hours  Vascepa 1 g oral capsule: 2 cap(s) orally 2 times a day   acetaminophen 325 mg oral tablet: 3 tab(s) orally every 6 hours as needed for Temp greater or equal to 38C (100.4F), Mild Pain (1 - 3)  aspirin 81 mg oral delayed release tablet: 1 tab(s) orally once a day  atorvastatin 40 mg oral tablet: 1 tab(s) orally once a day (at bedtime)  Basaglar KwikPen 100 units/mL subcutaneous solution: 6 unit(s) subcutaneous once a day (at bedtime)  ezetimibe 10 mg oral tablet: 1 tab(s) orally once a day  Farxiga 5 mg oral tablet: 1 tab(s) orally once a day  Janumet 50 mg-1000 mg oral tablet: 1 tab(s) orally 2 times a day RESUME 11/30/23  metoprolol succinate 200 mg oral tablet, extended release: 1 tab(s) orally once a day  oxyCODONE 5 mg oral tablet: 1 tab(s) orally every 6 hours MDD: 4 tablets  pantoprazole 40 mg oral delayed release tablet: 1 tab(s) orally once a day (before a meal)  sacubitril-valsartan 24 mg-26 mg oral tablet: 1 tab(s) orally 2 times a day  senna (sennosides) 8.6 mg oral tablet: 1 tab(s) orally once a day  spironolactone 25 mg oral tablet: 0.5 tab(s) orally once a day  tamsulosin 0.4 mg oral capsule: 1 cap(s) orally once a day  ticagrelor 90 mg oral tablet: 1 tab(s) orally every 12 hours  Vascepa 1 g oral capsule: 2 cap(s) orally 2 times a day

## 2024-05-21 NOTE — CONSULT NOTE ADULT - SUBJECTIVE AND OBJECTIVE BOX
[facility]   MD DAKOTA HOGAN MD MARIA SANTIAGO, NP        TEL:  OFFICE: [phone]  From 5pm-7am answering service [phone]    --- INITIAL RENAL CONSULT NOTE ---date of service 05-21-24 @ 13:38    HPI:  53 year old male with a PMH of  HTN, DM, CAD sp CABG & stents 12/2022, POBA to OM 11/14/23 and to mLAD 11/27/23, ICM/ CHF, GERD presents to the ED for abdominal pain and SOB. Nephrology consulted for hyponatremia.      Allergies:  No Known Allergies      PAST MEDICAL & SURGICAL HISTORY:  Hypertension      Diabetes mellitus      GERD (gastroesophageal reflux disease)      CAD (coronary artery disease)      S/P CABG (coronary artery bypass graft)          Home Medications Reviewed    Hospital Medications:   MEDICATIONS  (STANDING):  aspirin  chewable 81 milliGRAM(s) Oral daily  atorvastatin 40 milliGRAM(s) Oral at bedtime  chlorhexidine 2% Cloths 1 Application(s) Topical <User Schedule>  dextrose 10% Bolus 125 milliLiter(s) IV Bolus once  dextrose 5%. 1000 milliLiter(s) (50 mL/Hr) IV Continuous <Continuous>  dextrose 50% Injectable 25 milliLiter(s) IV Push every 15 minutes  dextrose 50% Injectable 25 Gram(s) IV Push once  dextrose 50% Injectable 12.5 Gram(s) IV Push once  dextrose 50% Injectable 50 milliLiter(s) IV Push every 15 minutes  glucagon  Injectable 1 milliGRAM(s) IntraMuscular once  heparin   Injectable 5000 Unit(s) SubCutaneous every 8 hours  insulin glargine Injectable (LANTUS) 28 Unit(s) SubCutaneous at bedtime  insulin lispro (ADMELOG) corrective regimen sliding scale   SubCutaneous Before meals and at bedtime  melatonin 3 milliGRAM(s) Oral at bedtime  metoprolol succinate  milliGRAM(s) Oral daily  piperacillin/tazobactam IVPB.. 3.375 Gram(s) IV Intermittent every 8 hours  ticagrelor 60 milliGRAM(s) Oral every 12 hours      SOCIAL HISTORY:  Denies ETOh, Smoking,     FAMILY HISTORY:  FH: heart disease (Father)        REVIEW OF SYSTEMS:  CONSTITUTIONAL: No weakness, fevers or chills  EYES/ENT: No visual changes;  No vertigo or throat pain   NECK: No pain or stiffness  RESPIRATORY: as per HPI   CARDIOVASCULAR: No chest pain or palpitations.  GASTROINTESTINAL: as per HPI   GENITOURINARY: No dysuria, frequency, foamy urine, urinary urgency, incontinence or hematuria  NEUROLOGICAL: No numbness or weakness  SKIN: No itching, burning, rashes, or lesions   VASCULAR: No bilateral lower extremity edema.   All other review of systems is negative unless indicated above.    VITALS:  T(F): 98.9 (05-21-24 @ 12:00), Max: 98.9 (05-21-24 @ 12:00)  HR: 78 (05-21-24 @ 12:00)  BP: 124/78 (05-21-24 @ 12:00)  RR: 22 (05-21-24 @ 12:00)  SpO2: 98% (05-21-24 @ 12:00)  Wt(kg): --    05-20 @ 07:01  -  05-21 @ 07:00  --------------------------------------------------------  IN: 1150 mL / OUT: 1765 mL / NET: -615 mL    05-21 @ 07:01  -  05-21 @ 13:38  --------------------------------------------------------  IN: 0 mL / OUT: 375 mL / NET: -375 mL          PHYSICAL EXAM:  General: NAD  HEENT: anicteric sclera, oropharynx clear, MMM  Neck: No JVD  Respiratory: CTAB, no wheezes, rales or rhonchi  Cardiovascular: S1, S2, RRR  Gastrointestinal: BS+, soft, NT/ND; s/p cholecystectomy   Extremities: No cyanosis or clubbing. No peripheral edema  Neurological: A/O x 3, no focal deficits  Psychiatric: Normal mood, normal affect  : No CVA tenderness. No ledbetter.   Skin: No rashes      LABS:  05-21    131<L>  |  102  |  7   ----------------------------<  160<H>  4.6   |  20<L>  |  0.73    Ca    8.6      21 May 2024 00:45  Phos  3.2     05-21  Mg     2.00     05-21    TPro  6.4  /  Alb  2.8<L>  /  TBili  1.5<H>  /  DBili  0.9<H>  /  AST  163<H>  /  ALT  214<H>  /  AlkPhos  211<H>  05-21    Creatinine Trend: 0.73 <--, 0.71 <--, 0.69 <--, 0.66 <--, 0.75 <--, 0.69 <--, 0.71 <--, 0.75 <--, 0.75 <--, 0.76 <--, 0.90 <--, 1.02 <--, 1.08 <--, 1.17 <--, 1.33 <--, 2.26 <--, 0.92 <--, 1.13 <--                        12.5   6.93  )-----------( 244      ( 21 May 2024 00:45 )             37.0     Urine Studies:  Urinalysis Basic - ( 21 May 2024 00:45 )    Color:  / Appearance:  / SG:  / pH:   Gluc: 160 mg/dL / Ketone:   / Bili:  / Urobili:    Blood:  / Protein:  / Nitrite:    Leuk Esterase:  / RBC:  / WBC    Sq Epi:  / Non Sq Epi:  / Bacteria:           RADIOLOGY & ADDITIONAL STUDIES:

## 2024-05-22 ENCOUNTER — TRANSCRIPTION ENCOUNTER (OUTPATIENT)
Age: 54
End: 2024-05-22

## 2024-05-22 VITALS
OXYGEN SATURATION: 100 % | HEART RATE: 84 BPM | SYSTOLIC BLOOD PRESSURE: 125 MMHG | DIASTOLIC BLOOD PRESSURE: 90 MMHG | RESPIRATION RATE: 19 BRPM

## 2024-05-22 LAB
ALBUMIN SERPL ELPH-MCNC: 2.9 G/DL — LOW (ref 3.3–5)
ALP SERPL-CCNC: 199 U/L — HIGH (ref 40–120)
ALT FLD-CCNC: 179 U/L — HIGH (ref 4–41)
ANION GAP SERPL CALC-SCNC: 11 MMOL/L — SIGNIFICANT CHANGE UP (ref 7–14)
AST SERPL-CCNC: 116 U/L — HIGH (ref 4–40)
BILIRUB DIRECT SERPL-MCNC: 0.7 MG/DL — HIGH (ref 0–0.3)
BILIRUB INDIRECT FLD-MCNC: 0.5 MG/DL — SIGNIFICANT CHANGE UP (ref 0–1)
BILIRUB SERPL-MCNC: 1.2 MG/DL — SIGNIFICANT CHANGE UP (ref 0.2–1.2)
BUN SERPL-MCNC: 8 MG/DL — SIGNIFICANT CHANGE UP (ref 7–23)
CALCIUM SERPL-MCNC: 8.5 MG/DL — SIGNIFICANT CHANGE UP (ref 8.4–10.5)
CHLORIDE SERPL-SCNC: 101 MMOL/L — SIGNIFICANT CHANGE UP (ref 98–107)
CO2 SERPL-SCNC: 24 MMOL/L — SIGNIFICANT CHANGE UP (ref 22–31)
CREAT SERPL-MCNC: 0.81 MG/DL — SIGNIFICANT CHANGE UP (ref 0.5–1.3)
CULTURE RESULTS: SIGNIFICANT CHANGE UP
CULTURE RESULTS: SIGNIFICANT CHANGE UP
EGFR: 106 ML/MIN/1.73M2 — SIGNIFICANT CHANGE UP
GLUCOSE BLDC GLUCOMTR-MCNC: 157 MG/DL — HIGH (ref 70–99)
GLUCOSE BLDC GLUCOMTR-MCNC: 70 MG/DL — SIGNIFICANT CHANGE UP (ref 70–99)
GLUCOSE SERPL-MCNC: 118 MG/DL — HIGH (ref 70–99)
HCT VFR BLD CALC: 35.9 % — LOW (ref 39–50)
HGB BLD-MCNC: 12 G/DL — LOW (ref 13–17)
MAGNESIUM SERPL-MCNC: 1.8 MG/DL — SIGNIFICANT CHANGE UP (ref 1.6–2.6)
MCHC RBC-ENTMCNC: 27.6 PG — SIGNIFICANT CHANGE UP (ref 27–34)
MCHC RBC-ENTMCNC: 33.4 GM/DL — SIGNIFICANT CHANGE UP (ref 32–36)
MCV RBC AUTO: 82.5 FL — SIGNIFICANT CHANGE UP (ref 80–100)
NRBC # BLD: 0 /100 WBCS — SIGNIFICANT CHANGE UP (ref 0–0)
NRBC # FLD: 0 K/UL — SIGNIFICANT CHANGE UP (ref 0–0)
PHOSPHATE SERPL-MCNC: 2.5 MG/DL — SIGNIFICANT CHANGE UP (ref 2.5–4.5)
PLATELET # BLD AUTO: 248 K/UL — SIGNIFICANT CHANGE UP (ref 150–400)
POTASSIUM SERPL-MCNC: 4 MMOL/L — SIGNIFICANT CHANGE UP (ref 3.5–5.3)
POTASSIUM SERPL-SCNC: 4 MMOL/L — SIGNIFICANT CHANGE UP (ref 3.5–5.3)
PROT SERPL-MCNC: 6.3 G/DL — SIGNIFICANT CHANGE UP (ref 6–8.3)
RBC # BLD: 4.35 M/UL — SIGNIFICANT CHANGE UP (ref 4.2–5.8)
RBC # FLD: 13.9 % — SIGNIFICANT CHANGE UP (ref 10.3–14.5)
SODIUM SERPL-SCNC: 136 MMOL/L — SIGNIFICANT CHANGE UP (ref 135–145)
SPECIMEN SOURCE: SIGNIFICANT CHANGE UP
SPECIMEN SOURCE: SIGNIFICANT CHANGE UP
WBC # BLD: 5.97 K/UL — SIGNIFICANT CHANGE UP (ref 3.8–10.5)
WBC # FLD AUTO: 5.97 K/UL — SIGNIFICANT CHANGE UP (ref 3.8–10.5)

## 2024-05-22 PROCEDURE — 99233 SBSQ HOSP IP/OBS HIGH 50: CPT

## 2024-05-22 RX ORDER — ACETAMINOPHEN 500 MG
3 TABLET ORAL
Qty: 0 | Refills: 0 | DISCHARGE
Start: 2024-05-22

## 2024-05-22 RX ORDER — SENNA PLUS 8.6 MG/1
2 TABLET ORAL AT BEDTIME
Refills: 0 | Status: DISCONTINUED | OUTPATIENT
Start: 2024-05-22 | End: 2024-05-22

## 2024-05-22 RX ORDER — DAPAGLIFLOZIN 10 MG/1
1 TABLET, FILM COATED ORAL
Qty: 0 | Refills: 0 | DISCHARGE

## 2024-05-22 RX ORDER — POLYETHYLENE GLYCOL 3350 17 G/17G
17 POWDER, FOR SOLUTION ORAL DAILY
Refills: 0 | Status: DISCONTINUED | OUTPATIENT
Start: 2024-05-22 | End: 2024-05-22

## 2024-05-22 RX ORDER — SODIUM,POTASSIUM PHOSPHATES 278-250MG
1 POWDER IN PACKET (EA) ORAL EVERY 6 HOURS
Refills: 0 | Status: DISCONTINUED | OUTPATIENT
Start: 2024-05-22 | End: 2024-05-22

## 2024-05-22 RX ORDER — MAGNESIUM SULFATE 500 MG/ML
2 VIAL (ML) INJECTION ONCE
Refills: 0 | Status: COMPLETED | OUTPATIENT
Start: 2024-05-22 | End: 2024-05-22

## 2024-05-22 RX ORDER — OXYCODONE HYDROCHLORIDE 5 MG/1
1 TABLET ORAL
Qty: 8 | Refills: 0
Start: 2024-05-22 | End: 2024-05-23

## 2024-05-22 RX ADMIN — OXYCODONE HYDROCHLORIDE 5 MILLIGRAM(S): 5 TABLET ORAL at 05:21

## 2024-05-22 RX ADMIN — HEPARIN SODIUM 5000 UNIT(S): 5000 INJECTION INTRAVENOUS; SUBCUTANEOUS at 05:21

## 2024-05-22 RX ADMIN — Medication 200 MILLIGRAM(S): at 05:21

## 2024-05-22 RX ADMIN — OXYCODONE HYDROCHLORIDE 5 MILLIGRAM(S): 5 TABLET ORAL at 06:00

## 2024-05-22 RX ADMIN — TICAGRELOR 60 MILLIGRAM(S): 90 TABLET ORAL at 05:21

## 2024-05-22 RX ADMIN — Medication 25 GRAM(S): at 06:37

## 2024-05-22 RX ADMIN — POLYETHYLENE GLYCOL 3350 17 GRAM(S): 17 POWDER, FOR SOLUTION ORAL at 11:37

## 2024-05-22 RX ADMIN — Medication 81 MILLIGRAM(S): at 11:36

## 2024-05-22 RX ADMIN — CHLORHEXIDINE GLUCONATE 1 APPLICATION(S): 213 SOLUTION TOPICAL at 05:24

## 2024-05-22 NOTE — DISCHARGE NOTE NURSING/CASE MANAGEMENT/SOCIAL WORK - NSDCFUADDAPPT_GEN_ALL_CORE_FT
Please follow up with your primary care provider 1-2 weeks after discharge regarding recent hospitalization.     ** Please follow up with gastroenterology within 2 months for repeat ERCP **  Follow up in Gastroenterology Clinic: [phone] ([facility] at [address] [place] [address]) or [phone] ([facility] at [address] [place] [address]) or [phone] ([facility] at [address] [place] [address])  or [place] Office: [phone] ([address] [place] [address]. [address] [place], [address])

## 2024-05-22 NOTE — PROGRESS NOTE ADULT - ATTENDING COMMENTS
I agree with the detailed interval history, physical, and plan, which I have reviewed and edited where appropriate'; also agree with notes/assessment with my team on service.  I have personally examined the patient.  I was physically present for the key portions of the evaluation and management (E/M) service provided.  I reviewed all the pertinent data.  The patient is a critical care patient with life threatening hemodynamic and metabolic instability in SICU.  The SICU team has a constant risk benefit analyzes discussion and coordinating care with the primary team and all consultants.   The patient is in SICU with the chief complaint and diagnosis mentioned in the note.   The plan will be specified in the note.  53M with PMHx HTN, DM, CAD sp CABG & stents with choledocholithiasis and extrahepatic biliary ductal dilatation in SICU secondary to septic shock. S/p ERCP with sphincterotomy and stent placement and S/p laparoscopic cholecystectomy.  Physical Exam:  General Appearance: NAD  Respiratory: clear  CV: RR  Abdomen: Soft     Plan:  NEUROLOGIC   -Tylenol   RESPIRATORY   - Monitor SpO2 goal >92%  CARDIOVASCULAR   -Lipitor   -Metoprolol   GASTROINTESTINAL   - Diet: clears  /RENAL   - IVL  HEMATOLOGIC  - SQH  - ASA   INFECTIOUS DISEASE  - Zosyn  ENDOCRINE  - Monitor Glucose  Dispo: DC floor
I agree with the detailed interval history, physical, and plan, which I have reviewed and edited where appropriate'; also agree with notes/assessment with my team on service.  I have personally examined the patient.  I was physically present for the key portions of the evaluation and management (E/M) service provided.  I reviewed all the pertinent data.  The patient is a critical care patient with life threatening hemodynamic and metabolic instability in SICU.  The SICU team has a constant risk benefit analyzes discussion and coordinating care with the primary team and all consultants.   The patient is in SICU with the chief complaint and diagnosis mentioned in the note.   The plan will be specified in the note.  53M with PMHx HTN, DM, CAD sp CABG & stents with choledocholithiasis and extrahepatic biliary ductal dilatation in SICU secondary to septic shock. S/p repeat ERCP with sphincterotomy and stent placement and S/p laparoscopic cholecystectomy.  Physical Exam:  General Appearance: NAD  Respiratory: clear  CV: RR  Abdomen: Soft     Plan:  NEUROLOGIC   -Tylenol prn  RESPIRATORY   - Monitor SpO2 goal >92%  CARDIOVASCULAR   -Lipitor   -Metoprolol   GASTROINTESTINAL   - Diet: clears  /RENAL   - IVL  HEMATOLOGIC  - Monitor H/H   - SQH  - ASA   - Brillinta   INFECTIOUS DISEASE  - Zosyn  ENDOCRINE  - Monitor Glucose  - Lantus 28 + NAKIA     Dispo: DC floor
Pt seen and examined.  Agree with resident eval and plan.  Imp: Cholangitis, choledocholithiasis s/p ERCP & stent.  IV antibiotics and tentative lap cholecystectomy tomorrow.
53M, initially admitted to OSH with abd pain and fevers.   Clinical picture concerning for cholangitis   ERCP on 5/17 unsuccessful due to difficult anatomy   Transferred to [facility] for further management  Repeat ERCP done yesterday, s/p sphincterotomy and biliary stent placement     continue IV abx   trend labs   advance diet as tolerated   Repeat ERCP in 2 mos   GI to follow, please call w questions
I agree with the history, physical, and plan, which I have reviewed and edited where appropriate.  I agree with notes/assessment of health care providers on my service.  I have personally examined the patient.  I was physically present for the key portions of the evaluation and management (E/M) service provided.  I reviewed data and laboratory tests/x-rays and all pertinent electronic images.  The patient is a critical care patient with life threatening hemodynamic and metabolic instability in SICU.  Risk benefit analyses discussed.    The patient is in SICU with diagnosis mentioned in the note.    The plan is specified below.      53M hx HTN, DM, CAD sp CABG & stents 12/2022, POBA to OM 11/14/23 and to mLAD 11/27/23, ICM/ CHF, GERD presented to OSH with abdominal pain and SOB. Pt admitted to medicine with choledocholithiasis on with extrahepatic biliary ductal dilatation on CT and MRCP. Cardiology consulted for new TWI in anterior and lateral leads compared to EKG 12/23, Trop elevation noted, downtrending. Admitted to ICU for septic shock. GI consulted for ERCP, CBD was unable to be intubated, +CBD stone seen on cholangiogram, procedure halted after multiple attempts at advancing wire. Patient remained intubated post procedure, requiring pressors, central line/summer placed prior to transfer. Transferred to [facility] SICU for IR consult for PTC. Extubated and weaned off pressors on 5/18.     PLAN  NEUROLOGIC   - Pain control: Tylenol prn    RESPIRATORY: respiratory insufficency, extubated 5/18  - wean to nasal cannula or RA     CARDIOVASCULAR:  h/o CAD sp CABG & stents 12/2022, POBA to OM 11/14/23 and to mLAD 11/27/23, ICM/ CHF (EF 35%) on ASA/Brillinta   - F/u TTE    GASTROINTESTINAL: cholangitis, unable to accomplish ERCP due to duodenal diverticulum/anatomy     - Diet: NPO  - IR consult for PTC-> intrahepatic ducts insufficiently dilated  - GI consult for possible repeat ERCP    /RENAL   - IV fluids: D5LR @ 100  - Maintain ledbetter catheter    HEMATOLOGIC  - DVT ppx: holding chemical dvt ppx for IR procedure, SCD  - On ASA/Brillinta (held) (last dose Tuesday 5/14)    INFECTIOUS DISEASE: cholangitis   - Zosyn    ENDOCRINE: euglycemic dka, gap now closed   - Monitor gluc q6  - Tx to NPH 10 bid, with NAKIA
I have personally seen and examined the patient.  I fully participated in the care of this patient.  I have made amendments to the documentation where necessary, and agree with the history, physical exam, and plan as documented by the Resident.     The patient was seen and examined, chart and notes reviewed.  The current diagnosis, plan of care and alternatives have been discussed with the patient.  All questions have been answered and updates have been discussed.  The case was discussed with B team residents/PA's and medical students at morning B surgical rounds and throughout the course of the day.    Septic shocks secondary to cholangitis  a.  On levophed, vasopressin weaned  to maintain MAP>65  b.  Decrease  IVF resuscitation   d.  Follow up blood cultures.  Continue IV zosyn  e.  GI note appreciated     Euglyecmic DKA  a.  Continue insulin gtt  c.  Improving AG    Respiratory insufficiency  a.  With adequate gas exchange  b. Transition to precedex  c. Possible CPAP trial    At risk for malnutrition  a,  NPI    CHF  a.  lopressor, entresto on hold secondary to hypotension  b.  Follow up ECHO .     Patient is critically ill, requiring critical care services.     Attending: I have personally and independently provided 90 minutes of critical care services.  This excludes any time spent on separate procedures or teaching.
I agree with the history, physical, and plan, which I have reviewed and edited where appropriate.  I agree with notes/assessment of health care providers on my service.  I have personally examined the patient.  I was physically present for the key portions of the evaluation and management (E/M) service provided.  I reviewed data and laboratory tests/x-rays and all pertinent electronic images.  The patient is a critical care patient with life threatening hemodynamic and metabolic instability in SICU.  Risk benefit analyses discussed.    The patient is in SICU with diagnosis mentioned in the note.    The plan is specified below.      53M hx HTN, DM, CAD sp CABG & stents 12/2022, POBA to OM 11/14/23 and to mLAD 11/27/23, ICM/ CHF, GERD presented to OSH with abdominal pain and SOB. Pt admitted to medicine with choledocholithiasis on with extrahepatic biliary ductal dilatation on CT and MRCP. Cardiology consulted for new TWI in anterior and lateral leads compared to EKG 12/23, Trop elevation noted, downtrending. Admitted to ICU for septic shock. GI consulted for ERCP, CBD was unable to be intubated, +CBD stone seen on cholangiogram, procedure halted after multiple attempts at advancing wire. Patient remained intubated post procedure, requiring pressors, central line/summer placed prior to transfer. Transferred to [facility] SICU for IR consult for PTC. Extubated and weaned off pressors on 5/18.     PLAN  NEUROLOGIC   - Pain control: Tylenol prn    RESPIRATORY: respiratory insufficency, extubated 5/18  - wean to nasal cannula or RA     CARDIOVASCULAR:  h/o CAD sp CABG & stents 12/2022, POBA to OM 11/14/23 and to mLAD 11/27/23, off ASA and plavix   - Cont ASA   - Resume home statin   - Weaning levo as tolerated   - cardiology for pre-op optimization     GASTROINTESTINAL: cholangitis, s/p urgent repeat ERCP stent placement on 5/18     - Diet: advance to regular diet   - possible cholecystectomy tomorrow if off pressors      /RENAL: SHAMEKA resolving   - IV fluids: D10NS @ 100, decrease when PO intake improves   - Maintain ledbetter catheter    HEMATOLOGIC  - DVT ppx: SQH  - On ASA    INFECTIOUS DISEASE: cholangitis   - Zosyn    ENDOCRINE: euglycemic dka, gap now closed   - Monitor gluc q2h  - Tx to NPH 12 bid, with NAKIA and pre-meal once taking PO
The patient was seen and examined, chart and notes reviewed.  The current diagnosis, plan of care and alternatives have been discussed with the patient.  All questions have been answered and updates have been discussed.  The case was discussed with B team residents/PA's and medical students at surgical rounds and throughout the course of the day.    Cholangitis  a. Remains afebrile  b.  WBC within normal limits  c.  Off vasopressor support  d.  S/P ERCP+ stent  e.  S/P laparoscopic cholecystectomy  f.  Continue abx x 4 days  g.  Trend cholangitis    DKA  a.  AG at 10  b.  Off insulin gtt  c.  On NPH  d.  FS Q 6     CHF/HTN  a.  Resume asa, brillanta per sx team  b.  Restart b-blocker, statin  c.  Appreciate cardiology input    At risk for malnutrition  a.  NPO  b.  May advance diet per surgery team

## 2024-05-22 NOTE — PROGRESS NOTE ADULT - ASSESSMENT
53 year old male with a PMH of  HTN, DM, CAD sp CABG & stents 12/2022, POBA to OM 11/14/23 and to mLAD 11/27/23, ICM/ CHF, GERD presents to the ED for abdominal pain and SOB. Nephrology consulted for hyponatremia.    A/P   Hyponatremia   - Patient known to our practice  - Ur Na 82; Ur Osm 354  - Sec to SIADH  - Na better today  - Fluid restriction 1L/day  - Monitor Na level    Hematuria   - Repeat UA, if persistent will need further work up    Cholecystectomy  - Management per SICU

## 2024-05-22 NOTE — PROGRESS NOTE ADULT - SUBJECTIVE AND OBJECTIVE BOX
SICU Progress Note    Overnight events: None     SUBJECTIVE: Pt seen and examined at bedside in AM by surgical team. Patient comfortable and in no-apparent distress.     Vital Signs Last 24 Hrs  T(C): 37.3 (21 May 2024 20:00), Max: 37.3 (21 May 2024 20:00)  T(F): 99.1 (21 May 2024 20:00), Max: 99.1 (21 May 2024 20:00)  HR: 81 (21 May 2024 20:00) (74 - 88)  BP: 137/90 (21 May 2024 20:00) (103/70 - 137/90)  BP(mean): 104 (21 May 2024 20:00) (82 - 104)  RR: 21 (21 May 2024 20:00) (13 - 24)  SpO2: 99% (21 May 2024 20:00) (97% - 99%)    Parameters below as of 21 May 2024 20:00  Patient On (Oxygen Delivery Method): room air        Physical Exam:  General Appearance: Appears well, NAD  Respiratory: No labored breathing  CV: Pulse regularly present  Abdomen: Soft, ATTP, incision c/d/i    LABS:                        12.5   6.93  )-----------( 244      ( 21 May 2024 00:45 )             37.0     05-21    131<L>  |  102  |  7   ----------------------------<  160<H>  4.6   |  20<L>  |  0.73    Ca    8.6      21 May 2024 00:45  Phos  3.2     -  Mg     2.00         TPro  6.4  /  Alb  2.8<L>  /  TBili  1.5<H>  /  DBili  0.9<H>  /  AST  163<H>  /  ALT  214<H>  /  AlkPhos  211<H>      PT/INR - ( 20 May 2024 00:30 )   PT: 13.3 sec;   INR: 1.19 ratio         PTT - ( 20 May 2024 00:30 )  PTT:41.5 sec  Urinalysis Basic - ( 21 May 2024 19:00 )    Color: Yellow / Appearance: Clear / S.016 / pH: x  Gluc: x / Ketone: Negative mg/dL  / Bili: Negative / Urobili: 0.2 mg/dL   Blood: x / Protein: Negative mg/dL / Nitrite: Negative   Leuk Esterase: Negative / RBC: 63 /HPF / WBC 1 /HPF   Sq Epi: x / Non Sq Epi: 0 /HPF / Bacteria: Negative /HPF        INs and OUTs:    24 @ 07:01  -  24 @ 07:00  --------------------------------------------------------  IN: 1150 mL / OUT: 1765 mL / NET: -615 mL    24 @ 07:01  -  24 @ 00:17  --------------------------------------------------------  IN: 100 mL / OUT: 1525 mL / NET: -1425 mL

## 2024-05-22 NOTE — PROGRESS NOTE ADULT - PROVIDER SPECIALTY LIST ADULT
Anesthesia
SICU
SICU
Surgery
Gastroenterology
Gastroenterology
SICU
SICU
Nephrology
SICU
SICU
Surgery

## 2024-05-22 NOTE — PROGRESS NOTE ADULT - ASSESSMENT
53 year old male with PMH HTN, CAD s/p CABG and stents 12/2022 on Aspirin and Brilinta, ICM, CHF (EF 35%), DM, GERD presents as transfer from [facility] for acute biliary obstruction choledocholithiasis w possible cholangitis s/p unsuccessful ERCP 2/2 difficult anatomy and periampullary diverticulum. 5/18 S/p EGD/ERCP demonstrating tortuous esophagus and small biliary sphincterotomy w biliary stent placement for choledocholithiasis. S/p lap ashley on 5/20    Plan  - Diet: regular  - Pain control  - Home meds restarted  - Zosyn  - Aspirin and DVT prophylaxis  - Appreciate cardio and GI recs  - Appreciate SICU care  - Discharge home today    B team surgery [id number]

## 2024-05-22 NOTE — PROGRESS NOTE ADULT - SUBJECTIVE AND OBJECTIVE BOX
[facility]   MD DAKOTA HOGAN MD MARIA SANTIAGO, NP    TEL:  OFFICE: [phone]  From 5pm-7am Answering Service [phone]    -- RENAL FOLLOW UP NOTE ---Date of Service 05-22-24 @ 13:18    Patient is a 53y old  Male who presents with a chief complaint of Calculus of bile duct without obstruction, cholangitis, or cholecystitis      Patient seen and examined at bedside. No chest pain/sob    VITALS:  T(F): 99.2 (05-22-24 @ 04:00), Max: 99.2 (05-22-24 @ 00:00)  HR: 84 (05-22-24 @ 08:00)  BP: 125/90 (05-22-24 @ 08:00)  RR: 19 (05-22-24 @ 08:00)  SpO2: 100% (05-22-24 @ 08:00)  Wt(kg): --    05-21 @ 07:01  -  05-22 @ 07:00  --------------------------------------------------------  IN: 100 mL / OUT: 2425 mL / NET: -2325 mL          PHYSICAL EXAM:  General: NAD  Neck: No JVD  Respiratory: CTAB, no wheezes, rales or rhonchi  Cardiovascular: S1, S2, RRR  Gastrointestinal: BS+, soft, NT/ND  Extremities: No peripheral edema    Hospital Medications:   MEDICATIONS  (STANDING):  aspirin  chewable 81 milliGRAM(s) Oral daily  atorvastatin 40 milliGRAM(s) Oral at bedtime  chlorhexidine 2% Cloths 1 Application(s) Topical <User Schedule>  dextrose 10% Bolus 125 milliLiter(s) IV Bolus once  dextrose 5%. 1000 milliLiter(s) (50 mL/Hr) IV Continuous <Continuous>  dextrose 50% Injectable 50 milliLiter(s) IV Push every 15 minutes  dextrose 50% Injectable 25 Gram(s) IV Push once  dextrose 50% Injectable 12.5 Gram(s) IV Push once  dextrose 50% Injectable 25 milliLiter(s) IV Push every 15 minutes  glucagon  Injectable 1 milliGRAM(s) IntraMuscular once  heparin   Injectable 5000 Unit(s) SubCutaneous every 8 hours  insulin glargine Injectable (LANTUS) 28 Unit(s) SubCutaneous at bedtime  insulin lispro (ADMELOG) corrective regimen sliding scale   SubCutaneous Before meals and at bedtime  melatonin 3 milliGRAM(s) Oral at bedtime  metoprolol succinate  milliGRAM(s) Oral daily  polyethylene glycol 3350 17 Gram(s) Oral daily  potassium phosphate / sodium phosphate Powder (PHOS-NaK) 1 Packet(s) Oral every 6 hours  senna 2 Tablet(s) Oral at bedtime  ticagrelor 60 milliGRAM(s) Oral every 12 hours      LABS:  05-22    136  |  101  |  8   ----------------------------<  118<H>  4.0   |  24  |  0.81    Ca    8.5      22 May 2024 01:00  Phos  2.5     05-22  Mg     1.80     05-22    TPro  6.3  /  Alb  2.9<L>  /  TBili  1.2  /  DBili  0.7<H>  /  AST  116<H>  /  ALT  179<H>  /  AlkPhos  199<H>  05-22    Creatinine Trend: 0.81 <--, 0.73 <--, 0.71 <--, 0.69 <--, 0.66 <--, 0.75 <--, 0.69 <--, 0.71 <--, 0.75 <--, 0.75 <--, 0.76 <--, 0.90 <--, 1.02 <--, 1.08 <--, 1.17 <--, 1.33 <--, 2.26 <--, 0.92 <--, 1.13 <--    Phosphorus: 2.5 mg/dL (05-22 @ 01:00)  Albumin: 2.9 g/dL (05-22 @ 01:00)                              12.0   5.97  )-----------( 248      ( 22 May 2024 01:00 )             35.9     Urine Studies:  Urinalysis - [05-22-24 @ 01:00]      Color  / Appearance  / SG  / pH       Gluc 118 / Ketone   / Bili  / Urobili        Blood  / Protein  / Leuk Est  / Nitrite       RBC  / WBC  / Hyaline  / Gran  / Sq Epi  / Non Sq Epi  / Bacteria     Urine Sodium 82      [05-21-24 @ 19:00]  Urine Osmolality 354      [05-21-24 @ 19:00]    TSH 1.23      [12-25-23 @ 07:00]  Lipid: chol 65, TG 58, HDL 29, LDL --      [05-17-24 @ 06:49]        RADIOLOGY & ADDITIONAL STUDIES:

## 2024-05-22 NOTE — DISCHARGE NOTE NURSING/CASE MANAGEMENT/SOCIAL WORK - PATIENT PORTAL LINK FT
You can access the FollowMyHealth Patient Portal offered by [facility] by registering at the following website: [email or web address]. By joining [facility]’s FollowMyHealth portal, you will also be able to view your health information using other applications (apps) compatible with our system.

## 2024-05-22 NOTE — PROGRESS NOTE ADULT - ASSESSMENT
53M hx HTN, DM, CAD sp CABG & stents 12/2022, POBA to OM 11/14/23 and to mLAD 11/27/23, ICM/ CHF, GERD presented to OSH with abdominal pain and SOB. Pt admitted to medicine with choledocholithiasis on with extrahepatic biliary ductal dilatation on CT and MRCP. Cardiology consulted for new TWI in anterior and lateral leads compared to EKG 12/23, Trop elevation noted, downtrending. Admitted to ICU for septic shock. GI consulted for ERCP, CBD was unable to be intubated, +CBD stone seen on cholangiogram, procedure halted after multiple attempts at advancing wire. Patient remained intubated post procedure, requiring pressors, central line/summer placed prior to transfer. Transferred to [facility] SICU for IR consult for PTC. Extubated and weaned off pressors on 5/18. S/p repeat ERCP with sphincterotomy and stent placement on 5/18. S/p laparoscopic cholecystectomy on 5/20.     Interval Events:  -None    Plan:  NEUROLOGIC   - Pain control: Tylenol, oxy prn  - Melatonin qHs    RESPIRATORY   - Monitor SpO2 goal >92%    CARDIOVASCULAR   -H/o CAD sp CABG & stents 12/2022, POBA to OM 11/14/23 and to mLAD 11/27/23, ICM/ CHF (EF 35%) on ASA/Brillinta   -TTE 5/18 EF 76%, grade one diastolic failure  -Lipitor 40qhs  -Home Metoprolol 200qd    GASTROINTESTINAL   - Diet: CC w/ fluid <1000cc/day  - S/p ERCP with sphincterotomy and stent placement on 5/18, s/p lap ashley 5/19  - 5/21 serum amylase 44    /RENAL   - Monitor electrolytes, replete PRN  - Limit PO fluid <1000cc/day  - IVL    HEMATOLOGIC  - Monitor H/H   - DVT ppx: SQH  - ASA   - Brillinta resumed 5/21    INFECTIOUS DISEASE  - Zosyn (5/17- 5/21)   - 517 Blood Cx NGTD     ENDOCRINE  - Monitor Glc/ BHB   - Lantus 28 + NAKIA   - Basaglar 12u + Farxiga at home  - Hgb a1c 6.1 on 5/17    LINES  - IJ CVC ( 5 / 17 )  - Radford ( 5 / 17 )  - PIV     Dispo: Listed

## 2024-05-23 ENCOUNTER — TRANSCRIPTION ENCOUNTER (OUTPATIENT)
Age: 54
End: 2024-05-23

## 2024-05-23 LAB
CULTURE RESULTS: SIGNIFICANT CHANGE UP
CULTURE RESULTS: SIGNIFICANT CHANGE UP
SPECIMEN SOURCE: SIGNIFICANT CHANGE UP
SPECIMEN SOURCE: SIGNIFICANT CHANGE UP

## 2024-05-24 DIAGNOSIS — I10 ESSENTIAL (PRIMARY) HYPERTENSION: ICD-10-CM

## 2024-06-03 LAB — SURGICAL PATHOLOGY STUDY: SIGNIFICANT CHANGE UP

## 2024-06-04 ENCOUNTER — APPOINTMENT (OUTPATIENT)
Dept: SURGERY | Facility: HOSPITAL | Age: 54
End: 2024-06-04

## 2024-06-04 DIAGNOSIS — Z46.89 ENCOUNTER FOR FITTING AND ADJUSTMENT OF OTHER SPECIFIED DEVICES: ICD-10-CM

## 2024-06-14 ENCOUNTER — APPOINTMENT (OUTPATIENT)
Dept: TRAUMA SURGERY | Facility: HOSPITAL | Age: 54
End: 2024-06-14

## 2024-06-14 VITALS
HEIGHT: 69 IN | DIASTOLIC BLOOD PRESSURE: 63 MMHG | TEMPERATURE: 97.9 F | WEIGHT: 143 LBS | BODY MASS INDEX: 21.18 KG/M2 | HEART RATE: 89 BPM | SYSTOLIC BLOOD PRESSURE: 91 MMHG

## 2024-06-14 PROCEDURE — 99024 POSTOP FOLLOW-UP VISIT: CPT

## 2024-06-14 NOTE — PHYSICAL EXAM
[de-identified] : Well ,comfortable. [de-identified] : Soft, nontender, nondistended. The incisions are well healed without signs of erythema, cellulitis or drainage. No palpable hernia formation.

## 2024-06-14 NOTE — HISTORY OF PRESENT ILLNESS
[de-identified] : Mr. Montez has recovered well and denies fevers, chills, abdominal pain, SOB/dyspnea or weakness/fatigue. Tolerating adequate PO intake and GI activity (bowel movements) has recovered without constipation or diarrhea. No complaints regarding incisions and dressings.

## 2024-06-14 NOTE — PLAN
[FreeTextEntry1] : Mr. Montez underwent a laparoscopic cholecystectomy and has recovered well. The incisions are well healed and good PO nutrition is being tolerated. The pathology revealed no evidence of malignancy.   Resume normal activity with gradual resumption of strenuous activity Avoid fatty foods. Low fat diet should diarrhea develop Follow up with me in clinic if yellowing of skin develops or fever/chills and RUQ pain develops.  GI f/u for stent removal  I spent 15min reviewing data, images and information. Greater than 50% of my time was spent in face to face discussion regarding wound healing, postoperative diet and activity.  Hong Villar MD Acute Care Surgery

## 2024-07-02 NOTE — ED ADULT NURSE NOTE - NS ED PATIENT SAFETY CONCERN
53-year-old female referred here by Dr. Small for GERD.   A copy of this note will be sent to the referring provider.   Most recently followed [facility] [place].  Seen 2022 noted to be on Linzess 145 mcg for constipation and Dexilant for GERD.  Linzess was increased to 190 mcg.  Is also noted to have abnormal LFTs in 2022 with , , alk phos 131.  Negative viral hepatitis panel.  JUMANA positive and .  Past medical history of MS, On Dexilant for GERD.  Last seen 2016.  On Linzess. EGD 2016 revealed medium amount of food residue in gastric body otherwise normal. Gastric emptying study 2016 was normal. Repeat gastric emptying study 1/5/2023 was normal. EGD with [facility] June/2020 revealed D1 DeMeester score of 12.7 with D2 DeMeester score 26.32 days score of 20.7.  Negative for H. pylori. CT abdomen and pelvis with contrast 2022 noted moderate hepatic steatosis with enlargement of the liver and small flash filling hemangioma and/or perfusion abnormality within periphery of posterior hepatic segment.  Increase fluid throughout distal small bowel and in the colon with no inflammatory changes or other abnormality most consistent with acute enterocolitis. Complete abdominal ultrasound 1/5/2023 revealed hepatic steatosis. CT abdomen and pelvis without contrast 3/15/2023 revealed fecal retention and diverticulosis.  Liver appears normal.  Stool studies 6/11/2023.  Culture negative. Labs 6/10/2024.  CBC normal with Hgb 14.6.  CMP: AST 95, . No

## 2024-07-24 PROBLEM — E11.9 TYPE 2 DIABETES MELLITUS WITHOUT COMPLICATIONS: Chronic | Status: INACTIVE | Noted: 2022-12-10 | Resolved: 2024-07-24

## 2024-07-31 ENCOUNTER — APPOINTMENT (OUTPATIENT)
Dept: GASTROENTEROLOGY | Facility: HOSPITAL | Age: 54
End: 2024-07-31

## 2024-08-29 PROBLEM — E11.9 TYPE 2 DIABETES MELLITUS WITHOUT COMPLICATIONS: Chronic | Status: ACTIVE | Noted: 2024-07-24

## 2024-08-29 PROBLEM — Z87.891 PERSONAL HISTORY OF NICOTINE DEPENDENCE: Chronic | Status: ACTIVE | Noted: 2024-07-24

## 2024-08-29 PROBLEM — Z86.79 PERSONAL HISTORY OF OTHER DISEASES OF THE CIRCULATORY SYSTEM: Chronic | Status: ACTIVE | Noted: 2024-07-24

## 2024-08-29 PROBLEM — N40.0 BENIGN PROSTATIC HYPERPLASIA WITHOUT LOWER URINARY TRACT SYMPTOMS: Chronic | Status: ACTIVE | Noted: 2024-07-24

## 2024-08-29 PROBLEM — Z98.890 OTHER SPECIFIED POSTPROCEDURAL STATES: Chronic | Status: ACTIVE | Noted: 2024-07-24

## 2024-08-29 PROBLEM — E78.5 HYPERLIPIDEMIA, UNSPECIFIED: Chronic | Status: ACTIVE | Noted: 2024-07-24

## 2024-10-09 ENCOUNTER — APPOINTMENT (OUTPATIENT)
Dept: GASTROENTEROLOGY | Facility: HOSPITAL | Age: 54
End: 2024-10-09

## 2024-10-09 ENCOUNTER — OUTPATIENT (OUTPATIENT)
Dept: OUTPATIENT SERVICES | Facility: HOSPITAL | Age: 54
LOS: 1 days | End: 2024-10-09
Payer: MEDICAID

## 2024-10-09 ENCOUNTER — RESULT REVIEW (OUTPATIENT)
Age: 54
End: 2024-10-09

## 2024-10-09 ENCOUNTER — TRANSCRIPTION ENCOUNTER (OUTPATIENT)
Age: 54
End: 2024-10-09

## 2024-10-09 VITALS
SYSTOLIC BLOOD PRESSURE: 119 MMHG | TEMPERATURE: 97 F | WEIGHT: 149.91 LBS | HEIGHT: 69 IN | RESPIRATION RATE: 16 BRPM | HEART RATE: 77 BPM | OXYGEN SATURATION: 99 % | DIASTOLIC BLOOD PRESSURE: 75 MMHG

## 2024-10-09 VITALS
HEART RATE: 75 BPM | DIASTOLIC BLOOD PRESSURE: 76 MMHG | RESPIRATION RATE: 12 BRPM | OXYGEN SATURATION: 94 % | SYSTOLIC BLOOD PRESSURE: 124 MMHG

## 2024-10-09 DIAGNOSIS — Z90.49 ACQUIRED ABSENCE OF OTHER SPECIFIED PARTS OF DIGESTIVE TRACT: Chronic | ICD-10-CM

## 2024-10-09 DIAGNOSIS — Z46.89 ENCOUNTER FOR FITTING AND ADJUSTMENT OF OTHER SPECIFIED DEVICES: ICD-10-CM

## 2024-10-09 DIAGNOSIS — Z95.1 PRESENCE OF AORTOCORONARY BYPASS GRAFT: Chronic | ICD-10-CM

## 2024-10-09 DIAGNOSIS — Z98.890 OTHER SPECIFIED POSTPROCEDURAL STATES: Chronic | ICD-10-CM

## 2024-10-09 DIAGNOSIS — Z98.61 CORONARY ANGIOPLASTY STATUS: Chronic | ICD-10-CM

## 2024-10-09 LAB — GLUCOSE BLDC GLUCOMTR-MCNC: 246 MG/DL — HIGH (ref 70–99)

## 2024-10-09 PROCEDURE — 88305 TISSUE EXAM BY PATHOLOGIST: CPT | Mod: 26

## 2024-10-09 PROCEDURE — 43264 ERCP REMOVE DUCT CALCULI: CPT

## 2024-10-09 PROCEDURE — 82962 GLUCOSE BLOOD TEST: CPT

## 2024-10-09 PROCEDURE — 74328 X-RAY BILE DUCT ENDOSCOPY: CPT | Mod: 26,GC,59

## 2024-10-09 PROCEDURE — 88342 IMHCHEM/IMCYTCHM 1ST ANTB: CPT

## 2024-10-09 PROCEDURE — 74330 X-RAY BILE/PANC ENDOSCOPY: CPT

## 2024-10-09 PROCEDURE — 88342 IMHCHEM/IMCYTCHM 1ST ANTB: CPT | Mod: 26

## 2024-10-09 PROCEDURE — 43264 ERCP REMOVE DUCT CALCULI: CPT | Mod: GC,59

## 2024-10-09 PROCEDURE — 88305 TISSUE EXAM BY PATHOLOGIST: CPT

## 2024-10-09 PROCEDURE — 43275 ERCP REMOVE FORGN BODY DUCT: CPT

## 2024-10-09 PROCEDURE — 43275 ERCP REMOVE FORGN BODY DUCT: CPT | Mod: GC

## 2024-10-09 PROCEDURE — C1769: CPT

## 2024-10-09 PROCEDURE — 43239 EGD BIOPSY SINGLE/MULTIPLE: CPT

## 2024-10-09 PROCEDURE — 43239 EGD BIOPSY SINGLE/MULTIPLE: CPT | Mod: GC,59

## 2024-10-09 PROCEDURE — 43262 ENDO CHOLANGIOPANCREATOGRAPH: CPT | Mod: GC,59

## 2024-10-09 DEVICE — BLLN EXTRACT FUSION QUATRO 8.5 10 12 15MM: Type: IMPLANTABLE DEVICE | Status: FUNCTIONAL

## 2024-10-09 DEVICE — AUTOTOME CANNULATING SPHINCTEROTOME RX 44 20MM: Type: IMPLANTABLE DEVICE | Status: FUNCTIONAL

## 2024-10-09 DEVICE — CATH BLLN EXTRACT DIST GUIDE WIRE 15MM 3LUM: Type: IMPLANTABLE DEVICE | Status: FUNCTIONAL

## 2024-10-09 DEVICE — HYDRATOME 44: Type: IMPLANTABLE DEVICE | Status: FUNCTIONAL

## 2024-10-09 RX ORDER — SODIUM CHLORIDE IRRIG SOLUTION 0.9 %
500 SOLUTION, IRRIGATION IRRIGATION
Refills: 0 | Status: COMPLETED | OUTPATIENT
Start: 2024-10-09 | End: 2024-10-09

## 2024-10-09 RX ORDER — INDOMETHACIN 25 MG
100 CAPSULE ORAL ONCE
Refills: 0 | Status: DISCONTINUED | OUTPATIENT
Start: 2024-10-09 | End: 2024-10-23

## 2024-10-09 RX ADMIN — Medication 30 MILLILITER(S): at 11:19

## 2024-10-09 NOTE — ASU PATIENT PROFILE, ADULT - FALL HARM RISK - UNIVERSAL INTERVENTIONS
Bed in lowest position, wheels locked, appropriate side rails in place/Call bell, personal items and telephone in reach/Instruct patient to call for assistance before getting out of bed or chair/Non-slip footwear when patient is out of bed/Shirley to call system/Physically safe environment - no spills, clutter or unnecessary equipment/Purposeful Proactive Rounding/Room/bathroom lighting operational, light cord in reach

## 2024-10-09 NOTE — PRE PROCEDURE NOTE - DAY OF PROCEDURE ATTESTATION
Family called for pt less responsive than usual, not eating and drinking well, became weak during a transfer.  Arrives with O2 100% NRB in use.  .  Hx pulmonary fibrosis
I have personally seen, examined, and participated in the care of this patient.

## 2024-10-09 NOTE — PRE PROCEDURE NOTE - RECENT H&P AUTHOR/LOCATION
PILI Booker / [facility] Endoscopy Dr. Ansari / [facility] Endoscopy

## 2024-10-09 NOTE — ASU PATIENT PROFILE, ADULT - NSICDXPASTSURGICALHX_GEN_ALL_CORE_FT
PAST SURGICAL HISTORY:  H/O coronary angioplasty     History of ERCP     S/P CABG (coronary artery bypass graft)     S/P cardiac cath with stent x7    S/P cholecystectomy

## 2024-10-09 NOTE — PRE PROCEDURE NOTE - PRE PROCEDURE EVALUATION
Pre-Endoscopy Evaluation    Attending Physician:  Dr. Kunal Loja    Procedure: EGD + ERCP    Indication for Procedure: Stent removal    PAST MEDICAL & SURGICAL HISTORY:  Hypertension      GERD (gastroesophageal reflux disease)      CAD (coronary artery disease)      History of biliary duct stent placement      BPH (benign prostatic hyperplasia)      T2DM (type 2 diabetes mellitus)      HLD (hyperlipidemia)      History of ischemic cardiomyopathy      Former smoker      S/P CABG (coronary artery bypass graft)      History of ERCP      S/P cholecystectomy      H/O coronary angioplasty      S/P cardiac cath  with stent x7          Allergies    No Known Allergies    Intolerances        Medications: MEDICATIONS  (STANDING):  indomethacin Suppository 100 milliGRAM(s) Rectal once    MEDICATIONS  (PRN):      Pertinent lab data:                      Physical Examination:  Daily Height in cm: 175.26 (09 Oct 2024 11:43)    Daily   Vital Signs Last 24 Hrs  T(C): 35.9 (09 Oct 2024 11:43), Max: 35.9 (09 Oct 2024 10:43)  T(F): 96.7 (09 Oct 2024 10:43), Max: 96.7 (09 Oct 2024 10:43)  HR: 77 (09 Oct 2024 11:43) (77 - 77)  BP: 119/75 (09 Oct 2024 11:43) (119/75 - 119/75)  BP(mean): --  RR: 16 (09 Oct 2024 11:43) (16 - 16)  SpO2: 99% (09 Oct 2024 11:43) (99% - 99%)    Parameters below as of 09 Oct 2024 10:43  Patient On (Oxygen Delivery Method): room air      GENERAL: no acute distress  NEURO: alert  HEENT: NCAT, no conjunctival pallor appreciated  CHEST: no respiratory distress, no accessory muscle use  CARDIAC: regular rate, +S1/S2  ABDOMEN: soft, nontender, no rebound or guarding  EXTREMITIES: warm, well perfused  SKIN: no lesions noted    Comments:    ASA Class: I []  II []  III [X]  IV []    The patient is a suitable candidate for the planned procedure unless box checked [ ]  No, explain:

## 2024-10-09 NOTE — ASU PATIENT PROFILE, ADULT - NSICDXPASTMEDICALHX_GEN_ALL_CORE_FT
PAST MEDICAL HISTORY:  BPH (benign prostatic hyperplasia)     CAD (coronary artery disease)     Former smoker     GERD (gastroesophageal reflux disease)     History of biliary duct stent placement     History of ischemic cardiomyopathy     HLD (hyperlipidemia)     Hypertension     T2DM (type 2 diabetes mellitus)

## 2024-10-09 NOTE — ASU DISCHARGE PLAN (ADULT/PEDIATRIC) - NS MD DC FALL RISK RISK
For information on Fall & Injury Prevention, visit: [email or web address] OR  [email or web address] OR  https://www.cdc.gov/steadi/patient.html

## 2024-10-14 ENCOUNTER — INPATIENT (INPATIENT)
Facility: HOSPITAL | Age: 54
LOS: 1 days | Discharge: HOME | DRG: 920 | End: 2024-10-16
Attending: STUDENT IN AN ORGANIZED HEALTH CARE EDUCATION/TRAINING PROGRAM | Admitting: HOSPITALIST
Payer: MEDICAID

## 2024-10-14 VITALS
RESPIRATION RATE: 18 BRPM | HEIGHT: 69 IN | HEART RATE: 110 BPM | DIASTOLIC BLOOD PRESSURE: 82 MMHG | OXYGEN SATURATION: 100 % | SYSTOLIC BLOOD PRESSURE: 125 MMHG | WEIGHT: 149.91 LBS

## 2024-10-14 DIAGNOSIS — K92.0 HEMATEMESIS: ICD-10-CM

## 2024-10-14 DIAGNOSIS — Z90.49 ACQUIRED ABSENCE OF OTHER SPECIFIED PARTS OF DIGESTIVE TRACT: Chronic | ICD-10-CM

## 2024-10-14 DIAGNOSIS — E78.5 HYPERLIPIDEMIA, UNSPECIFIED: ICD-10-CM

## 2024-10-14 DIAGNOSIS — Z95.1 PRESENCE OF AORTOCORONARY BYPASS GRAFT: Chronic | ICD-10-CM

## 2024-10-14 DIAGNOSIS — K92.1 MELENA: ICD-10-CM

## 2024-10-14 DIAGNOSIS — Z98.890 OTHER SPECIFIED POSTPROCEDURAL STATES: Chronic | ICD-10-CM

## 2024-10-14 DIAGNOSIS — K21.9 GASTRO-ESOPHAGEAL REFLUX DISEASE WITHOUT ESOPHAGITIS: ICD-10-CM

## 2024-10-14 DIAGNOSIS — Z98.61 CORONARY ANGIOPLASTY STATUS: Chronic | ICD-10-CM

## 2024-10-14 DIAGNOSIS — I25.10 ATHEROSCLEROTIC HEART DISEASE OF NATIVE CORONARY ARTERY WITHOUT ANGINA PECTORIS: ICD-10-CM

## 2024-10-14 DIAGNOSIS — I10 ESSENTIAL (PRIMARY) HYPERTENSION: ICD-10-CM

## 2024-10-14 DIAGNOSIS — E11.9 TYPE 2 DIABETES MELLITUS WITHOUT COMPLICATIONS: ICD-10-CM

## 2024-10-14 DIAGNOSIS — K92.2 GASTROINTESTINAL HEMORRHAGE, UNSPECIFIED: ICD-10-CM

## 2024-10-14 LAB
ALBUMIN SERPL ELPH-MCNC: 4.2 G/DL — SIGNIFICANT CHANGE UP (ref 3.3–5)
ALP SERPL-CCNC: 44 U/L — SIGNIFICANT CHANGE UP (ref 40–120)
ALT FLD-CCNC: 24 U/L — SIGNIFICANT CHANGE UP (ref 10–45)
ANION GAP SERPL CALC-SCNC: 17 MMOL/L — SIGNIFICANT CHANGE UP (ref 5–17)
APTT BLD: 28.8 SEC — SIGNIFICANT CHANGE UP (ref 24.5–35.6)
AST SERPL-CCNC: 27 U/L — SIGNIFICANT CHANGE UP (ref 10–40)
BASOPHILS # BLD AUTO: 0 K/UL — SIGNIFICANT CHANGE UP (ref 0–0.2)
BASOPHILS NFR BLD AUTO: 0 % — SIGNIFICANT CHANGE UP (ref 0–2)
BILIRUB SERPL-MCNC: 0.6 MG/DL — SIGNIFICANT CHANGE UP (ref 0.2–1.2)
BLD GP AB SCN SERPL QL: NEGATIVE — SIGNIFICANT CHANGE UP
BUN SERPL-MCNC: 39 MG/DL — HIGH (ref 7–23)
CALCIUM SERPL-MCNC: 8.9 MG/DL — SIGNIFICANT CHANGE UP (ref 8.4–10.5)
CHLORIDE SERPL-SCNC: 96 MMOL/L — SIGNIFICANT CHANGE UP (ref 96–108)
CO2 SERPL-SCNC: 17 MMOL/L — LOW (ref 22–31)
CREAT SERPL-MCNC: 0.77 MG/DL — SIGNIFICANT CHANGE UP (ref 0.5–1.3)
EGFR: 107 ML/MIN/1.73M2 — SIGNIFICANT CHANGE UP
EOSINOPHIL # BLD AUTO: 0 K/UL — SIGNIFICANT CHANGE UP (ref 0–0.5)
EOSINOPHIL NFR BLD AUTO: 0 % — SIGNIFICANT CHANGE UP (ref 0–6)
GAS PNL BLDV: SIGNIFICANT CHANGE UP
GLUCOSE BLDC GLUCOMTR-MCNC: 148 MG/DL — HIGH (ref 70–99)
GLUCOSE BLDC GLUCOMTR-MCNC: 158 MG/DL — HIGH (ref 70–99)
GLUCOSE BLDC GLUCOMTR-MCNC: 222 MG/DL — HIGH (ref 70–99)
GLUCOSE BLDC GLUCOMTR-MCNC: 323 MG/DL — HIGH (ref 70–99)
GLUCOSE SERPL-MCNC: 294 MG/DL — HIGH (ref 70–99)
HCT VFR BLD CALC: 23.9 % — LOW (ref 39–50)
HCT VFR BLD CALC: 28.4 % — LOW (ref 39–50)
HGB BLD-MCNC: 7.4 G/DL — LOW (ref 13–17)
HGB BLD-MCNC: 9.6 G/DL — LOW (ref 13–17)
INR BLD: 1.19 RATIO — HIGH (ref 0.85–1.16)
LACTATE SERPL-SCNC: 1.6 MMOL/L — SIGNIFICANT CHANGE UP (ref 0.5–2)
LIDOCAIN IGE QN: 47 U/L — SIGNIFICANT CHANGE UP (ref 7–60)
LYMPHOCYTES # BLD AUTO: 1.61 K/UL — SIGNIFICANT CHANGE UP (ref 1–3.3)
LYMPHOCYTES # BLD AUTO: 12 % — LOW (ref 13–44)
MANUAL SMEAR VERIFICATION: SIGNIFICANT CHANGE UP
MCHC RBC-ENTMCNC: 26.6 PG — LOW (ref 27–34)
MCHC RBC-ENTMCNC: 28.2 PG — SIGNIFICANT CHANGE UP (ref 27–34)
MCHC RBC-ENTMCNC: 31 GM/DL — LOW (ref 32–36)
MCHC RBC-ENTMCNC: 33.8 GM/DL — SIGNIFICANT CHANGE UP (ref 32–36)
MCV RBC AUTO: 83.3 FL — SIGNIFICANT CHANGE UP (ref 80–100)
MCV RBC AUTO: 86 FL — SIGNIFICANT CHANGE UP (ref 80–100)
METAMYELOCYTES # FLD: 1 % — HIGH (ref 0–0)
MONOCYTES # BLD AUTO: 0.4 K/UL — SIGNIFICANT CHANGE UP (ref 0–0.9)
MONOCYTES NFR BLD AUTO: 3 % — SIGNIFICANT CHANGE UP (ref 2–14)
NEUTROPHILS # BLD AUTO: 11.3 K/UL — HIGH (ref 1.8–7.4)
NEUTROPHILS NFR BLD AUTO: 82 % — HIGH (ref 43–77)
NEUTS BAND # BLD: 2 % — SIGNIFICANT CHANGE UP (ref 0–8)
NRBC # BLD: 0 /100 WBCS — SIGNIFICANT CHANGE UP (ref 0–0)
NRBC # BLD: 0 /100 WBCS — SIGNIFICANT CHANGE UP (ref 0–0)
NT-PROBNP SERPL-SCNC: 262 PG/ML — SIGNIFICANT CHANGE UP (ref 0–300)
OSMOLALITY UR: 644 MOS/KG — SIGNIFICANT CHANGE UP (ref 300–900)
PLAT MORPH BLD: NORMAL — SIGNIFICANT CHANGE UP
PLATELET # BLD AUTO: 165 K/UL — SIGNIFICANT CHANGE UP (ref 150–400)
PLATELET # BLD AUTO: 252 K/UL — SIGNIFICANT CHANGE UP (ref 150–400)
POTASSIUM SERPL-MCNC: 5.1 MMOL/L — SIGNIFICANT CHANGE UP (ref 3.5–5.3)
POTASSIUM SERPL-SCNC: 5.1 MMOL/L — SIGNIFICANT CHANGE UP (ref 3.5–5.3)
PROT SERPL-MCNC: 7 G/DL — SIGNIFICANT CHANGE UP (ref 6–8.3)
PROTHROM AB SERPL-ACNC: 13.7 SEC — HIGH (ref 9.9–13.4)
RBC # BLD: 2.78 M/UL — LOW (ref 4.2–5.8)
RBC # BLD: 3.41 M/UL — LOW (ref 4.2–5.8)
RBC # FLD: 14.7 % — HIGH (ref 10.3–14.5)
RBC # FLD: 15.1 % — HIGH (ref 10.3–14.5)
RBC BLD AUTO: SIGNIFICANT CHANGE UP
RH IG SCN BLD-IMP: POSITIVE — SIGNIFICANT CHANGE UP
SODIUM SERPL-SCNC: 130 MMOL/L — LOW (ref 135–145)
SODIUM UR-SCNC: 97 MMOL/L — SIGNIFICANT CHANGE UP
SURGICAL PATHOLOGY STUDY: SIGNIFICANT CHANGE UP
TROPONIN T, HIGH SENSITIVITY RESULT: 304 NG/L — HIGH (ref 0–51)
TROPONIN T, HIGH SENSITIVITY RESULT: 57 NG/L — HIGH (ref 0–51)
WBC # BLD: 13.45 K/UL — HIGH (ref 3.8–10.5)
WBC # BLD: 7.61 K/UL — SIGNIFICANT CHANGE UP (ref 3.8–10.5)
WBC # FLD AUTO: 13.45 K/UL — HIGH (ref 3.8–10.5)
WBC # FLD AUTO: 7.61 K/UL — SIGNIFICANT CHANGE UP (ref 3.8–10.5)

## 2024-10-14 PROCEDURE — 99223 1ST HOSP IP/OBS HIGH 75: CPT | Mod: GC

## 2024-10-14 PROCEDURE — 99291 CRITICAL CARE FIRST HOUR: CPT

## 2024-10-14 PROCEDURE — 71045 X-RAY EXAM CHEST 1 VIEW: CPT | Mod: 26

## 2024-10-14 PROCEDURE — 99292 CRITICAL CARE ADDL 30 MIN: CPT

## 2024-10-14 PROCEDURE — 99223 1ST HOSP IP/OBS HIGH 75: CPT | Mod: GC,25

## 2024-10-14 PROCEDURE — 43235 EGD DIAGNOSTIC BRUSH WASH: CPT | Mod: GC

## 2024-10-14 PROCEDURE — 99221 1ST HOSP IP/OBS SF/LOW 40: CPT

## 2024-10-14 DEVICE — AUTOTOME CANNULATING SPHINCTEROTOME RX 44 20MM: Type: IMPLANTABLE DEVICE | Status: FUNCTIONAL

## 2024-10-14 DEVICE — BLLN EXTRACT FUSION QUATRO 8.5 10 12 15MM: Type: IMPLANTABLE DEVICE | Status: FUNCTIONAL

## 2024-10-14 RX ORDER — ALCOHOL ANTISEPTIC PADS
25 PADS, MEDICATED (EA) TOPICAL ONCE
Refills: 0 | Status: DISCONTINUED | OUTPATIENT
Start: 2024-10-14 | End: 2024-10-16

## 2024-10-14 RX ORDER — GLUCAGON INJECTION, SOLUTION 0.5 MG/.1ML
1 INJECTION, SOLUTION SUBCUTANEOUS ONCE
Refills: 0 | Status: DISCONTINUED | OUTPATIENT
Start: 2024-10-14 | End: 2024-10-16

## 2024-10-14 RX ORDER — SODIUM CHLORIDE IRRIG SOLUTION 0.9 %
500 SOLUTION, IRRIGATION IRRIGATION
Refills: 0 | Status: DISCONTINUED | OUTPATIENT
Start: 2024-10-14 | End: 2024-10-15

## 2024-10-14 RX ORDER — INSULIN LISPRO 100/ML
VIAL (ML) SUBCUTANEOUS AT BEDTIME
Refills: 0 | Status: DISCONTINUED | OUTPATIENT
Start: 2024-10-14 | End: 2024-10-16

## 2024-10-14 RX ORDER — ALCOHOL ANTISEPTIC PADS
12.5 PADS, MEDICATED (EA) TOPICAL ONCE
Refills: 0 | Status: DISCONTINUED | OUTPATIENT
Start: 2024-10-14 | End: 2024-10-16

## 2024-10-14 RX ORDER — ALCOHOL ANTISEPTIC PADS
15 PADS, MEDICATED (EA) TOPICAL ONCE
Refills: 0 | Status: DISCONTINUED | OUTPATIENT
Start: 2024-10-14 | End: 2024-10-16

## 2024-10-14 RX ORDER — INDOMETHACIN 25 MG
100 CAPSULE ORAL ONCE
Refills: 0 | Status: DISCONTINUED | OUTPATIENT
Start: 2024-10-14 | End: 2024-10-14

## 2024-10-14 RX ORDER — INSULIN LISPRO 100/ML
VIAL (ML) SUBCUTANEOUS
Refills: 0 | Status: DISCONTINUED | OUTPATIENT
Start: 2024-10-14 | End: 2024-10-16

## 2024-10-14 RX ORDER — PANTOPRAZOLE SODIUM 40 MG/1
80 TABLET, DELAYED RELEASE ORAL ONCE
Refills: 0 | Status: COMPLETED | OUTPATIENT
Start: 2024-10-14 | End: 2024-10-14

## 2024-10-14 RX ORDER — PANTOPRAZOLE SODIUM 40 MG/1
40 TABLET, DELAYED RELEASE ORAL
Refills: 0 | Status: DISCONTINUED | OUTPATIENT
Start: 2024-10-14 | End: 2024-10-16

## 2024-10-14 RX ORDER — SODIUM CHLORIDE IRRIG SOLUTION 0.9 %
1000 SOLUTION, IRRIGATION IRRIGATION
Refills: 0 | Status: DISCONTINUED | OUTPATIENT
Start: 2024-10-14 | End: 2024-10-16

## 2024-10-14 RX ORDER — TICAGRELOR 60 MG/1
90 TABLET ORAL EVERY 12 HOURS
Refills: 0 | Status: DISCONTINUED | OUTPATIENT
Start: 2024-10-14 | End: 2024-10-16

## 2024-10-14 RX ORDER — TICAGRELOR 60 MG/1
60 TABLET ORAL EVERY 12 HOURS
Refills: 0 | Status: DISCONTINUED | OUTPATIENT
Start: 2024-10-14 | End: 2024-10-14

## 2024-10-14 RX ORDER — TAMSULOSIN HCL 0.4 MG
0.4 CAPSULE ORAL AT BEDTIME
Refills: 0 | Status: DISCONTINUED | OUTPATIENT
Start: 2024-10-14 | End: 2024-10-16

## 2024-10-14 RX ORDER — ASPIRIN 325 MG
81 TABLET ORAL DAILY
Refills: 0 | Status: DISCONTINUED | OUTPATIENT
Start: 2024-10-14 | End: 2024-10-15

## 2024-10-14 RX ORDER — ATORVASTATIN CALCIUM 10 MG/1
40 TABLET, FILM COATED ORAL AT BEDTIME
Refills: 0 | Status: DISCONTINUED | OUTPATIENT
Start: 2024-10-14 | End: 2024-10-16

## 2024-10-14 RX ORDER — INSULIN LISPRO 100/ML
VIAL (ML) SUBCUTANEOUS EVERY 6 HOURS
Refills: 0 | Status: DISCONTINUED | OUTPATIENT
Start: 2024-10-14 | End: 2024-10-14

## 2024-10-14 RX ADMIN — Medication 2: at 21:15

## 2024-10-14 RX ADMIN — Medication 2: at 11:22

## 2024-10-14 RX ADMIN — TICAGRELOR 90 MILLIGRAM(S): 60 TABLET ORAL at 17:44

## 2024-10-14 RX ADMIN — PANTOPRAZOLE SODIUM 80 MILLIGRAM(S): 40 TABLET, DELAYED RELEASE ORAL at 06:19

## 2024-10-14 RX ADMIN — Medication 0.4 MILLIGRAM(S): at 21:00

## 2024-10-14 RX ADMIN — Medication 81 MILLIGRAM(S): at 17:45

## 2024-10-14 RX ADMIN — PANTOPRAZOLE SODIUM 40 MILLIGRAM(S): 40 TABLET, DELAYED RELEASE ORAL at 17:45

## 2024-10-14 RX ADMIN — ATORVASTATIN CALCIUM 40 MILLIGRAM(S): 10 TABLET, FILM COATED ORAL at 21:00

## 2024-10-14 NOTE — PATIENT PROFILE ADULT - NSPROPTRIGHTCAREGIVER_GEN_A_NUR
Spoke with pt and given instruction for stress test appt at 11:30am tomorrow. Pt verbalizing understanding.  
declines

## 2024-10-14 NOTE — ED PROVIDER NOTE - PHYSICAL EXAMINATION
GENERAL: Awake, alert, uncomfortable appearing.   HEENT: NC/AT, moist mucous membranes, PERRL, EOMI  LUNGS: CTAB, no wheezes or crackles   CARDIAC: RRR, no m/r/g  ABDOMEN: Soft, non tender, non distended, no rebound, no guarding  BACK: No midline spinal tenderness, no CVA tenderness  EXT: No edema, no calf tenderness, 2+ DP pulses bilaterally, no deformities.  NEURO: A&Ox3. Moving all extremities.  SKIN: Warm and dry. No rash. slightly jaundiced, mild scleral icterus   PSYCH: Normal affect. see attg note

## 2024-10-14 NOTE — H&P ADULT - NSHPPHYSICALEXAM_GEN_ALL_CORE
VITALS:   Vital Signs Last 24 Hrs  T(C): 36 (14 Oct 2024 09:32), Max: 37.1 (14 Oct 2024 06:12)  T(F): 96.8 (14 Oct 2024 09:32), Max: 98.7 (14 Oct 2024 06:12)  HR: 74 (14 Oct 2024 09:32) (74 - 115)  BP: 92/60 (14 Oct 2024 09:32) (92/60 - 125/82)  BP(mean): --  RR: 18 (14 Oct 2024 09:32) (18 - 21)  SpO2: 98% (14 Oct 2024 09:32) (98% - 100%)    Parameters below as of 14 Oct 2024 09:32  Patient On (Oxygen Delivery Method): room air        GENERAL: NAD, lying in bed comfortably  HEAD:  Atraumatic, normocephalic  EYES: EOMI, PERRLA, conjunctiva and sclera clear  ENT: Moist mucous membranes  NECK: Supple, no JVD  HEART: S1, S2, regular rate and rhythm, no murmurs, rubs, or gallops  LUNGS: Unlabored respirations.  Clear to auscultation bilaterally, no crackles, wheezing, or rhonchi  ABDOMEN: Soft, nontender, nondistended, +BS  EXTREMITIES: 2+ peripheral pulses bilaterally. No clubbing, cyanosis, or edema  NERVOUS SYSTEM:  A&Ox3, no focal deficits   SKIN: No rashes or lesions

## 2024-10-14 NOTE — H&P ADULT - PROBLEM SELECTOR PLAN 1
#Hematemesis  - Most likely 2/2 UGIB, possibly from recent ERCP   - GI on board, plan for EGD  - Transfusing pRBC #Hematemesis  - Most likely 2/2 UGIB, possibly from recent ERCP   - GI on board, plan for EGD today  - S/p 2  pRBC transfusions  - Check post-transfusion CBC  - Transfuse as needed <7  - F/u EGD results #Hematemesis  - Most likely 2/2 UGIB, possibly from recent ERCP   - GI on board, plan for EGD today  - S/p 2  pRBC transfusions  - Check post-transfusion CBC  - Transfuse as needed, goal >=8  - F/u EGD results

## 2024-10-14 NOTE — CONSULT NOTE ADULT - NS ATTEND AMEND GEN_ALL_CORE FT
Known patient to our service. Now pw mild hyponatremia. Will monitor while inpatient. Can send urine na, urine osm

## 2024-10-14 NOTE — PRE-OP CHECKLIST - LATEX ALLERGY
CERTIFICATE OF RETURN TO WORK    August 17, 2017      Re:     Freda Norman  [address] [place] [address]                        This is to certify that Freda Norman has been under my care, please excuse from work 8/17/17.           SIGNATURE:___________________________________________,   8/17/2017          DEENA Gaytan  [address] [address] [phone]       no

## 2024-10-14 NOTE — CONSULT NOTE ADULT - SUBJECTIVE AND OBJECTIVE BOX
[facility]   MD DAKOTA HOGAN MD SAKIL BHUIYAN, MD BRIANA PETITO, NP    TEL:  FROM 9 AM to 5 PM ---OFFICE: [phone]  FROM 5 PM - 9 AM PLEASE CALL ANSWERING SERVICE: [phone]   RENAL INITIAL CONSULT NOTE: DATE OF SERVICE 10-14-24 @ 13:00     HPI: Shaikh Melida is a 54M w/ PMH HTN, CAD s/p CABG and 7 stents 2022 on Aspirin and Brilinta, ICM, CHF (EF 35%), DM, GERD, cardiac bypass here for 6 days of progressively worsening cp w/ sxs worsening at 11am on . Patient reports noted dark stool since Thursday, 2x daily BM's. Also had 3 episodes of hematemesis yesterday. Last emesis and BM was night of 10/13. Decreased PO due to decreased appetite. Also had chest pain that improved since arrival to ED. No SOB, fever, chills or abdominal pain. Of note, recent ERCP 10/9 with biliary stent removal and extension sphincterotomy, concerning for post sphincterotomy bleed. Nephrology consulted for Hyponatremia.     PMH:  Above    PSH:  ERCP with biliary stent removal and extension sphincterotomy  Coronary bypass in   7 stents, last stent in 2024    MEDS:  Below    ALLERGIES:  None    FH:  Father  from heart attack    SH:  Lives in [facility] w/ family.   Works in Real Estate  No smoking, drinking, or alcohol    CHART REVIEW:  2024: Hospitalized in acute biliary obstruction choledocholithiasis w possible cholangitis s/p unsuccessful ERCP 2/2 difficult anatomy and periampullary diverticulum.  S/p EGD/ERCP demonstrating tortuous esophagus and small biliary sphincterotomy w biliary stent placement for choledocholithiasis. S/p lap ashley on .    2023: Pt has been having angina 2-3x/week, described as chest tightness that lasts ~30min. Pt also reports +orthopnea, decreased exercise tolerance for the past month as well.   denies fever, chills, cough, palpitation, abd pain, n/v/d, or urinary sx. S/p cardiac cath : POBA to OM1 via RFA s/p manual pull/compression. Plan to return in 1-2 weeks for  LAD and D1    2023: Presented with CP to ED today and sent directly to cath with Ekg changes and trop of 738    - Successful PCi with CHERRI to the proximal OM1 and balloon of the distal  OM1.    - Successful POBA LIMA to LAD  - cardiac cath with one stent to the OM1.       2023: Admitted with NSTEMI, received ASA load, Brilinta load, s/p Heparin gtt, BB, statin - Trop 412- > 453-> 366.   ROYAL not in concordant leads. ST depressions seen, s/p cardiac cath ( diagnostic) on 4/3 shows significant progression of coronaries dz and occlusion of all grafts, LIMA occluded at distal anastomosis to LAD with diffuse distal LAD disease, MRI indicates viability.  Followed by cards, Lisinopril held per cards, changed to Entresto after 36hrs, Added Valsartan.  s/p CABG 3 months ago.  On  pt underwent PCI with CHERRI to OM1 via RRA by Dr. Amador and  PCI to LIMA.      IN THE ED:  Hgb found to be 7.4. 2 units of pRBC ordered. BCx and CXR ordered. GI consulted. (14 Oct 2024 10:10)      Allergies:  No Known Allergies      PAST MEDICAL & SURGICAL HISTORY:  Hypertension      GERD (gastroesophageal reflux disease)      CAD (coronary artery disease)      History of biliary duct stent placement      BPH (benign prostatic hyperplasia)      T2DM (type 2 diabetes mellitus)      HLD (hyperlipidemia)      History of ischemic cardiomyopathy      Former smoker      S/P CABG (coronary artery bypass graft)      History of ERCP      S/P cholecystectomy      H/O coronary angioplasty      S/P cardiac cath  with stent x7          Home Medications Reviewed    Hospital Medications:   MEDICATIONS  (STANDING):  aspirin enteric coated 81 milliGRAM(s) Oral daily  atorvastatin 40 milliGRAM(s) Oral at bedtime  dextrose 5%. 1000 milliLiter(s) (50 mL/Hr) IV Continuous <Continuous>  dextrose 5%. 1000 milliLiter(s) (100 mL/Hr) IV Continuous <Continuous>  dextrose 50% Injectable 25 Gram(s) IV Push once  dextrose 50% Injectable 25 Gram(s) IV Push once  dextrose 50% Injectable 12.5 Gram(s) IV Push once  glucagon  Injectable 1 milliGRAM(s) IntraMuscular once  insulin lispro (ADMELOG) corrective regimen sliding scale   SubCutaneous at bedtime  insulin lispro (ADMELOG) corrective regimen sliding scale   SubCutaneous three times a day before meals  lactated ringers. 500 milliLiter(s) (30 mL/Hr) IV Continuous <Continuous>  pantoprazole  Injectable 40 milliGRAM(s) IV Push two times a day  tamsulosin 0.4 milliGRAM(s) Oral at bedtime  ticagrelor 90 milliGRAM(s) Oral every 12 hours      SOCIAL HISTORY:  Denies ETOh, Smoking,     FAMILY HISTORY:  FH: heart disease (Father)        REVIEW OF SYSTEMS:  CONSTITUTIONAL: SEE HPI   EYES/ENT: No visual changes;  No vertigo or throat pain   NECK: No pain or stiffness  RESPIRATORY: No cough, wheezing, hemoptysis; No shortness of breath  CARDIOVASCULAR: SEE HPI   GASTROINTESTINAL: SEE HPI   GENITOURINARY: No dysuria, frequency, foamy urine, urinary urgency, incontinence or hematuria  NEUROLOGICAL: No numbness or weakness  SKIN: No itching, burning, rashes, or lesions   VASCULAR: No bilateral lower extremity edema.   All other review of systems is negative unless indicated above.    VITALS:  T(F): 96.9 (10-14-24 @ 15:56), Max: 98.7 (10-14-24 @ 06:12)  HR: 100 (10-14-24 @ 16:26)  BP: 98/64 (10-14-24 @ 16:26)  RR: 17 (10-14-24 @ 16:26)  SpO2: 99% (10-14-24 @ 16:26)  Wt(kg): --    10-14 @ 07:01  -  10-14 @ 16:31  --------------------------------------------------------  IN: 0 mL / OUT: 350 mL / NET: -350 mL      Height (cm): 175.3 (10-14 @ 15:49)  Weight (kg): 67 (10-14 @ 15:49)  BMI (kg/m2): 21.8 (10-14 @ 15:49)  BSA (m2): 1.82 (10-14 @ 15:49)    PHYSICAL EXAM:  Constitutional: NAD  HEENT: anicteric sclera, oropharynx clear, MMM  Neck: No JVD  Respiratory: CTAB, no wheezes, rales or rhonchi  Cardiovascular: S1, S2, RRR  Gastrointestinal: BS+, soft, NT/ND  Extremities: No cyanosis or clubbing. No peripheral edema  Neurological: A/O x 3, no focal deficits  Psychiatric: Normal mood, normal affect  : No CVA tenderness. No ledbetter.   Skin: No rashes  Vascular Access: n/a     LABS:  10-14    130[L]  |  96  |  39[H]  ----------------------------<  294[H]  5.1   |  17[L]  |  0.77    Ca    8.9      14 Oct 2024 06:40    TPro  7.0  /  Alb  4.2  /  TBili  0.6  /  DBili      /  AST  27  /  ALT  24  /  AlkPhos  44  10-14    Creatinine Trend: 0.77 <--                        7.4    13.45 )-----------( 252      ( 14 Oct 2024 06:40 )             23.9     Urine Studies:  Urinalysis Basic - ( 14 Oct 2024 06:40 )    Color:  / Appearance:  / SG:  / pH:   Gluc: 294 mg/dL / Ketone:   / Bili:  / Urobili:    Blood:  / Protein:  / Nitrite:    Leuk Esterase:  / RBC:  / WBC    Sq Epi:  / Non Sq Epi:  / Bacteria:       Osmolality, Random Urine: 644 mos/kg (10-14 @ 14:33)  Sodium, Random Urine: 97 mmol/L (10-14 @ 14:33)      RADIOLOGY & ADDITIONAL STUDIES:

## 2024-10-14 NOTE — ED PROVIDER NOTE - DATE/TIME 3
Refill passed per [facility]3], [facility] protocol.     Requested Prescriptions   Pending Prescriptions Disp Refills   • ALENDRONATE 70 MG Oral Tab [Pharmacy Med Name: Alendronate Sodium 70 Mg Tab Nort] 12 tablet 0     Sig: TAKE ONE TABLET BY MOUTH WEEKLY       Ost
14-Oct-2024 08:02

## 2024-10-14 NOTE — H&P ADULT - NSHPLABSRESULTS_GEN_ALL_CORE
7.4    13.45 )-----------( 252      ( 14 Oct 2024 06:40 )             23.9     10-14    130[L]  |  96  |  39[H]  ----------------------------<  294[H]  5.1   |  17[L]  |  0.77    Ca    8.9      14 Oct 2024 06:40    TPro  7.0  /  Alb  4.2  /  TBili  0.6  /  DBili  x   /  AST  27  /  ALT  24  /  AlkPhos  44  10-14 7.4    13.45 )-----------( 252      ( 14 Oct 2024 06:40 )             23.9     10-14    130[L]  |  96  |  39[H]  ----------------------------<  294[H]  5.1   |  17[L]  |  0.77    Ca    8.9      14 Oct 2024 06:40    TPro  7.0  /  Alb  4.2  /  TBili  0.6  /  DBili  x   /  AST  27  /  ALT  24  /  AlkPhos  44  10-14    < from: Xray Chest 1 View- PORTABLE-Urgent (10.14.24 @ 07:30) >    FINDINGS:  Midline sternotomy.  The heart size is normal.  The lungs are clear.  There is no pneumothorax or pleural effusion.  There are no acute osseous abnormalities.    IMPRESSION:  Clear lungs.    < end of copied text >

## 2024-10-14 NOTE — CONSULT NOTE ADULT - ASSESSMENT
54M w/ hx of cardiac bypass, 7 stents last 03/2024 follows w/ cards at [facility] here for 6 days of progressively worsening cp w/ sxs worsening at 11am on sunday. Found to have low hgb with report of melena and hematemesis. S/p recent ERCP 10/9 with biliary stent removal and extension sphincterotomy, concerning for post sphincterotomy bleed.     Assessment  #Melena  #Hematemesis  #Concern for post sphincterotomy bleed s/p recent ERCP 10/9 with biliary stent removal and extension sphincterotomy  #Chest pain  - Hemodynamically stable currently, but with significant drop in hgb with active GIB  - Chest pain possibly due to demand ischemia. However, with significant hx of cardiac disease  - Last dose of Brilinta 10/13. Has not been taking Farxiga for the past month.     Recommendations  - Please transfuse 2U PRBC in preparation for tentative EGD/ERCP (today vs. tomorrow pending optimization and clearance)  - Keep NPO for now  - Please obtain cardiac clearance given chest pain with past cardiac hx (mildly elevated trop x 1 on admission)  - Continue to hold Brilinta if no medical contraindications  - S/p PPI 80 IVP x 1, continue PPI IV BID for now  - Trend CBC and CMP    D/w Dr. Tono Ansari  GI/Hepatology Fellow    MONDAY-FRIDAY 8AM-5PM:  Pager# [phone] ([facility]) or [phone] ([facility])    NON-URGENT CONSULTS:  Please email [email or web address] OR [email or web address]  AT NIGHT AND ON WEEKENDS:  Contact on-call GI fellow via [employer] by paging or hospital  from 5pm-8am and on weekends/holidays   54M w/ hx of cardiac bypass, 7 stents last 03/2024 follows w/ cards at [facility] here for 6 days of progressively worsening cp w/ sxs worsening at 11am on sunday. Found to have low hgb with report of melena and hematemesis. S/p recent ERCP 10/9 with biliary stent removal and extension sphincterotomy, concerning for post sphincterotomy bleed.     Assessment  #Melena  #Hematemesis  #Concern for post sphincterotomy bleed s/p recent ERCP 10/9 with biliary stent removal and extension sphincterotomy  #Chest pain  - Hemodynamically stable currently, but with significant drop in hgb with active GIB  - Chest pain possibly due to demand ischemia. However, with significant hx of cardiac disease  - Last dose of Brilinta 10/13. Has not been taking Farxiga for the past month.     Recommendations  - Finish 2U PRBC transfusion in preparation for urgent EGD/ERCP for suspected post-sphincterotomy bleeding  - Keep NPO for now  - Pending cardiac clearance given chest pain with past cardiac hx (mildly elevated trop x 1 on admission)  - Continue to hold Brilinta if no medical contraindications  - S/p PPI 80 IVP x 1, continue PPI IV BID for now  - Trend CBC and CMP    D/w Dr. Tono Ansari  GI/Hepatology Fellow    MONDAY-FRIDAY 8AM-5PM:  Pager# [phone] ([facility]) or [phone] ([facility])    NON-URGENT CONSULTS:  Please email [email or web address]  AT NIGHT AND ON WEEKENDS:  Contact on-call GI fellow via [employer] by paging or hospital  from 5pm-8am and on weekends/holidays   54M w/ hx of cardiac bypass, 7 stents last 03/2024 follows w/ cards at [facility] here for 6 days of progressively worsening cp w/ sxs worsening at 11am on sunday. Found to have low hgb with report of melena and hematemesis. S/p recent ERCP 10/9 with biliary stent removal and extension sphincterotomy, concerning for post sphincterotomy bleed.     Assessment  #Melena  #Hematemesis  #Concern for post sphincterotomy bleed s/p recent ERCP 10/9 with biliary stent removal and extension sphincterotomy  #Chest pain  - Hemodynamically stable currently, but with significant drop in hgb with active GIB  - Chest pain possibly due to demand ischemia. However, with significant hx of cardiac disease  - Last dose of Brilinta 10/13. Has not been taking Farxiga for the past month.     Recommendations  - Finish 2U PRBC transfusion in preparation for urgent/emergent EGD/ERCP for suspected post-sphincterotomy bleeding  - Keep NPO for now  - Pending cardiac clearance given chest pain with past cardiac hx (mildly elevated trop x 1 on admission)  - Continue to hold Brilinta if no medical contraindications  - S/p PPI 80 IVP x 1, continue PPI IV BID for now  - Trend CBC and CMP    D/w Dr. Tono Ansari  GI/Hepatology Fellow    MONDAY-FRIDAY 8AM-5PM:  Pager# [phone] ([facility]) or [phone] ([facility])    NON-URGENT CONSULTS:  Please email [email or web address] OR [email or web address]  AT NIGHT AND ON WEEKENDS:  Contact on-call GI fellow via [employer] by paging or hospital  from 5pm-8am and on weekends/holidays

## 2024-10-14 NOTE — ED PROVIDER NOTE - HOW PATIENT ADDRESSED, PROFILE
"Anesthesia Transfer of Care Note    Patient: Shannan Figueredo    Procedure(s) Performed: Procedure(s) (LRB):  AMPUTATION, BELOW KNEE / LEFT (Left)    Patient location: PACU    Anesthesia Type: general    Transport from OR: Transported from OR on 6-10 L/min O2 by face mask with adequate spontaneous ventilation    Post pain: adequate analgesia    Post assessment: no apparent anesthetic complications and tolerated procedure well    Post vital signs: stable    Level of consciousness: awake    Nausea/Vomiting: no nausea/vomiting    Complications: none    Transfer of care protocol was followed      Last vitals:   Visit Vitals  /54   Pulse 65   Temp 36.6 °C (97.8 °F) (Skin)   Resp 16   Ht 5' 5" (1.651 m)   Wt 61.2 kg (134 lb 14.7 oz)   SpO2 100%   BMI 22.45 kg/m²     " Mr Montez

## 2024-10-14 NOTE — H&P ADULT - NSHPREVIEWOFSYSTEMS_GEN_ALL_CORE
CONSTITUTIONAL: No weakness, fevers or chills  EYES/ENT: No visual changes;  No vertigo or throat pain   NECK: No pain or stiffness  RESPIRATORY: No cough, wheezing, hemoptysis; No shortness of breath  CARDIOVASCULAR: No chest pain or palpitations. No orthopnea, paroxysmal nocturnal dyspnea, or lower extremity edema  GASTROINTESTINAL: No abdominal or epigastric pain. No nausea, vomiting, or hematemesis; No diarrhea or constipation. No melena or hematochezia.  GENITOURINARY: No dysuria, frequency or hematuria  NEUROLOGICAL: No numbness, weakness, tingling. No headache, no visual changes  SKIN: No itching, rashes  [ ] All other systems negative  [ ] Unable to assess ROS because ________ CONSTITUTIONAL: No weakness, fevers or chills  EYES/ENT: No visual changes;  No vertigo or throat pain   NECK: No pain or stiffness  RESPIRATORY: No cough, wheezing, hemoptysis; No shortness of breath  CARDIOVASCULAR: No chest pain or palpitations. No orthopnea, paroxysmal nocturnal dyspnea, or lower extremity edema  GASTROINTESTINAL: No abdominal or epigastric pain. No nausea, vomiting, or hematemesis; No diarrhea or constipation. No melena or hematochezia.  GENITOURINARY: No dysuria, frequency or hematuria  NEUROLOGICAL: No numbness, weakness, tingling. No headache, no visual changes  SKIN: No itching, rashes

## 2024-10-14 NOTE — ED PROVIDER NOTE - DATE/TIME 1
14-Oct-2024 06:51 Purse String (Intermediate) Text: Given the location of the defect and the characteristics of the surrounding skin a pursestring intermediate closure was deemed most appropriate.  Undermining was performed circumfirentially around the surgical defect.  A purstring suture was then placed and tightened.

## 2024-10-14 NOTE — PRE PROCEDURE NOTE - PRE PROCEDURE EVALUATION
Called and confirmed appointment for tomorrow at 10:30 AM. Will let Heather know via email.   
Attending Physician: Dr Power                            Procedure: ERCP     Indication for Procedure: post sphincterotomy bleed s/p recent ERCP 10/9 with biliary stent removal and extension sphincterotomy.  ________________________________________________________  PAST MEDICAL & SURGICAL HISTORY:  Hypertension      GERD (gastroesophageal reflux disease)      CAD (coronary artery disease)      History of biliary duct stent placement      BPH (benign prostatic hyperplasia)      T2DM (type 2 diabetes mellitus)      HLD (hyperlipidemia)      History of ischemic cardiomyopathy      Former smoker      S/P CABG (coronary artery bypass graft)      History of ERCP      S/P cholecystectomy      H/O coronary angioplasty      S/P cardiac cath  with stent x7        ALLERGIES:  No Known Allergies    HOME MEDICATIONS:  aspirin 81 mg oral tablet: orally once a day  atorvastatin 40 mg oral tablet: 1 tab(s) orally once a day  Basaglar KwikPen 100 units/mL subcutaneous solution: 6 unit(s) subcutaneous once a day (at bedtime)  Brilinta (ticagrelor) 90 mg oral tablet: 1 tab(s) orally 2 times a day  ezetimibe 10 mg oral tablet: 1 tab(s) orally once a day  folic acid: 1 tab(s) orally once a day  isosorbide dinitrate 30 mg oral tablet: 1 tab(s) orally once a day  Janumet 50 mg-1000 mg oral tablet: 1 tab(s) orally 2 times a day  metoprolol succinate 200 mg oral capsule, extended release: 1 cap(s) orally once a day  omeprazole 20 mg oral delayed release tablet: 1 tab(s) orally once a day  sacubitril-valsartan 24 mg-26 mg oral tablet: 1 tab(s) orally once a day  tamsulosin 0.4 mg oral capsule: 1 cap(s) orally once a day  Vascepa 1 g oral capsule: 2 cap(s) orally 2 times a day    AICD/PPM: [ ] yes   [x] no    PERTINENT LAB DATA:                        7.4    13.45 )-----------( 252      ( 14 Oct 2024 06:40 )             23.9     10-14    130[L]  |  96  |  39[H]  ----------------------------<  294[H]  5.1   |  17[L]  |  0.77    Ca    8.9      14 Oct 2024 06:40    TPro  7.0  /  Alb  4.2  /  TBili  0.6  /  DBili  x   /  AST  27  /  ALT  24  /  AlkPhos  44  10-14    PT/INR - ( 14 Oct 2024 06:40 )   PT: 13.7 sec;   INR: 1.19 ratio         PTT - ( 14 Oct 2024 06:40 )  PTT:28.8 sec            PHYSICAL EXAMINATION:    Height (cm): 175.3  Weight (kg): 67  BMI (kg/m2): 21.8  BSA (m2): 1.82T(C): 36.8  HR: 70  BP: 108/72  RR: 18  SpO2: 98%    Constitutional: NAD    Neck:  No JVD  Respiratory: normal effort   Cardiovascular: RRR  Extremities: No peripheral edema  Neurological: A/O x 4, no focal deficits        COMMENTS:    The patient is a suitable candidate for the planned procedure unless box checked [ ]  No, explain:

## 2024-10-14 NOTE — CONSULT NOTE ADULT - ATTENDING COMMENTS
AS above  54M w/ hx of cardiac bypass, 7 stents last 03/2024 follows w/ cards at [facility] here for 6 days of progressively worsening cp  Reports anemia and hematemesis since ERCP last week which was done for biliary stent removal  C/f post sphincterotomy bleed  Will plan for EGD/ERCP now for hemostasis    Thank you for this interesting consult.  Please call the advanced GI service with any questions or concerns.
55 yo man with extensive CAD/CABG, HTN, mildly decreased LV function. Presents with likely upper GI bleed with melena, profound anemia, and chest discomfort. EKG shows STachy with ST depressions; compared to prior, heart rate is faster and ST/T abn's are now more pronounced. Slight elevation in first trop. No active CP. He was on Brillinta and aspirin at home. Plan is for emergent EGD. Clearly, patient is at elevated CV risk to undergo any procedure; however, it appears that the EGD is emergent to control his bleeding. The best therapy for him is transfusions and localizing the source of bleeding. He likely has an element of demand ischemia (type II MI). There is no indication for emergent cardiac catheterization at this time. Would hold off on bblockers in acute phase, as he needs his HR to keep up with the profound anemia. When GI team is comfortable, suggest restarting at least aspirin, if not DAPT. An echocardiogram would be helpful with in the next few days.

## 2024-10-14 NOTE — CONSULT NOTE ADULT - SUBJECTIVE AND OBJECTIVE BOX
HPI:  SHAIKH FAMILIA is a 54M w/ hx of cardiac bypass, 7 stents last 03/2024 follows w/ cards at [facility] here for 6 days of progressively worsening cp w/ sxs worsening at 11am on sunday. Found to have low hgb with report of melena and hematemesis. S/p recent ERCP 10/9 with biliary stent removal and extension sphincterotomy, concerning for post sphincterotomy bleed.     Patient reports noted dark stool since Thursday, 2x daily BM's and last BM was last night. Also had 3 episodes of hematemesis yesterday, no further episodes today. Decreased PO due to decreased appetite. Also had chest pain that improved since arrival to ED. No SOB, fever, chills or abdominal pain.     PMHX/PSHX:    No pertinent past medical history    Hypertension    Diabetes mellitus    GERD (gastroesophageal reflux disease)    CAD (coronary artery disease)    History of biliary duct stent placement    BPH (benign prostatic hyperplasia)    T2DM (type 2 diabetes mellitus)    HLD (hyperlipidemia)    History of ischemic cardiomyopathy    Former smoker    No significant past surgical history    No significant past surgical history    S/P CABG (coronary artery bypass graft)    History of ERCP    S/P cholecystectomy    H/O coronary angioplasty    S/P cardiac cath      Allergies:  No Known Allergies      Home Medications: reviewed  Hospital Medications:  dextrose 5%. 1000 milliLiter(s) IV Continuous <Continuous>  dextrose 5%. 1000 milliLiter(s) IV Continuous <Continuous>  dextrose 50% Injectable 25 Gram(s) IV Push once  dextrose 50% Injectable 12.5 Gram(s) IV Push once  dextrose 50% Injectable 25 Gram(s) IV Push once  dextrose Oral Gel 15 Gram(s) Oral once PRN  glucagon  Injectable 1 milliGRAM(s) IntraMuscular once  insulin lispro (ADMELOG) corrective regimen sliding scale   SubCutaneous every 6 hours  pantoprazole  Injectable 40 milliGRAM(s) IV Push two times a day        PHYSICAL EXAM:   Vital Signs:  Vital Signs Last 24 Hrs  T(C): 36 (14 Oct 2024 09:32), Max: 37.1 (14 Oct 2024 06:12)  T(F): 96.8 (14 Oct 2024 09:32), Max: 98.7 (14 Oct 2024 06:12)  HR: 74 (14 Oct 2024 09:32) (74 - 115)  BP: 92/60 (14 Oct 2024 09:32) (92/60 - 125/82)  BP(mean): --  RR: 18 (14 Oct 2024 09:32) (18 - 21)  SpO2: 98% (14 Oct 2024 09:32) (98% - 100%)    Parameters below as of 14 Oct 2024 09:32  Patient On (Oxygen Delivery Method): room air      Daily Height in cm: 175.26 (14 Oct 2024 05:49)    Daily     GENERAL: no acute distress at rest  NEURO: alert and oriented x 3  HEENT: NCAT, anicteric sclera  CHEST: no respiratory distress, no accessory muscle use  CARDIAC: regular rate, +S1/S2  ABDOMEN: soft, nontender, no rebound or guarding  EXTREMITIES: warm, well perfused  SKIN: no lesions noted    LABS: reviewed                        7.4    13.45 )-----------( 252      ( 14 Oct 2024 06:40 )             23.9     10-14    130[L]  |  96  |  39[H]  ----------------------------<  294[H]  5.1   |  17[L]  |  0.77    Ca    8.9      14 Oct 2024 06:40    TPro  7.0  /  Alb  4.2  /  TBili  0.6  /  DBili  x   /  AST  27  /  ALT  24  /  AlkPhos  44  10-14    LIVER FUNCTIONS - ( 14 Oct 2024 06:40 )  Alb: 4.2 g/dL / Pro: 7.0 g/dL / ALK PHOS: 44 U/L / ALT: 24 U/L / AST: 27 U/L / GGT: x             < from: Xray Chest 1 View- PORTABLE-Urgent (10.14.24 @ 07:30) >  INTERPRETATION:  EXAMINATION: XR CHEST URGENT    CLINICAL INDICATION: r/o pna    TECHNIQUE: Single frontal, portable view of the chest was obtained.    COMPARISON: Chest x-ray 5/17/2024.    FINDINGS:  Midline sternotomy.  The heart size is normal.  The lungs are clear.  There is no pneumothorax or pleural effusion.  There are no acute osseous abnormalities.    IMPRESSION:  Clear lungs.    --- End of Report ---    < end of copied text >

## 2024-10-14 NOTE — ED ADULT NURSE NOTE - OBJECTIVE STATEMENT
54y male w/ pmh of 7 cardiac stents presents via EMS for chest pain. Pt states he woke up this morning with persistent 10/10 chest pain. Pt states he had ERCP done 5 days ago and noticed that his stools have been dark. Pt states he vomited three times this morning since awakening with chest pain. EMS states they administered 65 mcg of fentanyl; pt now reporting 2/10 pain. Pt denies fever, chills but endorses "sweats".

## 2024-10-14 NOTE — CONSULT NOTE ADULT - ASSESSMENT
Assessment and Recommendations:     Shaikh Melida is a 54M w/ PMH HTN, CAD s/p CABG and 7 stents post recent stent in march ,on Aspirin and Brilinta, ICM, CHF (EF 35%), DM, GERD, cardiac bypass here for 6 days of progressively worsening cp w/ sxs worsening at 11am on Sunday. Patient reports noted dark stool since Thursday, 2x daily BM's and last BM was last night.    Patient has no cardiac contraindications for Low risk Procedure (EGD/ Colonoscopy  - Patient in not in decompensated heart failure on clinical exam , EKG not showing arrhythmia ventricular arrhythmia  RCRI 3 points, 15% risk, METS >4  AMADOR  EKG showing Sinus Tachycardia in the setting of GI bleed  - Elevated Troponin in the setting of demand ischemia in the setting of GI Bleed  -would continue to hold aspirin and  Plavix   -continue GDMT with parameters of SBP>90 as tolerated since Patient has GI Bleed and came in initially hypotensive    Assessment and Recommendations:     Shaikh Melida is a 54M w/ PMH HTN, CAD s/p CABG and 7 stents post recent stent in march ,on Aspirin and Brilinta, ICM, CHF (EF 35%), DM, GERD, cardiac bypass here for 6 days of progressively worsening cp w/ sxs worsening at 11am on Sunday. Patient reports noted dark stool since Thursday, 2x daily BM's and last BM was last night.    Patient has no cardiac contraindications for Low risk Procedure (EGD/ Colonoscopy  - Patient in not in decompensated heart failure on clinical exam , EKG not showing arrhythmia ventricular arrhythmia  RCRI 3 points, 15% risk, METS >4  EKG showing Sinus Tachycardia in the setting of GI bleed  - Elevated Troponin in the setting of demand ischemia in the setting of GI Bleed  -would continue to hold aspirin and  Plavix   -continue GDMT with parameters of SBP>90 as tolerated since Patient has GI Bleed and came in initially hypotensive    Assessment and Recommendations:     Shaikh Melida is a 54M w/ PMH HTN, CAD s/p CABG and 7 stents post recent stent in November,on Aspirin and Brilinta, ICM, CHF (EF 35%), DM, GERD, cardiac bypass here for 6 days of progressively worsening cp w/ sxs worsening at 11am on Sunday. Patient reports noted dark stool since Thursday, 2x daily BM's and last BM was last night.    Patient has no cardiac contraindications for Low risk Procedure (EGD/ Colonoscopy  - Patient in not in decompensated heart failure on clinical exam , EKG not showing arrhythmia ventricular arrhythmia  RCRI 3 points, 15% risk, METS >4  EKG showing Sinus Tachycardia in the setting of GI bleed  - Elevated Troponin in the setting of demand ischemia in the setting of GI Bleed  -would continue to hold plavix , continue aspirin   -continue GDMT with parameters of SBP>90 as tolerated since Patient has GI Bleed and came in initially hypotensive    Assessment and Recommendations:     Shaikh Melida is a 54M w/ PMH HTN, CAD s/p CABG and 7 stents post recent stent in  March, on Aspirin and Brilinta, ICM, CHF (EF 35%), DM, GERD, cardiac bypass here for 6 days of progressively worsening cp w/ sxs worsening at 11am on Sunday. Patient reports noted dark stool since Thursday, 2x daily BM's and last BM was last night.    Patient has no cardiac contraindications for Low risk Procedure (EGD/ Colonoscopy  - Patient in not in decompensated heart failure on clinical exam , EKG not showing arrhythmia ventricular arrhythmia  RCRI 3 points, 15% risk, METS >4  EKG showing Sinus Tachycardia in the setting of GI bleed  - Elevated Troponin in the setting of demand ischemia in the setting of GI Bleed  -would continue to hold plavix , continue aspirin   -continue GDMT with parameters of SBP>90 as tolerated since Patient has GI Bleed and came in initially hypotensive    Assessment and Recommendations:     Shaikh Melida is a 54M w/ PMH HTN, CAD s/p CABG and 7 stents post recent stent in  March, on Aspirin and Brilinta, ICM, CHF (EF 35%), DM, GERD, cardiac bypass here for 6 days of progressively worsening cp w/ sxs worsening at 11am on Sunday. Patient reports noted dark stool since Thursday, 2x daily BM's and last BM was last night.    Patientis high Cardiac  risk for  Low risk Procedure (EGD/ Colonoscopy  - Patient in not in decompensated heart failure on clinical exam , EKG not showing arrhythmia ventricular arrhythmia  RCRI 3 points, 15% risk, METS >4  EKG showing Sinus Tachycardia and ST depressions shown in multiple leads that have been shown before   - Elevated Troponin in the setting of demand ischemia in the setting of GI Bleed  -would continue to hold plavix , continue aspirin   -continue GDMT with parameters of SBP>90 as tolerated since Patient has GI Bleed and came in initially hypotensive , so would hold off now  -order Troponin , EKG and TTE    -If patient is actively having chest pain please notify Cardiology

## 2024-10-14 NOTE — H&P ADULT - PROBLEM SELECTOR PLAN 2
- Hold DAPT for now given last stent 7 months ago  - Can resume aspirin when stable - Has been on DAPT Brilinta and aspirin at home s/p stents  - Hold DAPT for now given last stent 7 months ago  - Can resume aspirin when stable - New ST elevations compared to previous on EKG  - Elevated troponin  - Pt w/o CP at this time. Most likely i/s/o anemia  - Cards on board  - Trend troponin  - Has been on DAPT Brilinta and aspirin at home s/p stents  - Hold DAPT for now given last stent 7 months ago  - Can resume aspirin when stable

## 2024-10-14 NOTE — H&P ADULT - PROBLEM SELECTOR PLAN 4
- Takes insulin at home  - ISS for now  - Monitor FSG - Takes insulin at home and Janumet  - Hold home insulin  - ISS for now  - Monitor FSG

## 2024-10-14 NOTE — ED PROVIDER NOTE - PROGRESS NOTE DETAILS
Attending Paxton Geller:  anemic on vbg to 7.7, july hgb in 13s. will consent transfuse, email gi for consult. Attending Paxton Geller:  Labs showing leukocytosis 13, hemoglobin 7.4.  Elevated BUN to 39 mild metabolic acidosis.  First troponin 57 lactate 4.5.  Suspect secondary to demand ischemia in the setting of GI bleed.  Will repeat lactate start blood transfusion admit.  Discussed with patient who agrees with plan.  Remains hemodynamically stable do not need to start aggressive crystalloid resuscitation.  Can wait for blood. Fellow MD Alex Gómez: Discussed case with hospitalist, Dr. Jeffers, who accepts this patient for admission and requests cardiology consult. Fellow MD Alex Gómez: Discussed case with Dr. Lemus, cardiology fellow, who recommends holding the Brillinta. Continue aspirin. Message sent to hospitalist.

## 2024-10-14 NOTE — PATIENT PROFILE ADULT - FALL HARM RISK - UNIVERSAL INTERVENTIONS
Bed in lowest position, wheels locked, appropriate side rails in place/Call bell, personal items and telephone in reach/Instruct patient to call for assistance before getting out of bed or chair/Non-slip footwear when patient is out of bed/[place] to call system/Physically safe environment - no spills, clutter or unnecessary equipment/Purposeful Proactive Rounding/Room/bathroom lighting operational, light cord in reach

## 2024-10-14 NOTE — PATIENT PROFILE ADULT - DO YOU EVER NEED HELP READING HOSPITAL MATERIALS?
Shu Almendarez is a 86 year old female presenting with Follow up for Cough      Medications: medications verified and updated  Patient denies Latex allergy or sensitivity  Added preferred pharmacy    Social History     Tobacco Use   Smoking Status Former    Current packs/day: 0.00    Average packs/day: 0.3 packs/day for 30.0 years (9.0 ttl pk-yrs)    Types: Cigarettes    Start date: 1980    Quit date: 2010    Years since quittin.4   Smokeless Tobacco Never           Health Maintenance Due   Topic Date Due    COVID-19 Vaccine (1) Never done    Shingles Vaccine (1 of 2) 07/10/2017    Influenza Vaccine (1) 2023    Medicare Advantage- Medicare Wellness Visit  2024       Patient is due for topics listed above, she wishes to proceed with Immunization(s) COVID-19, Influenza, and Shingles, but is not proceeding with  at this time. The following has occurred:       Patient would like communication of their results via:   [place] Phone: [phone] (home)  Okay to leave a message containing results? Yes     no

## 2024-10-14 NOTE — PATIENT PROFILE ADULT - BILL PAYMENT
HR=68 bpm, JTLC=844/99 mmhg, YlA3=921.0 %, Resp=13 B/min, EtCO2=20 mmHg, Apnea=0 Seconds, Pain=0, Connelly=3 no

## 2024-10-14 NOTE — H&P ADULT - PROBLEM/PLAN-2
What Type Of Note Output Would You Prefer (Optional)?: Bullet Format How Severe Are Your Spot(S)?: mild Have Your Spot(S) Been Treated In The Past?: has not been treated Hpi Title: Evaluation of a Skin Lesion Additional History: Patient also here for full body skin exam. DISPLAY PLAN FREE TEXT

## 2024-10-14 NOTE — H&P ADULT - HISTORY OF PRESENT ILLNESS
54M w/ hx of cardiac bypass, 7 stents last 03/2024 follows w/ cards at [facility] here for 6 days of progressively worsening cp w/ sxs worsening at 11am on sunday. constant. having black stool and today had bright blood in emesis. Felt sweaty at home. denies fever, chills, trauma. recently had ercp w/ biliary stent removal, pus removal. received 65mcg of fent from ems, pain now 1/10.    hemodynam stable, though tachycardic, nad. aaox3. no jaundice. + pale conjunctivae. tachycardic rate, reg rhythm, no audible cardiac murmur. benign abd, no peritoneal signs. No LE pitting edema. neurovasc intact w/ 2+distal pulses.     concern for GI bleed resulting in demand ischemia, not febrile, doubt cholangitis. r/o type 1 acs. Shaikh Melida is a 54M w/ PMH of cardiac bypass, 7 stents (last 03/2024) follows w/ cards at [facility] here for 6 days of progressively worsening cp w/ sxs worsening at 11am on Sunday. Pt reports constant pain and also endorses having black stool with an episode of bright blood in emesis. Denies fever, chills, trauma. Of note, pt recently had ERCP w/ biliary stent removal and pus removal.     Shaikh Melida is a 54M w/ PMH HTN, CAD s/p CABG and 7 stents 12/2022 on Aspirin and Brilinta, ICM, CHF (EF 35%), DM, GERD, cardiac bypass here for 6 days of progressively worsening cp w/ sxs worsening at 11am on Sunday. Patient reports noted dark stool since Thursday, 2x daily BM's and last BM was last night. Also had 3 episodes of hematemesis yesterday, no further episodes today. Decreased PO due to decreased appetite. Also had chest pain that improved since arrival to ED. No SOB, fever, chills or abdominal pain. Of note, recent ERCP 10/9 with biliary stent removal and extension sphincterotomy, concerning for post sphincterotomy bleed.     PMH:  Above    PSH:    MEDS:  Below    ALLERGIES:    FH:    SH:    CHART REVIEW:  5/2024: Hospitalized in acute biliary obstruction choledocholithiasis w possible cholangitis s/p unsuccessful ERCP 2/2 difficult anatomy and periampullary diverticulum. 5/18 S/p EGD/ERCP demonstrating tortuous esophagus and small biliary sphincterotomy w biliary stent placement for choledocholithiasis. S/p lap ashley on 5/20.    11/2023: Pt has been having angina 2-3x/week, described as chest tightness that lasts ~30min. Pt also reports +orthopnea, decreased exercise tolerance for the past month as well.   denies fever, chills, cough, palpitation, abd pain, n/v/d, or urinary sx. S/p cardiac cath 11/14: POBA to OM1 via RFA s/p manual pull/compression. Plan to return in 1-2 weeks for  LAD and D1    4/2023: Presented with CP to ED today and sent directly to cath with Ekg changes and trop of 738 4/6   - Successful PCi with CHERRI to the proximal OM1 and balloon of the distal  OM1.    - Successful POBA LIMA to LAD  - cardiac cath with one stent to the OM1.       4/2023: Admitted with NSTEMI, received ASA load, Brilinta load, s/p Heparin gtt, BB, statin - Trop 412- > 453-> 366.   ROYAL not in concordant leads. ST depressions seen, s/p cardiac cath ( diagnostic) on 4/3 shows significant progression of coronaries dz and occlusion of all grafts, LIMA occluded at distal anastomosis to LAD with diffuse distal LAD disease, MRI indicates viability.  Followed by cards, Lisinopril held per cards, changed to Entresto after 36hrs, Added Valsartan.  s/p CABG 3 months ago.  On 4/6 pt underwent PCI with CHERRI to OM1 via RRA by Dr. Amador and 4/7 PCI to LIMA.      IN THE ED:  Hgb found to be 7.4. 2 units of pRBC ordered. BCx and CXR ordered. GI consulted. Shaikh Melida is a 54M w/ PMH HTN, CAD s/p CABG and 7 stents 2022 on Aspirin and Brilinta, ICM, CHF (EF 35%), DM, GERD, cardiac bypass here for 6 days of progressively worsening cp w/ sxs worsening at 11am on . Patient reports noted dark stool since Thursday, 2x daily BM's. Also had 3 episodes of hematemesis yesterday. Last emesis and BM was night of 10/13. Decreased PO due to decreased appetite. Also had chest pain that improved since arrival to ED. No SOB, fever, chills or abdominal pain. Of note, recent ERCP 10/9 with biliary stent removal and extension sphincterotomy, concerning for post sphincterotomy bleed.     PMH:  Above    PSH:  ERCP with biliary stent removal and extension sphincterotomy  Coronary bypass in   7 stents, last stent in 2024    MEDS:  Below    ALLERGIES:  None    FH:  Father  from heart attack    SH:  Lives in [facility] w/ family.   Works in Real Estate  No smoking, drinking, or alcohol    CHART REVIEW:  2024: Hospitalized in acute biliary obstruction choledocholithiasis w possible cholangitis s/p unsuccessful ERCP 2/2 difficult anatomy and periampullary diverticulum.  S/p EGD/ERCP demonstrating tortuous esophagus and small biliary sphincterotomy w biliary stent placement for choledocholithiasis. S/p lap ashley on .    2023: Pt has been having angina 2-3x/week, described as chest tightness that lasts ~30min. Pt also reports +orthopnea, decreased exercise tolerance for the past month as well.   denies fever, chills, cough, palpitation, abd pain, n/v/d, or urinary sx. S/p cardiac cath : POBA to OM1 via RFA s/p manual pull/compression. Plan to return in 1-2 weeks for  LAD and D1    2023: Presented with CP to ED today and sent directly to cath with Ekg changes and trop of 738    - Successful PCi with CHERRI to the proximal OM1 and balloon of the distal  OM1.    - Successful POBA LIMA to LAD  - cardiac cath with one stent to the OM1.       2023: Admitted with NSTEMI, received ASA load, Brilinta load, s/p Heparin gtt, BB, statin - Trop 412- > 453-> 366.   ROYAL not in concordant leads. ST depressions seen, s/p cardiac cath ( diagnostic) on 4/3 shows significant progression of coronaries dz and occlusion of all grafts, LIMA occluded at distal anastomosis to LAD with diffuse distal LAD disease, MRI indicates viability.  Followed by cards, Lisinopril held per cards, changed to Entresto after 36hrs, Added Valsartan.  s/p CABG 3 months ago.  On  pt underwent PCI with CHERRI to OM1 via RRA by Dr. Amador and  PCI to LIMA.      IN THE ED:  Hgb found to be 7.4. 2 units of pRBC ordered. BCx and CXR ordered. GI consulted.

## 2024-10-14 NOTE — H&P ADULT - PROBLEM SELECTOR PLAN 3
- Holding home HTN medications i/s/o bleeding - Takes metoprolol, Entresto, and isosorbide dinitrate at home  - Holding home HTN medications i/s/o bleeding

## 2024-10-14 NOTE — CONSULT NOTE ADULT - SUBJECTIVE AND OBJECTIVE BOX
HPI : Shaikh Melida is a 54M w/ PMH HTN, CAD s/p CABG and 7 stents post recent stent in march ,on Aspirin and Brilinta, ICM, CHF (EF 35%), DM, GERD, cardiac bypass here for 6 days of progressively worsening cp w/ sxs worsening at 11am on Sunday. Patient reports noted dark stool since Thursday, 2x daily BM's and last BM was last night. Also had 3 episodes of hematemesis yesterday, no further episodes today.  Patient endorsed that chest pain was on rest and exertion, and got worse with the dark stool since thursday . Upon arrival Patient was found to have  hemoglobin of 7.4 was ordered units of PRBCs currently having second unit of PRBC now.Also had chest pain that improved since arrival to ED. No SOB, fever, chills or abdominal pain. Of note, recent ERCP 10/9 with biliary stent removal and extension sphincterotomy, concerning for post sphincterotomy bleed.       Cardiology Consult Note            PMH:  Above    PSH: none     MEDS:  Below    ALLERGIES: no allergies as per patient    FH:CAD, dibates    SH: CAD,diabetes    CHART REVIEW:  5/2024: Hospitalized in acute biliary obstruction choledocholithiasis w possible cholangitis s/p unsuccessful ERCP 2/2 difficult anatomy and periampullary diverticulum. 5/18 S/p EGD/ERCP demonstrating tortuous esophagus and small biliary sphincterotomy w biliary stent placement for choledocholithiasis. S/p lap ashley on 5/20.    11/2023: Pt has been having angina 2-3x/week, described as chest tightness that lasts ~30min. Pt also reports +orthopnea, decreased exercise tolerance for the past month as well.   denies fever, chills, cough, palpitation, abd pain, n/v/d, or urinary sx. S/p cardiac cath 11/14: POBA to OM1 via RFA s/p manual pull/compression. Plan to return in 1-2 weeks for  LAD and D1    4/2023: Presented with CP to ED today and sent directly to cath with Ekg changes and trop of 738 4/6   - Successful PCi with CHERRI to the proximal OM1 and balloon of the distal  OM1.    - Successful POBA LIMA to LAD  - cardiac cath with one stent to the OM1.       4/2023: Admitted with NSTEMI, received ASA load, Brilinta load, s/p Heparin gtt, BB, statin - Trop 412- > 453-> 366.   ROYAL not in concordant leads. ST depressions seen, s/p cardiac cath ( diagnostic) on 4/3 shows significant progression of coronaries dz and occlusion of all grafts, LIMA occluded at distal anastomosis to LAD with diffuse distal LAD disease, MRI indicates viability.  Followed by cards, Lisinopril held per cards, changed to Entresto after 36hrs, Added Valsartan.  s/p CABG 3 months ago.  On 4/6 pt underwent PCI with CHERRI to OM1 via RRA by Dr. Amador and 4/7 PCI to LIMA.      IN THE ED:  Hgb found to be 7.4. 2 units of pRBC ordered. BCx and CXR ordered. GI consulted. (14 Oct 2024 10:10)        PAST MEDICAL & SURGICAL HISTORY:  Hypertension      GERD (gastroesophageal reflux disease)      CAD (coronary artery disease)      History of biliary duct stent placement      BPH (benign prostatic hyperplasia)      T2DM (type 2 diabetes mellitus)      HLD (hyperlipidemia)      History of ischemic cardiomyopathy      Former smoker      S/P CABG (coronary artery bypass graft)      History of ERCP      S/P cholecystectomy      H/O coronary angioplasty      S/P cardiac cath  with stent x7        FAMILY HISTORY:  FH: heart disease (Father)      SOCIAL HISTORY:  unchanged    MEDICATIONS: patient is currently on meotprolol 200 mg daily  aspirin 81 mg daily  Janumet 50 mg/1000 mg tab  tamsulosin .4 mg daily  atorvastatin  40 mg daily  Brilinta 90 mg daily  Entresto 24-26 mg twice a daily  senna  omeprazole 20 mg daily  ezetimibe 10 mg daily  isorbodie nm 30 mg once a day           pantoprazole  Injectable 40 milliGRAM(s) IV Push two times a day    dextrose 50% Injectable 25 Gram(s) IV Push once  dextrose 50% Injectable 12.5 Gram(s) IV Push once  dextrose 50% Injectable 25 Gram(s) IV Push once  dextrose Oral Gel 15 Gram(s) Oral once PRN  glucagon  Injectable 1 milliGRAM(s) IntraMuscular once  insulin lispro (ADMELOG) corrective regimen sliding scale   SubCutaneous every 6 hours    dextrose 5%. 1000 milliLiter(s) IV Continuous <Continuous>  dextrose 5%. 1000 milliLiter(s) IV Continuous <Continuous>        -------------------------------------------------------------------------------------------  PHYSICAL EXAM:  T(C): 36.7 (10-14-24 @ 12:03), Max: 37.1 (10-14-24 @ 06:12)  HR: 74 (10-14-24 @ 12:03) (58 - 115)  BP: 111/72 (10-14-24 @ 12:03) (92/60 - 125/82)  RR: 18 (10-14-24 @ 12:03) (18 - 21)  SpO2: 99% (10-14-24 @ 12:03) (98% - 100%)  Wt(kg): --  I&O's Summary    14 Oct 2024 07:01  -  14 Oct 2024 13:01  --------------------------------------------------------  IN: 0 mL / OUT: 0 mL / NET: 0 mL        GENERAL: Patient is alert, awake, and in no acute distress  HEENT: Moist mucous membranes. No conjunctival injection. No JVD  CHEST/LUNG: Clear to auscultation bilaterally; No wheezing. Unlabored respirations.  HEART: Regular rate and rhythm; No murmurs. Radial pulses 2+.  ABDOMEN: Bowel sounds present; Soft, Nontender, Nondistended.   EXTREMITIES:  No lower extremity tenderness. No lower extremity edema.  NEURO: Aox3, no focal deficits    -------------------------------------------------------------------------------------------  LABS:                          7.4    13.45 )-----------( 252      ( 14 Oct 2024 06:40 )             23.9     10-14    130[L]  |  96  |  39[H]  ----------------------------<  294[H]  5.1   |  17[L]  |  0.77    Ca    8.9      14 Oct 2024 06:40    TPro  7.0  /  Alb  4.2  /  TBili  0.6  /  DBili  x   /  AST  27  /  ALT  24  /  AlkPhos  44  10-14    PT/INR - ( 14 Oct 2024 06:40 )   PT: 13.7 sec;   INR: 1.19 ratio         PTT - ( 14 Oct 2024 06:40 )  PTT:28.8 sec  CARDIAC MARKERS ( 14 Oct 2024 06:40 )  57 ng/L / x     / x     / x     / x     / x                  -------------------------------------------------------------------------------------------  Cardiovascular Diagnostic Testing:    ECG: sinus tachycardia     Echo: last echo showing grade one diastolic dysfunction on last admission    Stress Testing:    Cath:    -------------------------------------------------------------------------------------------    Assessment and Recommendations:     Shaikh Melida is a 54M w/ PMH HTN, CAD s/p CABG and 7 stents post recent stent in march ,on Aspirin and Brilinta, ICM, CHF (EF 35%), DM, GERD, cardiac bypass here for 6 days of progressively worsening cp w/ sxs worsening at 11am on Sunday. Patient reports noted dark stool since Thursday, 2x daily BM's and last BM was last night.    Patient has no cardiac contraindications for Low risk Procedure (EGD/ Colonoscopy  - Patient in not in decompensated heart failure on clinical exam , EKG not showing arrhythmia ventricular arrhythmia  EKG showing Sinus Tachycardia in the setting of GI bleed  - Elevated Troponin in the setting of demand ischemia in the setting of GI Bleed  -would continue to hold aspirin and  Plavix   -continue GDMT with parameters of SBP>90 as tolerated since Patient has GI Bleed and came in initially hypotensive with MAP of               These are preliminary recommendations. Please see attending attestation for final recommendations.  HPI : Shaikh Melida is a 54M w/ PMH HTN, CAD s/p CABG and 7 stents post recent stent in march ,on Aspirin and Brilinta, ICM, CHF (EF 35%), DM, GERD, cardiac bypass here for 6 days of progressively worsening cp w/ sxs worsening at 11am on Sunday. Patient reports noted dark stool since Thursday, 2x daily BM's and last BM was last night. Also had 3 episodes of hematemesis yesterday, no further episodes today.  Patient endorsed that chest pain was on rest and exertion, and got worse with the dark stool since thursday . Upon arrival Patient was found to have  hemoglobin of 7.4 was ordered units of PRBCs currently having second unit of PRBC now.Also had chest pain that improved since arrival to ED. No SOB, fever, chills or abdominal pain. Of note, recent ERCP 10/9 with biliary stent removal and extension sphincterotomy, concerning for post sphincterotomy bleed.       Cardiology Consult Note            PMH:  Above    PSH: none     MEDS:  Below    ALLERGIES: no allergies as per patient    FH:CAD, dibates    SH: CAD,diabetes    CHART REVIEW:  5/2024: Hospitalized in acute biliary obstruction choledocholithiasis w possible cholangitis s/p unsuccessful ERCP 2/2 difficult anatomy and periampullary diverticulum. 5/18 S/p EGD/ERCP demonstrating tortuous esophagus and small biliary sphincterotomy w biliary stent placement for choledocholithiasis. S/p lap ashley on 5/20.    11/2023: Pt has been having angina 2-3x/week, described as chest tightness that lasts ~30min. Pt also reports +orthopnea, decreased exercise tolerance for the past month as well.   denies fever, chills, cough, palpitation, abd pain, n/v/d, or urinary sx. S/p cardiac cath 11/14: POBA to OM1 via RFA s/p manual pull/compression. Plan to return in 1-2 weeks for  LAD and D1    4/2023: Presented with CP to ED today and sent directly to cath with Ekg changes and trop of 738 4/6   - Successful PCi with CHERRI to the proximal OM1 and balloon of the distal  OM1.    - Successful POBA LIMA to LAD  - cardiac cath with one stent to the OM1.       4/2023: Admitted with NSTEMI, received ASA load, Brilinta load, s/p Heparin gtt, BB, statin - Trop 412- > 453-> 366.   ROYAL not in concordant leads. ST depressions seen, s/p cardiac cath ( diagnostic) on 4/3 shows significant progression of coronaries dz and occlusion of all grafts, LIMA occluded at distal anastomosis to LAD with diffuse distal LAD disease, MRI indicates viability.  Followed by cards, Lisinopril held per cards, changed to Entresto after 36hrs, Added Valsartan.  s/p CABG 3 months ago.  On 4/6 pt underwent PCI with CHERRI to OM1 via RRA by Dr. Amador and 4/7 PCI to LIMA.      IN THE ED:  Hgb found to be 7.4. 2 units of pRBC ordered. BCx and CXR ordered. GI consulted. (14 Oct 2024 10:10)        PAST MEDICAL & SURGICAL HISTORY:  Hypertension      GERD (gastroesophageal reflux disease)      CAD (coronary artery disease)      History of biliary duct stent placement      BPH (benign prostatic hyperplasia)      T2DM (type 2 diabetes mellitus)      HLD (hyperlipidemia)      History of ischemic cardiomyopathy      Former smoker      S/P CABG (coronary artery bypass graft)      History of ERCP      S/P cholecystectomy      H/O coronary angioplasty      S/P cardiac cath  with stent x7        FAMILY HISTORY:  FH: heart disease (Father)      SOCIAL HISTORY:  unchanged    MEDICATIONS: patient is currently on meotprolol 200 mg daily  aspirin 81 mg daily  Janumet 50 mg/1000 mg tab  tamsulosin .4 mg daily  atorvastatin  40 mg daily  Brilinta 90 mg daily  Entresto 24-26 mg twice a daily  senna  omeprazole 20 mg daily  ezetimibe 10 mg daily  isorbodie nm 30 mg once a day           pantoprazole  Injectable 40 milliGRAM(s) IV Push two times a day    dextrose 50% Injectable 25 Gram(s) IV Push once  dextrose 50% Injectable 12.5 Gram(s) IV Push once  dextrose 50% Injectable 25 Gram(s) IV Push once  dextrose Oral Gel 15 Gram(s) Oral once PRN  glucagon  Injectable 1 milliGRAM(s) IntraMuscular once  insulin lispro (ADMELOG) corrective regimen sliding scale   SubCutaneous every 6 hours    dextrose 5%. 1000 milliLiter(s) IV Continuous <Continuous>  dextrose 5%. 1000 milliLiter(s) IV Continuous <Continuous>        -------------------------------------------------------------------------------------------  PHYSICAL EXAM:  T(C): 36.7 (10-14-24 @ 12:03), Max: 37.1 (10-14-24 @ 06:12)  HR: 74 (10-14-24 @ 12:03) (58 - 115)  BP: 111/72 (10-14-24 @ 12:03) (92/60 - 125/82)  RR: 18 (10-14-24 @ 12:03) (18 - 21)  SpO2: 99% (10-14-24 @ 12:03) (98% - 100%)  Wt(kg): --  I&O's Summary    14 Oct 2024 07:01  -  14 Oct 2024 13:01  --------------------------------------------------------  IN: 0 mL / OUT: 0 mL / NET: 0 mL        GENERAL: Patient is alert, awake, and in no acute distress  HEENT: Moist mucous membranes. No conjunctival injection. No JVD  CHEST/LUNG: Clear to auscultation bilaterally; No wheezing. Unlabored respirations.  HEART: Regular rate and rhythm; No murmurs. Radial pulses 2+.  ABDOMEN: Bowel sounds present; Soft, Nontender, Nondistended.   EXTREMITIES:  No lower extremity tenderness. No lower extremity edema.  NEURO: Aox3, no focal deficits    -------------------------------------------------------------------------------------------  LABS:                          7.4    13.45 )-----------( 252      ( 14 Oct 2024 06:40 )             23.9     10-14    130[L]  |  96  |  39[H]  ----------------------------<  294[H]  5.1   |  17[L]  |  0.77    Ca    8.9      14 Oct 2024 06:40    TPro  7.0  /  Alb  4.2  /  TBili  0.6  /  DBili  x   /  AST  27  /  ALT  24  /  AlkPhos  44  10-14    PT/INR - ( 14 Oct 2024 06:40 )   PT: 13.7 sec;   INR: 1.19 ratio         PTT - ( 14 Oct 2024 06:40 )  PTT:28.8 sec  CARDIAC MARKERS ( 14 Oct 2024 06:40 )  57 ng/L / x     / x     / x     / x     / x                  -------------------------------------------------------------------------------------------  Cardiovascular Diagnostic Testing:    ECG: sinus tachycardia     Echo: last echo showing grade one diastolic dysfunction on last admission    Stress Testing:    Cath:    -------------------------------------------------------------------------------------------                These are preliminary recommendations. Please see attending attestation for final recommendations.  HPI : Shaikh Melida is a 54M w/ PMH HTN, CAD s/p CABG and 7 stents post recent stent in march ,on Aspirin and Brilinta, ICM, CHF (EF 35%), DM, GERD, cardiac bypass here for 6 days of progressively worsening cp w/ sxs worsening at 11am on Sunday. Patient reports noted dark stool since Thursday, 2x daily BM's and last BM was last night. Also had 3 episodes of hematemesis yesterday, no further episodes today.  Patient endorsed that chest pain was on rest and exertion, and got worse with the dark stool since thursday . Upon arrival Patient was found to have  hemoglobin of 7.4 was ordered units of PRBCs currently having second unit of PRBC now.Also had chest pain that improved since arrival to ED. No SOB, fever, chills or abdominal pain. Of note, recent ERCP 10/9 with biliary stent removal and extension sphincterotomy, concerning for post sphincterotomy bleed.       Cardiology Consult Note            PMH:  Above    PSH: none     MEDS:  Below    ALLERGIES: no allergies as per patient    FH:CAD, dibates    SH: CAD,diabetes    CHART REVIEW:  5/2024: Hospitalized in acute biliary obstruction choledocholithiasis w possible cholangitis s/p unsuccessful ERCP 2/2 difficult anatomy and periampullary diverticulum. 5/18 S/p EGD/ERCP demonstrating tortuous esophagus and small biliary sphincterotomy w biliary stent placement for choledocholithiasis. S/p lap ashley on 5/20.    11/2023: Pt has been having angina 2-3x/week, described as chest tightness that lasts ~30min. Pt also reports +orthopnea, decreased exercise tolerance for the past month as well.   denies fever, chills, cough, palpitation, abd pain, n/v/d, or urinary sx. S/p cardiac cath 11/14: POBA to OM1 via RFA s/p manual pull/compression. Plan to return in 1-2 weeks for  LAD and D1    4/2023: Presented with CP to ED today and sent directly to cath with Ekg changes and trop of 738 4/6   - Successful PCi with CHERRI to the proximal OM1 and balloon of the distal  OM1.    - Successful POBA LIMA to LAD  - cardiac cath with one stent to the OM1.       4/2023: Admitted with NSTEMI, received ASA load, Brilinta load, s/p Heparin gtt, BB, statin - Trop 412- > 453-> 366.   ROYAL not in concordant leads. ST depressions seen, s/p cardiac cath ( diagnostic) on 4/3 shows significant progression of coronaries dz and occlusion of all grafts, LIMA occluded at distal anastomosis to LAD with diffuse distal LAD disease, MRI indicates viability.  Followed by cards, Lisinopril held per cards, changed to Entresto after 36hrs, Added Valsartan.  s/p CABG 3 months ago.  On 4/6 pt underwent PCI with CHERRI to OM1 via RRA by Dr. Amador and 4/7 PCI to LIMA.      IN THE ED:  Hgb found to be 7.4. 2 units of pRBC ordered. BCx and CXR ordered. GI consulted. (14 Oct 2024 10:10)        PAST MEDICAL & SURGICAL HISTORY:  Hypertension      GERD (gastroesophageal reflux disease)      CAD (coronary artery disease)      History of biliary duct stent placement      BPH (benign prostatic hyperplasia)      T2DM (type 2 diabetes mellitus)      HLD (hyperlipidemia)      History of ischemic cardiomyopathy      Former smoker      S/P CABG (coronary artery bypass graft)      History of ERCP      S/P cholecystectomy      H/O coronary angioplasty      S/P cardiac cath  with stent x7        FAMILY HISTORY:  FH: heart disease (Father)      SOCIAL HISTORY:  unchanged    MEDICATIONS: patient is currently on meotprolol 200 mg daily  aspirin 81 mg daily  Janumet 50 mg/1000 mg tab  tamsulosin .4 mg daily  atorvastatin  40 mg daily  Brilinta 90 mg daily  Entresto 24-26 mg twice a daily  senna daily as needed  omeprazole 20 mg daily  ezetimibe 10 mg daily  isosorbide nm 30 mg once a day           pantoprazole  Injectable 40 milliGRAM(s) IV Push two times a day    dextrose 50% Injectable 25 Gram(s) IV Push once  dextrose 50% Injectable 12.5 Gram(s) IV Push once  dextrose 50% Injectable 25 Gram(s) IV Push once  dextrose Oral Gel 15 Gram(s) Oral once PRN  glucagon  Injectable 1 milliGRAM(s) IntraMuscular once  insulin lispro (ADMELOG) corrective regimen sliding scale   SubCutaneous every 6 hours    dextrose 5%. 1000 milliLiter(s) IV Continuous <Continuous>  dextrose 5%. 1000 milliLiter(s) IV Continuous <Continuous>        -------------------------------------------------------------------------------------------  PHYSICAL EXAM:  T(C): 36.7 (10-14-24 @ 12:03), Max: 37.1 (10-14-24 @ 06:12)  HR: 74 (10-14-24 @ 12:03) (58 - 115)  BP: 111/72 (10-14-24 @ 12:03) (92/60 - 125/82)  RR: 18 (10-14-24 @ 12:03) (18 - 21)  SpO2: 99% (10-14-24 @ 12:03) (98% - 100%)  Wt(kg): --  I&O's Summary    14 Oct 2024 07:01  -  14 Oct 2024 13:01  --------------------------------------------------------  IN: 0 mL / OUT: 0 mL / NET: 0 mL        GENERAL: Patient is alert, awake, and in no acute distress  HEENT: Moist mucous membranes. No conjunctival injection. No JVD  CHEST/LUNG: Clear to auscultation bilaterally; No wheezing. Unlabored respirations.  HEART: Regular rate and rhythm; No murmurs. Radial pulses 2+.  ABDOMEN: Bowel sounds present; Soft, Nontender, Nondistended.   EXTREMITIES:  No lower extremity tenderness. No lower extremity edema.  NEURO: Aox3, no focal deficits    -------------------------------------------------------------------------------------------  LABS:                          7.4    13.45 )-----------( 252      ( 14 Oct 2024 06:40 )             23.9     10-14    130[L]  |  96  |  39[H]  ----------------------------<  294[H]  5.1   |  17[L]  |  0.77    Ca    8.9      14 Oct 2024 06:40    TPro  7.0  /  Alb  4.2  /  TBili  0.6  /  DBili  x   /  AST  27  /  ALT  24  /  AlkPhos  44  10-14    PT/INR - ( 14 Oct 2024 06:40 )   PT: 13.7 sec;   INR: 1.19 ratio         PTT - ( 14 Oct 2024 06:40 )  PTT:28.8 sec  CARDIAC MARKERS ( 14 Oct 2024 06:40 )  57 ng/L / x     / x     / x     / x     / x                  -------------------------------------------------------------------------------------------  Cardiovascular Diagnostic Testing:    ECG: sinus tachycardia     Echo: last echo showing grade one diastolic dysfunction on last admission    Stress Testing:    Cath:    -------------------------------------------------------------------------------------------                These are preliminary recommendations. Please see attending attestation for final recommendations.  HPI : Shaikh Melida is a 54M w/ PMH HTN, CAD s/p CABG and 7 stents post recent stent in November ,on Aspirin and Brilinta, ICM, CHF (EF 35%), DM, GERD, cardiac bypass here for 6 days of progressively worsening cp w/ sxs worsening at 11am on Sunday. Patient reports noted dark stool since Thursday, 2x daily BM's and last BM was last night. Also had 3 episodes of hematemesis yesterday, no further episodes today.  Patient endorsed that chest pain was on rest and exertion, and got worse with the dark stool since thursday . Upon arrival Patient was found to have  hemoglobin of 7.4 was ordered units of PRBCs currently having second unit of PRBC now.Also had chest pain that improved since arrival to ED. No SOB, fever, chills or abdominal pain. Of note, recent ERCP 10/9 with biliary stent removal and extension sphincterotomy, concerning for post sphincterotomy bleed.       Cardiology Consult Note            PMH:  Above    PSH: none     MEDS:  Below    ALLERGIES: no allergies as per patient    FH:CAD, dibates    SH: CAD,diabetes    CHART REVIEW:  5/2024: Hospitalized in acute biliary obstruction choledocholithiasis w possible cholangitis s/p unsuccessful ERCP 2/2 difficult anatomy and periampullary diverticulum. 5/18 S/p EGD/ERCP demonstrating tortuous esophagus and small biliary sphincterotomy w biliary stent placement for choledocholithiasis. S/p lap ashley on 5/20.    11/2023: Pt has been having angina 2-3x/week, described as chest tightness that lasts ~30min. Pt also reports +orthopnea, decreased exercise tolerance for the past month as well.   denies fever, chills, cough, palpitation, abd pain, n/v/d, or urinary sx. S/p cardiac cath 11/14: POBA to OM1 via RFA s/p manual pull/compression. Plan to return in 1-2 weeks for  LAD and D1    4/2023: Presented with CP to ED today and sent directly to cath with Ekg changes and trop of 738 4/6   - Successful PCi with CHERRI to the proximal OM1 and balloon of the distal  OM1.    - Successful POBA LIMA to LAD  - cardiac cath with one stent to the OM1.       4/2023: Admitted with NSTEMI, received ASA load, Brilinta load, s/p Heparin gtt, BB, statin - Trop 412- > 453-> 366.   ROYAL not in concordant leads. ST depressions seen, s/p cardiac cath ( diagnostic) on 4/3 shows significant progression of coronaries dz and occlusion of all grafts, LIMA occluded at distal anastomosis to LAD with diffuse distal LAD disease, MRI indicates viability.  Followed by cards, Lisinopril held per cards, changed to Entresto after 36hrs, Added Valsartan.  s/p CABG 3 months ago.  On 4/6 pt underwent PCI with CHERRI to OM1 via RRA by Dr. Amador and 4/7 PCI to LIMA.      IN THE ED:  Hgb found to be 7.4. 2 units of pRBC ordered. BCx and CXR ordered. GI consulted. (14 Oct 2024 10:10)        PAST MEDICAL & SURGICAL HISTORY:  Hypertension      GERD (gastroesophageal reflux disease)      CAD (coronary artery disease)      History of biliary duct stent placement      BPH (benign prostatic hyperplasia)      T2DM (type 2 diabetes mellitus)      HLD (hyperlipidemia)      History of ischemic cardiomyopathy      Former smoker      S/P CABG (coronary artery bypass graft)      History of ERCP      S/P cholecystectomy      H/O coronary angioplasty      S/P cardiac cath  with stent x7        FAMILY HISTORY:  FH: heart disease (Father)      SOCIAL HISTORY:  unchanged    MEDICATIONS: patient is currently on meotprolol 200 mg daily  aspirin 81 mg daily  Janumet 50 mg/1000 mg tab  tamsulosin .4 mg daily  atorvastatin  40 mg daily  Brilinta 90 mg daily  Entresto 24-26 mg twice a daily  senna daily as needed  omeprazole 20 mg daily  ezetimibe 10 mg daily  isosorbide nm 30 mg once a day           pantoprazole  Injectable 40 milliGRAM(s) IV Push two times a day    dextrose 50% Injectable 25 Gram(s) IV Push once  dextrose 50% Injectable 12.5 Gram(s) IV Push once  dextrose 50% Injectable 25 Gram(s) IV Push once  dextrose Oral Gel 15 Gram(s) Oral once PRN  glucagon  Injectable 1 milliGRAM(s) IntraMuscular once  insulin lispro (ADMELOG) corrective regimen sliding scale   SubCutaneous every 6 hours    dextrose 5%. 1000 milliLiter(s) IV Continuous <Continuous>  dextrose 5%. 1000 milliLiter(s) IV Continuous <Continuous>        -------------------------------------------------------------------------------------------  PHYSICAL EXAM:  T(C): 36.7 (10-14-24 @ 12:03), Max: 37.1 (10-14-24 @ 06:12)  HR: 74 (10-14-24 @ 12:03) (58 - 115)  BP: 111/72 (10-14-24 @ 12:03) (92/60 - 125/82)  RR: 18 (10-14-24 @ 12:03) (18 - 21)  SpO2: 99% (10-14-24 @ 12:03) (98% - 100%)  Wt(kg): --  I&O's Summary    14 Oct 2024 07:01  -  14 Oct 2024 13:01  --------------------------------------------------------  IN: 0 mL / OUT: 0 mL / NET: 0 mL        GENERAL: Patient is alert, awake, and in no acute distress  HEENT: Moist mucous membranes. No conjunctival injection. No JVD  CHEST/LUNG: Clear to auscultation bilaterally; No wheezing. Unlabored respirations.  HEART: Regular rate and rhythm; No murmurs. Radial pulses 2+.  ABDOMEN: Bowel sounds present; Soft, Nontender, Nondistended.   EXTREMITIES:  No lower extremity tenderness. No lower extremity edema.  NEURO: Aox3, no focal deficits    -------------------------------------------------------------------------------------------  LABS:                          7.4    13.45 )-----------( 252      ( 14 Oct 2024 06:40 )             23.9     10-14    130[L]  |  96  |  39[H]  ----------------------------<  294[H]  5.1   |  17[L]  |  0.77    Ca    8.9      14 Oct 2024 06:40    TPro  7.0  /  Alb  4.2  /  TBili  0.6  /  DBili  x   /  AST  27  /  ALT  24  /  AlkPhos  44  10-14    PT/INR - ( 14 Oct 2024 06:40 )   PT: 13.7 sec;   INR: 1.19 ratio         PTT - ( 14 Oct 2024 06:40 )  PTT:28.8 sec  CARDIAC MARKERS ( 14 Oct 2024 06:40 )  57 ng/L / x     / x     / x     / x     / x                  -------------------------------------------------------------------------------------------  Cardiovascular Diagnostic Testing:    ECG: sinus tachycardia     Echo: last echo showing grade one diastolic dysfunction on last admission    Stress Testing:    Cath:    -------------------------------------------------------------------------------------------                These are preliminary recommendations. Please see attending attestation for final recommendations.  HPI : Shaikh Melida is a 54M w/ PMH HTN, CAD s/p CABG and 7 stents post recent stent in March  ,on Aspirin and Brilinta, ICM, CHF (EF 35%), DM, GERD, cardiac bypass here for 6 days of progressively worsening cp w/ sxs worsening at 11am on Sunday. Patient reports noted dark stool since Thursday, 2x daily BM's and last BM was last night. Also had 3 episodes of hematemesis yesterday, no further episodes today.  Patient endorsed that chest pain was on rest and exertion, and got worse with the dark stool since thursday . Upon arrival Patient was found to have  hemoglobin of 7.4 was ordered units of PRBCs currently having second unit of PRBC now.Also had chest pain that improved since arrival to ED. No SOB, fever, chills or abdominal pain. Of note, recent ERCP 10/9 with biliary stent removal and extension sphincterotomy, concerning for post sphincterotomy bleed.       Cardiology Consult Note            PMH:  Above    PSH: none     MEDS:  Below    ALLERGIES: no allergies as per patient    FH:CAD, dibates    SH: CAD,diabetes    CHART REVIEW:  5/2024: Hospitalized in acute biliary obstruction choledocholithiasis w possible cholangitis s/p unsuccessful ERCP 2/2 difficult anatomy and periampullary diverticulum. 5/18 S/p EGD/ERCP demonstrating tortuous esophagus and small biliary sphincterotomy w biliary stent placement for choledocholithiasis. S/p lap ashley on 5/20.    11/2023: Pt has been having angina 2-3x/week, described as chest tightness that lasts ~30min. Pt also reports +orthopnea, decreased exercise tolerance for the past month as well.   denies fever, chills, cough, palpitation, abd pain, n/v/d, or urinary sx. S/p cardiac cath 11/14: POBA to OM1 via RFA s/p manual pull/compression. Plan to return in 1-2 weeks for  LAD and D1    4/2023: Presented with CP to ED today and sent directly to cath with Ekg changes and trop of 738 4/6   - Successful PCi with CHERRI to the proximal OM1 and balloon of the distal  OM1.    - Successful POBA LIMA to LAD  - cardiac cath with one stent to the OM1.       4/2023: Admitted with NSTEMI, received ASA load, Brilinta load, s/p Heparin gtt, BB, statin - Trop 412- > 453-> 366.   ROYAL not in concordant leads. ST depressions seen, s/p cardiac cath ( diagnostic) on 4/3 shows significant progression of coronaries dz and occlusion of all grafts, LIMA occluded at distal anastomosis to LAD with diffuse distal LAD disease, MRI indicates viability.  Followed by cards, Lisinopril held per cards, changed to Entresto after 36hrs, Added Valsartan.  s/p CABG 3 months ago.  On 4/6 pt underwent PCI with CHERRI to OM1 via RRA by Dr. Amador and 4/7 PCI to LIMA.      IN THE ED:  Hgb found to be 7.4. 2 units of pRBC ordered. BCx and CXR ordered. GI consulted. (14 Oct 2024 10:10)        PAST MEDICAL & SURGICAL HISTORY:  Hypertension      GERD (gastroesophageal reflux disease)      CAD (coronary artery disease)      History of biliary duct stent placement      BPH (benign prostatic hyperplasia)      T2DM (type 2 diabetes mellitus)      HLD (hyperlipidemia)      History of ischemic cardiomyopathy      Former smoker      S/P CABG (coronary artery bypass graft)      History of ERCP      S/P cholecystectomy      H/O coronary angioplasty      S/P cardiac cath  with stent x7        FAMILY HISTORY:  FH: heart disease (Father)      SOCIAL HISTORY:  unchanged    MEDICATIONS: patient is currently on meotprolol 200 mg daily  aspirin 81 mg daily  Janumet 50 mg/1000 mg tab  tamsulosin .4 mg daily  atorvastatin  40 mg daily  Brilinta 90 mg daily  Entresto 24-26 mg twice a daily  senna daily as needed  omeprazole 20 mg daily  ezetimibe 10 mg daily  isosorbide nm 30 mg once a day           pantoprazole  Injectable 40 milliGRAM(s) IV Push two times a day    dextrose 50% Injectable 25 Gram(s) IV Push once  dextrose 50% Injectable 12.5 Gram(s) IV Push once  dextrose 50% Injectable 25 Gram(s) IV Push once  dextrose Oral Gel 15 Gram(s) Oral once PRN  glucagon  Injectable 1 milliGRAM(s) IntraMuscular once  insulin lispro (ADMELOG) corrective regimen sliding scale   SubCutaneous every 6 hours    dextrose 5%. 1000 milliLiter(s) IV Continuous <Continuous>  dextrose 5%. 1000 milliLiter(s) IV Continuous <Continuous>        -------------------------------------------------------------------------------------------  PHYSICAL EXAM:  T(C): 36.7 (10-14-24 @ 12:03), Max: 37.1 (10-14-24 @ 06:12)  HR: 74 (10-14-24 @ 12:03) (58 - 115)  BP: 111/72 (10-14-24 @ 12:03) (92/60 - 125/82)  RR: 18 (10-14-24 @ 12:03) (18 - 21)  SpO2: 99% (10-14-24 @ 12:03) (98% - 100%)  Wt(kg): --  I&O's Summary    14 Oct 2024 07:01  -  14 Oct 2024 13:01  --------------------------------------------------------  IN: 0 mL / OUT: 0 mL / NET: 0 mL        GENERAL: Patient is alert, awake, and in no acute distress  HEENT: Moist mucous membranes. No conjunctival injection. No JVD  CHEST/LUNG: Clear to auscultation bilaterally; No wheezing. Unlabored respirations.  HEART: Regular rate and rhythm; No murmurs. Radial pulses 2+.  ABDOMEN: Bowel sounds present; Soft, Nontender, Nondistended.   EXTREMITIES:  No lower extremity tenderness. No lower extremity edema.  NEURO: Aox3, no focal deficits    -------------------------------------------------------------------------------------------  LABS:                          7.4    13.45 )-----------( 252      ( 14 Oct 2024 06:40 )             23.9     10-14    130[L]  |  96  |  39[H]  ----------------------------<  294[H]  5.1   |  17[L]  |  0.77    Ca    8.9      14 Oct 2024 06:40    TPro  7.0  /  Alb  4.2  /  TBili  0.6  /  DBili  x   /  AST  27  /  ALT  24  /  AlkPhos  44  10-14    PT/INR - ( 14 Oct 2024 06:40 )   PT: 13.7 sec;   INR: 1.19 ratio         PTT - ( 14 Oct 2024 06:40 )  PTT:28.8 sec  CARDIAC MARKERS ( 14 Oct 2024 06:40 )  57 ng/L / x     / x     / x     / x     / x                  -------------------------------------------------------------------------------------------  Cardiovascular Diagnostic Testing:    ECG: sinus tachycardia     Echo: last echo showing grade one diastolic dysfunction on last admission    Stress Testing:    Cath:    -------------------------------------------------------------------------------------------                These are preliminary recommendations. Please see attending attestation for final recommendations.

## 2024-10-14 NOTE — CONSULT NOTE ADULT - ASSESSMENT
Shaikh Melida is a 54M w/ PMH HTN, CAD s/p CABG and 7 stents 12/2022 on Aspirin and Brilinta, ICM, CHF (EF 35%), DM, GERD, cardiac bypass here for 6 days of progressively worsening cp w/ sxs worsening at 11am on Sunday. Patient reports noted dark stool since Thursday, 2x daily BM's. Also had 3 episodes of hematemesis yesterday. Last emesis and BM was night of 10/13. Decreased PO due to decreased appetite. Also had chest pain that improved since arrival to ED. No SOB, fever, chills or abdominal pain. Of note, recent ERCP 10/9 with biliary stent removal and extension sphincterotomy, concerning for post sphincterotomy bleed. Nephrology consulted for Hyponatremia.     Hyponatremia   - Patient known to our practice  - Hx of Hyponatremia, SIADH in past   -  Urine Osm 644, Urine Na 97 , c/w SIADH   - Start Fluid restriction of 1L/day   - Monitor Na    Anemia  Admitted w/ melena   GI following  Plan for Endoscopy today  Monitor Hgb

## 2024-10-14 NOTE — H&P ADULT - ASSESSMENT
Shaikh Melida is a 54M PMH HTN, CAD s/p CABG and stents 12/2022 on Aspirin and Brilinta, ICM, CHF (EF 35%), DM, GERD of cardiac bypass, 7 stents (last 03/2024) follows w/ cards at [facility] here for 6 days of progressively worsening cp w/ sxs worsening at 11am on Sunday. Pt reports constant pain and also endorses having black stool with an episode of bright blood in emesis. Denies fever, chills, trauma. Of note, pt recently had ERCP w/ biliary stent removal and pus removal.     PMH:  Above    PSH:    MEDS:  Below    ALLERGIES:    FH:    SH:    CHART REVIEW:  5/2024: Hospitalized in acute biliary obstruction choledocholithiasis w possible cholangitis s/p unsuccessful ERCP 2/2 difficult anatomy and periampullary diverticulum. 5/18 S/p EGD/ERCP demonstrating tortuous esophagus and small biliary sphincterotomy w biliary stent placement for choledocholithiasis. S/p lap ashley on 5/20.    11/2023: Pt has been having angina 2-3x/week, described as chest tightness that lasts ~30min. Pt also reports +orthopnea, decreased exercise tolerance for the past month as well.   denies fever, chills, cough, palpitation, abd pain, n/v/d, or urinary sx. S/p cardiac cath 11/14: POBA to OM1 via RFA s/p manual pull/compression. Plan to return in 1-2 weeks for  LAD and D1    4/2023: 53 y/o M with Pmhx of HTN, HLD, DM2, CAD s/p CABG on 12/19/22, recent stent here last week, presented with CP to ED today and sent directly to cath with Ekg changes and trop of 738 4/6 Cath:  Conclusions:   Successful PCi with CHERRI to the proximal OM1 and balloon of the distal  OM1.  Plan staged PCI of the LIMA to LAD tomorrow.    4/7 Cath: Conclusions:   Successful POBA LIMA to LAD   (11 Apr 2023 17:43)    4/11 cardiac cath with one stent to the OM1. Right femoral site without swelling, bleeding.      4:2023:Admitted with NSTEMI, received ASA load, Brilinta load, s/p Heparin gtt, BB, statin - Trop 412- > 453-> 366.   ROYAL not in concordant leads. ST depressions seen, s/p cardiac cath ( diagnostic) on 4/3 shows significant progression of coronaries dz and occlusion of all grafts, LIMA occluded at distal anastomosis to LAD with diffuse distal LAD disease, MRI indicates viability.  Followed by cards, Lisinopril held per cards, changed to Entresto after 36hrs, Added Valsartan.  s/p CABG 3 months ago.  On 4/6 pt underwent PCI with CHERRI to OM1 via RRA by Dr. Amador and 4/7 PCI to LIMA.        IN THE ED:  Hgb found to be 7.4. 2 units of pRBC ordered. BCx and CXR ordered. GI consulted. Shaikh Melida is a 54M w/ PMH HTN, CAD s/p CABG and 7 stents 12/2022 on Aspirin and Brilinta, ICM, CHF (EF 35%), DM, GERD, cardiac bypass here for 6 days of progressively worsening cp w/ sxs worsening at 11am on Sunday. Patient reports noted dark stool since Thursday, 2x daily BM's and last BM was last night. Also had 3 episodes of hematemesis yesterday, no further episodes today. Of note, recent ERCP 10/9 with biliary stent removal and extension sphincterotomy, concerning for post sphincterotomy bleed. Concern for UGIB considering decreased hemoglobin, recent procedure, and hemamesis and dark stools.     Shaikh Melida is a 54M w/ PMH HTN, CAD s/p CABG and 7 stents 12/2022 on Aspirin and Brilinta, ICM, CHF (EF 35%), DM, GERD, cardiac bypass here for 6 days of progressively worsening cp w/ sxs worsening at 11am on Sunday. Patient reports noted dark stool since Thursday, 2x daily BM's and last BM was last night. Also had 3 episodes of hematemesis yesterday, no further episodes today. Of note, recent ERCP 10/9 with biliary stent removal and extension sphincterotomy, concerning for post sphincterotomy bleed. Concern for UGIB considering decreased hemoglobin, recent procedure, and hematemesis and dark stools.

## 2024-10-14 NOTE — H&P ADULT - ATTENDING COMMENTS
54M with PMHx of CAD (multiple stents, last one was in 03/2024) and T2DM presenting for one day history of melena and hematemesis. Patient has a history of choledocholithiasis and cholangitis with recent biliary stent placement and subsequent removal several days prior. The procedure itself was unremarkable, but then he developed above symptoms. Also with noted fatigue. He denies abdominal pain    VS reviewed - mildly hypotensive  Abd exam unremarkable    Labs personally reviewed   Hg 7.4 (baseline ~12?)  Lactate 4.5  BUN 39, Cr 0.7    ERCP/EGD from several days prior with stent removal and gastritis    A/P:  - Likely UGIB given symptoms, signs, hypoperfusion  - Hold DAPT (last stent >7 months ago) for now, would resume at least aspirin when feasible  - Transfuse pRBC now  - GI consult for EGD  - PPI IV BID

## 2024-10-15 ENCOUNTER — TRANSCRIPTION ENCOUNTER (OUTPATIENT)
Age: 54
End: 2024-10-15

## 2024-10-15 ENCOUNTER — RESULT REVIEW (OUTPATIENT)
Age: 54
End: 2024-10-15

## 2024-10-15 LAB
A1C WITH ESTIMATED AVERAGE GLUCOSE RESULT: 6.4 % — HIGH (ref 4–5.6)
ANION GAP SERPL CALC-SCNC: 12 MMOL/L — SIGNIFICANT CHANGE UP (ref 5–17)
BUN SERPL-MCNC: 25 MG/DL — HIGH (ref 7–23)
CALCIUM SERPL-MCNC: 8.6 MG/DL — SIGNIFICANT CHANGE UP (ref 8.4–10.5)
CHLORIDE SERPL-SCNC: 100 MMOL/L — SIGNIFICANT CHANGE UP (ref 96–108)
CO2 SERPL-SCNC: 22 MMOL/L — SIGNIFICANT CHANGE UP (ref 22–31)
CREAT SERPL-MCNC: 0.98 MG/DL — SIGNIFICANT CHANGE UP (ref 0.5–1.3)
EGFR: 92 ML/MIN/1.73M2 — SIGNIFICANT CHANGE UP
ESTIMATED AVERAGE GLUCOSE: 137 MG/DL — HIGH (ref 68–114)
GLUCOSE BLDC GLUCOMTR-MCNC: 178 MG/DL — HIGH (ref 70–99)
GLUCOSE BLDC GLUCOMTR-MCNC: 228 MG/DL — HIGH (ref 70–99)
GLUCOSE BLDC GLUCOMTR-MCNC: 235 MG/DL — HIGH (ref 70–99)
GLUCOSE BLDC GLUCOMTR-MCNC: 266 MG/DL — HIGH (ref 70–99)
GLUCOSE SERPL-MCNC: 232 MG/DL — HIGH (ref 70–99)
HCT VFR BLD CALC: 27.7 % — LOW (ref 39–50)
HGB BLD-MCNC: 9.4 G/DL — LOW (ref 13–17)
MAGNESIUM SERPL-MCNC: 1.8 MG/DL — SIGNIFICANT CHANGE UP (ref 1.6–2.6)
MCHC RBC-ENTMCNC: 28 PG — SIGNIFICANT CHANGE UP (ref 27–34)
MCHC RBC-ENTMCNC: 33.9 GM/DL — SIGNIFICANT CHANGE UP (ref 32–36)
MCV RBC AUTO: 82.4 FL — SIGNIFICANT CHANGE UP (ref 80–100)
NRBC # BLD: 0 /100 WBCS — SIGNIFICANT CHANGE UP (ref 0–0)
PHOSPHATE SERPL-MCNC: 3.8 MG/DL — SIGNIFICANT CHANGE UP (ref 2.5–4.5)
PLATELET # BLD AUTO: 170 K/UL — SIGNIFICANT CHANGE UP (ref 150–400)
POTASSIUM SERPL-MCNC: 4 MMOL/L — SIGNIFICANT CHANGE UP (ref 3.5–5.3)
POTASSIUM SERPL-SCNC: 4 MMOL/L — SIGNIFICANT CHANGE UP (ref 3.5–5.3)
RBC # BLD: 3.36 M/UL — LOW (ref 4.2–5.8)
RBC # FLD: 14.6 % — HIGH (ref 10.3–14.5)
SODIUM SERPL-SCNC: 134 MMOL/L — LOW (ref 135–145)
TROPONIN T, HIGH SENSITIVITY RESULT: 405 NG/L — HIGH (ref 0–51)
TROPONIN T, HIGH SENSITIVITY RESULT: 417 NG/L — HIGH (ref 0–51)
WBC # BLD: 9.08 K/UL — SIGNIFICANT CHANGE UP (ref 3.8–10.5)
WBC # FLD AUTO: 9.08 K/UL — SIGNIFICANT CHANGE UP (ref 3.8–10.5)

## 2024-10-15 PROCEDURE — 99239 HOSP IP/OBS DSCHRG MGMT >30: CPT | Mod: GC

## 2024-10-15 PROCEDURE — 99231 SBSQ HOSP IP/OBS SF/LOW 25: CPT

## 2024-10-15 PROCEDURE — 99233 SBSQ HOSP IP/OBS HIGH 50: CPT | Mod: GC

## 2024-10-15 PROCEDURE — 93306 TTE W/DOPPLER COMPLETE: CPT | Mod: 26

## 2024-10-15 RX ORDER — METOPROLOL TARTRATE 50 MG
200 TABLET ORAL DAILY
Refills: 0 | Status: DISCONTINUED | OUTPATIENT
Start: 2024-10-15 | End: 2024-10-16

## 2024-10-15 RX ORDER — ATORVASTATIN CALCIUM 10 MG/1
1 TABLET, FILM COATED ORAL
Qty: 30 | Refills: 0
Start: 2024-10-15 | End: 2024-11-13

## 2024-10-15 RX ORDER — SITAGLIPTIN AND METFORMIN HYDROCHLORIDE 500; 50 MG/1; MG/1
1 TABLET, FILM COATED ORAL
Qty: 60 | Refills: 0
Start: 2024-10-15 | End: 2024-11-13

## 2024-10-15 RX ORDER — SACUBITRIL AND VALSARTAN 97; 103 MG/1; MG/1
1 TABLET, FILM COATED ORAL
Refills: 0 | Status: DISCONTINUED | OUTPATIENT
Start: 2024-10-15 | End: 2024-10-16

## 2024-10-15 RX ORDER — EZETIMIBE 10 MG/1
1 TABLET ORAL
Refills: 0 | DISCHARGE

## 2024-10-15 RX ORDER — ASPIRIN 325 MG
81 TABLET ORAL DAILY
Refills: 0 | Status: DISCONTINUED | OUTPATIENT
Start: 2024-10-15 | End: 2024-10-16

## 2024-10-15 RX ORDER — INSULIN GLARGINE 300 U/ML
6 INJECTION, SOLUTION SUBCUTANEOUS
Qty: 1 | Refills: 0
Start: 2024-10-15 | End: 2024-11-13

## 2024-10-15 RX ORDER — EZETIMIBE 10 MG/1
1 TABLET ORAL
Qty: 30 | Refills: 0
Start: 2024-10-15 | End: 2024-11-13

## 2024-10-15 RX ORDER — TICAGRELOR 60 MG/1
1 TABLET ORAL
Qty: 60 | Refills: 0
Start: 2024-10-15 | End: 2024-11-13

## 2024-10-15 RX ORDER — INSULIN GLARGINE 300 U/ML
6 INJECTION, SOLUTION SUBCUTANEOUS AT BEDTIME
Refills: 0 | Status: DISCONTINUED | OUTPATIENT
Start: 2024-10-15 | End: 2024-10-16

## 2024-10-15 RX ORDER — FOLIC ACID 1 MG/1
1 TABLET ORAL
Qty: 30 | Refills: 0
Start: 2024-10-15 | End: 2024-11-13

## 2024-10-15 RX ORDER — SACUBITRIL AND VALSARTAN 97; 103 MG/1; MG/1
1 TABLET, FILM COATED ORAL
Refills: 0 | DISCHARGE

## 2024-10-15 RX ORDER — SACUBITRIL AND VALSARTAN 97; 103 MG/1; MG/1
1 TABLET, FILM COATED ORAL
Qty: 60 | Refills: 0
Start: 2024-10-15 | End: 2024-11-13

## 2024-10-15 RX ORDER — SACUBITRIL AND VALSARTAN 97; 103 MG/1; MG/1
1 TABLET, FILM COATED ORAL
Refills: 0 | Status: DISCONTINUED | OUTPATIENT
Start: 2024-10-15 | End: 2024-10-15

## 2024-10-15 RX ORDER — TAMSULOSIN HCL 0.4 MG
1 CAPSULE ORAL
Qty: 30 | Refills: 0
Start: 2024-10-15 | End: 2024-11-13

## 2024-10-15 RX ORDER — ICOSAPENT ETHYL 1 G/1
2 CAPSULE ORAL
Refills: 0 | DISCHARGE

## 2024-10-15 RX ORDER — ICOSAPENT ETHYL 1 G/1
2 CAPSULE ORAL
Qty: 120 | Refills: 0
Start: 2024-10-15 | End: 2024-11-13

## 2024-10-15 RX ORDER — METOPROLOL TARTRATE 50 MG
1 TABLET ORAL
Qty: 30 | Refills: 0
Start: 2024-10-15 | End: 2024-11-13

## 2024-10-15 RX ORDER — ISOSORBIDE DINITRATE 10 MG
1 TABLET ORAL
Qty: 30 | Refills: 0
Start: 2024-10-15 | End: 2024-11-13

## 2024-10-15 RX ADMIN — Medication 200 MILLIGRAM(S): at 17:01

## 2024-10-15 RX ADMIN — Medication 2: at 08:39

## 2024-10-15 RX ADMIN — TICAGRELOR 90 MILLIGRAM(S): 60 TABLET ORAL at 05:09

## 2024-10-15 RX ADMIN — PANTOPRAZOLE SODIUM 40 MILLIGRAM(S): 40 TABLET, DELAYED RELEASE ORAL at 05:09

## 2024-10-15 RX ADMIN — ATORVASTATIN CALCIUM 40 MILLIGRAM(S): 10 TABLET, FILM COATED ORAL at 21:23

## 2024-10-15 RX ADMIN — Medication 2: at 17:21

## 2024-10-15 RX ADMIN — Medication 81 MILLIGRAM(S): at 11:03

## 2024-10-15 RX ADMIN — TICAGRELOR 90 MILLIGRAM(S): 60 TABLET ORAL at 17:02

## 2024-10-15 RX ADMIN — INSULIN GLARGINE 6 UNIT(S): 300 INJECTION, SOLUTION SUBCUTANEOUS at 21:23

## 2024-10-15 RX ADMIN — Medication 3: at 12:26

## 2024-10-15 RX ADMIN — Medication 0.4 MILLIGRAM(S): at 21:23

## 2024-10-15 RX ADMIN — PANTOPRAZOLE SODIUM 40 MILLIGRAM(S): 40 TABLET, DELAYED RELEASE ORAL at 17:01

## 2024-10-15 NOTE — PROGRESS NOTE ADULT - SUBJECTIVE AND OBJECTIVE BOX
***Plan not finalized until attending attestation***    Keenan Azul MD (PGY-1)  Internal Medicine  Contact via [employer] TEAMS    ******************************************    PROGRESS NOTE:     Patient is a 54y old  Male who presents with a chief complaint of Melena (15 Oct 2024 07:12)      INTERVAL EVENTS: No acute overnight events.     SUBJECTIVE: Patient seen and examined at bedside. This morning, the patient is comfortable and doing well. No acute complaints.    MEDICATIONS  (STANDING):  aspirin enteric coated 81 milliGRAM(s) Oral daily  atorvastatin 40 milliGRAM(s) Oral at bedtime  dextrose 5%. 1000 milliLiter(s) (50 mL/Hr) IV Continuous <Continuous>  dextrose 5%. 1000 milliLiter(s) (100 mL/Hr) IV Continuous <Continuous>  dextrose 50% Injectable 25 Gram(s) IV Push once  dextrose 50% Injectable 12.5 Gram(s) IV Push once  dextrose 50% Injectable 25 Gram(s) IV Push once  glucagon  Injectable 1 milliGRAM(s) IntraMuscular once  insulin lispro (ADMELOG) corrective regimen sliding scale   SubCutaneous at bedtime  insulin lispro (ADMELOG) corrective regimen sliding scale   SubCutaneous three times a day before meals  pantoprazole  Injectable 40 milliGRAM(s) IV Push two times a day  sacubitril 24 mG/valsartan 26 mG 1 Tablet(s) Oral <User Schedule>  tamsulosin 0.4 milliGRAM(s) Oral at bedtime  ticagrelor 90 milliGRAM(s) Oral every 12 hours    MEDICATIONS  (PRN):  dextrose Oral Gel 15 Gram(s) Oral once PRN Blood Glucose LESS THAN 70 milliGRAM(s)/deciliter      CAPILLARY BLOOD GLUCOSE      POCT Blood Glucose.: 323 mg/dL (14 Oct 2024 20:58)  POCT Blood Glucose.: 148 mg/dL (14 Oct 2024 17:22)  POCT Blood Glucose.: 158 mg/dL (14 Oct 2024 15:04)  POCT Blood Glucose.: 222 mg/dL (14 Oct 2024 10:49)    I&O's Summary    14 Oct 2024 07:01  -  15 Oct 2024 07:00  --------------------------------------------------------  IN: 360 mL / OUT: 850 mL / NET: -490 mL        PHYSICAL EXAM:  Vital Signs Last 24 Hrs  T(C): 36.8 (15 Oct 2024 04:25), Max: 37.3 (14 Oct 2024 17:49)  T(F): 98.2 (15 Oct 2024 04:25), Max: 99.2 (14 Oct 2024 17:49)  HR: 110 (15 Oct 2024 04:25) (58 - 110)  BP: 129/88 (15 Oct 2024 04:25) (86/52 - 129/88)  BP(mean): --  RR: 18 (15 Oct 2024 04:25) (15 - 20)  SpO2: 99% (15 Oct 2024 04:25) (96% - 100%)    Parameters below as of 15 Oct 2024 04:25  Patient On (Oxygen Delivery Method): room air        GENERAL: NAD, lying in bed comfortably  HEAD: Atraumatic, normocephalic  EYES: EOMI, PERRLA, conjunctiva and sclera clear  ENT: Moist mucous membranes  NECK: Supple, no JVD  HEART: S1, S2, Regular rate and rhythm, no murmurs, rubs, or gallops  LUNGS: Unlabored respirations, clear to auscultation bilaterally, no crackles, wheezing, or rhonchi  ABDOMEN: Soft, nontender, nondistended, +BS  EXTREMITIES: 2+ peripheral pulses bilaterally. No clubbing, cyanosis, or edema  NERVOUS SYSTEM:  A&Ox3, no focal deficits   SKIN: No rashes or lesions    LABS:                        9.4    9.08  )-----------( 170      ( 15 Oct 2024 04:42 )             27.7     10-15    134[L]  |  100  |  25[H]  ----------------------------<  232[H]  4.0   |  22  |  0.98    Ca    8.6      15 Oct 2024 04:42  Phos  3.8     10-15  Mg     1.8     10-15    TPro  7.0  /  Alb  4.2  /  TBili  0.6  /  DBili  x   /  AST  27  /  ALT  24  /  AlkPhos  44  10-14    PT/INR - ( 14 Oct 2024 06:40 )   PT: 13.7 sec;   INR: 1.19 ratio         PTT - ( 14 Oct 2024 06:40 )  PTT:28.8 sec      Urinalysis Basic - ( 15 Oct 2024 04:42 )    Color: x / Appearance: x / SG: x / pH: x  Gluc: 232 mg/dL / Ketone: x  / Bili: x / Urobili: x   Blood: x / Protein: x / Nitrite: x   Leuk Esterase: x / RBC: x / WBC x   Sq Epi: x / Non Sq Epi: x / Bacteria: x          RADIOLOGY & ADDITIONAL TESTS:  Results Reviewed:   Imaging Personally Reviewed:  Electrocardiogram Personally Reviewed:  Tele: ***Plan not finalized until attending attestation***    Keenan Azul MD (PGY-1)  Internal Medicine  Contact via [employer] TEAMS    ******************************************    PROGRESS NOTE:     Patient is a 54y old  Male who presents with a chief complaint of Melena (15 Oct 2024 07:12)      INTERVAL EVENTS:   Episode of CP overnight 15-20 mins  Trops redrawn and EKG ordered  Cards consulted. Were not concerned given EKG unchanged  Chest pain subsided    SUBJECTIVE: Patient seen and examined at bedside. This morning, the patient is comfortable and doing well. No acute complaints.    MEDICATIONS  (STANDING):  aspirin enteric coated 81 milliGRAM(s) Oral daily  atorvastatin 40 milliGRAM(s) Oral at bedtime  dextrose 5%. 1000 milliLiter(s) (50 mL/Hr) IV Continuous <Continuous>  dextrose 5%. 1000 milliLiter(s) (100 mL/Hr) IV Continuous <Continuous>  dextrose 50% Injectable 25 Gram(s) IV Push once  dextrose 50% Injectable 12.5 Gram(s) IV Push once  dextrose 50% Injectable 25 Gram(s) IV Push once  glucagon  Injectable 1 milliGRAM(s) IntraMuscular once  insulin lispro (ADMELOG) corrective regimen sliding scale   SubCutaneous at bedtime  insulin lispro (ADMELOG) corrective regimen sliding scale   SubCutaneous three times a day before meals  pantoprazole  Injectable 40 milliGRAM(s) IV Push two times a day  sacubitril 24 mG/valsartan 26 mG 1 Tablet(s) Oral <User Schedule>  tamsulosin 0.4 milliGRAM(s) Oral at bedtime  ticagrelor 90 milliGRAM(s) Oral every 12 hours    MEDICATIONS  (PRN):  dextrose Oral Gel 15 Gram(s) Oral once PRN Blood Glucose LESS THAN 70 milliGRAM(s)/deciliter      CAPILLARY BLOOD GLUCOSE      POCT Blood Glucose.: 323 mg/dL (14 Oct 2024 20:58)  POCT Blood Glucose.: 148 mg/dL (14 Oct 2024 17:22)  POCT Blood Glucose.: 158 mg/dL (14 Oct 2024 15:04)  POCT Blood Glucose.: 222 mg/dL (14 Oct 2024 10:49)    I&O's Summary    14 Oct 2024 07:01  -  15 Oct 2024 07:00  --------------------------------------------------------  IN: 360 mL / OUT: 850 mL / NET: -490 mL        PHYSICAL EXAM:  Vital Signs Last 24 Hrs  T(C): 36.8 (15 Oct 2024 04:25), Max: 37.3 (14 Oct 2024 17:49)  T(F): 98.2 (15 Oct 2024 04:25), Max: 99.2 (14 Oct 2024 17:49)  HR: 110 (15 Oct 2024 04:25) (58 - 110)  BP: 129/88 (15 Oct 2024 04:25) (86/52 - 129/88)  BP(mean): --  RR: 18 (15 Oct 2024 04:25) (15 - 20)  SpO2: 99% (15 Oct 2024 04:25) (96% - 100%)    Parameters below as of 15 Oct 2024 04:25  Patient On (Oxygen Delivery Method): room air        GENERAL: NAD, lying in bed comfortably  HEAD: Atraumatic, normocephalic  EYES: EOMI, PERRLA, conjunctiva and sclera clear  ENT: Moist mucous membranes  NECK: Supple, no JVD  HEART: S1, S2, Regular rate and rhythm, no murmurs, rubs, or gallops  LUNGS: Unlabored respirations, clear to auscultation bilaterally, no crackles, wheezing, or rhonchi  ABDOMEN: Soft, nontender, nondistended, +BS  EXTREMITIES: 2+ peripheral pulses bilaterally. No clubbing, cyanosis, or edema  NERVOUS SYSTEM:  A&Ox3, no focal deficits   SKIN: No rashes or lesions    LABS:                        9.4    9.08  )-----------( 170      ( 15 Oct 2024 04:42 )             27.7     10-15    134[L]  |  100  |  25[H]  ----------------------------<  232[H]  4.0   |  22  |  0.98    Ca    8.6      15 Oct 2024 04:42  Phos  3.8     10-15  Mg     1.8     10-15    TPro  7.0  /  Alb  4.2  /  TBili  0.6  /  DBili  x   /  AST  27  /  ALT  24  /  AlkPhos  44  10-14    PT/INR - ( 14 Oct 2024 06:40 )   PT: 13.7 sec;   INR: 1.19 ratio         PTT - ( 14 Oct 2024 06:40 )  PTT:28.8 sec      Urinalysis Basic - ( 15 Oct 2024 04:42 )    Color: x / Appearance: x / SG: x / pH: x  Gluc: 232 mg/dL / Ketone: x  / Bili: x / Urobili: x   Blood: x / Protein: x / Nitrite: x   Leuk Esterase: x / RBC: x / WBC x   Sq Epi: x / Non Sq Epi: x / Bacteria: x          RADIOLOGY & ADDITIONAL TESTS:  < from: Upper Endoscopy (10.14.24 @ 14:57) >  Findings:       The examined esophagus was normal.       No gross lesions were noted in the entire examined stomach.       No gross lesions were noted in the duodenal bulb and in the second portion of the duodenum.        The major papilla was examed with a duodenoscopy in close view, with stigmata of recent        bleeding from the piror sphincterotomy site, but no active bleeding.                                                                                                        Impression:          - No active bleeding or residual blood noted on the exam                       - Stigmata of recent bleeding from the prior sphincterotmoy site. Likely the                        source of recent melena. No interventions needed.  Recommendation:      - Return patient to [facility] for ongoing care.                       - Advance diet as tolerated.    < end of copied text >

## 2024-10-15 NOTE — DISCHARGE NOTE PROVIDER - HOSPITAL COURSE
HPI:  Shaikh Melida is a 54M w/ PMH HTN, CAD s/p CABG and 7 stents 12/2022 on Aspirin and Brilinta, ICM, CHF (EF 35%), DM, GERD, cardiac bypass here for 6 days of progressively worsening cp w/ sxs worsening at 11am on Sunday. Patient reports noted dark stool since Thursday, 2x daily BM's. Also had 3 episodes of hematemesis yesterday. Last emesis and BM was night of 10/13. Decreased PO due to decreased appetite. Also had chest pain that improved since arrival to ED. No SOB, fever, chills or abdominal pain. Of note, recent ERCP 10/9 with biliary stent removal and extension sphincterotomy, concerning for post sphincterotomy bleed. Concern for UGIB considering decreased hemoglobin, recent procedure, and hematemesis and dark stools.    Hospital course:  Patient was transfused 2u of pRBC and underwent Endoscopy showing: No active bleeding or residual blood noted on the exam. Stigmata of recent bleeding from the prior sphincterotmoy site. Likely the source of recent melena. No interventions were needed and hemoglobin remained stable. Of note patient also had EKG completed which showed various (noncontiguous) ST elevations, and ST depressions as well as labwork showing troponemia. Cardiology evaluated the patient and concluded that elevated Troponin was in the setting of demand ischemia in the setting of GI Bleed, and repeat EKG showed less present ST changes compared to prior with improved HR control. TTE was completed and unchanged from TTE in November 2023. Patient was cleared for discharge from both a cardiology and GI perspective and was restarted on his GDMT and DAPT.    Important Medication Changes and Reason:  - None    Active or Pending Issues Requiring Follow-up:  - None    Advanced Directives:   [ ] Full code  [ ] DNR  [ ] Hospice    Discharge Diagnoses:  - Bleed from sphincterotomy site    On day of discharge, patient is clinically stable with no new exam findings or acute symptoms compared to prior. The patient was seen by the attending physician on the date of discharge and deemed stable and acceptable for discharge. The patient's chronic medical conditions were treated accordingly per the patient's home medication regimen. The patient's medication reconciliation (with changes made to chronic medications), follow up appointments, discharge orders, instructions, and significant lab and diagnostic studies are as noted.    Patient will be discharged to home with close follow up. HPI:  Shaikh Melida is a 54M w/ PMH HTN, CAD s/p CABG and 7 stents 12/2022 on Aspirin and Brilinta, ICM, CHF (EF 35%), DM, GERD, cardiac bypass here for 6 days of progressively worsening cp w/ sxs worsening at 11am on Sunday. Patient reports noted dark stool since Thursday, 2x daily BM's. Also had 3 episodes of hematemesis yesterday. Last emesis and BM was night of 10/13. Decreased PO due to decreased appetite. Also had chest pain that improved since arrival to ED. No SOB, fever, chills or abdominal pain. Of note, recent ERCP 10/9 with biliary stent removal and extension sphincterotomy, concerning for post sphincterotomy bleed. Concern for UGIB considering decreased hemoglobin, recent procedure, and hematemesis and dark stools.    Hospital course:  Patient was transfused 2u of pRBC and underwent Endoscopy showing: No active bleeding or residual blood noted on the exam. Stigmata of recent bleeding from the prior sphincterotmoy site. Likely the source of recent melena. No interventions were needed and hemoglobin remained stable. Of note patient also had EKG completed which showed various (noncontiguous) ST elevations, and ST depressions as well as labwork showing troponemia. Cardiology evaluated the patient and concluded that elevated Troponin was in the setting of demand ischemia in the setting of GI Bleed, and repeat EKG showed less present ST changes compared to prior with improved HR control. TTE was completed and unchanged from TTE in November 2023. Patient was cleared for discharge from both a cardiology and GI perspective and was restarted on his GDMT and DAPT.    Important Medication Changes and Reason:  - None    Active or Pending Issues Requiring Follow-up:  - None    Advanced Directives:   [ X] Full code  [ ] DNR  [ ] Hospice    Discharge Diagnoses:  - Bleed from sphincterotomy site    On day of discharge, patient is clinically stable with no new exam findings or acute symptoms compared to prior. The patient was seen by the attending physician on the date of discharge and deemed stable and acceptable for discharge. The patient's chronic medical conditions were treated accordingly per the patient's home medication regimen. The patient's medication reconciliation (with changes made to chronic medications), follow up appointments, discharge orders, instructions, and significant lab and diagnostic studies are as noted.    Patient will be discharged to home with close follow up. HPI:  Shaikh Melida is a 54M w/ PMH HTN, CAD s/p CABG and 7 stents 12/2022 on Aspirin and Brilinta, ICM, CHF (EF 35%), DM, GERD, cardiac bypass here for 6 days of progressively worsening cp w/ sxs worsening at 11am on Sunday. Patient reports noted dark stool since Thursday, 2x daily BM's. Also had 3 episodes of hematemesis yesterday. Last emesis and BM was night of 10/13. Decreased PO due to decreased appetite. Also had chest pain that improved since arrival to ED. No SOB, fever, chills or abdominal pain. Of note, recent ERCP 10/9 with biliary stent removal and extension sphincterotomy, concerning for post sphincterotomy bleed. Concern for UGIB considering decreased hemoglobin, recent procedure, and hematemesis and dark stools.    Hospital course:  Patient was transfused 2u of pRBC and underwent Endoscopy showing: No active bleeding or residual blood noted on the exam. Stigmata of recent bleeding from the prior sphincterotmoy site. Likely the source of recent melena. No interventions were needed and hemoglobin remained stable. Of note patient also had EKG completed which showed various (noncontiguous) ST elevations, and ST depressions as well as labwork showing troponemia. Cardiology evaluated the patient and concluded that elevated Troponin was in the setting of demand ischemia in the setting of GI Bleed, and repeat EKG showed less present ST changes compared to prior with improved HR control. TTE was completed and unchanged from TTE in November 2023. Patient was cleared for discharge from both a cardiology and GI perspective and was restarted on his GDMT and DAPT.    Important Medication Changes and Reason:  - None    Active or Pending Issues Requiring Follow-up:  - Anemia (repeat CBC in 2-4 weeks)    Advanced Directives:   [ X] Full code  [ ] DNR  [ ] Hospice    Discharge Diagnoses:  - Bleed from sphincterotomy site    On day of discharge, patient is clinically stable with no new exam findings or acute symptoms compared to prior. The patient was seen by the attending physician on the date of discharge and deemed stable and acceptable for discharge. The patient's chronic medical conditions were treated accordingly per the patient's home medication regimen. The patient's medication reconciliation (with changes made to chronic medications), follow up appointments, discharge orders, instructions, and significant lab and diagnostic studies are as noted.    Patient will be discharged to home with close follow up.

## 2024-10-15 NOTE — PROVIDER CONTACT NOTE (OTHER) - BACKGROUND
54M w/ hx of cardiac bypass, 7 stents last 03/2024 follows w/ cards at [facility] here for 6 days of progressively worsening chest pain, low hgb with report of melena and hematemesis.

## 2024-10-15 NOTE — PROGRESS NOTE ADULT - PROBLEM SELECTOR PLAN 2
- New ST elevations compared to previous on EKG  - Elevated troponin  - Pt w/o CP at this time. Most likely i/s/o anemia  - Cards on board  - Trend troponin  - Has been on DAPT Brilinta and aspirin at home s/p stents  - Hold DAPT for now given last stent 7 months ago  - Can resume aspirin when stable  - TTE ordered - New ST elevations compared to previous on EKG  - Elevated troponin  - Pt w/o CP at this time. Most likely i/s/o anemia  - Has been on DAPT Brilinta and aspirin at home s/p stents  - Started DAPT per cardiology  - TTE unchanged since previous  - Cards signing off unless repeat episode of CP

## 2024-10-15 NOTE — DISCHARGE NOTE PROVIDER - NSDCCPTREATMENT_GEN_ALL_CORE_FT
PRINCIPAL PROCEDURE  Procedure: Endoscopy, UGI  Findings and Treatment: Findings:       The examined esophagus was normal.       No gross lesions were noted in the entire examined stomach.       No gross lesions were noted in the duodenal bulb and in the second portion of the duodenum.        The major papilla was examed with a duodenoscopy in close view, with stigmata of recent        bleeding from the piror sphincterotomy site, but no active bleeding.                                                                                                        Impression:          - No active bleeding or residual blood noted on the exam                       - Stigmata of recent bleeding from the prior sphincterotmoy site. Likely the                        source of recent melena. No interventions needed.  Recommendation:      - Return patient to hospital [facility] for ongoing care.                       - Advance diet as tolerated.

## 2024-10-15 NOTE — PROGRESS NOTE ADULT - ASSESSMENT
Assessment and Recommendations:     Shaikh Melida is a 54M w/ PMH HTN, CAD s/p CABG and 7 stents post recent stent in  March, on Aspirin and Brilinta, ICM, CHF (EF 35%), DM, GERD, cardiac bypass here for 6 days of progressively worsening cp w/ sxs worsening at 11am on Sunday. Patient reports noted dark stool since Thursday, 2x daily BM's and last BM was last night.    Patient got EGD/Coonscopy done   EKG showing Sinus Tachycardia and ST depressions shown in multiple leads that have been shown before along with ST elevation repeat EKG showing improvement of ST elevation and heart rate  - Elevated Troponin in the setting of demand ischemia in the setting of GI Bleed currently down trending would stop   -would continue to hold aspirin and Plavix as hemoglobin in stable currently   -continue GDMT with parameters of SBP>90 as tolerated since Patient has GI Bleed and came in initially hypotensive , so would hold off now   TTE    -If patient is actively having chest pain please notify Cardiology   Assessment and Recommendations:     Shaikh Melida is a 54M w/ PMH HTN, CAD s/p CABG and 7 stents post recent stent in  March, on Aspirin and Brilinta, ICM, CHF (EF 35%), DM, GERD, cardiac bypass here for 6 days of progressively worsening cp w/ sxs worsening at 11am on Sunday. Patient reports noted dark stool since Thursday, 2x daily BM's and last BM was last night.    Patient got EGD/Coonscopy done   EKG showing Sinus Tachycardia and ST depressions shown in multiple leads that have been shown before along with ST elevation repeat EKG showing improvement of ST elevation and heart rate  - Elevated Troponin in the setting of demand ischemia in the setting of GI Bleed currently up trending  -would continue to hold aspirin and Plavix as hemoglobin in stable currently   -continue GDMT with parameters of SBP>90 as tolerated since Patient has GI Bleed and came in initially hypotensive , so would hold off now   TTE    -If patient is actively having chest pain please notify Cardiology   Assessment and Recommendations:     Shaikh Melida is a 54M w/ PMH HTN, CAD s/p CABG and 7 stents post recent stent in  March, on Aspirin and Brilinta, ICM, CHF (EF 35%), DM, GERD, cardiac bypass here for 6 days of progressively worsening cp w/ sxs worsening at 11am on Sunday. Patient reports noted dark stool since Thursday, 2x daily BM's and last BM was last night.    Patient got EGD done, got two units of PRBCS  EKG showing Sinus Tachycardia and ST depressions shown in multiple leads that have been shown before along with ST abnormalities due to the increased heart rate  - Elevated Troponin in the setting of demand ischemia in the setting of GI Bleed currently up trending  -would continue aspirin and Plavix as hemoglobin in stable currently   -continue GDMT with parameters of SBP>90 as tolerated since Patient has GI Bleed and came in initially hypotensive ,would resume when able as Patient currenly volume resuscitated and hemoglobin is stable    TTE     Assessment and Recommendations:     Shaikh Melida is a 54M w/ PMH HTN, CAD s/p CABG and 7 stents post recent stent in  March, on Aspirin and Brilinta, ICM, CHF (EF 35%), DM, GERD, cardiac bypass here for 6 days of progressively worsening cp w/ sxs worsening at 11am on Sunday. Patient reports noted dark stool since Thursday, 2x daily BM's and last BM was last night.    Patient got EGD done, got two units of PRBCS.   EKG showing Sinus Tachycardia and ST depressions shown in multiple leads that have been shown before along with ST abnormalities due to the increased heart rate  -Repeat EKGs show that patient ST changes look less present do to more controlled HR    - Elevated Troponin in the setting of demand ischemia in the setting of GI Bleed currently up trending would not continue trending   -would continue aspirin and Plavix as hemoglobin in stable currently   -continue GDMT with parameters of SBP>90 as tolerated since Patient has GI Bleed and came in initially hypotensive ,would resume when able as Patient currenly volume resuscitated and hemoglobin is stable    TTE showing EF:35%      Assessment and Recommendations:     Shaikh Melida is a 54M w/ PMH HTN, CAD s/p CABG and 7 stents post recent stent in  March, on Aspirin and Brilinta, ICM, CHF (EF 35%), DM, GERD, cardiac bypass here for 6 days of progressively worsening cp w/ sxs worsening at 11am on Sunday. Patient reports noted dark stool since Thursday, 2x daily BM's and last BM was last night.    Patient got EGD done, got two units of PRBCS.   EKG showing Sinus Tachycardia and ST depressions shown in multiple leads that have been shown before along with ST abnormalities due to the increased heart rate  -Repeat EKGs show that patient ST changes look less present do to more controlled HR    - Elevated Troponin in the setting of demand ischemia in the setting of GI Bleed currently up trending would not continue trending   -would continue aspirin and Plavix as hemoglobin in stable currently     TTE showing EF:35%  showing moderate systolic dysfunction similar to last echo  would restart GDMT,  GDMT with parameters of SBP>90

## 2024-10-15 NOTE — PROGRESS NOTE ADULT - PROBLEM SELECTOR PLAN 1
#Hematemesis  - Most likely 2/2 UGIB, possibly from recent ERCP   - GI on board, plan for EGD today  - S/p 2  pRBC transfusions  - Check post-transfusion CBC  - Transfuse as needed, goal >=8  - F/u EGD results #Hematemesis  - Most likely 2/2 UGIB, possibly from recent ERCP   - S/p 2  pRBC transfusions  - Transfuse as needed, goal >=8  - EGD showed signs of previous bleed from sphinctotomy site  - No bleeding since first night in ED - stable

## 2024-10-15 NOTE — PROVIDER CONTACT NOTE (OTHER) - ACTION/TREATMENT ORDERED:
Provider notified, EKG, labs including tropes drawn. cardiologist came to bedside. will redo ekg in 1 hour if pt continues to complain of chest pain

## 2024-10-15 NOTE — PROGRESS NOTE ADULT - PROBLEM SELECTOR PLAN 3
- Takes metoprolol, Entresto, and isosorbide dinitrate at home  - Holding home HTN medications i/s/o bleeding - Takes metoprolol, Entresto, and isosorbide dinitrate at home  - Restarted home meds

## 2024-10-15 NOTE — DISCHARGE NOTE PROVIDER - PROVIDER TOKENS
PROVIDER:[TOKEN:[[id number]],FOLLOWUP:[1 month],ESTABLISHEDPATIENT:[T]],PROVIDER:[TOKEN:[[id number]],FOLLOWUP:[1 month],ESTABLISHEDPATIENT:[T]] PROVIDER:[TOKEN:[[id number]],FOLLOWUP:[1 month],ESTABLISHEDPATIENT:[T]]

## 2024-10-15 NOTE — PROGRESS NOTE ADULT - SUBJECTIVE AND OBJECTIVE BOX
HPI : Shaikh Melida is a 54M w/ PMH HTN, CAD s/p CABG and 7 stents post recent stent in March  ,on Aspirin and Brilinta, ICM, CHF (EF 35%), DM, GERD, cardiac bypass here for 6 days of progressively worsening cp w/ sxs worsening at 11am on Sunday. Patient reports noted dark stool since Thursday, 2x daily BM's and last BM was last night. Also had 3 episodes of hematemesis yesterday, no further episodes today.  Patient endorsed that chest pain was on rest and exertion, and got worse with the dark stool since thursday . Upon arrival Patient was found to have  hemoglobin of 7.4 was ordered units of PRBCs currently having second unit of PRBC now.Also had chest pain that improved since arrival to ED. No SOB, fever, chills or abdominal pain. Of note, recent ERCP 10/9 with biliary stent removal and extension sphincterotomy, concerning for post sphincterotomy bleed.       Cardiology Consult Note            PMH:  Above    PSH: none     MEDS:  Below    ALLERGIES: no allergies as per patient    FH:CAD, dibates    SH: CAD,diabetes    CHART REVIEW:  5/2024: Hospitalized in acute biliary obstruction choledocholithiasis w possible cholangitis s/p unsuccessful ERCP 2/2 difficult anatomy and periampullary diverticulum. 5/18 S/p EGD/ERCP demonstrating tortuous esophagus and small biliary sphincterotomy w biliary stent placement for choledocholithiasis. S/p lap ashley on 5/20.    11/2023: Pt has been having angina 2-3x/week, described as chest tightness that lasts ~30min. Pt also reports +orthopnea, decreased exercise tolerance for the past month as well.   denies fever, chills, cough, palpitation, abd pain, n/v/d, or urinary sx. S/p cardiac cath 11/14: POBA to OM1 via RFA s/p manual pull/compression. Plan to return in 1-2 weeks for  LAD and D1    4/2023: Presented with CP to ED today and sent directly to cath with Ekg changes and trop of 738 4/6   - Successful PCi with CHERRI to the proximal OM1 and balloon of the distal  OM1.    - Successful POBA LIMA to LAD  - cardiac cath with one stent to the OM1.       4/2023: Admitted with NSTEMI, received ASA load, Brilinta load, s/p Heparin gtt, BB, statin - Trop 412- > 453-> 366.   ROYAL not in concordant leads. ST depressions seen, s/p cardiac cath ( diagnostic) on 4/3 shows significant progression of coronaries dz and occlusion of all grafts, LIMA occluded at distal anastomosis to LAD with diffuse distal LAD disease, MRI indicates viability.  Followed by cards, Lisinopril held per cards, changed to Entresto after 36hrs, Added Valsartan.  s/p CABG 3 months ago.  On 4/6 pt underwent PCI with CHERRI to OM1 via RRA by Dr. Amador and 4/7 PCI to LIMA.      IN THE ED:  Hgb found to be 7.4. 2 units of pRBC ordered. BCx and CXR ordered. GI consulted. (14 Oct 2024 10:10)        PAST MEDICAL & SURGICAL HISTORY:  Hypertension      GERD (gastroesophageal reflux disease)      CAD (coronary artery disease)      History of biliary duct stent placement      BPH (benign prostatic hyperplasia)      T2DM (type 2 diabetes mellitus)      HLD (hyperlipidemia)      History of ischemic cardiomyopathy      Former smoker      S/P CABG (coronary artery bypass graft)      History of ERCP      S/P cholecystectomy      H/O coronary angioplasty      S/P cardiac cath  with stent x7        FAMILY HISTORY:  FH: heart disease (Father)      SOCIAL HISTORY:  unchanged    MEDICATIONS: patient is currently on meotprolol 200 mg daily  aspirin 81 mg daily  Janumet 50 mg/1000 mg tab  tamsulosin .4 mg daily  atorvastatin  40 mg daily  Brilinta 90 mg daily  Entresto 24-26 mg twice a daily  senna daily as needed  omeprazole 20 mg daily  ezetimibe 10 mg daily  isosorbide nm 30 mg once a day           pantoprazole  Injectable 40 milliGRAM(s) IV Push two times a day    dextrose 50% Injectable 25 Gram(s) IV Push once  dextrose 50% Injectable 12.5 Gram(s) IV Push once  dextrose 50% Injectable 25 Gram(s) IV Push once  dextrose Oral Gel 15 Gram(s) Oral once PRN  glucagon  Injectable 1 milliGRAM(s) IntraMuscular once  insulin lispro (ADMELOG) corrective regimen sliding scale   SubCutaneous every 6 hours    dextrose 5%. 1000 milliLiter(s) IV Continuous <Continuous>  dextrose 5%. 1000 milliLiter(s) IV Continuous <Continuous>        -------------------------------------------------------------------------------------------  PHYSICAL EXAM:  T(C): 36.7 (10-14-24 @ 12:03), Max: 37.1 (10-14-24 @ 06:12)  HR: 74 (10-14-24 @ 12:03) (58 - 115)  BP: 111/72 (10-14-24 @ 12:03) (92/60 - 125/82)  RR: 18 (10-14-24 @ 12:03) (18 - 21)  SpO2: 99% (10-14-24 @ 12:03) (98% - 100%)  Wt(kg): --  I&O's Summary    14 Oct 2024 07:01  -  14 Oct 2024 13:01  --------------------------------------------------------  IN: 0 mL / OUT: 0 mL / NET: 0 mL        GENERAL: Patient is alert, awake, and in no acute distress  HEENT: Moist mucous membranes. No conjunctival injection. No JVD  CHEST/LUNG: Clear to auscultation bilaterally; No wheezing. Unlabored respirations.  HEART: Regular rate and rhythm; No murmurs. Radial pulses 2+.  ABDOMEN: Bowel sounds present; Soft, Nontender, Nondistended.   EXTREMITIES:  No lower extremity tenderness. No lower extremity edema.  NEURO: Aox3, no focal deficits    -------------------------------------------------------------------------------------------  LABS:                          7.4    13.45 )-----------( 252      ( 14 Oct 2024 06:40 )             23.9     10-14    130[L]  |  96  |  39[H]  ----------------------------<  294[H]  5.1   |  17[L]  |  0.77    Ca    8.9      14 Oct 2024 06:40    TPro  7.0  /  Alb  4.2  /  TBili  0.6  /  DBili  x   /  AST  27  /  ALT  24  /  AlkPhos  44  10-14    PT/INR - ( 14 Oct 2024 06:40 )   PT: 13.7 sec;   INR: 1.19 ratio         PTT - ( 14 Oct 2024 06:40 )  PTT:28.8 sec  CARDIAC MARKERS ( 14 Oct 2024 06:40 )  57 ng/L / x     / x     / x     / x     / x                  -------------------------------------------------------------------------------------------  Cardiovascular Diagnostic Testing:    ECG: sinus tachycardia     Echo: last echo showing grade one diastolic dysfunction on last admission    Stress Testing:    Cath:    -------------------------------------------------------------------------------------------                These are preliminary recommendations. Please see attending attestation for final recommendations.

## 2024-10-15 NOTE — PROGRESS NOTE ADULT - SUBJECTIVE AND OBJECTIVE BOX
[facility]   MD DAKOTA HOGAN MD SAKIL BHUIYAN, MD BRIANA PETITO, CARLOS    TEL:  FROM 9 AM to 5 PM ---OFFICE: [phone]  FROM 5 PM - 9 AM PLEASE CALL ANSWERING SERVICE: [phone]     RENAL PROGRESS NOTE: DATE OF SERVICE 10-15-24 @ 18:50    Patient is a 54y old  Male who presents with a chief complaint of Melena  Patient seen and examined at bedside. No chest pain/sob    VITALS:  T(F): 98.5 (10-15-24 @ 16:36), Max: 98.7 (10-14-24 @ 21:01)  HR: 98 (10-15-24 @ 16:36)  BP: 107/71 (10-15-24 @ 16:36)  RR: 18 (10-15-24 @ 16:36)  SpO2: 98% (10-15-24 @ 16:36)  Wt(kg): --    10-14 @ 07:01  -  10-15 @ 07:00  --------------------------------------------------------  IN: 360 mL / OUT: 850 mL / NET: -490 mL    10-15 @ 07:01  -  10-15 @ 18:50  --------------------------------------------------------  IN: 840 mL / OUT: 650 mL / NET: 190 mL      PHYSICAL EXAM:  Constitutional: NAD  Neck: No JVD  Respiratory: CTAB, no wheezes, rales or rhonchi  Cardiovascular: S1, S2, RRR  Gastrointestinal: BS+, soft, NT/ND  Extremities: No peripheral edema    Hospital Medications:   MEDICATIONS  (STANDING):  aspirin enteric coated 81 milliGRAM(s) Oral daily  atorvastatin 40 milliGRAM(s) Oral at bedtime  dextrose 5%. 1000 milliLiter(s) (50 mL/Hr) IV Continuous <Continuous>  dextrose 5%. 1000 milliLiter(s) (100 mL/Hr) IV Continuous <Continuous>  dextrose 50% Injectable 25 Gram(s) IV Push once  dextrose 50% Injectable 25 Gram(s) IV Push once  dextrose 50% Injectable 12.5 Gram(s) IV Push once  glucagon  Injectable 1 milliGRAM(s) IntraMuscular once  insulin glargine Injectable (LANTUS) 6 Unit(s) SubCutaneous at bedtime  insulin lispro (ADMELOG) corrective regimen sliding scale   SubCutaneous at bedtime  insulin lispro (ADMELOG) corrective regimen sliding scale   SubCutaneous three times a day before meals  metoprolol succinate  milliGRAM(s) Oral daily  pantoprazole  Injectable 40 milliGRAM(s) IV Push two times a day  sacubitril 24 mG/valsartan 26 mG 1 Tablet(s) Oral two times a day  tamsulosin 0.4 milliGRAM(s) Oral at bedtime  ticagrelor 90 milliGRAM(s) Oral every 12 hours      LABS:  10-15    134[L]  |  100  |  25[H]  ----------------------------<  232[H]  4.0   |  22  |  0.98    Ca    8.6      15 Oct 2024 04:42  Phos  3.8     10-15  Mg     1.8     10-15    TPro  7.0  /  Alb  4.2  /  TBili  0.6  /  DBili      /  AST  27  /  ALT  24  /  AlkPhos  44  10-14    Creatinine Trend: 0.98 <--, 0.77 <--    Phosphorus: 3.8 mg/dL (10-15 @ 04:42)                              9.4    9.08  )-----------( 170      ( 15 Oct 2024 04:42 )             27.7     Urine Studies:  Urinalysis - [10-15-24 @ 04:42]      Color  / Appearance  / SG  / pH       Gluc 232 / Ketone   / Bili  / Urobili        Blood  / Protein  / Leuk Est  / Nitrite       RBC  / WBC  / Hyaline  / Gran  / Sq Epi  / Non Sq Epi  / Bacteria     Urine Sodium 97      [10-14-24 @ 14:33]  Urine Osmolality 644      [10-14-24 @ 14:33]    TSH 1.23      [12-25-23 @ 07:00]  Lipid: chol 65, TG 58, HDL 29, LDL --      [05-17-24 @ 06:49]        RADIOLOGY & ADDITIONAL STUDIES:

## 2024-10-15 NOTE — PROGRESS NOTE ADULT - ASSESSMENT
54M w/ hx of cardiac bypass, 7 stents last 03/2024 follows w/ cards at [facility] here for 6 days of progressively worsening cp w/ sxs worsening at 11am on sunday. Found to have low hgb with report of melena and hematemesis. S/p recent ERCP 10/9 with biliary stent removal and extension sphincterotomy, concerning for post sphincterotomy bleed.     Assessment  #Melena  #Hematemesis  #Concern for post sphincterotomy bleed s/p recent ERCP 10/9 with biliary stent removal and extension sphincterotomy  #Chest pain  - S/p EGD 10/14 with stigmata of recent bleeding from the prior sphincterotomy site. No active bleeding noted.   - Chest pain x 1 overnight. Currently chest pain free    Recommendations  - Chest pain management per primary team and cardiology team  - No absolute GI contraindication for Brilinta resumption  - Diet as tolerated  - GI team will sign off. Please reconsult as needed    D/w Dr. Tono Ansari  GI/Hepatology Fellow    MONDAY-FRIDAY 8AM-5PM:  Pager# [phone] ([facility]) or [phone] ([facility])    NON-URGENT CONSULTS:  Please email [email or web address]  AT NIGHT AND ON WEEKENDS:  Contact on-call GI fellow via [employer] by paging or hospital  from 5pm-8am and on weekends/holidays

## 2024-10-15 NOTE — DISCHARGE NOTE PROVIDER - NSDCFUADDAPPT_GEN_ALL_CORE_FT
APPTS ARE READY TO BE MADE: [x ] YES    Best Family or Patient Contact (if needed):    Additional Information about above appointments (if needed):    1: PCP  2: Cardiology    Other comments or requests:    APPTS ARE READY TO BE MADE: [x ] YES    Best Family or Patient Contact (if needed):    Additional Information about above appointments (if needed):    1: PCP  2: Cardiology (your cardiologist at [facility]).    Other comments or requests:    APPTS ARE READY TO BE MADE: [x ] YES    Best Family or Patient Contact (if needed):    Additional Information about above appointments (if needed):    1: PCP  2: Cardiology (your cardiologist at [facility]).    Other comments or requests:     Internal Medicine/ Dr. Urbina:  Provided patient with provider referral information, however patient prefers to schedule the appointments on their own.    Cardiology:  Patient informed us they already have secured a follow up appointment which was not scheduled by our team.  Dr. Puja Mccord MD on 11/11/2024

## 2024-10-15 NOTE — PROGRESS NOTE ADULT - SUBJECTIVE AND OBJECTIVE BOX
Interval Events:   Patient had chest pain last night that resolved spontaneously. Currently, patient denied fever, chills, CP, SOB, abdominal pain, or GIB. No BM since Sunday. Tolerating PO.      Hospital Medications:  aspirin enteric coated 81 milliGRAM(s) Oral daily  atorvastatin 40 milliGRAM(s) Oral at bedtime  dextrose 5%. 1000 milliLiter(s) IV Continuous <Continuous>  dextrose 5%. 1000 milliLiter(s) IV Continuous <Continuous>  dextrose 50% Injectable 25 Gram(s) IV Push once  dextrose 50% Injectable 12.5 Gram(s) IV Push once  dextrose 50% Injectable 25 Gram(s) IV Push once  dextrose Oral Gel 15 Gram(s) Oral once PRN  glucagon  Injectable 1 milliGRAM(s) IntraMuscular once  insulin glargine Injectable (LANTUS) 6 Unit(s) SubCutaneous at bedtime  insulin lispro (ADMELOG) corrective regimen sliding scale   SubCutaneous at bedtime  insulin lispro (ADMELOG) corrective regimen sliding scale   SubCutaneous three times a day before meals  pantoprazole  Injectable 40 milliGRAM(s) IV Push two times a day  sacubitril 24 mG/valsartan 26 mG 1 Tablet(s) Oral two times a day  tamsulosin 0.4 milliGRAM(s) Oral at bedtime  ticagrelor 90 milliGRAM(s) Oral every 12 hours      PHYSICAL EXAM:   Vital Signs:  Vital Signs Last 24 Hrs  T(C): 36.8 (15 Oct 2024 04:25), Max: 37.3 (14 Oct 2024 17:49)  T(F): 98.2 (15 Oct 2024 04:25), Max: 99.2 (14 Oct 2024 17:49)  HR: 110 (15 Oct 2024 04:25) (58 - 110)  BP: 129/88 (15 Oct 2024 04:25) (86/52 - 129/88)  BP(mean): --  RR: 18 (15 Oct 2024 04:25) (15 - 19)  SpO2: 99% (15 Oct 2024 04:25) (96% - 100%)    Parameters below as of 15 Oct 2024 04:25  Patient On (Oxygen Delivery Method): room air      Daily Height in cm: 175.26 (14 Oct 2024 15:49)    Daily     GENERAL: no acute distress  NEURO: alert and oriented x 3  HEENT: NCAT, no conjunctival pallor appreciated; anicteric sclera  CHEST: no respiratory distress, no accessory muscle use  CARDIAC: regular rate, +S1/S2  ABDOMEN: soft, nontender, no rebound or guarding  EXTREMITIES: warm, well perfused  SKIN: no active lesions noted    LABS: reviewed                        9.4    9.08  )-----------( 170      ( 15 Oct 2024 04:42 )             27.7     10-15    134[L]  |  100  |  25[H]  ----------------------------<  232[H]  4.0   |  22  |  0.98    Ca    8.6      15 Oct 2024 04:42  Phos  3.8     10-15  Mg     1.8     10-15    TPro  7.0  /  Alb  4.2  /  TBili  0.6  /  DBili  x   /  AST  27  /  ALT  24  /  AlkPhos  44  10-14

## 2024-10-15 NOTE — PROGRESS NOTE ADULT - TIME-BASED BILLING (NON-CRITICAL CARE)
CHW contacted [facility] to discuss her Christa Chau application. MOP was putting the pt to sleep and stated now was not a good time to speak.     CHW to f/u at a later time.   Time-based billing (NON-critical care)

## 2024-10-15 NOTE — PROGRESS NOTE ADULT - ASSESSMENT
Shaikh Melida is a 54M w/ PMH HTN, CAD s/p CABG and 7 stents 12/2022 on Aspirin and Brilinta, ICM, CHF (EF 35%), DM, GERD, cardiac bypass here for 6 days of progressively worsening cp w/ sxs worsening at 11am on Sunday. Patient reports noted dark stool since Thursday, 2x daily BM's. Also had 3 episodes of hematemesis yesterday. Last emesis and BM was night of 10/13. Decreased PO due to decreased appetite. Also had chest pain that improved since arrival to ED. No SOB, fever, chills or abdominal pain. Of note, recent ERCP 10/9 with biliary stent removal and extension sphincterotomy, concerning for post sphincterotomy bleed. Nephrology consulted for Hyponatremia.     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 No active bleeding noted. GI Signed off.   Hgb relatively stable  Maintain Hgb > 7

## 2024-10-15 NOTE — CHART NOTE - NSCHARTNOTEFT_GEN_A_CORE
Called by overnight team for chest pain.   Attended to patient. Chest pain resolved.   Chest pain left sided 8/10 w/o radiation that lasted for 15 minutes worse with lying flat and improved with sitting up. Similar to his chest pain at prior events.    At the time of my assessment, the patients chest pain had resolved and he was comfortably sleeping.     Recommendations:   1. Repeated troponin/CKMB  2. Check CBC   3. Check cmp  4. Repeat EKG if chest pain recurrs   5. Transfusion threshold should be >8 mg/dL     Edmundo Can MD   Cardiology Fellow

## 2024-10-15 NOTE — DISCHARGE NOTE PROVIDER - NSDCCPCAREPLAN_GEN_ALL_CORE_FT
PRINCIPAL DISCHARGE DIAGNOSIS  Diagnosis: GI bleed  Assessment and Plan of Treatment: You were admitted to the hospital with a bleed in your gastrointestinal tract. In order to treat and further evaluate this, the GI team was consulted and you underwent an endoscopy. This showed resolution of the bleed.  Please follow up with your primary care doctor.   If you start to experience symptoms including but not limited to: bloody or dark stools, fainting, dizziness, please return to the emergency room.     PRINCIPAL DISCHARGE DIAGNOSIS  Diagnosis: GI bleed  Assessment and Plan of Treatment: You were admitted to the hospital with a bleed in your gastrointestinal tract. In order to treat and further evaluate this, the GI team was consulted and you underwent an endoscopy. This showed resolution of the bleed.  Please follow up with your primary care doctor.   If you start to experience symptoms including but not limited to: bloody or dark stools, fainting, dizziness, please return to the emergency room.      SECONDARY DISCHARGE DIAGNOSES  Diagnosis: CAD (coronary artery disease)  Assessment and Plan of Treatment: Resume home medications

## 2024-10-15 NOTE — DISCHARGE NOTE PROVIDER - NSDCMRMEDTOKEN_GEN_ALL_CORE_FT
aspirin 81 mg oral tablet: orally once a day  atorvastatin 40 mg oral tablet: 1 tab(s) orally once a day  Basaglar KwikPen 100 units/mL subcutaneous solution: 6 unit(s) subcutaneous once a day (at bedtime)  Brilinta (ticagrelor) 90 mg oral tablet: 1 tab(s) orally 2 times a day  Entresto 24 mg-26 mg oral tablet: 1 tab(s) orally 2 times a day  ezetimibe 10 mg oral tablet: 1 tab(s) orally once a day  folic acid 1 mg oral tablet: 1 tab(s) orally once a day  isosorbide dinitrate 30 mg oral tablet: 1 tab(s) orally once a day  Janumet 50 mg-1000 mg oral tablet: 1 tab(s) orally 2 times a day  metoprolol succinate 200 mg oral capsule, extended release: 1 cap(s) orally once a day  omeprazole 20 mg oral delayed release tablet: 1 tab(s) orally once a day  tamsulosin 0.4 mg oral capsule: 1 cap(s) orally once a day  Vascepa 1 g oral capsule: 2 cap(s) orally 2 times a day   aspirin 81 mg oral tablet: orally once a day  atorvastatin 40 mg oral tablet: 1 tab(s) orally once a day  Basaglar KwikPen 100 units/mL subcutaneous solution: 6 unit(s) subcutaneous once a day (at bedtime)  Brilinta (ticagrelor) 90 mg oral tablet: 1 tab(s) orally 2 times a day  Entresto 24 mg-26 mg oral tablet: 1 tab(s) orally 2 times a day  ezetimibe 10 mg oral tablet: 1 tab(s) orally once a day  folic acid 1 mg oral tablet: 1 tab(s) orally once a day  isosorbide dinitrate 30 mg oral tablet: 1 tab(s) orally once a day  Janumet 50 mg-1000 mg oral tablet: 1 tab(s) orally 2 times a day  metoprolol succinate 200 mg oral capsule, extended release: 1 cap(s) orally once a day  omeprazole 20 mg oral delayed release tablet: 1 tab(s) orally once a day  pantoprazole 40 mg oral delayed release tablet: 1 tab(s) orally once a day (before a meal)  tamsulosin 0.4 mg oral capsule: 1 cap(s) orally once a day  Vascepa 1 g oral capsule: 2 cap(s) orally 2 times a day

## 2024-10-15 NOTE — DISCHARGE NOTE PROVIDER - CARE PROVIDER_API CALL
Figueroa Urbina  Internal Medicine  [address] [place] [address]  Phone: [phone]  Fax: [phone]  Established Patient  Follow Up Time: 1 month    VICKI KAISER  Phone: [phone]  Fax: [phone]  Established Patient  Follow Up Time: 1 month   Figueroa Urbina  Internal Medicine  [address] [place] [address]  Phone: [phone]  Fax: [phone]  Established Patient  Follow Up Time: 1 month

## 2024-10-15 NOTE — PROGRESS NOTE ADULT - ASSESSMENT
Shaikh Melida is a 54M w/ PMH HTN, CAD s/p CABG and 7 stents 12/2022 on Aspirin and Brilinta, ICM, CHF (EF 35%), DM, GERD, cardiac bypass here for 6 days of progressively worsening cp w/ sxs worsening at 11am on Sunday. Patient reports noted dark stool since Thursday, 2x daily BM's and last BM was last night. Also had 3 episodes of hematemesis yesterday, no further episodes today. Of note, recent ERCP 10/9 with biliary stent removal and extension sphincterotomy, concerning for post sphincterotomy bleed. Concern for UGIB considering decreased hemoglobin, recent procedure, and hematemesis and dark stools.

## 2024-10-15 NOTE — DISCHARGE NOTE PROVIDER - CARE PROVIDERS DIRECT ADDRESSES
[email or web address]

## 2024-10-16 ENCOUNTER — TRANSCRIPTION ENCOUNTER (OUTPATIENT)
Age: 54
End: 2024-10-16

## 2024-10-16 VITALS
DIASTOLIC BLOOD PRESSURE: 62 MMHG | HEART RATE: 79 BPM | OXYGEN SATURATION: 99 % | TEMPERATURE: 98 F | RESPIRATION RATE: 18 BRPM | SYSTOLIC BLOOD PRESSURE: 98 MMHG

## 2024-10-16 LAB
ANION GAP SERPL CALC-SCNC: 9 MMOL/L — SIGNIFICANT CHANGE UP (ref 5–17)
BUN SERPL-MCNC: 16 MG/DL — SIGNIFICANT CHANGE UP (ref 7–23)
CALCIUM SERPL-MCNC: 8.6 MG/DL — SIGNIFICANT CHANGE UP (ref 8.4–10.5)
CHLORIDE SERPL-SCNC: 99 MMOL/L — SIGNIFICANT CHANGE UP (ref 96–108)
CO2 SERPL-SCNC: 24 MMOL/L — SIGNIFICANT CHANGE UP (ref 22–31)
CREAT SERPL-MCNC: 0.91 MG/DL — SIGNIFICANT CHANGE UP (ref 0.5–1.3)
EGFR: 101 ML/MIN/1.73M2 — SIGNIFICANT CHANGE UP
GLUCOSE BLDC GLUCOMTR-MCNC: 190 MG/DL — HIGH (ref 70–99)
GLUCOSE BLDC GLUCOMTR-MCNC: 253 MG/DL — HIGH (ref 70–99)
GLUCOSE SERPL-MCNC: 167 MG/DL — HIGH (ref 70–99)
HCT VFR BLD CALC: 24.5 % — LOW (ref 39–50)
HCT VFR BLD CALC: 26.7 % — LOW (ref 39–50)
HGB BLD-MCNC: 8 G/DL — LOW (ref 13–17)
HGB BLD-MCNC: 8.8 G/DL — LOW (ref 13–17)
MAGNESIUM SERPL-MCNC: 1.8 MG/DL — SIGNIFICANT CHANGE UP (ref 1.6–2.6)
MCHC RBC-ENTMCNC: 27.6 PG — SIGNIFICANT CHANGE UP (ref 27–34)
MCHC RBC-ENTMCNC: 28.1 PG — SIGNIFICANT CHANGE UP (ref 27–34)
MCHC RBC-ENTMCNC: 32.7 GM/DL — SIGNIFICANT CHANGE UP (ref 32–36)
MCHC RBC-ENTMCNC: 33 GM/DL — SIGNIFICANT CHANGE UP (ref 32–36)
MCV RBC AUTO: 84.5 FL — SIGNIFICANT CHANGE UP (ref 80–100)
MCV RBC AUTO: 85.3 FL — SIGNIFICANT CHANGE UP (ref 80–100)
NRBC # BLD: 0 /100 WBCS — SIGNIFICANT CHANGE UP (ref 0–0)
NRBC # BLD: 0 /100 WBCS — SIGNIFICANT CHANGE UP (ref 0–0)
PHOSPHATE SERPL-MCNC: 4 MG/DL — SIGNIFICANT CHANGE UP (ref 2.5–4.5)
PLATELET # BLD AUTO: 175 K/UL — SIGNIFICANT CHANGE UP (ref 150–400)
PLATELET # BLD AUTO: 192 K/UL — SIGNIFICANT CHANGE UP (ref 150–400)
POTASSIUM SERPL-MCNC: 3.7 MMOL/L — SIGNIFICANT CHANGE UP (ref 3.5–5.3)
POTASSIUM SERPL-SCNC: 3.7 MMOL/L — SIGNIFICANT CHANGE UP (ref 3.5–5.3)
RBC # BLD: 2.9 M/UL — LOW (ref 4.2–5.8)
RBC # BLD: 3.13 M/UL — LOW (ref 4.2–5.8)
RBC # FLD: 14.9 % — HIGH (ref 10.3–14.5)
RBC # FLD: 15.3 % — HIGH (ref 10.3–14.5)
SODIUM SERPL-SCNC: 132 MMOL/L — LOW (ref 135–145)
WBC # BLD: 6.43 K/UL — SIGNIFICANT CHANGE UP (ref 3.8–10.5)
WBC # BLD: 6.78 K/UL — SIGNIFICANT CHANGE UP (ref 3.8–10.5)
WBC # FLD AUTO: 6.43 K/UL — SIGNIFICANT CHANGE UP (ref 3.8–10.5)
WBC # FLD AUTO: 6.78 K/UL — SIGNIFICANT CHANGE UP (ref 3.8–10.5)

## 2024-10-16 PROCEDURE — 85025 COMPLETE CBC W/AUTO DIFF WBC: CPT

## 2024-10-16 PROCEDURE — P9016: CPT

## 2024-10-16 PROCEDURE — 85027 COMPLETE CBC AUTOMATED: CPT

## 2024-10-16 PROCEDURE — 36430 TRANSFUSION BLD/BLD COMPNT: CPT

## 2024-10-16 PROCEDURE — 83935 ASSAY OF URINE OSMOLALITY: CPT

## 2024-10-16 PROCEDURE — 85014 HEMATOCRIT: CPT

## 2024-10-16 PROCEDURE — 82803 BLOOD GASES ANY COMBINATION: CPT

## 2024-10-16 PROCEDURE — 87040 BLOOD CULTURE FOR BACTERIA: CPT

## 2024-10-16 PROCEDURE — 86901 BLOOD TYPING SEROLOGIC RH(D): CPT

## 2024-10-16 PROCEDURE — 83735 ASSAY OF MAGNESIUM: CPT

## 2024-10-16 PROCEDURE — 86923 COMPATIBILITY TEST ELECTRIC: CPT

## 2024-10-16 PROCEDURE — 96374 THER/PROPH/DIAG INJ IV PUSH: CPT

## 2024-10-16 PROCEDURE — 86900 BLOOD TYPING SEROLOGIC ABO: CPT

## 2024-10-16 PROCEDURE — 83036 HEMOGLOBIN GLYCOSYLATED A1C: CPT

## 2024-10-16 PROCEDURE — 82330 ASSAY OF CALCIUM: CPT

## 2024-10-16 PROCEDURE — 85018 HEMOGLOBIN: CPT

## 2024-10-16 PROCEDURE — 85730 THROMBOPLASTIN TIME PARTIAL: CPT

## 2024-10-16 PROCEDURE — 83880 ASSAY OF NATRIURETIC PEPTIDE: CPT

## 2024-10-16 PROCEDURE — C9399: CPT

## 2024-10-16 PROCEDURE — 99232 SBSQ HOSP IP/OBS MODERATE 35: CPT | Mod: GC

## 2024-10-16 PROCEDURE — 83605 ASSAY OF LACTIC ACID: CPT

## 2024-10-16 PROCEDURE — C8929: CPT

## 2024-10-16 PROCEDURE — 82947 ASSAY GLUCOSE BLOOD QUANT: CPT

## 2024-10-16 PROCEDURE — 83690 ASSAY OF LIPASE: CPT

## 2024-10-16 PROCEDURE — 84484 ASSAY OF TROPONIN QUANT: CPT

## 2024-10-16 PROCEDURE — 80048 BASIC METABOLIC PNL TOTAL CA: CPT

## 2024-10-16 PROCEDURE — 82435 ASSAY OF BLOOD CHLORIDE: CPT

## 2024-10-16 PROCEDURE — 84132 ASSAY OF SERUM POTASSIUM: CPT

## 2024-10-16 PROCEDURE — 99285 EMERGENCY DEPT VISIT HI MDM: CPT

## 2024-10-16 PROCEDURE — 84300 ASSAY OF URINE SODIUM: CPT

## 2024-10-16 PROCEDURE — 71045 X-RAY EXAM CHEST 1 VIEW: CPT

## 2024-10-16 PROCEDURE — 85610 PROTHROMBIN TIME: CPT

## 2024-10-16 PROCEDURE — 80053 COMPREHEN METABOLIC PANEL: CPT

## 2024-10-16 PROCEDURE — 84295 ASSAY OF SERUM SODIUM: CPT

## 2024-10-16 PROCEDURE — 84100 ASSAY OF PHOSPHORUS: CPT

## 2024-10-16 PROCEDURE — 82962 GLUCOSE BLOOD TEST: CPT

## 2024-10-16 PROCEDURE — 86850 RBC ANTIBODY SCREEN: CPT

## 2024-10-16 RX ORDER — PANTOPRAZOLE SODIUM 40 MG/1
40 TABLET, DELAYED RELEASE ORAL
Refills: 0 | Status: DISCONTINUED | OUTPATIENT
Start: 2024-10-16 | End: 2024-10-16

## 2024-10-16 RX ORDER — PANTOPRAZOLE SODIUM 40 MG/1
1 TABLET, DELAYED RELEASE ORAL
Qty: 90 | Refills: 0
Start: 2024-10-16 | End: 2025-01-13

## 2024-10-16 RX ADMIN — Medication 3: at 12:45

## 2024-10-16 RX ADMIN — Medication 200 MILLIGRAM(S): at 11:06

## 2024-10-16 RX ADMIN — TICAGRELOR 90 MILLIGRAM(S): 60 TABLET ORAL at 05:49

## 2024-10-16 RX ADMIN — PANTOPRAZOLE SODIUM 40 MILLIGRAM(S): 40 TABLET, DELAYED RELEASE ORAL at 05:49

## 2024-10-16 RX ADMIN — Medication 81 MILLIGRAM(S): at 11:06

## 2024-10-16 RX ADMIN — Medication 1: at 08:44

## 2024-10-16 RX ADMIN — SACUBITRIL AND VALSARTAN 1 TABLET(S): 97; 103 TABLET, FILM COATED ORAL at 11:06

## 2024-10-16 NOTE — PROGRESS NOTE ADULT - SUBJECTIVE AND OBJECTIVE BOX
***************************************************************  Shai Johnnie, PGY 1   Internal Medicine   ***************************************************************    SHAIKH MARYURIJJOFRANDY  54y  MRN: [id number]    Patient is a 54y old  Male who presents with a chief complaint of Melena (15 Oct 2024 18:50)      Subjective: no events ON. Denies fever, CP, SOB, abn pain, N/V, dysuria. Tolerating diet.      MEDICATIONS  (STANDING):  aspirin enteric coated 81 milliGRAM(s) Oral daily  atorvastatin 40 milliGRAM(s) Oral at bedtime  dextrose 5%. 1000 milliLiter(s) (50 mL/Hr) IV Continuous <Continuous>  dextrose 5%. 1000 milliLiter(s) (100 mL/Hr) IV Continuous <Continuous>  dextrose 50% Injectable 25 Gram(s) IV Push once  dextrose 50% Injectable 12.5 Gram(s) IV Push once  dextrose 50% Injectable 25 Gram(s) IV Push once  glucagon  Injectable 1 milliGRAM(s) IntraMuscular once  insulin glargine Injectable (LANTUS) 6 Unit(s) SubCutaneous at bedtime  insulin lispro (ADMELOG) corrective regimen sliding scale   SubCutaneous at bedtime  insulin lispro (ADMELOG) corrective regimen sliding scale   SubCutaneous three times a day before meals  metoprolol succinate  milliGRAM(s) Oral daily  pantoprazole    Tablet 40 milliGRAM(s) Oral before breakfast  sacubitril 24 mG/valsartan 26 mG 1 Tablet(s) Oral two times a day  tamsulosin 0.4 milliGRAM(s) Oral at bedtime  ticagrelor 90 milliGRAM(s) Oral every 12 hours    MEDICATIONS  (PRN):  dextrose Oral Gel 15 Gram(s) Oral once PRN Blood Glucose LESS THAN 70 milliGRAM(s)/deciliter      Objective:    Vitals: Vital Signs Last 24 Hrs  T(C): 36.7 (10-16-24 @ 05:00), Max: 37 (10-15-24 @ 20:51)  T(F): 98 (10-16-24 @ 05:00), Max: 98.6 (10-15-24 @ 20:51)  HR: 80 (10-16-24 @ 05:00) (80 - 98)  BP: 97/62 (10-16-24 @ 05:00) (97/62 - 107/71)  BP(mean): --  RR: 18 (10-16-24 @ 05:00) (18 - 18)  SpO2: 98% (10-16-24 @ 05:00) (98% - 100%)            I&O's Summary    15 Oct 2024 07:01  -  16 Oct 2024 07:00  --------------------------------------------------------  IN: 840 mL / OUT: 650 mL / NET: 190 mL        PHYSICAL EXAM:  GENERAL: NAD  HEAD:  Atraumatic, Normocephalic  EYES: EOMI, conjunctiva and sclera clear  CHEST/LUNG: Clear to auscultation bilaterally; No rales, rhonchi, wheezing, or rubs  HEART: Regular rate and rhythm; No murmurs, rubs, or gallops  ABDOMEN: Soft, Nontender, Nondistended;   SKIN: No rashes or lesions  NERVOUS SYSTEM:  Alert & Oriented X3, no focal deficits    LABS:  10-16    132[L]  |  99  |  16  ----------------------------<  167[H]  3.7   |  24  |  0.91  10-15    134[L]  |  100  |  25[H]  ----------------------------<  232[H]  4.0   |  22  |  0.98  10-14    130[L]  |  96  |  39[H]  ----------------------------<  294[H]  5.1   |  17[L]  |  0.77    Ca    8.6      16 Oct 2024 06:31  Ca    8.6      15 Oct 2024 04:42  Ca    8.9      14 Oct 2024 06:40  Phos  4.0     10-16  Mg     1.8     10-16    TPro  7.0  /  Alb  4.2  /  TBili  0.6  /  DBili  x   /  AST  27  /  ALT  24  /  AlkPhos  44  10-14                    Urinalysis Basic - ( 16 Oct 2024 06:31 )    Color: x / Appearance: x / SG: x / pH: x  Gluc: 167 mg/dL / Ketone: x  / Bili: x / Urobili: x   Blood: x / Protein: x / Nitrite: x   Leuk Esterase: x / RBC: x / WBC x   Sq Epi: x / Non Sq Epi: x / Bacteria: x                              8.0    6.43  )-----------( 175      ( 16 Oct 2024 06:31 )             24.5                         9.4    9.08  )-----------( 170      ( 15 Oct 2024 04:42 )             27.7                         9.6    7.61  )-----------( 165      ( 14 Oct 2024 16:35 )             28.4     CAPILLARY BLOOD GLUCOSE      POCT Blood Glucose.: 178 mg/dL (15 Oct 2024 21:15)  POCT Blood Glucose.: 228 mg/dL (15 Oct 2024 16:34)  POCT Blood Glucose.: 266 mg/dL (15 Oct 2024 12:23)  POCT Blood Glucose.: 235 mg/dL (15 Oct 2024 08:33)      RADIOLOGY & ADDITIONAL TESTS:    Imaging Personally Reviewed:  [x ] YES  [ ] NO    Consultants involved in case:   Consultant(s) Notes Reviewed:  [ x] YES  [ ] NO:   Care Discussed with Consultants/Other Providers [x ] YES  [ ] NO         ***************************************************************  Shai Johnnie, PGY 1   Internal Medicine   ***************************************************************    SHAIKH MARYURIJINOCENTE  54y  MRN: [id number]    Patient is a 54y old  Male who presents with a chief complaint of Melena (15 Oct 2024 18:50)      Subjective: no events ON. Denies fever, CP, SOB, abn pain, N/V, dysuria. Tolerating diet. Last BM yesterday morning, black and hard stool.    MEDICATIONS  (STANDING):  aspirin enteric coated 81 milliGRAM(s) Oral daily  atorvastatin 40 milliGRAM(s) Oral at bedtime  dextrose 5%. 1000 milliLiter(s) (50 mL/Hr) IV Continuous <Continuous>  dextrose 5%. 1000 milliLiter(s) (100 mL/Hr) IV Continuous <Continuous>  dextrose 50% Injectable 25 Gram(s) IV Push once  dextrose 50% Injectable 12.5 Gram(s) IV Push once  dextrose 50% Injectable 25 Gram(s) IV Push once  glucagon  Injectable 1 milliGRAM(s) IntraMuscular once  insulin glargine Injectable (LANTUS) 6 Unit(s) SubCutaneous at bedtime  insulin lispro (ADMELOG) corrective regimen sliding scale   SubCutaneous at bedtime  insulin lispro (ADMELOG) corrective regimen sliding scale   SubCutaneous three times a day before meals  metoprolol succinate  milliGRAM(s) Oral daily  pantoprazole    Tablet 40 milliGRAM(s) Oral before breakfast  sacubitril 24 mG/valsartan 26 mG 1 Tablet(s) Oral two times a day  tamsulosin 0.4 milliGRAM(s) Oral at bedtime  ticagrelor 90 milliGRAM(s) Oral every 12 hours    MEDICATIONS  (PRN):  dextrose Oral Gel 15 Gram(s) Oral once PRN Blood Glucose LESS THAN 70 milliGRAM(s)/deciliter      Objective:    Vitals: Vital Signs Last 24 Hrs  T(C): 36.7 (10-16-24 @ 05:00), Max: 37 (10-15-24 @ 20:51)  T(F): 98 (10-16-24 @ 05:00), Max: 98.6 (10-15-24 @ 20:51)  HR: 80 (10-16-24 @ 05:00) (80 - 98)  BP: 97/62 (10-16-24 @ 05:00) (97/62 - 107/71)  BP(mean): --  RR: 18 (10-16-24 @ 05:00) (18 - 18)  SpO2: 98% (10-16-24 @ 05:00) (98% - 100%)            I&O's Summary    15 Oct 2024 07:01  -  16 Oct 2024 07:00  --------------------------------------------------------  IN: 840 mL / OUT: 650 mL / NET: 190 mL        PHYSICAL EXAM:  GENERAL: NAD  HEAD:  Atraumatic, Normocephalic  EYES: EOMI, conjunctiva and sclera clear  CHEST/LUNG: Clear to auscultation bilaterally; No rales, rhonchi, wheezing, or rubs  HEART: Regular rate and rhythm; No murmurs, rubs, or gallops  ABDOMEN: Soft, Nontender, Nondistended;   SKIN: No rashes or lesions  NERVOUS SYSTEM:  Alert & Oriented X3, no focal deficits    LABS:  10-16    132[L]  |  99  |  16  ----------------------------<  167[H]  3.7   |  24  |  0.91  10-15    134[L]  |  100  |  25[H]  ----------------------------<  232[H]  4.0   |  22  |  0.98  10-14    130[L]  |  96  |  39[H]  ----------------------------<  294[H]  5.1   |  17[L]  |  0.77    Ca    8.6      16 Oct 2024 06:31  Ca    8.6      15 Oct 2024 04:42  Ca    8.9      14 Oct 2024 06:40  Phos  4.0     10-16  Mg     1.8     10-16    TPro  7.0  /  Alb  4.2  /  TBili  0.6  /  DBili  x   /  AST  27  /  ALT  24  /  AlkPhos  44  10-14                    Urinalysis Basic - ( 16 Oct 2024 06:31 )    Color: x / Appearance: x / SG: x / pH: x  Gluc: 167 mg/dL / Ketone: x  / Bili: x / Urobili: x   Blood: x / Protein: x / Nitrite: x   Leuk Esterase: x / RBC: x / WBC x   Sq Epi: x / Non Sq Epi: x / Bacteria: x                              8.0    6.43  )-----------( 175      ( 16 Oct 2024 06:31 )             24.5                         9.4    9.08  )-----------( 170      ( 15 Oct 2024 04:42 )             27.7                         9.6    7.61  )-----------( 165      ( 14 Oct 2024 16:35 )             28.4     CAPILLARY BLOOD GLUCOSE      POCT Blood Glucose.: 178 mg/dL (15 Oct 2024 21:15)  POCT Blood Glucose.: 228 mg/dL (15 Oct 2024 16:34)  POCT Blood Glucose.: 266 mg/dL (15 Oct 2024 12:23)  POCT Blood Glucose.: 235 mg/dL (15 Oct 2024 08:33)      RADIOLOGY & ADDITIONAL TESTS:    Imaging Personally Reviewed:  [x ] YES  [ ] NO    Consultants involved in case:   Consultant(s) Notes Reviewed:  [ x] YES  [ ] NO:   Care Discussed with Consultants/Other Providers [x ] YES  [ ] NO

## 2024-10-16 NOTE — PROGRESS NOTE ADULT - PROBLEM SELECTOR PLAN 2
- New ST elevations compared to previous on EKG  - Elevated troponin  - Pt w/o CP at this time. Most likely i/s/o anemia  - Has been on DAPT Brilinta and aspirin at home s/p stents  - Started DAPT per cardiology  - TTE unchanged since previous  - Cards signing off unless repeat episode of CP

## 2024-10-16 NOTE — PROGRESS NOTE ADULT - ATTENDING COMMENTS
53 yo man with extensive CAD/CABG, HTN, mildly decreased LV function. Presents with likely upper GI bleed with melena, profound anemia, and chest discomfort. EKG shows STachy with ST depressions; compared to prior, heart rate is faster and ST/T abn's are now more pronounced. Slight elevation in first trop. No active CP. He was on Brillinta and aspirin at home. Plan is for emergent EGD. Clearly, patient is at elevated CV risk to undergo any procedure; however, it appears that the EGD is emergent to control his bleeding. The best therapy for him is transfusions and localizing the source of bleeding. He likely has an element of demand ischemia (type II MI). There is no indication for emergent cardiac catheterization at this time. Would hold off on bblockers in acute phase, as he needs his HR to keep up with the profound anemia. When GI team is comfortable, suggest restarting at least aspirin, if not DAPT. An echocardiogram would be helpful with in the next few days.
AS above  54M with sig cardiac hx here for likely post ERCP sphincterotomy bleeding with melena, symptomatic anemia  S/p EGD with side viewing endoscopy, no active bleeding, source likely sphincterotomy site  Overnight with chest pain that resolved  No further interventions from GI standpoint, ok with AC as needed  Diet as tolerated  Trend Hgb    Thank you for this interesting consult.  Please call the advanced GI service with any questions or concerns.
Patient seen and examined at bedside. No acute events overnight. Intermittent CP, though EKG not worsening. Troponin likely peaked in setting of demand. Hg is stable. No more signs of melena. TTE with known HFrEF and unchanged WMA. Per cardiology no further inpatient work up. Plan likely demand and anginal which requires further med optimization. Rec close outpatient follow up with his cards.    DC Time: 35 minutes
Patient seen and examined at bedside. No acute events overnight. NO CP/SOB. One episode of melena yesterday, though explained to him that this may take several days to completely resolve. In addition, if actively bleeding you would expect more frequent BM. Restarted his DAPT and GDMT. Hg noted this morning, may be a fluctuation. Will repeat CBC this afternoon and monitor for interval events. If stable then will DC today. Start PPI daily.      DC Time: 35 minutes

## 2024-10-16 NOTE — PROGRESS NOTE ADULT - ASSESSMENT
Shaikh Melida is a 54M w/ PMH HTN, CAD s/p CABG and 7 stents 12/2022 on Aspirin and Brilinta, ICM, CHF (EF 35%), DM, GERD, cardiac bypass here for 6 days of progressively worsening cp w/ sxs worsening at 11am on Sunday. Patient reports noted dark stool since Thursday, 2x daily BM's. Also had 3 episodes of hematemesis yesterday. Last emesis and BM was night of 10/13. Decreased PO due to decreased appetite. Also had chest pain that improved since arrival to ED. No SOB, fever, chills or abdominal pain. Of note, recent ERCP 10/9 with biliary stent removal and extension sphincterotomy, concerning for post sphincterotomy bleed. Nephrology consulted for Hyponatremia.     Hyponatremia   - Patient known to our practice  - Hx of Hyponatremia, SIADH in past   - 10/14 : Urine Osm 644, Urine Na 97 , c/w SIADH   - Na stable  - c/w fluid restriction 1.2L/day  Monitor Na     Anemia  Admitted w/ melena   S/p EGD 10/14 with stigmata of recent bleeding from the prior sphincterotomy site. No active bleeding noted. GI Signed off.   Hgb relatively stable  Maintain Hgb > 7

## 2024-10-16 NOTE — PROGRESS NOTE ADULT - PROBLEM SELECTOR PLAN 6
- C/w home atorvastatin  - C/w zetia  - C/w vascepa
- C/w home atorvastatin  - C/w zetia  - C/w vascepa

## 2024-10-16 NOTE — PROGRESS NOTE ADULT - PROBLEM SELECTOR PROBLEM 4
No acute issues (more to be covered in full note)  She wants to return home   Last PT note recommends OMAIRA and pt upset about not being able to return home     Called mAando's number (not available) as she wanted me to speak to Lyla per pt request.     Will have therapy re assess today   Will finalize cardiology input today as well     
Diabetes mellitus
Diabetes mellitus

## 2024-10-16 NOTE — PROGRESS NOTE ADULT - PROBLEM SELECTOR PLAN 4
- Takes insulin at home and Janumet  - Hold home insulin  - ISS for now  - Monitor FSG
- Takes insulin at home and Janumet  - Hold home insulin  - ISS for now  - Monitor FSG

## 2024-10-16 NOTE — DISCHARGE NOTE NURSING/CASE MANAGEMENT/SOCIAL WORK - FINANCIAL ASSISTANCE
[facility] provides services at a reduced cost to those who are determined to be eligible through [facility]’s financial assistance program. Information regarding [facility]’s financial assistance program can be found by going to [email or web address] or by calling [phone].

## 2024-10-16 NOTE — PROGRESS NOTE ADULT - ASSESSMENT
Aspirin and Brilinta, ICM, CHF (EF 35%), DM, GERD, cardiac bypass here for 6 days of progressively worsening cp w/ sxs worsening at 11am on Sunday. Patient reports noted dark stool since Thursday, 2x daily BM's and last BM was last night.    Patient got EGD done, got two units of PRBCS.   EKG showing Sinus Tachycardia and ST depressions shown in multiple leads that have been shown before along with ST abnormalities due to the increased heart rate  -Repeat EKGs show that patient ST changes look less present do to more controlled HR    - Elevated Troponin in the setting of demand ischemia in the setting of GI Bleed currently up trending would not continue trending   -would continue aspirin and Plavix as hemoglobin is stable currently     TTE showing EF:35%  showing moderate systolic dysfunction similar to last echo  primary team has resumed GGDMT with with Entresto ,Toprol-xl  200 mg,would add Jardiance 10 mg for GDMT  -continue statin  - Cardiology will sign off at this time

## 2024-10-16 NOTE — PROGRESS NOTE ADULT - PROBLEM SELECTOR PLAN 1
#Hematemesis  - Most likely 2/2 UGIB, possibly from recent ERCP   - S/p 2  pRBC transfusions  - Transfuse as needed, goal >=8  - EGD showed signs of previous bleed from sphinctotomy site  - No bleeding since first night in ED - stable #Hematemesis  - Most likely 2/2 UGIB, possibly from recent ERCP   - S/p 2  pRBC transfusions  - Transfuse as needed, goal >=8  - EGD showed signs of previous bleed from sphinctotomy site  - Dark black hard stool yesterday morning, Hb 8 this AM from 9.5, will repeat CBC in PM.

## 2024-10-16 NOTE — PROGRESS NOTE ADULT - SUBJECTIVE AND OBJECTIVE BOX
[facility]    Dr. Felicity Trotter     Office [phone] (9 am to 5 pm)  Service: [phone] (5pm to 9am)        RENAL PROGRESS NOTE: DATE OF SERVICE 10-16-24 @ 11:00    Patient is a 54y old  Male who presents with a chief complaint of Melena (16 Oct 2024 07:39)      Patient seen and examined at bedside. No chest pain/sob    VITALS:  T(F): 97.4 (10-16-24 @ 08:09), Max: 98.6 (10-15-24 @ 20:51)  HR: 77 (10-16-24 @ 08:09)  BP: 95/62 (10-16-24 @ 08:09)  RR: 18 (10-16-24 @ 08:09)  SpO2: 100% (10-16-24 @ 08:09)  Wt(kg): --    10-15 @ 07:01  -  10-16 @ 07:00  --------------------------------------------------------  IN: 840 mL / OUT: 650 mL / NET: 190 mL    10-16 @ 07:01  -  10-16 @ 11:00  --------------------------------------------------------  IN: 0 mL / OUT: 0 mL / NET: 0 mL          PHYSICAL EXAM:  Constitutional: NAD  Neck: No JVD  Respiratory: CTAB, no wheezes, rales or rhonchi  Cardiovascular: S1, S2, RRR  Gastrointestinal: BS+, soft, NT/ND  Extremities: No peripheral edema    Hospital Medications:   MEDICATIONS  (STANDING):  aspirin enteric coated 81 milliGRAM(s) Oral daily  atorvastatin 40 milliGRAM(s) Oral at bedtime  dextrose 5%. 1000 milliLiter(s) (50 mL/Hr) IV Continuous <Continuous>  dextrose 5%. 1000 milliLiter(s) (100 mL/Hr) IV Continuous <Continuous>  dextrose 50% Injectable 25 Gram(s) IV Push once  dextrose 50% Injectable 12.5 Gram(s) IV Push once  dextrose 50% Injectable 25 Gram(s) IV Push once  glucagon  Injectable 1 milliGRAM(s) IntraMuscular once  insulin glargine Injectable (LANTUS) 6 Unit(s) SubCutaneous at bedtime  insulin lispro (ADMELOG) corrective regimen sliding scale   SubCutaneous at bedtime  insulin lispro (ADMELOG) corrective regimen sliding scale   SubCutaneous three times a day before meals  metoprolol succinate  milliGRAM(s) Oral daily  pantoprazole    Tablet 40 milliGRAM(s) Oral before breakfast  sacubitril 24 mG/valsartan 26 mG 1 Tablet(s) Oral two times a day  tamsulosin 0.4 milliGRAM(s) Oral at bedtime  ticagrelor 90 milliGRAM(s) Oral every 12 hours      LABS:  10-16    132[L]  |  99  |  16  ----------------------------<  167[H]  3.7   |  24  |  0.91    Ca    8.6      16 Oct 2024 06:31  Phos  4.0     10-16  Mg     1.8     10-16      Creatinine Trend: 0.91 <--, 0.98 <--, 0.77 <--    Phosphorus: 4.0 mg/dL (10-16 @ 06:31)                              8.0    6.43  )-----------( 175      ( 16 Oct 2024 06:31 )             24.5     Urine Studies:  Urinalysis - [10-16-24 @ 06:31]      Color  / Appearance  / SG  / pH       Gluc 167 / Ketone   / Bili  / Urobili        Blood  / Protein  / Leuk Est  / Nitrite       RBC  / WBC  / Hyaline  / Gran  / Sq Epi  / Non Sq Epi  / Bacteria     Urine Sodium 97      [10-14-24 @ 14:33]  Urine Osmolality 644      [10-14-24 @ 14:33]    TSH 1.23      [12-25-23 @ 07:00]  Lipid: chol 65, TG 58, HDL 29, LDL --      [05-17-24 @ 06:49]        RADIOLOGY & ADDITIONAL STUDIES:

## 2024-10-16 NOTE — DISCHARGE NOTE NURSING/CASE MANAGEMENT/SOCIAL WORK - PATIENT PORTAL LINK FT
You can access the FollowMyHealth Patient Portal offered by [facility] by registering at the following website: [email or web address]. By joining [employer]’s FollowMyHealth portal, you will also be able to view your health information using other applications (apps) compatible with our system.

## 2024-10-16 NOTE — DISCHARGE NOTE NURSING/CASE MANAGEMENT/SOCIAL WORK - NSDCFUADDAPPT_GEN_ALL_CORE_FT
APPTS ARE READY TO BE MADE: [x ] YES    Best Family or Patient Contact (if needed):    Additional Information about above appointments (if needed):    1: PCP  2: Cardiology (your cardiologist at [facility]).    Other comments or requests:

## 2024-10-16 NOTE — PROGRESS NOTE ADULT - SUBJECTIVE AND OBJECTIVE BOX
HPI : Shaikh Melida is a 54M w/ PMH HTN, CAD s/p CABG and 7 stents post recent stent in March  ,on Aspirin and Brilinta, ICM, CHF (EF 35%), DM, GERD, cardiac bypass here for 6 days of progressively worsening cp w/ sxs worsening at 11am on Sunday. Patient reports noted dark stool since Thursday, 2x daily BM's and last BM was last night. Also had 3 episodes of hematemesis yesterday, no further episodes today.  Patient endorsed that chest pain was on rest and exertion, and got worse with the dark stool since thursday . Upon arrival Patient was found to have  hemoglobin of 7.4 was ordered units of PRBCs currently having second unit of PRBC now.Also had chest pain that improved since arrival to ED. No SOB, fever, chills or abdominal pain. Of note, recent ERCP 10/9 with biliary stent removal and extension sphincterotomy, concerning for post sphincterotomy bleed.       Cardiology Consult Note            PMH:  Above    PSH: none     MEDS:  Below    ALLERGIES: no allergies as per patient    FH:CAD, dibates    SH: CAD,diabetes    CHART REVIEW:  5/2024: Hospitalized in acute biliary obstruction choledocholithiasis w possible cholangitis s/p unsuccessful ERCP 2/2 difficult anatomy and periampullary diverticulum. 5/18 S/p EGD/ERCP demonstrating tortuous esophagus and small biliary sphincterotomy w biliary stent placement for choledocholithiasis. S/p lap ashley on 5/20.    11/2023: Pt has been having angina 2-3x/week, described as chest tightness that lasts ~30min. Pt also reports +orthopnea, decreased exercise tolerance for the past month as well.   denies fever, chills, cough, palpitation, abd pain, n/v/d, or urinary sx. S/p cardiac cath 11/14: POBA to OM1 via RFA s/p manual pull/compression. Plan to return in 1-2 weeks for  LAD and D1    4/2023: Presented with CP to ED today and sent directly to cath with Ekg changes and trop of 738 4/6   - Successful PCi with CHERRI to the proximal OM1 and balloon of the distal  OM1.    - Successful POBA LIMA to LAD  - cardiac cath with one stent to the OM1.       4/2023: Admitted with NSTEMI, received ASA load, Brilinta load, s/p Heparin gtt, BB, statin - Trop 412- > 453-> 366.   ROYAL not in concordant leads. ST depressions seen, s/p cardiac cath ( diagnostic) on 4/3 shows significant progression of coronaries dz and occlusion of all grafts, LIMA occluded at distal anastomosis to LAD with diffuse distal LAD disease, MRI indicates viability.  Followed by cards, Lisinopril held per cards, changed to Entresto after 36hrs, Added Valsartan.  s/p CABG 3 months ago.  On 4/6 pt underwent PCI with CHERRI to OM1 via RRA by Dr. Amador and 4/7 PCI to LIMA.      IN THE ED:  Hgb found to be 7.4. 2 units of pRBC ordered. BCx and CXR ordered. GI consulted. (14 Oct 2024 10:10)        PAST MEDICAL & SURGICAL HISTORY:  Hypertension      GERD (gastroesophageal reflux disease)      CAD (coronary artery disease)      History of biliary duct stent placement      BPH (benign prostatic hyperplasia)      T2DM (type 2 diabetes mellitus)      HLD (hyperlipidemia)      History of ischemic cardiomyopathy      Former smoker      S/P CABG (coronary artery bypass graft)      History of ERCP      S/P cholecystectomy      H/O coronary angioplasty      S/P cardiac cath  with stent x7        FAMILY HISTORY:  FH: heart disease (Father)      SOCIAL HISTORY:  unchanged    MEDICATIONS: patient is currently on meotprolol 200 mg daily  aspirin 81 mg daily  Janumet 50 mg/1000 mg tab  tamsulosin .4 mg daily  atorvastatin  40 mg daily  Brilinta 90 mg daily  Entresto 24-26 mg twice a daily  senna daily as needed  omeprazole 20 mg daily  ezetimibe 10 mg daily  isosorbide nm 30 mg once a day           pantoprazole  Injectable 40 milliGRAM(s) IV Push two times a day    dextrose 50% Injectable 25 Gram(s) IV Push once  dextrose 50% Injectable 12.5 Gram(s) IV Push once  dextrose 50% Injectable 25 Gram(s) IV Push once  dextrose Oral Gel 15 Gram(s) Oral once PRN  glucagon  Injectable 1 milliGRAM(s) IntraMuscular once  insulin lispro (ADMELOG) corrective regimen sliding scale   SubCutaneous every 6 hours    dextrose 5%. 1000 milliLiter(s) IV Continuous <Continuous>  dextrose 5%. 1000 milliLiter(s) IV Continuous <Continuous>        -------------------------------------------------------------------------------------------  PHYSICAL EXAM:  T(C): 36.7 (10-14-24 @ 12:03), Max: 37.1 (10-14-24 @ 06:12)  HR: 74 (10-14-24 @ 12:03) (58 - 115)  BP: 111/72 (10-14-24 @ 12:03) (92/60 - 125/82)  RR: 18 (10-14-24 @ 12:03) (18 - 21)  SpO2: 99% (10-14-24 @ 12:03) (98% - 100%)  Wt(kg): --  I&O's Summary    14 Oct 2024 07:01  -  14 Oct 2024 13:01  --------------------------------------------------------  IN: 0 mL / OUT: 0 mL / NET: 0 mL        GENERAL: Patient is alert, awake, and in no acute distress  HEENT: Moist mucous membranes. No conjunctival injection. No JVD  CHEST/LUNG: Clear to auscultation bilaterally; No wheezing. Unlabored respirations.  HEART: Regular rate and rhythm; No murmurs. Radial pulses 2+.  ABDOMEN: Bowel sounds present; Soft, Nontender, Nondistended.   EXTREMITIES:  No lower extremity tenderness. No lower extremity edema.  NEURO: Aox3, no focal deficits    -------------------------------------------------------------------------------------------  LABS:                          7.4    13.45 )-----------( 252      ( 14 Oct 2024 06:40 )             23.9     10-14    130[L]  |  96  |  39[H]  ----------------------------<  294[H]  5.1   |  17[L]  |  0.77    Ca    8.9      14 Oct 2024 06:40    TPro  7.0  /  Alb  4.2  /  TBili  0.6  /  DBili  x   /  AST  27  /  ALT  24  /  AlkPhos  44  10-14    PT/INR - ( 14 Oct 2024 06:40 )   PT: 13.7 sec;   INR: 1.19 ratio         PTT - ( 14 Oct 2024 06:40 )  PTT:28.8 sec  CARDIAC MARKERS ( 14 Oct 2024 06:40 )  57 ng/L / x     / x     / x     / x     / x                  -------------------------------------------------------------------------------------------  Cardiovascular Diagnostic Testing:    ECG: sinus tachycardia     Echo: last echo showing grade one diastolic dysfunction on last admission      -------------------------------------------------------------------------------------------

## 2024-10-18 ENCOUNTER — TRANSCRIPTION ENCOUNTER (OUTPATIENT)
Age: 54
End: 2024-10-18

## 2025-02-10 NOTE — ED PROVIDER NOTE - IV ALTEPLASE ADMIN OUTSIDE HIDDEN
Patient daughter positive for flu from urgent care. Asking for tamiflu due to her starting to have symptoms.    show

## 2025-03-08 NOTE — CONSULT NOTE ADULT - PROBLEM SELECTOR RECOMMENDATION 2
Patient is in the supine position.   The left arm was positioned using the following devices: arm board and self-adhering foam.  The right arm was positioned using the following devices: arm board and self-adhering foam.  The left leg was positioned using the following devices: safety strap.  The right leg was positioned using the following devices: safety strap. - Ischemic eval as above, along with RHC  - Please obtain pro-BNP  - Repeat TTE if remaining inpatient  - Cont home Toprol, Entresto. Hold home Farxiga pending possible cath  - Ok with trial of Imdur 30 per general Cardiology   - If patient to be discharged prior to Tuesday, please arrange outpatient Heart Failure follow up

## 2025-03-14 NOTE — ED ADULT NURSE REASSESSMENT NOTE - CONDITION
patient is in need of a new mobility device/power chair    Patient has a mobility limitation that impairs their ability to  toilet, eat, dress themself, groom or bathe themselves.  Mobility limitation cannot be improved with a cane or walker   use of a  mobility device/power chair will significantly improve ability to participate in daily activities within an out of their home  and patient will use on regular basis   patient has not expressed an unwillingness to use a  mobility device/power chair in an out of their home   patient has upper extremity function and overall physical and mental capabilities needed to safely propel the mobility device/power chair in an out of the home   patient also has a caregiver available and willing to provide assistance with the  mobility device/power chair    Prescription for Permobil power chair written and faxed to [email or web address]   improved

## 2025-07-11 NOTE — ED PROVIDER NOTE - CROS ED ROS STATEMENT
No care due was identified.  [facility] Embedded Care Due Messages. Reference number: [id number].   7/11/2025 4:53:10 PM CDT   all other ROS negative except as per HPI

## (undated) DEVICE — PACING CABLE (BROWN) A/V TEMP SCREW DOWN 12FT

## (undated) DEVICE — DRSG OPSITE 13.75 X 4"

## (undated) DEVICE — GETINGE VASOVIEW 7 ENDOSCOPIC VESSEL HARVESTING SYSTEM

## (undated) DEVICE — TROCAR COVIDIEN VERSAPORT BLADELESS OPTICAL 5MM STANDARD

## (undated) DEVICE — NDL INJ SCLERO INTERJECT 25G

## (undated) DEVICE — CATH IV SAFE BC 20G X 1.16" (PINK)

## (undated) DEVICE — SOL NORMOSOL-R PH7.4 1000ML

## (undated) DEVICE — WARMING BLANKET UPPER ADULT

## (undated) DEVICE — DRSG TEGADERM 6"X8"

## (undated) DEVICE — CONNECTOR INTERSEPT "Y" 1/4 X 1/4 X 1/4"

## (undated) DEVICE — AORTIC PUNCH 4.0MM STANDARD HANDLE

## (undated) DEVICE — PACK UNIVERSAL CARDIAC SUPPLEMNTAL B

## (undated) DEVICE — TUBING SUCTION 20FT

## (undated) DEVICE — CATH IV SAFE BC 22G X 1" (BLUE)

## (undated) DEVICE — BIOPSY FORCEP RADIAL JAW 4 STANDARD WITH NEEDLE

## (undated) DEVICE — DRAIN CHANNEL 19FR ROUND FULL FLUTED

## (undated) DEVICE — SUT MONOCRYL 4-0 27" PS-2 UNDYED

## (undated) DEVICE — SPECIMEN CONTAINER 100ML

## (undated) DEVICE — LIJ/LIA-ESU ERBE APC2 062977: Type: DURABLE MEDICAL EQUIPMENT

## (undated) DEVICE — SUCTION YANKAUER NO CONTROL VENT

## (undated) DEVICE — SNARE POLYP MINI ACCUSNR 1.5 X 3CM

## (undated) DEVICE — SENSOR MYOCARDIAL TEMP 15MM

## (undated) DEVICE — FOLEY HOLDER STATLOCK 2 WAY ADULT

## (undated) DEVICE — PROTECTOR HEEL / ELBOW

## (undated) DEVICE — SUT PROLENE 7-0 4-24" BV-1

## (undated) DEVICE — GLV 8 PROTEXIS (WHITE)

## (undated) DEVICE — DRSG STERISTRIPS 0.5 X 4"

## (undated) DEVICE — PACING CABLE (BLUE) ATRIAL TEMP SCREW DOWN 12FT

## (undated) DEVICE — VENODYNE/SCD SLEEVE CALF MEDIUM

## (undated) DEVICE — PAPIL BILRTH II 6-5FRX0.035IN

## (undated) DEVICE — DRAPE 3/4 SHEET 52X76"

## (undated) DEVICE — SYNOVIS VASCULAR PROBE 1.5MM 15CM

## (undated) DEVICE — SAW BLADE MICROAIRE STERNUM 1X34X9.4MM

## (undated) DEVICE — PACK IV START WITH CHG

## (undated) DEVICE — CHEST DRAIN PLEUR-EVAC WET/WET ADULT-PEDS SINGLE (QUICK)

## (undated) DEVICE — SYR LUER LOK 20CC

## (undated) DEVICE — SUT DOUBLE 6 WIRE STERNAL

## (undated) DEVICE — POSITIONER FOAM EGG CRATE ULNAR 2PCS (PINK)

## (undated) DEVICE — TUBING STRYKEFLOW II SUCTION / IRRIGATOR

## (undated) DEVICE — TUBING IV SET GRAVITY 3Y 100" MACRO

## (undated) DEVICE — SYS VEIN HARVESTING VIRTUOSAPH PLUS W/ RADIAL

## (undated) DEVICE — TROCAR COVIDIEN BLUNT TIP HASSAN 10MM STANDARD

## (undated) DEVICE — SUT BOOT STANDARD (ASSORTED) 5 PAIR

## (undated) DEVICE — SUT PROLENE 6-0 4-30" C-1

## (undated) DEVICE — TROCAR COVIDIEN VERSAONE BLADED FIXATION 11MM STANDARD

## (undated) DEVICE — DRAPE LIGHT HANDLE COVER (BLUE)

## (undated) DEVICE — SNARE POLYP SENS SM 13MM 240CM

## (undated) DEVICE — BASIN EMESIS 10IN GRADUATED MAUVE

## (undated) DEVICE — SUT PDS II 0 18" ENDOLOOP LIGATURE

## (undated) DEVICE — TROCAR COVIDIEN VERSAONE FIXATION CANNULA 5MM

## (undated) DEVICE — POSITIONER FOAM HEAD CRADLE (PINK)

## (undated) DEVICE — NDL INJ SCLERO INTERJECT 23G

## (undated) DEVICE — FORCEP RADIAL JAW 4 PEDIATRIC W NDL 1.8MM 2MM 160CM DISP

## (undated) DEVICE — CONNECTOR "Y" 1/2 X 3/8 X 3/8"

## (undated) DEVICE — DRSG STOCKINETTE IMPERVIOUS XL

## (undated) DEVICE — SUT PLEDGET PRE PUNCH 4.8 X 9.5 X 1.5 MM

## (undated) DEVICE — PHRENIC NERVE PAD MEDIUM

## (undated) DEVICE — SUMP PERICARDIAL 20FR 1/4" ADULT

## (undated) DEVICE — DRSG OPSITE 2.5 X 2"

## (undated) DEVICE — TUBING TRUWAVE PRESSURE MALE/FEMALE 72"

## (undated) DEVICE — VENTING ADAPTER "Y" (RED/BLUE) 7.5"

## (undated) DEVICE — DRSG KLING 6"

## (undated) DEVICE — DRSG BANDAID 0.75X3"

## (undated) DEVICE — SOL IRR POUR NS 0.9% 1000ML

## (undated) DEVICE — SOL INJ NS 0.9% 500ML 2 PORT

## (undated) DEVICE — GOWN LG

## (undated) DEVICE — DRAIN CHANNEL 32FR ROUND HUBLESS FULL FLUTED

## (undated) DEVICE — SALIVA EJECTOR (BLUE)

## (undated) DEVICE — DRSG CURITY GAUZE SPONGE 4 X 4" 12-PLY NON-STERILE

## (undated) DEVICE — AORTIC PUNCH 4.0MM LONG LENGTH HANDLE

## (undated) DEVICE — SUT POLYSORB 2-0 30" GS-21 UNDYED

## (undated) DEVICE — DRSG DERMABOND PRINEO 60CM

## (undated) DEVICE — SUT BLUNT SZ 5

## (undated) DEVICE — APPLICATOR SURGICEL LAP TROCAR POINT 2.5MM X 150MM

## (undated) DEVICE — MEDICATION LABELS W MARKER

## (undated) DEVICE — SUT PROLENE 7-0 24" BV175-6

## (undated) DEVICE — PACK UNIVERSAL CARDIAC

## (undated) DEVICE — D HELP - CLEARVIEW CLEARIFY SYSTEM

## (undated) DEVICE — TIP METZENBAUM SCISSOR MONOPOLAR ENDOCUT (ORANGE)

## (undated) DEVICE — VESSEL LOOP KEY SURG MINI RED 2.4X1.2MM

## (undated) DEVICE — SUT PROLENE 4-0 36" RB-1

## (undated) DEVICE — BRUSH CYTO RAP EXCHG 3MM

## (undated) DEVICE — TUBING TUR 2 PRONG

## (undated) DEVICE — SYR ASEPTO

## (undated) DEVICE — SNARE OVAL LOOP MICOR

## (undated) DEVICE — BIOPSY FORCEP COLD DISP

## (undated) DEVICE — BLADE SURGICAL #15 CARBON

## (undated) DEVICE — SYR LUER LOK 30CC

## (undated) DEVICE — SUT VICRYL 0 27" UR-6

## (undated) DEVICE — NDL HYPO SAFE 18G X 1.5" (PINK)

## (undated) DEVICE — FEEDING TUBE NG SUMP 16FR 48"

## (undated) DEVICE — SUT TICRON 5 30" KV-40

## (undated) DEVICE — TUBING IV SET MICROCLAVE ADAPTER

## (undated) DEVICE — DISSECTOR ENDOSCOPIC KITTNER SINGLE TIP

## (undated) DEVICE — Device

## (undated) DEVICE — DVC AUTO CAP RX LOKG

## (undated) DEVICE — SUT PROLENE 3-0 36" SH

## (undated) DEVICE — DRAPE 3/4 SHEET W REINFORCEMENT 56X77"

## (undated) DEVICE — MULTIPLE PERFUSION SET FEMALE 1 INLET LEG W 4 LEGS 15" (BLUE/RED)

## (undated) DEVICE — NDL BLUNT 18G LOOP VESSEL MAXI WHITE

## (undated) DEVICE — GLV 7.5 PROTEXIS (WHITE)

## (undated) DEVICE — TUBING STRYKER PNEUMOCLEAR SMOKE HEAT HUMID

## (undated) DEVICE — TUBING INSUFFLATION LAP FILTER 10FT

## (undated) DEVICE — DRAPE 1/2 SHEET 40X57"

## (undated) DEVICE — DRAPE MAYO STAND 30"

## (undated) DEVICE — WARMING BLANKET DUO-THERM HYPER/HYPOTHERM ADULT

## (undated) DEVICE — SENSOR O2 FINGER ADULT

## (undated) DEVICE — BULLDOG SPRING CLIP 6MM SOFT/SOFT

## (undated) DEVICE — BITE BLOCK ADULT 20 X 27MM (GREEN)

## (undated) DEVICE — ELCTR BOVIE TIP BLADE VALLEYLAB 6.5"

## (undated) DEVICE — MEDTRONIC CLEARVIEW BLOWER MISTER KIT W TUBING SET

## (undated) DEVICE — PREP CHLORAPREP HI-LITE ORANGE 26ML

## (undated) DEVICE — BLADE SCALPEL SAFETYLOCK #11

## (undated) DEVICE — VISITEC 4X4

## (undated) DEVICE — DRAPE INSTRUMENT POUCH 6.75" X 11"

## (undated) DEVICE — CONTAINER FORMALIN 80ML YELLOW

## (undated) DEVICE — UNDERPAD LINEN SAVER 17 X 24"

## (undated) DEVICE — SOL IRR POUR H2O 250ML

## (undated) DEVICE — SUT PROLENE 8-0 24" BV175-6

## (undated) DEVICE — SOL IRR BAG NS 0.9% 3000ML

## (undated) DEVICE — ELCTR ECG CONDUCTIVE ADHESIVE

## (undated) DEVICE — TUBING MEDI-VAC W MAXIGRIP CONNECTORS 1/4"X6'

## (undated) DEVICE — STAPLER SKIN VISI-STAT 35 WIDE

## (undated) DEVICE — GOWN XXXL

## (undated) DEVICE — TROCAR COVIDIEN BLUNT TIP HASSAN 10MM

## (undated) DEVICE — GOWN TRIMAX XXL

## (undated) DEVICE — INSUFFLATION NDL COVIDIEN SURGINEEDLE VERESS 120MM

## (undated) DEVICE — SPHINCTEROTOME CLEVERCUT WIRE 25MM  2.8MM X 170CM

## (undated) DEVICE — LITHOTRIPY BASKET TRAPEZOID 2.5CM

## (undated) DEVICE — OLYMPUS DISTAL COVER ENDOSCOPE

## (undated) DEVICE — ELCTR BOVIE PENCIL SMOKE EVACUATION

## (undated) DEVICE — LAP PAD 18 X 18"

## (undated) DEVICE — ELCTR GROUNDING PAD ADULT COVIDIEN

## (undated) DEVICE — SUT STAINLESS STEEL 5 18" SCC

## (undated) DEVICE — LUBRICATING JELLY HR ONE SHOT 3G

## (undated) DEVICE — TUBING KIT FAST START ATF 40

## (undated) DEVICE — FRA-ESU BOVIE CODMAN HP3706AU: Type: DURABLE MEDICAL EQUIPMENT

## (undated) DEVICE — ELCTR BOVIE TIP BLADE INSULATED 2.75" EDGE

## (undated) DEVICE — CONNECTOR STRAIGHT 3/8 X 1/2"

## (undated) DEVICE — GLV 6.5 PROTEXIS (WHITE)

## (undated) DEVICE — PAD MONITORING LOW LEVEL II

## (undated) DEVICE — SOL ANTI FOG

## (undated) DEVICE — SYR LUER LOK 1CC

## (undated) DEVICE — SUT SOFSILK 0 30" V-20

## (undated) DEVICE — SOL IRR POUR NS 0.9% 500ML

## (undated) DEVICE — STRYKER INTERPULSE HANDPIECE W IRR SUCTION TUBE

## (undated) DEVICE — SUCTION CATH ARGYLE WHISTLE TIP 14FR STRAIGHT PACKED

## (undated) DEVICE — BASIN SET SINGLE

## (undated) DEVICE — DRAPE SLUSH / WARMER 44 X 66"

## (undated) DEVICE — SUT POLYSORB 2 30" GS-26

## (undated) DEVICE — STEALTH CLAMP INSERT FIBRA/FIBRA 60MM

## (undated) DEVICE — SOL IRR POUR H2O 500ML

## (undated) DEVICE — BRUSH COLONOSCOPY CYTOLOGY

## (undated) DEVICE — SYR LUER LOK 50CC

## (undated) DEVICE — BLADE SCALPEL SAFETYLOCK #10

## (undated) DEVICE — GOWN TRIMAX LG

## (undated) DEVICE — POSITIONER CARDIAC BUMP

## (undated) DEVICE — PACK GENERAL LAPAROSCOPY

## (undated) DEVICE — SUT POLYSORB 4-0 30" CVF-23

## (undated) DEVICE — MARKING PEN W RULER

## (undated) DEVICE — SUT PROLENE 4-0 36" BB

## (undated) DEVICE — TUBING OLYMPUS INSUFFLATION

## (undated) DEVICE — NDL COUNTER FOAM AND MAGNET 40-70

## (undated) DEVICE — GLV 7 PROTEXIS (WHITE)

## (undated) DEVICE — DRSG 2X2

## (undated) DEVICE — SUT SURGICAL STEEL 6 30" BP-1

## (undated) DEVICE — VESSEL LOOP MAXI-RED  0.120" X 16"

## (undated) DEVICE — AORTIC PUNCH 5MM STANDARD HANDLE

## (undated) DEVICE — SUT SOFSILK 2 60" TIES

## (undated) DEVICE — DRAPE IOBAN 23" X 23"

## (undated) DEVICE — PACK CUSTOM W/INSPIRE OXYGENATOR

## (undated) DEVICE — TROCAR COVIDIEN VERSAONE BLUNT TIP HASSAN 12MM

## (undated) DEVICE — ENDOCATCH 10MM SPECIMEN POUCH

## (undated) DEVICE — WARMING BLANKET FULL ADULT

## (undated) DEVICE — BLADE SCALPEL SAFETYLOCK #15

## (undated) DEVICE — TUBING ATS SUCTION LINE

## (undated) DEVICE — FOLEY TRAY 16FR 5CC LF LUBRISIL ADVANCE TEMP CLOSED

## (undated) DEVICE — GLV 8 PROTEXIS ORTHO (CREAM)

## (undated) DEVICE — CLAMP BX HOT RAD JAW 3

## (undated) DEVICE — DENTURE CUP PINK

## (undated) DEVICE — DRAPE TOWEL BLUE 17" X 24"

## (undated) DEVICE — FILTER REINFUSION FOR SALVAGED BLOOD DISP

## (undated) DEVICE — TOURNIQUET SET 12FR (1 RED, 1 BLUE, 1 SNARE) 7"

## (undated) DEVICE — POSITIONER STRAP ARMBOARD VELCRO TS-30

## (undated) DEVICE — DRSG ACE BANDAGE 6"

## (undated) DEVICE — STEALTH CLAMP INSERT FIBRA/FIBRA 90MM

## (undated) DEVICE — BEAVER BLADE MINI SHARP ALL ROUND (BLUE)

## (undated) DEVICE — SUT POLYSORB 0 36" GS-25 UNDYED

## (undated) DEVICE — GLV 8.5 PROTEXIS (WHITE)

## (undated) DEVICE — SUT BIOSYN 4-0 18" P-12

## (undated) DEVICE — DRAIN RESERVOIR FOR JACKSON PRATT 100CC CARDINAL